# Patient Record
Sex: FEMALE | Employment: FULL TIME | URBAN - METROPOLITAN AREA
[De-identification: names, ages, dates, MRNs, and addresses within clinical notes are randomized per-mention and may not be internally consistent; named-entity substitution may affect disease eponyms.]

---

## 2017-01-01 ENCOUNTER — TELEPHONE (OUTPATIENT)
Dept: GYNECOLOGY | Age: 52
End: 2017-01-01

## 2017-01-01 ENCOUNTER — OFFICE VISIT (OUTPATIENT)
Dept: GYNECOLOGY | Age: 52
End: 2017-01-01

## 2017-01-01 ENCOUNTER — HOSPITAL ENCOUNTER (OUTPATIENT)
Dept: INFUSION THERAPY | Age: 52
Discharge: HOME | End: 2017-11-30
Payer: COMMERCIAL

## 2017-01-01 VITALS
RESPIRATION RATE: 16 BRPM | HEART RATE: 81 BPM | SYSTOLIC BLOOD PRESSURE: 105 MMHG | DIASTOLIC BLOOD PRESSURE: 61 MMHG | TEMPERATURE: 97.9 F

## 2017-01-01 VITALS
BODY MASS INDEX: 23.07 KG/M2 | WEIGHT: 147 LBS | HEIGHT: 67 IN | SYSTOLIC BLOOD PRESSURE: 119 MMHG | DIASTOLIC BLOOD PRESSURE: 70 MMHG | HEART RATE: 80 BPM

## 2017-01-01 DIAGNOSIS — R11.2 CINV (CHEMOTHERAPY-INDUCED NAUSEA AND VOMITING): ICD-10-CM

## 2017-01-01 DIAGNOSIS — T45.1X5A CINV (CHEMOTHERAPY-INDUCED NAUSEA AND VOMITING): ICD-10-CM

## 2017-01-01 DIAGNOSIS — Z15.01 BRCA1 POSITIVE: ICD-10-CM

## 2017-01-01 DIAGNOSIS — T45.1X5A CINV (CHEMOTHERAPY-INDUCED NAUSEA AND VOMITING): Primary | ICD-10-CM

## 2017-01-01 DIAGNOSIS — C56.3 MALIGNANT NEOPLASM OF BOTH OVARIES (HCC): ICD-10-CM

## 2017-01-01 DIAGNOSIS — C56.3 MALIGNANT NEOPLASM OF BOTH OVARIES (HCC): Primary | ICD-10-CM

## 2017-01-01 DIAGNOSIS — Z15.09 BRCA1 POSITIVE: ICD-10-CM

## 2017-01-01 DIAGNOSIS — R11.2 CINV (CHEMOTHERAPY-INDUCED NAUSEA AND VOMITING): Primary | ICD-10-CM

## 2017-01-01 LAB
ALBUMIN SERPL-MCNC: 3.3 G/DL (ref 3.5–5)
ALBUMIN SERPL-MCNC: 4.6 G/DL (ref 3.5–5.5)
ALBUMIN/GLOB SERPL: 0.8 {RATIO} (ref 1.1–2.2)
ALBUMIN/GLOB SERPL: 2 {RATIO} (ref 1.2–2.2)
ALP SERPL-CCNC: 107 U/L (ref 45–117)
ALP SERPL-CCNC: 87 IU/L (ref 39–117)
ALT SERPL-CCNC: 20 IU/L (ref 0–32)
ALT SERPL-CCNC: 44 U/L (ref 12–78)
ANION GAP SERPL CALC-SCNC: 6 MMOL/L (ref 5–15)
AST SERPL-CCNC: 19 IU/L (ref 0–40)
AST SERPL-CCNC: 21 U/L (ref 15–37)
BASOPHILS # BLD AUTO: 0 X10E3/UL (ref 0–0.2)
BASOPHILS # BLD: 0 K/UL (ref 0–0.1)
BASOPHILS NFR BLD AUTO: 1 %
BASOPHILS NFR BLD: 0 % (ref 0–1)
BILIRUB SERPL-MCNC: 0.2 MG/DL (ref 0.2–1)
BILIRUB SERPL-MCNC: 0.2 MG/DL (ref 0–1.2)
BUN SERPL-MCNC: 15 MG/DL (ref 6–20)
BUN SERPL-MCNC: 15 MG/DL (ref 6–24)
BUN/CREAT SERPL: 14 (ref 9–23)
BUN/CREAT SERPL: 15 (ref 12–20)
CALCIUM SERPL-MCNC: 8.9 MG/DL (ref 8.5–10.1)
CALCIUM SERPL-MCNC: 9.6 MG/DL (ref 8.7–10.2)
CANCER AG125 SERPL-ACNC: 15.6 U/ML (ref 0–38.1)
CANCER AG125 SERPL-ACNC: 74 U/ML (ref 0–30)
CHLORIDE SERPL-SCNC: 104 MMOL/L (ref 96–106)
CHLORIDE SERPL-SCNC: 106 MMOL/L (ref 97–108)
CO2 SERPL-SCNC: 24 MMOL/L (ref 18–29)
CO2 SERPL-SCNC: 27 MMOL/L (ref 21–32)
CREAT SERPL-MCNC: 1.03 MG/DL (ref 0.55–1.02)
CREAT SERPL-MCNC: 1.05 MG/DL (ref 0.57–1)
EOSINOPHIL # BLD AUTO: 0.1 X10E3/UL (ref 0–0.4)
EOSINOPHIL # BLD: 0 K/UL (ref 0–0.4)
EOSINOPHIL NFR BLD AUTO: 2 %
EOSINOPHIL NFR BLD: 1 % (ref 0–7)
ERYTHROCYTE [DISTWIDTH] IN BLOOD BY AUTOMATED COUNT: 13.8 % (ref 11.5–14.5)
ERYTHROCYTE [DISTWIDTH] IN BLOOD BY AUTOMATED COUNT: 15.2 % (ref 12.3–15.4)
GFR SERPLBLD CREATININE-BSD FMLA CKD-EPI: 61 ML/MIN/1.73
GFR SERPLBLD CREATININE-BSD FMLA CKD-EPI: 71 ML/MIN/1.73
GLOBULIN SER CALC-MCNC: 2.3 G/DL (ref 1.5–4.5)
GLOBULIN SER CALC-MCNC: 3.9 G/DL (ref 2–4)
GLUCOSE SERPL-MCNC: 101 MG/DL (ref 65–100)
GLUCOSE SERPL-MCNC: 61 MG/DL (ref 65–99)
HCT VFR BLD AUTO: 31.8 % (ref 35–47)
HCT VFR BLD AUTO: 33.4 % (ref 34–46.6)
HGB BLD-MCNC: 10.4 G/DL (ref 11.5–16)
HGB BLD-MCNC: 10.9 G/DL (ref 11.1–15.9)
IMM GRANULOCYTES # BLD: 0 X10E3/UL (ref 0–0.1)
IMM GRANULOCYTES NFR BLD: 0 %
LYMPHOCYTES # BLD AUTO: 1.2 X10E3/UL (ref 0.7–3.1)
LYMPHOCYTES # BLD: 1.6 K/UL (ref 0.8–3.5)
LYMPHOCYTES NFR BLD AUTO: 36 %
LYMPHOCYTES NFR BLD: 32 % (ref 12–49)
MAGNESIUM SERPL-MCNC: 2.2 MG/DL (ref 1.6–2.3)
MCH RBC QN AUTO: 31 PG (ref 26–34)
MCH RBC QN AUTO: 31.6 PG (ref 26.6–33)
MCHC RBC AUTO-ENTMCNC: 32.6 G/DL (ref 31.5–35.7)
MCHC RBC AUTO-ENTMCNC: 32.7 G/DL (ref 30–36.5)
MCV RBC AUTO: 94.9 FL (ref 80–99)
MCV RBC AUTO: 97 FL (ref 79–97)
MONOCYTES # BLD AUTO: 0.5 X10E3/UL (ref 0.1–0.9)
MONOCYTES # BLD: 0.4 K/UL (ref 0–1)
MONOCYTES NFR BLD AUTO: 14 %
MONOCYTES NFR BLD: 7 % (ref 5–13)
NEUTROPHILS # BLD AUTO: 1.6 X10E3/UL (ref 1.4–7)
NEUTROPHILS NFR BLD AUTO: 47 %
NEUTS SEG # BLD: 3 K/UL (ref 1.8–8)
NEUTS SEG NFR BLD: 60 % (ref 32–75)
PLATELET # BLD AUTO: 124 X10E3/UL (ref 150–379)
PLATELET # BLD AUTO: 160 K/UL (ref 150–400)
POTASSIUM SERPL-SCNC: 3.9 MMOL/L (ref 3.5–5.1)
POTASSIUM SERPL-SCNC: 4.3 MMOL/L (ref 3.5–5.2)
PROT SERPL-MCNC: 6.9 G/DL (ref 6–8.5)
PROT SERPL-MCNC: 7.2 G/DL (ref 6.4–8.2)
RBC # BLD AUTO: 3.35 M/UL (ref 3.8–5.2)
RBC # BLD AUTO: 3.45 X10E6/UL (ref 3.77–5.28)
SODIUM SERPL-SCNC: 139 MMOL/L (ref 136–145)
SODIUM SERPL-SCNC: 143 MMOL/L (ref 134–144)
WBC # BLD AUTO: 3.4 X10E3/UL (ref 3.4–10.8)
WBC # BLD AUTO: 5 K/UL (ref 3.6–11)

## 2017-01-01 PROCEDURE — 77030012965 HC NDL HUBR BBMI -A

## 2017-01-01 PROCEDURE — 36591 DRAW BLOOD OFF VENOUS DEVICE: CPT

## 2017-01-01 PROCEDURE — 86304 IMMUNOASSAY TUMOR CA 125: CPT | Performed by: OBSTETRICS & GYNECOLOGY

## 2017-01-01 PROCEDURE — 74011250636 HC RX REV CODE- 250/636: Performed by: OBSTETRICS & GYNECOLOGY

## 2017-01-01 PROCEDURE — 36415 COLL VENOUS BLD VENIPUNCTURE: CPT | Performed by: OBSTETRICS & GYNECOLOGY

## 2017-01-01 PROCEDURE — 74011000250 HC RX REV CODE- 250: Performed by: OBSTETRICS & GYNECOLOGY

## 2017-01-01 PROCEDURE — 80053 COMPREHEN METABOLIC PANEL: CPT | Performed by: OBSTETRICS & GYNECOLOGY

## 2017-01-01 PROCEDURE — 85025 COMPLETE CBC W/AUTO DIFF WBC: CPT | Performed by: OBSTETRICS & GYNECOLOGY

## 2017-01-01 RX ORDER — OLAPARIB 150 MG/1
TABLET, FILM COATED ORAL
COMMUNITY
Start: 2017-11-20 | End: 2018-01-01 | Stop reason: DRUGHIGH

## 2017-01-01 RX ORDER — OXYMETAZOLINE HCL 0.05 %
2 SPRAY, NON-AEROSOL (ML) NASAL 2 TIMES DAILY
COMMUNITY
End: 2018-01-01 | Stop reason: ALTCHOICE

## 2017-01-01 RX ORDER — SODIUM CHLORIDE 9 MG/ML
10 INJECTION INTRAMUSCULAR; INTRAVENOUS; SUBCUTANEOUS AS NEEDED
Status: ACTIVE | OUTPATIENT
Start: 2017-01-01 | End: 2017-01-01

## 2017-01-01 RX ORDER — HEPARIN 100 UNIT/ML
500 SYRINGE INTRAVENOUS AS NEEDED
Status: ACTIVE | OUTPATIENT
Start: 2017-01-01 | End: 2017-01-01

## 2017-01-01 RX ORDER — SODIUM CHLORIDE 0.9 % (FLUSH) 0.9 %
5-10 SYRINGE (ML) INJECTION AS NEEDED
Status: ACTIVE | OUTPATIENT
Start: 2017-01-01 | End: 2017-01-01

## 2017-01-01 RX ORDER — PROMETHAZINE HYDROCHLORIDE 12.5 MG/1
12.5 TABLET ORAL
Qty: 40 TAB | Refills: 2 | Status: SHIPPED | OUTPATIENT
Start: 2017-01-01 | End: 2018-01-01 | Stop reason: ALTCHOICE

## 2017-01-01 RX ORDER — OLAPARIB 100 MG/1
TABLET, FILM COATED ORAL
COMMUNITY
Start: 2017-11-20 | End: 2018-01-01 | Stop reason: DRUGHIGH

## 2017-01-01 RX ADMIN — SODIUM CHLORIDE, PRESERVATIVE FREE 500 UNITS: 5 INJECTION INTRAVENOUS at 17:05

## 2017-01-01 RX ADMIN — SODIUM CHLORIDE 10 ML: 9 INJECTION, SOLUTION INTRAMUSCULAR; INTRAVENOUS; SUBCUTANEOUS at 17:05

## 2017-01-01 RX ADMIN — Medication 10 ML: at 17:05

## 2017-01-16 ENCOUNTER — HOSPITAL ENCOUNTER (OUTPATIENT)
Dept: INFUSION THERAPY | Age: 52
Discharge: HOME | End: 2017-01-16
Payer: COMMERCIAL

## 2017-01-16 VITALS
TEMPERATURE: 97.9 F | HEART RATE: 90 BPM | RESPIRATION RATE: 16 BRPM | SYSTOLIC BLOOD PRESSURE: 106 MMHG | DIASTOLIC BLOOD PRESSURE: 49 MMHG | OXYGEN SATURATION: 100 %

## 2017-01-16 LAB
ALBUMIN SERPL BCP-MCNC: 3.8 G/DL (ref 3.5–5)
ALBUMIN/GLOB SERPL: 1 {RATIO} (ref 1.1–2.2)
ALP SERPL-CCNC: 126 U/L (ref 45–117)
ALT SERPL-CCNC: 58 U/L (ref 12–78)
ANION GAP BLD CALC-SCNC: 7 MMOL/L (ref 5–15)
AST SERPL W P-5'-P-CCNC: 25 U/L (ref 15–37)
BASOPHILS # BLD AUTO: 0 K/UL (ref 0–0.1)
BASOPHILS # BLD: 0 % (ref 0–1)
BILIRUB SERPL-MCNC: 0.1 MG/DL (ref 0.2–1)
BUN SERPL-MCNC: 17 MG/DL (ref 6–20)
BUN/CREAT SERPL: 22 (ref 12–20)
CALCIUM SERPL-MCNC: 9.4 MG/DL (ref 8.5–10.1)
CHLORIDE SERPL-SCNC: 103 MMOL/L (ref 97–108)
CO2 SERPL-SCNC: 28 MMOL/L (ref 21–32)
CREAT SERPL-MCNC: 0.78 MG/DL (ref 0.55–1.02)
EOSINOPHIL # BLD: 0.1 K/UL (ref 0–0.4)
EOSINOPHIL NFR BLD: 2 % (ref 0–7)
ERYTHROCYTE [DISTWIDTH] IN BLOOD BY AUTOMATED COUNT: 12.7 % (ref 11.5–14.5)
GLOBULIN SER CALC-MCNC: 3.7 G/DL (ref 2–4)
GLUCOSE SERPL-MCNC: 106 MG/DL (ref 65–100)
HCT VFR BLD AUTO: 35.8 % (ref 35–47)
HGB BLD-MCNC: 11.8 G/DL (ref 11.5–16)
LYMPHOCYTES # BLD AUTO: 37 % (ref 12–49)
LYMPHOCYTES # BLD: 1.7 K/UL (ref 0.8–3.5)
MCH RBC QN AUTO: 31.8 PG (ref 26–34)
MCHC RBC AUTO-ENTMCNC: 33 G/DL (ref 30–36.5)
MCV RBC AUTO: 96.5 FL (ref 80–99)
MONOCYTES # BLD: 0.5 K/UL (ref 0–1)
MONOCYTES NFR BLD AUTO: 10 % (ref 5–13)
NEUTS SEG # BLD: 2.3 K/UL (ref 1.8–8)
NEUTS SEG NFR BLD AUTO: 51 % (ref 32–75)
PLATELET # BLD AUTO: 203 K/UL (ref 150–400)
POTASSIUM SERPL-SCNC: 4.2 MMOL/L (ref 3.5–5.1)
PROT SERPL-MCNC: 7.5 G/DL (ref 6.4–8.2)
RBC # BLD AUTO: 3.71 M/UL (ref 3.8–5.2)
SODIUM SERPL-SCNC: 138 MMOL/L (ref 136–145)
WBC # BLD AUTO: 4.4 K/UL (ref 3.6–11)

## 2017-01-16 PROCEDURE — 36591 DRAW BLOOD OFF VENOUS DEVICE: CPT

## 2017-01-16 PROCEDURE — 80053 COMPREHEN METABOLIC PANEL: CPT | Performed by: OBSTETRICS & GYNECOLOGY

## 2017-01-16 PROCEDURE — 74011250636 HC RX REV CODE- 250/636: Performed by: OBSTETRICS & GYNECOLOGY

## 2017-01-16 PROCEDURE — 36415 COLL VENOUS BLD VENIPUNCTURE: CPT | Performed by: OBSTETRICS & GYNECOLOGY

## 2017-01-16 PROCEDURE — 86304 IMMUNOASSAY TUMOR CA 125: CPT | Performed by: OBSTETRICS & GYNECOLOGY

## 2017-01-16 PROCEDURE — 74011000250 HC RX REV CODE- 250: Performed by: OBSTETRICS & GYNECOLOGY

## 2017-01-16 PROCEDURE — 77030012965 HC NDL HUBR BBMI -A

## 2017-01-16 PROCEDURE — 85025 COMPLETE CBC W/AUTO DIFF WBC: CPT | Performed by: OBSTETRICS & GYNECOLOGY

## 2017-01-16 RX ORDER — HEPARIN 100 UNIT/ML
500 SYRINGE INTRAVENOUS AS NEEDED
Status: ACTIVE | OUTPATIENT
Start: 2017-01-16 | End: 2017-01-17

## 2017-01-16 RX ORDER — SODIUM CHLORIDE 0.9 % (FLUSH) 0.9 %
5-10 SYRINGE (ML) INJECTION AS NEEDED
Status: DISCONTINUED | OUTPATIENT
Start: 2017-01-16 | End: 2017-01-20 | Stop reason: HOSPADM

## 2017-01-16 RX ORDER — SODIUM CHLORIDE 9 MG/ML
10 INJECTION INTRAMUSCULAR; INTRAVENOUS; SUBCUTANEOUS AS NEEDED
Status: ACTIVE | OUTPATIENT
Start: 2017-01-16 | End: 2017-01-17

## 2017-01-16 RX ADMIN — Medication 500 UNITS: at 17:08

## 2017-01-16 RX ADMIN — SODIUM CHLORIDE 10 ML: 9 INJECTION, SOLUTION INTRAMUSCULAR; INTRAVENOUS; SUBCUTANEOUS at 17:06

## 2017-01-16 RX ADMIN — Medication 10 ML: at 17:08

## 2017-01-16 NOTE — PROGRESS NOTES
[facility] Short Visit Progress Note    1700 Pt admit to [place] for port flush/labs. Pt ambulatory in stable condition. Assessment completed. No new concerns voiced. Please review pending lab results in [facility]. Visit Vitals    /49 (BP 1 Location: Right arm, BP Patient Position: Sitting)    Pulse 90    Temp 97.9 °F (36.6 °C)    Resp 16    LMP 02/02/2015 (Approximate)    SpO2 100%    Breastfeeding No       Port with positive blood return. Labs drawn, flushed, heparinized and de-accessed per protocol. 1710 Pt tolerated treatment well. D/c home ambulatory in no distress. Pt aware of next appointment scheduled for 03/13/17.

## 2017-01-18 LAB — CANCER AG125 SERPL-ACNC: 55 U/ML (ref 0–30)

## 2017-01-19 ENCOUNTER — TELEPHONE (OUTPATIENT)
Dept: GYNECOLOGY | Age: 52
End: 2017-01-19

## 2017-01-19 NOTE — TELEPHONE ENCOUNTER
Pt informed of Ca 125 results of 55 drawn on 1/16/17. Per Dr. Danielle Courtney he will call her today and will repeat labs in 1 month. Had ct scan 12/8/16.

## 2017-01-31 RX ORDER — LORAZEPAM 1 MG/1
TABLET ORAL
Qty: 30 TAB | Refills: 1 | Status: SHIPPED | OUTPATIENT
Start: 2017-01-31 | End: 2017-03-30 | Stop reason: SDUPTHER

## 2017-02-02 ENCOUNTER — OFFICE VISIT (OUTPATIENT)
Dept: GYNECOLOGY | Age: 52
End: 2017-02-02

## 2017-02-02 VITALS
HEART RATE: 98 BPM | SYSTOLIC BLOOD PRESSURE: 104 MMHG | DIASTOLIC BLOOD PRESSURE: 68 MMHG | BODY MASS INDEX: 21.82 KG/M2 | HEIGHT: 67 IN | WEIGHT: 139 LBS

## 2017-02-02 DIAGNOSIS — C56.9 OVARIAN CANCER, UNSPECIFIED LATERALITY (HCC): Primary | ICD-10-CM

## 2017-02-02 DIAGNOSIS — R10.32 LEFT LOWER QUADRANT PAIN: ICD-10-CM

## 2017-02-02 NOTE — PROGRESS NOTES
[facility] [address], [address] [facility]  [phone]  MD Naun Chi MD  Office Note  Patient ID:  Name:  Cecily Hurtado  MRN:  [id number]  :  1965/52 y.o. Date:  2017      HISTORY OF PRESENT ILLNESS:  Cecily Hurtado is a 46 y.o.  postmenopausal female with stage IIIC ovarian cancer. She underwent X-lap, hysterectomy, and tumor debulking in 2015. She was recommended adjuvant chemotherapy with 6 cycles of Taxol and Carboplatin. Her post-treatment PET/CT was negative for disease. Her Myriad results also found her to have a deleterious BRCA 1 mutation. As for the BRCA 1 mutation, I had her see one of our breast surgeons, Dr. Dorene Ellis, to talk about options, specifically screening and prophylactic surgery. She is going to hold off on surgery for now. She has decided not to have her daughter tested at this time. She presented in December ahead of her scheduled visit complaining of left lower quadrant pain for several weeks. The pain was intermittent and crampy. She also had some pain with bowel movements. Certain foods also exacerbated it. I sent her for a CT of the abdomen/pelvis which showed no obvious disease. In late December she had a colonoscopy which could not be completed due to adhesions. A subsequent barium enema was negative. A subsequent CA-125 was mildly elevated at 55, up from 10 three months earlier. She is still having the same pain. It's no worse, but not any better. ROS:   and GI review: Negative  Cardiopulmonary review:Negative   Musculoskeletal:  Negative    A comprehensive review of systems was negative except for that written in the History of Present Illness.  , 10 point ROS      Problem List:  Patient Active Problem List    Diagnosis Date Noted    BRCA1 positive 09/10/2015    Ovarian cancer ([facility] [address] 2015    S/P total abdominal hysterectomy 2015  Pelvic mass 03/06/2015     PMH:  Past Medical History   Diagnosis Date    Anxiety     Calculus of kidney 1/13     right    Hernia, inguinal, left 2012    MS (multiple sclerosis) ([facility]) 1996    Nausea & vomiting     Ovarian cancer ([facility] [address] 3/2015     high grade, stage 3C papillary serous    Thromboembolus ([facility] [address] 8/2007     lower abdomen post fall      PSH:  Past Surgical History   Procedure Laterality Date    Hx other surgical  8/2007     green filter    Hx gyn  2009     endometrial ablation with punctured uterus    Hx collins and bso  3/2014     ovarian cancer      Social History:  Social History   Substance Use Topics    Smoking status: Never Smoker    Smokeless tobacco: Never Used    Alcohol use No      Family History:  Family History   Problem Relation Age of Onset    Cancer Paternal Grandmother      breast    Heart Disease Mother     Heart Disease Father       Medications: (reviewed)  Current Outpatient Prescriptions   Medication Sig    LORazepam (ATIVAN) 1 mg tablet TAKE 1 TABLET BY MOUTH AT BEDTIME    cefdinir (OMNICEF) 300 mg capsule     amitriptyline (ELAVIL) 25 mg tablet TAKE 1 TABLET BY MOUTH NIGHTLY    XARELTO 20 mg tab tablet     gabapentin (NEURONTIN) 100 mg capsule Take 2 Caps by mouth three (3) times daily.  gabapentin (NEURONTIN) 400 mg capsule Take 1 Cap by mouth three (3) times daily.  BOOSTRIX TDAP 2.5-8-5 Lf-mcg-Lf/0.5mL syrg     amitriptyline (ELAVIL) 25 mg tablet Take 1 Tab by mouth nightly.  RIVAROXABAN (XARELTO PO) Take 20 mg by mouth.  lidocaine-prilocaine (EMLA) topical cream Apply small amount to port area and cover with band aid 1 hour before chemo treatment    TECFIDERA 120 mg cpDR      No current facility-administered medications for this visit. Allergies: (reviewed)  Allergies   Allergen Reactions    Pcn [Penicillins] Rash          OBJECTIVE:    Physical Exam:  VITAL SIGNS: There were no vitals filed for this visit.   There is no height or weight on file to calculate BMI. GENERAL FILIBERTO: Conversant, alert, oriented. No acute distress. HEENT: HEENT. No thyroid enlargement. No JVD. Neck: Supple without restrictions. RESPIRATORY: Clear to auscultation and percussion to the bases. No CVAT. CARDIOVASC: RRR without murmur/rub. GASTROINT: Soft, non-distended, without masses or organomegaly. MUSCULOSKEL: no joint tenderness, deformity or swelling   EXTREMITIES: extremities normal, atraumatic, no cyanosis or edema   PELVIC: Normal vulva. Normal vagina. Uterus, cervix, ovaries absent. No masses or nodularity. RECTAL: Deferred   CHARMAINE SURVEY: No suspicious lymphadenopathy or edema noted. NEURO: Grossly intact. No acute deficit. Lab Results   Component Value Date/Time    WBC 4.4 01/16/2017 05:07 PM    HGB 11.8 01/16/2017 05:07 PM    HCT 35.8 01/16/2017 05:07 PM    PLATELET 624 52/10/7699 05:07 PM    MCV 96.5 01/16/2017 05:07 PM     Lab Results   Component Value Date/Time    Sodium 138 01/16/2017 05:07 PM    Potassium 4.2 01/16/2017 05:07 PM    Chloride 103 01/16/2017 05:07 PM    CO2 28 01/16/2017 05:07 PM    Anion gap 7 01/16/2017 05:07 PM    Glucose 106 01/16/2017 05:07 PM    BUN 17 01/16/2017 05:07 PM    Creatinine 0.78 01/16/2017 05:07 PM    BUN/Creatinine ratio 22 01/16/2017 05:07 PM    GFR est AA >60 01/16/2017 05:07 PM    GFR est non-AA >60 01/16/2017 05:07 PM    Calcium 9.4 01/16/2017 05:07 PM       CT of abdomen/pelvis (12/8/16)  The visualized portions of the lung bases are clear.     Incidental hypodensity is seen adjacent to ligamentum teres. The liver is  otherwise unremarkable. The spleen, bilateral adrenal glands, pancreas, and  gallbladder are unremarkable. The bilateral kidneys are unremarkable without  evidence of hydronephrosis.     The abdominal aorta is normal in size. An IVC filter is in place. A stent is  present in the left iliac vein. No retroperitoneal lymphadenopathy.     The stomach and small bowel appear unremarkable. The appendix is normal. No  obstruction, free fluid, or free air.     The patient is status hysterectomy and ovarian resection. There is unchanged  scarring above the vaginal cuff and bladder.     No evidence of a destructive osseous lesion. IMPRESSION:   1. Status post hysterectomy and ovarian resection. There is unchanged scarring  over the vaginal cuff and bladder. 2. No evidence of acute process. Barium enema (12/22/16)   KUB shows unremarkable bowel gas pattern. There is IVC filter  and left iliac stent. Retrograde flow of barium without air fills the colon with  reflux into the appendix and extensive reflux into the unremarkable distal  ileum. Colon is well cleansed. Sigmoid is redundant and tortuous without  obstruction, and without mass or stricture identified.     Mucosal pattern is smooth. No mass or polyp is encountered. There is no  stricture. Diverticula are not seen. Postevacuation image shows incomplete  emptying of the colon but no level of obstruction. Radiation exposure index:  45.1Gycm2.     IMPRESSION: Unremarkable Barium Enema      IMPRESSION/PLAN:  Roxi Richmond is a 46 y.o. female with a diagnosis of stage IIIC ovarian cancer. She underwent tumor debulking followed by 6 cycles of Taxol and Carboplatin chemotherapy. Her post-treatment PET/CT was negative. She has no evidence of disease on her exam and her CT scan revealed nothing obvious, although her CA-125 is mildly elevated. This is obviously concerning for recurrence, but not definite. The elevated CA-125 could be secondary to inflammation from her recent procedures. I recommend that we wait a few more weeks and repeat a CT scan and a CA-125. We may ultimately need to consider diagnostic laparoscopy if the pain continues and if there is no definitive recurrence.       Signed By: Mery Escobar MD     2/2/2017/3:54 PM

## 2017-02-13 ENCOUNTER — HOSPITAL ENCOUNTER (OUTPATIENT)
Dept: INFUSION THERAPY | Age: 52
Discharge: HOME | End: 2017-02-13
Payer: COMMERCIAL

## 2017-02-13 VITALS
TEMPERATURE: 97.4 F | RESPIRATION RATE: 16 BRPM | DIASTOLIC BLOOD PRESSURE: 58 MMHG | HEART RATE: 87 BPM | SYSTOLIC BLOOD PRESSURE: 116 MMHG

## 2017-02-13 PROCEDURE — 86304 IMMUNOASSAY TUMOR CA 125: CPT | Performed by: OBSTETRICS & GYNECOLOGY

## 2017-02-13 PROCEDURE — 77030012965 HC NDL HUBR BBMI -A

## 2017-02-13 PROCEDURE — 36591 DRAW BLOOD OFF VENOUS DEVICE: CPT

## 2017-02-13 PROCEDURE — 74011250636 HC RX REV CODE- 250/636: Performed by: OBSTETRICS & GYNECOLOGY

## 2017-02-13 PROCEDURE — 74011000250 HC RX REV CODE- 250: Performed by: OBSTETRICS & GYNECOLOGY

## 2017-02-13 PROCEDURE — 36415 COLL VENOUS BLD VENIPUNCTURE: CPT | Performed by: OBSTETRICS & GYNECOLOGY

## 2017-02-13 RX ORDER — SODIUM CHLORIDE 0.9 % (FLUSH) 0.9 %
10-40 SYRINGE (ML) INJECTION AS NEEDED
Status: DISCONTINUED | OUTPATIENT
Start: 2017-02-13 | End: 2017-02-17 | Stop reason: HOSPADM

## 2017-02-13 RX ORDER — SODIUM CHLORIDE 9 MG/ML
10 INJECTION INTRAMUSCULAR; INTRAVENOUS; SUBCUTANEOUS AS NEEDED
Status: ACTIVE | OUTPATIENT
Start: 2017-02-13 | End: 2017-02-14

## 2017-02-13 RX ORDER — HEPARIN 100 UNIT/ML
500 SYRINGE INTRAVENOUS AS NEEDED
Status: ACTIVE | OUTPATIENT
Start: 2017-02-13 | End: 2017-02-14

## 2017-02-13 RX ADMIN — SODIUM CHLORIDE 10 ML: 9 INJECTION, SOLUTION INTRAMUSCULAR; INTRAVENOUS; SUBCUTANEOUS at 16:53

## 2017-02-13 RX ADMIN — Medication 10 ML: at 16:54

## 2017-02-13 RX ADMIN — Medication 500 UNITS: at 16:55

## 2017-02-14 ENCOUNTER — HOSPITAL ENCOUNTER (OUTPATIENT)
Dept: CT IMAGING | Age: 52
Discharge: HOME | End: 2017-02-14
Attending: OBSTETRICS & GYNECOLOGY
Payer: COMMERCIAL

## 2017-02-14 DIAGNOSIS — C56.9 OVARIAN CANCER, UNSPECIFIED LATERALITY (HCC): ICD-10-CM

## 2017-02-14 DIAGNOSIS — R10.32 LEFT LOWER QUADRANT PAIN: ICD-10-CM

## 2017-02-14 PROCEDURE — 74011636320 HC RX REV CODE- 636/320: Performed by: OBSTETRICS & GYNECOLOGY

## 2017-02-14 PROCEDURE — 74011000258 HC RX REV CODE- 258: Performed by: OBSTETRICS & GYNECOLOGY

## 2017-02-14 PROCEDURE — 74177 CT ABD & PELVIS W/CONTRAST: CPT

## 2017-02-14 RX ORDER — SODIUM CHLORIDE 0.9 % (FLUSH) 0.9 %
10 SYRINGE (ML) INJECTION
Status: COMPLETED | OUTPATIENT
Start: 2017-02-14 | End: 2017-02-14

## 2017-02-14 RX ADMIN — SODIUM CHLORIDE 100 ML: 900 INJECTION, SOLUTION INTRAVENOUS at 18:42

## 2017-02-14 RX ADMIN — Medication 10 ML: at 18:42

## 2017-02-14 RX ADMIN — IOPAMIDOL 100 ML: 755 INJECTION, SOLUTION INTRAVENOUS at 18:42

## 2017-02-15 LAB — CANCER AG125 SERPL-ACNC: 99 U/ML (ref 0–30)

## 2017-02-16 ENCOUNTER — OFFICE VISIT (OUTPATIENT)
Dept: GYNECOLOGY | Age: 52
End: 2017-02-16

## 2017-02-16 VITALS
HEIGHT: 67 IN | WEIGHT: 137.6 LBS | BODY MASS INDEX: 21.6 KG/M2 | DIASTOLIC BLOOD PRESSURE: 68 MMHG | HEART RATE: 114 BPM | SYSTOLIC BLOOD PRESSURE: 137 MMHG

## 2017-02-16 DIAGNOSIS — Z15.01 BRCA1 POSITIVE: ICD-10-CM

## 2017-02-16 DIAGNOSIS — C56.9 OVARIAN CANCER, UNSPECIFIED LATERALITY (HCC): Primary | ICD-10-CM

## 2017-02-16 DIAGNOSIS — Z15.09 BRCA1 POSITIVE: ICD-10-CM

## 2017-02-16 RX ORDER — POLYETHYLENE GLYCOL 3350, SODIUM CHLORIDE, SODIUM BICARBONATE, POTASSIUM CHLORIDE 420; 11.2; 5.72; 1.48 G/4L; G/4L; G/4L; G/4L
POWDER, FOR SOLUTION ORAL
COMMUNITY
Start: 2016-12-17 | End: 2017-02-16 | Stop reason: ALTCHOICE

## 2017-02-16 NOTE — PROGRESS NOTES
[address] [place] [address] [address], [address] [facility]  [phone]  Virlinda Bamberger, MD Casey Bue, MD  Office Note  Patient ID:  Name:  Earvin Spatz  MRN:  [id number]  :  1965/52 y.o. Date:  2017      HISTORY OF PRESENT ILLNESS:  Earvin Spatz is a 46 y.o.  postmenopausal female with stage IIIC ovarian cancer. She underwent X-lap, hysterectomy, and tumor debulking in 2015. She was recommended adjuvant chemotherapy with 6 cycles of Taxol and Carboplatin. Her post-treatment PET/CT was negative for disease. Her Myriad results also found her to have a deleterious BRCA 1 mutation. As for the BRCA 1 mutation, I had her see one of our breast surgeons, Dr. Lawrence Lazaro, to talk about options, specifically screening and prophylactic surgery. She is going to hold off on surgery for now. She has decided not to have her daughter tested at this time. She presented in December ahead of her scheduled visit complaining of left lower quadrant pain for several weeks. The pain was intermittent and crampy. She also had some pain with bowel movements. Certain foods also exacerbated it. I sent her for a CT of the abdomen/pelvis which showed no obvious disease. In late December she had a colonoscopy which could not be completed due to adhesions. A subsequent barium enema was negative. A subsequent CA-125 was mildly elevated at 55, up from 10 three months earlier. Her CA-125 is now up to 99 and her CT suggests recurrent disease. She presents today to discuss these results. ROS:   and GI review: Negative  Cardiopulmonary review:Negative   Musculoskeletal:  Negative    A comprehensive review of systems was negative except for that written in the History of Present Illness.  , 10 point ROS      Problem List:  Patient Active Problem List    Diagnosis Date Noted    BRCA1 positive 09/10/2015    Ovarian cancer ([facility] [address] 2015    S/P total abdominal hysterectomy 03/18/2015    Pelvic mass 03/06/2015     PMH:  Past Medical History   Diagnosis Date    Anxiety     Calculus of kidney 1/13     right    Hernia, inguinal, left 2012    MS (multiple sclerosis) ([facility]) 1996    Nausea & vomiting     Ovarian cancer ([facility] [address] 3/2015     high grade, stage 3C papillary serous    Thromboembolus ([facility] [address] 8/2007     lower abdomen post fall      PSH:  Past Surgical History   Procedure Laterality Date    Hx other surgical  8/2007     green filter    Hx gyn  2009     endometrial ablation with punctured uterus    Hx collins and bso  3/2014     ovarian cancer      Social History:  Social History   Substance Use Topics    Smoking status: Never Smoker    Smokeless tobacco: Never Used    Alcohol use No      Family History:  Family History   Problem Relation Age of Onset    Cancer Paternal Grandmother      breast    Heart Disease Mother     Heart Disease Father       Medications: (reviewed)  Current Outpatient Prescriptions   Medication Sig    LORazepam (ATIVAN) 1 mg tablet TAKE 1 TABLET BY MOUTH AT BEDTIME    XARELTO 20 mg tab tablet     gabapentin (NEURONTIN) 400 mg capsule Take 1 Cap by mouth three (3) times daily.  amitriptyline (ELAVIL) 25 mg tablet Take 1 Tab by mouth nightly.  lidocaine-prilocaine (EMLA) topical cream Apply small amount to port area and cover with band aid 1 hour before chemo treatment    TECFIDERA 120 mg cpDR      No current facility-administered medications for this visit. Facility-Administered Medications Ordered in Other Visits   Medication Dose Route Frequency    sodium chloride (NS) flush 10-40 mL  10-40 mL IntraVENous PRN     Allergies: (reviewed)  Allergies   Allergen Reactions    Pcn [Penicillins] Rash          OBJECTIVE:    Physical Exam:  VITAL SIGNS: Vitals:    02/16/17 1512   BP: 137/68   Pulse: (!) 114   Weight: 137 lb 9.6 oz (62.4 kg)   Height: 5' 7.01\" (1.702 m)     Body mass index is 21.55 kg/(m^2). GENERAL FILIBERTO: Conversant, alert, oriented. No acute distress. HEENT: HEENT. No thyroid enlargement. No JVD. Neck: Supple without restrictions. RESPIRATORY: Clear to auscultation and percussion to the bases. No CVAT. CARDIOVASC: RRR without murmur/rub. GASTROINT: Soft, non-distended, without masses or organomegaly. MUSCULOSKEL: no joint tenderness, deformity or swelling   EXTREMITIES: extremities normal, atraumatic, no cyanosis or edema   PELVIC: Deferred   RECTAL: Deferred   CHARMAINE SURVEY: No suspicious lymphadenopathy or edema noted. NEURO: Grossly intact. No acute deficit. Lab Results   Component Value Date/Time    WBC 4.4 01/16/2017 05:07 PM    HGB 11.8 01/16/2017 05:07 PM    HCT 35.8 01/16/2017 05:07 PM    PLATELET 064 69/67/1632 05:07 PM    MCV 96.5 01/16/2017 05:07 PM     Lab Results   Component Value Date/Time    Sodium 138 01/16/2017 05:07 PM    Potassium 4.2 01/16/2017 05:07 PM    Chloride 103 01/16/2017 05:07 PM    CO2 28 01/16/2017 05:07 PM    Anion gap 7 01/16/2017 05:07 PM    Glucose 106 01/16/2017 05:07 PM    BUN 17 01/16/2017 05:07 PM    Creatinine 0.78 01/16/2017 05:07 PM    BUN/Creatinine ratio 22 01/16/2017 05:07 PM    GFR est AA >60 01/16/2017 05:07 PM    GFR est non-AA >60 01/16/2017 05:07 PM    Calcium 9.4 01/16/2017 05:07 PM       CT of abdomen/pelvis (12/8/16)  The visualized portions of the lung bases are clear.     Incidental hypodensity is seen adjacent to ligamentum teres. The liver is  otherwise unremarkable. The spleen, bilateral adrenal glands, pancreas, and  gallbladder are unremarkable. The bilateral kidneys are unremarkable without  evidence of hydronephrosis.     The abdominal aorta is normal in size. An IVC filter is in place. A stent is  present in the left iliac vein. No retroperitoneal lymphadenopathy.     The stomach and small bowel appear unremarkable.  The appendix is normal. No  obstruction, free fluid, or free air.     The patient is status hysterectomy and ovarian resection. There is unchanged  scarring above the vaginal cuff and bladder.     No evidence of a destructive osseous lesion. IMPRESSION:   1. Status post hysterectomy and ovarian resection. There is unchanged scarring  over the vaginal cuff and bladder. 2. No evidence of acute process. Barium enema (12/22/16)   KUB shows unremarkable bowel gas pattern. There is IVC filter  and left iliac stent. Retrograde flow of barium without air fills the colon with  reflux into the appendix and extensive reflux into the unremarkable distal  ileum. Colon is well cleansed. Sigmoid is redundant and tortuous without  obstruction, and without mass or stricture identified.     Mucosal pattern is smooth. No mass or polyp is encountered. There is no  stricture. Diverticula are not seen. Postevacuation image shows incomplete  emptying of the colon but no level of obstruction. Radiation exposure index:  45.1Gycm2.     IMPRESSION: Unremarkable Barium Enema      CT of abdomen/pelvis (2/14/17)  Abdomen: The lung bases are clear and the heart size is normal. The distal  esophagus, stomach, duodenum, liver, gallbladder, pancreas, spleen, adrenals,  and kidneys are normal.      Pelvis: There is trace, new ascites in the anterior pelvis (3-79, 602-78), the  anterior left lower quadrant (601-28), the pericecal right lower quadrant  (601-26, 3-72), and around the liver and spleen. The adjacent peritoneum  enhances (601-28, 602-77, 602-50). Curvilinear enhancement in the deep pelvis is  not masslike, but has increased compared to 12/18/2016 and 5/24/2016. Extension  along the peritoneum of the left paracolic gutter has increased (318-695). Enhancement is curvilinear and not nodular; no focal peritoneal or omental  nodules.  The appendiceal mucosa is hyperenhancing (171-94), likely reactive, as  the appendix is not dilated.     The left external iliac vein is chronically occluded, though there is a stent in  place extending from the external iliac vein, through the common iliac, and into  the IVC. A large subcutaneous collateral vein (601-15, 601-18) and drains the  left lower extremity via the right common femoral and external iliac veins. An  IVC filter is in appropriate position below the renal vein ostia.     The small bowel, ileocecal junction, colon, and bladder are otherwise normal.  Post hysterectomy. No free air and no abdominopelvic lymphadenopathy.     IMPRESSION:   1. Trace new ascites, with enhancement of adjacent peritoneum. 2. Increased curvilinear enhancement in the pelvis and left paracolic gutter. No  focal peritoneal or omental nodules to confirm peritoneal carcinomatosis. 3. Chronic left external iliac vein occlusion. The left lower extremity drains  via a large subcutaneous collateral to the right femoral and iliac system. IMPRESSION/PLAN:  Kev James is a 46 y.o. female with a diagnosis of stage IIIC ovarian cancer. She underwent tumor debulking followed by 6 cycles of Taxol and Carboplatin chemotherapy. She completed treatment approximately 18 months ago. Her post-treatment PET/CT was negative. Her abdominal pain has increased and her CT now suggests recurrence, but not definitive. She is not a candidate for any kind of biopsy to confirm. I have recommended getting a PET/CT before making the decision to start back on chemotherapy. She agrees with that plan.         Signed By: Priscilla Han MD     2/16/2017/3:54 PM

## 2017-02-20 ENCOUNTER — HOSPITAL ENCOUNTER (OUTPATIENT)
Dept: PET IMAGING | Age: 52
Discharge: HOME | End: 2017-02-20
Attending: OBSTETRICS & GYNECOLOGY
Payer: COMMERCIAL

## 2017-02-20 DIAGNOSIS — Z15.01 BRCA1 POSITIVE: ICD-10-CM

## 2017-02-20 DIAGNOSIS — Z15.09 BRCA1 POSITIVE: ICD-10-CM

## 2017-02-20 DIAGNOSIS — C56.9 OVARIAN CANCER, UNSPECIFIED LATERALITY (HCC): ICD-10-CM

## 2017-02-20 PROCEDURE — A9552 F18 FDG: HCPCS

## 2017-02-20 RX ORDER — SODIUM CHLORIDE 0.9 % (FLUSH) 0.9 %
10 SYRINGE (ML) INJECTION
Status: COMPLETED | OUTPATIENT
Start: 2017-02-20 | End: 2017-02-20

## 2017-02-20 RX ADMIN — Medication 10 ML: at 12:05

## 2017-02-21 ENCOUNTER — OFFICE VISIT (OUTPATIENT)
Dept: GYNECOLOGY | Age: 52
End: 2017-02-21

## 2017-02-21 VITALS
HEIGHT: 67 IN | SYSTOLIC BLOOD PRESSURE: 121 MMHG | BODY MASS INDEX: 21.5 KG/M2 | HEART RATE: 93 BPM | DIASTOLIC BLOOD PRESSURE: 56 MMHG | WEIGHT: 137 LBS

## 2017-02-21 DIAGNOSIS — Z15.09 BRCA1 POSITIVE: ICD-10-CM

## 2017-02-21 DIAGNOSIS — Z15.01 BRCA1 POSITIVE: ICD-10-CM

## 2017-02-21 DIAGNOSIS — C56.9 OVARIAN CANCER, UNSPECIFIED LATERALITY (HCC): Primary | ICD-10-CM

## 2017-02-21 NOTE — PROGRESS NOTES
[address] [place] [address] [address], [address] [facility]  [phone]  MD Tai Cuevas MD  Office Note  Patient ID:  Name:  Janae Sanchez  MRN:  [id number]  :  1965/52 y.o. Date:  2017      HISTORY OF PRESENT ILLNESS:  Janae Sanchez is a 46 y.o.  postmenopausal female with stage IIIC ovarian cancer. She underwent X-lap, hysterectomy, and tumor debulking in 2015. She was recommended adjuvant chemotherapy with 6 cycles of Taxol and Carboplatin. Her post-treatment PET/CT was negative for disease. Her Myriad results also found her to have a deleterious BRCA 1 mutation. As for the BRCA 1 mutation, I had her see one of our breast surgeons, Dr. Hesham Shah, to talk about options, specifically screening and prophylactic surgery. She is going to hold off on surgery for now. She has decided not to have her daughter tested at this time. She presented in December ahead of her scheduled visit complaining of left lower quadrant pain for several weeks. The pain was intermittent and crampy. She also had some pain with bowel movements. Certain foods also exacerbated it. I sent her for a CT of the abdomen/pelvis which showed no obvious disease. In late December she had a colonoscopy which could not be completed due to adhesions. A subsequent barium enema was negative. A subsequent CA-125 was mildly elevated at 55, up from 10 three months earlier. Her CA-125 is now up to 99 and her CT suggests recurrent disease, although not definitive for recurrence. I recommended a PET/CT. She presents today to discuss those results. ROS:   and GI review: Negative  Cardiopulmonary review:Negative   Musculoskeletal:  Negative    A comprehensive review of systems was negative except for that written in the History of Present Illness.  , 10 point ROS      Problem List:  Patient Active Problem List    Diagnosis Date Noted    BRCA1 positive 09/10/2015    Ovarian cancer ([facility] 04/09/2015    S/P total abdominal hysterectomy 03/18/2015    Pelvic mass 03/06/2015     PMH:  Past Medical History   Diagnosis Date    Anxiety     Calculus of kidney 1/13     right    Hernia, inguinal, left 2012    MS (multiple sclerosis) (HCC) 1996    Nausea & vomiting     Ovarian cancer ([facility] 3/2015     high grade, stage 3C papillary serous    Thromboembolus ([facility] 8/2007     lower abdomen post fall      PSH:  Past Surgical History   Procedure Laterality Date    Hx other surgical  8/2007     green filter    Hx gyn  2009     endometrial ablation with punctured uterus    Hx collins and bso  3/2014     ovarian cancer      Social History:  Social History   Substance Use Topics    Smoking status: Never Smoker    Smokeless tobacco: Never Used    Alcohol use No      Family History:  Family History   Problem Relation Age of Onset    Cancer Paternal Grandmother      breast    Heart Disease Mother     Heart Disease Father       Medications: (reviewed)  Current Outpatient Prescriptions   Medication Sig    LORazepam (ATIVAN) 1 mg tablet TAKE 1 TABLET BY MOUTH AT BEDTIME    XARELTO 20 mg tab tablet     gabapentin (NEURONTIN) 400 mg capsule Take 1 Cap by mouth three (3) times daily.  amitriptyline (ELAVIL) 25 mg tablet Take 1 Tab by mouth nightly.  lidocaine-prilocaine (EMLA) topical cream Apply small amount to port area and cover with band aid 1 hour before chemo treatment    TECFIDERA 120 mg cpDR      No current facility-administered medications for this visit. Allergies: (reviewed)  Allergies   Allergen Reactions    Pcn [Penicillins] Rash          OBJECTIVE:    Physical Exam:  VITAL SIGNS: Vitals:    02/21/17 1538   BP: 121/56   Pulse: 93   Weight: 137 lb (62.1 kg)   Height: 5' 7.01\" (1.702 m)     Body mass index is 21.45 kg/(m^2). GENERAL FILIBERTO: Conversant, alert, oriented. No acute distress. HEENT: HEENT. No thyroid enlargement. No JVD. Neck: Supple without restrictions. RESPIRATORY: Clear to auscultation and percussion to the bases. No CVAT. CARDIOVASC: RRR without murmur/rub. GASTROINT: Soft, non-distended, without masses or organomegaly. MUSCULOSKEL: no joint tenderness, deformity or swelling   EXTREMITIES: extremities normal, atraumatic, no cyanosis or edema   PELVIC: Deferred   RECTAL: Deferred   CHARMAINE SURVEY: No suspicious lymphadenopathy or edema noted. NEURO: Grossly intact. No acute deficit. Lab Results   Component Value Date/Time    WBC 4.4 01/16/2017 05:07 PM    HGB 11.8 01/16/2017 05:07 PM    HCT 35.8 01/16/2017 05:07 PM    PLATELET 031 73/65/4019 05:07 PM    MCV 96.5 01/16/2017 05:07 PM     Lab Results   Component Value Date/Time    Sodium 138 01/16/2017 05:07 PM    Potassium 4.2 01/16/2017 05:07 PM    Chloride 103 01/16/2017 05:07 PM    CO2 28 01/16/2017 05:07 PM    Anion gap 7 01/16/2017 05:07 PM    Glucose 106 01/16/2017 05:07 PM    BUN 17 01/16/2017 05:07 PM    Creatinine 0.78 01/16/2017 05:07 PM    BUN/Creatinine ratio 22 01/16/2017 05:07 PM    GFR est AA >60 01/16/2017 05:07 PM    GFR est non-AA >60 01/16/2017 05:07 PM    Calcium 9.4 01/16/2017 05:07 PM       CT of abdomen/pelvis (12/8/16)  The visualized portions of the lung bases are clear.     Incidental hypodensity is seen adjacent to ligamentum teres. The liver is  otherwise unremarkable. The spleen, bilateral adrenal glands, pancreas, and  gallbladder are unremarkable. The bilateral kidneys are unremarkable without  evidence of hydronephrosis.     The abdominal aorta is normal in size. An IVC filter is in place. A stent is  present in the left iliac vein. No retroperitoneal lymphadenopathy.     The stomach and small bowel appear unremarkable. The appendix is normal. No  obstruction, free fluid, or free air.     The patient is status hysterectomy and ovarian resection.  There is unchanged  scarring above the vaginal cuff and bladder.     No evidence of a destructive osseous lesion. IMPRESSION:   1. Status post hysterectomy and ovarian resection. There is unchanged scarring  over the vaginal cuff and bladder. 2. No evidence of acute process. Barium enema (12/22/16)   KUB shows unremarkable bowel gas pattern. There is IVC filter  and left iliac stent. Retrograde flow of barium without air fills the colon with  reflux into the appendix and extensive reflux into the unremarkable distal  ileum. Colon is well cleansed. Sigmoid is redundant and tortuous without  obstruction, and without mass or stricture identified.     Mucosal pattern is smooth. No mass or polyp is encountered. There is no  stricture. Diverticula are not seen. Postevacuation image shows incomplete  emptying of the colon but no level of obstruction. Radiation exposure index:  45.1Gycm2.     IMPRESSION: Unremarkable Barium Enema      CT of abdomen/pelvis (2/14/17)  Abdomen: The lung bases are clear and the heart size is normal. The distal  esophagus, stomach, duodenum, liver, gallbladder, pancreas, spleen, adrenals,  and kidneys are normal.      Pelvis: There is trace, new ascites in the anterior pelvis (3-79, 602-78), the  anterior left lower quadrant (601-28), the pericecal right lower quadrant  (601-26, 3-72), and around the liver and spleen. The adjacent peritoneum  enhances (601-28, 602-77, 602-50). Curvilinear enhancement in the deep pelvis is  not masslike, but has increased compared to 12/18/2016 and 5/24/2016. Extension  along the peritoneum of the left paracolic gutter has increased (087-505). Enhancement is curvilinear and not nodular; no focal peritoneal or omental  nodules. The appendiceal mucosa is hyperenhancing (000-14), likely reactive, as  the appendix is not dilated.     The left external iliac vein is chronically occluded, though there is a stent in  place extending from the external iliac vein, through the common iliac, and into  the IVC.  A large subcutaneous collateral vein (601-15, 601-18) and drains the  left lower extremity via the right common femoral and external iliac veins. An  IVC filter is in appropriate position below the renal vein ostia.     The small bowel, ileocecal junction, colon, and bladder are otherwise normal.  Post hysterectomy. No free air and no abdominopelvic lymphadenopathy.     IMPRESSION:   1. Trace new ascites, with enhancement of adjacent peritoneum. 2. Increased curvilinear enhancement in the pelvis and left paracolic gutter. No  focal peritoneal or omental nodules to confirm peritoneal carcinomatosis. 3. Chronic left external iliac vein occlusion. The left lower extremity drains  via a large subcutaneous collateral to the right femoral and iliac system. PET/CT (2/20/17)  HEAD/NECK: No apparent foci of abnormal hypermetabolism. Cerebral evaluation is  limited by normal intense activity.     CHEST: No foci of abnormal hypermetabolism.     ABDOMEN/PELVIS: There is new extensive peritoneal activity compatible with  carcinomatosis as suggested by CT, max SUV 9.     SKELETON: No foci of abnormal hypermetabolism in the axial and visualized  appendicular skeleton.     IMPRESSION: Peritoneal carcinomatosis appearance. IMPRESSION/PLAN:  Rich Mello is a 46 y.o. female with a diagnosis of stage IIIC ovarian cancer. She underwent tumor debulking followed by 6 cycles of Taxol and Carboplatin chemotherapy, which she completed approximately 18 months ago. She now has evidence of recurrent disease based upon symptoms, elevated CA-125, CT and PET/CT findings. We discussed chemotherapy for her recurrence. She is clearly platinum resistant, therefore I recommended a platinum-based regimen. I would recommend Gemzar/Carboplatin. 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 She would also like to keep her hair.   We also discussed options for down the line, specifically a PARP inhibitor, since she is BRCA positive. We will start with a dose of Gemzar 800 mg/m2 on days 1 and 8 and a dose of Carboplatin of AUC 5 on day 1. The risks, benefits, indications, and alternatives of the regimen were discussed. Her questions were answered and she wishes to proceed.        Signed By: Bridget Hernandez MD     2/21/2017/3:54 PM

## 2017-02-22 ENCOUNTER — TELEPHONE (OUTPATIENT)
Dept: GYNECOLOGY | Age: 52
End: 2017-02-22

## 2017-02-22 RX ORDER — ONDANSETRON 4 MG/1
4 TABLET, ORALLY DISINTEGRATING ORAL
Qty: 30 TAB | Refills: 2 | Status: SHIPPED | OUTPATIENT
Start: 2017-02-22 | End: 2017-03-17

## 2017-02-22 NOTE — TELEPHONE ENCOUNTER
Requested Prescriptions     Signed Prescriptions Disp Refills    ondansetron (ZOFRAN ODT) 4 mg disintegrating tablet 30 Tab 2     Sig: Take 1 Tab by mouth every eight (8) hours as needed for Nausea.  Indications: CANCER CHEMOTHERAPY-INDUCED NAUSEA AND VOMITING     Authorizing Provider: Salvador Ta     Ordering User: Afshan Villegas to pharmacy for chemo per vo Dr. Jacquelyn Salazar

## 2017-03-09 ENCOUNTER — HOSPITAL ENCOUNTER (OUTPATIENT)
Dept: INFUSION THERAPY | Age: 52
Discharge: HOME | End: 2017-03-09
Payer: COMMERCIAL

## 2017-03-09 VITALS
HEIGHT: 68 IN | BODY MASS INDEX: 20.75 KG/M2 | SYSTOLIC BLOOD PRESSURE: 99 MMHG | DIASTOLIC BLOOD PRESSURE: 57 MMHG | HEART RATE: 84 BPM | TEMPERATURE: 97.5 F | RESPIRATION RATE: 16 BRPM | OXYGEN SATURATION: 94 % | WEIGHT: 136.91 LBS

## 2017-03-09 DIAGNOSIS — C56.9 OVARIAN CANCER, UNSPECIFIED LATERALITY (HCC): Primary | ICD-10-CM

## 2017-03-09 DIAGNOSIS — T45.1X5A CINV (CHEMOTHERAPY-INDUCED NAUSEA AND VOMITING): ICD-10-CM

## 2017-03-09 DIAGNOSIS — R11.2 CINV (CHEMOTHERAPY-INDUCED NAUSEA AND VOMITING): ICD-10-CM

## 2017-03-09 LAB
ALBUMIN SERPL BCP-MCNC: 3.6 G/DL (ref 3.5–5)
ALBUMIN/GLOB SERPL: 1 {RATIO} (ref 1.1–2.2)
ALP SERPL-CCNC: 128 U/L (ref 45–117)
ALT SERPL-CCNC: 62 U/L (ref 12–78)
ANION GAP BLD CALC-SCNC: 5 MMOL/L (ref 5–15)
AST SERPL W P-5'-P-CCNC: 36 U/L (ref 15–37)
BASO+EOS+MONOS # BLD AUTO: 0.8 K/UL (ref 0.2–1.2)
BASO+EOS+MONOS # BLD AUTO: 14 % (ref 3.2–16.9)
BILIRUB SERPL-MCNC: 0.4 MG/DL (ref 0.2–1)
BUN SERPL-MCNC: 20 MG/DL (ref 6–20)
BUN/CREAT SERPL: 29 (ref 12–20)
CALCIUM SERPL-MCNC: 8.9 MG/DL (ref 8.5–10.1)
CHLORIDE SERPL-SCNC: 105 MMOL/L (ref 97–108)
CO2 SERPL-SCNC: 28 MMOL/L (ref 21–32)
CREAT SERPL-MCNC: 0.69 MG/DL (ref 0.55–1.02)
DIFFERENTIAL METHOD BLD: ABNORMAL
ERYTHROCYTE [DISTWIDTH] IN BLOOD BY AUTOMATED COUNT: 13.2 % (ref 11.8–15.8)
GLOBULIN SER CALC-MCNC: 3.6 G/DL (ref 2–4)
GLUCOSE SERPL-MCNC: 87 MG/DL (ref 65–100)
HCT VFR BLD AUTO: 35.1 % (ref 35–47)
HGB BLD-MCNC: 12.1 G/DL (ref 11.5–16)
LYMPHOCYTES # BLD AUTO: 23 % (ref 12–49)
LYMPHOCYTES # BLD: 1.2 K/UL (ref 0.8–3.5)
MAGNESIUM SERPL-MCNC: 2.3 MG/DL (ref 1.6–2.4)
MCH RBC QN AUTO: 32.6 PG (ref 26–34)
MCHC RBC AUTO-ENTMCNC: 34.5 G/DL (ref 30–36.5)
MCV RBC AUTO: 94.6 FL (ref 80–99)
NEUTS SEG # BLD: 3.5 K/UL (ref 1.8–8)
NEUTS SEG NFR BLD AUTO: 63 % (ref 32–75)
PLATELET # BLD AUTO: 181 K/UL (ref 150–400)
POTASSIUM SERPL-SCNC: 4.3 MMOL/L (ref 3.5–5.1)
PROT SERPL-MCNC: 7.2 G/DL (ref 6.4–8.2)
RBC # BLD AUTO: 3.71 M/UL (ref 3.8–5.2)
SODIUM SERPL-SCNC: 138 MMOL/L (ref 136–145)
WBC # BLD AUTO: 5.5 K/UL (ref 3.6–11)

## 2017-03-09 PROCEDURE — 74011250636 HC RX REV CODE- 250/636: Performed by: OBSTETRICS & GYNECOLOGY

## 2017-03-09 PROCEDURE — 74011000250 HC RX REV CODE- 250: Performed by: OBSTETRICS & GYNECOLOGY

## 2017-03-09 PROCEDURE — 80053 COMPREHEN METABOLIC PANEL: CPT | Performed by: NURSE PRACTITIONER

## 2017-03-09 PROCEDURE — 96375 TX/PRO/DX INJ NEW DRUG ADDON: CPT

## 2017-03-09 PROCEDURE — 96413 CHEMO IV INFUSION 1 HR: CPT

## 2017-03-09 PROCEDURE — 96417 CHEMO IV INFUS EACH ADDL SEQ: CPT

## 2017-03-09 PROCEDURE — 85025 COMPLETE CBC W/AUTO DIFF WBC: CPT | Performed by: OBSTETRICS & GYNECOLOGY

## 2017-03-09 PROCEDURE — 86304 IMMUNOASSAY TUMOR CA 125: CPT | Performed by: OBSTETRICS & GYNECOLOGY

## 2017-03-09 PROCEDURE — 36415 COLL VENOUS BLD VENIPUNCTURE: CPT | Performed by: OBSTETRICS & GYNECOLOGY

## 2017-03-09 PROCEDURE — 83735 ASSAY OF MAGNESIUM: CPT | Performed by: NURSE PRACTITIONER

## 2017-03-09 RX ORDER — DEXAMETHASONE SODIUM PHOSPHATE 4 MG/ML
20 INJECTION, SOLUTION INTRA-ARTICULAR; INTRALESIONAL; INTRAMUSCULAR; INTRAVENOUS; SOFT TISSUE ONCE
Status: COMPLETED | OUTPATIENT
Start: 2017-03-09 | End: 2017-03-09

## 2017-03-09 RX ORDER — GRANISETRON HYDROCHLORIDE 1 MG/ML
1 INJECTION, SOLUTION INTRAVENOUS ONCE
Status: COMPLETED | OUTPATIENT
Start: 2017-03-09 | End: 2017-03-09

## 2017-03-09 RX ORDER — HEPARIN 100 UNIT/ML
500 SYRINGE INTRAVENOUS AS NEEDED
Status: ACTIVE | OUTPATIENT
Start: 2017-03-09 | End: 2017-03-10

## 2017-03-09 RX ORDER — SODIUM CHLORIDE 9 MG/ML
25 INJECTION, SOLUTION INTRAVENOUS AS NEEDED
Status: DISPENSED | OUTPATIENT
Start: 2017-03-09 | End: 2017-03-10

## 2017-03-09 RX ORDER — SODIUM CHLORIDE 9 MG/ML
10 INJECTION INTRAMUSCULAR; INTRAVENOUS; SUBCUTANEOUS AS NEEDED
Status: ACTIVE | OUTPATIENT
Start: 2017-03-09 | End: 2017-03-10

## 2017-03-09 RX ORDER — SODIUM CHLORIDE 0.9 % (FLUSH) 0.9 %
10-40 SYRINGE (ML) INJECTION AS NEEDED
Status: ACTIVE | OUTPATIENT
Start: 2017-03-09 | End: 2017-03-10

## 2017-03-09 RX ORDER — PROCHLORPERAZINE MALEATE 10 MG
10 TABLET ORAL
Qty: 30 TAB | Refills: 5 | Status: SHIPPED | OUTPATIENT
Start: 2017-03-09 | End: 2017-01-01 | Stop reason: ALTCHOICE

## 2017-03-09 RX ADMIN — SODIUM CHLORIDE 1400 MG: 900 INJECTION, SOLUTION INTRAVENOUS at 13:30

## 2017-03-09 RX ADMIN — Medication 20 ML: at 11:22

## 2017-03-09 RX ADMIN — DEXAMETHASONE SODIUM PHOSPHATE 20 MG: 4 INJECTION, SOLUTION INTRA-ARTICULAR; INTRALESIONAL; INTRAMUSCULAR; INTRAVENOUS; SOFT TISSUE at 12:48

## 2017-03-09 RX ADMIN — CARBOPLATIN 540 MG: 10 INJECTION, SOLUTION INTRAVENOUS at 14:25

## 2017-03-09 RX ADMIN — Medication 500 UNITS: at 15:04

## 2017-03-09 RX ADMIN — GRANISETRON HYDROCHLORIDE 1 MG: 1 INJECTION, SOLUTION INTRAVENOUS at 12:43

## 2017-03-09 RX ADMIN — SODIUM CHLORIDE 25 ML/HR: 900 INJECTION, SOLUTION INTRAVENOUS at 11:22

## 2017-03-09 RX ADMIN — Medication 10 ML: at 15:03

## 2017-03-09 RX ADMIN — SODIUM CHLORIDE 10 ML: 9 INJECTION, SOLUTION INTRAMUSCULAR; INTRAVENOUS; SUBCUTANEOUS at 11:21

## 2017-03-09 NOTE — PROGRESS NOTES
1100 Pt admit to [facility] for C 1, D 1 Gemzar/Carboplatin ambulatory in stable condition. Accompanied by supportive , Luz Maria Castillo. Assessment completed, has some residual neuropathy from prior chemo treatments. No new concerns voiced. Port accessed with positive blood return. Conferred with Christina, KYE - orders state to use labs that are > 27days old. Labs drawn per order and sent. Normal Saline at [facility]. Micromedex on Gemzar given, reviewed, understood by patient and family. Treatment schedule reviewed. Ankit Lopes will see at bedside. Visit Vitals    BP 99/57 (BP 1 Location: Right arm, BP Patient Position: Supine)    Pulse 84    Temp 97.5 °F (36.4 °C)    Resp 16    Ht 5' 7.5\" (1.715 m)    Wt 62.1 kg (136 lb 14.5 oz)    LMP 02/02/2015 (Approximate)    SpO2 94%    Breastfeeding No    BMI 21.13 kg/m2       Medications:  Normal Saline  Kytril  Decadron  Gemzar  Carboplatin    1505 Pt tolerated treatment well. Port maintained positive blood return throughout treatment, flushed with positive blood return at conclusion and heparinized per protocol. Nalinishun Eric removed. D/c home ambulatory in no distress. Pt aware of next appointment scheduled for C 1, D 8 on 3/16/17. Recent Results (from the past 12 hour(s))   CBC WITH 3 PART DIFF    Collection Time: 03/09/17 11:23 AM   Result Value Ref Range    WBC 5.5 3.6 - 11.0 K/uL    RBC 3.71 (L) 3.80 - 5.20 M/uL    HGB 12.1 11.5 - 16.0 g/dL    HCT 35.1 35.0 - 47.0 %    MCV 94.6 80.0 - 99.0 FL    MCH 32.6 26.0 - 34.0 PG    MCHC 34.5 30.0 - 36.5 g/dL    RDW 13.2 11.8 - 15.8 %    PLATELET 905 674 - 664 K/uL    NEUTROPHILS 63 32 - 75 %    MIXED CELLS 14 3.2 - 16.9 %    LYMPHOCYTES 23 12 - 49 %    ABS. NEUTROPHILS 3.5 1.8 - 8.0 K/UL    ABS. MIXED CELLS 0.8 0.2 - 1.2 K/uL    ABS.  LYMPHOCYTES 1.2 0.8 - 3.5 K/UL    DF AUTOMATED     METABOLIC PANEL, COMPREHENSIVE    Collection Time: 03/09/17 11:23 AM   Result Value Ref Range    Sodium 138 136 - 145 mmol/L    Potassium 4.3 3.5 - 5.1 mmol/L    Chloride 105 97 - 108 mmol/L    CO2 28 21 - 32 mmol/L    Anion gap 5 5 - 15 mmol/L    Glucose 87 65 - 100 mg/dL    BUN 20 6 - 20 MG/DL    Creatinine 0.69 0.55 - 1.02 MG/DL    BUN/Creatinine ratio 29 (H) 12 - 20      GFR est AA >60 >60 ml/min/1.73m2    GFR est non-AA >60 >60 ml/min/1.73m2    Calcium 8.9 8.5 - 10.1 MG/DL    Bilirubin, total 0.4 0.2 - 1.0 MG/DL    ALT (SGPT) 62 12 - 78 U/L    AST (SGOT) 36 15 - 37 U/L    Alk.  phosphatase 128 (H) 45 - 117 U/L    Protein, total 7.2 6.4 - 8.2 g/dL    Albumin 3.6 3.5 - 5.0 g/dL    Globulin 3.6 2.0 - 4.0 g/dL    A-G Ratio 1.0 (L) 1.1 - 2.2     MAGNESIUM    Collection Time: 03/09/17 11:23 AM   Result Value Ref Range    Magnesium 2.3 1.6 - 2.4 mg/dL

## 2017-03-09 NOTE — PROGRESS NOTES
[facility]     Dr. Jose Barlow NP      Date of visit: 3/9/2017       Subjective:   Sam Johns is a 46 y.o.  postmenopausal female with stage IIIC ovarian cancer. She underwent X-lap, hysterectomy, and tumor debulking in March 2015. She was recommended adjuvant chemotherapy with 6 cycles of Taxol and Carboplatin. Her post-treatment PET/CT was negative for disease. Her Myriad results also found her to have a deleterious BRCA 1 mutation. As for the BRCA 1 mutation, I had her see one of our breast surgeons, Dr. Kai Echols, to talk about options, specifically screening and prophylactic surgery. She is going to hold off on surgery for now. She has decided not to have her daughter tested at this time.     She presented in December ahead of her scheduled visit complaining of left lower quadrant pain for several weeks. The pain was intermittent and crampy. She also had some pain with bowel movements. Certain foods also exacerbated it. Dr. Jazzmine Taylor sent her for a CT of the abdomen/pelvis which showed no obvious disease. In late December she had a colonoscopy which could not be completed due to adhesions. A subsequent barium enema was negative. A subsequent CA-125 was mildly elevated at 55, up from 10 three months earlier. Her CA-125 is now up to 99 and her CT suggests recurrent disease, although not definitive for recurrence. Dr. Jazzmine Taylor recommended a PET/CT. This was done on Feb 20, 2017 (see full results below)  This showed peritoneal carcinomatosis and discussion with Dr. Jazzmine Taylor was had and they agreed upon Carbo/Gemzar for 3 cycles Q21 days each with gemzar D1 & 8. .     She presents today for her first cycle of Carbo/Gemzar. She is in good spirits and her  is with her and supportive. Denies any complaint other than some mild residual neuropathy from her previous chemotherapy.       Current Outpatient Prescriptions   Medication Sig    ondansetron (ZOFRAN ODT) 4 mg disintegrating tablet Take 1 Tab by mouth every eight (8) hours as needed for Nausea. Indications: CANCER CHEMOTHERAPY-INDUCED NAUSEA AND VOMITING    LORazepam (ATIVAN) 1 mg tablet TAKE 1 TABLET BY MOUTH AT BEDTIME    XARELTO 20 mg tab tablet     gabapentin (NEURONTIN) 400 mg capsule Take 1 Cap by mouth three (3) times daily.  amitriptyline (ELAVIL) 25 mg tablet Take 1 Tab by mouth nightly.  lidocaine-prilocaine (EMLA) topical cream Apply small amount to port area and cover with band aid 1 hour before chemo treatment    TECFIDERA 120 mg cpDR      Current Facility-Administered Medications   Medication Dose Route Frequency    sodium chloride (NS) flush 10-40 mL  10-40 mL IntraVENous PRN    0.9% sodium chloride infusion  25 mL/hr IntraVENous PRN    sodium chloride 0.9 % injection 10 mL  10 mL IntraVENous PRN    heparin (porcine) pf 500 Units  500 Units IntraVENous PRN        Review of Systems:  General: Denies wt loss, fatigue  HEENT: Denies visual changes, dysphagia or headache  Resp: Denies SOB, LYONS, wheezing or cough  CV: Denies CP, palpitations  GI/: Denies N/V, Diarrhea, constipation or dysuria  MuskSkel: Denies muscle ache or joint pain  Neuro: Denies dizzyness or syncope.  Persistent neuropathy from previous chemotherapy that she says has improved \"some since stopping\"    Objective:     Patient Vitals for the past 8 hrs:   BP Temp Pulse Resp SpO2 Height Weight   03/09/17 1502 99/57 97.5 °F (36.4 °C) 84 16 94 % - -   03/09/17 1336 95/58 96.7 °F (35.9 °C) 72 16 99 % - -   03/09/17 1104 91/60 95.4 °F (35.2 °C) 77 16 100 % 5' 7.5\" (1.715 m) 136 lb 14.5 oz (62.1 kg)       Physical Exam:  General: A&O X3 in NAD  HEENT: Sclera anicteric, Mucosa pink, moist   Neck: No JVD bilat without cervical adenopathy present  Port site without redness/swelling or tenderness  Heart: Regular without M/R/G  Lungs: CTA Bilat   Abd: Soft, NT/ND with + BS throughout  Ext: Without edema and + pedal pulses bilat  Neuro: grossly intact    Recent Results (from the past 12 hour(s))   CBC WITH 3 PART DIFF    Collection Time: 03/09/17 11:23 AM   Result Value Ref Range    WBC 5.5 3.6 - 11.0 K/uL    RBC 3.71 (L) 3.80 - 5.20 M/uL    HGB 12.1 11.5 - 16.0 g/dL    HCT 35.1 35.0 - 47.0 %    MCV 94.6 80.0 - 99.0 FL    MCH 32.6 26.0 - 34.0 PG    MCHC 34.5 30.0 - 36.5 g/dL    RDW 13.2 11.8 - 15.8 %    PLATELET 316 867 - 880 K/uL    NEUTROPHILS 63 32 - 75 %    MIXED CELLS 14 3.2 - 16.9 %    LYMPHOCYTES 23 12 - 49 %    ABS. NEUTROPHILS 3.5 1.8 - 8.0 K/UL    ABS. MIXED CELLS 0.8 0.2 - 1.2 K/uL    ABS. LYMPHOCYTES 1.2 0.8 - 3.5 K/UL    DF AUTOMATED     METABOLIC PANEL, COMPREHENSIVE    Collection Time: 03/09/17 11:23 AM   Result Value Ref Range    Sodium 138 136 - 145 mmol/L    Potassium 4.3 3.5 - 5.1 mmol/L    Chloride 105 97 - 108 mmol/L    CO2 28 21 - 32 mmol/L    Anion gap 5 5 - 15 mmol/L    Glucose 87 65 - 100 mg/dL    BUN 20 6 - 20 MG/DL    Creatinine 0.69 0.55 - 1.02 MG/DL    BUN/Creatinine ratio 29 (H) 12 - 20      GFR est AA >60 >60 ml/min/1.73m2    GFR est non-AA >60 >60 ml/min/1.73m2    Calcium 8.9 8.5 - 10.1 MG/DL    Bilirubin, total 0.4 0.2 - 1.0 MG/DL    ALT (SGPT) 62 12 - 78 U/L    AST (SGOT) 36 15 - 37 U/L    Alk. phosphatase 128 (H) 45 - 117 U/L    Protein, total 7.2 6.4 - 8.2 g/dL    Albumin 3.6 3.5 - 5.0 g/dL    Globulin 3.6 2.0 - 4.0 g/dL    A-G Ratio 1.0 (L) 1.1 - 2.2     MAGNESIUM    Collection Time: 03/09/17 11:23 AM   Result Value Ref Range    Magnesium 2.3 1.6 - 2.4 mg/dL               Imaging:  PET/CT 2/20/2017;  FINDINGS:     HEAD/NECK: No apparent foci of abnormal hypermetabolism.  Cerebral evaluation is  limited by normal intense activity.     CHEST: No foci of abnormal hypermetabolism.     ABDOMEN/PELVIS: There is new extensive peritoneal activity compatible with  carcinomatosis as suggested by CT, max SUV 9.     SKELETON: No foci of abnormal hypermetabolism in the axial and visualized  appendicular skeleton.       IMPRESSION: Peritoneal carcinomatosis appearance.     Assessment:     Patient Active Problem List   Diagnosis Code    Pelvic mass R19.00    S/P total abdominal hysterectomy Z90.710    Ovarian cancer ([facility] C56.9    BRCA1 positive Z15.01, Z15.02         Plan:   Proceed with cycle 1 of Carbo/Gemzar  Reviewed at length the importance of hydration  Will call in compazine for prn use if Zofran is ineffective as this was helpful with her last chemotherapy  Hydration encouraged  Encouraged to call for any concerns or questions  Maksim Hawkins NP

## 2017-03-10 ENCOUNTER — HOSPITAL ENCOUNTER (OUTPATIENT)
Dept: MAMMOGRAPHY | Age: 52
Discharge: HOME | End: 2017-03-10
Payer: COMMERCIAL

## 2017-03-10 DIAGNOSIS — Z12.31 VISIT FOR SCREENING MAMMOGRAM: ICD-10-CM

## 2017-03-10 LAB — CANCER AG125 SERPL-ACNC: 496 U/ML (ref 0–30)

## 2017-03-10 PROCEDURE — 77067 SCR MAMMO BI INCL CAD: CPT

## 2017-03-13 ENCOUNTER — APPOINTMENT (OUTPATIENT)
Dept: INFUSION THERAPY | Age: 52
End: 2017-03-13

## 2017-03-14 RX ORDER — DEXAMETHASONE SODIUM PHOSPHATE 4 MG/ML
4 INJECTION, SOLUTION INTRA-ARTICULAR; INTRALESIONAL; INTRAMUSCULAR; INTRAVENOUS; SOFT TISSUE ONCE
Status: COMPLETED | OUTPATIENT
Start: 2017-03-16 | End: 2017-03-16

## 2017-03-14 RX ORDER — GRANISETRON HYDROCHLORIDE 1 MG/ML
1 INJECTION, SOLUTION INTRAVENOUS ONCE
Status: COMPLETED | OUTPATIENT
Start: 2017-03-16 | End: 2017-03-16

## 2017-03-16 ENCOUNTER — HOSPITAL ENCOUNTER (OUTPATIENT)
Dept: INFUSION THERAPY | Age: 52
Discharge: HOME | End: 2017-03-16
Payer: COMMERCIAL

## 2017-03-16 VITALS
DIASTOLIC BLOOD PRESSURE: 70 MMHG | SYSTOLIC BLOOD PRESSURE: 112 MMHG | HEART RATE: 67 BPM | OXYGEN SATURATION: 99 % | RESPIRATION RATE: 16 BRPM | BODY MASS INDEX: 20.55 KG/M2 | HEIGHT: 68 IN | TEMPERATURE: 96.7 F | WEIGHT: 135.58 LBS

## 2017-03-16 LAB
ALBUMIN SERPL BCP-MCNC: 3.2 G/DL (ref 3.5–5)
ALBUMIN/GLOB SERPL: 0.8 {RATIO} (ref 1.1–2.2)
ALP SERPL-CCNC: 193 U/L (ref 45–117)
ALT SERPL-CCNC: 128 U/L (ref 12–78)
ANION GAP BLD CALC-SCNC: 8 MMOL/L (ref 5–15)
AST SERPL W P-5'-P-CCNC: 70 U/L (ref 15–37)
BASOPHILS # BLD AUTO: 0 K/UL (ref 0–0.1)
BASOPHILS # BLD: 1 % (ref 0–1)
BILIRUB SERPL-MCNC: 0.2 MG/DL (ref 0.2–1)
BUN SERPL-MCNC: 24 MG/DL (ref 6–20)
BUN/CREAT SERPL: 37 (ref 12–20)
CALCIUM SERPL-MCNC: 8.8 MG/DL (ref 8.5–10.1)
CHLORIDE SERPL-SCNC: 103 MMOL/L (ref 97–108)
CO2 SERPL-SCNC: 27 MMOL/L (ref 21–32)
CREAT SERPL-MCNC: 0.65 MG/DL (ref 0.55–1.02)
EOSINOPHIL # BLD: 0 K/UL (ref 0–0.4)
EOSINOPHIL NFR BLD: 1 % (ref 0–7)
ERYTHROCYTE [DISTWIDTH] IN BLOOD BY AUTOMATED COUNT: 12.6 % (ref 11.5–14.5)
GLOBULIN SER CALC-MCNC: 3.9 G/DL (ref 2–4)
GLUCOSE SERPL-MCNC: 102 MG/DL (ref 65–100)
HCT VFR BLD AUTO: 30.5 % (ref 35–47)
HGB BLD-MCNC: 10.2 G/DL (ref 11.5–16)
LYMPHOCYTES # BLD AUTO: 26 % (ref 12–49)
LYMPHOCYTES # BLD: 1.2 K/UL (ref 0.8–3.5)
MCH RBC QN AUTO: 32 PG (ref 26–34)
MCHC RBC AUTO-ENTMCNC: 33.4 G/DL (ref 30–36.5)
MCV RBC AUTO: 95.6 FL (ref 80–99)
MONOCYTES # BLD: 0.4 K/UL (ref 0–1)
MONOCYTES NFR BLD AUTO: 8 % (ref 5–13)
NEUTS SEG # BLD: 2.9 K/UL (ref 1.8–8)
NEUTS SEG NFR BLD AUTO: 64 % (ref 32–75)
PLATELET # BLD AUTO: 108 K/UL (ref 150–400)
POTASSIUM SERPL-SCNC: 3.9 MMOL/L (ref 3.5–5.1)
PROT SERPL-MCNC: 7.1 G/DL (ref 6.4–8.2)
RBC # BLD AUTO: 3.19 M/UL (ref 3.8–5.2)
SODIUM SERPL-SCNC: 138 MMOL/L (ref 136–145)
WBC # BLD AUTO: 4.5 K/UL (ref 3.6–11)

## 2017-03-16 PROCEDURE — 77030012965 HC NDL HUBR BBMI -A

## 2017-03-16 PROCEDURE — 96375 TX/PRO/DX INJ NEW DRUG ADDON: CPT

## 2017-03-16 PROCEDURE — 80053 COMPREHEN METABOLIC PANEL: CPT | Performed by: OBSTETRICS & GYNECOLOGY

## 2017-03-16 PROCEDURE — 74011000250 HC RX REV CODE- 250: Performed by: OBSTETRICS & GYNECOLOGY

## 2017-03-16 PROCEDURE — 36415 COLL VENOUS BLD VENIPUNCTURE: CPT | Performed by: OBSTETRICS & GYNECOLOGY

## 2017-03-16 PROCEDURE — 96361 HYDRATE IV INFUSION ADD-ON: CPT

## 2017-03-16 PROCEDURE — 96413 CHEMO IV INFUSION 1 HR: CPT

## 2017-03-16 PROCEDURE — 74011250636 HC RX REV CODE- 250/636: Performed by: OBSTETRICS & GYNECOLOGY

## 2017-03-16 PROCEDURE — 85025 COMPLETE CBC W/AUTO DIFF WBC: CPT | Performed by: OBSTETRICS & GYNECOLOGY

## 2017-03-16 RX ORDER — HEPARIN 100 UNIT/ML
500 SYRINGE INTRAVENOUS AS NEEDED
Status: ACTIVE | OUTPATIENT
Start: 2017-03-16 | End: 2017-03-17

## 2017-03-16 RX ORDER — SODIUM CHLORIDE 9 MG/ML
10 INJECTION INTRAMUSCULAR; INTRAVENOUS; SUBCUTANEOUS AS NEEDED
Status: ACTIVE | OUTPATIENT
Start: 2017-03-16 | End: 2017-03-17

## 2017-03-16 RX ORDER — SODIUM CHLORIDE 0.9 % (FLUSH) 0.9 %
10-40 SYRINGE (ML) INJECTION AS NEEDED
Status: ACTIVE | OUTPATIENT
Start: 2017-03-16 | End: 2017-03-17

## 2017-03-16 RX ADMIN — DEXAMETHASONE SODIUM PHOSPHATE 4 MG: 4 INJECTION, SOLUTION INTRA-ARTICULAR; INTRALESIONAL; INTRAMUSCULAR; INTRAVENOUS; SOFT TISSUE at 16:06

## 2017-03-16 RX ADMIN — SODIUM CHLORIDE 1000 ML: 900 INJECTION, SOLUTION INTRAVENOUS at 14:08

## 2017-03-16 RX ADMIN — Medication 500 UNITS: at 17:20

## 2017-03-16 RX ADMIN — Medication 10 ML: at 17:20

## 2017-03-16 RX ADMIN — SODIUM CHLORIDE 1400 MG: 900 INJECTION, SOLUTION INTRAVENOUS at 16:45

## 2017-03-16 RX ADMIN — SODIUM CHLORIDE 10 ML: 9 INJECTION, SOLUTION INTRAMUSCULAR; INTRAVENOUS; SUBCUTANEOUS at 13:25

## 2017-03-16 RX ADMIN — Medication 20 ML: at 13:26

## 2017-03-16 RX ADMIN — GRANISETRON HYDROCHLORIDE 1 MG: 1 INJECTION, SOLUTION INTRAVENOUS at 16:04

## 2017-03-17 ENCOUNTER — HOSPITAL ENCOUNTER (OUTPATIENT)
Dept: INFUSION THERAPY | Age: 52
Discharge: HOME | End: 2017-03-17
Payer: COMMERCIAL

## 2017-03-17 VITALS
SYSTOLIC BLOOD PRESSURE: 111 MMHG | RESPIRATION RATE: 16 BRPM | TEMPERATURE: 97.1 F | DIASTOLIC BLOOD PRESSURE: 67 MMHG | HEART RATE: 69 BPM | OXYGEN SATURATION: 100 %

## 2017-03-17 DIAGNOSIS — R11.2 CINV (CHEMOTHERAPY-INDUCED NAUSEA AND VOMITING): ICD-10-CM

## 2017-03-17 DIAGNOSIS — Z15.09 BRCA1 POSITIVE: ICD-10-CM

## 2017-03-17 DIAGNOSIS — C56.9 OVARIAN CANCER, UNSPECIFIED LATERALITY (HCC): Primary | ICD-10-CM

## 2017-03-17 DIAGNOSIS — Z15.01 BRCA1 POSITIVE: ICD-10-CM

## 2017-03-17 DIAGNOSIS — T45.1X5A CINV (CHEMOTHERAPY-INDUCED NAUSEA AND VOMITING): ICD-10-CM

## 2017-03-17 PROBLEM — D69.6 THROMBOCYTOPENIA (HCC): Status: ACTIVE | Noted: 2017-03-17

## 2017-03-17 PROBLEM — E86.0 DEHYDRATION: Status: ACTIVE | Noted: 2017-03-17

## 2017-03-17 PROCEDURE — 74011250636 HC RX REV CODE- 250/636: Performed by: OBSTETRICS & GYNECOLOGY

## 2017-03-17 PROCEDURE — 96360 HYDRATION IV INFUSION INIT: CPT

## 2017-03-17 PROCEDURE — 77030012965 HC NDL HUBR BBMI -A

## 2017-03-17 RX ORDER — DEXAMETHASONE 4 MG/1
TABLET ORAL
Qty: 20 TAB | Refills: 2 | Status: SHIPPED | OUTPATIENT
Start: 2017-03-17 | End: 2018-01-01 | Stop reason: ALTCHOICE

## 2017-03-17 RX ORDER — ONDANSETRON 4 MG/1
4 TABLET, FILM COATED ORAL
Qty: 20 TAB | Refills: 2 | Status: SHIPPED | OUTPATIENT
Start: 2017-03-17 | End: 2017-03-28 | Stop reason: SDUPTHER

## 2017-03-17 RX ORDER — HEPARIN 100 UNIT/ML
500 SYRINGE INTRAVENOUS AS NEEDED
Status: ACTIVE | OUTPATIENT
Start: 2017-03-17 | End: 2017-03-18

## 2017-03-17 RX ORDER — SODIUM CHLORIDE 0.9 % (FLUSH) 0.9 %
10-40 SYRINGE (ML) INJECTION AS NEEDED
Status: ACTIVE | OUTPATIENT
Start: 2017-03-17 | End: 2017-03-18

## 2017-03-17 RX ADMIN — SODIUM CHLORIDE 1000 ML: 900 INJECTION, SOLUTION INTRAVENOUS at 15:08

## 2017-03-17 RX ADMIN — Medication 10 ML: at 14:59

## 2017-03-17 NOTE — PROGRESS NOTES
Outpatient Infusion Center Progress Note    1500 Pt admit to [facility] for hydration ambulatory in stable condition. Assessment completed. No new concerns voiced. Port accessed with positive blood return. Pt connected to hydration. Christine Saucedo NP at bedside with pt discussing symptom management. Visit Vitals    /67 (BP 1 Location: Left arm, BP Patient Position: Sitting)    Pulse 69    Temp 97.1 °F (36.2 °C)    Resp 16    LMP 02/02/2015 (Approximate)    SpO2 100%       Medications:  1000 mL NS over 1 hr    1615 Pt tolerated treatment well. Port flushed, heparinized, and de-accessed per protocol. D/c home ambulatory in no distress. Pt aware of next appointment scheduled for 3/30/17.

## 2017-03-17 NOTE — PROGRESS NOTES
Ton Barlow NP      Date of visit: 3/17/2017       Subjective:   Lupe Yoder is a 46 y.o.  postmenopausal female with stage IIIC ovarian cancer. She underwent X-lap, hysterectomy, and tumor debulking in March 2015. She was recommended adjuvant chemotherapy with 6 cycles of Taxol and Carboplatin. Her post-treatment PET/CT was negative for disease. Her Myriad results also found her to have a deleterious BRCA 1 mutation. As for the BRCA 1 mutation, I had her see one of our breast surgeons, Dr. Peter Crawford, to talk about options, specifically screening and prophylactic surgery. She is going to hold off on surgery for now. She has decided not to have her daughter tested at this time.     She presented in December ahead of her scheduled visit complaining of left lower quadrant pain for several weeks. The pain was intermittent and crampy. She also had some pain with bowel movements. Certain foods also exacerbated it. Dr. Ariel Blue sent her for a CT of the abdomen/pelvis which showed no obvious disease. In late December she had a colonoscopy which could not be completed due to adhesions. A subsequent barium enema was negative. A subsequent CA-125 was mildly elevated at 55, up from 10 three months earlier. Her CA-125 is now up to 99 and her CT suggests recurrent disease, although not definitive for recurrence. Dr. Ariel Blue recommended a PET/CT. This was done on Feb 20, 2017 (see full results below)  This showed peritoneal carcinomatosis and discussion with Dr. Ariel Blue was had and they agreed upon Carbo/Gemzar for 3 cycles Q21 days each with gemzar D1 & 8. .     She presented yesterday for C1D8 (I was not here yesterday to see her)  When she presented yesterday, she mentioned that she \"felt like she had been hit by a truck\". Beginning the eveing of post chemo day 1, she felt fatigued and with mild nausea.   By Sat mid day (day 2) she said she \"just began to go down hill\" and was so tired, she barly got out of bed. Also, nausea began and \"just didn't let up\". She tried the desolving Zofran, but she said even that made her nauseated so she stuck with the Compazine. She did say she tried to drink as much fluid as she could tolerate, but she knew it was not good enough. By Monday, she tried to go to work, but said she just wanted to curl up and go to sleep. Nausea persisted until Tuesday. She said she only began to have some relief of the fatigue yesterday and today, feels it is begging to \"creep back in\". She was not prescribed decadron with this treatment. She is having BM's and some loose, but \"no actual diarrhea\"    Current Outpatient Prescriptions   Medication Sig    dexamethasone (DECADRON) 4 mg tablet Take 1-2 tabs QAM with food the 2 days following chemo as needed    ondansetron hcl (ZOFRAN) 4 mg tablet Take 1 Tab by mouth every eight (8) hours as needed for Nausea.  prochlorperazine (COMPAZINE) 10 mg tablet Take 1 Tab by mouth every six (6) hours as needed for Nausea. Indications: CANCER CHEMOTHERAPY-INDUCED NAUSEA AND VOMITING    LORazepam (ATIVAN) 1 mg tablet TAKE 1 TABLET BY MOUTH AT BEDTIME    XARELTO 20 mg tab tablet     gabapentin (NEURONTIN) 400 mg capsule Take 1 Cap by mouth three (3) times daily.  amitriptyline (ELAVIL) 25 mg tablet Take 1 Tab by mouth nightly.     lidocaine-prilocaine (EMLA) topical cream Apply small amount to port area and cover with band aid 1 hour before chemo treatment    TECFIDERA 120 mg cpDR      Current Facility-Administered Medications   Medication Dose Route Frequency    heparin (porcine) pf 500 Units  500 Units IntraVENous PRN    sodium chloride (NS) flush 10-40 mL  10-40 mL IntraVENous PRN        Review of Systems:  General: See above  HEENT: Denies visual changes, dysphagia or headache  Resp: Denies SOB, LYONS, wheezing or cough  CV: Denies CP, palpitations  GI/:See above for N/V/ +loose stools, but no more than 2 a day. Denies constipation or dysuria  MuskSkel: Acknowledges achy muscles but denies joint pain  Neuro: Denies dizzyness or syncope. Persistent neuropathy, but it has not worsened since receiving first treatment. Objective:     Patient Vitals for the past 8 hrs:   BP Temp Pulse Resp   03/17/17 1456 115/70 97.1 °F (36.2 °C) 81 16       Physical Exam:  General: A&O X3 tired appearing  HEENT: Sclera anicteric, Mucosa pink, moist   Neck: No JVD bilat without cervical adenopathy present  Port site without redness/swelling or tenderness  Heart: Regular without M/R/G  Lungs: CTA Bilat   Abd: Soft, mild tenderness to upper abd. No distention noted with + BS throughout  Ext: Without edema and + pedal pulses bilat  Neuro: grossly intact    Labs 3/16/17:  WBC 4.5; HGB 10.2; HCT 30.5; Plt's 108; ANC 2.9; Na 138; K+ 3.9; ; CO2 27; BUN 24; Creat 0.65; Gluc 102; Ca 8.8   496 on 3/9        Imaging:  PET/CT 2/20/2017;  FINDINGS:     HEAD/NECK: No apparent foci of abnormal hypermetabolism. Cerebral evaluation is  limited by normal intense activity.     CHEST: No foci of abnormal hypermetabolism.     ABDOMEN/PELVIS: There is new extensive peritoneal activity compatible with  carcinomatosis as suggested by CT, max SUV 9.     SKELETON: No foci of abnormal hypermetabolism in the axial and visualized  appendicular skeleton.       IMPRESSION: Peritoneal carcinomatosis appearance. Assessment:     Patient Active Problem List   Diagnosis Code    Pelvic mass R19.00    S/P total abdominal hysterectomy Z90.710    Ovarian cancer ([facility] C56.9    BRCA1 positive Z15.01, Z15.02    Thrombocytopenia (HCC) D69.6    CINV (chemotherapy-induced nausea and vomiting) R11.2, T45.1X5A    Dehydration E86.0         Plan:   Received cycle 1D8 of Carbo/Gemzar (Gemzar only) yesterday. Hydrated today with 1 liter NaCl  Long discussion with pt about symptom management.   She and I discussed trying Decadron as she had used before, but only in the morning to avoid some of the sleeplessness. She will take it tomorrow and Sunday and call back on Monday to let me know how she did. If her symptoms get any worse, she is to call over the weekend (Dr. Mary Ernandez is on call) or come directly to the ER  Reviewed at length the importance of hydration  Monitor plt's  Called in Zofran that is not disintegrating as to avoid nausea from this.     Encouraged to call for any concerns or questions  Shawanda Knowles NP

## 2017-03-24 DIAGNOSIS — C56.9 OVARIAN CANCER, UNSPECIFIED LATERALITY (HCC): Primary | ICD-10-CM

## 2017-03-28 ENCOUNTER — OFFICE VISIT (OUTPATIENT)
Dept: GYNECOLOGY | Age: 52
End: 2017-03-28

## 2017-03-28 VITALS
SYSTOLIC BLOOD PRESSURE: 102 MMHG | DIASTOLIC BLOOD PRESSURE: 61 MMHG | HEART RATE: 78 BPM | HEIGHT: 68 IN | WEIGHT: 136.4 LBS | BODY MASS INDEX: 20.67 KG/M2

## 2017-03-28 DIAGNOSIS — C56.9 OVARIAN CANCER, UNSPECIFIED LATERALITY (HCC): Primary | ICD-10-CM

## 2017-03-28 RX ORDER — ONDANSETRON 4 MG/1
TABLET, ORALLY DISINTEGRATING ORAL
Refills: 2 | COMMUNITY
Start: 2017-02-22 | End: 2018-01-01 | Stop reason: ALTCHOICE

## 2017-03-28 NOTE — PROGRESS NOTES
[address] [facility] [address] [facility]  [phone]  MD Opal Rasmussen MD  Office Note  Patient ID:  Name:  Kirk Liu  MRN:  [id number]  :  1965/52 y.o. Date:  3/28/2017      HISTORY OF PRESENT ILLNESS:  Kirk Liu is a 46 y.o.  postmenopausal female with stage IIIC ovarian cancer. She underwent X-lap, hysterectomy, and tumor debulking in 2015. She was recommended adjuvant chemotherapy with 6 cycles of Taxol and Carboplatin. Her post-treatment PET/CT was negative for disease. Her Myriad results also found her to have a deleterious BRCA 1 mutation. As for the BRCA 1 mutation, I had her see one of our breast surgeons, Dr. Sheree Escobar, to talk about options, specifically screening and prophylactic surgery. She is going to hold off on surgery for now. She has decided not to have her daughter tested at this time. She presented in December ahead of her scheduled visit complaining of left lower quadrant pain for several weeks. The pain was intermittent and crampy. She also had some pain with bowel movements. Certain foods also exacerbated it. I sent her for a CT of the abdomen/pelvis which showed no obvious disease. In late December she had a colonoscopy which could not be completed due to adhesions. A subsequent barium enema was negative. A subsequent CA-125 was mildly elevated at 55, up from 10 three months earlier. Her CA-125 is now up to 99 and her CT suggests recurrent disease, although not definitive for recurrence. I recommended a PET/CT which confirmed recurrence. We discussed chemotherapy for her recurrence. She was clearly platinum resistant, therefore I recommended a platinum-based regimen. I recommended Gemzar/Carboplatin. 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 She also wanted to keep her hair.   We also discussed options for down the line, specifically a PARP inhibitor, since she is BRCA positive. She has completed one cycle so far and it was a very tough cycle. She felt nauseated, fatigued, and didn't feel like doing anything. She had to be brought in for additional hydration as well. Today she actually feels OK. ROS:   and GI review: Negative  Cardiopulmonary review:Negative   Musculoskeletal:  Negative    A comprehensive review of systems was negative except for that written in the History of Present Illness.  , 10 point ROS      Problem List:  Patient Active Problem List    Diagnosis Date Noted    Thrombocytopenia ([address] 03/17/2017    CINV (chemotherapy-induced nausea and vomiting) 03/17/2017    Dehydration 03/17/2017    BRCA1 positive 09/10/2015    Ovarian cancer ([address] 04/09/2015    S/P total abdominal hysterectomy 03/18/2015    Pelvic mass 03/06/2015     PMH:  Past Medical History:   Diagnosis Date    Anxiety     Calculus of kidney 1/13    right    Hernia, inguinal, left 2012    MS (multiple sclerosis) ([address] 1996    Nausea & vomiting     Ovarian cancer ([address] 3/2015    high grade, stage 3C papillary serous    Thromboembolus ([address] 8/2007    lower abdomen post fall      PSH:  Past Surgical History:   Procedure Laterality Date    HX GYN  2009    endometrial ablation with punctured uterus    HX OTHER SURGICAL  8/2007    green filter    HX MARIEL AND BSO  3/2014    ovarian cancer      Social History:  Social History   Substance Use Topics    Smoking status: Never Smoker    Smokeless tobacco: Never Used    Alcohol use No      Family History:  Family History   Problem Relation Age of Onset    Cancer Paternal Grandmother      breast    Heart Disease Mother     Heart Disease Father       Medications: (reviewed)  Current Outpatient Prescriptions   Medication Sig    ondansetron (ZOFRAN ODT) 4 mg disintegrating tablet TAKE 1 TABLET BY MOUTH EVERY 8 HOURS AS NEEDED FOR NAUSEA    dexamethasone (DECADRON) 4 mg tablet Take 1-2 tabs QAM with food the 2 days following chemo as needed    prochlorperazine (COMPAZINE) 10 mg tablet Take 1 Tab by mouth every six (6) hours as needed for Nausea. Indications: CANCER CHEMOTHERAPY-INDUCED NAUSEA AND VOMITING    LORazepam (ATIVAN) 1 mg tablet TAKE 1 TABLET BY MOUTH AT BEDTIME    XARELTO 20 mg tab tablet     amitriptyline (ELAVIL) 25 mg tablet Take 1 Tab by mouth nightly.  lidocaine-prilocaine (EMLA) topical cream Apply small amount to port area and cover with band aid 1 hour before chemo treatment     No current facility-administered medications for this visit. Allergies: (reviewed)  Allergies   Allergen Reactions    Pcn [Penicillins] Rash          OBJECTIVE:    Physical Exam:  VITAL SIGNS: Vitals:    03/28/17 1535   BP: 102/61   Pulse: 78   Weight: 136 lb 6.4 oz (61.9 kg)   Height: 5' 7.52\" (1.715 m)     Body mass index is 21.04 kg/(m^2). GENERAL FILIBERTO: Conversant, alert, oriented. No acute distress. HEENT: HEENT. No thyroid enlargement. No JVD. Neck: Supple without restrictions. RESPIRATORY: Clear to auscultation and percussion to the bases. No CVAT. CARDIOVASC: RRR without murmur/rub. GASTROINT: Soft, non-distended, without masses or organomegaly. MUSCULOSKEL: no joint tenderness, deformity or swelling   EXTREMITIES: extremities normal, atraumatic, no cyanosis or edema   PELVIC: Deferred   RECTAL: Deferred   CHARMAINE SURVEY: No suspicious lymphadenopathy or edema noted. NEURO: Grossly intact. No acute deficit.          Lab Results   Component Value Date/Time    WBC 4.5 03/16/2017 01:23 PM    HGB 10.2 03/16/2017 01:23 PM    HCT 30.5 03/16/2017 01:23 PM    PLATELET 499 69/28/1322 01:23 PM    MCV 95.6 03/16/2017 01:23 PM     Lab Results   Component Value Date/Time    Sodium 138 03/16/2017 02:08 PM    Potassium 3.9 03/16/2017 02:08 PM    Chloride 103 03/16/2017 02:08 PM    CO2 27 03/16/2017 02:08 PM    Anion gap 8 03/16/2017 02:08 PM    Glucose 102 03/16/2017 02:08 PM    BUN 24 03/16/2017 02:08 PM    Creatinine 0.65 03/16/2017 02:08 PM    BUN/Creatinine ratio 37 03/16/2017 02:08 PM    GFR est AA >60 03/16/2017 02:08 PM    GFR est non-AA >60 03/16/2017 02:08 PM    Calcium 8.8 03/16/2017 02:08 PM       CT of abdomen/pelvis (12/8/16)  The visualized portions of the lung bases are clear.     Incidental hypodensity is seen adjacent to ligamentum teres. The liver is  otherwise unremarkable. The spleen, bilateral adrenal glands, pancreas, and  gallbladder are unremarkable. The bilateral kidneys are unremarkable without  evidence of hydronephrosis.     The abdominal aorta is normal in size. An IVC filter is in place. A stent is  present in the left iliac vein. No retroperitoneal lymphadenopathy.     The stomach and small bowel appear unremarkable. The appendix is normal. No  obstruction, free fluid, or free air.     The patient is status hysterectomy and ovarian resection. There is unchanged  scarring above the vaginal cuff and bladder.     No evidence of a destructive osseous lesion. IMPRESSION:   1. Status post hysterectomy and ovarian resection. There is unchanged scarring  over the vaginal cuff and bladder. 2. No evidence of acute process. Barium enema (12/22/16)   KUB shows unremarkable bowel gas pattern. There is IVC filter  and left iliac stent. Retrograde flow of barium without air fills the colon with  reflux into the appendix and extensive reflux into the unremarkable distal  ileum. Colon is well cleansed. Sigmoid is redundant and tortuous without  obstruction, and without mass or stricture identified.     Mucosal pattern is smooth. No mass or polyp is encountered. There is no  stricture. Diverticula are not seen. Postevacuation image shows incomplete  emptying of the colon but no level of obstruction. Radiation exposure index:  45.1Gycm2.     IMPRESSION: Unremarkable Barium Enema      CT of abdomen/pelvis (2/14/17)  Abdomen:  The lung bases are clear and the heart size is normal. The distal  esophagus, stomach, duodenum, liver, gallbladder, pancreas, spleen, adrenals,  and kidneys are normal.      Pelvis: There is trace, new ascites in the anterior pelvis (3-79, 602-78), the  anterior left lower quadrant (601-28), the pericecal right lower quadrant  (601-26, 3-72), and around the liver and spleen. The adjacent peritoneum  enhances (601-28, 602-77, 602-50). Curvilinear enhancement in the deep pelvis is  not masslike, but has increased compared to 12/18/2016 and 5/24/2016. Extension  along the peritoneum of the left paracolic gutter has increased (238-093). Enhancement is curvilinear and not nodular; no focal peritoneal or omental  nodules. The appendiceal mucosa is hyperenhancing (675-84), likely reactive, as  the appendix is not dilated.     The left external iliac vein is chronically occluded, though there is a stent in  place extending from the external iliac vein, through the common iliac, and into  the IVC. A large subcutaneous collateral vein (601-15, 601-18) and drains the  left lower extremity via the right common femoral and external iliac veins. An  IVC filter is in appropriate position below the renal vein ostia.     The small bowel, ileocecal junction, colon, and bladder are otherwise normal.  Post hysterectomy. No free air and no abdominopelvic lymphadenopathy.     IMPRESSION:   1. Trace new ascites, with enhancement of adjacent peritoneum. 2. Increased curvilinear enhancement in the pelvis and left paracolic gutter. No  focal peritoneal or omental nodules to confirm peritoneal carcinomatosis. 3. Chronic left external iliac vein occlusion. The left lower extremity drains  via a large subcutaneous collateral to the right femoral and iliac system. PET/CT (2/20/17)  HEAD/NECK: No apparent foci of abnormal hypermetabolism.  Cerebral evaluation is  limited by normal intense activity.     CHEST: No foci of abnormal hypermetabolism.     ABDOMEN/PELVIS: There is new extensive peritoneal activity compatible with  carcinomatosis as suggested by CT, max SUV 9.     SKELETON: No foci of abnormal hypermetabolism in the axial and visualized  appendicular skeleton.     IMPRESSION: Peritoneal carcinomatosis appearance. IMPRESSION/PLAN:  Brady Galindo is a 46 y.o. female with a diagnosis of stage IIIC ovarian cancer. She underwent tumor debulking followed by 6 cycles of Taxol and Carboplatin chemotherapy, which she completed approximately 18 months ago. She now has evidence of recurrent disease based upon symptoms, elevated CA-125, CT and PET/CT findings. She is now receiving Gemzar/Carboplatin and has completed one cycle. She had a lot of difficulty with the first cycle. I tried to encourage her today. We will plan to bring give her additional hydration through the cycle. We will also increase the use of steroids around the time of treatment. Hopefully her counts will tolerate therapy. She is heading to the lab after the appointment.        Signed By: Jocelynn Lopez MD     3/28/2017/3:54 PM

## 2017-03-28 NOTE — PROGRESS NOTES
Pre-chemo apt, labs today, chemo 3/30/17. The pt states she is no longer taking Neurontin or Techfidera.

## 2017-03-29 ENCOUNTER — TELEPHONE (OUTPATIENT)
Dept: GYNECOLOGY | Age: 52
End: 2017-03-29

## 2017-03-29 LAB
ALBUMIN SERPL-MCNC: 4.2 G/DL (ref 3.5–5.5)
ALBUMIN/GLOB SERPL: 1.6 {RATIO} (ref 1.2–2.2)
ALP SERPL-CCNC: 138 IU/L (ref 39–117)
ALT SERPL-CCNC: 50 IU/L (ref 0–32)
AST SERPL-CCNC: 21 IU/L (ref 0–40)
BASOPHILS # BLD AUTO: 0 X10E3/UL (ref 0–0.2)
BASOPHILS NFR BLD AUTO: 0 %
BILIRUB SERPL-MCNC: <0.2 MG/DL (ref 0–1.2)
BUN SERPL-MCNC: 17 MG/DL (ref 6–24)
BUN/CREAT SERPL: 25 (ref 9–23)
CALCIUM SERPL-MCNC: 9.3 MG/DL (ref 8.7–10.2)
CANCER AG125 SERPL-ACNC: 150.4 U/ML (ref 0–38.1)
CHLORIDE SERPL-SCNC: 102 MMOL/L (ref 96–106)
CO2 SERPL-SCNC: 25 MMOL/L (ref 18–29)
CREAT SERPL-MCNC: 0.67 MG/DL (ref 0.57–1)
EOSINOPHIL # BLD AUTO: 0 X10E3/UL (ref 0–0.4)
EOSINOPHIL NFR BLD AUTO: 0 %
ERYTHROCYTE [DISTWIDTH] IN BLOOD BY AUTOMATED COUNT: 13.1 % (ref 12.3–15.4)
GLOBULIN SER CALC-MCNC: 2.7 G/DL (ref 1.5–4.5)
GLUCOSE SERPL-MCNC: 105 MG/DL (ref 65–99)
HCT VFR BLD AUTO: 30.8 % (ref 34–46.6)
HGB BLD-MCNC: 10 G/DL (ref 11.1–15.9)
IMM GRANULOCYTES # BLD: 0 X10E3/UL (ref 0–0.1)
IMM GRANULOCYTES NFR BLD: 0 %
LYMPHOCYTES # BLD AUTO: 1.4 X10E3/UL (ref 0.7–3.1)
LYMPHOCYTES NFR BLD AUTO: 44 %
MAGNESIUM SERPL-MCNC: 2 MG/DL (ref 1.6–2.3)
MCH RBC QN AUTO: 31.3 PG (ref 26.6–33)
MCHC RBC AUTO-ENTMCNC: 32.5 G/DL (ref 31.5–35.7)
MCV RBC AUTO: 97 FL (ref 79–97)
MONOCYTES # BLD AUTO: 0.3 X10E3/UL (ref 0.1–0.9)
MONOCYTES NFR BLD AUTO: 10 %
NEUTROPHILS # BLD AUTO: 1.5 X10E3/UL (ref 1.4–7)
NEUTROPHILS NFR BLD AUTO: 46 %
PLATELET # BLD AUTO: 146 X10E3/UL (ref 150–379)
POTASSIUM SERPL-SCNC: 4.2 MMOL/L (ref 3.5–5.2)
PROT SERPL-MCNC: 6.9 G/DL (ref 6–8.5)
RBC # BLD AUTO: 3.19 X10E6/UL (ref 3.77–5.28)
SODIUM SERPL-SCNC: 143 MMOL/L (ref 134–144)
WBC # BLD AUTO: 3.3 X10E3/UL (ref 3.4–10.8)

## 2017-03-30 ENCOUNTER — HOSPITAL ENCOUNTER (OUTPATIENT)
Dept: INFUSION THERAPY | Age: 52
Discharge: HOME | End: 2017-03-30
Payer: COMMERCIAL

## 2017-03-30 VITALS
WEIGHT: 136.69 LBS | SYSTOLIC BLOOD PRESSURE: 110 MMHG | BODY MASS INDEX: 21.45 KG/M2 | RESPIRATION RATE: 16 BRPM | HEART RATE: 78 BPM | DIASTOLIC BLOOD PRESSURE: 62 MMHG | HEIGHT: 67 IN | TEMPERATURE: 96.5 F

## 2017-03-30 PROCEDURE — 74011000250 HC RX REV CODE- 250

## 2017-03-30 PROCEDURE — 74011250636 HC RX REV CODE- 250/636: Performed by: OBSTETRICS & GYNECOLOGY

## 2017-03-30 PROCEDURE — 96375 TX/PRO/DX INJ NEW DRUG ADDON: CPT

## 2017-03-30 PROCEDURE — 96417 CHEMO IV INFUS EACH ADDL SEQ: CPT

## 2017-03-30 PROCEDURE — 96413 CHEMO IV INFUSION 1 HR: CPT

## 2017-03-30 PROCEDURE — 96361 HYDRATE IV INFUSION ADD-ON: CPT

## 2017-03-30 PROCEDURE — 77030012965 HC NDL HUBR BBMI -A

## 2017-03-30 RX ORDER — SODIUM CHLORIDE 9 MG/ML
10 INJECTION INTRAMUSCULAR; INTRAVENOUS; SUBCUTANEOUS AS NEEDED
Status: ACTIVE | OUTPATIENT
Start: 2017-03-30 | End: 2017-03-31

## 2017-03-30 RX ORDER — HEPARIN 100 UNIT/ML
500 SYRINGE INTRAVENOUS AS NEEDED
Status: ACTIVE | OUTPATIENT
Start: 2017-03-30 | End: 2017-03-31

## 2017-03-30 RX ORDER — SODIUM CHLORIDE 0.9 % (FLUSH) 0.9 %
10-40 SYRINGE (ML) INJECTION AS NEEDED
Status: ACTIVE | OUTPATIENT
Start: 2017-03-30 | End: 2017-03-31

## 2017-03-30 RX ORDER — DEXAMETHASONE SODIUM PHOSPHATE 4 MG/ML
20 INJECTION, SOLUTION INTRA-ARTICULAR; INTRALESIONAL; INTRAMUSCULAR; INTRAVENOUS; SOFT TISSUE ONCE
Status: COMPLETED | OUTPATIENT
Start: 2017-03-30 | End: 2017-03-30

## 2017-03-30 RX ORDER — LORAZEPAM 1 MG/1
TABLET ORAL
Qty: 30 TAB | Refills: 1 | Status: SHIPPED | OUTPATIENT
Start: 2017-03-30 | End: 2017-05-30 | Stop reason: SDUPTHER

## 2017-03-30 RX ORDER — GRANISETRON HYDROCHLORIDE 1 MG/ML
1 INJECTION, SOLUTION INTRAVENOUS ONCE
Status: COMPLETED | OUTPATIENT
Start: 2017-03-30 | End: 2017-03-30

## 2017-03-30 RX ADMIN — Medication 10 ML: at 11:13

## 2017-03-30 RX ADMIN — SODIUM CHLORIDE 1000 ML: 900 INJECTION, SOLUTION INTRAVENOUS at 11:13

## 2017-03-30 RX ADMIN — GRANISETRON HYDROCHLORIDE 1 MG: 1 INJECTION, SOLUTION INTRAVENOUS at 12:23

## 2017-03-30 RX ADMIN — SODIUM CHLORIDE 1400 MG: 900 INJECTION, SOLUTION INTRAVENOUS at 13:30

## 2017-03-30 RX ADMIN — CARBOPLATIN 480 MG: 10 INJECTION, SOLUTION INTRAVENOUS at 14:18

## 2017-03-30 RX ADMIN — SODIUM CHLORIDE 10 ML: 9 INJECTION, SOLUTION INTRAMUSCULAR; INTRAVENOUS; SUBCUTANEOUS at 11:13

## 2017-03-30 RX ADMIN — DEXAMETHASONE SODIUM PHOSPHATE 20 MG: 4 INJECTION, SOLUTION INTRA-ARTICULAR; INTRALESIONAL; INTRAMUSCULAR; INTRAVENOUS; SOFT TISSUE at 12:27

## 2017-03-30 NOTE — PROGRESS NOTES
Ene Dominguez. KYE Barlow      Date of visit: 3/30/2017       Subjective:   Shirlene Lora is a 46 y.o.  postmenopausal female with stage IIIC ovarian cancer. She underwent X-lap, hysterectomy, and tumor debulking in March 2015. She was recommended adjuvant chemotherapy with 6 cycles of Taxol and Carboplatin. Her post-treatment PET/CT was negative for disease. Her Myriad results also found her to have a deleterious BRCA 1 mutation. As for the BRCA 1 mutation, I had her see one of our breast surgeons, Dr. Carlos Contreras, to talk about options, specifically screening and prophylactic surgery. She is going to hold off on surgery for now. She has decided not to have her daughter tested at this time.     She presented in December ahead of her scheduled visit complaining of left lower quadrant pain for several weeks. The pain was intermittent and crampy. She also had some pain with bowel movements. Certain foods also exacerbated it. Dr. Mary Ernandez sent her for a CT of the abdomen/pelvis which showed no obvious disease. In late December she had a colonoscopy which could not be completed due to adhesions. A subsequent barium enema was negative. A subsequent CA-125 was mildly elevated at 55, up from 10 three months earlier. Her CA-125 is now up to 99 and her CT suggests recurrent disease, although not definitive for recurrence. Dr. Mary Ernandez recommended a PET/CT. This was done on Feb 20, 2017 (see full results below)  This showed peritoneal carcinomatosis and discussion with Dr. Mary Ernandez was had and they agreed upon Carbo/Gemzar for 3 cycles Q21 days each with gemzar D1 & 8. .     She presents today for cycle 2D1. She feels she has \"recovered\" from her previous round. She did receive an extra day of hydration after her last infusion as she was dehydrated and \"just felt awful\".   She said the fluids did \"make a big difference\" and felt she began getting better after that. Said nausea has also improved. Denies diarrhea or constipation   Says she is eating well, but her taste buds are \"very different\" and she eats mostly bland type foods. She did take her decadron yesterday and will continue it tomorrow and Sat. She is scheduled for hydration again tomorrow      Current Outpatient Prescriptions   Medication Sig    LORazepam (ATIVAN) 1 mg tablet TAKE 1 TABLET BY MOUTH AT BEDTIME    ondansetron (ZOFRAN ODT) 4 mg disintegrating tablet TAKE 1 TABLET BY MOUTH EVERY 8 HOURS AS NEEDED FOR NAUSEA    dexamethasone (DECADRON) 4 mg tablet Take 1-2 tabs QAM with food the 2 days following chemo as needed    prochlorperazine (COMPAZINE) 10 mg tablet Take 1 Tab by mouth every six (6) hours as needed for Nausea. Indications: CANCER CHEMOTHERAPY-INDUCED NAUSEA AND VOMITING    XARELTO 20 mg tab tablet     amitriptyline (ELAVIL) 25 mg tablet Take 1 Tab by mouth nightly.  lidocaine-prilocaine (EMLA) topical cream Apply small amount to port area and cover with band aid 1 hour before chemo treatment     Current Facility-Administered Medications   Medication Dose Route Frequency    sodium chloride 0.9 % injection 10 mL  10 mL IntraVENous PRN    heparin (porcine) pf 500 Units  500 Units IntraVENous PRN    sodium chloride (NS) flush 10-40 mL  10-40 mL IntraVENous PRN     Facility-Administered Medications Ordered in Other Encounters   Medication Dose Route Frequency    [START ON 3/31/2017] sodium chloride 0.9 % bolus infusion 1,000 mL  1,000 mL IntraVENous ONCE        Review of Systems:  General: feeling stronger this time and denies wt loss  HEENT: Denies visual changes, dysphagia or headache  Resp: Denies SOB, LYONS, wheezing or cough  CV: Denies CP, palpitations  GI/:Denies abd pain/discomfort. Denies constipation or dysuria  MuskSkel: Acknowledges achy muscles, unchanged,  but denies joint pain  Neuro: Denies dizzyness or syncope.  Persistent neuropathy, but it has not worsened since receiving first treatment. Objective:     Patient Vitals for the past 8 hrs:   BP Temp Pulse Resp Height Weight   03/30/17 1500 110/62 - 78 16 - -   03/30/17 1102 (!) 89/54 96.5 °F (35.8 °C) 69 16 5' 6.93\" (1.7 m) 136 lb 11 oz (62 kg)       Physical Exam:  General: A&O X3 in NAD and less tired appearing  HEENT: Sclera anicteric, Mucosa pink, moist   Neck: No JVD bilat without cervical adenopathy present  Port site without redness/swelling or tenderness and accessed without difficulty  Heart: Regular without M/R/G  Lungs: CTA Bilat   Abd: Soft, mild tenderness to upper abd. No distention noted with + BS throughout  Ext: Without edema and + pedal pulses bilat  Neuro: grossly intact    Labs 3/28/17:  WBC 3.3; HGB 10.0; HCT 30.8; Plt's 146; ANC 1.5; Na 143; K+ 4.2; ; CO2 25; BUN 17; Creat 0.67; Gluc 105; Ca 9.3   3/48=433(  on 3/9=496; 2/13=99; 1/16=55)        Imaging:  PET/CT 2/20/2017;  FINDINGS:     HEAD/NECK: No apparent foci of abnormal hypermetabolism. Cerebral evaluation is  limited by normal intense activity.     CHEST: No foci of abnormal hypermetabolism.     ABDOMEN/PELVIS: There is new extensive peritoneal activity compatible with  carcinomatosis as suggested by CT, max SUV 9.     SKELETON: No foci of abnormal hypermetabolism in the axial and visualized  appendicular skeleton.       IMPRESSION: Peritoneal carcinomatosis appearance.     Assessment:     Patient Active Problem List   Diagnosis Code    Pelvic mass R19.00    S/P total abdominal hysterectomy Z90.710    Ovarian cancer ([address] C56.9    BRCA1 positive Z15.01, Z15.02    Thrombocytopenia (HCC) D69.6    CINV (chemotherapy-induced nausea and vomiting) R11.2, T45.1X5A    Dehydration E86.0         Plan:   Proceed with cycle 2 D1 of Carbo/Gemzar  Plan on hydration tomorrow  Discussed management of side effects and pt will take her decadron tomorrow and Sat and take 4 mg on Sunday since this helped much last time  She will eat small, frequent meals and let us know if she is not able to eat much  Continue to try to walk daily and work as able. Reviewed at length the importance of hydration  Continue to monitor plt's  Continue \"non-de solving\" Zofran for nausea  Encouraged to call for any concerns or questions  We discussed the FDA approval of the new PARP inhibitors and her potential use of them. We will continue this regimen and consider starting one as midstance after this regimen is completed.   Elizabeth Fraire, NP

## 2017-03-30 NOTE — PROGRESS NOTES
Outpatient Infusion Center - Chemotherapy Progress Note    1100 Pt admit to [facility] for Gemzar/Carboplatin C2D1 ambulatory in stable condition accompanied by spouse. Assessment completed. No new concerns voiced. Port accessed with positive blood return. Labs drawn, 03/28, WNL. Updated orders scanned in, hydration ordered. Line flushed, hydration started. Visit Vitals    /62    Pulse 78    Temp 96.5 °F (35.8 °C)    Resp 16    Ht 5' 6.93\" (1.7 m)    Wt 62 kg (136 lb 11 oz)    LMP 02/02/2015 (Approximate)    BMI 21.45 kg/m2       Medications:  Hydration  NS  Kytril 1 mg  Decadron 20 mg  Gemzar  Carboplatin    1500 Pt tolerated treatment well. Port maintained positive blood return throughout treatment, flushed with positive blood return at conclusion, heparinized, and de-accessed per pt. D/c home ambulatory in no distress. Pt aware of next [facility] appointment scheduled for 03/31/2017 for hydration.

## 2017-03-31 ENCOUNTER — HOSPITAL ENCOUNTER (OUTPATIENT)
Dept: INFUSION THERAPY | Age: 52
Discharge: HOME | End: 2017-03-31
Payer: COMMERCIAL

## 2017-03-31 VITALS
SYSTOLIC BLOOD PRESSURE: 94 MMHG | DIASTOLIC BLOOD PRESSURE: 60 MMHG | RESPIRATION RATE: 16 BRPM | HEART RATE: 71 BPM | TEMPERATURE: 96.9 F

## 2017-03-31 PROCEDURE — 96360 HYDRATION IV INFUSION INIT: CPT

## 2017-03-31 PROCEDURE — 77030012965 HC NDL HUBR BBMI -A

## 2017-03-31 RX ORDER — HEPARIN 100 UNIT/ML
500 SYRINGE INTRAVENOUS AS NEEDED
Status: ACTIVE | OUTPATIENT
Start: 2017-03-31 | End: 2017-04-01

## 2017-03-31 RX ORDER — SODIUM CHLORIDE 9 MG/ML
10 INJECTION INTRAMUSCULAR; INTRAVENOUS; SUBCUTANEOUS AS NEEDED
Status: ACTIVE | OUTPATIENT
Start: 2017-03-31 | End: 2017-04-01

## 2017-03-31 RX ORDER — SODIUM CHLORIDE 0.9 % (FLUSH) 0.9 %
10-40 SYRINGE (ML) INJECTION AS NEEDED
Status: ACTIVE | OUTPATIENT
Start: 2017-03-31 | End: 2017-04-01

## 2017-04-04 RX ORDER — GRANISETRON HYDROCHLORIDE 1 MG/ML
1 INJECTION, SOLUTION INTRAVENOUS ONCE
Status: COMPLETED | OUTPATIENT
Start: 2017-04-06 | End: 2017-04-06

## 2017-04-04 RX ORDER — DEXAMETHASONE SODIUM PHOSPHATE 4 MG/ML
4 INJECTION, SOLUTION INTRA-ARTICULAR; INTRALESIONAL; INTRAMUSCULAR; INTRAVENOUS; SOFT TISSUE ONCE
Status: COMPLETED | OUTPATIENT
Start: 2017-04-06 | End: 2017-04-06

## 2017-04-06 ENCOUNTER — HOSPITAL ENCOUNTER (OUTPATIENT)
Dept: INFUSION THERAPY | Age: 52
Discharge: HOME | End: 2017-04-06
Payer: COMMERCIAL

## 2017-04-06 VITALS
HEART RATE: 72 BPM | SYSTOLIC BLOOD PRESSURE: 90 MMHG | BODY MASS INDEX: 20.7 KG/M2 | WEIGHT: 136.6 LBS | OXYGEN SATURATION: 100 % | RESPIRATION RATE: 16 BRPM | HEIGHT: 68 IN | TEMPERATURE: 97.4 F | DIASTOLIC BLOOD PRESSURE: 52 MMHG

## 2017-04-06 PROBLEM — T45.1X5A CHEMOTHERAPY-INDUCED NEUTROPENIA (HCC): Status: ACTIVE | Noted: 2017-04-06

## 2017-04-06 PROBLEM — D70.1 CHEMOTHERAPY-INDUCED NEUTROPENIA (HCC): Status: ACTIVE | Noted: 2017-04-06

## 2017-04-06 LAB
BASOPHILS # BLD AUTO: 0 K/UL (ref 0–0.1)
BASOPHILS # BLD: 0 % (ref 0–1)
EOSINOPHIL # BLD: 0 K/UL (ref 0–0.4)
EOSINOPHIL NFR BLD: 1 % (ref 0–7)
ERYTHROCYTE [DISTWIDTH] IN BLOOD BY AUTOMATED COUNT: 13.4 % (ref 11.5–14.5)
HCT VFR BLD AUTO: 30 % (ref 35–47)
HGB BLD-MCNC: 10 G/DL (ref 11.5–16)
LYMPHOCYTES # BLD AUTO: 64 % (ref 12–49)
LYMPHOCYTES # BLD: 2.4 K/UL (ref 0.8–3.5)
MCH RBC QN AUTO: 31.5 PG (ref 26–34)
MCHC RBC AUTO-ENTMCNC: 33.3 G/DL (ref 30–36.5)
MCV RBC AUTO: 94.6 FL (ref 80–99)
MONOCYTES # BLD: 0.3 K/UL (ref 0–1)
MONOCYTES NFR BLD AUTO: 8 % (ref 5–13)
NEUTS SEG # BLD: 1 K/UL (ref 1.8–8)
NEUTS SEG NFR BLD AUTO: 27 % (ref 32–75)
PLATELET # BLD AUTO: 307 K/UL (ref 150–400)
RBC # BLD AUTO: 3.17 M/UL (ref 3.8–5.2)
WBC # BLD AUTO: 3.7 K/UL (ref 3.6–11)

## 2017-04-06 PROCEDURE — 96413 CHEMO IV INFUSION 1 HR: CPT

## 2017-04-06 PROCEDURE — 96375 TX/PRO/DX INJ NEW DRUG ADDON: CPT

## 2017-04-06 PROCEDURE — 85025 COMPLETE CBC W/AUTO DIFF WBC: CPT | Performed by: OBSTETRICS & GYNECOLOGY

## 2017-04-06 PROCEDURE — 74011000250 HC RX REV CODE- 250: Performed by: OBSTETRICS & GYNECOLOGY

## 2017-04-06 PROCEDURE — 96361 HYDRATE IV INFUSION ADD-ON: CPT

## 2017-04-06 PROCEDURE — 36415 COLL VENOUS BLD VENIPUNCTURE: CPT | Performed by: OBSTETRICS & GYNECOLOGY

## 2017-04-06 PROCEDURE — 74011250636 HC RX REV CODE- 250/636: Performed by: OBSTETRICS & GYNECOLOGY

## 2017-04-06 PROCEDURE — 77030012965 HC NDL HUBR BBMI -A

## 2017-04-06 RX ORDER — SODIUM CHLORIDE 0.9 % (FLUSH) 0.9 %
10-40 SYRINGE (ML) INJECTION AS NEEDED
Status: ACTIVE | OUTPATIENT
Start: 2017-04-06 | End: 2017-04-07

## 2017-04-06 RX ORDER — HEPARIN 100 UNIT/ML
500 SYRINGE INTRAVENOUS AS NEEDED
Status: ACTIVE | OUTPATIENT
Start: 2017-04-06 | End: 2017-04-07

## 2017-04-06 RX ORDER — SODIUM CHLORIDE 9 MG/ML
10 INJECTION INTRAMUSCULAR; INTRAVENOUS; SUBCUTANEOUS AS NEEDED
Status: ACTIVE | OUTPATIENT
Start: 2017-04-06 | End: 2017-04-07

## 2017-04-06 RX ADMIN — Medication 10 ML: at 13:35

## 2017-04-06 RX ADMIN — SODIUM CHLORIDE 1400 MG: 900 INJECTION, SOLUTION INTRAVENOUS at 16:12

## 2017-04-06 RX ADMIN — DEXAMETHASONE SODIUM PHOSPHATE 4 MG: 4 INJECTION, SOLUTION INTRA-ARTICULAR; INTRALESIONAL; INTRAMUSCULAR; INTRAVENOUS; SOFT TISSUE at 15:42

## 2017-04-06 RX ADMIN — GRANISETRON HYDROCHLORIDE 1 MG: 1 INJECTION, SOLUTION INTRAVENOUS at 15:39

## 2017-04-06 RX ADMIN — SODIUM CHLORIDE 10 ML: 9 INJECTION INTRAMUSCULAR; INTRAVENOUS; SUBCUTANEOUS at 13:35

## 2017-04-06 RX ADMIN — SODIUM CHLORIDE 1000 ML: 900 INJECTION, SOLUTION INTRAVENOUS at 13:36

## 2017-04-06 RX ADMIN — Medication 10 ML: at 16:49

## 2017-04-06 RX ADMIN — Medication 500 UNITS: at 16:49

## 2017-04-06 NOTE — PROGRESS NOTES
1325 Pt admit to [facility] for C 2, D 8 Gemzar ambulatory in stable condition. Accompanied by supportive , Lizzy Kirk. 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 No new concerns voiced. Port accessed with positive blood return. Labs drawn per order and sent. Hydration of 1 liter initiated. Contacted MD office with labs. Will order Neulasta which required a peer to peer approval for medication. Chemo ordered, premeds given. Visit Vitals    BP 90/52    Pulse 72    Temp 97.4 °F (36.3 °C)    Resp 16    Ht 5' 7.5\" (1.715 m)    Wt 62 kg (136 lb 9.6 oz)    LMP 02/02/2015 (Approximate)    SpO2 100%    Breastfeeding No    BMI 21.08 kg/m2       Medications:  Normal Saline 1 Liter  Kytril  Decadron  Gemzar      1650 Pt tolerated treatment well. Port maintained positive blood return throughout treatment, flushed with positive blood return at conclusion and heparinized per protocol. Carol Ann Reeve removed. D/c home ambulatory in no distress. Pt aware of next appointment scheduled for hydration and Neulasta on 4/7/17  Recent Results (from the past 12 hour(s))   CBC WITH AUTOMATED DIFF    Collection Time: 04/06/17  1:37 PM   Result Value Ref Range    WBC 3.7 3.6 - 11.0 K/uL    RBC 3.17 (L) 3.80 - 5.20 M/uL    HGB 10.0 (L) 11.5 - 16.0 g/dL    HCT 30.0 (L) 35.0 - 47.0 %    MCV 94.6 80.0 - 99.0 FL    MCH 31.5 26.0 - 34.0 PG    MCHC 33.3 30.0 - 36.5 g/dL    RDW 13.4 11.5 - 14.5 %    PLATELET 496 703 - 628 K/uL    NEUTROPHILS 27 (L) 32 - 75 %    LYMPHOCYTES 64 (H) 12 - 49 %    MONOCYTES 8 5 - 13 %    EOSINOPHILS 1 0 - 7 %    BASOPHILS 0 0 - 1 %    ABS. NEUTROPHILS 1.0 (L) 1.8 - 8.0 K/UL    ABS. LYMPHOCYTES 2.4 0.8 - 3.5 K/UL    ABS. MONOCYTES 0.3 0.0 - 1.0 K/UL    ABS. EOSINOPHILS 0.0 0.0 - 0.4 K/UL    ABS.  BASOPHILS 0.0 0.0 - 0.1 K/UL

## 2017-04-06 NOTE — PROGRESS NOTES
Amber Irene. KYE Barlow      Date of visit: 4/6/2017       HPI:   Deanne Henry is a 46 y.o.  postmenopausal female with stage IIIC ovarian cancer. She underwent X-lap, hysterectomy, and tumor debulking in March 2015. She was recommended adjuvant chemotherapy with 6 cycles of Taxol and Carboplatin. Her post-treatment PET/CT was negative for disease. Her Myriad results also found her to have a deleterious BRCA 1 mutation. As for the BRCA 1 mutation, I had her see one of our breast surgeons, Dr. Avila Adjutant, to talk about options, specifically screening and prophylactic surgery. She is going to hold off on surgery for now. She has decided not to have her daughter tested at this time.     She presented in December ahead of her scheduled visit complaining of left lower quadrant pain for several weeks. The pain was intermittent and crampy. She also had some pain with bowel movements. Certain foods also exacerbated it. Dr. Cresencio Rubin sent her for a CT of the abdomen/pelvis which showed no obvious disease. In late December she had a colonoscopy which could not be completed due to adhesions. A subsequent barium enema was negative. A subsequent CA-125 was mildly elevated at 55, up from 10 three months earlier. Her CA-125 is now up to 99 and her CT suggests recurrent disease, although not definitive for recurrence. Dr. Cresencio Rubin recommended a PET/CT. This was done on Feb 20, 2017 (see full results below)  This showed peritoneal carcinomatosis and discussion with Dr. Cresencio Rubin was had and they agreed upon Carbo/Gemzar for 3 cycles Q21 days each with gemzar D1 & 8. SUBJECTIVE:  She presents today for cycle 2D8. She had difficulty with her first cycle and after her second cycle, she received hydration the following day and said she \"did much better\". She says she is trying to stay active and continues to work   Said nausea has also improved.   Denies diarrhea or constipation   Says she is eating well, but her taste buds are \"very different\" and she eats mostly bland type foods. She is scheduled for hydration again tomorrow      Current Outpatient Prescriptions   Medication Sig    LORazepam (ATIVAN) 1 mg tablet TAKE 1 TABLET BY MOUTH AT BEDTIME    ondansetron (ZOFRAN ODT) 4 mg disintegrating tablet TAKE 1 TABLET BY MOUTH EVERY 8 HOURS AS NEEDED FOR NAUSEA    dexamethasone (DECADRON) 4 mg tablet Take 1-2 tabs QAM with food the 2 days following chemo as needed    prochlorperazine (COMPAZINE) 10 mg tablet Take 1 Tab by mouth every six (6) hours as needed for Nausea. Indications: CANCER CHEMOTHERAPY-INDUCED NAUSEA AND VOMITING    XARELTO 20 mg tab tablet     amitriptyline (ELAVIL) 25 mg tablet Take 1 Tab by mouth nightly.  lidocaine-prilocaine (EMLA) topical cream Apply small amount to port area and cover with band aid 1 hour before chemo treatment     Current Facility-Administered Medications   Medication Dose Route Frequency    sodium chloride 0.9 % injection 10 mL  10 mL IntraVENous PRN    heparin (porcine) pf 500 Units  500 Units IntraVENous PRN    sodium chloride (NS) flush 10-40 mL  10-40 mL IntraVENous PRN    gemcitabine (GEMZAR) 1,400 mg in 0.9% sodium chloride 250 mL, overfill volume 25 mL chemo infusion  1,400 mg IntraVENous ONCE    granisetron (PF) (KYTRIL) injection 1 mg  1 mg IntraVENous ONCE    dexamethasone (DECADRON) 4 mg/mL injection 4 mg  4 mg IntraVENous ONCE     Facility-Administered Medications Ordered in Other Encounters   Medication Dose Route Frequency    [START ON 4/7/2017] pegfilgrastim (NEULASTA) injection 6 mg  6 mg SubCUTAneous ONCE    [START ON 4/7/2017] sodium chloride 0.9 % bolus infusion 1,000 mL  1,000 mL IntraVENous ONCE        Review of Systems:  General: feeling a \"little run down\" from her Day 1 infusion last week.  Denies wt loss  HEENT: Denies visual changes, dysphagia or headache  Resp: Denies SOB, LYONS, wheezing or cough  CV: Denies CP, palpitations  GI/:Denies abd pain/discomfort. Denies constipation or dysuria  MuskSkel: Acknowledges persistent  achy muscles, unchanged,  but denies joint pain  Neuro: Denies dizzyness or syncope. Persistent neuropathy, but it has not worsened since receiving first treatment. Objective:     Patient Vitals for the past 8 hrs:   BP Temp Pulse Resp Height Weight   04/06/17 1328 92/54 97.4 °F (36.3 °C) 74 16 5' 7.5\" (1.715 m) 136 lb 9.6 oz (62 kg)       Physical Exam:  General: A&O X3 in NAD and slightly tired appearing  HEENT: Sclera anicteric, mucosa pink, moist   Neck: No JVD bilat without cervical adenopathy present  Port site without redness/swelling or tenderness and accessed without difficulty  Heart: Regular without M/R/G  Lungs: CTA Bilat without rales or wheezing  Abd: Soft, mild tenderness to upper abd . No distention noted with + BS throughout  Ext: Without edema and + pedal pulses bilat  Neuro: grossly intact    Labs 4/6/17:  WBC 3.7; HGB 10.0; HCT 30.0; Plt's 307; ANC 1.0;  3/28/2017: Na 143; K+ 4.2; ; CO2 25; BUN 17; Creat 0.67; Gluc 105; Ca 9.3   3/42=858(  on 3/9=496; 2/13=99; 1/16=55)        Imaging:  PET/CT 2/20/2017;  FINDINGS:     HEAD/NECK: No apparent foci of abnormal hypermetabolism. Cerebral evaluation is  limited by normal intense activity.     CHEST: No foci of abnormal hypermetabolism.     ABDOMEN/PELVIS: There is new extensive peritoneal activity compatible with  carcinomatosis as suggested by CT, max SUV 9.     SKELETON: No foci of abnormal hypermetabolism in the axial and visualized  appendicular skeleton.       IMPRESSION: Peritoneal carcinomatosis appearance.     Assessment:     Patient Active Problem List   Diagnosis Code    Pelvic mass R19.00    S/P total abdominal hysterectomy Z90.710    Ovarian cancer ([facility] [address] C56.9    BRCA1 positive Z15.01, Z15.02    Thrombocytopenia (HCC) D69.6    CINV (chemotherapy-induced nausea and vomiting) R11.2, T45.1X5A    Dehydration E86.0    Chemotherapy-induced neutropenia (HCC) D70.1         Plan:   Proceed with cycle 2 D8 of Carbo/Gemzar (Gemzar only today)  Due to her ANC of 1.0, discussed with Dr. Edson Hensley and we will proceed, but will give Neulasta tomorrow with hydration (at least 24 hours after infusion completed). Discussed s/s infection and pt will call us for any temp of 100.4 or greater or any concern of infections  Plan on hydration tomorrow in addition to what she receives today   She will eat small, frequent meals and let us know if she is not able to eat much  Continue to try to walk daily and work as able. Reviewed at length the importance of hydration at home in addition to her supplement here  Continue to monitor plt's  Continue Zofran tabs for nausea  Encouraged to call for any concerns or questions  We discussed the FDA approval of the new PARP inhibitors and her potential use of them. We will continue this regimen and consider starting one as midstance after this regimen is completed.   Jessie Greenwood NP

## 2017-04-07 ENCOUNTER — HOSPITAL ENCOUNTER (OUTPATIENT)
Dept: INFUSION THERAPY | Age: 52
Discharge: HOME | End: 2017-04-07
Payer: COMMERCIAL

## 2017-04-07 VITALS
HEART RATE: 80 BPM | RESPIRATION RATE: 16 BRPM | DIASTOLIC BLOOD PRESSURE: 65 MMHG | TEMPERATURE: 96.7 F | SYSTOLIC BLOOD PRESSURE: 109 MMHG

## 2017-04-07 PROCEDURE — 96372 THER/PROPH/DIAG INJ SC/IM: CPT

## 2017-04-07 PROCEDURE — 74011250636 HC RX REV CODE- 250/636: Performed by: OBSTETRICS & GYNECOLOGY

## 2017-04-07 PROCEDURE — 96360 HYDRATION IV INFUSION INIT: CPT

## 2017-04-07 PROCEDURE — 74011000250 HC RX REV CODE- 250: Performed by: OBSTETRICS & GYNECOLOGY

## 2017-04-07 PROCEDURE — 74011250636 HC RX REV CODE- 250/636: Performed by: NURSE PRACTITIONER

## 2017-04-07 PROCEDURE — 77030012965 HC NDL HUBR BBMI -A

## 2017-04-07 RX ORDER — HEPARIN 100 UNIT/ML
500 SYRINGE INTRAVENOUS AS NEEDED
Status: ACTIVE | OUTPATIENT
Start: 2017-04-07 | End: 2017-04-08

## 2017-04-07 RX ORDER — SODIUM CHLORIDE 9 MG/ML
10 INJECTION INTRAMUSCULAR; INTRAVENOUS; SUBCUTANEOUS AS NEEDED
Status: ACTIVE | OUTPATIENT
Start: 2017-04-07 | End: 2017-04-08

## 2017-04-07 RX ORDER — SODIUM CHLORIDE 0.9 % (FLUSH) 0.9 %
5-10 SYRINGE (ML) INJECTION AS NEEDED
Status: ACTIVE | OUTPATIENT
Start: 2017-04-07 | End: 2017-04-08

## 2017-04-07 RX ADMIN — SODIUM CHLORIDE 10 ML: 9 INJECTION, SOLUTION INTRAMUSCULAR; INTRAVENOUS; SUBCUTANEOUS at 18:17

## 2017-04-07 RX ADMIN — Medication 500 UNITS: at 18:18

## 2017-04-07 RX ADMIN — PEGFILGRASTIM 6 MG: 6 INJECTION SUBCUTANEOUS at 17:22

## 2017-04-07 RX ADMIN — Medication 10 ML: at 18:17

## 2017-04-07 RX ADMIN — SODIUM CHLORIDE 1000 ML: 900 INJECTION, SOLUTION INTRAVENOUS at 16:20

## 2017-04-07 NOTE — PROGRESS NOTES
[facility] Outpatient Infusion Center Note:  1600Pt arrived at [facility] ambulatory and in no distress for hydration    Assessment stable, no new complaints voiced. Medications received:  NS 1000 ml     Tolerated treatment well, no adverse reaction noted. D/Cd from [facility] ambulatory and in no distress accompanied by self. Next appt 4/20  1100  Visit Vitals    /65    Pulse 80    Temp 96.7 °F (35.9 °C)    Resp 16    LMP 02/02/2015 (Approximate)     No results found for this or any previous visit (from the past 12 hour(s)).

## 2017-04-07 NOTE — PROGRESS NOTES
Problem: Patient Education: Go to Education Activity  Goal: Patient/Family Education  Outcome: Progressing Towards Goal  Benefits of hydration

## 2017-04-14 DIAGNOSIS — C56.9 OVARIAN CANCER, UNSPECIFIED LATERALITY (HCC): Primary | ICD-10-CM

## 2017-04-18 ENCOUNTER — OFFICE VISIT (OUTPATIENT)
Dept: GYNECOLOGY | Age: 52
End: 2017-04-18

## 2017-04-18 VITALS
HEART RATE: 78 BPM | SYSTOLIC BLOOD PRESSURE: 102 MMHG | WEIGHT: 143.8 LBS | BODY MASS INDEX: 21.79 KG/M2 | HEIGHT: 68 IN | DIASTOLIC BLOOD PRESSURE: 62 MMHG

## 2017-04-18 DIAGNOSIS — C56.9 OVARIAN CANCER, UNSPECIFIED LATERALITY (HCC): Primary | ICD-10-CM

## 2017-04-18 RX ORDER — GABAPENTIN 400 MG/1
CAPSULE ORAL
Refills: 12 | COMMUNITY
Start: 2017-02-22 | End: 2018-01-01 | Stop reason: ALTCHOICE

## 2017-04-18 NOTE — PROGRESS NOTES
[address] [place] [address] [address] [place]  [phone]  MD Sylvie Mejia MD  Office Note  Patient ID:  Name:  Sharyle Erps  MRN:  [id number]  :  1965/52 y.o. Date:  2017      HISTORY OF PRESENT ILLNESS:  Sharyle Erps is a 46 y.o.  postmenopausal female with stage IIIC ovarian cancer. She underwent X-lap, hysterectomy, and tumor debulking in 2015. She was recommended adjuvant chemotherapy with 6 cycles of Taxol and Carboplatin. Her post-treatment PET/CT was negative for disease. Her Myriad results also found her to have a deleterious BRCA 1 mutation. As for the BRCA 1 mutation, I had her see one of our breast surgeons, Dr. Niki Martino, to talk about options, specifically screening and prophylactic surgery. She is going to hold off on surgery for now. She has decided not to have her daughter tested at this time. She presented in December ahead of her scheduled visit complaining of left lower quadrant pain for several weeks. The pain was intermittent and crampy. She also had some pain with bowel movements. Certain foods also exacerbated it. I sent her for a CT of the abdomen/pelvis which showed no obvious disease. In late December she had a colonoscopy which could not be completed due to adhesions. A subsequent barium enema was negative. A subsequent CA-125 was mildly elevated at 55, up from 10 three months earlier. Her CA-125 is now up to 99 and her CT suggests recurrent disease, although not definitive for recurrence. I recommended a PET/CT which confirmed recurrence. We discussed chemotherapy for her recurrence. She was clearly platinum resistant, therefore I recommended a platinum-based regimen. I recommended Gemzar/Carboplatin. 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 She also wanted to keep her hair.   We also discussed options for down the line, specifically a PARP inhibitor, since she is BRCA positive. She has completed 2 cycles so far. She had a lot of difficulty with the first cycle, but the second was much easier. ROS:   and GI review: Negative  Cardiopulmonary review:Negative   Musculoskeletal:  Negative    A comprehensive review of systems was negative except for that written in the History of Present Illness.  , 10 point ROS      Problem List:  Patient Active Problem List    Diagnosis Date Noted    Chemotherapy-induced neutropenia ([address] 04/06/2017    Thrombocytopenia (HCC) 03/17/2017    CINV (chemotherapy-induced nausea and vomiting) 03/17/2017    Dehydration 03/17/2017    BRCA1 positive 09/10/2015    Ovarian cancer ([facility] [address] 04/09/2015    S/P total abdominal hysterectomy 03/18/2015    Pelvic mass 03/06/2015     PMH:  Past Medical History:   Diagnosis Date    Anxiety     Calculus of kidney 1/13    right    Hernia, inguinal, left 2012    MS (multiple sclerosis) (HCC) 1996    Nausea & vomiting     Ovarian cancer ([facility] [address] 3/2015    high grade, stage 3C papillary serous    Thromboembolus ([facility] [address] 8/2007    lower abdomen post fall      PSH:  Past Surgical History:   Procedure Laterality Date    HX GYN  2009    endometrial ablation with punctured uterus    HX OTHER SURGICAL  8/2007    green filter    HX MARIEL AND BSO  3/2014    ovarian cancer      Social History:  Social History   Substance Use Topics    Smoking status: Never Smoker    Smokeless tobacco: Never Used    Alcohol use No      Family History:  Family History   Problem Relation Age of Onset    Cancer Paternal Grandmother      breast    Heart Disease Mother     Heart Disease Father       Medications: (reviewed)  Current Outpatient Prescriptions   Medication Sig    LORazepam (ATIVAN) 1 mg tablet TAKE 1 TABLET BY MOUTH AT BEDTIME    ondansetron (ZOFRAN ODT) 4 mg disintegrating tablet TAKE 1 TABLET BY MOUTH EVERY 8 HOURS AS NEEDED FOR NAUSEA  dexamethasone (DECADRON) 4 mg tablet Take 1-2 tabs QAM with food the 2 days following chemo as needed    prochlorperazine (COMPAZINE) 10 mg tablet Take 1 Tab by mouth every six (6) hours as needed for Nausea. Indications: CANCER CHEMOTHERAPY-INDUCED NAUSEA AND VOMITING    XARELTO 20 mg tab tablet     lidocaine-prilocaine (EMLA) topical cream Apply small amount to port area and cover with band aid 1 hour before chemo treatment    gabapentin (NEURONTIN) 400 mg capsule TAKE 1 CAPSULE BY MOUTH 3 TIMES DAILY    amitriptyline (ELAVIL) 25 mg tablet Take 1 Tab by mouth nightly. No current facility-administered medications for this visit. Allergies: (reviewed)  Allergies   Allergen Reactions    Pcn [Penicillins] Rash          OBJECTIVE:    Physical Exam:  VITAL SIGNS: Vitals:    04/18/17 1556   BP: 102/62   Pulse: 78   Weight: 143 lb 12.8 oz (65.2 kg)   Height: 5' 7.52\" (1.715 m)     Body mass index is 22.18 kg/(m^2). GENERAL FILIBERTO: Conversant, alert, oriented. No acute distress. HEENT: HEENT. No thyroid enlargement. No JVD. Neck: Supple without restrictions. RESPIRATORY: Clear to auscultation and percussion to the bases. No CVAT. CARDIOVASC: RRR without murmur/rub. GASTROINT: Soft, non-distended, without masses or organomegaly. MUSCULOSKEL: no joint tenderness, deformity or swelling   EXTREMITIES: extremities normal, atraumatic, no cyanosis or edema   PELVIC: Deferred   RECTAL: Deferred   CHARMAINE SURVEY: No suspicious lymphadenopathy or edema noted. NEURO: Grossly intact. No acute deficit.          Lab Results   Component Value Date/Time    WBC 3.7 04/06/2017 01:37 PM    HGB 10.0 04/06/2017 01:37 PM    HCT 30.0 04/06/2017 01:37 PM    PLATELET 045 43/86/0058 01:37 PM    MCV 94.6 04/06/2017 01:37 PM     Lab Results   Component Value Date/Time    Sodium 143 03/28/2017 04:36 PM    Potassium 4.2 03/28/2017 04:36 PM    Chloride 102 03/28/2017 04:36 PM    CO2 25 03/28/2017 04:36 PM    Anion gap 8 03/16/2017 02:08 PM    Glucose 105 03/28/2017 04:36 PM    BUN 17 03/28/2017 04:36 PM    Creatinine 0.67 03/28/2017 04:36 PM    BUN/Creatinine ratio 25 03/28/2017 04:36 PM    GFR est  03/28/2017 04:36 PM    GFR est non- 03/28/2017 04:36 PM    Calcium 9.3 03/28/2017 04:36 PM       CT of abdomen/pelvis (12/8/16)  The visualized portions of the lung bases are clear.     Incidental hypodensity is seen adjacent to ligamentum teres. The liver is  otherwise unremarkable. The spleen, bilateral adrenal glands, pancreas, and  gallbladder are unremarkable. The bilateral kidneys are unremarkable without  evidence of hydronephrosis.     The abdominal aorta is normal in size. An IVC filter is in place. A stent is  present in the left iliac vein. No retroperitoneal lymphadenopathy.     The stomach and small bowel appear unremarkable. The appendix is normal. No  obstruction, free fluid, or free air.     The patient is status hysterectomy and ovarian resection. There is unchanged  scarring above the vaginal cuff and bladder.     No evidence of a destructive osseous lesion. IMPRESSION:   1. Status post hysterectomy and ovarian resection. There is unchanged scarring  over the vaginal cuff and bladder. 2. No evidence of acute process. Barium enema (12/22/16)   KUB shows unremarkable bowel gas pattern. There is IVC filter  and left iliac stent. Retrograde flow of barium without air fills the colon with  reflux into the appendix and extensive reflux into the unremarkable distal  ileum. Colon is well cleansed. Sigmoid is redundant and tortuous without  obstruction, and without mass or stricture identified.     Mucosal pattern is smooth. No mass or polyp is encountered. There is no  stricture. Diverticula are not seen. Postevacuation image shows incomplete  emptying of the colon but no level of obstruction.  Radiation exposure index:  45.1Gycm2.     IMPRESSION: Unremarkable Barium Enema      CT of abdomen/pelvis (2/14/17)  Abdomen: The lung bases are clear and the heart size is normal. The distal  esophagus, stomach, duodenum, liver, gallbladder, pancreas, spleen, adrenals,  and kidneys are normal.      Pelvis: There is trace, new ascites in the anterior pelvis (3-79, 602-78), the  anterior left lower quadrant (601-28), the pericecal right lower quadrant  (601-26, 3-72), and around the liver and spleen. The adjacent peritoneum  enhances (601-28, 602-77, 602-50). Curvilinear enhancement in the deep pelvis is  not masslike, but has increased compared to 12/18/2016 and 5/24/2016. Extension  along the peritoneum of the left paracolic gutter has increased (371-384). Enhancement is curvilinear and not nodular; no focal peritoneal or omental  nodules. The appendiceal mucosa is hyperenhancing (453-04), likely reactive, as  the appendix is not dilated.     The left external iliac vein is chronically occluded, though there is a stent in  place extending from the external iliac vein, through the common iliac, and into  the IVC. A large subcutaneous collateral vein (601-15, 601-18) and drains the  left lower extremity via the right common femoral and external iliac veins. An  IVC filter is in appropriate position below the renal vein ostia.     The small bowel, ileocecal junction, colon, and bladder are otherwise normal.  Post hysterectomy. No free air and no abdominopelvic lymphadenopathy.     IMPRESSION:   1. Trace new ascites, with enhancement of adjacent peritoneum. 2. Increased curvilinear enhancement in the pelvis and left paracolic gutter. No  focal peritoneal or omental nodules to confirm peritoneal carcinomatosis. 3. Chronic left external iliac vein occlusion. The left lower extremity drains  via a large subcutaneous collateral to the right femoral and iliac system. PET/CT (2/20/17)  HEAD/NECK: No apparent foci of abnormal hypermetabolism.  Cerebral evaluation is  limited by normal intense activity.     CHEST: No foci of abnormal hypermetabolism.     ABDOMEN/PELVIS: There is new extensive peritoneal activity compatible with  carcinomatosis as suggested by CT, max SUV 9.     SKELETON: No foci of abnormal hypermetabolism in the axial and visualized  appendicular skeleton.     IMPRESSION: Peritoneal carcinomatosis appearance. IMPRESSION/PLAN:  Ruma Robert is a 46 y.o. female with a diagnosis of stage IIIC ovarian cancer. She underwent tumor debulking followed by 6 cycles of Taxol and Carboplatin chemotherapy, which she completed approximately 18 months ago. She now has evidence of recurrent disease based upon symptoms, elevated CA-125, CT and PET/CT findings. She is now receiving Gemzar/Carboplatin and has completed 2 cycles. She had a lot of difficulty with the first cycle, but did much better with the second. Her CA-125 has responded well. We will continue with the current regimen for now.       Signed By: Pankaj Nix MD     4/18/2017/3:54 PM

## 2017-04-18 NOTE — PROGRESS NOTES
Pre-chemo apt, scheduled for 4/20, labs today. The pt states she is no longer taking Neurontin or Elavil.

## 2017-04-19 LAB
ALBUMIN SERPL-MCNC: 4.4 G/DL (ref 3.5–5.5)
ALBUMIN/GLOB SERPL: 1.9 {RATIO} (ref 1.2–2.2)
ALP SERPL-CCNC: 128 IU/L (ref 39–117)
ALT SERPL-CCNC: 45 IU/L (ref 0–32)
AST SERPL-CCNC: 24 IU/L (ref 0–40)
BASOPHILS # BLD AUTO: 0 X10E3/UL (ref 0–0.2)
BASOPHILS NFR BLD AUTO: 0 %
BILIRUB SERPL-MCNC: <0.2 MG/DL (ref 0–1.2)
BUN SERPL-MCNC: 18 MG/DL (ref 6–24)
BUN/CREAT SERPL: 26 (ref 9–23)
CALCIUM SERPL-MCNC: 9 MG/DL (ref 8.7–10.2)
CANCER AG125 SERPL-ACNC: 27.9 U/ML (ref 0–38.1)
CHLORIDE SERPL-SCNC: 101 MMOL/L (ref 96–106)
CO2 SERPL-SCNC: 25 MMOL/L (ref 18–29)
CREAT SERPL-MCNC: 0.7 MG/DL (ref 0.57–1)
EOSINOPHIL # BLD AUTO: 0 X10E3/UL (ref 0–0.4)
EOSINOPHIL NFR BLD AUTO: 0 %
ERYTHROCYTE [DISTWIDTH] IN BLOOD BY AUTOMATED COUNT: 16.2 % (ref 12.3–15.4)
GLOBULIN SER CALC-MCNC: 2.3 G/DL (ref 1.5–4.5)
GLUCOSE SERPL-MCNC: 85 MG/DL (ref 65–99)
HCT VFR BLD AUTO: 29.7 % (ref 34–46.6)
HGB BLD-MCNC: 9.7 G/DL (ref 11.1–15.9)
IMM GRANULOCYTES # BLD: 0.1 X10E3/UL (ref 0–0.1)
IMM GRANULOCYTES NFR BLD: 1 %
LYMPHOCYTES # BLD AUTO: 1.8 X10E3/UL (ref 0.7–3.1)
LYMPHOCYTES NFR BLD AUTO: 19 %
MAGNESIUM SERPL-MCNC: 2 MG/DL (ref 1.6–2.3)
MCH RBC QN AUTO: 32.6 PG (ref 26.6–33)
MCHC RBC AUTO-ENTMCNC: 32.7 G/DL (ref 31.5–35.7)
MCV RBC AUTO: 100 FL (ref 79–97)
MONOCYTES # BLD AUTO: 0.8 X10E3/UL (ref 0.1–0.9)
MONOCYTES NFR BLD AUTO: 9 %
MORPHOLOGY BLD-IMP: ABNORMAL
NEUTROPHILS # BLD AUTO: 6.6 X10E3/UL (ref 1.4–7)
NEUTROPHILS NFR BLD AUTO: 71 %
PLATELET # BLD AUTO: 85 X10E3/UL (ref 150–379)
POTASSIUM SERPL-SCNC: 4.3 MMOL/L (ref 3.5–5.2)
PROT SERPL-MCNC: 6.7 G/DL (ref 6–8.5)
RBC # BLD AUTO: 2.98 X10E6/UL (ref 3.77–5.28)
SODIUM SERPL-SCNC: 141 MMOL/L (ref 134–144)
WBC # BLD AUTO: 9.2 X10E3/UL (ref 3.4–10.8)

## 2017-04-19 RX ORDER — DEXAMETHASONE SODIUM PHOSPHATE 4 MG/ML
20 INJECTION, SOLUTION INTRA-ARTICULAR; INTRALESIONAL; INTRAMUSCULAR; INTRAVENOUS; SOFT TISSUE ONCE
Status: COMPLETED | OUTPATIENT
Start: 2017-04-20 | End: 2017-04-20

## 2017-04-19 RX ORDER — GRANISETRON HYDROCHLORIDE 1 MG/ML
1 INJECTION, SOLUTION INTRAVENOUS ONCE
Status: COMPLETED | OUTPATIENT
Start: 2017-04-20 | End: 2017-04-20

## 2017-04-20 ENCOUNTER — HOSPITAL ENCOUNTER (OUTPATIENT)
Dept: INFUSION THERAPY | Age: 52
Discharge: HOME | End: 2017-04-20
Payer: COMMERCIAL

## 2017-04-20 VITALS
WEIGHT: 141 LBS | BODY MASS INDEX: 21.37 KG/M2 | TEMPERATURE: 97.6 F | RESPIRATION RATE: 16 BRPM | HEIGHT: 68 IN | DIASTOLIC BLOOD PRESSURE: 58 MMHG | HEART RATE: 85 BPM | SYSTOLIC BLOOD PRESSURE: 97 MMHG

## 2017-04-20 LAB
BASOPHILS # BLD AUTO: 0 K/UL (ref 0–0.1)
BASOPHILS # BLD: 0 % (ref 0–1)
EOSINOPHIL # BLD: 0 K/UL (ref 0–0.4)
EOSINOPHIL NFR BLD: 0 % (ref 0–7)
ERYTHROCYTE [DISTWIDTH] IN BLOOD BY AUTOMATED COUNT: 16.3 % (ref 11.5–14.5)
HCT VFR BLD AUTO: 29 % (ref 35–47)
HGB BLD-MCNC: 9.4 G/DL (ref 11.5–16)
LYMPHOCYTES # BLD AUTO: 19 % (ref 12–49)
LYMPHOCYTES # BLD: 1.4 K/UL (ref 0.8–3.5)
MCH RBC QN AUTO: 31.5 PG (ref 26–34)
MCHC RBC AUTO-ENTMCNC: 32.4 G/DL (ref 30–36.5)
MCV RBC AUTO: 97.3 FL (ref 80–99)
MONOCYTES # BLD: 0.6 K/UL (ref 0–1)
MONOCYTES NFR BLD AUTO: 8 % (ref 5–13)
NEUTS SEG # BLD: 5.2 K/UL (ref 1.8–8)
NEUTS SEG NFR BLD AUTO: 73 % (ref 32–75)
PLATELET # BLD AUTO: 135 K/UL (ref 150–400)
RBC # BLD AUTO: 2.98 M/UL (ref 3.8–5.2)
WBC # BLD AUTO: 7.2 K/UL (ref 3.6–11)

## 2017-04-20 PROCEDURE — 96417 CHEMO IV INFUS EACH ADDL SEQ: CPT

## 2017-04-20 PROCEDURE — 74011000250 HC RX REV CODE- 250

## 2017-04-20 PROCEDURE — 85025 COMPLETE CBC W/AUTO DIFF WBC: CPT | Performed by: NURSE PRACTITIONER

## 2017-04-20 PROCEDURE — 74011250636 HC RX REV CODE- 250/636: Performed by: OBSTETRICS & GYNECOLOGY

## 2017-04-20 PROCEDURE — 74011250636 HC RX REV CODE- 250/636

## 2017-04-20 PROCEDURE — 96375 TX/PRO/DX INJ NEW DRUG ADDON: CPT

## 2017-04-20 PROCEDURE — 36415 COLL VENOUS BLD VENIPUNCTURE: CPT | Performed by: NURSE PRACTITIONER

## 2017-04-20 PROCEDURE — 77030012965 HC NDL HUBR BBMI -A

## 2017-04-20 PROCEDURE — 96413 CHEMO IV INFUSION 1 HR: CPT

## 2017-04-20 PROCEDURE — 96361 HYDRATE IV INFUSION ADD-ON: CPT

## 2017-04-20 RX ORDER — SODIUM CHLORIDE 9 MG/ML
1000 INJECTION, SOLUTION INTRAVENOUS ONCE
Status: COMPLETED | OUTPATIENT
Start: 2017-04-21 | End: 2017-04-21

## 2017-04-20 RX ORDER — SODIUM CHLORIDE 0.9 % (FLUSH) 0.9 %
10-40 SYRINGE (ML) INJECTION AS NEEDED
Status: ACTIVE | OUTPATIENT
Start: 2017-04-20 | End: 2017-04-21

## 2017-04-20 RX ORDER — HEPARIN 100 UNIT/ML
500 SYRINGE INTRAVENOUS AS NEEDED
Status: ACTIVE | OUTPATIENT
Start: 2017-04-20 | End: 2017-04-21

## 2017-04-20 RX ORDER — SODIUM CHLORIDE 9 MG/ML
10 INJECTION INTRAMUSCULAR; INTRAVENOUS; SUBCUTANEOUS AS NEEDED
Status: ACTIVE | OUTPATIENT
Start: 2017-04-20 | End: 2017-04-21

## 2017-04-20 RX ADMIN — DEXAMETHASONE SODIUM PHOSPHATE 20 MG: 4 INJECTION, SOLUTION INTRA-ARTICULAR; INTRALESIONAL; INTRAMUSCULAR; INTRAVENOUS; SOFT TISSUE at 11:55

## 2017-04-20 RX ADMIN — Medication 500 UNITS: at 14:23

## 2017-04-20 RX ADMIN — SODIUM CHLORIDE 1000 ML: 900 INJECTION, SOLUTION INTRAVENOUS at 11:20

## 2017-04-20 RX ADMIN — Medication 20 ML: at 14:23

## 2017-04-20 RX ADMIN — GRANISETRON HYDROCHLORIDE 1 MG: 1 INJECTION, SOLUTION INTRAVENOUS at 11:54

## 2017-04-20 RX ADMIN — SODIUM CHLORIDE 10 ML: 9 INJECTION INTRAMUSCULAR; INTRAVENOUS; SUBCUTANEOUS at 11:20

## 2017-04-20 RX ADMIN — CARBOPLATIN 480 MG: 10 INJECTION, SOLUTION INTRAVENOUS at 13:51

## 2017-04-20 RX ADMIN — Medication 20 ML: at 11:20

## 2017-04-20 RX ADMIN — SODIUM CHLORIDE 1400 MG: 900 INJECTION, SOLUTION INTRAVENOUS at 13:16

## 2017-04-20 NOTE — PROGRESS NOTES
Outpatient Infusion Center - Chemotherapy Progress Note    1100 Pt admit to [facility] for Gemzar/Carbo C3 D1 ambulatory in stable condition. Assessment completed. No new concerns voiced. Port with positive blood return. PLT from 4/18 are low at 85. NP aware, orders received to redraw CBC with diff. Labs drawn per order and sent. Line flushed, pt connected to IV bolus. Labs reviewed, ok to proceed with treatment per NP. Visit Vitals    /54 (BP 1 Location: Left arm, BP Patient Position: Sitting)    Pulse 86    Temp 97.6 °F (36.4 °C)    Resp 16    Ht 5' 7.5\" (1.715 m)    Wt 64 kg (141 lb)    LMP 02/02/2015 (Approximate)    BMI 21.76 kg/m2       Medications:  NS 1000 mL bolus  Kytril  Decadron  Gemzar  Carbo    1430 Pt tolerated treatment well. Port maintained positive blood return throughout treatment, flushed with positive blood return at conclusion and heparinized and de-accessed per protocol. D/c home ambulatory in no distress. Pt aware of next [facility] appointment scheduled for 4/21/17. Recent Results (from the past 12 hour(s))   CBC WITH AUTOMATED DIFF    Collection Time: 04/20/17 11:11 AM   Result Value Ref Range    WBC 7.2 3.6 - 11.0 K/uL    RBC 2.98 (L) 3.80 - 5.20 M/uL    HGB 9.4 (L) 11.5 - 16.0 g/dL    HCT 29.0 (L) 35.0 - 47.0 %    MCV 97.3 80.0 - 99.0 FL    MCH 31.5 26.0 - 34.0 PG    MCHC 32.4 30.0 - 36.5 g/dL    RDW 16.3 (H) 11.5 - 14.5 %    PLATELET 711 (L) 763 - 400 K/uL    NEUTROPHILS 73 32 - 75 %    LYMPHOCYTES 19 12 - 49 %    MONOCYTES 8 5 - 13 %    EOSINOPHILS 0 0 - 7 %    BASOPHILS 0 0 - 1 %    ABS. NEUTROPHILS 5.2 1.8 - 8.0 K/UL    ABS. LYMPHOCYTES 1.4 0.8 - 3.5 K/UL    ABS. MONOCYTES 0.6 0.0 - 1.0 K/UL    ABS. EOSINOPHILS 0.0 0.0 - 0.4 K/UL    ABS.  BASOPHILS 0.0 0.0 - 0.1 K/UL

## 2017-04-21 ENCOUNTER — HOSPITAL ENCOUNTER (OUTPATIENT)
Dept: INFUSION THERAPY | Age: 52
Discharge: HOME | End: 2017-04-21
Payer: COMMERCIAL

## 2017-04-21 VITALS
HEART RATE: 88 BPM | DIASTOLIC BLOOD PRESSURE: 55 MMHG | SYSTOLIC BLOOD PRESSURE: 111 MMHG | TEMPERATURE: 96.6 F | RESPIRATION RATE: 16 BRPM

## 2017-04-21 PROCEDURE — 74011250636 HC RX REV CODE- 250/636: Performed by: OBSTETRICS & GYNECOLOGY

## 2017-04-21 PROCEDURE — 77030012965 HC NDL HUBR BBMI -A

## 2017-04-21 PROCEDURE — 96360 HYDRATION IV INFUSION INIT: CPT

## 2017-04-21 PROCEDURE — 74011000250 HC RX REV CODE- 250

## 2017-04-21 PROCEDURE — 74011250636 HC RX REV CODE- 250/636

## 2017-04-21 RX ORDER — HEPARIN 100 UNIT/ML
500 SYRINGE INTRAVENOUS AS NEEDED
Status: ACTIVE | OUTPATIENT
Start: 2017-04-21 | End: 2017-04-22

## 2017-04-21 RX ORDER — SODIUM CHLORIDE 0.9 % (FLUSH) 0.9 %
10-40 SYRINGE (ML) INJECTION AS NEEDED
Status: DISCONTINUED | OUTPATIENT
Start: 2017-04-21 | End: 2017-04-25 | Stop reason: HOSPADM

## 2017-04-21 RX ORDER — SODIUM CHLORIDE 9 MG/ML
10 INJECTION INTRAMUSCULAR; INTRAVENOUS; SUBCUTANEOUS AS NEEDED
Status: ACTIVE | OUTPATIENT
Start: 2017-04-21 | End: 2017-04-22

## 2017-04-21 RX ADMIN — Medication 500 UNITS: at 17:04

## 2017-04-21 RX ADMIN — Medication 10 ML: at 17:04

## 2017-04-21 RX ADMIN — Medication 10 ML: at 16:00

## 2017-04-21 RX ADMIN — SODIUM CHLORIDE 10 ML: 9 INJECTION INTRAMUSCULAR; INTRAVENOUS; SUBCUTANEOUS at 16:00

## 2017-04-21 RX ADMIN — SODIUM CHLORIDE 1000 ML: 900 INJECTION, SOLUTION INTRAVENOUS at 16:00

## 2017-04-21 NOTE — PROGRESS NOTES
1545 Pt admit to [facility] for Hydration ambulatory in stable condition. Assessment completed. No new concerns voiced. Port accessed and flushed with positive blood return. Visit Vitals    /55    Pulse 88    Temp 96.6 °F (35.9 °C)    Resp 16    LMP 02/02/2015 (Approximate)       Medications:  Hydration 1 liter    1705 Pt tolerated treatment well. Port maintained positive blood return throughout treatment, flushed with positive blood return at conclusion, port heparinized and de-accessed. D/c home ambulatory in no distress. Pt aware of next appointment scheduled for 4/28/17.

## 2017-04-25 RX ORDER — DEXAMETHASONE SODIUM PHOSPHATE 4 MG/ML
4 INJECTION, SOLUTION INTRA-ARTICULAR; INTRALESIONAL; INTRAMUSCULAR; INTRAVENOUS; SOFT TISSUE ONCE
Status: COMPLETED | OUTPATIENT
Start: 2017-04-27 | End: 2017-04-27

## 2017-04-25 RX ORDER — GRANISETRON HYDROCHLORIDE 1 MG/ML
1 INJECTION, SOLUTION INTRAVENOUS ONCE
Status: COMPLETED | OUTPATIENT
Start: 2017-04-27 | End: 2017-04-27

## 2017-04-25 RX ORDER — SODIUM CHLORIDE 9 MG/ML
1000 INJECTION, SOLUTION INTRAVENOUS ONCE
Status: COMPLETED | OUTPATIENT
Start: 2017-04-27 | End: 2017-04-27

## 2017-04-27 ENCOUNTER — HOSPITAL ENCOUNTER (OUTPATIENT)
Dept: INFUSION THERAPY | Age: 52
Discharge: HOME | End: 2017-04-27
Payer: COMMERCIAL

## 2017-04-27 VITALS
RESPIRATION RATE: 16 BRPM | BODY MASS INDEX: 21.43 KG/M2 | TEMPERATURE: 97 F | DIASTOLIC BLOOD PRESSURE: 65 MMHG | SYSTOLIC BLOOD PRESSURE: 105 MMHG | OXYGEN SATURATION: 98 % | WEIGHT: 141.4 LBS | HEART RATE: 82 BPM | HEIGHT: 68 IN

## 2017-04-27 LAB
BASOPHILS # BLD AUTO: 0 K/UL (ref 0–0.1)
BASOPHILS # BLD: 0 % (ref 0–1)
DIFFERENTIAL METHOD BLD: ABNORMAL
EOSINOPHIL # BLD: 0 K/UL (ref 0–0.4)
EOSINOPHIL NFR BLD: 0 % (ref 0–7)
ERYTHROCYTE [DISTWIDTH] IN BLOOD BY AUTOMATED COUNT: 16 % (ref 11.5–14.5)
HCT VFR BLD AUTO: 28.8 % (ref 35–47)
HGB BLD-MCNC: 9.7 G/DL (ref 11.5–16)
LYMPHOCYTES # BLD AUTO: 33 % (ref 12–49)
LYMPHOCYTES # BLD: 3.5 K/UL (ref 0.8–3.5)
MCH RBC QN AUTO: 32.3 PG (ref 26–34)
MCHC RBC AUTO-ENTMCNC: 33.7 G/DL (ref 30–36.5)
MCV RBC AUTO: 96 FL (ref 80–99)
METAMYELOCYTES NFR BLD MANUAL: 2 %
MONOCYTES # BLD: 1.1 K/UL (ref 0–1)
MONOCYTES NFR BLD AUTO: 10 % (ref 5–13)
MYELOCYTES NFR BLD MANUAL: 2 %
NEUTS SEG # BLD: 5.6 K/UL (ref 1.8–8)
NEUTS SEG NFR BLD AUTO: 53 % (ref 32–75)
PLATELET # BLD AUTO: 357 K/UL (ref 150–400)
RBC # BLD AUTO: 3 M/UL (ref 3.8–5.2)
RBC MORPH BLD: ABNORMAL
RBC MORPH BLD: ABNORMAL
WBC # BLD AUTO: 10.5 K/UL (ref 3.6–11)

## 2017-04-27 PROCEDURE — 74011250636 HC RX REV CODE- 250/636: Performed by: OBSTETRICS & GYNECOLOGY

## 2017-04-27 PROCEDURE — 96361 HYDRATE IV INFUSION ADD-ON: CPT

## 2017-04-27 PROCEDURE — 85025 COMPLETE CBC W/AUTO DIFF WBC: CPT | Performed by: OBSTETRICS & GYNECOLOGY

## 2017-04-27 PROCEDURE — 77030012965 HC NDL HUBR BBMI -A

## 2017-04-27 PROCEDURE — 96413 CHEMO IV INFUSION 1 HR: CPT

## 2017-04-27 PROCEDURE — 96375 TX/PRO/DX INJ NEW DRUG ADDON: CPT

## 2017-04-27 PROCEDURE — 74011000250 HC RX REV CODE- 250: Performed by: OBSTETRICS & GYNECOLOGY

## 2017-04-27 PROCEDURE — 36415 COLL VENOUS BLD VENIPUNCTURE: CPT | Performed by: OBSTETRICS & GYNECOLOGY

## 2017-04-27 RX ORDER — SODIUM CHLORIDE 9 MG/ML
50 INJECTION, SOLUTION INTRAVENOUS AS NEEDED
Status: DISPENSED | OUTPATIENT
Start: 2017-04-27 | End: 2017-04-28

## 2017-04-27 RX ORDER — SODIUM CHLORIDE 9 MG/ML
10 INJECTION INTRAMUSCULAR; INTRAVENOUS; SUBCUTANEOUS AS NEEDED
Status: ACTIVE | OUTPATIENT
Start: 2017-04-27 | End: 2017-04-28

## 2017-04-27 RX ORDER — HEPARIN 100 UNIT/ML
500 SYRINGE INTRAVENOUS AS NEEDED
Status: ACTIVE | OUTPATIENT
Start: 2017-04-27 | End: 2017-04-28

## 2017-04-27 RX ORDER — SODIUM CHLORIDE 0.9 % (FLUSH) 0.9 %
10-40 SYRINGE (ML) INJECTION AS NEEDED
Status: ACTIVE | OUTPATIENT
Start: 2017-04-27 | End: 2017-04-28

## 2017-04-27 RX ADMIN — DEXAMETHASONE SODIUM PHOSPHATE 4 MG: 4 INJECTION, SOLUTION INTRA-ARTICULAR; INTRALESIONAL; INTRAMUSCULAR; INTRAVENOUS; SOFT TISSUE at 14:26

## 2017-04-27 RX ADMIN — SODIUM CHLORIDE 1000 ML: 900 INJECTION, SOLUTION INTRAVENOUS at 13:18

## 2017-04-27 RX ADMIN — SODIUM CHLORIDE 10 ML: 9 INJECTION INTRAMUSCULAR; INTRAVENOUS; SUBCUTANEOUS at 13:17

## 2017-04-27 RX ADMIN — Medication 10 ML: at 13:17

## 2017-04-27 RX ADMIN — GRANISETRON HYDROCHLORIDE 1 MG: 1 INJECTION, SOLUTION INTRAVENOUS at 14:25

## 2017-04-27 RX ADMIN — SODIUM CHLORIDE 1400 MG: 900 INJECTION, SOLUTION INTRAVENOUS at 15:09

## 2017-04-27 NOTE — PROGRESS NOTES
Outpatient Infusion Center - Chemotherapy Progress Note    1300 Pt admit to [facility] for Gemzar/C3D8 ambulatory in stable condition accompanied by spouse. Assessment completed. No new concerns voiced. Port accessed with positive blood return. Labs drawn per order and sent. Line flushed, hydration started. Pt stated she will not receive the Neulasta OBI today, she will get the Neulasta injection when she returns tomorrow, 04/28 for hydration, stated, \"That's what I did the last time. \"  Called and notified [employer], will scan in updated Neulasta orders. Visit Vitals    /65    Pulse 82    Temp 97 °F (36.1 °C)    Resp 16    Ht 5' 7.5\" (1.715 m)    Wt 64.1 kg (141 lb 6.4 oz)    LMP 02/02/2015 (Approximate)    SpO2 98%    BMI 21.82 kg/m2       Medications:  NS 1L/hour  Kytril 1 mg  Decadron 4 mg  Gemzar    1550 Pt tolerated treatment well. Port maintained positive blood return throughout treatment, flushed with positive blood return at conclusion,  Heparinized and de-accessed. D/c home ambulatory in no distress. Pt aware of next [facility] appointment scheduled for 04/28/2017 for hydration and Neulasta. Recent Results (from the past 12 hour(s))   CBC WITH AUTOMATED DIFF    Collection Time: 04/27/17  1:07 PM   Result Value Ref Range    WBC 10.5 3.6 - 11.0 K/uL    RBC 3.00 (L) 3.80 - 5.20 M/uL    HGB 9.7 (L) 11.5 - 16.0 g/dL    HCT 28.8 (L) 35.0 - 47.0 %    MCV 96.0 80.0 - 99.0 FL    MCH 32.3 26.0 - 34.0 PG    MCHC 33.7 30.0 - 36.5 g/dL    RDW 16.0 (H) 11.5 - 14.5 %    PLATELET 847 027 - 466 K/uL    NEUTROPHILS 53 32 - 75 %    LYMPHOCYTES 33 12 - 49 %    MONOCYTES 10 5 - 13 %    EOSINOPHILS 0 0 - 7 %    BASOPHILS 0 0 - 1 %    METAMYELOCYTES 2 %    MYELOCYTES 2 %    ABS. NEUTROPHILS 5.6 1.8 - 8.0 K/UL    ABS. LYMPHOCYTES 3.5 0.8 - 3.5 K/UL    ABS. MONOCYTES 1.1 (H) 0.0 - 1.0 K/UL    ABS. EOSINOPHILS 0.0 0.0 - 0.4 K/UL    ABS.  BASOPHILS 0.0 0.0 - 0.1 K/UL    DF MANUAL      RBC COMMENTS ANISOCYTOSIS  1+        RBC COMMENTS MACROCYTOSIS  1+

## 2017-04-28 ENCOUNTER — HOSPITAL ENCOUNTER (OUTPATIENT)
Dept: INFUSION THERAPY | Age: 52
Discharge: HOME | End: 2017-04-28
Payer: COMMERCIAL

## 2017-04-28 VITALS
HEIGHT: 68 IN | BODY MASS INDEX: 21.98 KG/M2 | HEART RATE: 78 BPM | TEMPERATURE: 98.1 F | RESPIRATION RATE: 16 BRPM | OXYGEN SATURATION: 100 % | SYSTOLIC BLOOD PRESSURE: 117 MMHG | WEIGHT: 145 LBS | DIASTOLIC BLOOD PRESSURE: 61 MMHG

## 2017-04-28 PROCEDURE — 96360 HYDRATION IV INFUSION INIT: CPT

## 2017-04-28 PROCEDURE — 74011250636 HC RX REV CODE- 250/636: Performed by: NURSE PRACTITIONER

## 2017-04-28 PROCEDURE — 96372 THER/PROPH/DIAG INJ SC/IM: CPT

## 2017-04-28 PROCEDURE — 74011250636 HC RX REV CODE- 250/636: Performed by: OBSTETRICS & GYNECOLOGY

## 2017-04-28 PROCEDURE — 77030012965 HC NDL HUBR BBMI -A

## 2017-04-28 PROCEDURE — 74011000250 HC RX REV CODE- 250: Performed by: OBSTETRICS & GYNECOLOGY

## 2017-04-28 RX ORDER — SODIUM CHLORIDE 9 MG/ML
10 INJECTION INTRAMUSCULAR; INTRAVENOUS; SUBCUTANEOUS AS NEEDED
Status: ACTIVE | OUTPATIENT
Start: 2017-04-28 | End: 2017-04-29

## 2017-04-28 RX ORDER — SODIUM CHLORIDE 0.9 % (FLUSH) 0.9 %
10 SYRINGE (ML) INJECTION AS NEEDED
Status: ACTIVE | OUTPATIENT
Start: 2017-04-28 | End: 2017-04-29

## 2017-04-28 RX ORDER — HEPARIN 100 UNIT/ML
500 SYRINGE INTRAVENOUS AS NEEDED
Status: ACTIVE | OUTPATIENT
Start: 2017-04-28 | End: 2017-04-29

## 2017-04-28 RX ADMIN — SODIUM CHLORIDE 10 ML: 9 INJECTION INTRAMUSCULAR; INTRAVENOUS; SUBCUTANEOUS at 16:45

## 2017-04-28 RX ADMIN — Medication 10 ML: at 17:53

## 2017-04-28 RX ADMIN — PEGFILGRASTIM 6 MG: 6 INJECTION SUBCUTANEOUS at 17:58

## 2017-04-28 RX ADMIN — Medication 500 UNITS: at 17:53

## 2017-04-28 RX ADMIN — SODIUM CHLORIDE 1000 ML: 900 INJECTION, SOLUTION INTRAVENOUS at 16:47

## 2017-04-28 NOTE — PROGRESS NOTES
36 Pt admit to [facility] for Hydration/Neulasta ambulatory in stable condition. Assessment completed. No new concerns voiced. Port accessed with positive blood return. No labs due today, Normal Saline 1 liter over 1 hour. Visit Vitals    /61 (BP 1 Location: Left arm, BP Patient Position: Sitting)    Pulse 78    Temp 98.1 °F (36.7 °C)    Resp 16    Ht 5' 7.5\" (1.715 m)    Wt 65.8 kg (145 lb)    LMP 02/02/2015 (Approximate)    SpO2 100%    BMI 22.38 kg/m2       Medications:  Normal Saline 1 Liter bolus  Neulasta SQ on right upper arm    1800 Pt tolerated treatment well. Port maintained positive blood return throughout treatment, flushed with positive blood return at conclusion and heparinized per protocol. Deborah Flowers removed. . D/c home ambulatory in no distress. Pt aware of next [facility] appointment scheduled for 5/11/2017.

## 2017-05-08 ENCOUNTER — APPOINTMENT (OUTPATIENT)
Dept: INFUSION THERAPY | Age: 52
End: 2017-05-08
Payer: COMMERCIAL

## 2017-05-08 DIAGNOSIS — C56.9 OVARIAN CANCER, UNSPECIFIED LATERALITY (HCC): Primary | ICD-10-CM

## 2017-05-09 ENCOUNTER — OFFICE VISIT (OUTPATIENT)
Dept: GYNECOLOGY | Age: 52
End: 2017-05-09

## 2017-05-09 VITALS
WEIGHT: 144 LBS | HEIGHT: 67 IN | DIASTOLIC BLOOD PRESSURE: 55 MMHG | BODY MASS INDEX: 22.6 KG/M2 | SYSTOLIC BLOOD PRESSURE: 97 MMHG | HEART RATE: 87 BPM

## 2017-05-09 DIAGNOSIS — Z15.01 BRCA1 POSITIVE: ICD-10-CM

## 2017-05-09 DIAGNOSIS — Z15.09 BRCA1 POSITIVE: ICD-10-CM

## 2017-05-09 DIAGNOSIS — C56.9 OVARIAN CANCER, UNSPECIFIED LATERALITY (HCC): Primary | ICD-10-CM

## 2017-05-09 NOTE — PROGRESS NOTES
[address] [place] [address] [address], [address] [place]  [phone]  MD Maurilio Hernandez MD  Office Note  Patient ID:  Name:  Renato Doyle  MRN:  [id number]  :  1965/52 y.o. Date:  2017      HISTORY OF PRESENT ILLNESS:  Renato Doyle is a 46 y.o.  postmenopausal female with stage IIIC ovarian cancer. She underwent X-lap, hysterectomy, and tumor debulking in 2015. She was recommended adjuvant chemotherapy with 6 cycles of Taxol and Carboplatin. Her post-treatment PET/CT was negative for disease. Her [facility] results also found her to have a deleterious BRCA 1 mutation. As for the BRCA 1 mutation, I had her see one of our breast surgeons, Dr. Jef Narvaez, to talk about options, specifically screening and prophylactic surgery. She is going to hold off on surgery for now. She has decided not to have her daughter tested at this time. She presented in December ahead of her scheduled visit complaining of left lower quadrant pain for several weeks. The pain was intermittent and crampy. She also had some pain with bowel movements. Certain foods also exacerbated it. I sent her for a CT of the abdomen/pelvis which showed no obvious disease. In late December she had a colonoscopy which could not be completed due to adhesions. A subsequent barium enema was negative. A subsequent CA-125 was mildly elevated at 55, up from 10 three months earlier. Her CA-125 is now up to 99 and her CT suggests recurrent disease, although not definitive for recurrence. I recommended a PET/CT which confirmed recurrence. We discussed chemotherapy for her recurrence. She was clearly platinum resistant, therefore I recommended a platinum-based regimen. I recommended Gemzar/Carboplatin. 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 She also wanted to keep her hair.   We also discussed options for down the line, specifically a PARP inhibitor, since she is BRCA positive. She has completed 3 cycles so far. She had a lot of difficulty with the first cycle, but the second and third cycles were much easier. Her CA-125 has responded well. ROS:   and GI review: Negative  Cardiopulmonary review:Negative   Musculoskeletal:  Negative    A comprehensive review of systems was negative except for that written in the History of Present Illness.  , 10 point ROS      Problem List:  Patient Active Problem List    Diagnosis Date Noted    Chemotherapy-induced neutropenia ([address] 04/06/2017    Thrombocytopenia (HCC) 03/17/2017    CINV (chemotherapy-induced nausea and vomiting) 03/17/2017    Dehydration 03/17/2017    BRCA1 positive 09/10/2015    [facility] cancer [facility] 04/09/2015    S/P total abdominal hysterectomy 03/18/2015    Pelvic mass 03/06/2015     PMH:  Past Medical History:   Diagnosis Date    Anxiety     Calculus of kidney 1/13    right    Hernia, inguinal, left 2012    MS (multiple sclerosis) ([facility] 1996    Nausea & vomiting     Ovarian cancer ([address] 3/2015    high grade, stage 3C papillary serous    Thromboembolus ([address] 8/2007    lower abdomen post fall      PSH:  Past Surgical History:   Procedure Laterality Date    HX GYN  2009    endometrial ablation with punctured uterus    HX OTHER SURGICAL  8/2007    green filter    HX MARIEL AND BSO  3/2014    ovarian cancer      Social History:  Social History   Substance Use Topics    Smoking status: Never Smoker    Smokeless tobacco: Never Used    Alcohol use No      Family History:  Family History   Problem Relation Age of Onset    Cancer Paternal Grandmother      breast    Heart Disease Mother     Heart Disease Father       Medications: (reviewed)  Current Outpatient Prescriptions   Medication Sig    LORazepam (ATIVAN) 1 mg tablet TAKE 1 TABLET BY MOUTH AT BEDTIME    ondansetron (ZOFRAN ODT) 4 mg disintegrating tablet TAKE 1 TABLET BY MOUTH EVERY 8 HOURS AS NEEDED FOR NAUSEA    dexamethasone (DECADRON) 4 mg tablet Take 1-2 tabs QAM with food the 2 days following chemo as needed    prochlorperazine (COMPAZINE) 10 mg tablet Take 1 Tab by mouth every six (6) hours as needed for Nausea. Indications: CANCER CHEMOTHERAPY-INDUCED NAUSEA AND VOMITING    XARELTO 20 mg tab tablet     amitriptyline (ELAVIL) 25 mg tablet Take 1 Tab by mouth nightly.  lidocaine-prilocaine (EMLA) topical cream Apply small amount to port area and cover with band aid 1 hour before chemo treatment    gabapentin (NEURONTIN) 400 mg capsule TAKE 1 CAPSULE BY MOUTH 3 TIMES DAILY     No current facility-administered medications for this visit. Allergies: (reviewed)  Allergies   Allergen Reactions    Pcn [Penicillins] Rash          OBJECTIVE:    Physical Exam:  VITAL SIGNS: Vitals:    05/09/17 1552   BP: 97/55   Pulse: 87   Weight: 144 lb (65.3 kg)   Height: 5' 7\" (1.702 m)     Body mass index is 22.55 kg/(m^2). GENERAL FILIBERTO: Conversant, alert, oriented. No acute distress. HEENT: HEENT. No thyroid enlargement. No JVD. Neck: Supple without restrictions. RESPIRATORY: Clear to auscultation and percussion to the bases. No CVAT. CARDIOVASC: RRR without murmur/rub. GASTROINT: Soft, non-distended, without masses or organomegaly. MUSCULOSKEL: no joint tenderness, deformity or swelling   EXTREMITIES: extremities normal, atraumatic, no cyanosis or edema   PELVIC: Deferred   RECTAL: Deferred   CHARMAINE SURVEY: No suspicious lymphadenopathy or edema noted. NEURO: Grossly intact. No acute deficit.          Lab Results   Component Value Date/Time    WBC 10.5 04/27/2017 01:07 PM    HGB 9.7 04/27/2017 01:07 PM    HCT 28.8 04/27/2017 01:07 PM    PLATELET 805 81/47/1351 01:07 PM    MCV 96.0 04/27/2017 01:07 PM     Lab Results   Component Value Date/Time    Sodium 141 04/18/2017 04:38 PM    Potassium 4.3 04/18/2017 04:38 PM    Chloride 101 04/18/2017 04:38 PM    CO2 25 04/18/2017 04:38 PM    Anion gap 8 03/16/2017 02:08 PM    Glucose 85 04/18/2017 04:38 PM    BUN 18 04/18/2017 04:38 PM    Creatinine 0.70 04/18/2017 04:38 PM    BUN/Creatinine ratio 26 04/18/2017 04:38 PM    GFR est  04/18/2017 04:38 PM    GFR est non- 04/18/2017 04:38 PM    Calcium 9.0 04/18/2017 04:38 PM       CT of abdomen/pelvis (12/8/16)  The visualized portions of the lung bases are clear.     Incidental hypodensity is seen adjacent to ligamentum teres. The liver is  otherwise unremarkable. The spleen, bilateral adrenal glands, pancreas, and  gallbladder are unremarkable. The bilateral kidneys are unremarkable without  evidence of hydronephrosis.     The abdominal aorta is normal in size. An IVC filter is in place. A stent is  present in the left iliac vein. No retroperitoneal lymphadenopathy.     The stomach and small bowel appear unremarkable. The appendix is normal. No  obstruction, free fluid, or free air.     The patient is status hysterectomy and ovarian resection. There is unchanged  scarring above the vaginal cuff and bladder.     No evidence of a destructive osseous lesion. IMPRESSION:   1. Status post hysterectomy and ovarian resection. There is unchanged scarring  over the vaginal cuff and bladder. 2. No evidence of acute process. Barium enema (12/22/16)   KUB shows unremarkable bowel gas pattern. There is IVC filter  and left iliac stent. Retrograde flow of barium without air fills the colon with  reflux into the appendix and extensive reflux into the unremarkable distal  ileum. Colon is well cleansed. Sigmoid is redundant and tortuous without  obstruction, and without mass or stricture identified.     Mucosal pattern is smooth. No mass or polyp is encountered. There is no  stricture. Diverticula are not seen. Postevacuation image shows incomplete  emptying of the colon but no level of obstruction.  Radiation exposure index:  45.1Gycm2.     IMPRESSION: Unremarkable Barium Enema      CT of abdomen/pelvis (2/14/17)  Abdomen: The lung bases are clear and the heart size is normal. The distal  esophagus, stomach, duodenum, liver, gallbladder, pancreas, spleen, adrenals,  and kidneys are normal.      Pelvis: There is trace, new ascites in the anterior pelvis (3-79, 602-78), the  anterior left lower quadrant (601-28), the pericecal right lower quadrant  (601-26, 3-72), and around the liver and spleen. The adjacent peritoneum  enhances (601-28, 602-77, 602-50). Curvilinear enhancement in the deep pelvis is  not masslike, but has increased compared to 12/18/2016 and 5/24/2016. Extension  along the peritoneum of the left paracolic gutter has increased (094-308). Enhancement is curvilinear and not nodular; no focal peritoneal or omental  nodules. The appendiceal mucosa is hyperenhancing (632-44), likely reactive, as  the appendix is not dilated.     The left external iliac vein is chronically occluded, though there is a stent in  place extending from the external iliac vein, through the common iliac, and into  the IVC. A large subcutaneous collateral vein (601-15, 601-18) and drains the  left lower extremity via the right common femoral and external iliac veins. An  IVC filter is in appropriate position below the renal vein ostia.     The small bowel, ileocecal junction, colon, and bladder are otherwise normal.  Post hysterectomy. No free air and no abdominopelvic lymphadenopathy.     IMPRESSION:   1. Trace new ascites, with enhancement of adjacent peritoneum. 2. Increased curvilinear enhancement in the pelvis and left paracolic gutter. No  focal peritoneal or omental nodules to confirm peritoneal carcinomatosis. 3. Chronic left external iliac vein occlusion. The left lower extremity drains  via a large subcutaneous collateral to the right femoral and iliac system. PET/CT (2/20/17)  HEAD/NECK: No apparent foci of abnormal hypermetabolism.  Cerebral evaluation is  limited by normal intense activity.     CHEST: No foci of abnormal hypermetabolism.     ABDOMEN/PELVIS: There is new extensive peritoneal activity compatible with  carcinomatosis as suggested by CT, max SUV 9.     SKELETON: No foci of abnormal hypermetabolism in the axial and visualized  appendicular skeleton.     IMPRESSION: Peritoneal carcinomatosis appearance. IMPRESSION/PLAN:  Beatrice Gillespie is a 46 y.o. female with a diagnosis of stage IIIC ovarian cancer. She underwent tumor debulking followed by 6 cycles of Taxol and Carboplatin chemotherapy, which she completed approximately 18 months ago. She now has evidence of recurrent disease based upon symptoms, elevated CA-125, CT and PET/CT findings. She is now receiving Gemzar/Carboplatin and has completed 3 cycles. She had a lot of difficulty with the first cycle, but did much better with subsequent cycles. Her CA-125 has responded well. We will continue with the current regimen for now. We will consider stopping chemotherapy and converting her to a PARP inhibitor at some point.       Signed By: Santy Flores MD     5/9/2017/3:54 PM

## 2017-05-10 ENCOUNTER — TELEPHONE (OUTPATIENT)
Dept: GYNECOLOGY | Age: 52
End: 2017-05-10

## 2017-05-10 LAB
ALBUMIN SERPL-MCNC: 4.4 G/DL (ref 3.5–5.5)
ALBUMIN/GLOB SERPL: 2.6 {RATIO} (ref 1.2–2.2)
ALP SERPL-CCNC: 167 IU/L (ref 39–117)
ALT SERPL-CCNC: 28 IU/L (ref 0–32)
AST SERPL-CCNC: 24 IU/L (ref 0–40)
BASOPHILS # BLD AUTO: 0 X10E3/UL (ref 0–0.2)
BASOPHILS NFR BLD AUTO: 0 %
BILIRUB SERPL-MCNC: 0.2 MG/DL (ref 0–1.2)
BUN SERPL-MCNC: 21 MG/DL (ref 6–24)
BUN/CREAT SERPL: 33 (ref 9–23)
CALCIUM SERPL-MCNC: 9.3 MG/DL (ref 8.7–10.2)
CANCER AG125 SERPL-ACNC: 15.3 U/ML (ref 0–38.1)
CHLORIDE SERPL-SCNC: 102 MMOL/L (ref 96–106)
CO2 SERPL-SCNC: 24 MMOL/L (ref 18–29)
CREAT SERPL-MCNC: 0.63 MG/DL (ref 0.57–1)
EOSINOPHIL # BLD AUTO: 0 X10E3/UL (ref 0–0.4)
EOSINOPHIL NFR BLD AUTO: 0 %
ERYTHROCYTE [DISTWIDTH] IN BLOOD BY AUTOMATED COUNT: 19.3 % (ref 12.3–15.4)
GLOBULIN SER CALC-MCNC: 1.7 G/DL (ref 1.5–4.5)
GLUCOSE SERPL-MCNC: 77 MG/DL (ref 65–99)
HCT VFR BLD AUTO: 28.7 % (ref 34–46.6)
HGB BLD-MCNC: 9.2 G/DL (ref 11.1–15.9)
IMM GRANULOCYTES # BLD: 0.1 X10E3/UL (ref 0–0.1)
IMM GRANULOCYTES NFR BLD: 1 %
LYMPHOCYTES # BLD AUTO: 1.9 X10E3/UL (ref 0.7–3.1)
LYMPHOCYTES NFR BLD AUTO: 14 %
MAGNESIUM SERPL-MCNC: 2.2 MG/DL (ref 1.6–2.3)
MCH RBC QN AUTO: 33.2 PG (ref 26.6–33)
MCHC RBC AUTO-ENTMCNC: 32.1 G/DL (ref 31.5–35.7)
MCV RBC AUTO: 104 FL (ref 79–97)
MONOCYTES # BLD AUTO: 0.9 X10E3/UL (ref 0.1–0.9)
MONOCYTES NFR BLD AUTO: 7 %
NEUTROPHILS # BLD AUTO: 10.2 X10E3/UL (ref 1.4–7)
NEUTROPHILS NFR BLD AUTO: 78 %
PLATELET # BLD AUTO: 105 X10E3/UL (ref 150–379)
POTASSIUM SERPL-SCNC: 4.4 MMOL/L (ref 3.5–5.2)
PROT SERPL-MCNC: 6.1 G/DL (ref 6–8.5)
RBC # BLD AUTO: 2.77 X10E6/UL (ref 3.77–5.28)
SODIUM SERPL-SCNC: 142 MMOL/L (ref 134–144)
WBC # BLD AUTO: 13.1 X10E3/UL (ref 3.4–10.8)

## 2017-05-11 ENCOUNTER — HOSPITAL ENCOUNTER (OUTPATIENT)
Dept: INFUSION THERAPY | Age: 52
Discharge: HOME | End: 2017-05-11
Payer: COMMERCIAL

## 2017-05-11 ENCOUNTER — NURSE NAVIGATOR (OUTPATIENT)
Dept: ONCOLOGY | Age: 52
End: 2017-05-11

## 2017-05-11 VITALS
HEART RATE: 88 BPM | HEIGHT: 68 IN | TEMPERATURE: 97.8 F | DIASTOLIC BLOOD PRESSURE: 54 MMHG | SYSTOLIC BLOOD PRESSURE: 95 MMHG | WEIGHT: 144 LBS | RESPIRATION RATE: 16 BRPM | BODY MASS INDEX: 21.82 KG/M2 | OXYGEN SATURATION: 100 %

## 2017-05-11 PROCEDURE — 74011000250 HC RX REV CODE- 250

## 2017-05-11 PROCEDURE — 96413 CHEMO IV INFUSION 1 HR: CPT

## 2017-05-11 PROCEDURE — 74011250636 HC RX REV CODE- 250/636: Performed by: OBSTETRICS & GYNECOLOGY

## 2017-05-11 PROCEDURE — 96375 TX/PRO/DX INJ NEW DRUG ADDON: CPT

## 2017-05-11 PROCEDURE — 77030012965 HC NDL HUBR BBMI -A

## 2017-05-11 PROCEDURE — 96417 CHEMO IV INFUS EACH ADDL SEQ: CPT

## 2017-05-11 RX ORDER — GRANISETRON HYDROCHLORIDE 1 MG/ML
1 INJECTION, SOLUTION INTRAVENOUS ONCE
Status: COMPLETED | OUTPATIENT
Start: 2017-05-11 | End: 2017-05-11

## 2017-05-11 RX ORDER — HEPARIN 100 UNIT/ML
500 SYRINGE INTRAVENOUS AS NEEDED
Status: ACTIVE | OUTPATIENT
Start: 2017-05-11 | End: 2017-05-12

## 2017-05-11 RX ORDER — DEXAMETHASONE SODIUM PHOSPHATE 4 MG/ML
20 INJECTION, SOLUTION INTRA-ARTICULAR; INTRALESIONAL; INTRAMUSCULAR; INTRAVENOUS; SOFT TISSUE ONCE
Status: COMPLETED | OUTPATIENT
Start: 2017-05-11 | End: 2017-05-11

## 2017-05-11 RX ORDER — SODIUM CHLORIDE 0.9 % (FLUSH) 0.9 %
10-40 SYRINGE (ML) INJECTION AS NEEDED
Status: ACTIVE | OUTPATIENT
Start: 2017-05-11 | End: 2017-05-12

## 2017-05-11 RX ORDER — SODIUM CHLORIDE 9 MG/ML
10 INJECTION INTRAMUSCULAR; INTRAVENOUS; SUBCUTANEOUS AS NEEDED
Status: ACTIVE | OUTPATIENT
Start: 2017-05-11 | End: 2017-05-12

## 2017-05-11 RX ADMIN — SODIUM CHLORIDE 10 ML: 9 INJECTION INTRAMUSCULAR; INTRAVENOUS; SUBCUTANEOUS at 11:17

## 2017-05-11 RX ADMIN — SODIUM CHLORIDE 1000 ML: 900 INJECTION, SOLUTION INTRAVENOUS at 11:17

## 2017-05-11 RX ADMIN — SODIUM CHLORIDE 1400 MG: 900 INJECTION, SOLUTION INTRAVENOUS at 13:09

## 2017-05-11 RX ADMIN — Medication 10 ML: at 11:17

## 2017-05-11 RX ADMIN — CARBOPLATIN 480 MG: 10 INJECTION, SOLUTION INTRAVENOUS at 13:51

## 2017-05-11 RX ADMIN — GRANISETRON HYDROCHLORIDE 1 MG: 1 INJECTION, SOLUTION INTRAVENOUS at 12:25

## 2017-05-11 RX ADMIN — DEXAMETHASONE SODIUM PHOSPHATE 20 MG: 4 INJECTION, SOLUTION INTRA-ARTICULAR; INTRALESIONAL; INTRAMUSCULAR; INTRAVENOUS; SOFT TISSUE at 12:27

## 2017-05-11 NOTE — PROGRESS NOTES
Gabby Reed, : 1965  Ovarian Cancer    I stopped by to meet Mrs. Montague while she was receiving her chemotherapy today. Her  was present and she was pleasant and stated that she was feeling well. I explained the role of the ONN and provided my contact information to her. I also explained to her the complimentary services that are offered through the [facility] and she will contact me if she wishes to be scheduled for these services. She and her  said they appreciated my visit today.       Sundar Mendiola RN, BSN, [facility], [facility]    Oncology Nurse Navigator

## 2017-05-11 NOTE — PROGRESS NOTES
Outpatient Infusion Center - Chemotherapy Progress Note    1110 Pt admit to [facility] for Gemzar/Carboplatin C4D1 ambulatory in stable condition accompanied by spouse. Assessment completed. No new concerns voiced. Port accessed with positive blood return. Labs drawn 05/09, WNL. Line flushed, hydration started. Visit Vitals    BP 95/54    Pulse 88    Temp 97.8 °F (36.6 °C)    Resp 16    Ht 5' 7.5\" (1.715 m)    Wt 65.3 kg (144 lb)    LMP 02/02/2015 (Approximate)    SpO2 100%    Breastfeeding No    BMI 22.22 kg/m2       Medications:  Hydration  Kytril 1 mg  Decadron 20 mg  Gemzar  Carboplatin    1430 Pt tolerated treatment well. Port maintained positive blood return throughout treatment, flushed with positive blood return at conclusion, heparinized and de-accessed. D/c home ambulatory in no distress. Pt aware of next [facility] appointment scheduled for 05/12/2017 for hydration.

## 2017-05-12 ENCOUNTER — HOSPITAL ENCOUNTER (OUTPATIENT)
Dept: INFUSION THERAPY | Age: 52
Discharge: HOME | End: 2017-05-12
Payer: COMMERCIAL

## 2017-05-12 VITALS
DIASTOLIC BLOOD PRESSURE: 56 MMHG | HEART RATE: 80 BPM | RESPIRATION RATE: 16 BRPM | OXYGEN SATURATION: 96 % | TEMPERATURE: 97 F | SYSTOLIC BLOOD PRESSURE: 99 MMHG

## 2017-05-12 PROCEDURE — 74011250636 HC RX REV CODE- 250/636: Performed by: OBSTETRICS & GYNECOLOGY

## 2017-05-12 PROCEDURE — 96360 HYDRATION IV INFUSION INIT: CPT

## 2017-05-12 PROCEDURE — 77030012965 HC NDL HUBR BBMI -A

## 2017-05-12 RX ADMIN — SODIUM CHLORIDE 1000 ML: 900 INJECTION, SOLUTION INTRAVENOUS at 16:14

## 2017-05-12 NOTE — PROGRESS NOTES
Outpatient Infusion Center Short Visit Progress Note    8268 Pt admit to [facility] for hydration ambulatory in stable condition. Assessment completed. No new concerns voiced. Visit Vitals    BP 99/56    Pulse 80    Temp 97 °F (36.1 °C)    Resp 16    LMP 02/02/2015 (Approximate)    SpO2 96%       Port with positive blood return. Lab drawn, flushed, heparinized and de-accessed per protocol. Medications:  Hydration NS 1000ml over 60 minutes     1730 Pt tolerated treatment well. D/c home ambulatory in no distress. Pt aware of next appointment scheduled for 5/18/17.

## 2017-05-16 RX ORDER — DEXAMETHASONE SODIUM PHOSPHATE 4 MG/ML
4 INJECTION, SOLUTION INTRA-ARTICULAR; INTRALESIONAL; INTRAMUSCULAR; INTRAVENOUS; SOFT TISSUE ONCE
Status: COMPLETED | OUTPATIENT
Start: 2017-05-18 | End: 2017-05-18

## 2017-05-16 RX ORDER — GRANISETRON HYDROCHLORIDE 1 MG/ML
1 INJECTION, SOLUTION INTRAVENOUS ONCE
Status: COMPLETED | OUTPATIENT
Start: 2017-05-18 | End: 2017-05-18

## 2017-05-18 ENCOUNTER — HOSPITAL ENCOUNTER (OUTPATIENT)
Dept: INFUSION THERAPY | Age: 52
Discharge: HOME | End: 2017-05-18
Payer: COMMERCIAL

## 2017-05-18 VITALS
HEART RATE: 77 BPM | WEIGHT: 143 LBS | TEMPERATURE: 98 F | DIASTOLIC BLOOD PRESSURE: 62 MMHG | RESPIRATION RATE: 16 BRPM | BODY MASS INDEX: 22.44 KG/M2 | SYSTOLIC BLOOD PRESSURE: 107 MMHG | HEIGHT: 67 IN

## 2017-05-18 LAB
BASOPHILS # BLD AUTO: 0 K/UL (ref 0–0.1)
BASOPHILS # BLD: 0 % (ref 0–1)
EOSINOPHIL # BLD: 0 K/UL (ref 0–0.4)
EOSINOPHIL NFR BLD: 0 % (ref 0–7)
ERYTHROCYTE [DISTWIDTH] IN BLOOD BY AUTOMATED COUNT: 19.2 % (ref 11.5–14.5)
HCT VFR BLD AUTO: 26.7 % (ref 35–47)
HGB BLD-MCNC: 9.1 G/DL (ref 11.5–16)
LYMPHOCYTES # BLD AUTO: 28 % (ref 12–49)
LYMPHOCYTES # BLD: 2.5 K/UL (ref 0.8–3.5)
MCH RBC QN AUTO: 34 PG (ref 26–34)
MCHC RBC AUTO-ENTMCNC: 34.1 G/DL (ref 30–36.5)
MCV RBC AUTO: 99.6 FL (ref 80–99)
MONOCYTES # BLD: 0.8 K/UL (ref 0–1)
MONOCYTES NFR BLD AUTO: 9 % (ref 5–13)
NEUTS SEG # BLD: 5.6 K/UL (ref 1.8–8)
NEUTS SEG NFR BLD AUTO: 63 % (ref 32–75)
PLATELET # BLD AUTO: 266 K/UL (ref 150–400)
RBC # BLD AUTO: 2.68 M/UL (ref 3.8–5.2)
WBC # BLD AUTO: 8.9 K/UL (ref 3.6–11)

## 2017-05-18 PROCEDURE — 85025 COMPLETE CBC W/AUTO DIFF WBC: CPT | Performed by: OBSTETRICS & GYNECOLOGY

## 2017-05-18 PROCEDURE — 77030012965 HC NDL HUBR BBMI -A

## 2017-05-18 PROCEDURE — 96375 TX/PRO/DX INJ NEW DRUG ADDON: CPT

## 2017-05-18 PROCEDURE — 36415 COLL VENOUS BLD VENIPUNCTURE: CPT | Performed by: OBSTETRICS & GYNECOLOGY

## 2017-05-18 PROCEDURE — 96413 CHEMO IV INFUSION 1 HR: CPT

## 2017-05-18 PROCEDURE — 74011250636 HC RX REV CODE- 250/636: Performed by: OBSTETRICS & GYNECOLOGY

## 2017-05-18 RX ORDER — SODIUM CHLORIDE 9 MG/ML
10 INJECTION INTRAMUSCULAR; INTRAVENOUS; SUBCUTANEOUS AS NEEDED
Status: ACTIVE | OUTPATIENT
Start: 2017-05-18 | End: 2017-05-19

## 2017-05-18 RX ORDER — SODIUM CHLORIDE 0.9 % (FLUSH) 0.9 %
10-40 SYRINGE (ML) INJECTION AS NEEDED
Status: ACTIVE | OUTPATIENT
Start: 2017-05-18 | End: 2017-05-19

## 2017-05-18 RX ORDER — HEPARIN 100 UNIT/ML
500 SYRINGE INTRAVENOUS AS NEEDED
Status: ACTIVE | OUTPATIENT
Start: 2017-05-18 | End: 2017-05-19

## 2017-05-18 RX ADMIN — SODIUM CHLORIDE 1400 MG: 900 INJECTION, SOLUTION INTRAVENOUS at 15:28

## 2017-05-18 RX ADMIN — DEXAMETHASONE SODIUM PHOSPHATE 4 MG: 4 INJECTION, SOLUTION INTRA-ARTICULAR; INTRALESIONAL; INTRAMUSCULAR; INTRAVENOUS; SOFT TISSUE at 14:33

## 2017-05-18 RX ADMIN — GRANISETRON HYDROCHLORIDE 1 MG: 1 INJECTION, SOLUTION INTRAVENOUS at 14:25

## 2017-05-18 RX ADMIN — SODIUM CHLORIDE 1000 ML: 900 INJECTION, SOLUTION INTRAVENOUS at 13:27

## 2017-05-18 NOTE — PROGRESS NOTES
Outpatient Infusion Center - Chemotherapy Progress Note    1300 Pt admit to [facility] for Gemzar ambulatory in stable condition. Assessment completed. No new concerns voiced. Port with positive blood return. Labs drawn and sent for processing. Treatment started  Chemotherapy Flowsheet 5/18/2017   Cycle C4D8   Date 5/18/2017   Drug / Regimen Gemzar   Pre Hydration given    Pre Meds given    Notes given         Visit Vitals    /62    Pulse 77    Temp 98 °F (36.7 °C)    Resp 16    Ht 5' 7\" (1.702 m)    Wt 64.9 kg (143 lb)    LMP 02/02/2015 (Approximate)    BMI 22.4 kg/m2       Medications:  Hydration 1L/hour  Kytril  Decadron  Gemzar    1610 Pt tolerated treatment well. Port maintained positive blood return throughout treatment. Flushed, heparinized and de-accessed per protocol. D/c home ambulatory in no distress. Pt aware of next appointment scheduled for 5/19/17. Recent Results (from the past 12 hour(s))   CBC WITH AUTOMATED DIFF    Collection Time: 05/18/17  1:22 PM   Result Value Ref Range    WBC 8.9 3.6 - 11.0 K/uL    RBC 2.68 (L) 3.80 - 5.20 M/uL    HGB 9.1 (L) 11.5 - 16.0 g/dL    HCT 26.7 (L) 35.0 - 47.0 %    MCV 99.6 (H) 80.0 - 99.0 FL    MCH 34.0 26.0 - 34.0 PG    MCHC 34.1 30.0 - 36.5 g/dL    RDW 19.2 (H) 11.5 - 14.5 %    PLATELET 594 814 - 842 K/uL    NEUTROPHILS 63 32 - 75 %    LYMPHOCYTES 28 12 - 49 %    MONOCYTES 9 5 - 13 %    EOSINOPHILS 0 0 - 7 %    BASOPHILS 0 0 - 1 %    ABS. NEUTROPHILS 5.6 1.8 - 8.0 K/UL    ABS. LYMPHOCYTES 2.5 0.8 - 3.5 K/UL    ABS. MONOCYTES 0.8 0.0 - 1.0 K/UL    ABS. EOSINOPHILS 0.0 0.0 - 0.4 K/UL    ABS.  BASOPHILS 0.0 0.0 - 0.1 K/UL

## 2017-05-19 ENCOUNTER — HOSPITAL ENCOUNTER (OUTPATIENT)
Dept: INFUSION THERAPY | Age: 52
Discharge: HOME | End: 2017-05-19
Payer: COMMERCIAL

## 2017-05-19 ENCOUNTER — DOCUMENTATION ONLY (OUTPATIENT)
Dept: GYNECOLOGY | Age: 52
End: 2017-05-19

## 2017-05-19 VITALS
DIASTOLIC BLOOD PRESSURE: 53 MMHG | RESPIRATION RATE: 16 BRPM | SYSTOLIC BLOOD PRESSURE: 110 MMHG | TEMPERATURE: 98.2 F | HEART RATE: 87 BPM

## 2017-05-19 PROCEDURE — 96360 HYDRATION IV INFUSION INIT: CPT

## 2017-05-19 PROCEDURE — 96372 THER/PROPH/DIAG INJ SC/IM: CPT

## 2017-05-19 PROCEDURE — 74011250636 HC RX REV CODE- 250/636: Performed by: NURSE PRACTITIONER

## 2017-05-19 PROCEDURE — 77030012965 HC NDL HUBR BBMI -A

## 2017-05-19 RX ADMIN — SODIUM CHLORIDE 1000 ML: 900 INJECTION, SOLUTION INTRAVENOUS at 15:11

## 2017-05-19 RX ADMIN — PEGFILGRASTIM 6 MG: 6 INJECTION SUBCUTANEOUS at 16:14

## 2017-05-19 NOTE — PROGRESS NOTES
Completed peer to peer for autherization for Jackie Mins Strait's chemotherapy and neulasta.   Auth number it [id number]

## 2017-05-19 NOTE — PROGRESS NOTES
Outpatient Infusion Center - Chemotherapy Progress Note    1500 Pt admit to [facility] for Hydration/Neulasta ambulatory in stable condition. Assessment completed. No new concerns voiced. Port accessed with positive blood return. Line flushed, hydration infusing. Visit Vitals    /53    Pulse 87    Temp 98.2 °F (36.8 °C)    Resp 16    LMP 02/02/2015 (Approximate)       Medications:  Hydration   Neulasta (SC R arm)    1620 Pt tolerated treatment well. Port maintained positive blood return throughout treatment, flushed with positive blood return at conclusion, heparinized and de-accessed. D/c home ambulatory in no distress. Pt aware of next [facility] appointment scheduled for 06/01/2017.

## 2017-05-30 ENCOUNTER — OFFICE VISIT (OUTPATIENT)
Dept: GYNECOLOGY | Age: 52
End: 2017-05-30

## 2017-05-30 VITALS
BODY MASS INDEX: 23.1 KG/M2 | DIASTOLIC BLOOD PRESSURE: 58 MMHG | WEIGHT: 147.2 LBS | HEIGHT: 67 IN | HEART RATE: 85 BPM | SYSTOLIC BLOOD PRESSURE: 119 MMHG

## 2017-05-30 DIAGNOSIS — T45.1X5A CINV (CHEMOTHERAPY-INDUCED NAUSEA AND VOMITING): ICD-10-CM

## 2017-05-30 DIAGNOSIS — C56.9 OVARIAN CANCER, UNSPECIFIED LATERALITY (HCC): Primary | ICD-10-CM

## 2017-05-30 DIAGNOSIS — C56.3 MALIGNANT NEOPLASM OF BOTH OVARIES (HCC): Primary | ICD-10-CM

## 2017-05-30 DIAGNOSIS — R11.2 CINV (CHEMOTHERAPY-INDUCED NAUSEA AND VOMITING): ICD-10-CM

## 2017-05-30 DIAGNOSIS — D69.6 THROMBOCYTOPENIA (HCC): ICD-10-CM

## 2017-05-30 RX ORDER — ONDANSETRON 4 MG/1
TABLET, FILM COATED ORAL
Refills: 2 | COMMUNITY
Start: 2017-05-19 | End: 2018-01-01 | Stop reason: ALTCHOICE

## 2017-05-30 NOTE — PROGRESS NOTES
[address] [place] [address] [address]ias [address], [address] [facility]  [phone]  MD Margret Garza MD  Office Note  Patient ID:  Name:  Gene Ramirez  MRN:  [id number]  :  1965/52 y.o. Date:  2017      HISTORY OF PRESENT ILLNESS:  Gene Ramirez is a 46 y.o.  postmenopausal female with stage IIIC ovarian cancer. She underwent X-lap, hysterectomy, and tumor debulking in 2015. She was recommended adjuvant chemotherapy with 6 cycles of Taxol and Carboplatin. Her post-treatment PET/CT was negative for disease. Her Myriad results also found her to have a deleterious BRCA 1 mutation. As for the BRCA 1 mutation, I had her see one of our breast surgeons, Dr. Randee Multani, to talk about options, specifically screening and prophylactic surgery. She is going to hold off on surgery for now. She has decided not to have her daughter tested at this time. She presented in December ahead of her scheduled visit complaining of left lower quadrant pain for several weeks. The pain was intermittent and crampy. She also had some pain with bowel movements. Certain foods also exacerbated it. I sent her for a CT of the abdomen/pelvis which showed no obvious disease. In late December she had a colonoscopy which could not be completed due to adhesions. A subsequent barium enema was negative. A subsequent CA-125 was mildly elevated at 55, up from 10 three months earlier. Her CA-125 is now up to 99 and her CT suggests recurrent disease, although not definitive for recurrence. I recommended a PET/CT which confirmed recurrence. We discussed chemotherapy for her recurrence. She was clearly platinum sensitive, therefore I recommended a platinum-based regimen. I recommended Gemzar/Carboplatin. 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 She also wanted to keep her hair.   We also discussed options for down the line, specifically a PARP inhibitor, since she is BRCA positive. She has completed 4 cycles so far. She had a lot of difficulty with the first cycle, but the last few cycles were easier. She has trouble sleeping with the steroids. Her CA-125 has responded well, and is almost back to baseline. ROS:   and GI review: Negative  Cardiopulmonary review:Negative   Musculoskeletal:  Negative    A comprehensive review of systems was negative except for that written in the History of Present Illness.  , 10 point ROS      Problem List:  Patient Active Problem List    Diagnosis Date Noted    Chemotherapy-induced neutropenia ([address] 04/06/2017    Thrombocytopenia (HCC) 03/17/2017    CINV (chemotherapy-induced nausea and vomiting) 03/17/2017    Dehydration 03/17/2017    BRCA1 positive 09/10/2015    Malignant neoplasm of both ovaries ([address] 04/09/2015    S/P total abdominal hysterectomy 03/18/2015    Pelvic mass 03/06/2015     PMH:  Past Medical History:   Diagnosis Date    Anxiety     Calculus of kidney 1/13    right    Hernia, inguinal, left 2012    MS (multiple sclerosis) ([address] 1996    Nausea & vomiting     Ovarian cancer ([address] 3/2015    high grade, stage 3C papillary serous    Thromboembolus ([address] 8/2007    lower abdomen post fall      PSH:  Past Surgical History:   Procedure Laterality Date    HX GYN  2009    endometrial ablation with punctured uterus    HX OTHER SURGICAL  8/2007    green filter    HX MARIEL AND BSO  3/2014    ovarian cancer      Social History:  Social History   Substance Use Topics    Smoking status: Never Smoker    Smokeless tobacco: Never Used    Alcohol use No      Family History:  Family History   Problem Relation Age of Onset    Cancer Paternal Grandmother      breast    Heart Disease Mother     Heart Disease Father       Medications: (reviewed)  Current Outpatient Prescriptions   Medication Sig    ondansetron hcl (ZOFRAN) 4 mg tablet TAKE 1 TAB BY MOUTH EVERY EIGHT (8) HOURS AS NEEDED FOR NAUSEA.  LORazepam (ATIVAN) 1 mg tablet TAKE 1 TABLET BY MOUTH AT BEDTIME    dexamethasone (DECADRON) 4 mg tablet Take 1-2 tabs QAM with food the 2 days following chemo as needed    prochlorperazine (COMPAZINE) 10 mg tablet Take 1 Tab by mouth every six (6) hours as needed for Nausea. Indications: CANCER CHEMOTHERAPY-INDUCED NAUSEA AND VOMITING    XARELTO 20 mg tab tablet     lidocaine-prilocaine (EMLA) topical cream Apply small amount to port area and cover with band aid 1 hour before chemo treatment    gabapentin (NEURONTIN) 400 mg capsule TAKE 1 CAPSULE BY MOUTH 3 TIMES DAILY    ondansetron (ZOFRAN ODT) 4 mg disintegrating tablet TAKE 1 TABLET BY MOUTH EVERY 8 HOURS AS NEEDED FOR NAUSEA    amitriptyline (ELAVIL) 25 mg tablet Take 1 Tab by mouth nightly. No current facility-administered medications for this visit. Allergies: (reviewed)  Allergies   Allergen Reactions    Pcn [Penicillins] Rash          OBJECTIVE:    Physical Exam:  VITAL SIGNS: Vitals:    05/30/17 1546   BP: 119/58   Pulse: 85   Weight: 66.8 kg (147 lb 3.2 oz)   Height: 5' 7.01\" (1.702 m)     Body mass index is 23.05 kg/(m^2). GENERAL FILIBERTO: Conversant, alert, oriented. No acute distress. HEENT: HEENT. No thyroid enlargement. No JVD. Neck: Supple without restrictions. RESPIRATORY: Clear to auscultation and percussion to the bases. No CVAT. CARDIOVASC: RRR without murmur/rub. GASTROINT: Soft, non-distended, without masses or organomegaly. MUSCULOSKEL: no joint tenderness, deformity or swelling   EXTREMITIES: extremities normal, atraumatic, no cyanosis or edema   PELVIC: Deferred   RECTAL: Deferred   CHARMAINE SURVEY: No suspicious lymphadenopathy or edema noted. NEURO: Grossly intact. No acute deficit.          Lab Results   Component Value Date/Time    WBC 8.9 05/18/2017 01:22 PM    HGB 9.1 05/18/2017 01:22 PM    HCT 26.7 05/18/2017 01:22 PM    PLATELET 682 30/88/1863 01:22 PM    MCV 99.6 05/18/2017 01:22 PM     Lab Results   Component Value Date/Time    Sodium 142 05/09/2017 04:28 PM    Potassium 4.4 05/09/2017 04:28 PM    Chloride 102 05/09/2017 04:28 PM    CO2 24 05/09/2017 04:28 PM    Anion gap 8 03/16/2017 02:08 PM    Glucose 77 05/09/2017 04:28 PM    BUN 21 05/09/2017 04:28 PM    Creatinine 0.63 05/09/2017 04:28 PM    BUN/Creatinine ratio 33 05/09/2017 04:28 PM    GFR est  05/09/2017 04:28 PM    GFR est non- 05/09/2017 04:28 PM    Calcium 9.3 05/09/2017 04:28 PM       CT of abdomen/pelvis (12/8/16)  The visualized portions of the lung bases are clear.     Incidental hypodensity is seen adjacent to ligamentum teres. The liver is  otherwise unremarkable. The spleen, bilateral adrenal glands, pancreas, and  gallbladder are unremarkable. The bilateral kidneys are unremarkable without  evidence of hydronephrosis.     The abdominal aorta is normal in size. An IVC filter is in place. A stent is  present in the left iliac vein. No retroperitoneal lymphadenopathy.     The stomach and small bowel appear unremarkable. The appendix is normal. No  obstruction, free fluid, or free air.     The patient is status hysterectomy and ovarian resection. There is unchanged  scarring above the vaginal cuff and bladder.     No evidence of a destructive osseous lesion. IMPRESSION:   1. Status post hysterectomy and ovarian resection. There is unchanged scarring  over the vaginal cuff and bladder. 2. No evidence of acute process. Barium enema (12/22/16)   KUB shows unremarkable bowel gas pattern. There is IVC filter  and left iliac stent. Retrograde flow of barium without air fills the colon with  reflux into the appendix and extensive reflux into the unremarkable distal  ileum. Colon is well cleansed. Sigmoid is redundant and tortuous without  obstruction, and without mass or stricture identified.     Mucosal pattern is smooth. No mass or polyp is encountered.  There is no  stricture. Diverticula are not seen. Postevacuation image shows incomplete  emptying of the colon but no level of obstruction. Radiation exposure index:  45.1Gycm2.     IMPRESSION: Unremarkable Barium Enema      CT of abdomen/pelvis (2/14/17)  Abdomen: The lung bases are clear and the heart size is normal. The distal  esophagus, stomach, duodenum, liver, gallbladder, pancreas, spleen, adrenals,  and kidneys are normal.      Pelvis: There is trace, new ascites in the anterior pelvis (3-79, 602-78), the  anterior left lower quadrant (601-28), the pericecal right lower quadrant  (601-26, 3-72), and around the liver and spleen. The adjacent peritoneum  enhances (601-28, 602-77, 602-50). Curvilinear enhancement in the deep pelvis is  not masslike, but has increased compared to 12/18/2016 and 5/24/2016. Extension  along the peritoneum of the left paracolic gutter has increased (969-922). Enhancement is curvilinear and not nodular; no focal peritoneal or omental  nodules. The appendiceal mucosa is hyperenhancing (820-80), likely reactive, as  the appendix is not dilated.     The left external iliac vein is chronically occluded, though there is a stent in  place extending from the external iliac vein, through the common iliac, and into  the IVC. A large subcutaneous collateral vein (601-15, 601-18) and drains the  left lower extremity via the right common femoral and external iliac veins. An  IVC filter is in appropriate position below the renal vein ostia.     The small bowel, ileocecal junction, colon, and bladder are otherwise normal.  Post hysterectomy. No free air and no abdominopelvic lymphadenopathy.     IMPRESSION:   1. Trace new ascites, with enhancement of adjacent peritoneum. 2. Increased curvilinear enhancement in the pelvis and left paracolic gutter. No  focal peritoneal or omental nodules to confirm peritoneal carcinomatosis. 3. Chronic left external iliac vein occlusion.  The left lower extremity drains  via a large subcutaneous collateral to the right femoral and iliac system. PET/CT (2/20/17)  HEAD/NECK: No apparent foci of abnormal hypermetabolism. Cerebral evaluation is  limited by normal intense activity.     CHEST: No foci of abnormal hypermetabolism.     ABDOMEN/PELVIS: There is new extensive peritoneal activity compatible with  carcinomatosis as suggested by CT, max SUV 9.     SKELETON: No foci of abnormal hypermetabolism in the axial and visualized  appendicular skeleton.     IMPRESSION: Peritoneal carcinomatosis appearance. IMPRESSION/PLAN:  Abrahan Pacheco is a 46 y.o. female with a diagnosis of stage IIIC ovarian cancer. She underwent tumor debulking followed by 6 cycles of Taxol and Carboplatin chemotherapy, which she completed approximately 18 months ago. She now has evidence of recurrent disease based upon symptoms, elevated CA-125, CT and PET/CT findings. She is now receiving Gemzar/Carboplatin and has completed 4 cycles. She had a lot of difficulty with the first cycle, but did much better with subsequent cycles. Her CA-125 has responded well. We will continue with the current regimen for now. We will consider stopping chemotherapy and converting her to a PARP inhibitor after the next cycle. I will send her for a CT of the abdomen/pelvis after this cycle.       Signed By: Mitzy Urban MD     5/30/2017/3:54 PM

## 2017-05-31 ENCOUNTER — DOCUMENTATION ONLY (OUTPATIENT)
Dept: GYNECOLOGY | Age: 52
End: 2017-05-31

## 2017-05-31 LAB
ALBUMIN SERPL-MCNC: 4.5 G/DL (ref 3.5–5.5)
ALBUMIN/GLOB SERPL: 1.9 {RATIO} (ref 1.2–2.2)
ALP SERPL-CCNC: 169 IU/L (ref 39–117)
ALT SERPL-CCNC: 28 IU/L (ref 0–32)
AST SERPL-CCNC: 22 IU/L (ref 0–40)
BASOPHILS # BLD AUTO: 0 X10E3/UL (ref 0–0.2)
BASOPHILS NFR BLD AUTO: 0 %
BILIRUB SERPL-MCNC: <0.2 MG/DL (ref 0–1.2)
BUN SERPL-MCNC: 19 MG/DL (ref 6–24)
BUN/CREAT SERPL: 26 (ref 9–23)
CALCIUM SERPL-MCNC: 9.3 MG/DL (ref 8.7–10.2)
CANCER AG125 SERPL-ACNC: 11.4 U/ML (ref 0–38.1)
CHLORIDE SERPL-SCNC: 103 MMOL/L (ref 96–106)
CO2 SERPL-SCNC: 21 MMOL/L (ref 18–29)
CREAT SERPL-MCNC: 0.73 MG/DL (ref 0.57–1)
EOSINOPHIL # BLD AUTO: 0 X10E3/UL (ref 0–0.4)
EOSINOPHIL NFR BLD AUTO: 0 %
ERYTHROCYTE [DISTWIDTH] IN BLOOD BY AUTOMATED COUNT: 22.1 % (ref 12.3–15.4)
GLOBULIN SER CALC-MCNC: 2.4 G/DL (ref 1.5–4.5)
GLUCOSE SERPL-MCNC: 81 MG/DL (ref 65–99)
HCT VFR BLD AUTO: 27.8 % (ref 34–46.6)
HGB BLD-MCNC: 9 G/DL (ref 11.1–15.9)
IMM GRANULOCYTES # BLD: 0 X10E3/UL (ref 0–0.1)
IMM GRANULOCYTES NFR BLD: 0 %
LYMPHOCYTES # BLD AUTO: 1.6 X10E3/UL (ref 0.7–3.1)
LYMPHOCYTES NFR BLD AUTO: 12 %
MAGNESIUM SERPL-MCNC: 2.3 MG/DL (ref 1.6–2.3)
MCH RBC QN AUTO: 33.8 PG (ref 26.6–33)
MCHC RBC AUTO-ENTMCNC: 32.4 G/DL (ref 31.5–35.7)
MCV RBC AUTO: 105 FL (ref 79–97)
MONOCYTES # BLD AUTO: 0.9 X10E3/UL (ref 0.1–0.9)
MONOCYTES NFR BLD AUTO: 7 %
MORPHOLOGY BLD-IMP: ABNORMAL
NEUTROPHILS # BLD AUTO: 10.5 X10E3/UL (ref 1.4–7)
NEUTROPHILS NFR BLD AUTO: 81 %
PLATELET # BLD AUTO: 83 X10E3/UL (ref 150–379)
POTASSIUM SERPL-SCNC: 4.7 MMOL/L (ref 3.5–5.2)
PROT SERPL-MCNC: 6.9 G/DL (ref 6–8.5)
RBC # BLD AUTO: 2.66 X10E6/UL (ref 3.77–5.28)
SODIUM SERPL-SCNC: 143 MMOL/L (ref 134–144)
WBC # BLD AUTO: 12.9 X10E3/UL (ref 3.4–10.8)

## 2017-05-31 RX ORDER — LORAZEPAM 1 MG/1
TABLET ORAL
Qty: 30 TAB | Refills: 1 | Status: SHIPPED | OUTPATIENT
Start: 2017-05-31 | End: 2017-07-29 | Stop reason: SDUPTHER

## 2017-05-31 NOTE — PROGRESS NOTES
MD notes, allergies and labs reviewed. Chemotherapy orders for C5D1 Gemzar/Carbo/Hydration for 6/1/17. New orders scanned into CC to repeat CBC pre chemo as platelets were low at 83. Pt has been informed.

## 2017-05-31 NOTE — PROGRESS NOTES
Per Manjula Calle NP, hydration was cancelled with opic for D1 and D8 of this chemo cycle per pt request.

## 2017-06-01 ENCOUNTER — TELEPHONE (OUTPATIENT)
Dept: GYNECOLOGY | Age: 52
End: 2017-06-01

## 2017-06-01 ENCOUNTER — HOSPITAL ENCOUNTER (OUTPATIENT)
Dept: INFUSION THERAPY | Age: 52
Discharge: HOME | End: 2017-06-01
Payer: COMMERCIAL

## 2017-06-01 VITALS
HEIGHT: 68 IN | WEIGHT: 147.4 LBS | RESPIRATION RATE: 16 BRPM | DIASTOLIC BLOOD PRESSURE: 59 MMHG | BODY MASS INDEX: 22.34 KG/M2 | HEART RATE: 82 BPM | TEMPERATURE: 97 F | OXYGEN SATURATION: 100 % | SYSTOLIC BLOOD PRESSURE: 104 MMHG

## 2017-06-01 LAB
BASOPHILS # BLD AUTO: 0 K/UL (ref 0–0.1)
BASOPHILS # BLD: 0 % (ref 0–1)
DIFFERENTIAL METHOD BLD: ABNORMAL
EOSINOPHIL # BLD: 0 K/UL (ref 0–0.4)
EOSINOPHIL NFR BLD: 0 % (ref 0–7)
ERYTHROCYTE [DISTWIDTH] IN BLOOD BY AUTOMATED COUNT: 22.2 % (ref 11.5–14.5)
HCT VFR BLD AUTO: 26.4 % (ref 35–47)
HGB BLD-MCNC: 8.6 G/DL (ref 11.5–16)
LYMPHOCYTES # BLD AUTO: 13 % (ref 12–49)
LYMPHOCYTES # BLD: 1.4 K/UL (ref 0.8–3.5)
MCH RBC QN AUTO: 33.9 PG (ref 26–34)
MCHC RBC AUTO-ENTMCNC: 32.6 G/DL (ref 30–36.5)
MCV RBC AUTO: 103.9 FL (ref 80–99)
MONOCYTES # BLD: 0.6 K/UL (ref 0–1)
MONOCYTES NFR BLD AUTO: 6 % (ref 5–13)
NEUTS SEG # BLD: 8.4 K/UL (ref 1.8–8)
NEUTS SEG NFR BLD AUTO: 81 % (ref 32–75)
PLATELET # BLD AUTO: 95 K/UL (ref 150–400)
PLATELET COMMENTS,PCOM: ABNORMAL
RBC # BLD AUTO: 2.54 M/UL (ref 3.8–5.2)
RBC MORPH BLD: ABNORMAL
WBC # BLD AUTO: 10.4 K/UL (ref 3.6–11)

## 2017-06-01 PROCEDURE — 85025 COMPLETE CBC W/AUTO DIFF WBC: CPT | Performed by: OBSTETRICS & GYNECOLOGY

## 2017-06-01 PROCEDURE — 96375 TX/PRO/DX INJ NEW DRUG ADDON: CPT

## 2017-06-01 PROCEDURE — 74011250636 HC RX REV CODE- 250/636: Performed by: OBSTETRICS & GYNECOLOGY

## 2017-06-01 PROCEDURE — 36415 COLL VENOUS BLD VENIPUNCTURE: CPT | Performed by: OBSTETRICS & GYNECOLOGY

## 2017-06-01 PROCEDURE — 96417 CHEMO IV INFUS EACH ADDL SEQ: CPT

## 2017-06-01 PROCEDURE — 77030012965 HC NDL HUBR BBMI -A

## 2017-06-01 PROCEDURE — 96413 CHEMO IV INFUSION 1 HR: CPT

## 2017-06-01 RX ORDER — DEXAMETHASONE SODIUM PHOSPHATE 4 MG/ML
20 INJECTION, SOLUTION INTRA-ARTICULAR; INTRALESIONAL; INTRAMUSCULAR; INTRAVENOUS; SOFT TISSUE ONCE
Status: COMPLETED | OUTPATIENT
Start: 2017-06-01 | End: 2017-06-01

## 2017-06-01 RX ORDER — GRANISETRON HYDROCHLORIDE 1 MG/ML
1 INJECTION, SOLUTION INTRAVENOUS ONCE
Status: COMPLETED | OUTPATIENT
Start: 2017-06-01 | End: 2017-06-01

## 2017-06-01 RX ORDER — HEPARIN 100 UNIT/ML
500 SYRINGE INTRAVENOUS AS NEEDED
Status: ACTIVE | OUTPATIENT
Start: 2017-06-01 | End: 2017-06-02

## 2017-06-01 RX ORDER — SODIUM CHLORIDE 0.9 % (FLUSH) 0.9 %
10-40 SYRINGE (ML) INJECTION AS NEEDED
Status: ACTIVE | OUTPATIENT
Start: 2017-06-01 | End: 2017-06-02

## 2017-06-01 RX ORDER — SODIUM CHLORIDE 9 MG/ML
10 INJECTION INTRAMUSCULAR; INTRAVENOUS; SUBCUTANEOUS AS NEEDED
Status: ACTIVE | OUTPATIENT
Start: 2017-06-01 | End: 2017-06-02

## 2017-06-01 RX ADMIN — GRANISETRON HYDROCHLORIDE 1 MG: 1 INJECTION, SOLUTION INTRAVENOUS at 13:09

## 2017-06-01 RX ADMIN — CARBOPLATIN 480 MG: 10 INJECTION, SOLUTION INTRAVENOUS at 14:53

## 2017-06-01 RX ADMIN — SODIUM CHLORIDE 1400 MG: 900 INJECTION, SOLUTION INTRAVENOUS at 14:03

## 2017-06-01 RX ADMIN — DEXAMETHASONE SODIUM PHOSPHATE 20 MG: 4 INJECTION, SOLUTION INTRA-ARTICULAR; INTRALESIONAL; INTRAMUSCULAR; INTRAVENOUS; SOFT TISSUE at 13:26

## 2017-06-01 NOTE — PROGRESS NOTES
1100 Pt admit to [facility] for C5D1 Gemzar/Carbo ambulatory in stable condition. Assessment completed. No new concerns voiced. Port accessed and flushed with positive blood return. Labs drawn per order and sent for processing. Labs reviewed, platelets 95, spoke with Safia per Dr Marry schneider to treat. Medication ordered. Visit Vitals    /58    Pulse 89    Temp 97.6 °F (36.4 °C)    Resp 16    Ht 5' 7.5\" (1.715 m)    Wt 66.9 kg (147 lb 6.4 oz)    LMP 02/02/2015 (Approximate)    SpO2 100%    BMI 22.75 kg/m2       Medications:  Normal Saline KVO  Kytril IV Push  Decadron IV Push  Gemzar  Carboplatin  Heparin Flush    1540 Pt tolerated treatment well. Port maintained positive blood return throughout treatment, flushed with positive blood return at conclusion, port heparinized and de-accessed per protocol. D/c home ambulatory in no distress. Pt aware of next appointment scheduled for 6/2/17. Recent Results (from the past 12 hour(s))   CBC WITH AUTOMATED DIFF    Collection Time: 06/01/17 11:05 AM   Result Value Ref Range    WBC 10.4 3.6 - 11.0 K/uL    RBC 2.54 (L) 3.80 - 5.20 M/uL    HGB 8.6 (L) 11.5 - 16.0 g/dL    HCT 26.4 (L) 35.0 - 47.0 %    .9 (H) 80.0 - 99.0 FL    MCH 33.9 26.0 - 34.0 PG    MCHC 32.6 30.0 - 36.5 g/dL    RDW 22.2 (H) 11.5 - 14.5 %    PLATELET 95 (L) 334 - 400 K/uL    NEUTROPHILS 81 (H) 32 - 75 %    LYMPHOCYTES 13 12 - 49 %    MONOCYTES 6 5 - 13 %    EOSINOPHILS 0 0 - 7 %    BASOPHILS 0 0 - 1 %    ABS. NEUTROPHILS 8.4 (H) 1.8 - 8.0 K/UL    ABS. LYMPHOCYTES 1.4 0.8 - 3.5 K/UL    ABS. MONOCYTES 0.6 0.0 - 1.0 K/UL    ABS. EOSINOPHILS 0.0 0.0 - 0.4 K/UL    ABS.  BASOPHILS 0.0 0.0 - 0.1 K/UL    DF SMEAR SCANNED      PLATELET COMMENTS LARGE PLATELETS      RBC COMMENTS ANISOCYTOSIS  2+        RBC COMMENTS MACROCYTOSIS  1+        RBC COMMENTS TEARDROP CELLS  PRESENT        RBC COMMENTS OVALOCYTES  PRESENT

## 2017-06-01 NOTE — TELEPHONE ENCOUNTER
Pt calling with concern of not having C6 chemo scheduled and ca125 being 11 (not in the single digits) before CT scan. Pt informed per Dr. Joshua Miller the ca125 result is close enough, and to keep Ct and MD fu appts, and not to schedule a 6th cycle yet.

## 2017-06-02 ENCOUNTER — HOSPITAL ENCOUNTER (OUTPATIENT)
Dept: INFUSION THERAPY | Age: 52
Discharge: HOME | End: 2017-06-02
Payer: COMMERCIAL

## 2017-06-02 ENCOUNTER — DOCUMENTATION ONLY (OUTPATIENT)
Dept: GYNECOLOGY | Age: 52
End: 2017-06-02

## 2017-06-02 NOTE — PROGRESS NOTES
C5D1 Gemzar/Carboplatin treatment from 6/1/17 reviewed. Pt does not want hydration today and tomorrow. appts cancelled with opic.

## 2017-06-06 RX ORDER — GRANISETRON HYDROCHLORIDE 1 MG/ML
1 INJECTION INTRAVENOUS ONCE
Status: COMPLETED | OUTPATIENT
Start: 2017-06-08 | End: 2017-06-08

## 2017-06-06 RX ORDER — DEXAMETHASONE SODIUM PHOSPHATE 4 MG/ML
4 INJECTION, SOLUTION INTRA-ARTICULAR; INTRALESIONAL; INTRAMUSCULAR; INTRAVENOUS; SOFT TISSUE ONCE
Status: COMPLETED | OUTPATIENT
Start: 2017-06-08 | End: 2017-06-08

## 2017-06-08 ENCOUNTER — HOSPITAL ENCOUNTER (OUTPATIENT)
Dept: INFUSION THERAPY | Age: 52
Discharge: HOME | End: 2017-06-08
Payer: COMMERCIAL

## 2017-06-08 VITALS
SYSTOLIC BLOOD PRESSURE: 100 MMHG | OXYGEN SATURATION: 97 % | TEMPERATURE: 98.6 F | WEIGHT: 147.2 LBS | BODY MASS INDEX: 22.31 KG/M2 | HEART RATE: 84 BPM | DIASTOLIC BLOOD PRESSURE: 54 MMHG | RESPIRATION RATE: 16 BRPM | HEIGHT: 68 IN

## 2017-06-08 LAB
BASOPHILS # BLD AUTO: 0 K/UL (ref 0–0.1)
BASOPHILS # BLD: 0 % (ref 0–1)
DIFFERENTIAL METHOD BLD: ABNORMAL
EOSINOPHIL # BLD: 0 K/UL (ref 0–0.4)
EOSINOPHIL NFR BLD: 0 % (ref 0–7)
ERYTHROCYTE [DISTWIDTH] IN BLOOD BY AUTOMATED COUNT: 20.2 % (ref 11.5–14.5)
HCT VFR BLD AUTO: 24.7 % (ref 35–47)
HGB BLD-MCNC: 8.1 G/DL (ref 11.5–16)
LYMPHOCYTES # BLD AUTO: 35 % (ref 12–49)
LYMPHOCYTES # BLD: 2.9 K/UL (ref 0.8–3.5)
MCH RBC QN AUTO: 34.8 PG (ref 26–34)
MCHC RBC AUTO-ENTMCNC: 32.8 G/DL (ref 30–36.5)
MCV RBC AUTO: 106 FL (ref 80–99)
MONOCYTES # BLD: 0.8 K/UL (ref 0–1)
MONOCYTES NFR BLD AUTO: 9 % (ref 5–13)
NEUTS SEG # BLD: 4.7 K/UL (ref 1.8–8)
NEUTS SEG NFR BLD AUTO: 56 % (ref 32–75)
PLATELET # BLD AUTO: 161 K/UL (ref 150–400)
RBC # BLD AUTO: 2.33 M/UL (ref 3.8–5.2)
RBC MORPH BLD: ABNORMAL
WBC # BLD AUTO: 8.4 K/UL (ref 3.6–11)

## 2017-06-08 PROCEDURE — 96375 TX/PRO/DX INJ NEW DRUG ADDON: CPT

## 2017-06-08 PROCEDURE — 96413 CHEMO IV INFUSION 1 HR: CPT

## 2017-06-08 PROCEDURE — 77030012965 HC NDL HUBR BBMI -A

## 2017-06-08 PROCEDURE — 85025 COMPLETE CBC W/AUTO DIFF WBC: CPT | Performed by: OBSTETRICS & GYNECOLOGY

## 2017-06-08 PROCEDURE — 74011000250 HC RX REV CODE- 250: Performed by: OBSTETRICS & GYNECOLOGY

## 2017-06-08 PROCEDURE — 96361 HYDRATE IV INFUSION ADD-ON: CPT

## 2017-06-08 PROCEDURE — 74011250636 HC RX REV CODE- 250/636: Performed by: OBSTETRICS & GYNECOLOGY

## 2017-06-08 PROCEDURE — 36415 COLL VENOUS BLD VENIPUNCTURE: CPT | Performed by: OBSTETRICS & GYNECOLOGY

## 2017-06-08 RX ORDER — SODIUM CHLORIDE 0.9 % (FLUSH) 0.9 %
10-40 SYRINGE (ML) INJECTION AS NEEDED
Status: ACTIVE | OUTPATIENT
Start: 2017-06-08 | End: 2017-06-09

## 2017-06-08 RX ORDER — SODIUM CHLORIDE 9 MG/ML
10 INJECTION INTRAMUSCULAR; INTRAVENOUS; SUBCUTANEOUS AS NEEDED
Status: ACTIVE | OUTPATIENT
Start: 2017-06-08 | End: 2017-06-09

## 2017-06-08 RX ORDER — HEPARIN 100 UNIT/ML
500 SYRINGE INTRAVENOUS AS NEEDED
Status: ACTIVE | OUTPATIENT
Start: 2017-06-08 | End: 2017-06-09

## 2017-06-08 RX ADMIN — Medication 10 ML: at 17:12

## 2017-06-08 RX ADMIN — SODIUM CHLORIDE 1400 MG: 900 INJECTION, SOLUTION INTRAVENOUS at 16:31

## 2017-06-08 RX ADMIN — SODIUM CHLORIDE 10 ML: 9 INJECTION, SOLUTION INTRAMUSCULAR; INTRAVENOUS; SUBCUTANEOUS at 13:40

## 2017-06-08 RX ADMIN — DEXAMETHASONE SODIUM PHOSPHATE 4 MG: 4 INJECTION, SOLUTION INTRA-ARTICULAR; INTRALESIONAL; INTRAMUSCULAR; INTRAVENOUS; SOFT TISSUE at 16:14

## 2017-06-08 RX ADMIN — SODIUM CHLORIDE 1000 ML: 900 INJECTION, SOLUTION INTRAVENOUS at 14:24

## 2017-06-08 RX ADMIN — SODIUM CHLORIDE, PRESERVATIVE FREE 500 UNITS: 5 INJECTION INTRAVENOUS at 17:12

## 2017-06-08 RX ADMIN — GRANISETRON HYDROCHLORIDE 1 MG: 1 INJECTION INTRAVENOUS at 16:10

## 2017-06-08 RX ADMIN — Medication 20 ML: at 13:40

## 2017-06-08 NOTE — PROGRESS NOTES
1325 Pt admit to [facility] for C 5 D 8 Gemzar ambulatory in stable condition. Accompanied by supportive , Scott Doyle. Assessment completed, has some residual neuropathy from prior chemo treatments, dysgeusia, fatigue. No new concerns voiced. Port accessed with positive blood return. Labs drawn per order and sent. Hydration of 1 liter initiated. [address] for results, Per daisha in lab, results will be soon. 1546 Chemo ordered, premeds given. Normal Saline at [facility]. Visit Vitals    /60 (BP 1 Location: Right arm, BP Patient Position: Sitting)    Pulse 89    Temp 97.8 °F (36.6 °C)    Resp 16    Ht 5' 7.5\" (1.715 m)    Wt 66.8 kg (147 lb 3.2 oz)    LMP 02/02/2015 (Approximate)    BMI 22.71 kg/m2       Medications:  Normal Saline 1 Liter  Kytril  Decadron  Gemzar      1715 Pt tolerated treatment well. Port maintained positive blood return throughout treatment, flushed with positive blood return at conclusion and heparinized per protocol. Felipe Arnold removed. D/c home ambulatory in no distress. Pt aware of next appointment scheduled for Neulasta on 6/9/17. Recent Results (from the past 12 hour(s))   CBC WITH AUTOMATED DIFF    Collection Time: 06/08/17  2:03 PM   Result Value Ref Range    WBC 8.4 3.6 - 11.0 K/uL    RBC 2.33 (L) 3.80 - 5.20 M/uL    HGB 8.1 (L) 11.5 - 16.0 g/dL    HCT 24.7 (L) 35.0 - 47.0 %    .0 (H) 80.0 - 99.0 FL    MCH 34.8 (H) 26.0 - 34.0 PG    MCHC 32.8 30.0 - 36.5 g/dL    RDW 20.2 (H) 11.5 - 14.5 %    PLATELET 042 752 - 712 K/uL    NEUTROPHILS 56 32 - 75 %    LYMPHOCYTES 35 12 - 49 %    MONOCYTES 9 5 - 13 %    EOSINOPHILS 0 0 - 7 %    BASOPHILS 0 0 - 1 %    ABS. NEUTROPHILS 4.7 1.8 - 8.0 K/UL    ABS. LYMPHOCYTES 2.9 0.8 - 3.5 K/UL    ABS. MONOCYTES 0.8 0.0 - 1.0 K/UL    ABS. EOSINOPHILS 0.0 0.0 - 0.4 K/UL    ABS.  BASOPHILS 0.0 0.0 - 0.1 K/UL    DF SMEAR SCANNED      RBC COMMENTS ANISOCYTOSIS  2+        RBC COMMENTS TEARDROP CELLS  PRESENT        RBC COMMENTS MACROCYTOSIS  2+

## 2017-06-08 NOTE — PROGRESS NOTES
Mickey Barlow NP      Date of visit: 6/8/2017       HPI:   Ciro Phillips is a 46 y.o.  postmenopausal female with stage IIIC ovarian cancer. She underwent X-lap, hysterectomy, and tumor debulking in March 2015. She was recommended adjuvant chemotherapy with 6 cycles of Taxol and Carboplatin. Her post-treatment PET/CT was negative for disease. Her Myriad results also found her to have a deleterious BRCA 1 mutation. As for the BRCA 1 mutation, I had her see one of our breast surgeons, Dr. Benito Daniel, to talk about options, specifically screening and prophylactic surgery. She is going to hold off on surgery for now. She has decided not to have her daughter tested at this time.     She presented in December ahead of her scheduled visit complaining of left lower quadrant pain for several weeks. The pain was intermittent and crampy. She also had some pain with bowel movements. Certain foods also exacerbated it. Dr. Héctor Campoverde sent her for a CT of the abdomen/pelvis which showed no obvious disease. In late December she had a colonoscopy which could not be completed due to adhesions. A subsequent barium enema was negative. A subsequent CA-125 was mildly elevated at 55, up from 10 three months earlier. Her CA-125 is now up to 99 and her CT suggests recurrent disease, although not definitive for recurrence. Dr. Héctor Campoverde recommended a PET/CT. This was done on Feb 20, 2017 (see full results below)  This showed peritoneal carcinomatosis and discussion with Dr. Héctor Campoverde was had and they agreed upon Carbo/Gemzar for 5 cycles Q21 days each with gemzar D1 & 8. SUBJECTIVE:  She presents today for cycle 5D8, last treatment. She has been receiving hydration with her previous cycles, but since this is her last, she asked to hold that because she says she does not sleep for 3 days because of getting up to urinate at night.   She agreed it was helpful to get her to this point. She continues to work and says she has only missed her chemo days of work and is pleased with this. She is also very excited today is the last cycle. Noted hgb of 8.1 today    Current Outpatient Prescriptions   Medication Sig    LORazepam (ATIVAN) 1 mg tablet TAKE 1 TABLET BY MOUTH AT BEDTIME    ondansetron hcl (ZOFRAN) 4 mg tablet TAKE 1 TAB BY MOUTH EVERY EIGHT (8) HOURS AS NEEDED FOR NAUSEA.  gabapentin (NEURONTIN) 400 mg capsule TAKE 1 CAPSULE BY MOUTH 3 TIMES DAILY    ondansetron (ZOFRAN ODT) 4 mg disintegrating tablet TAKE 1 TABLET BY MOUTH EVERY 8 HOURS AS NEEDED FOR NAUSEA    dexamethasone (DECADRON) 4 mg tablet Take 1-2 tabs QAM with food the 2 days following chemo as needed    prochlorperazine (COMPAZINE) 10 mg tablet Take 1 Tab by mouth every six (6) hours as needed for Nausea. Indications: CANCER CHEMOTHERAPY-INDUCED NAUSEA AND VOMITING    XARELTO 20 mg tab tablet     amitriptyline (ELAVIL) 25 mg tablet Take 1 Tab by mouth nightly.  lidocaine-prilocaine (EMLA) topical cream Apply small amount to port area and cover with band aid 1 hour before chemo treatment     Current Facility-Administered Medications   Medication Dose Route Frequency    sodium chloride 0.9 % injection 10 mL  10 mL IntraVENous PRN    heparin (porcine) pf 500 Units  500 Units IntraVENous PRN    sodium chloride (NS) flush 10-40 mL  10-40 mL IntraVENous PRN    gemcitabine (GEMZAR) 1,400 mg in 0.9% sodium chloride 250 mL, overfill volume 25 mL chemo infusion  1,400 mg IntraVENous ONCE        Review of Systems:  General:Fatigues easlily, but says it is \"manageable\"  HEENT: Denies visual changes, dysphagia or headache  Resp: Denies SOB, LYONS, wheezing or cough  CV: Denies CP, palpitations  GI/:Denies abd pain/discomfort. Denies  Dysuria and says she take Miralax for constipation.    MuskSkel: Acknowledges persistent  achy muscles, unchanged,  but denies joint pain  Neuro: Denies dizzyness or syncope. Persistent neuropathy, but it has not worsened with this treatment. Objective:     Patient Vitals for the past 8 hrs:   BP Temp Pulse Resp Height Weight   06/08/17 1332 106/60 97.8 °F (36.6 °C) 89 16 5' 7.5\" (1.715 m) 147 lb 3.2 oz (66.8 kg)       Physical Exam:  General: A&O X3 in NAD in good spirits  HEENT: Sclera anicteric, mucosa pink, moist   Neck: No JVD bilat without cervical adenopathy present  Port site without redness/swelling or tenderness and accessed without difficulty  Heart: Regular without M/R/G  Lungs: CTA bilat without rales or wheezing  Abd: Soft, no tenderness throughout . No distention noted with + BS throughout  Ext: Without edema and + pedal pulses bilat  Neuro: grossly intact    Labs 4/6/17:  Recent Results (from the past 12 hour(s))   CBC WITH AUTOMATED DIFF    Collection Time: 06/08/17  2:03 PM   Result Value Ref Range    WBC 8.4 3.6 - 11.0 K/uL    RBC 2.33 (L) 3.80 - 5.20 M/uL    HGB 8.1 (L) 11.5 - 16.0 g/dL    HCT 24.7 (L) 35.0 - 47.0 %    .0 (H) 80.0 - 99.0 FL    MCH 34.8 (H) 26.0 - 34.0 PG    MCHC 32.8 30.0 - 36.5 g/dL    RDW 20.2 (H) 11.5 - 14.5 %    PLATELET 002 625 - 079 K/uL    NEUTROPHILS 56 32 - 75 %    LYMPHOCYTES 35 12 - 49 %    MONOCYTES 9 5 - 13 %    EOSINOPHILS 0 0 - 7 %    BASOPHILS 0 0 - 1 %    ABS. NEUTROPHILS 4.7 1.8 - 8.0 K/UL    ABS. LYMPHOCYTES 2.9 0.8 - 3.5 K/UL    ABS. MONOCYTES 0.8 0.0 - 1.0 K/UL    ABS. EOSINOPHILS 0.0 0.0 - 0.4 K/UL    ABS. BASOPHILS 0.0 0.0 - 0.1 K/UL    DF SMEAR SCANNED      RBC COMMENTS ANISOCYTOSIS  2+        RBC COMMENTS TEARDROP CELLS  PRESENT        RBC COMMENTS MACROCYTOSIS  2+            5/30= 11.4; (5/9=15.3; 4/18=27.9)        Imaging:  PET/CT 2/20/2017;  FINDINGS:     HEAD/NECK: No apparent foci of abnormal hypermetabolism.  Cerebral evaluation is  limited by normal intense activity.     CHEST: No foci of abnormal hypermetabolism.     ABDOMEN/PELVIS: There is new extensive peritoneal activity compatible with  carcinomatosis as suggested by CT, max SUV 9.     SKELETON: No foci of abnormal hypermetabolism in the axial and visualized  appendicular skeleton.       IMPRESSION: Peritoneal carcinomatosis appearance. Assessment:     Patient Active Problem List   Diagnosis Code    Pelvic mass R19.00    S/P total abdominal hysterectomy Z90.710    Malignant neoplasm of both ovaries (HCC) C56.1, C56.2    BRCA1 positive Z15.01, Z15.02    Thrombocytopenia (HCC) D69.6    CINV (chemotherapy-induced nausea and vomiting) R11.2, T45.1X5A    Dehydration E86.0    Chemotherapy-induced neutropenia (HCC) D70.1         Plan:   Proceed with cycle 5 D8 of Carbo/Gemzar (Gemzar only today)  Monitor Hgb. She will call for any increase in SOB, fatigue or palpitations  Continue to try to walk daily .    Continue hydration  Encouraged to call for any concerns or questions    Monik Cisneros NP

## 2017-06-09 ENCOUNTER — HOSPITAL ENCOUNTER (OUTPATIENT)
Dept: INFUSION THERAPY | Age: 52
Discharge: HOME | End: 2017-06-09
Payer: COMMERCIAL

## 2017-06-09 ENCOUNTER — APPOINTMENT (OUTPATIENT)
Dept: INFUSION THERAPY | Age: 52
End: 2017-06-09
Payer: COMMERCIAL

## 2017-06-09 VITALS
HEART RATE: 84 BPM | RESPIRATION RATE: 18 BRPM | SYSTOLIC BLOOD PRESSURE: 108 MMHG | DIASTOLIC BLOOD PRESSURE: 65 MMHG | TEMPERATURE: 97.1 F | OXYGEN SATURATION: 96 %

## 2017-06-09 PROCEDURE — 96372 THER/PROPH/DIAG INJ SC/IM: CPT

## 2017-06-09 PROCEDURE — 74011250636 HC RX REV CODE- 250/636: Performed by: OBSTETRICS & GYNECOLOGY

## 2017-06-09 RX ADMIN — PEGFILGRASTIM 6 MG: 6 INJECTION SUBCUTANEOUS at 15:48

## 2017-06-09 NOTE — PROGRESS NOTES
Outpatient Infusion Center Short Visit Progress Note    1868 Pt admit to [facility] for Neulasta ambulatory in stable condition. Assessment completed. No new concerns voiced. Visit Vitals    /65 (BP 1 Location: Right arm, BP Patient Position: At rest)    Pulse 84    Temp 97.1 °F (36.2 °C)    Resp 18    SpO2 96%       Medications:  Neulasta SQ right arm    1555 Pt tolerated treatment well. D/c home ambulatory in no distress. Pt to follow up with MD sands.

## 2017-06-12 ENCOUNTER — HOSPITAL ENCOUNTER (OUTPATIENT)
Dept: CT IMAGING | Age: 52
Discharge: HOME | End: 2017-06-12
Attending: OBSTETRICS & GYNECOLOGY
Payer: COMMERCIAL

## 2017-06-12 DIAGNOSIS — C56.3 MALIGNANT NEOPLASM OF BOTH OVARIES (HCC): ICD-10-CM

## 2017-06-12 PROCEDURE — 74011000258 HC RX REV CODE- 258: Performed by: OBSTETRICS & GYNECOLOGY

## 2017-06-12 PROCEDURE — 74177 CT ABD & PELVIS W/CONTRAST: CPT

## 2017-06-12 PROCEDURE — 74011636320 HC RX REV CODE- 636/320: Performed by: OBSTETRICS & GYNECOLOGY

## 2017-06-12 RX ORDER — SODIUM CHLORIDE 0.9 % (FLUSH) 0.9 %
10 SYRINGE (ML) INJECTION
Status: COMPLETED | OUTPATIENT
Start: 2017-06-12 | End: 2017-06-12

## 2017-06-12 RX ADMIN — SODIUM CHLORIDE 100 ML: 900 INJECTION, SOLUTION INTRAVENOUS at 19:00

## 2017-06-12 RX ADMIN — IOPAMIDOL 100 ML: 755 INJECTION, SOLUTION INTRAVENOUS at 19:00

## 2017-06-12 RX ADMIN — Medication 10 ML: at 19:00

## 2017-06-13 ENCOUNTER — OFFICE VISIT (OUTPATIENT)
Dept: GYNECOLOGY | Age: 52
End: 2017-06-13

## 2017-06-13 VITALS
SYSTOLIC BLOOD PRESSURE: 128 MMHG | DIASTOLIC BLOOD PRESSURE: 61 MMHG | BODY MASS INDEX: 22.76 KG/M2 | HEART RATE: 89 BPM | WEIGHT: 150.2 LBS | HEIGHT: 68 IN

## 2017-06-13 DIAGNOSIS — C56.3 MALIGNANT NEOPLASM OF BOTH OVARIES (HCC): Primary | ICD-10-CM

## 2017-06-13 NOTE — PROGRESS NOTES
[address] [place] [address] Mathias Moritz [address], [address] [facility]  [phone]  MD Eufemia Bean MD  Office Note  Patient ID:  Name:  Jason Zaldivar  MRN:  [id number]  :  1965/52 y.o. Date:  2017      HISTORY OF PRESENT ILLNESS:  Jason Zaldivar is a 46 y.o.  postmenopausal female with stage IIIC ovarian cancer. She underwent X-lap, hysterectomy, and tumor debulking in 2015. She was recommended adjuvant chemotherapy with 6 cycles of Taxol and Carboplatin. Her post-treatment PET/CT was negative for disease. Her [facility] results also found her to have a deleterious BRCA 1 mutation. As for the BRCA 1 mutation, I had her see one of our breast surgeons, Dr. Rl Perdomo, to talk about options, specifically screening and prophylactic surgery. She is going to hold off on surgery for now. She has decided not to have her daughter tested at this time. She presented in December ahead of her scheduled visit complaining of left lower quadrant pain for several weeks. The pain was intermittent and crampy. She also had some pain with bowel movements. Certain foods also exacerbated it. I sent her for a CT of the abdomen/pelvis which showed no obvious disease. In late December she had a colonoscopy which could not be completed due to adhesions. A subsequent barium enema was negative. A subsequent CA-125 was mildly elevated at 55, up from 10 three months earlier. Her CA-125 is now up to 99 and her CT suggests recurrent disease, although not definitive for recurrence. I recommended a PET/CT which confirmed recurrence. We discussed chemotherapy for her recurrence. She was clearly platinum sensitive, therefore I recommended a platinum-based regimen. I recommended Gemzar/Carboplatin. 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 She also wanted to keep her hair.   We also discussed options for down the line, specifically a PARP inhibitor, since she is BRCA positive. She has completed 5 cycles so far. She had a lot of difficulty with the first cycle, but the last few cycles were easier. She has trouble sleeping with the steroids. Her CA-125 has responded well, and is now back to baseline. She presents today to discuss the results of her recent CT scan and consideration of starting on a PARP inhibitor      ROS:   and GI review: Negative  Cardiopulmonary review:Negative   Musculoskeletal:  Negative    A comprehensive review of systems was negative except for that written in the History of Present Illness.  , 10 point ROS      Problem List:  Patient Active Problem List    Diagnosis Date Noted    Chemotherapy-induced neutropenia ([address] 04/06/2017    Thrombocytopenia (HCC) 03/17/2017    CINV (chemotherapy-induced nausea and vomiting) 03/17/2017    Dehydration 03/17/2017    BRCA1 positive 09/10/2015    Malignant neoplasm of both ovaries ([address] 04/09/2015    S/P total abdominal hysterectomy 03/18/2015    Pelvic mass 03/06/2015     PMH:  Past Medical History:   Diagnosis Date    Anxiety     Calculus of kidney 1/13    right    Hernia, inguinal, left 2012    MS (multiple sclerosis) ([address] 1996    Nausea & vomiting     Ovarian cancer ([address] 3/2015    high grade, stage 3C papillary serous    Thromboembolus ([address] 8/2007    lower abdomen post fall      PSH:  Past Surgical History:   Procedure Laterality Date    HX GYN  2009    endometrial ablation with punctured uterus    HX OTHER SURGICAL  8/2007    green filter    HX MARIEL AND BSO  3/2014    ovarian cancer      Social History:  Social History   Substance Use Topics    Smoking status: Never Smoker    Smokeless tobacco: Never Used    Alcohol use No      Family History:  Family History   Problem Relation Age of Onset    Cancer Paternal Grandmother      breast    Heart Disease Mother     Heart Disease Father       Medications: (reviewed)  Current Outpatient Prescriptions   Medication Sig    LORazepam (ATIVAN) 1 mg tablet TAKE 1 TABLET BY MOUTH AT BEDTIME    ondansetron hcl (ZOFRAN) 4 mg tablet TAKE 1 TAB BY MOUTH EVERY EIGHT (8) HOURS AS NEEDED FOR NAUSEA.  gabapentin (NEURONTIN) 400 mg capsule TAKE 1 CAPSULE BY MOUTH 3 TIMES DAILY    ondansetron (ZOFRAN ODT) 4 mg disintegrating tablet TAKE 1 TABLET BY MOUTH EVERY 8 HOURS AS NEEDED FOR NAUSEA    dexamethasone (DECADRON) 4 mg tablet Take 1-2 tabs QAM with food the 2 days following chemo as needed    prochlorperazine (COMPAZINE) 10 mg tablet Take 1 Tab by mouth every six (6) hours as needed for Nausea. Indications: CANCER CHEMOTHERAPY-INDUCED NAUSEA AND VOMITING    XARELTO 20 mg tab tablet     lidocaine-prilocaine (EMLA) topical cream Apply small amount to port area and cover with band aid 1 hour before chemo treatment    amitriptyline (ELAVIL) 25 mg tablet Take 1 Tab by mouth nightly. No current facility-administered medications for this visit. Allergies: (reviewed)  Allergies   Allergen Reactions    Pcn [Penicillins] Rash          OBJECTIVE:    Physical Exam:  VITAL SIGNS: Vitals:    06/13/17 1547   BP: 128/61   Pulse: 89   Weight: 68.1 kg (150 lb 3.2 oz)   Height: 5' 7.52\" (1.715 m)     Body mass index is 23.16 kg/(m^2). GENERAL FILIBERTO: Conversant, alert, oriented. No acute distress. HEENT: HEENT. No thyroid enlargement. No JVD. Neck: Supple without restrictions. RESPIRATORY: Clear to auscultation and percussion to the bases. No CVAT. CARDIOVASC: RRR without murmur/rub. GASTROINT: Soft, non-distended, without masses or organomegaly. MUSCULOSKEL: no joint tenderness, deformity or swelling   EXTREMITIES: extremities normal, atraumatic, no cyanosis or edema   PELVIC: Deferred   RECTAL: Deferred   CHARMAINE SURVEY: No suspicious lymphadenopathy or edema noted. NEURO: Grossly intact. No acute deficit.          Lab Results   Component Value Date/Time    WBC 8.4 06/08/2017 02:03 PM    HGB 8.1 06/08/2017 02:03 PM    HCT 24.7 06/08/2017 02:03 PM    PLATELET 387 22/15/6671 02:03 PM    .0 06/08/2017 02:03 PM     Lab Results   Component Value Date/Time    Sodium 143 05/30/2017 04:21 PM    Potassium 4.7 05/30/2017 04:21 PM    Chloride 103 05/30/2017 04:21 PM    CO2 21 05/30/2017 04:21 PM    Anion gap 8 03/16/2017 02:08 PM    Glucose 81 05/30/2017 04:21 PM    BUN 19 05/30/2017 04:21 PM    Creatinine 0.73 05/30/2017 04:21 PM    BUN/Creatinine ratio 26 05/30/2017 04:21 PM    GFR est  05/30/2017 04:21 PM    GFR est non-AA 95 05/30/2017 04:21 PM    Calcium 9.3 05/30/2017 04:21 PM       CT of abdomen/pelvis (6/12/17)  No pleural effusion at the lung bases. Trace pericardial fluid. Small hiatal hernia.     LIVER/GALLBLADDER: No mass or biliary dilatation. No evidence of serosal  implant. There is no intrahepatic duct dilatation. The CBD is not dilated. There  is no hepatic parenchymal mass. Hepatic enhancement pattern is within normal  limits. Gallbladder within normal limits by CT criteria.     SPLEEN/PANCREAS: No mass. There is no pancreatic duct dilatation. There is no  evidence of splenomegaly.     ADRENALS/KIDNEYS: Glory Lo is no adrenal mass.] There is no hydroureteronephrosis. There is no renal or ureteral calculus. There is no renal mass. There is no  perinephric mass.     COLON AND SMALL BOWEL: No dilatation or wall thickening. There is no obstruction  or free intraperitoneal air. There is no evidence of incarceration or  obstruction. Paracolic gutter soft tissue is not demonstrated when compared to  the prior examination 2/20/2017   Series 3 image 225 as index focus is correlates to imaging findings at series 3  image 64 of the current study.   Series 3 image 246 just to the right of midline correlates to imaging findings  of series 3 image 79 on the current exam.  APPENDIX: Unremarkable.     BLADDER/REPRODUCTIVE ORGANS: No mass or calculus.     BONES/RETROPERITONEUM: No destructive bone lesion. . IVC filter in place. Left  iliac vein stent. The abdominal aorta is normal in caliber. No aneurysm. No  fracture. No retroperitoneal adenopathy. Trace ascites is resolved. Peritoneal  enhancement has resolved. No focal nodular enhancement or omental implant is  demonstrated. Subcutaneous venous collaterals. Status post hysterectomy. No  external iliac adenopathy. No pelvic adenopathy.     IMPRESSION:   Resolved ascites and peritoneal thickening/ enhancement. Areas of thickened  peritoneum/soft tissue density identified on the PET examination of 2/20/2017  are not demonstrated on the current study. No focal nodular implants suggestive of peritoneal carcinomatosis.     No other significant interval change. IMPRESSION/PLAN:  Marcia Martinez is a 46 y.o. female with a diagnosis of stage IIIC ovarian cancer. She underwent tumor debulking followed by 6 cycles of Taxol and Carboplatin chemotherapy, which she completed approximately 18 months ago. She now has evidence of recurrent disease based upon symptoms, elevated CA-125, CT and PET/CT findings. She is now receiving Gemzar/Carboplatin and has completed 5 cycles. Her CA-125 has responded well, and is now back to her baseline. We reviewed the CT results together. She has no evidence of residual disease. We discussed our options at this point and I recommended maintenance therapy with Pedro Luis Abts. We reviewed the toxicities and I answered their questions. They wish to proceed with therapy at this time.         Signed By: Julius Jones MD     6/13/2017/3:54 PM

## 2017-06-22 ENCOUNTER — TELEPHONE (OUTPATIENT)
Dept: GYNECOLOGY | Age: 52
End: 2017-06-22

## 2017-06-22 NOTE — TELEPHONE ENCOUNTER
Pt called complaining of a headache. She said it has now been almost every day. Says it is much worse when she has been reading or doing computer work and seems to be better just \"not using my eyes\". I reviewed that many times eyeglass prescription can change with the stress of chemotherapy. She agreed that she felt it was related to eye strain. Suggested trying Excedrin Migraine as directed, applying ice packs to her forehead and resting her eyes. Also suggested she make an appointment with her opthamologist soon and having her prescription evaluated. She said she would do this today. Also reviewed with her that if she felt the headache was getting worse or if she had any visual changes, dizziness, syncope or N/V associated with it, call our office or to proceed to the ER for further evaluation. She will call back in a few days to let us know how she is doing or sooner if needed.

## 2017-06-26 DIAGNOSIS — C56.3 MALIGNANT NEOPLASM OF BOTH OVARIES (HCC): Primary | ICD-10-CM

## 2017-07-03 ENCOUNTER — APPOINTMENT (OUTPATIENT)
Dept: INFUSION THERAPY | Age: 52
End: 2017-07-03
Payer: COMMERCIAL

## 2017-07-06 LAB
ALBUMIN SERPL-MCNC: 4.6 G/DL (ref 3.5–5.5)
ALBUMIN/GLOB SERPL: 1.8 {RATIO} (ref 1.2–2.2)
ALP SERPL-CCNC: 88 IU/L (ref 39–117)
ALT SERPL-CCNC: 25 IU/L (ref 0–32)
AST SERPL-CCNC: 25 IU/L (ref 0–40)
BASOPHILS # BLD AUTO: 0.1 X10E3/UL (ref 0–0.2)
BASOPHILS NFR BLD AUTO: 2 %
BILIRUB SERPL-MCNC: 0.2 MG/DL (ref 0–1.2)
BUN SERPL-MCNC: 16 MG/DL (ref 6–24)
BUN/CREAT SERPL: 19 (ref 9–23)
CALCIUM SERPL-MCNC: 9.9 MG/DL (ref 8.7–10.2)
CANCER AG125 SERPL-ACNC: 10.6 U/ML (ref 0–38.1)
CHLORIDE SERPL-SCNC: 102 MMOL/L (ref 96–106)
CO2 SERPL-SCNC: 23 MMOL/L (ref 18–29)
CREAT SERPL-MCNC: 0.86 MG/DL (ref 0.57–1)
EOSINOPHIL # BLD AUTO: 0.1 X10E3/UL (ref 0–0.4)
EOSINOPHIL NFR BLD AUTO: 1 %
ERYTHROCYTE [DISTWIDTH] IN BLOOD BY AUTOMATED COUNT: 17.2 % (ref 12.3–15.4)
GLOBULIN SER CALC-MCNC: 2.6 G/DL (ref 1.5–4.5)
GLUCOSE SERPL-MCNC: 81 MG/DL (ref 65–99)
HCT VFR BLD AUTO: 31.7 % (ref 34–46.6)
HGB BLD-MCNC: 9.8 G/DL (ref 11.1–15.9)
IMM GRANULOCYTES # BLD: 0 X10E3/UL (ref 0–0.1)
IMM GRANULOCYTES NFR BLD: 1 %
LYMPHOCYTES # BLD AUTO: 1.7 X10E3/UL (ref 0.7–3.1)
LYMPHOCYTES NFR BLD AUTO: 38 %
MAGNESIUM SERPL-MCNC: 1.9 MG/DL (ref 1.6–2.3)
MCH RBC QN AUTO: 33.8 PG (ref 26.6–33)
MCHC RBC AUTO-ENTMCNC: 30.9 G/DL (ref 31.5–35.7)
MCV RBC AUTO: 109 FL (ref 79–97)
MONOCYTES # BLD AUTO: 0.7 X10E3/UL (ref 0.1–0.9)
MONOCYTES NFR BLD AUTO: 16 %
NEUTROPHILS # BLD AUTO: 1.9 X10E3/UL (ref 1.4–7)
NEUTROPHILS NFR BLD AUTO: 42 %
PLATELET # BLD AUTO: 412 X10E3/UL (ref 150–379)
POTASSIUM SERPL-SCNC: 4.6 MMOL/L (ref 3.5–5.2)
PROT SERPL-MCNC: 7.2 G/DL (ref 6–8.5)
RBC # BLD AUTO: 2.9 X10E6/UL (ref 3.77–5.28)
SODIUM SERPL-SCNC: 142 MMOL/L (ref 134–144)
WBC # BLD AUTO: 4.5 X10E3/UL (ref 3.4–10.8)

## 2017-07-07 ENCOUNTER — TELEPHONE (OUTPATIENT)
Dept: GYNECOLOGY | Age: 52
End: 2017-07-07

## 2017-07-17 ENCOUNTER — TELEPHONE (OUTPATIENT)
Dept: GYNECOLOGY | Age: 52
End: 2017-07-17

## 2017-07-17 NOTE — TELEPHONE ENCOUNTER
Pt calling with c/o vomiting all night. She feels it was take out [place] food from last night. No nausea or diarrhea, but now has migraine this AM.  She was to start Alfornia Derek today, but requesting to hold off until she is feeling better. Per Dr. Sienna Mijares this is fine, and she will call at end of week with follow up.

## 2017-07-21 ENCOUNTER — TELEPHONE (OUTPATIENT)
Dept: GYNECOLOGY | Age: 52
End: 2017-07-21

## 2017-07-21 RX ORDER — NIRAPARIB 100 MG/1
200 CAPSULE ORAL
COMMUNITY
Start: 2017-06-20 | End: 2017-10-11 | Stop reason: SINTOL

## 2017-07-21 NOTE — TELEPHONE ENCOUNTER
Pt calling to state is feeling recovered and would like to start her Lynda Harada on 7/22/17 with repeat labs on 7/31/17. Baseline labs were drawn on 7/5/17. Per vo Dr. Evangelina Waite, she may start Lynda Harada on 7/22/17.

## 2017-07-25 DIAGNOSIS — C56.9 OVARIAN CANCER, UNSPECIFIED LATERALITY (HCC): ICD-10-CM

## 2017-07-25 DIAGNOSIS — Z15.01 BRCA1 POSITIVE: ICD-10-CM

## 2017-07-25 DIAGNOSIS — Z15.09 BRCA1 POSITIVE: ICD-10-CM

## 2017-07-25 DIAGNOSIS — R11.2 CINV (CHEMOTHERAPY-INDUCED NAUSEA AND VOMITING): ICD-10-CM

## 2017-07-25 DIAGNOSIS — T45.1X5A CINV (CHEMOTHERAPY-INDUCED NAUSEA AND VOMITING): ICD-10-CM

## 2017-07-26 ENCOUNTER — TELEPHONE (OUTPATIENT)
Dept: GYNECOLOGY | Age: 52
End: 2017-07-26

## 2017-07-26 RX ORDER — ONDANSETRON 4 MG/1
TABLET, FILM COATED ORAL
Qty: 20 TAB | Refills: 2 | Status: SHIPPED | OUTPATIENT
Start: 2017-07-26 | End: 2017-01-01 | Stop reason: SDUPTHER

## 2017-07-26 NOTE — TELEPHONE ENCOUNTER
Pt called to say she began her Zejula on July 22nd. Pt states she seems to have more and more side effects every day. First it was some nausea, then headache, then sleeplessness and now \"really bad stomach pain\". Pt says the symptoms start within an hour or two of taking the medication. She has been taking Zofran with the Primus Nan, and says this helps \"some\" but is feeling like she is getting \"chemo every day now with the bad side effects\". We discussed some strategies. She will take the Zofran 30 minuts prior to Primus Nan. She will continue taking it at night with food  We will lower the dose to 200 mg daily. She will continue with increase hydration    I asked her to keep us informed of how she is doing. She said she would.

## 2017-07-31 ENCOUNTER — TELEPHONE (OUTPATIENT)
Dept: GYNECOLOGY | Age: 52
End: 2017-07-31

## 2017-07-31 DIAGNOSIS — C56.9 MALIGNANT NEOPLASM OF OVARY, UNSPECIFIED LATERALITY (HCC): Primary | ICD-10-CM

## 2017-07-31 RX ORDER — LORAZEPAM 1 MG/1
TABLET ORAL
Qty: 30 TAB | Refills: 1 | Status: SHIPPED | OUTPATIENT
Start: 2017-07-31 | End: 2017-09-28 | Stop reason: SDUPTHER

## 2017-07-31 NOTE — TELEPHONE ENCOUNTER
Pt called to say the side effects of the Wyattetta Rumpf are causing her a lot of problems especially GI issue. She did lower the dose to 200 mg last Friday and says her stomach pain has continued, but does admit she thinks it is a little better. She says the nausea persist and now the Zofran is not \"really helping\" and is causing her a lot of constipation. We discussed taking the Zejula before bed with food and moving her Xarelto to the morning. She said she would try this and let me know. She also had her labs drawn today and they have not resulted yet. We will talk tomorrow and review labs and how she is feeling. I did offer her coompazine/phenergan but she said she did not want that yet. She would let me know.

## 2017-08-01 ENCOUNTER — TELEPHONE (OUTPATIENT)
Dept: GYNECOLOGY | Age: 52
End: 2017-08-01

## 2017-08-01 ENCOUNTER — HOSPITAL ENCOUNTER (OUTPATIENT)
Dept: INFUSION THERAPY | Age: 52
Discharge: HOME | End: 2017-08-01
Payer: COMMERCIAL

## 2017-08-01 VITALS
TEMPERATURE: 96.9 F | RESPIRATION RATE: 18 BRPM | HEART RATE: 79 BPM | SYSTOLIC BLOOD PRESSURE: 105 MMHG | DIASTOLIC BLOOD PRESSURE: 62 MMHG

## 2017-08-01 LAB
ALBUMIN SERPL-MCNC: 4.7 G/DL (ref 3.5–5.5)
ALBUMIN/GLOB SERPL: 1.9 {RATIO} (ref 1.2–2.2)
ALP SERPL-CCNC: 87 IU/L (ref 39–117)
ALT SERPL-CCNC: 17 IU/L (ref 0–32)
AST SERPL-CCNC: 22 IU/L (ref 0–40)
BASOPHILS # BLD AUTO: 0 X10E3/UL (ref 0–0.2)
BASOPHILS NFR BLD AUTO: 1 %
BILIRUB SERPL-MCNC: 0.3 MG/DL (ref 0–1.2)
BUN SERPL-MCNC: 15 MG/DL (ref 6–24)
BUN/CREAT SERPL: 12 (ref 9–23)
CALCIUM SERPL-MCNC: 9.7 MG/DL (ref 8.7–10.2)
CANCER AG125 SERPL-ACNC: 9.5 U/ML (ref 0–38.1)
CHLORIDE SERPL-SCNC: 100 MMOL/L (ref 96–106)
CO2 SERPL-SCNC: 25 MMOL/L (ref 18–29)
CREAT SERPL-MCNC: 1.29 MG/DL (ref 0.57–1)
EOSINOPHIL # BLD AUTO: 0.2 X10E3/UL (ref 0–0.4)
EOSINOPHIL NFR BLD AUTO: 4 %
ERYTHROCYTE [DISTWIDTH] IN BLOOD BY AUTOMATED COUNT: 13.9 % (ref 12.3–15.4)
GLOBULIN SER CALC-MCNC: 2.5 G/DL (ref 1.5–4.5)
GLUCOSE SERPL-MCNC: 103 MG/DL (ref 65–99)
HCT VFR BLD AUTO: 35.5 % (ref 34–46.6)
HGB BLD-MCNC: 11.6 G/DL (ref 11.1–15.9)
IMM GRANULOCYTES # BLD: 0 X10E3/UL (ref 0–0.1)
IMM GRANULOCYTES NFR BLD: 0 %
LYMPHOCYTES # BLD AUTO: 0.9 X10E3/UL (ref 0.7–3.1)
LYMPHOCYTES NFR BLD AUTO: 17 %
MAGNESIUM SERPL-MCNC: 2.1 MG/DL (ref 1.6–2.3)
MCH RBC QN AUTO: 33.6 PG (ref 26.6–33)
MCHC RBC AUTO-ENTMCNC: 32.7 G/DL (ref 31.5–35.7)
MCV RBC AUTO: 103 FL (ref 79–97)
MONOCYTES # BLD AUTO: 0.5 X10E3/UL (ref 0.1–0.9)
MONOCYTES NFR BLD AUTO: 9 %
NEUTROPHILS # BLD AUTO: 3.8 X10E3/UL (ref 1.4–7)
NEUTROPHILS NFR BLD AUTO: 69 %
PLATELET # BLD AUTO: 146 X10E3/UL (ref 150–379)
POTASSIUM SERPL-SCNC: 4.5 MMOL/L (ref 3.5–5.2)
PROT SERPL-MCNC: 7.2 G/DL (ref 6–8.5)
RBC # BLD AUTO: 3.45 X10E6/UL (ref 3.77–5.28)
SODIUM SERPL-SCNC: 141 MMOL/L (ref 134–144)
WBC # BLD AUTO: 5.5 X10E3/UL (ref 3.4–10.8)

## 2017-08-01 PROCEDURE — 77030012965 HC NDL HUBR BBMI -A

## 2017-08-01 PROCEDURE — 96360 HYDRATION IV INFUSION INIT: CPT

## 2017-08-01 PROCEDURE — 74011000250 HC RX REV CODE- 250: Performed by: OBSTETRICS & GYNECOLOGY

## 2017-08-01 PROCEDURE — 74011250636 HC RX REV CODE- 250/636: Performed by: OBSTETRICS & GYNECOLOGY

## 2017-08-01 RX ORDER — SODIUM CHLORIDE 0.9 % (FLUSH) 0.9 %
10-40 SYRINGE (ML) INJECTION AS NEEDED
Status: ACTIVE | OUTPATIENT
Start: 2017-08-01 | End: 2017-08-02

## 2017-08-01 RX ORDER — SODIUM CHLORIDE 9 MG/ML
1000 INJECTION, SOLUTION INTRAVENOUS ONCE
Status: COMPLETED | OUTPATIENT
Start: 2017-08-01 | End: 2017-08-01

## 2017-08-01 RX ORDER — HEPARIN 100 UNIT/ML
500 SYRINGE INTRAVENOUS AS NEEDED
Status: ACTIVE | OUTPATIENT
Start: 2017-08-01 | End: 2017-08-02

## 2017-08-01 RX ORDER — SODIUM CHLORIDE 9 MG/ML
10 INJECTION INTRAMUSCULAR; INTRAVENOUS; SUBCUTANEOUS AS NEEDED
Status: ACTIVE | OUTPATIENT
Start: 2017-08-01 | End: 2017-08-02

## 2017-08-01 RX ADMIN — Medication 500 UNITS: at 16:11

## 2017-08-01 RX ADMIN — SODIUM CHLORIDE 1000 ML: 900 INJECTION, SOLUTION INTRAVENOUS at 15:10

## 2017-08-01 RX ADMIN — Medication 20 ML: at 16:11

## 2017-08-01 RX ADMIN — SODIUM CHLORIDE 10 ML: 9 INJECTION INTRAMUSCULAR; INTRAVENOUS; SUBCUTANEOUS at 15:10

## 2017-08-01 NOTE — TELEPHONE ENCOUNTER
Called pt to given lab results that were drawn yesterday, and d/t elevated creatinine Dr. Roland Silva has ordered 1 L NS hydration to be given today at 3pm.  Pt states she is feeling better without nausea this AM.  Will cancel port flush scheduled for 8/3/17, and have labs repeated on 8/7/17.

## 2017-08-02 ENCOUNTER — TELEPHONE (OUTPATIENT)
Dept: GYNECOLOGY | Age: 52
End: 2017-08-02

## 2017-08-02 NOTE — TELEPHONE ENCOUNTER
Pt calling with c/o port area that was accessed yesterday, continued to have a drip of blood for several hours afterwards. She states nothing out of the ordinary happened during hydration yesterday, but the \"hole\" looks a little bigger. We will have her get stat labs 8/4/17, and come by office to look at it, if she continues to have concerns. Reviewed bleeding and infection precautions with pt.

## 2017-08-03 ENCOUNTER — APPOINTMENT (OUTPATIENT)
Dept: INFUSION THERAPY | Age: 52
End: 2017-08-03
Payer: COMMERCIAL

## 2017-08-04 ENCOUNTER — TELEPHONE (OUTPATIENT)
Dept: GYNECOLOGY | Age: 52
End: 2017-08-04

## 2017-08-04 LAB
ALBUMIN SERPL-MCNC: 4.5 G/DL (ref 3.5–5.5)
ALBUMIN/GLOB SERPL: 2 {RATIO} (ref 1.2–2.2)
ALP SERPL-CCNC: 87 IU/L (ref 39–117)
ALT SERPL-CCNC: 17 IU/L (ref 0–32)
AST SERPL-CCNC: 21 IU/L (ref 0–40)
BASOPHILS # BLD AUTO: 0.1 X10E3/UL (ref 0–0.2)
BASOPHILS NFR BLD AUTO: 1 %
BILIRUB SERPL-MCNC: 0.2 MG/DL (ref 0–1.2)
BUN SERPL-MCNC: 13 MG/DL (ref 6–24)
BUN/CREAT SERPL: 13 (ref 9–23)
CALCIUM SERPL-MCNC: 9.5 MG/DL (ref 8.7–10.2)
CANCER AG125 SERPL-ACNC: 9.3 U/ML (ref 0–38.1)
CHLORIDE SERPL-SCNC: 101 MMOL/L (ref 96–106)
CO2 SERPL-SCNC: 24 MMOL/L (ref 18–29)
CREAT SERPL-MCNC: 1.01 MG/DL (ref 0.57–1)
EOSINOPHIL # BLD AUTO: 0.2 X10E3/UL (ref 0–0.4)
EOSINOPHIL NFR BLD AUTO: 4 %
ERYTHROCYTE [DISTWIDTH] IN BLOOD BY AUTOMATED COUNT: 13.7 % (ref 12.3–15.4)
GLOBULIN SER CALC-MCNC: 2.3 G/DL (ref 1.5–4.5)
GLUCOSE SERPL-MCNC: 93 MG/DL (ref 65–99)
HCT VFR BLD AUTO: 35 % (ref 34–46.6)
HGB BLD-MCNC: 11 G/DL (ref 11.1–15.9)
IMM GRANULOCYTES # BLD: 0 X10E3/UL (ref 0–0.1)
IMM GRANULOCYTES NFR BLD: 0 %
LYMPHOCYTES # BLD AUTO: 1.2 X10E3/UL (ref 0.7–3.1)
LYMPHOCYTES NFR BLD AUTO: 27 %
MAGNESIUM SERPL-MCNC: 2.1 MG/DL (ref 1.6–2.3)
MCH RBC QN AUTO: 33.4 PG (ref 26.6–33)
MCHC RBC AUTO-ENTMCNC: 31.4 G/DL (ref 31.5–35.7)
MCV RBC AUTO: 106 FL (ref 79–97)
MONOCYTES # BLD AUTO: 0.3 X10E3/UL (ref 0.1–0.9)
MONOCYTES NFR BLD AUTO: 7 %
MORPHOLOGY BLD-IMP: ABNORMAL
NEUTROPHILS # BLD AUTO: 2.8 X10E3/UL (ref 1.4–7)
NEUTROPHILS NFR BLD AUTO: 61 %
PLATELET # BLD AUTO: 93 X10E3/UL (ref 150–379)
POTASSIUM SERPL-SCNC: 4.1 MMOL/L (ref 3.5–5.2)
PROT SERPL-MCNC: 6.8 G/DL (ref 6–8.5)
RBC # BLD AUTO: 3.29 X10E6/UL (ref 3.77–5.28)
SODIUM SERPL-SCNC: 140 MMOL/L (ref 134–144)
WBC # BLD AUTO: 4.6 X10E3/UL (ref 3.4–10.8)

## 2017-08-04 NOTE — TELEPHONE ENCOUNTER
Abnormal platelet count of 93 called to pt. Per annabelle Deluna Course NP pt was informed to stop Zejula today and have labs repeated next week. Bleeding precautions reviewed with pt. Pt understands and agrees.

## 2017-08-11 ENCOUNTER — TELEPHONE (OUTPATIENT)
Dept: GYNECOLOGY | Age: 52
End: 2017-08-11

## 2017-08-11 LAB
ALBUMIN SERPL-MCNC: 4.6 G/DL (ref 3.5–5.5)
ALBUMIN/GLOB SERPL: 1.7 {RATIO} (ref 1.2–2.2)
ALP SERPL-CCNC: 100 IU/L (ref 39–117)
ALT SERPL-CCNC: 21 IU/L (ref 0–32)
AST SERPL-CCNC: 19 IU/L (ref 0–40)
BASOPHILS # BLD AUTO: 0 X10E3/UL (ref 0–0.2)
BASOPHILS NFR BLD AUTO: 0 %
BILIRUB SERPL-MCNC: 0.3 MG/DL (ref 0–1.2)
BUN SERPL-MCNC: 14 MG/DL (ref 6–24)
BUN/CREAT SERPL: 15 (ref 9–23)
CALCIUM SERPL-MCNC: 9.9 MG/DL (ref 8.7–10.2)
CANCER AG125 SERPL-ACNC: 9.5 U/ML (ref 0–38.1)
CHLORIDE SERPL-SCNC: 101 MMOL/L (ref 96–106)
CO2 SERPL-SCNC: 24 MMOL/L (ref 18–29)
CREAT SERPL-MCNC: 0.96 MG/DL (ref 0.57–1)
EOSINOPHIL # BLD AUTO: 0.2 X10E3/UL (ref 0–0.4)
EOSINOPHIL NFR BLD AUTO: 5 %
ERYTHROCYTE [DISTWIDTH] IN BLOOD BY AUTOMATED COUNT: 13.9 % (ref 12.3–15.4)
GLOBULIN SER CALC-MCNC: 2.7 G/DL (ref 1.5–4.5)
GLUCOSE SERPL-MCNC: 110 MG/DL (ref 65–99)
HCT VFR BLD AUTO: 35.6 % (ref 34–46.6)
HGB BLD-MCNC: 11.4 G/DL (ref 11.1–15.9)
IMM GRANULOCYTES # BLD: 0 X10E3/UL (ref 0–0.1)
IMM GRANULOCYTES NFR BLD: 0 %
LYMPHOCYTES # BLD AUTO: 1 X10E3/UL (ref 0.7–3.1)
LYMPHOCYTES NFR BLD AUTO: 23 %
MAGNESIUM SERPL-MCNC: 2.1 MG/DL (ref 1.6–2.3)
MCH RBC QN AUTO: 33.1 PG (ref 26.6–33)
MCHC RBC AUTO-ENTMCNC: 32 G/DL (ref 31.5–35.7)
MCV RBC AUTO: 104 FL (ref 79–97)
MONOCYTES # BLD AUTO: 0.3 X10E3/UL (ref 0.1–0.9)
MONOCYTES NFR BLD AUTO: 6 %
MORPHOLOGY BLD-IMP: ABNORMAL
NEUTROPHILS # BLD AUTO: 2.9 X10E3/UL (ref 1.4–7)
NEUTROPHILS NFR BLD AUTO: 66 %
PLATELET # BLD AUTO: 39 X10E3/UL (ref 150–379)
POTASSIUM SERPL-SCNC: 4.4 MMOL/L (ref 3.5–5.2)
PROT SERPL-MCNC: 7.3 G/DL (ref 6–8.5)
RBC # BLD AUTO: 3.44 X10E6/UL (ref 3.77–5.28)
SODIUM SERPL-SCNC: 142 MMOL/L (ref 134–144)
WBC # BLD AUTO: 4.4 X10E3/UL (ref 3.4–10.8)

## 2017-08-11 NOTE — TELEPHONE ENCOUNTER
Pt informed of low platelet count of 39 drawn on 8/10/17, and to continue to hold Leisa Bhakta and have labs repeated 8/15/17 per annabelle Mason NP.

## 2017-08-11 NOTE — PROGRESS NOTES
Platelet's noted and will hold [place] Saline Ben and re-check on Tuesday,8/15. Bleeding precautions reviewed again today.    Will evacuate if we re-start at 200 mg or decrease again to 100 mg daily

## 2017-08-12 ENCOUNTER — HOSPITAL ENCOUNTER (EMERGENCY)
Age: 52
Discharge: HOME | End: 2017-08-12
Attending: EMERGENCY MEDICINE
Payer: COMMERCIAL

## 2017-08-12 VITALS
TEMPERATURE: 98 F | BODY MASS INDEX: 22.76 KG/M2 | WEIGHT: 145 LBS | OXYGEN SATURATION: 99 % | HEIGHT: 67 IN | DIASTOLIC BLOOD PRESSURE: 61 MMHG | SYSTOLIC BLOOD PRESSURE: 116 MMHG | RESPIRATION RATE: 16 BRPM | HEART RATE: 70 BPM

## 2017-08-12 DIAGNOSIS — D69.6 THROMBOCYTOPENIA (HCC): Primary | ICD-10-CM

## 2017-08-12 LAB
ALBUMIN SERPL BCP-MCNC: 3.8 G/DL (ref 3.5–5)
ALBUMIN/GLOB SERPL: 1 {RATIO} (ref 1.1–2.2)
ALP SERPL-CCNC: 97 U/L (ref 45–117)
ALT SERPL-CCNC: 28 U/L (ref 12–78)
ANION GAP BLD CALC-SCNC: 6 MMOL/L (ref 5–15)
AST SERPL W P-5'-P-CCNC: 19 U/L (ref 15–37)
BASOPHILS # BLD AUTO: 0 K/UL (ref 0–0.1)
BASOPHILS # BLD: 0 % (ref 0–1)
BILIRUB SERPL-MCNC: 0.2 MG/DL (ref 0.2–1)
BUN SERPL-MCNC: 15 MG/DL (ref 6–20)
BUN/CREAT SERPL: 17 (ref 12–20)
CALCIUM SERPL-MCNC: 9.3 MG/DL (ref 8.5–10.1)
CHLORIDE SERPL-SCNC: 106 MMOL/L (ref 97–108)
CO2 SERPL-SCNC: 26 MMOL/L (ref 21–32)
CREAT SERPL-MCNC: 0.86 MG/DL (ref 0.55–1.02)
EOSINOPHIL # BLD: 0.2 K/UL (ref 0–0.4)
EOSINOPHIL NFR BLD: 5 % (ref 0–7)
ERYTHROCYTE [DISTWIDTH] IN BLOOD BY AUTOMATED COUNT: 13.4 % (ref 11.5–14.5)
GLOBULIN SER CALC-MCNC: 3.7 G/DL (ref 2–4)
GLUCOSE SERPL-MCNC: 96 MG/DL (ref 65–100)
HCT VFR BLD AUTO: 34.4 % (ref 35–47)
HGB BLD-MCNC: 11.2 G/DL (ref 11.5–16)
LYMPHOCYTES # BLD AUTO: 24 % (ref 12–49)
LYMPHOCYTES # BLD: 1.1 K/UL (ref 0.8–3.5)
MCH RBC QN AUTO: 33 PG (ref 26–34)
MCHC RBC AUTO-ENTMCNC: 32.6 G/DL (ref 30–36.5)
MCV RBC AUTO: 101.5 FL (ref 80–99)
MONOCYTES # BLD: 0.8 K/UL (ref 0–1)
MONOCYTES NFR BLD AUTO: 16 % (ref 5–13)
NEUTS SEG # BLD: 2.6 K/UL (ref 1.8–8)
NEUTS SEG NFR BLD AUTO: 55 % (ref 32–75)
PLATELET # BLD AUTO: 50 K/UL (ref 150–400)
POTASSIUM SERPL-SCNC: 4 MMOL/L (ref 3.5–5.1)
PROT SERPL-MCNC: 7.5 G/DL (ref 6.4–8.2)
RBC # BLD AUTO: 3.39 M/UL (ref 3.8–5.2)
SODIUM SERPL-SCNC: 138 MMOL/L (ref 136–145)
WBC # BLD AUTO: 4.8 K/UL (ref 3.6–11)

## 2017-08-12 PROCEDURE — 36415 COLL VENOUS BLD VENIPUNCTURE: CPT | Performed by: EMERGENCY MEDICINE

## 2017-08-12 PROCEDURE — 99283 EMERGENCY DEPT VISIT LOW MDM: CPT

## 2017-08-12 PROCEDURE — 80053 COMPREHEN METABOLIC PANEL: CPT | Performed by: EMERGENCY MEDICINE

## 2017-08-12 PROCEDURE — 85025 COMPLETE CBC W/AUTO DIFF WBC: CPT | Performed by: EMERGENCY MEDICINE

## 2017-08-12 NOTE — DISCHARGE INSTRUCTIONS
We hope that we have addressed all of your medical concerns. The examination and treatment you received in the Emergency Department were for an emergent problem and were not intended as complete care. It is important that you follow up with your healthcare provider(s) for ongoing care. If your symptoms worsen or do not improve as expected, and you are unable to reach your usual health care provider(s), you should return to the Emergency Department. Today's healthcare is undergoing tremendous change, and patient satisfaction surveys are one of the many tools to assess the quality of medical care. You may receive a survey from the [employer] organization regarding your experience in the Emergency Department. I hope that your experience has been completely positive, particularly the medical care that I provided. As such, please participate in the survey; anything less than excellent does not meet my expectations or intentions. [facility] and 8 [facility] participate in nationally recognized quality of care measures. If your blood pressure is greater than 120/80, as reported below, we urge that you seek medical care to address the potential of high blood pressure, commonly known as hypertension. Hypertension can be hereditary or can be caused by certain medical conditions, pain, stress, or \"white coat syndrome. \"       Please make an appointment with your health care provider(s) for follow up of your Emergency Department visit. VITALS:   Patient Vitals for the past 8 hrs:   Temp Pulse Resp BP SpO2   08/12/17 1300 - 70 16 116/61 99 %   08/12/17 1245 - - - 110/65 100 %   08/12/17 1049 97.6 °F (36.4 °C) (!) 101 18 110/65 98 %          Thank you for allowing us to provide you with medical care today. We realize that you have many choices for your emergency care needs. Please choose us in the future for any continued health care needs.       Regards, Joel Vasquez M.D. [address] [employer] [facility],[address], [address] [place] [address].   Office: [phone]            Recent Results (from the past 24 hour(s))   CBC WITH AUTOMATED DIFF    Collection Time: 08/12/17 11:57 AM   Result Value Ref Range    WBC 4.8 3.6 - 11.0 K/uL    RBC 3.39 (L) 3.80 - 5.20 M/uL    HGB 11.2 (L) 11.5 - 16.0 g/dL    HCT 34.4 (L) 35.0 - 47.0 %    .5 (H) 80.0 - 99.0 FL    MCH 33.0 26.0 - 34.0 PG    MCHC 32.6 30.0 - 36.5 g/dL    RDW 13.4 11.5 - 14.5 %    PLATELET 50 (L) 826 - 400 K/uL    NEUTROPHILS 55 32 - 75 %    LYMPHOCYTES 24 12 - 49 %    MONOCYTES 16 (H) 5 - 13 %    EOSINOPHILS 5 0 - 7 %    BASOPHILS 0 0 - 1 %    ABS. NEUTROPHILS 2.6 1.8 - 8.0 K/UL    ABS. LYMPHOCYTES 1.1 0.8 - 3.5 K/UL    ABS. MONOCYTES 0.8 0.0 - 1.0 K/UL    ABS. EOSINOPHILS 0.2 0.0 - 0.4 K/UL    ABS. BASOPHILS 0.0 0.0 - 0.1 K/UL   METABOLIC PANEL, COMPREHENSIVE    Collection Time: 08/12/17 11:57 AM   Result Value Ref Range    Sodium 138 136 - 145 mmol/L    Potassium 4.0 3.5 - 5.1 mmol/L    Chloride 106 97 - 108 mmol/L    CO2 26 21 - 32 mmol/L    Anion gap 6 5 - 15 mmol/L    Glucose 96 65 - 100 mg/dL    BUN 15 6 - 20 MG/DL    Creatinine 0.86 0.55 - 1.02 MG/DL    BUN/Creatinine ratio 17 12 - 20      GFR est AA >60 >60 ml/min/1.73m2    GFR est non-AA >60 >60 ml/min/1.73m2    Calcium 9.3 8.5 - 10.1 MG/DL    Bilirubin, total 0.2 0.2 - 1.0 MG/DL    ALT (SGPT) 28 12 - 78 U/L    AST (SGOT) 19 15 - 37 U/L    Alk. phosphatase 97 45 - 117 U/L    Protein, total 7.5 6.4 - 8.2 g/dL    Albumin 3.8 3.5 - 5.0 g/dL    Globulin 3.7 2.0 - 4.0 g/dL    A-G Ratio 1.0 (L) 1.1 - 2.2         No results found. Thrombocytopenia: Care Instructions  Your Care Instructions    Thrombocytopenia is a low number of platelets in the blood. Platelets are the cells that help blood clot. If you don't have enough of them, your blood cannot clot well. So it is harder to stop bleeding.   You may have low platelets because your bone marrow does not make them. Or your body's defenses (immune system) may destroy them. Having an enlarged spleen can also reduce the number of platelets in your blood. This is because they can get trapped in the enlarged spleen. Some diseases or medicines may also cause low platelets. But platelets may go back to normal levels if the disease is treated or the medicine is stopped. You may not need treatment if your problem is mild. If you do need treatment, you may have platelets added to your blood. Or you may get medicine to stop the loss of platelets or help your body make them. Follow-up care is a key part of your treatment and safety. Be sure to make and go to all appointments, and call your doctor if you are having problems. It's also a good idea to know your test results and keep a list of the medicines you take. How can you care for yourself at home? · Be safe with medicines. Take your medicines exactly as prescribed. Call your doctor if you think you are having a problem with your medicine. · Do not take aspirin or anti-inflammatory medicines unless your doctor says it is okay. Examples are ibuprofen (Advil, Motrin) and naproxen (Aleve). They may increase the risk of bleeding. · Avoid contact sports or activities that could cause you to fall. When should you call for help? Call 911 anytime you think you may need emergency care. For example, call if:  · You have symptoms of a stroke. These may include:  ¨ Sudden numbness, tingling, weakness, or loss of movement in your face, arm, or leg, especially on only one side of your body. ¨ Sudden vision changes. ¨ Sudden trouble speaking. ¨ Sudden confusion or trouble understanding simple statements. ¨ Sudden problems with walking or balance. ¨ A sudden, severe headache that is different from past headaches. Call your doctor now or seek immediate medical care if:  · You have any abnormal bleeding, such as:  ¨ Nosebleeds.   ¨ Vaginal bleeding that is different (heavier, more frequent, at a different time of the month) than what you are used to. ¨ Bloody or black stools, or rectal bleeding. ¨ Bloody or pink urine. · You have severe pain that does not get better. Watch closely for changes in your health, and be sure to contact your doctor if:  · You do not get better as expected. Where can you learn more? Go to http://ammon-kalli.info/. Enter X422 in the search box to learn more about \"Thrombocytopenia: Care Instructions. \"  Current as of: October 13, 2016  Content Version: 11.3  © 4977-2701 [employer]. Care instructions adapted under license by [employer] (which disclaims liability or warranty for this information). If you have questions about a medical condition or this instruction, always ask your healthcare professional. [address] any warranty or liability for your use of this information.

## 2017-08-12 NOTE — ED PROVIDER NOTES
HPI Comments: 46 y.o. female with past medical history significant for kidney stones, MS, hernia, thromboembolus, and ovarian cancer who presents from home for evaluation of low platelets. Patient is on a chemo maintenance drug for ovarian cancer that causes her platelets to be low. Her platelets dropped from 147,000 to 93,000 while she was on the drug. She stopped taking the drug for one week and had her platelets rechecked and they were at 39,000. Patient was sent here to have her platelets checked again to see if they are continuing to drop even after being taken off the drug. She reports increased fatigue and a rash on her lower extremities. She takes an anticoagulant. She denies bleeding from gums, vaginal bleeding, rectal bleeding, or hematuria. Patient does note some \"darker\" stools but would not say they are black. Her stool this morning was more brown. She takes a multivitamin and is unsure if it contains iron. There are no other acute medical concerns at this time. Social hx: Never smoker. No alcohol use. No illicit drug use. PCP: Jen Rutledge MD    Note written by Gemma Bazan, as dictated by Alem Barnett MD 11:10 AM      The history is provided by the patient.         Past Medical History:   Diagnosis Date    Anxiety     Calculus of kidney 1/13    right    Hernia, inguinal, left 2012    MS (multiple sclerosis) ([address] 1996    Nausea & vomiting     Ovarian cancer ([address] 3/2015    high grade, stage 3C papillary serous    Thromboembolus ([address] 8/2007    lower abdomen post fall       Past Surgical History:   Procedure Laterality Date    HX GYN  2009    endometrial ablation with punctured uterus    HX OTHER SURGICAL  8/2007    green filter    HX MARIEL AND BSO  3/2014    ovarian cancer         Family History:   Problem Relation Age of Onset    Cancer Paternal Grandmother      breast    Heart Disease Mother     Heart Disease Father        Social History     Social History    Marital status:      Spouse name: N/A    Number of children: N/A    Years of education: N/A     Occupational History    Not on file. Social History Main Topics    Smoking status: Never Smoker    Smokeless tobacco: Never Used    Alcohol use No    Drug use: No    Sexual activity: Not on file     Other Topics Concern    Not on file     Social History Narrative         ALLERGIES: Pcn [penicillins]    Review of Systems   Constitutional: Positive for fatigue. Negative for chills, diaphoresis and fever. HENT: Negative for congestion, postnasal drip, rhinorrhea and sore throat. Eyes: Negative for photophobia, discharge, redness and visual disturbance. Respiratory: Negative for cough, chest tightness, shortness of breath and wheezing. Cardiovascular: Negative for chest pain, palpitations and leg swelling. Gastrointestinal: Negative for abdominal distention, abdominal pain, anal bleeding, blood in stool, constipation, diarrhea, nausea and vomiting. Genitourinary: Negative for difficulty urinating, dysuria, frequency, hematuria and urgency. Musculoskeletal: Negative for arthralgias, back pain, joint swelling and myalgias. Skin: Positive for rash. Negative for color change. Neurological: Negative for dizziness, speech difficulty, weakness, light-headedness, numbness and headaches. Psychiatric/Behavioral: Negative for confusion. The patient is not nervous/anxious. All other systems reviewed and are negative. Vitals:    08/12/17 1049   BP: 110/65   Pulse: (!) 101   Resp: 18   Temp: 97.6 °F (36.4 °C)   SpO2: 98%   Weight: 65.8 kg (145 lb)   Height: 5' 7\" (1.702 m)            Physical Exam   Constitutional: She is oriented to person, place, and time. She appears well-developed and well-nourished. No distress. HENT:   Head: Normocephalic and atraumatic.    Right Ear: External ear normal.   Left Ear: External ear normal.   Nose: Nose normal.   Mouth/Throat: Oropharynx is clear and moist. Eyes: Conjunctivae and EOM are normal. Pupils are equal, round, and reactive to light. No scleral icterus. Neck: Normal range of motion. Neck supple. No JVD present. No tracheal deviation present. No thyromegaly present. Cardiovascular: Normal rate, regular rhythm and normal heart sounds. Exam reveals no gallop and no friction rub. No murmur heard. Pulmonary/Chest: Effort normal and breath sounds normal. No respiratory distress. She has no wheezes. She has no rales. She exhibits no tenderness. Abdominal: Soft. Bowel sounds are normal. She exhibits no distension and no mass. There is no tenderness. There is no rebound and no guarding. Musculoskeletal: Normal range of motion. She exhibits no edema or tenderness. Lymphadenopathy:     She has no cervical adenopathy. Neurological: She is alert and oriented to person, place, and time. She has normal strength. She displays no atrophy and no tremor. No cranial nerve deficit. She exhibits normal muscle tone. Coordination and gait normal.   Skin: Skin is warm and dry. Petechiae (lower extremities) noted. No rash noted. She is not diaphoretic. No erythema. Psychiatric: She has a normal mood and affect. Her behavior is normal. Judgment and thought content normal.   Nursing note and vitals reviewed. Note written by Gemma Bridges, as dictated by Papito Lilly MD 11:10 AM    MDM  Number of Diagnoses or Management Options  Diagnosis management comments: STRAIT  Impression: 60-year-old female with a history of ovarian cancer on chemotherapy agents is known to have thrombocytopenia and is here for reevaluation of same. Only physical stigmata of her thrombocytopenia is that she has petechiae in her lower extremities. Plan of care will be repeat CBC and we'll treat accordingly.     ED Course       Procedures

## 2017-08-16 ENCOUNTER — OFFICE VISIT (OUTPATIENT)
Dept: GYNECOLOGY | Age: 52
End: 2017-08-16

## 2017-08-16 VITALS
SYSTOLIC BLOOD PRESSURE: 122 MMHG | WEIGHT: 146.2 LBS | HEART RATE: 101 BPM | BODY MASS INDEX: 22.95 KG/M2 | HEIGHT: 67 IN | DIASTOLIC BLOOD PRESSURE: 64 MMHG

## 2017-08-16 DIAGNOSIS — D69.6 THROMBOCYTOPENIA (HCC): ICD-10-CM

## 2017-08-16 DIAGNOSIS — C56.3 MALIGNANT NEOPLASM OF BOTH OVARIES (HCC): ICD-10-CM

## 2017-08-16 DIAGNOSIS — Z15.01 BRCA1 POSITIVE: Primary | ICD-10-CM

## 2017-08-16 DIAGNOSIS — Z15.09 BRCA1 POSITIVE: Primary | ICD-10-CM

## 2017-08-16 DIAGNOSIS — Z79.01 CHRONIC ANTICOAGULATION: ICD-10-CM

## 2017-08-16 DIAGNOSIS — Z86.718 HISTORY OF BLOOD CLOTS: ICD-10-CM

## 2017-08-16 LAB
ALBUMIN SERPL-MCNC: 4.7 G/DL (ref 3.5–5.5)
ALBUMIN/GLOB SERPL: 1.8 {RATIO} (ref 1.2–2.2)
ALP SERPL-CCNC: 103 IU/L (ref 39–117)
ALT SERPL-CCNC: 21 IU/L (ref 0–32)
AST SERPL-CCNC: 24 IU/L (ref 0–40)
BASOPHILS # BLD AUTO: 0 X10E3/UL (ref 0–0.2)
BASOPHILS NFR BLD AUTO: 1 %
BILIRUB SERPL-MCNC: 0.2 MG/DL (ref 0–1.2)
BUN SERPL-MCNC: 13 MG/DL (ref 6–24)
BUN/CREAT SERPL: 15 (ref 9–23)
CALCIUM SERPL-MCNC: 9.8 MG/DL (ref 8.7–10.2)
CANCER AG125 SERPL-ACNC: 10.3 U/ML (ref 0–38.1)
CHLORIDE SERPL-SCNC: 101 MMOL/L (ref 96–106)
CO2 SERPL-SCNC: 24 MMOL/L (ref 18–29)
CREAT SERPL-MCNC: 0.85 MG/DL (ref 0.57–1)
EOSINOPHIL # BLD AUTO: 0.2 X10E3/UL (ref 0–0.4)
EOSINOPHIL NFR BLD AUTO: 6 %
ERYTHROCYTE [DISTWIDTH] IN BLOOD BY AUTOMATED COUNT: 14.2 % (ref 12.3–15.4)
GLOBULIN SER CALC-MCNC: 2.6 G/DL (ref 1.5–4.5)
GLUCOSE SERPL-MCNC: 61 MG/DL (ref 65–99)
HCT VFR BLD AUTO: 35.7 % (ref 34–46.6)
HGB BLD-MCNC: 11.3 G/DL (ref 11.1–15.9)
IMM GRANULOCYTES # BLD: 0 X10E3/UL (ref 0–0.1)
IMM GRANULOCYTES NFR BLD: 0 %
LYMPHOCYTES # BLD AUTO: 0.8 X10E3/UL (ref 0.7–3.1)
LYMPHOCYTES NFR BLD AUTO: 20 %
MAGNESIUM SERPL-MCNC: 2.1 MG/DL (ref 1.6–2.3)
MCH RBC QN AUTO: 32.2 PG (ref 26.6–33)
MCHC RBC AUTO-ENTMCNC: 31.7 G/DL (ref 31.5–35.7)
MCV RBC AUTO: 102 FL (ref 79–97)
MONOCYTES # BLD AUTO: 0.5 X10E3/UL (ref 0.1–0.9)
MONOCYTES NFR BLD AUTO: 13 %
NEUTROPHILS # BLD AUTO: 2.2 X10E3/UL (ref 1.4–7)
NEUTROPHILS NFR BLD AUTO: 60 %
PLATELET # BLD AUTO: 105 X10E3/UL (ref 150–379)
POTASSIUM SERPL-SCNC: 3.6 MMOL/L (ref 3.5–5.2)
PROT SERPL-MCNC: 7.3 G/DL (ref 6–8.5)
RBC # BLD AUTO: 3.51 X10E6/UL (ref 3.77–5.28)
SODIUM SERPL-SCNC: 143 MMOL/L (ref 134–144)
WBC # BLD AUTO: 3.8 X10E3/UL (ref 3.4–10.8)

## 2017-08-16 NOTE — PROGRESS NOTES
[address] [place] [address] [address]ias [address], [address] [facility]  [phone]  Mickiel Slade, MD Kendrick Najjar, MD  Office Note  Patient ID:  Name:  Zetta Lesch  MRN:  [id number]  :  1965/52 y.o. Date:  2017      HISTORY OF PRESENT ILLNESS:  Zetta Lesch is a 46 y.o.  postmenopausal female with stage IIIC ovarian cancer. She underwent X-lap, hysterectomy, and tumor debulking in 2015. She was recommended adjuvant chemotherapy with 6 cycles of Taxol and Carboplatin. Her post-treatment PET/CT was negative for disease. Her [facility] results also found her to have a deleterious BRCA 1 mutation. As for the BRCA 1 mutation, she was referred to one of our breast surgeons, Dr. Kaela Shore, to talk about options, specifically screening and prophylactic surgery. She is going to hold off on surgery for now. She has decided not to have her daughter tested at this time. She presented in December ahead of her scheduled visit complaining of left lower quadrant pain for several weeks. The pain was intermittent and crampy. She also had some pain with bowel movements. Certain foods also exacerbated it. I sent her for a CT of the abdomen/pelvis which showed no obvious disease. In late December she had a colonoscopy which could not be completed due to adhesions. A subsequent barium enema was negative. A subsequent CA-125 was mildly elevated at 55, up from 10 three months earlier. Her CA-125 is now up to 99 and her CT suggests recurrent disease, although not definitive for recurrence. Dr. Loreto Lucero then recommended a PET/CT which confirmed recurrence. They discussed chemotherapy for her recurrence. She was clearly platinum sensitive, therefore was recommended a platinum-based regimen, specifically, Gemzar/Carboplatin. 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   She also wanted to keep her hair.  We also discussed options for down the line, specifically a PARP inhibitor, since she is BRCA positive. She completed 5 cycles of Gemzar/Carbo. She had a lot of difficulty with the first cycle, but the last few cycles were easier. She has trouble sleeping with the steroids. Her CA-125 has responded well, and went back to baseline. She met with Dr. Caity Thompson in June to discuss the possibility of a PARP inhibitor and on July 22 nd, she began [place]  On July 28, she was having significant stomach pain and 'GI upsetment\" and her dose was lowered to 200 mg daily. She did do bettter on this, but on her labs on 8/4, she was noted with plt's of 93 and her [place] was held and labs re-checked on 8/10. At this time, plt's were 39. We continued to hold. She also was noted to have a rash on her lower legs. On August 12th, they began to rise to 50 and the rash began to improve. Yesterday, 8/15, they were now noted to be 105 and she is free of the rash  She presents today for follow up. Karuna Adams says she \"felt miserable on that drug\" and says she felt like she had her 6th round of chemotherapy. She says if this is how she is going to feel, she does not want to take it any more. ROS:    A comprehensive review of systems was negative except for that written in the History of Present Illness.  , 10 point ROS      Problem List:  Patient Active Problem List    Diagnosis Date Noted    Chemotherapy-induced neutropenia ([address] 04/06/2017    Thrombocytopenia (HCC) 03/17/2017    CINV (chemotherapy-induced nausea and vomiting) 03/17/2017    Dehydration 03/17/2017    BRCA1 positive 09/10/2015    Malignant neoplasm of both ovaries ([address] 04/09/2015    S/P total abdominal hysterectomy 03/18/2015    Pelvic mass 03/06/2015     PMH:  Past Medical History:   Diagnosis Date    Anxiety     Calculus of kidney 1/13    right    Hernia, inguinal, left 2012    MS (multiple sclerosis) (HCC) 1996    Nausea & vomiting     Ovarian [facility] ([facility] 3/2015    high grade, stage 3C papillary serous    Thromboembolus ([facility] 8/2007    lower abdomen post fall      PSH:  Past Surgical History:   Procedure Laterality Date    HX GYN  2009    endometrial ablation with punctured uterus    HX OTHER SURGICAL  8/2007    green filter    HX MARIEL AND BSO  3/2014    ovarian cancer      Social History:  Social History   Substance Use Topics    Smoking status: Never Smoker    Smokeless tobacco: Never Used    Alcohol use No      Family History:  Family History   Problem Relation Age of Onset    Cancer Paternal Grandmother      breast    Heart Disease Mother     Heart Disease Father       Medications: (reviewed)  Current Outpatient Prescriptions   Medication Sig    LORazepam (ATIVAN) 1 mg tablet TAKE 1 TABLET BY MOUTH AT BEDTIME. DO NOT EXCEED 4 TABS DAILY    ondansetron hcl (ZOFRAN) 4 mg tablet TAKE 1 TAB BY MOUTH EVERY EIGHT (8) HOURS AS NEEDED FOR NAUSEA.  ZEJULA 100 mg cap 200 mg.    ondansetron hcl (ZOFRAN) 4 mg tablet TAKE 1 TAB BY MOUTH EVERY EIGHT (8) HOURS AS NEEDED FOR NAUSEA.  gabapentin (NEURONTIN) 400 mg capsule TAKE 1 CAPSULE BY MOUTH 3 TIMES DAILY    ondansetron (ZOFRAN ODT) 4 mg disintegrating tablet TAKE 1 TABLET BY MOUTH EVERY 8 HOURS AS NEEDED FOR NAUSEA    dexamethasone (DECADRON) 4 mg tablet Take 1-2 tabs QAM with food the 2 days following chemo as needed    prochlorperazine (COMPAZINE) 10 mg tablet Take 1 Tab by mouth every six (6) hours as needed for Nausea. Indications: CANCER CHEMOTHERAPY-INDUCED NAUSEA AND VOMITING    XARELTO 20 mg tab tablet     amitriptyline (ELAVIL) 25 mg tablet Take 1 Tab by mouth nightly.  lidocaine-prilocaine (EMLA) topical cream Apply small amount to port area and cover with band aid 1 hour before chemo treatment     No current facility-administered medications for this visit.       Allergies: (reviewed)  Allergies   Allergen Reactions    Pcn [Penicillins] Rash          OBJECTIVE:  Visit Vitals    /64 (BP 1 Location: Left arm, BP Patient Position: Sitting)    Pulse (!) 101    Ht 5' 7.01\" (1.702 m)    Wt 146 lb 3.2 oz (66.3 kg)    LMP 02/02/2015 (Approximate)    BMI 22.89 kg/m2       Physical Exam:  GENERAL FILIBERTO: Conversant, alert, oriented. No acute distress. HEENT: No JVD. No adenopathy appreciated . RESPIRATORY: CTA bilat withotu wheezing or rales   CARDIOVASC: Regular without M/R/G   GASTROINT: Soft, NT/ND with + bowel sounds throughout. EXTREMITIES: With + pedal pulses bilat without edema or rash   PELVIC: Deferred   RECTAL: Deferred   NEURO: Grossly intact. No acute deficit. Lab Results   Component Value Date/Time    WBC 3.8 08/15/2017 08:56 AM    HGB 11.3 08/15/2017 08:56 AM    HCT 35.7 08/15/2017 08:56 AM    PLATELET 788 92/51/0912 08:56 AM     08/15/2017 08:56 AM     Lab Results   Component Value Date/Time    Sodium 143 08/15/2017 08:56 AM    Potassium 3.6 08/15/2017 08:56 AM    Chloride 101 08/15/2017 08:56 AM    CO2 24 08/15/2017 08:56 AM    Anion gap 6 08/12/2017 11:57 AM    Glucose 61 08/15/2017 08:56 AM    BUN 13 08/15/2017 08:56 AM    Creatinine 0.85 08/15/2017 08:56 AM    BUN/Creatinine ratio 15 08/15/2017 08:56 AM    GFR est AA 91 08/15/2017 08:56 AM    GFR est non-AA 79 08/15/2017 08:56 AM    Calcium 9.8 08/15/2017 08:56 AM       CT of abdomen/pelvis (6/12/17)  No pleural effusion at the lung bases. Trace pericardial fluid. Small hiatal hernia.     LIVER/GALLBLADDER: No mass or biliary dilatation. No evidence of serosal  implant. There is no intrahepatic duct dilatation. The CBD is not dilated. There  is no hepatic parenchymal mass. Hepatic enhancement pattern is within normal  limits. Gallbladder within normal limits by CT criteria.     SPLEEN/PANCREAS: No mass. There is no pancreatic duct dilatation. There is no  evidence of splenomegaly.     ADRENALS/KIDNEYS: Lam Croak is no adrenal mass.] There is no hydroureteronephrosis.   There is no renal or ureteral calculus. There is no renal mass. There is no  perinephric mass.     COLON AND SMALL BOWEL: No dilatation or wall thickening. There is no obstruction  or free intraperitoneal air. There is no evidence of incarceration or  obstruction. Paracolic gutter soft tissue is not demonstrated when compared to  the prior examination 2/20/2017   Series 3 image 225 as index focus is correlates to imaging findings at series 3  image 64 of the current study. Series 3 image 246 just to the right of midline correlates to imaging findings  of series 3 image 79 on the current exam.  APPENDIX: Unremarkable.     BLADDER/REPRODUCTIVE ORGANS: No mass or calculus.     BONES/RETROPERITONEUM: No destructive bone lesion. . IVC filter in place. Left  iliac vein stent. The abdominal aorta is normal in caliber. No aneurysm. No  fracture. No retroperitoneal adenopathy. Trace ascites is resolved. Peritoneal  enhancement has resolved. No focal nodular enhancement or omental implant is  demonstrated. Subcutaneous venous collaterals. Status post hysterectomy. No  external iliac adenopathy. No pelvic adenopathy.     IMPRESSION:   Resolved ascites and peritoneal thickening/ enhancement. Areas of thickened  peritoneum/soft tissue density identified on the PET examination of 2/20/2017  are not demonstrated on the current study. No focal nodular implants suggestive of peritoneal carcinomatosis.     No other significant interval change. IMPRESSION/PLAN:  Faiza Patel is a 46 y.o. female with a diagnosis of stage IIIC ovarian cancer. She underwent tumor debulking followed by 6 cycles of Taxol and Carboplatin chemotherapy, which she completed approximately 18 months ago. She now has evidence of recurrent disease based upon symptoms, elevated CA-125, CT and PET/CT findings. She completed 5 cycles of Gemzar/Carboplatin    Her CA-125 has responded well, and has remained at her baseline.     Pt, her  and I had a long discussion about Cindy Juan and how \"awful\" she felt on it. Due to her need to be on Xarelto (which we have been holding due to thrombocytopenia) for history of pelvic blood clot with kan filter in place, we will wait until platelets are at least 130 before beginning Xarelto again and then not re-start Zejula until they are at least 150. When we do re-start Cindy Gun[place], we will only begin at 100 mg daily. We discussed that if after she re-starts it and begins to feel poorly, we will stop it and consider just staying of all together and monitoring her regularly   Greater than 30 minutes spend in consultation regarding medication management and side effects.        Signed By: Roberto López NP     8/16/2017/3:54 PM

## 2017-08-16 NOTE — PATIENT INSTRUCTIONS
Thrombocytopenia: Care Instructions  Your Care Instructions    Thrombocytopenia is a low number of platelets in the blood. Platelets are the cells that help blood clot. If you don't have enough of them, your blood cannot clot well. So it is harder to stop bleeding. You may have low platelets because your bone marrow does not make them. Or your body's defenses (immune system) may destroy them. Having an enlarged spleen can also reduce the number of platelets in your blood. This is because they can get trapped in the enlarged spleen. Some diseases or medicines may also cause low platelets. But platelets may go back to normal levels if the disease is treated or the medicine is stopped. You may not need treatment if your problem is mild. If you do need treatment, you may have platelets added to your blood. Or you may get medicine to stop the loss of platelets or help your body make them. Follow-up care is a key part of your treatment and safety. Be sure to make and go to all appointments, and call your doctor if you are having problems. It's also a good idea to know your test results and keep a list of the medicines you take. How can you care for yourself at home? · Be safe with medicines. Take your medicines exactly as prescribed. Call your doctor if you think you are having a problem with your medicine. · Do not take aspirin or anti-inflammatory medicines unless your doctor says it is okay. Examples are ibuprofen (Advil, Motrin) and naproxen (Aleve). They may increase the risk of bleeding. · Avoid contact sports or activities that could cause you to fall. When should you call for help? Call 911 anytime you think you may need emergency care. For example, call if:  · You have symptoms of a stroke. These may include:  ¨ Sudden numbness, tingling, weakness, or loss of movement in your face, arm, or leg, especially on only one side of your body. ¨ Sudden vision changes.   ¨ Sudden trouble speaking. ¨ Sudden confusion or trouble understanding simple statements. ¨ Sudden problems with walking or balance. ¨ A sudden, severe headache that is different from past headaches. Call your doctor now or seek immediate medical care if:  · You have any abnormal bleeding, such as:  ¨ Nosebleeds. ¨ Vaginal bleeding that is different (heavier, more frequent, at a different time of the month) than what you are used to. ¨ Bloody or black stools, or rectal bleeding. ¨ Bloody or pink urine. · You have severe pain that does not get better. Watch closely for changes in your health, and be sure to contact your doctor if:  · You do not get better as expected. Where can you learn more? Go to http://ammon-kalli.info/. Enter E380 in the search box to learn more about \"Thrombocytopenia: Care Instructions. \"  Current as of: October 13, 2016  Content Version: 11.3  © 6934-8005 [employer]. Care instructions adapted under license by [employer] (which disclaims liability or warranty for this information). If you have questions about a medical condition or this instruction, always ask your healthcare professional. [place] [address] any warranty or liability for your use of this information.

## 2017-08-22 LAB
ALBUMIN SERPL-MCNC: 4.4 G/DL (ref 3.5–5.5)
ALBUMIN/GLOB SERPL: 1.6 {RATIO} (ref 1.2–2.2)
ALP SERPL-CCNC: 110 IU/L (ref 39–117)
ALT SERPL-CCNC: 25 IU/L (ref 0–32)
AST SERPL-CCNC: 22 IU/L (ref 0–40)
BASOPHILS # BLD AUTO: 0 X10E3/UL (ref 0–0.2)
BASOPHILS NFR BLD AUTO: 1 %
BILIRUB SERPL-MCNC: 0.2 MG/DL (ref 0–1.2)
BUN SERPL-MCNC: 16 MG/DL (ref 6–24)
BUN/CREAT SERPL: 22 (ref 9–23)
CALCIUM SERPL-MCNC: 9.6 MG/DL (ref 8.7–10.2)
CANCER AG125 SERPL-ACNC: 10.2 U/ML (ref 0–38.1)
CHLORIDE SERPL-SCNC: 99 MMOL/L (ref 96–106)
CO2 SERPL-SCNC: 24 MMOL/L (ref 18–29)
CREAT SERPL-MCNC: 0.72 MG/DL (ref 0.57–1)
EOSINOPHIL # BLD AUTO: 0.1 X10E3/UL (ref 0–0.4)
EOSINOPHIL NFR BLD AUTO: 3 %
ERYTHROCYTE [DISTWIDTH] IN BLOOD BY AUTOMATED COUNT: 13.9 % (ref 12.3–15.4)
GLOBULIN SER CALC-MCNC: 2.8 G/DL (ref 1.5–4.5)
GLUCOSE SERPL-MCNC: 99 MG/DL (ref 65–99)
HCT VFR BLD AUTO: 32.4 % (ref 34–46.6)
HGB BLD-MCNC: 10.7 G/DL (ref 11.1–15.9)
IMM GRANULOCYTES # BLD: 0 X10E3/UL (ref 0–0.1)
IMM GRANULOCYTES NFR BLD: 0 %
LYMPHOCYTES # BLD AUTO: 1.1 X10E3/UL (ref 0.7–3.1)
LYMPHOCYTES NFR BLD AUTO: 30 %
MAGNESIUM SERPL-MCNC: 2.2 MG/DL (ref 1.6–2.3)
MCH RBC QN AUTO: 32.9 PG (ref 26.6–33)
MCHC RBC AUTO-ENTMCNC: 33 G/DL (ref 31.5–35.7)
MCV RBC AUTO: 100 FL (ref 79–97)
MONOCYTES # BLD AUTO: 0.7 X10E3/UL (ref 0.1–0.9)
MONOCYTES NFR BLD AUTO: 18 %
NEUTROPHILS # BLD AUTO: 1.7 X10E3/UL (ref 1.4–7)
NEUTROPHILS NFR BLD AUTO: 48 %
PLATELET # BLD AUTO: 169 X10E3/UL (ref 150–379)
POTASSIUM SERPL-SCNC: 4.5 MMOL/L (ref 3.5–5.2)
PROT SERPL-MCNC: 7.2 G/DL (ref 6–8.5)
RBC # BLD AUTO: 3.25 X10E6/UL (ref 3.77–5.28)
SODIUM SERPL-SCNC: 139 MMOL/L (ref 134–144)
WBC # BLD AUTO: 3.6 X10E3/UL (ref 3.4–10.8)

## 2017-08-23 ENCOUNTER — TELEPHONE (OUTPATIENT)
Dept: GYNECOLOGY | Age: 52
End: 2017-08-23

## 2017-09-11 ENCOUNTER — TELEPHONE (OUTPATIENT)
Dept: GYNECOLOGY | Age: 52
End: 2017-09-11

## 2017-09-13 ENCOUNTER — OFFICE VISIT (OUTPATIENT)
Dept: GYNECOLOGY | Age: 52
End: 2017-09-13

## 2017-09-13 VITALS
DIASTOLIC BLOOD PRESSURE: 60 MMHG | WEIGHT: 145.8 LBS | HEART RATE: 73 BPM | SYSTOLIC BLOOD PRESSURE: 103 MMHG | BODY MASS INDEX: 22.88 KG/M2 | HEIGHT: 67 IN

## 2017-09-13 DIAGNOSIS — D69.6 THROMBOCYTOPENIA (HCC): Primary | ICD-10-CM

## 2017-09-13 DIAGNOSIS — Z15.01 BRCA1 POSITIVE: ICD-10-CM

## 2017-09-13 DIAGNOSIS — Z15.09 BRCA1 POSITIVE: ICD-10-CM

## 2017-09-13 DIAGNOSIS — C56.9 OVARIAN CANCER, UNSPECIFIED LATERALITY (HCC): ICD-10-CM

## 2017-09-13 NOTE — PROGRESS NOTES
[address] [place] [address] [address]ias [address], [address] [facility]  [phone]  MD Sharmaine Soriano MD  Office Note  Patient ID:  Name:  Annabella Hare  MRN:  [id number]  :  1965/52 y.o. Date:  2017      HISTORY OF PRESENT ILLNESS:  Annabella Hare is a 46 y.o.  postmenopausal female with stage IIIC ovarian cancer. She underwent X-lap, hysterectomy, and tumor debulking in 2015. She was recommended adjuvant chemotherapy with 6 cycles of Taxol and Carboplatin. Her post-treatment PET/CT was negative for disease. Her Myriad results also found her to have a deleterious BRCA 1 mutation. As for the BRCA 1 mutation, she was referred to one of our breast surgeons, Dr. Steve Garcia, to talk about options, specifically screening and prophylactic surgery. She is going to hold off on surgery for now. She has decided not to have her daughter tested at this time. She presented in December ahead of her scheduled visit complaining of left lower quadrant pain for several weeks. The pain was intermittent and crampy. She also had some pain with bowel movements. Certain foods also exacerbated it. I sent her for a CT of the abdomen/pelvis which showed no obvious disease. In late December she had a colonoscopy which could not be completed due to adhesions. A subsequent barium enema was negative. A subsequent CA-125 was mildly elevated at 55, up from 10 three months earlier. Her CA-125 is now up to 99 and her CT suggests recurrent disease, although not definitive for recurrence. Dr. Patricia Garcia then recommended a PET/CT which confirmed recurrence. They discussed chemotherapy for her recurrence. She was clearly platinum sensitive, therefore was recommended a platinum-based regimen, specifically, Gemzar/Carboplatin. 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   She also wanted to keep her hair.  We also discussed options for down the line, specifically a PARP inhibitor, since she is BRCA positive. She completed 5 cycles of Gemzar/Carbo. She had a lot of difficulty with the first cycle, but the last few cycles were easier. She has trouble sleeping with the steroids. Her CA-125 has responded well, and went back to baseline. She met with Dr. August Jiménez in June to discuss the possibility of a PARP inhibitor and on July 22 nd, she began [place]  On July 28, she was having significant stomach pain and 'GI upsetment\" and her dose was lowered to 200 mg daily. She did do bettter on this, but on her labs on 8/4, she was noted with plt's of 93 and her [place] was held and labs re-checked on 8/10. At this time, plt's were 39. We continued to hold. She also was noted to have a rash on her lower legs. On August 12th, they began to rise to 50 and the rash began to improve. On 8/15, they were noted to be rising and were 105 and she was free of the rash   When she presented on 8/16, she said she \"felt miserable on that drug\" and says she felt like she had her worst round of chemotherapy. She stated she did not want to take it any more or at least take a \"good break\". She now presents for follow up. She is a  at an elementary school and has just re-started the school year. She is feeling more tired than she expected and since stopping the Zejuls, feels she \"just can not get back into feeling like she has any energy. This is frustrating her. She says she tries and does swim on the weekends, but during the week, seems to only be able to go to work and come home, eat and go to bed. Says this \"is just not me\". She does deny any physical complaint, but says she \"feels like a failure for not being able to tolerate the Zejula\"  She says she knows there is nothing she could do about platelets, she is just concerned now her cancer will come back sooner. She became teary when discussing this. She is also frustrated her hair, although has grown back some, seems to have stopped growing and it is a very short hair style. ROS:    A comprehensive review of systems was negative except for that written in the History of Present Illness. , 10 point ROS      Problem List:  Patient Active Problem List    Diagnosis Date Noted    Chemotherapy-induced neutropenia ([facility] 04/06/2017    Thrombocytopenia (HCC) 03/17/2017    CINV (chemotherapy-induced nausea and vomiting) 03/17/2017    Dehydration 03/17/2017    BRCA1 positive 09/10/2015    Malignant neoplasm of both ovaries ([address] 04/09/2015    S/P total abdominal hysterectomy 03/18/2015    Pelvic mass 03/06/2015     PMH:  Past Medical History:   Diagnosis Date    Anxiety     Calculus of kidney 1/13    right    Hernia, inguinal, left 2012    MS (multiple sclerosis) ([facility] 1996    Nausea & vomiting     Ovarian cancer ([facility] 3/2015    high grade, stage 3C papillary serous    Thromboembolus ([facility] [facility] 8/2007    lower abdomen post fall      PSH:  Past Surgical History:   Procedure Laterality Date    HX GYN  2009    endometrial ablation with punctured uterus    HX OTHER SURGICAL  8/2007    green filter    HX MARIEL AND BSO  3/2014    ovarian cancer      Social History:  Social History   Substance Use Topics    Smoking status: Never Smoker    Smokeless tobacco: Never Used    Alcohol use No      Family History:  Family History   Problem Relation Age of Onset    Cancer Paternal Grandmother      breast    Heart Disease Mother     Heart Disease Father       Medications: (reviewed)  Current Outpatient Prescriptions   Medication Sig    LORazepam (ATIVAN) 1 mg tablet TAKE 1 TABLET BY MOUTH AT BEDTIME.  DO NOT EXCEED 4 TABS DAILY    XARELTO 20 mg tab tablet     lidocaine-prilocaine (EMLA) topical cream Apply small amount to port area and cover with band aid 1 hour before chemo treatment    ondansetron hcl (ZOFRAN) 4 mg tablet TAKE 1 TAB BY MOUTH EVERY EIGHT (8) HOURS AS NEEDED FOR NAUSEA.  ZEJULA 100 mg cap 200 mg.    ondansetron hcl (ZOFRAN) 4 mg tablet TAKE 1 TAB BY MOUTH EVERY EIGHT (8) HOURS AS NEEDED FOR NAUSEA.  gabapentin (NEURONTIN) 400 mg capsule TAKE 1 CAPSULE BY MOUTH 3 TIMES DAILY    ondansetron (ZOFRAN ODT) 4 mg disintegrating tablet TAKE 1 TABLET BY MOUTH EVERY 8 HOURS AS NEEDED FOR NAUSEA    dexamethasone (DECADRON) 4 mg tablet Take 1-2 tabs QAM with food the 2 days following chemo as needed    prochlorperazine (COMPAZINE) 10 mg tablet Take 1 Tab by mouth every six (6) hours as needed for Nausea. Indications: CANCER CHEMOTHERAPY-INDUCED NAUSEA AND VOMITING    amitriptyline (ELAVIL) 25 mg tablet Take 1 Tab by mouth nightly. No current facility-administered medications for this visit. Allergies: (reviewed)  Allergies   Allergen Reactions    Pcn [Penicillins] Rash          OBJECTIVE:  Visit Vitals    /60 (BP 1 Location: Right arm, BP Patient Position: Sitting)    Pulse 73    Ht 5' 7.01\" (1.702 m)    Wt 145 lb 12.8 oz (66.1 kg)    LMP 02/02/2015 (Approximate)    BMI 22.83 kg/m2       Physical Exam:  GENERAL FILIBERTO: Conversant, alert, oriented. No acute distress, but teary when talking about not being able to take the [facility]: Without redness, swelling or tenderness. HEENT: No JVD. No adenopathy appreciated . RESPIRATORY: CTA bilat withotu wheezing or rales   CARDIOVASC: Regular without M/R/G   GASTROINT: Soft, NT/ND with + bowel sounds throughout. EXTREMITIES: With + pedal pulses bilat without edema or rash   PELVIC: Deferred   RECTAL: Deferred   NEURO: Grossly intact. No acute deficit. Labs will be drawn today.      Lab Results   Component Value Date/Time    WBC 3.6 08/21/2017 04:02 PM    HGB 10.7 08/21/2017 04:02 PM    HCT 32.4 08/21/2017 04:02 PM    PLATELET 077 79/74/1150 04:02 PM     08/21/2017 04:02 PM     Lab Results   Component Value Date/Time    Sodium 139 08/21/2017 04:02 PM    Potassium 4.5 08/21/2017 04:02 PM    Chloride 99 08/21/2017 04:02 PM    CO2 24 08/21/2017 04:02 PM    Anion gap 6 08/12/2017 11:57 AM    Glucose 99 08/21/2017 04:02 PM    BUN 16 08/21/2017 04:02 PM    Creatinine 0.72 08/21/2017 04:02 PM    BUN/Creatinine ratio 22 08/21/2017 04:02 PM    GFR est  08/21/2017 04:02 PM    GFR est non-AA 97 08/21/2017 04:02 PM    Calcium 9.6 08/21/2017 04:02 PM       CT of abdomen/pelvis (6/12/17)  No pleural effusion at the lung bases. Trace pericardial fluid. Small hiatal hernia.     LIVER/GALLBLADDER: No mass or biliary dilatation. No evidence of serosal  implant. There is no intrahepatic duct dilatation. The CBD is not dilated. There  is no hepatic parenchymal mass. Hepatic enhancement pattern is within normal  limits. Gallbladder within normal limits by CT criteria.     SPLEEN/PANCREAS: No mass. There is no pancreatic duct dilatation. There is no  evidence of splenomegaly.     ADRENALS/KIDNEYS: Harpreet Sites is no adrenal mass.] There is no hydroureteronephrosis. There is no renal or ureteral calculus. There is no renal mass. There is no  perinephric mass.     COLON AND SMALL BOWEL: No dilatation or wall thickening. There is no obstruction  or free intraperitoneal air. There is no evidence of incarceration or  obstruction. Paracolic gutter soft tissue is not demonstrated when compared to  the prior examination 2/20/2017   Series 3 image 225 as index focus is correlates to imaging findings at series 3  image 64 of the current study. Series 3 image 246 just to the right of midline correlates to imaging findings  of series 3 image 79 on the current exam.  APPENDIX: Unremarkable.     BLADDER/REPRODUCTIVE ORGANS: No mass or calculus.     BONES/RETROPERITONEUM: No destructive bone lesion. . IVC filter in place. Left  iliac vein stent. The abdominal aorta is normal in caliber. No aneurysm. No  fracture. No retroperitoneal adenopathy. Trace ascites is resolved. Peritoneal  enhancement has resolved.  No focal nodular enhancement or omental implant is  demonstrated. Subcutaneous venous collaterals. Status post hysterectomy. No  external iliac adenopathy. No pelvic adenopathy.     IMPRESSION:   Resolved ascites and peritoneal thickening/ enhancement. Areas of thickened  peritoneum/soft tissue density identified on the PET examination of 2/20/2017  are not demonstrated on the current study. No focal nodular implants suggestive of peritoneal carcinomatosis.     No other significant interval change. IMPRESSION/PLAN:  Bertin Cheney is a 46 y.o. female with a diagnosis of stage IIIC ovarian cancer. She underwent tumor debulking followed by 6 cycles of Taxol and Carboplatin chemotherapy, which she completed approximately 18 months ago. She now has evidence of recurrent disease based upon symptoms, elevated CA-125, CT and PET/CT findings. She completed 5 cycles of Gemzar/Carboplatin    Her CA-125 has responded well, and has remained at her baseline. We will re-check today  Since pt's last visit, Earnest Kingstein (another PARB inhibitor) has been approved for maintance therapy. We discussed this at length and reviewed that it tends to affect platelets less than Leah Reel so it could be a very good option for her. Jesus Soriano would like to wait one more month to begin this as with the beginning of school, she would like to just get that behind her before trying something new. She was given patient information and will look it over. We will then discuss further either at her next visit or sooner if she desires. We also discussed trying to walk in her neighborhood at least 15 minutes each evening and maybe try to work up to 30 minutes M, W, F over time. She agreed she knew this would be good for her and would try it. She did tearfully revealed that her son just got engaged and they will  next Fall.   She and Dr. Valentino Esparza discussed that her ovarian cancer may reoccur at any time and she is scared she will not be in good shape if that happens. I discussed that that is ONLY if it reoccurs and that there is no predicting it will in that time frame. She has a day with her daughter planned in two weeks in [place] and is very much looking forward to this.     She was encouraged to call for any concerns or questions  Greater than 30 minutes was spent in face to face counseling/discussion      Signed By: Michelle Antoine NP     9/13/2017/3:54 PM

## 2017-09-13 NOTE — PROGRESS NOTES
Follow up for Roger Goode. Pt stopped taking the medication on 8/22/17 d/t side effects. The pt is no longer taking Neurontin, Elavil, Zofran, Compazine, and Decadron.

## 2017-09-13 NOTE — PATIENT INSTRUCTIONS

## 2017-09-14 LAB
ALBUMIN SERPL-MCNC: 4.6 G/DL (ref 3.5–5.5)
ALBUMIN/GLOB SERPL: 1.5 {RATIO} (ref 1.2–2.2)
ALP SERPL-CCNC: 88 IU/L (ref 39–117)
ALT SERPL-CCNC: 17 IU/L (ref 0–32)
AST SERPL-CCNC: 23 IU/L (ref 0–40)
BASOPHILS # BLD AUTO: 0 X10E3/UL (ref 0–0.2)
BASOPHILS NFR BLD AUTO: 1 %
BILIRUB SERPL-MCNC: <0.2 MG/DL (ref 0–1.2)
BUN SERPL-MCNC: 13 MG/DL (ref 6–24)
BUN/CREAT SERPL: 15 (ref 9–23)
CALCIUM SERPL-MCNC: 9.7 MG/DL (ref 8.7–10.2)
CANCER AG125 SERPL-ACNC: 10 U/ML (ref 0–38.1)
CHLORIDE SERPL-SCNC: 102 MMOL/L (ref 96–106)
CO2 SERPL-SCNC: 26 MMOL/L (ref 18–29)
CREAT SERPL-MCNC: 0.86 MG/DL (ref 0.57–1)
EOSINOPHIL # BLD AUTO: 0.1 X10E3/UL (ref 0–0.4)
EOSINOPHIL NFR BLD AUTO: 2 %
ERYTHROCYTE [DISTWIDTH] IN BLOOD BY AUTOMATED COUNT: 14.6 % (ref 12.3–15.4)
GLOBULIN SER CALC-MCNC: 3 G/DL (ref 1.5–4.5)
GLUCOSE SERPL-MCNC: 87 MG/DL (ref 65–99)
HCT VFR BLD AUTO: 35 % (ref 34–46.6)
HGB BLD-MCNC: 11.8 G/DL (ref 11.1–15.9)
IMM GRANULOCYTES # BLD: 0 X10E3/UL (ref 0–0.1)
IMM GRANULOCYTES NFR BLD: 0 %
LYMPHOCYTES # BLD AUTO: 1.6 X10E3/UL (ref 0.7–3.1)
LYMPHOCYTES NFR BLD AUTO: 42 %
MAGNESIUM SERPL-MCNC: 1.8 MG/DL (ref 1.6–2.3)
MCH RBC QN AUTO: 32.4 PG (ref 26.6–33)
MCHC RBC AUTO-ENTMCNC: 33.7 G/DL (ref 31.5–35.7)
MCV RBC AUTO: 96 FL (ref 79–97)
MONOCYTES # BLD AUTO: 0.5 X10E3/UL (ref 0.1–0.9)
MONOCYTES NFR BLD AUTO: 13 %
NEUTROPHILS # BLD AUTO: 1.7 X10E3/UL (ref 1.4–7)
NEUTROPHILS NFR BLD AUTO: 42 %
PLATELET # BLD AUTO: 178 X10E3/UL (ref 150–379)
POTASSIUM SERPL-SCNC: 4.4 MMOL/L (ref 3.5–5.2)
PROT SERPL-MCNC: 7.6 G/DL (ref 6–8.5)
RBC # BLD AUTO: 3.64 X10E6/UL (ref 3.77–5.28)
SODIUM SERPL-SCNC: 141 MMOL/L (ref 134–144)
WBC # BLD AUTO: 3.9 X10E3/UL (ref 3.4–10.8)

## 2017-09-28 ENCOUNTER — APPOINTMENT (OUTPATIENT)
Dept: INFUSION THERAPY | Age: 52
End: 2017-09-28
Payer: COMMERCIAL

## 2017-09-29 RX ORDER — LORAZEPAM 1 MG/1
TABLET ORAL
Qty: 30 TAB | Refills: 1 | Status: SHIPPED | OUTPATIENT
Start: 2017-09-29 | End: 2018-01-01 | Stop reason: SDUPTHER

## 2017-10-05 ENCOUNTER — HOSPITAL ENCOUNTER (OUTPATIENT)
Dept: INFUSION THERAPY | Age: 52
Discharge: HOME | End: 2017-10-05
Payer: COMMERCIAL

## 2017-10-05 VITALS
DIASTOLIC BLOOD PRESSURE: 64 MMHG | HEART RATE: 63 BPM | RESPIRATION RATE: 16 BRPM | TEMPERATURE: 98 F | SYSTOLIC BLOOD PRESSURE: 100 MMHG

## 2017-10-05 LAB
ALBUMIN SERPL-MCNC: 3.8 G/DL (ref 3.5–5)
ALBUMIN/GLOB SERPL: 1.2 {RATIO} (ref 1.1–2.2)
ALP SERPL-CCNC: 79 U/L (ref 45–117)
ALT SERPL-CCNC: 23 U/L (ref 12–78)
ANION GAP SERPL CALC-SCNC: 8 MMOL/L (ref 5–15)
AST SERPL-CCNC: 19 U/L (ref 15–37)
BASOPHILS # BLD: 0 K/UL (ref 0–0.1)
BASOPHILS NFR BLD: 1 % (ref 0–1)
BILIRUB SERPL-MCNC: 0.2 MG/DL (ref 0.2–1)
BUN SERPL-MCNC: 16 MG/DL (ref 6–20)
BUN/CREAT SERPL: 22 (ref 12–20)
CALCIUM SERPL-MCNC: 8.5 MG/DL (ref 8.5–10.1)
CHLORIDE SERPL-SCNC: 106 MMOL/L (ref 97–108)
CO2 SERPL-SCNC: 26 MMOL/L (ref 21–32)
CREAT SERPL-MCNC: 0.74 MG/DL (ref 0.55–1.02)
EOSINOPHIL # BLD: 0.1 K/UL (ref 0–0.4)
EOSINOPHIL NFR BLD: 2 % (ref 0–7)
ERYTHROCYTE [DISTWIDTH] IN BLOOD BY AUTOMATED COUNT: 14.2 % (ref 11.5–14.5)
GLOBULIN SER CALC-MCNC: 3.1 G/DL (ref 2–4)
GLUCOSE SERPL-MCNC: 94 MG/DL (ref 65–100)
HCT VFR BLD AUTO: 33.7 % (ref 35–47)
HGB BLD-MCNC: 10.9 G/DL (ref 11.5–16)
LYMPHOCYTES # BLD: 1.9 K/UL (ref 0.8–3.5)
LYMPHOCYTES NFR BLD: 44 % (ref 12–49)
MAGNESIUM SERPL-MCNC: 2.1 MG/DL (ref 1.6–2.4)
MCH RBC QN AUTO: 30.8 PG (ref 26–34)
MCHC RBC AUTO-ENTMCNC: 32.3 G/DL (ref 30–36.5)
MCV RBC AUTO: 95.2 FL (ref 80–99)
MONOCYTES # BLD: 0.4 K/UL (ref 0–1)
MONOCYTES NFR BLD: 10 % (ref 5–13)
NEUTS SEG # BLD: 1.9 K/UL (ref 1.8–8)
NEUTS SEG NFR BLD: 43 % (ref 32–75)
PLATELET # BLD AUTO: 135 K/UL (ref 150–400)
POTASSIUM SERPL-SCNC: 3.7 MMOL/L (ref 3.5–5.1)
PROT SERPL-MCNC: 6.9 G/DL (ref 6.4–8.2)
RBC # BLD AUTO: 3.54 M/UL (ref 3.8–5.2)
SODIUM SERPL-SCNC: 140 MMOL/L (ref 136–145)
WBC # BLD AUTO: 4.3 K/UL (ref 3.6–11)

## 2017-10-05 PROCEDURE — 85025 COMPLETE CBC W/AUTO DIFF WBC: CPT | Performed by: OBSTETRICS & GYNECOLOGY

## 2017-10-05 PROCEDURE — 86304 IMMUNOASSAY TUMOR CA 125: CPT | Performed by: OBSTETRICS & GYNECOLOGY

## 2017-10-05 PROCEDURE — 77030012965 HC NDL HUBR BBMI -A

## 2017-10-05 PROCEDURE — 74011250636 HC RX REV CODE- 250/636: Performed by: OBSTETRICS & GYNECOLOGY

## 2017-10-05 PROCEDURE — 83735 ASSAY OF MAGNESIUM: CPT | Performed by: OBSTETRICS & GYNECOLOGY

## 2017-10-05 PROCEDURE — 80053 COMPREHEN METABOLIC PANEL: CPT | Performed by: OBSTETRICS & GYNECOLOGY

## 2017-10-05 PROCEDURE — 74011000250 HC RX REV CODE- 250: Performed by: OBSTETRICS & GYNECOLOGY

## 2017-10-05 PROCEDURE — 36591 DRAW BLOOD OFF VENOUS DEVICE: CPT

## 2017-10-05 PROCEDURE — 36415 COLL VENOUS BLD VENIPUNCTURE: CPT | Performed by: OBSTETRICS & GYNECOLOGY

## 2017-10-05 RX ORDER — SODIUM CHLORIDE 0.9 % (FLUSH) 0.9 %
5-10 SYRINGE (ML) INJECTION AS NEEDED
Status: ACTIVE | OUTPATIENT
Start: 2017-10-05 | End: 2017-10-06

## 2017-10-05 RX ORDER — HEPARIN 100 UNIT/ML
500 SYRINGE INTRAVENOUS AS NEEDED
Status: ACTIVE | OUTPATIENT
Start: 2017-10-05 | End: 2017-10-06

## 2017-10-05 RX ORDER — SODIUM CHLORIDE 9 MG/ML
10 INJECTION INTRAMUSCULAR; INTRAVENOUS; SUBCUTANEOUS AS NEEDED
Status: ACTIVE | OUTPATIENT
Start: 2017-10-05 | End: 2017-10-06

## 2017-10-05 RX ADMIN — SODIUM CHLORIDE 10 ML: 9 INJECTION INTRAMUSCULAR; INTRAVENOUS; SUBCUTANEOUS at 17:16

## 2017-10-05 RX ADMIN — Medication 10 ML: at 17:16

## 2017-10-05 RX ADMIN — Medication 500 UNITS: at 17:16

## 2017-10-05 NOTE — PROGRESS NOTES
[facility] - Chemotherapy Progress Note    2933 Pt admit to [facility] for port flush/labs ambulatory in stable condition. Assessment completed. No new concerns voiced. Port accessed with positive blood return. Labs drawn per order and sent. Line flushed, heparinized and de-accessed. Visit Vitals    /64    Pulse 63    Temp 98 °F (36.7 °C)    Resp 16    LMP 02/02/2015 (Approximate)    Breastfeeding No       Medications:  NS flush  Heparin flush    1720 Pt tolerated treatment well. D/c home ambulatory in no distress. Pt aware of next [facility] appointment scheduled for 11/30/2017. Please review labs in [facility] once resulted.

## 2017-10-06 LAB — CANCER AG125 SERPL-ACNC: 5 U/ML (ref 0–30)

## 2017-11-02 NOTE — PROGRESS NOTES
I spoke with patient regarding breast MRI appointment. She has green field filter and is aware that she will need to be rescheduled at [facility]. I spoke with Schuyler Lane in [facility] and is contacting patient to reschedule. Patient has my contact number as well.

## 2017-11-07 ENCOUNTER — OFFICE VISIT (OUTPATIENT)
Dept: GYNECOLOGY | Age: 52
End: 2017-11-07

## 2017-11-07 VITALS
SYSTOLIC BLOOD PRESSURE: 115 MMHG | DIASTOLIC BLOOD PRESSURE: 72 MMHG | HEART RATE: 73 BPM | BODY MASS INDEX: 22.88 KG/M2 | HEIGHT: 67 IN | WEIGHT: 145.8 LBS

## 2017-11-07 DIAGNOSIS — C56.9 MALIGNANT NEOPLASM OF OVARY, UNSPECIFIED LATERALITY (HCC): Primary | ICD-10-CM

## 2017-11-07 NOTE — PROGRESS NOTES
[address] [place] [address] [address], [address] [facility]  [phone]  MD Cindi Freeman MD  Office Note  Patient ID:  Name:  Tahmina Olivas  MRN:  [id number]  :  1965/52 y.o. Date:  2017      HISTORY OF PRESENT ILLNESS:  Tahmina Olivas is a 46 y.o.  postmenopausal female with stage IIIC ovarian cancer. She underwent X-lap, hysterectomy, and tumor debulking in 2015. She was recommended adjuvant chemotherapy with 6 cycles of Taxol and Carboplatin. Her post-treatment PET/CT was negative for disease. Her Myriad results also found her to have a deleterious BRCA 1 mutation. As for the BRCA 1 mutation, I had her see one of our breast surgeons, Dr. Vishnu Perez, to talk about options, specifically screening and prophylactic surgery. She is going to hold off on surgery for now. She has decided not to have her daughter tested at this time. In early  she was found to have recurrence. She was clearly platinum sensitive, therefore I recommended a platinum-based regimen. 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 She also wanted to keep her hair. We also discussed options for down the line, specifically a PARP inhibitor, since she is BRCA positive. She has completed 6 cycles of Gemzar/Carboplatin. Her CA-125 responded well, and in fact returned to baseline. Her CT at that time was clear. We discussed maintenance therapy with a PARP inhibitor. She was started on Zejula in late 2017 at 300 mg daily. Her dose was lowered to 200 mg daily due to gastrointestinal complaints. In 2017 it was held due to thrombocytopenia. She elected not to restart at that time, even at a lower dose. She presents today to discuss other PARP alternatives.   Her CA-125 remains normal.      ROS:   and GI review:  Negative  Cardiopulmonary review:  Negative Musculoskeletal:  Negative    A comprehensive review of systems was negative except for that written in the History of Present Illness. , 10 point ROS      Problem List:  Patient Active Problem List    Diagnosis Date Noted    Chemotherapy-induced neutropenia ([address] 04/06/2017    Thrombocytopenia (HCC) 03/17/2017    CINV (chemotherapy-induced nausea and vomiting) 03/17/2017    Dehydration 03/17/2017    BRCA1 positive 09/10/2015    Malignant neoplasm of both ovaries ([facility] 04/09/2015    S/P total abdominal hysterectomy 03/18/2015    Pelvic mass 03/06/2015     PMH:  Past Medical History:   Diagnosis Date    Anxiety     Calculus of kidney 1/13    right    Hernia, inguinal, left 2012    MS (multiple sclerosis) (HCC) 1996    Nausea & vomiting     Ovarian cancer ([facility] [address] 3/2015    high grade, stage 3C papillary serous    Thromboembolus ([facility] 8/2007    lower abdomen post fall      PSH:  Past Surgical History:   Procedure Laterality Date    HX GYN  2009    endometrial ablation with punctured uterus    HX OTHER SURGICAL  8/2007    green filter    HX MARIEL AND BSO  3/2014    ovarian cancer      Social History:  Social History   Substance Use Topics    Smoking status: Never Smoker    Smokeless tobacco: Never Used    Alcohol use No      Family History:  Family History   Problem Relation Age of Onset    Cancer Paternal Grandmother      breast    Heart Disease Mother     Heart Disease Father       Medications: (reviewed)  Current Outpatient Prescriptions   Medication Sig    LORazepam (ATIVAN) 1 mg tablet TAKE 1 TABLET BY MOUTH AT BEDTIME    XARELTO 20 mg tab tablet     lidocaine-prilocaine (EMLA) topical cream Apply small amount to port area and cover with band aid 1 hour before chemo treatment    ondansetron hcl (ZOFRAN) 4 mg tablet TAKE 1 TAB BY MOUTH EVERY EIGHT (8) HOURS AS NEEDED FOR NAUSEA.  ondansetron hcl (ZOFRAN) 4 mg tablet TAKE 1 TAB BY MOUTH EVERY EIGHT (8) HOURS AS NEEDED FOR NAUSEA.     gabapentin (NEURONTIN) 400 mg capsule TAKE 1 CAPSULE BY MOUTH 3 TIMES DAILY    ondansetron (ZOFRAN ODT) 4 mg disintegrating tablet TAKE 1 TABLET BY MOUTH EVERY 8 HOURS AS NEEDED FOR NAUSEA    dexamethasone (DECADRON) 4 mg tablet Take 1-2 tabs QAM with food the 2 days following chemo as needed    prochlorperazine (COMPAZINE) 10 mg tablet Take 1 Tab by mouth every six (6) hours as needed for Nausea. Indications: CANCER CHEMOTHERAPY-INDUCED NAUSEA AND VOMITING    amitriptyline (ELAVIL) 25 mg tablet Take 1 Tab by mouth nightly. No current facility-administered medications for this visit. Allergies: (reviewed)  Allergies   Allergen Reactions    Pcn [Penicillins] Rash          OBJECTIVE:    Physical Exam:  VITAL SIGNS: Vitals:    11/07/17 1455   BP: 115/72   Pulse: 73   Weight: 145 lb 12.8 oz (66.1 kg)   Height: 5' 7.01\" (1.702 m)     Body mass index is 22.83 kg/(m^2). GENERAL FILIBERTO: Conversant, alert, oriented. No acute distress. HEENT: HEENT. No thyroid enlargement. No JVD. Neck: Supple without restrictions. RESPIRATORY: Clear to auscultation and percussion to the bases. No CVAT. CARDIOVASC: RRR without murmur/rub. GASTROINT: Soft, non-distended, without masses or organomegaly. MUSCULOSKEL: no joint tenderness, deformity or swelling   EXTREMITIES: extremities normal, atraumatic, no cyanosis or edema   PELVIC: Deferred   RECTAL: Deferred   CHARMAINE SURVEY: No suspicious lymphadenopathy or edema noted. NEURO: Grossly intact. No acute deficit.          Lab Results   Component Value Date/Time    WBC 4.3 10/05/2017 05:08 PM    HGB 10.9 10/05/2017 05:08 PM    HCT 33.7 10/05/2017 05:08 PM    PLATELET 900 18/60/4586 05:08 PM    MCV 95.2 10/05/2017 05:08 PM     Lab Results   Component Value Date/Time    Sodium 140 10/05/2017 05:08 PM    Potassium 3.7 10/05/2017 05:08 PM    Chloride 106 10/05/2017 05:08 PM    CO2 26 10/05/2017 05:08 PM    Anion gap 8 10/05/2017 05:08 PM    Glucose 94 10/05/2017 05:08 PM BUN 16 10/05/2017 05:08 PM    Creatinine 0.74 10/05/2017 05:08 PM    BUN/Creatinine ratio 22 10/05/2017 05:08 PM    GFR est AA >60 10/05/2017 05:08 PM    GFR est non-AA >60 10/05/2017 05:08 PM    Calcium 8.5 10/05/2017 05:08 PM       CT of abdomen/pelvis (6/12/17)  No pleural effusion at the lung bases. Trace pericardial fluid. Small hiatal hernia.     LIVER/GALLBLADDER: No mass or biliary dilatation. No evidence of serosal  implant. There is no intrahepatic duct dilatation. The CBD is not dilated. There  is no hepatic parenchymal mass. Hepatic enhancement pattern is within normal  limits. Gallbladder within normal limits by CT criteria.     SPLEEN/PANCREAS: No mass. There is no pancreatic duct dilatation. There is no  evidence of splenomegaly.     ADRENALS/KIDNEYS: Maya Casa is no adrenal mass.] There is no hydroureteronephrosis. There is no renal or ureteral calculus. There is no renal mass. There is no  perinephric mass.     COLON AND SMALL BOWEL: No dilatation or wall thickening. There is no obstruction  or free intraperitoneal air. There is no evidence of incarceration or  obstruction. Paracolic gutter soft tissue is not demonstrated when compared to  the prior examination 2/20/2017   Series 3 image 225 as index focus is correlates to imaging findings at series 3  image 64 of the current study. Series 3 image 246 just to the right of midline correlates to imaging findings  of series 3 image 79 on the current exam.  APPENDIX: Unremarkable.     BLADDER/REPRODUCTIVE ORGANS: No mass or calculus.     BONES/RETROPERITONEUM: No destructive bone lesion. . IVC filter in place. Left  iliac vein stent. The abdominal aorta is normal in caliber. No aneurysm. No  fracture. No retroperitoneal adenopathy. Trace ascites is resolved. Peritoneal  enhancement has resolved. No focal nodular enhancement or omental implant is  demonstrated. Subcutaneous venous collaterals. Status post hysterectomy. No  external iliac adenopathy. No pelvic adenopathy.     IMPRESSION:   Resolved ascites and peritoneal thickening/ enhancement. Areas of thickened  peritoneum/soft tissue density identified on the PET examination of 2/20/2017  are not demonstrated on the current study. No focal nodular implants suggestive of peritoneal carcinomatosis.     No other significant interval change. IMPRESSION/PLAN:  Brady Galindo is a 46 y.o. female with a history of recurrent ovarian cancer. She has no evidence of disease at this time. She has had a lot of discussions with her  about restarting maintenance therapy with a PARP inhibitor. Due to the toxicities that she experienced with Dakota Hardy. There may be still some of the gastrointestinal side effects, but the hematologic effects should be much less. We will start her at a one-step dose reduction of 250 mg twice daily. We will keep a very close eye on her for toxicities. She is someone who should benefit the most from a PARP inhibitor. It is important to keep her on therapy.         Signed By: Jocelynn Lopez MD     11/7/2017/3:54 PM

## 2017-11-08 LAB
ALBUMIN SERPL-MCNC: 4.6 G/DL (ref 3.5–5.5)
ALBUMIN/GLOB SERPL: 1.6 {RATIO} (ref 1.2–2.2)
ALP SERPL-CCNC: 84 IU/L (ref 39–117)
ALT SERPL-CCNC: 18 IU/L (ref 0–32)
AST SERPL-CCNC: 18 IU/L (ref 0–40)
BASOPHILS # BLD AUTO: 0 X10E3/UL (ref 0–0.2)
BASOPHILS NFR BLD AUTO: 0 %
BILIRUB SERPL-MCNC: 0.3 MG/DL (ref 0–1.2)
BUN SERPL-MCNC: 12 MG/DL (ref 6–24)
BUN/CREAT SERPL: 14 (ref 9–23)
CALCIUM SERPL-MCNC: 9.6 MG/DL (ref 8.7–10.2)
CANCER AG125 SERPL-ACNC: 24 U/ML (ref 0–38.1)
CHLORIDE SERPL-SCNC: 102 MMOL/L (ref 96–106)
CO2 SERPL-SCNC: 23 MMOL/L (ref 18–29)
CREAT SERPL-MCNC: 0.83 MG/DL (ref 0.57–1)
EOSINOPHIL # BLD AUTO: 0 X10E3/UL (ref 0–0.4)
EOSINOPHIL NFR BLD AUTO: 1 %
ERYTHROCYTE [DISTWIDTH] IN BLOOD BY AUTOMATED COUNT: 15.2 % (ref 12.3–15.4)
GFR SERPLBLD CREATININE-BSD FMLA CKD-EPI: 81 ML/MIN/1.73
GFR SERPLBLD CREATININE-BSD FMLA CKD-EPI: 94 ML/MIN/1.73
GLOBULIN SER CALC-MCNC: 2.8 G/DL (ref 1.5–4.5)
GLUCOSE SERPL-MCNC: 87 MG/DL (ref 65–99)
HCT VFR BLD AUTO: 37.9 % (ref 34–46.6)
HGB BLD-MCNC: 12.4 G/DL (ref 11.1–15.9)
IMM GRANULOCYTES # BLD: 0 X10E3/UL (ref 0–0.1)
IMM GRANULOCYTES NFR BLD: 0 %
LYMPHOCYTES # BLD AUTO: 2 X10E3/UL (ref 0.7–3.1)
LYMPHOCYTES NFR BLD AUTO: 39 %
MAGNESIUM SERPL-MCNC: 2.1 MG/DL (ref 1.6–2.3)
MCH RBC QN AUTO: 31 PG (ref 26.6–33)
MCHC RBC AUTO-ENTMCNC: 32.7 G/DL (ref 31.5–35.7)
MCV RBC AUTO: 95 FL (ref 79–97)
MONOCYTES # BLD AUTO: 0.5 X10E3/UL (ref 0.1–0.9)
MONOCYTES NFR BLD AUTO: 9 %
NEUTROPHILS # BLD AUTO: 2.7 X10E3/UL (ref 1.4–7)
NEUTROPHILS NFR BLD AUTO: 51 %
PLATELET # BLD AUTO: 170 X10E3/UL (ref 150–379)
POTASSIUM SERPL-SCNC: 4.3 MMOL/L (ref 3.5–5.2)
PROT SERPL-MCNC: 7.4 G/DL (ref 6–8.5)
RBC # BLD AUTO: 4 X10E6/UL (ref 3.77–5.28)
SODIUM SERPL-SCNC: 142 MMOL/L (ref 134–144)
WBC # BLD AUTO: 5.2 X10E3/UL (ref 3.4–10.8)

## 2017-11-09 ENCOUNTER — TELEPHONE (OUTPATIENT)
Dept: GYNECOLOGY | Age: 52
End: 2017-11-09

## 2017-11-10 ENCOUNTER — HOSPITAL ENCOUNTER (OUTPATIENT)
Dept: MRI IMAGING | Age: 52
Discharge: HOME | End: 2017-11-10
Payer: COMMERCIAL

## 2017-11-10 VITALS — WEIGHT: 145 LBS | BODY MASS INDEX: 22.7 KG/M2

## 2017-11-10 DIAGNOSIS — Z15.01 BRCA1 POSITIVE: ICD-10-CM

## 2017-11-10 DIAGNOSIS — Z15.09 BRCA1 POSITIVE: ICD-10-CM

## 2017-11-10 PROCEDURE — 74011250636 HC RX REV CODE- 250/636

## 2017-11-10 PROCEDURE — 77059 MRI BREAST BI W WO CONT: CPT

## 2017-11-10 PROCEDURE — A9585 GADOBUTROL INJECTION: HCPCS

## 2017-11-10 RX ADMIN — GADOBUTROL 6.5 ML: 604.72 INJECTION INTRAVENOUS at 10:33

## 2017-11-21 ENCOUNTER — TELEPHONE (OUTPATIENT)
Dept: GYNECOLOGY | Age: 52
End: 2017-11-21

## 2017-11-21 NOTE — TELEPHONE ENCOUNTER
Pt calling to state [facility] called her yesterday and confirmed a 0 co-pay for Lynparza and delivery will be today 11/21/17. Pt will begin Lynparza 250 mg Q12hrs. To have port flush/labs on 11/30/17, and repeat labs on 12/18/17 with 1 month f/u appt with Lourdes Tate NP on 12/21/17. Pt encouraged to call the office with any concerns.

## 2017-11-22 ENCOUNTER — APPOINTMENT (OUTPATIENT)
Dept: INFUSION THERAPY | Age: 52
End: 2017-11-22
Payer: COMMERCIAL

## 2017-11-24 ENCOUNTER — APPOINTMENT (OUTPATIENT)
Dept: CT IMAGING | Age: 52
End: 2017-11-24
Attending: NURSE PRACTITIONER
Payer: COMMERCIAL

## 2017-11-24 ENCOUNTER — HOSPITAL ENCOUNTER (EMERGENCY)
Age: 52
Discharge: HOME | End: 2017-11-24
Attending: STUDENT IN AN ORGANIZED HEALTH CARE EDUCATION/TRAINING PROGRAM
Payer: COMMERCIAL

## 2017-11-24 VITALS
SYSTOLIC BLOOD PRESSURE: 114 MMHG | HEART RATE: 78 BPM | HEIGHT: 67 IN | OXYGEN SATURATION: 99 % | RESPIRATION RATE: 15 BRPM | BODY MASS INDEX: 23.23 KG/M2 | WEIGHT: 148 LBS | DIASTOLIC BLOOD PRESSURE: 71 MMHG | TEMPERATURE: 97.8 F

## 2017-11-24 DIAGNOSIS — R10.31 ABDOMINAL PAIN, RIGHT LOWER QUADRANT: Primary | ICD-10-CM

## 2017-11-24 DIAGNOSIS — K52.89 OTHER NONINFECTIOUS GASTROENTERITIS: ICD-10-CM

## 2017-11-24 PROBLEM — K52.9 NONINFECTIOUS GASTROENTERITIS: Status: ACTIVE | Noted: 2017-11-24

## 2017-11-24 LAB
ALBUMIN SERPL-MCNC: 3.6 G/DL (ref 3.5–5)
ALBUMIN/GLOB SERPL: 0.9 {RATIO} (ref 1.1–2.2)
ALP SERPL-CCNC: 126 U/L (ref 45–117)
ALT SERPL-CCNC: 86 U/L (ref 12–78)
ANION GAP SERPL CALC-SCNC: 7 MMOL/L (ref 5–15)
APPEARANCE UR: CLEAR
AST SERPL-CCNC: 72 U/L (ref 15–37)
BACTERIA URNS QL MICRO: NEGATIVE /HPF
BASOPHILS # BLD: 0 K/UL (ref 0–0.1)
BASOPHILS NFR BLD: 0 % (ref 0–1)
BILIRUB SERPL-MCNC: 0.7 MG/DL (ref 0.2–1)
BILIRUB UR QL: NEGATIVE
BUN SERPL-MCNC: 16 MG/DL (ref 6–20)
BUN/CREAT SERPL: 14 (ref 12–20)
CALCIUM SERPL-MCNC: 9.4 MG/DL (ref 8.5–10.1)
CHLORIDE SERPL-SCNC: 102 MMOL/L (ref 97–108)
CO2 SERPL-SCNC: 29 MMOL/L (ref 21–32)
COLOR UR: ABNORMAL
CREAT SERPL-MCNC: 1.12 MG/DL (ref 0.55–1.02)
EOSINOPHIL # BLD: 0 K/UL (ref 0–0.4)
EOSINOPHIL NFR BLD: 1 % (ref 0–7)
EPITH CASTS URNS QL MICRO: ABNORMAL /LPF
ERYTHROCYTE [DISTWIDTH] IN BLOOD BY AUTOMATED COUNT: 14.5 % (ref 11.5–14.5)
GLOBULIN SER CALC-MCNC: 4.2 G/DL (ref 2–4)
GLUCOSE SERPL-MCNC: 115 MG/DL (ref 65–100)
GLUCOSE UR STRIP.AUTO-MCNC: NEGATIVE MG/DL
HCT VFR BLD AUTO: 36.3 % (ref 35–47)
HGB BLD-MCNC: 11.9 G/DL (ref 11.5–16)
HGB UR QL STRIP: NEGATIVE
HYALINE CASTS URNS QL MICRO: ABNORMAL /LPF (ref 0–5)
KETONES UR QL STRIP.AUTO: NEGATIVE MG/DL
LEUKOCYTE ESTERASE UR QL STRIP.AUTO: ABNORMAL
LIPASE SERPL-CCNC: 132 U/L (ref 73–393)
LYMPHOCYTES # BLD: 1.6 K/UL (ref 0.8–3.5)
LYMPHOCYTES NFR BLD: 22 % (ref 12–49)
MCH RBC QN AUTO: 31.5 PG (ref 26–34)
MCHC RBC AUTO-ENTMCNC: 32.8 G/DL (ref 30–36.5)
MCV RBC AUTO: 96 FL (ref 80–99)
MONOCYTES # BLD: 0.8 K/UL (ref 0–1)
MONOCYTES NFR BLD: 12 % (ref 5–13)
NEUTS SEG # BLD: 4.7 K/UL (ref 1.8–8)
NEUTS SEG NFR BLD: 65 % (ref 32–75)
NITRITE UR QL STRIP.AUTO: NEGATIVE
PH UR STRIP: 6.5 [PH] (ref 5–8)
PLATELET # BLD AUTO: 167 K/UL (ref 150–400)
POTASSIUM SERPL-SCNC: 3.6 MMOL/L (ref 3.5–5.1)
PROT SERPL-MCNC: 7.8 G/DL (ref 6.4–8.2)
PROT UR STRIP-MCNC: NEGATIVE MG/DL
RBC # BLD AUTO: 3.78 M/UL (ref 3.8–5.2)
RBC #/AREA URNS HPF: ABNORMAL /HPF (ref 0–5)
SODIUM SERPL-SCNC: 138 MMOL/L (ref 136–145)
SP GR UR REFRACTOMETRY: 1.01 (ref 1–1.03)
UR CULT HOLD, URHOLD: NORMAL
UROBILINOGEN UR QL STRIP.AUTO: 0.2 EU/DL (ref 0.2–1)
WBC # BLD AUTO: 7.1 K/UL (ref 3.6–11)
WBC URNS QL MICRO: ABNORMAL /HPF (ref 0–4)

## 2017-11-24 PROCEDURE — 83690 ASSAY OF LIPASE: CPT | Performed by: STUDENT IN AN ORGANIZED HEALTH CARE EDUCATION/TRAINING PROGRAM

## 2017-11-24 PROCEDURE — 74011636320 HC RX REV CODE- 636/320: Performed by: STUDENT IN AN ORGANIZED HEALTH CARE EDUCATION/TRAINING PROGRAM

## 2017-11-24 PROCEDURE — 81001 URINALYSIS AUTO W/SCOPE: CPT | Performed by: EMERGENCY MEDICINE

## 2017-11-24 PROCEDURE — 74011000258 HC RX REV CODE- 258: Performed by: STUDENT IN AN ORGANIZED HEALTH CARE EDUCATION/TRAINING PROGRAM

## 2017-11-24 PROCEDURE — 36415 COLL VENOUS BLD VENIPUNCTURE: CPT | Performed by: EMERGENCY MEDICINE

## 2017-11-24 PROCEDURE — 99284 EMERGENCY DEPT VISIT MOD MDM: CPT

## 2017-11-24 PROCEDURE — 80053 COMPREHEN METABOLIC PANEL: CPT | Performed by: EMERGENCY MEDICINE

## 2017-11-24 PROCEDURE — 85025 COMPLETE CBC W/AUTO DIFF WBC: CPT | Performed by: EMERGENCY MEDICINE

## 2017-11-24 PROCEDURE — 74177 CT ABD & PELVIS W/CONTRAST: CPT

## 2017-11-24 RX ORDER — METRONIDAZOLE 500 MG/1
500 TABLET ORAL 2 TIMES DAILY
Qty: 20 TAB | Refills: 0 | Status: SHIPPED | OUTPATIENT
Start: 2017-11-24 | End: 2017-01-01

## 2017-11-24 RX ORDER — SODIUM CHLORIDE 0.9 % (FLUSH) 0.9 %
10 SYRINGE (ML) INJECTION
Status: COMPLETED | OUTPATIENT
Start: 2017-11-24 | End: 2017-11-24

## 2017-11-24 RX ORDER — CIPROFLOXACIN 500 MG/1
500 TABLET ORAL 2 TIMES DAILY
Qty: 20 TAB | Refills: 0 | Status: SHIPPED | OUTPATIENT
Start: 2017-11-24 | End: 2017-01-01

## 2017-11-24 RX ADMIN — Medication 10 ML: at 15:44

## 2017-11-24 RX ADMIN — SODIUM CHLORIDE 100 ML: 900 INJECTION, SOLUTION INTRAVENOUS at 15:44

## 2017-11-24 RX ADMIN — IOPAMIDOL 100 ML: 755 INJECTION, SOLUTION INTRAVENOUS at 15:44

## 2017-11-24 NOTE — CONSULTS
Surgery Consult    Subjective:      Marsha Saenz is a 46 y.o. female who is being seen for evaluation of abdominal pain. The pateint began to have pain about 3 days ago. She states that she has a history of constipation that began after she started her treatments for ovarian cancer. Usually she has some constipation and her pain is relieved by BM. This time was different. The pain is in the RLQ. Complains of pain with movement. She had a BM and this time it did not work. She denies any fever. She had some nausea a few days ago that resolved with zofran. She started on a new drug called  Lynparza 2 days ago. She denies a history of inflammatory bowel disease.        Patient Active Problem List    Diagnosis Date Noted    Chemotherapy-induced neutropenia ([address] 04/06/2017    Thrombocytopenia (HCC) 03/17/2017    CINV (chemotherapy-induced nausea and vomiting) 03/17/2017    Dehydration 03/17/2017    BRCA1 positive 09/10/2015    Malignant neoplasm of both ovaries ([address] 04/09/2015    S/P total abdominal hysterectomy 03/18/2015    Pelvic mass 03/06/2015     Past Medical History:   Diagnosis Date    Anxiety     Calculus of kidney 1/13    right    Hernia, inguinal, left 2012    MS (multiple sclerosis) ([address] 1996    Nausea & vomiting     Ovarian cancer ([address] 3/2015    high grade, stage 3C papillary serous    Thromboembolus ([address] 8/2007    lower abdomen post fall      Past Surgical History:   Procedure Laterality Date    HX GYN  2009    endometrial ablation with punctured uterus    HX OTHER SURGICAL  8/2007    green filter    HX MARIEL AND BSO  3/2014    ovarian cancer      Social History   Substance Use Topics    Smoking status: Never Smoker    Smokeless tobacco: Never Used    Alcohol use No      Family History   Problem Relation Age of Onset    Cancer Paternal Grandmother      breast    Heart Disease Mother     Heart Disease Father       No current facility-administered medications for this encounter. Current Outpatient Prescriptions   Medication Sig    LORazepam (ATIVAN) 1 mg tablet TAKE 1 TABLET BY MOUTH AT BEDTIME    ondansetron hcl (ZOFRAN) 4 mg tablet TAKE 1 TAB BY MOUTH EVERY EIGHT (8) HOURS AS NEEDED FOR NAUSEA.  ondansetron hcl (ZOFRAN) 4 mg tablet TAKE 1 TAB BY MOUTH EVERY EIGHT (8) HOURS AS NEEDED FOR NAUSEA.  gabapentin (NEURONTIN) 400 mg capsule TAKE 1 CAPSULE BY MOUTH 3 TIMES DAILY    ondansetron (ZOFRAN ODT) 4 mg disintegrating tablet TAKE 1 TABLET BY MOUTH EVERY 8 HOURS AS NEEDED FOR NAUSEA    dexamethasone (DECADRON) 4 mg tablet Take 1-2 tabs QAM with food the 2 days following chemo as needed    prochlorperazine (COMPAZINE) 10 mg tablet Take 1 Tab by mouth every six (6) hours as needed for Nausea. Indications: CANCER CHEMOTHERAPY-INDUCED NAUSEA AND VOMITING    XARELTO 20 mg tab tablet     amitriptyline (ELAVIL) 25 mg tablet Take 1 Tab by mouth nightly.     lidocaine-prilocaine (EMLA) topical cream Apply small amount to port area and cover with band aid 1 hour before chemo treatment      Allergies   Allergen Reactions    Pcn [Penicillins] Rash       Review of Systems:    Constitutional: negative  Eyes: negative  Ears, Nose, Mouth, Throat, and Face: negative  Respiratory: negative  Cardiovascular: negative  Gastrointestinal: positive for abdominal pain  Genitourinary:negative  Integument/Breast: negative  Hematologic/Lymphatic: negative  Musculoskeletal:negative  Neurological: negative  Behavioral/Psychiatric: negative  Endocrine: negative  Allergic/Immunologic: negative    Objective:        Visit Vitals    /72 (BP 1 Location: Left arm, BP Patient Position: At rest)    Pulse 83    Temp 98.1 °F (36.7 °C)    Resp 15    Ht 5' 7\" (1.702 m)    Wt 148 lb (67.1 kg)    LMP 02/02/2015 (Approximate)    SpO2 98%    BMI 23.18 kg/m2       Physical Exam:  GENERAL: alert, cooperative, no distress, appears stated age, EYE: negative, THROAT & NECK: normal, LUNG: clear to auscultation bilaterally, HEART: regular rate and rhythm, ABDOMEN: soft with tenderness in the RLQ, no generalized rebound. EXTREMITIES:  extremities normal, atraumatic, no cyanosis or edema, SKIN: Normal., NEUROLOGIC: negative, PSYCH: non focal    Imaging:  images and reports reviewed  CT scan reviewed directly with the radiologist.  There is  Transmural inflammation of the Cecum with fluid laterally. The appendix is short and fat similar to the way it looked 5 months ago. Lab/Data Review: All lab results for the last 24 hours reviewed. Recent Results (from the past 24 hour(s))   URINALYSIS W/MICROSCOPIC    Collection Time: 11/24/17  2:04 PM   Result Value Ref Range    Color YELLOW/STRAW      Appearance CLEAR CLEAR      Specific gravity 1.008 1.003 - 1.030      pH (UA) 6.5 5.0 - 8.0      Protein NEGATIVE  NEG mg/dL    Glucose NEGATIVE  NEG mg/dL    Ketone NEGATIVE  NEG mg/dL    Bilirubin NEGATIVE  NEG      Blood NEGATIVE  NEG      Urobilinogen 0.2 0.2 - 1.0 EU/dL    Nitrites NEGATIVE  NEG      Leukocyte Esterase TRACE (A) NEG      WBC 0-4 0 - 4 /hpf    RBC 0-5 0 - 5 /hpf    Epithelial cells FEW FEW /lpf    Bacteria NEGATIVE  NEG /hpf    Hyaline cast 0-2 0 - 5 /lpf   URINE CULTURE HOLD SAMPLE    Collection Time: 11/24/17  2:04 PM   Result Value Ref Range    Urine culture hold URINE ON HOLD IN MICROBIOLOGY DEPT FOR 3 DAYS     CBC WITH AUTOMATED DIFF    Collection Time: 11/24/17  2:04 PM   Result Value Ref Range    WBC 7.1 3.6 - 11.0 K/uL    RBC 3.78 (L) 3.80 - 5.20 M/uL    HGB 11.9 11.5 - 16.0 g/dL    HCT 36.3 35.0 - 47.0 %    MCV 96.0 80.0 - 99.0 FL    MCH 31.5 26.0 - 34.0 PG    MCHC 32.8 30.0 - 36.5 g/dL    RDW 14.5 11.5 - 14.5 %    PLATELET 425 872 - 051 K/uL    NEUTROPHILS 65 32 - 75 %    LYMPHOCYTES 22 12 - 49 %    MONOCYTES 12 5 - 13 %    EOSINOPHILS 1 0 - 7 %    BASOPHILS 0 0 - 1 %    ABS. NEUTROPHILS 4.7 1.8 - 8.0 K/UL    ABS. LYMPHOCYTES 1.6 0.8 - 3.5 K/UL    ABS. MONOCYTES 0.8 0.0 - 1.0 K/UL    ABS. EOSINOPHILS 0.0 0.0 - 0.4 K/UL    ABS. BASOPHILS 0.0 0.0 - 0.1 K/UL   METABOLIC PANEL, COMPREHENSIVE    Collection Time: 11/24/17  2:04 PM   Result Value Ref Range    Sodium 138 136 - 145 mmol/L    Potassium 3.6 3.5 - 5.1 mmol/L    Chloride 102 97 - 108 mmol/L    CO2 29 21 - 32 mmol/L    Anion gap 7 5 - 15 mmol/L    Glucose 115 (H) 65 - 100 mg/dL    BUN 16 6 - 20 MG/DL    Creatinine 1.12 (H) 0.55 - 1.02 MG/DL    BUN/Creatinine ratio 14 12 - 20      GFR est AA >60 >60 ml/min/1.73m2    GFR est non-AA 51 (L) >60 ml/min/1.73m2    Calcium 9.4 8.5 - 10.1 MG/DL    Bilirubin, total 0.7 0.2 - 1.0 MG/DL    ALT (SGPT) 86 (H) 12 - 78 U/L    AST (SGOT) 72 (H) 15 - 37 U/L    Alk. phosphatase 126 (H) 45 - 117 U/L    Protein, total 7.8 6.4 - 8.2 g/dL    Albumin 3.6 3.5 - 5.0 g/dL    Globulin 4.2 (H) 2.0 - 4.0 g/dL    A-G Ratio 0.9 (L) 1.1 - 2.2     LIPASE    Collection Time: 11/24/17  2:04 PM   Result Value Ref Range    Lipase 132 73 - 393 U/L         Assessment:     Cecal Inflammation - This seems to be a primary colon issue as opposed to appendicitis     Plan:   No need for Appendectomy at this time  Would Begin ABX. Recommend discussing with her oncologist regarding her current medication as a possible source  Should she not improve may need laparoscopy, though would have to hold Xarelto prior to this.      Signed By: Jamison Metz MD     November 24, 2017

## 2017-11-24 NOTE — ED NOTES
Patient is NPO; she is resting in bed, comfortably, call bell at the bedside, will continue to monitor patient.

## 2017-11-25 NOTE — DISCHARGE INSTRUCTIONS
Colitis: Care Instructions  Your Care Instructions  Colitis is the medical term for swelling (inflammation) of the intestine. It can be caused by different things, such as an infection or loss of blood flow in the intestine. Other causes are problems like Crohn's disease or ulcerative colitis. Symptoms may include fever, diarrhea that may be bloody, or belly pain. Sometimes symptoms go away without treatment. But you may need treatment or more tests, such as blood tests or a stool test. Or you may need imaging tests like a CT scan or a colonoscopy. In some cases, the doctor may want to test a sample of tissue from the intestine. This test is called a biopsy. The doctor has checked you carefully, but problems can develop later. If you notice any problems or new symptoms, get medical treatment right away. Follow-up care is a key part of your treatment and safety. Be sure to make and go to all appointments, and call your doctor if you are having problems. It's also a good idea to know your test results and keep a list of the medicines you take. How can you care for yourself at home? · Rest until you feel better. · Your doctor may recommend that you eat bland foods. These include rice, dry toast or crackers, bananas, and applesauce. · To prevent dehydration, drink plenty of fluids. Choose water and other caffeine-free clear liquids until you feel better. If you have kidney, heart, or liver disease and have to limit fluids, talk with your doctor before you increase the amount of fluids you drink. · Be safe with medicines. Take your medicines exactly as prescribed. Call your doctor if you think you are having a problem with your medicine. You will get more details on the specific medicines your doctor prescribes. When should you call for help? Call 911 anytime you think you may need emergency care. For example, call if:  ? · You passed out (lost consciousness). ? · Your stools are maroon or very bloody. ?Call your doctor now or seek immediate medical care if:  ? · You have new or worse belly pain. ? · You have a fever. ? · You are vomiting. ? · You cannot pass stools or gas. ? · You have new or more blood in your stools. ? Watch closely for changes in your health, and be sure to contact your doctor if:  ? · You have new or worse symptoms. ? · You are losing weight. ? · You do not get better as expected. Where can you learn more? Go to http://ammon-kalli.info/. Josh Hirsch in the search box to learn more about \"Colitis: Care Instructions. \"  Current as of: May 12, 2017  Content Version: 11.4  © 8391-1838 [employer]. Care instructions adapted under license by [employer] (which disclaims liability or warranty for this information). If you have questions about a medical condition or this instruction, always ask your healthcare professional. [place] [address] any warranty or liability for your use of this information.

## 2017-11-25 NOTE — ED PROVIDER NOTES
HPI Comments: Alexandria King is a 46 y.o. female who presents ambulatory to the ED with  c/o abdominal pain. Patient states she had rapid onset right lower quadrant pain that has persisted for three days now. Patient states she went to [facility] and was referred to the emergency room to rule out appendicitis. Patient states the pain decreases when she stays still laying in bed but with any movement increases dramatically. Patient denies any fever or chills, patient with mild nausea, no vomiting. Patient is a current oncology patient that is being followed by Dr. Jarocho Timmons. There are no further complaints at this time. PCP: Filemon Haley MD    PMHx significant for: Past Medical History:  No date: Anxiety  1/13: Calculus of kidney      Comment: right  2012: Hernia, inguinal, left  1996: MS (multiple sclerosis) ([address]  No date: Nausea & vomiting  3/2015: Ovarian cancer ([address]      Comment: high grade, stage 3C papillary serous  8/2007: Thromboembolus ([address]      Comment: lower abdomen post fall    PSHx significant for: Past Surgical History:  2009: HX GYN      Comment: endometrial ablation with punctured uterus  8/2007: HX OTHER SURGICAL      Comment: green filter  3/2014: HX MARIEL AND BSO      Comment: ovarian cancer    Social Hx: Tobacco: none EtOH: none Illicit drug use: none  There are no further complaints or symptoms at this time. The history is provided by the patient.         Past Medical History:   Diagnosis Date    Anxiety     Calculus of kidney 1/13    right    Hernia, inguinal, left 2012    MS (multiple sclerosis) ([address] 1996    Nausea & vomiting     Ovarian cancer ([address] 3/2015    high grade, stage 3C papillary serous    Thromboembolus ([address] 8/2007    lower abdomen post fall       Past Surgical History:   Procedure Laterality Date    HX GYN  2009    endometrial ablation with punctured uterus    HX OTHER SURGICAL  8/2007    green filter    HX MARIEL AND BSO  3/2014    ovarian cancer Family History:   Problem Relation Age of Onset    Cancer Paternal Grandmother      breast    Heart Disease Mother     Heart Disease Father        Social History     Social History    Marital status:      Spouse name: N/A    Number of children: N/A    Years of education: N/A     Occupational History    Not on file. Social History Main Topics    Smoking status: Never Smoker    Smokeless tobacco: Never Used    Alcohol use No    Drug use: No    Sexual activity: Not on file     Other Topics Concern    Not on file     Social History Narrative         ALLERGIES: Pcn [penicillins]    Review of Systems   Constitutional: Positive for activity change (unable to move without pain in the right lower quadrant). Negative for appetite change, chills, diaphoresis, fatigue and fever. HENT: Negative for congestion, ear discharge, ear pain, sinus pain, sinus pressure, sore throat and trouble swallowing. Eyes: Negative for photophobia, pain, redness and visual disturbance. Respiratory: Negative for chest tightness, shortness of breath and wheezing. Cardiovascular: Negative for chest pain and palpitations. Gastrointestinal: Positive for abdominal pain and nausea. Negative for abdominal distention and vomiting. Endocrine: Negative. Genitourinary: Negative for difficulty urinating, flank pain, frequency and urgency. Musculoskeletal: Negative for back pain, neck pain and neck stiffness. Skin: Negative for color change, pallor, rash and wound. Allergic/Immunologic: Negative. Neurological: Negative for dizziness, speech difficulty, weakness and headaches. Hematological: Does not bruise/bleed easily. Psychiatric/Behavioral: Negative for behavioral problems. The patient is not nervous/anxious.         Vitals:    11/24/17 1315 11/24/17 1904   BP: 116/72 114/71   Pulse: 83 78   Resp: 15 15   Temp: 98.1 °F (36.7 °C) 97.8 °F (36.6 °C)   SpO2: 98% 99%   Weight: 67.1 kg (148 lb)    Height: 5' 7\" (1.702 m)             Physical Exam   Constitutional: She is oriented to person, place, and time. She appears well-developed and well-nourished. No distress. HENT:   Head: Normocephalic and atraumatic. Right Ear: External ear normal.   Left Ear: External ear normal.   Nose: Nose normal.   Mouth/Throat: Oropharynx is clear and moist.   Eyes: Conjunctivae and EOM are normal. Pupils are equal, round, and reactive to light. Right eye exhibits no discharge. Left eye exhibits no discharge. Neck: Normal range of motion. Neck supple. No JVD present. No tracheal deviation present. Cardiovascular: Normal rate, regular rhythm, normal heart sounds and intact distal pulses. Exam reveals no gallop. No murmur heard. Pulmonary/Chest: Effort normal and breath sounds normal. No respiratory distress. She has no wheezes. She has no rales. She exhibits no tenderness. Abdominal: Soft. Bowel sounds are normal. She exhibits no distension. There is tenderness. There is no rebound and no guarding. Genitourinary:   Genitourinary Comments: Negative     Musculoskeletal: Normal range of motion. She exhibits no edema or tenderness. Neurological: She is alert and oriented to person, place, and time. Skin: Skin is warm and dry. No rash noted. No erythema. No pallor. Psychiatric: She has a normal mood and affect. Her behavior is normal. Judgment and thought content normal.   Nursing note and vitals reviewed.        MDM  Number of Diagnoses or Management Options  Abdominal pain, right lower quadrant: new and requires workup  Other noninfectious gastroenteritis: new and requires workup  Diagnosis management comments: Plan:   Discharge to home and follow up with PCP, oncologist       Amount and/or Complexity of Data Reviewed  Clinical lab tests: ordered and reviewed  Tests in the radiology section of CPT®: ordered and reviewed  Discuss the patient with other providers: yes (Discussed with Dr. Carmen Orr)      ED Course   1820:  Hugo at bedside for general surgery consult. No need for appendectomy at present. 1925: Spoke with Dr. Gabrielle Jay regarding discharge plans and the ability to take Cipro and Flagyl with Hesham Pacheco. Ok to discharge to home and follow up on Monday. 7:28 PM  Pt has been reexamined. Pt has no new complaints, changes or physical findings. Care plan outlined and precautions discussed. All available results were reviewed with pt. All medications were reviewed with pt. All of pt's questions and concerns were addressed. Pt agrees to F/U as instructed and agrees to return to ED upon further deterioration. Pt is ready to go home.   Mile Aguirre NP      Procedures

## 2017-11-27 NOTE — TELEPHONE ENCOUNTER
Patient  and is requesting a call from [facility].   She may be reached at [phone]

## 2017-11-27 NOTE — TELEPHONE ENCOUNTER
[place] [address] Oncology      Pt called today with concerns after her ER visit on 11/24. She began with RLQ pain on Wed, 11/22 and said she \"just tolerated it\" because she had company. She did proceed and begin her Lymparza that evening, but the pain was present before she ever began it. Over the next two days, it continued at [place] a level 5\" and finally on 11/24, she went to the ER for evaluation. There she had a CT scan that showed:   IMPRESSION: There is inflammatory change and thin minimal fluid collection around the lateral margin of the cecum which is in confluence with mildly  inflamed appearing appendix suspicious for primary cecal information versus sequela of ruptured appendicitis. This question is too small for percutaneous drainage. There is secondary thickening of the adjacent cecal wall for which  underlying neoplasm cannot be entirely excluded. Pt was seen by a surgeon (Dr. Luisa Gonzalez) and was told it was not appendicitis and was placed on Cipro & Flagyl for colitis. She said she is so concerned it is her cancer returning and they just aren't seeing it. She now is having loose and frequent stools. She has placed herself on a BRAT diet and said it is helping some. She said she continues with dull, throbbing pain to her RLQ, but it is improved some. Denies any fevers or chills and amador nausea or vomiting. She asks me to discuss with Dr. Dave Dotson to have him review her scan. I did have Dr. Dave Dotson review her scan and he does see the inflammation to that area, but says there is now way to be able to tell for sure it is cancer or not cancer and that we will have to let the abx/flagyl run their course and see how she does    She was told to call for any fevers/chills/increase in pain or any concerns.          Joy Blake, KYE

## 2017-11-29 NOTE — TELEPHONE ENCOUNTER
Pt called to say she is feeling \"much better\" and her pain is now a 0-2 and \"not all the time'  She does continue with stool frequency, but says it is not diarrhea and it is just \"slightly loose\". Said she was able to go out for a good walk yesterday, but then did begin to have some discomfort at the end of the walk. She says she is staying on a BRAT diet because she is afraid to advance it any. I suggested she try Benifiber to try to bulk the stools up and maybe help with looseness of stool. She said she would try this beginning today and let me know on Friday. She will continue with her antibiotics until completed next Monday. She will call with any worsening if symptoms, fevers or nausea/vomiting.

## 2017-11-30 NOTE — PROGRESS NOTES
[facility] Short Visit Progress Note    1700 Pt admit to [facility] for port flush and labs ambulatory in stable condition. Assessment completed. No new concerns voiced. Please review pending lab results in [address] [place] [address]. Patient Vitals for the past 12 hrs:   Temp Pulse Resp BP   11/30/17 1701 97.9 °F (36.6 °C) 81 16 105/61       Port accessed with positive blood return. Lab drawn, flushed, heparinized and de-accessed per protocol. Medications:  NS flushes  Heparin flush    1710 Pt tolerated treatment well. D/c home ambulatory in no distress. Pt aware of next appointment scheduled for 1/25/17.

## 2017-12-20 NOTE — PROGRESS NOTES
[facility] GYNECOLOGIC ONCOLOGY  [address] [place] [address], [id number] [facility]  [phone] formerly [facility] [phone]  Patient ID:  Name:  Rich Mello  MRN:  [id number]  :  1965/52 y.o. Date:  2017      HISTORY OF PRESENT ILLNESS:  Rich Mello is a 46 y.o.  postmenopausal female with stage IIIC ovarian cancer. She underwent X-lap, hysterectomy, and tumor debulking in 2015. She was recommended adjuvant chemotherapy with 6 cycles of Taxol and Carboplatin. Her post-treatment PET/CT was negative for disease. Her My[facility] results also found her to have a deleterious BRCA 1 mutation. As for the BRCA 1 mutation, she was referred to one of our breast surgeons, Dr. Mariela Romo, to talk about options, specifically screening and prophylactic surgery. She is going to hold off on surgery for now. She has decided not to have her daughter tested at this time. She presented in December ahead of her scheduled visit complaining of left lower quadrant pain for several weeks. The pain was intermittent and crampy. She also had some pain with bowel movements. Certain foods also exacerbated it. I sent her for a CT of the abdomen/pelvis which showed no obvious disease. In late December she had a colonoscopy which could not be completed due to adhesions. A subsequent barium enema was negative. A subsequent CA-125 was mildly elevated at 55, up from 10 three months earlier. Her CA-125 is now up to 99 and her CT suggests recurrent disease, although not definitive for recurrence. Dr. Saleem Sotelo then recommended a PET/CT which confirmed recurrence. They discussed chemotherapy for her recurrence. She was clearly platinum sensitive, therefore was recommended a platinum-based regimen, specifically, Gemzar/Carboplatin. 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 She also wanted to keep her hair.   We also discussed options for down the line, specifically a PARP inhibitor, since she is BRCA positive. She completed 5 cycles of Gemzar/Carbo. She had a lot of difficulty with the first cycle, but the last few cycles were easier. She has trouble sleeping with the steroids. Her CA-125 has responded well, and went back to baseline. She met with Dr. Yamil Nelson in June to discuss the possibility of a PARP inhibitor and on July 22 nd, she began [place]  On July 28, she was having significant stomach pain and 'GI upsetment\" and her dose was lowered to 200 mg daily. She did do bettter on this, but on her labs on 8/4, she was noted with plt's of 93 and her [place] was held and labs re-checked on 8/10. At this time, plt's were 39. We continued to hold. She also was noted to have a rash on her lower legs. On August 12th, they began to rise to 50 and the rash began to improve. On 8/15, they were noted to be rising and were 105 and she was free of the rash   When she presented on 8/16, she said she \"felt miserable on that drug\" and says she felt like she had her worst round of chemotherapy. She stated she did not want to take it any more or at least take a \"good break\". 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 She waited until the first quarter of school was finished and then agreed to begin Zimbae. She began 250 mg Q12h on 11/22/17. She had some difficulties at first with nausea, but she says in the last week or so, this has improved. She only takes the Zofran occasionally and not daily. She continues to work, but says towards 2pm, she is feeling like she \"hit a wall\" and will go home and need a rest for about 30-45 minutes. She then says she feels better. Continues to walk daily. Overall, she is very pleased that she seems to be tolerating the Lymparza and that her CA-125 decreased so significantly (74 to 15)     Her  is with her and supportive.      She remains frustrated with her hair, although has grown back some, seems to have stopped growing and it is a very short hair style. ROS:    A comprehensive review of systems was negative except for that written in the History of Present Illness. , 10 point ROS      Problem List:  Patient Active Problem List    Diagnosis Date Noted    Noninfectious gastroenteritis 11/24/2017    Chemotherapy-induced neutropenia (HCC) 04/06/2017    Thrombocytopenia (HCC) 03/17/2017    CINV (chemotherapy-induced nausea and vomiting) 03/17/2017    Dehydration 03/17/2017    BRCA1 positive 09/10/2015    Malignant neoplasm of both ovaries ([facility] [address] 04/09/2015    S/P total abdominal hysterectomy 03/18/2015    Pelvic mass 03/06/2015        Medications: (reviewed)  Allergies: (reviewed)  Allergies   Allergen Reactions    Pcn [Penicillins] Rash          OBJECTIVE:  Visit Vitals    /70 (BP 1 Location: Right arm, BP Patient Position: Sitting)    Pulse 80    Ht 5' 7.01\" (1.702 m)    Wt 147 lb (66.7 kg)    LMP 02/02/2015 (Approximate)    BMI 23.02 kg/m2       Physical Exam:  GENERAL FILIBERTO: Conversant, alert, oriented. No acute distress   Port Site: Without redness, swelling or tenderness. HEENT: Sclera anicteric, pupils equal and reactive to light. Oral mucosa pink, moist   Neck No JVD. No adenopathy appreciated . RESPIRATORY: CTA bilat withotu wheezing or rales   CARDIOVASC: Regular without M/R/G   GASTROINT: Soft, NT/ND with + bowel sounds throughout. EXTREMITIES: With + pedal pulses bilat without edema or rash   NEURO: Grossly intact. No acute deficit.      Labs from 12/18    Lab Results   Component Value Date/Time    WBC 3.4 12/18/2017 08:25 AM    HGB 10.9 12/18/2017 08:25 AM    HCT 33.4 12/18/2017 08:25 AM    PLATELET 579 57/51/6688 08:25 AM    MCV 97 12/18/2017 08:25 AM     Lab Results   Component Value Date/Time    Sodium 143 12/18/2017 08:25 AM    Potassium 4.3 12/18/2017 08:25 AM    Chloride 104 12/18/2017 08:25 AM CO2 24 12/18/2017 08:25 AM    Anion gap 6 11/30/2017 05:03 PM    Glucose 61 12/18/2017 08:25 AM    BUN 15 12/18/2017 08:25 AM    Creatinine 1.05 12/18/2017 08:25 AM    BUN/Creatinine ratio 14 12/18/2017 08:25 AM    GFR est AA 71 12/18/2017 08:25 AM    GFR est non-AA 61 12/18/2017 08:25 AM    Calcium 9.6 12/18/2017 08:25 AM     CA-125 12/18/17=15.6 (11/30=74; 11/7=24)      IMAGING:  CT Abd/Pelvis with contrast 11/24/17  FINDINGS:      LUNG BASES: Clear. INCIDENTALLY IMAGED HEART AND MEDIASTINUM: The visualized heart is normal in  size without pericardial effusion. LIVER: Unremarkable. GALLBLADDER: Unremarkable. SPLEEN: Unremarkable. PANCREAS: No mass or duct dilation. ADRENALS: Unremarkable. KIDNEYS: No mass, calculus, or hydronephrosis. STOMACH: Unremarkable. SMALL BOWEL: No dilatation or wall thickening. COLON AND APPENDIX: There is inflammatory change and a thin collection of fluid  with peripheral enhancement along the lateral margin of the cecum with adjacent  cecal wall thickening. The appendix is positioned medially along the inferior  aspect of the appendix and does not appear dilated though there is some  stranding around the appendix. PERITONEUM: No ascites or pneumoperitoneum. RETROPERITONEUM: Atherosclerotic calcification within the aorta without  dissection or aneurysm. IVC filter in position. Left iliac vein stent occluded. REPRODUCTIVE ORGANS: Uterus and ovaries are surgically absent. URINARY BLADDER: No mass or calculus. BONES: No acute fracture or aggressive lesion. IMPRESSION: There is inflammatory change and thin minimal fluid collection  around the lateral margin of the cecum which is in confluence with mildly  inflamed appearing appendix suspicious for primary cecal information versus  sequela of ruptured appendicitis. This question is too small for percutaneous  drainage.  There is secondary thickening of the adjacent cecal wall for which  underlying neoplasm cannot be entirely excluded. CT of abdomen/pelvis (6/12/17)  No pleural effusion at the lung bases. Trace pericardial fluid. Small hiatal hernia.     LIVER/GALLBLADDER: No mass or biliary dilatation. No evidence of serosal  implant. There is no intrahepatic duct dilatation. The CBD is not dilated. There  is no hepatic parenchymal mass. Hepatic enhancement pattern is within normal  limits. Gallbladder within normal limits by CT criteria.     SPLEEN/PANCREAS: No mass. There is no pancreatic duct dilatation. There is no  evidence of splenomegaly.     ADRENALS/KIDNEYS: Lizama Comes is no adrenal mass.] There is no hydroureteronephrosis. There is no renal or ureteral calculus. There is no renal mass. There is no  perinephric mass.     COLON AND SMALL BOWEL: No dilatation or wall thickening. There is no obstruction  or free intraperitoneal air. There is no evidence of incarceration or  obstruction. Paracolic gutter soft tissue is not demonstrated when compared to  the prior examination 2/20/2017   Series 3 image 225 as index focus is correlates to imaging findings at series 3  image 64 of the current study. Series 3 image 246 just to the right of midline correlates to imaging findings  of series 3 image 79 on the current exam.  APPENDIX: Unremarkable.     BLADDER/REPRODUCTIVE ORGANS: No mass or calculus.     BONES/RETROPERITONEUM: No destructive bone lesion. . IVC filter in place. Left  iliac vein stent. The abdominal aorta is normal in caliber. No aneurysm. No  fracture. No retroperitoneal adenopathy. Trace ascites is resolved. Peritoneal  enhancement has resolved. No focal nodular enhancement or omental implant is  demonstrated. Subcutaneous venous collaterals. Status post hysterectomy. No  external iliac adenopathy. No pelvic adenopathy.     IMPRESSION:   Resolved ascites and peritoneal thickening/ enhancement.  Areas of thickened  peritoneum/soft tissue density identified on the PET examination of 2/20/2017  are not demonstrated on the current study. No focal nodular implants suggestive of peritoneal carcinomatosis.     No other significant interval change. IMPRESSION/PLAN:  Kev James is a 46 y.o. female with a diagnosis of stage IIIC ovarian cancer. She underwent tumor debulking followed by 6 cycles of Taxol and Carboplatin chemotherapy, which she completed approximately 18 months ago. She then had evidence of recurrent disease based upon symptoms, elevated CA-125, CT and PET/CT findings. She completed 5 cycles of Gemzar/Carboplatin    Her CA-125 has responded well, and improved significantly with her last draw on 12/18   She will continue on current dose of Rozetta Fairly and continue with current symptom management since it is working well for her.   Continue Xeralto  And management per PCP  She will call for any concerns or questions  Follow up in one month or sooner if necessary  Greater than 30 minutes was spent in face to face counseling/discussion      Signed By: Leticia Oconnor NP     12/20/2017/3:54 PM

## 2017-12-20 NOTE — PROGRESS NOTES
One month follow up on Lynparza. Pt c/o nasal congestion and using Afrin, denies fever or chills. The pt is no longer taking Neurontin, Decadron, or Compazine. She states the Zofran is not effective for the nausea.

## 2018-01-01 ENCOUNTER — DOCUMENTATION ONLY (OUTPATIENT)
Dept: PALLATIVE CARE | Age: 53
End: 2018-01-01

## 2018-01-01 ENCOUNTER — HOSPITAL ENCOUNTER (INPATIENT)
Age: 53
LOS: 2 days | Discharge: HOME | DRG: 392 | End: 2018-10-12
Attending: OBSTETRICS & GYNECOLOGY | Admitting: OBSTETRICS & GYNECOLOGY
Payer: COMMERCIAL

## 2018-01-01 ENCOUNTER — APPOINTMENT (OUTPATIENT)
Dept: INFUSION THERAPY | Age: 53
End: 2018-01-01

## 2018-01-01 ENCOUNTER — APPOINTMENT (OUTPATIENT)
Dept: ULTRASOUND IMAGING | Age: 53
DRG: 374 | End: 2018-01-01
Attending: NURSE PRACTITIONER
Payer: COMMERCIAL

## 2018-01-01 ENCOUNTER — TELEPHONE (OUTPATIENT)
Dept: PALLATIVE CARE | Age: 53
End: 2018-01-01

## 2018-01-01 ENCOUNTER — TELEPHONE (OUTPATIENT)
Dept: GYNECOLOGY | Age: 53
End: 2018-01-01

## 2018-01-01 ENCOUNTER — HOSPITAL ENCOUNTER (OUTPATIENT)
Dept: INFUSION THERAPY | Age: 53
Discharge: HOME | End: 2018-03-20
Payer: COMMERCIAL

## 2018-01-01 ENCOUNTER — APPOINTMENT (OUTPATIENT)
Dept: INFUSION THERAPY | Age: 53
End: 2018-01-01
Payer: COMMERCIAL

## 2018-01-01 ENCOUNTER — HOSPITAL ENCOUNTER (OUTPATIENT)
Dept: INFUSION THERAPY | Age: 53
Discharge: HOME | End: 2018-08-09
Payer: COMMERCIAL

## 2018-01-01 ENCOUNTER — APPOINTMENT (OUTPATIENT)
Dept: ULTRASOUND IMAGING | Age: 53
DRG: 392 | End: 2018-01-01
Attending: PHYSICIAN ASSISTANT
Payer: COMMERCIAL

## 2018-01-01 ENCOUNTER — OFFICE VISIT (OUTPATIENT)
Dept: GYNECOLOGY | Age: 53
End: 2018-01-01

## 2018-01-01 ENCOUNTER — HOSPITAL ENCOUNTER (OUTPATIENT)
Dept: CT IMAGING | Age: 53
Discharge: HOME | DRG: 374 | End: 2018-10-15
Attending: OBSTETRICS & GYNECOLOGY
Payer: COMMERCIAL

## 2018-01-01 ENCOUNTER — HOSPITAL ENCOUNTER (OUTPATIENT)
Dept: ULTRASOUND IMAGING | Age: 53
Discharge: HOME | End: 2018-04-27
Attending: NURSE PRACTITIONER
Payer: COMMERCIAL

## 2018-01-01 ENCOUNTER — OFFICE VISIT (OUTPATIENT)
Dept: PALLATIVE CARE | Age: 53
End: 2018-01-01

## 2018-01-01 ENCOUNTER — HOME CARE VISIT (OUTPATIENT)
Dept: HOSPICE | Facility: HOSPICE | Age: 53
End: 2018-01-01
Payer: COMMERCIAL

## 2018-01-01 ENCOUNTER — HOSPITAL ENCOUNTER (OUTPATIENT)
Dept: INFUSION THERAPY | Age: 53
Discharge: HOME | End: 2018-09-06
Payer: COMMERCIAL

## 2018-01-01 ENCOUNTER — TELEPHONE (OUTPATIENT)
Dept: ONCOLOGY | Age: 53
End: 2018-01-01

## 2018-01-01 ENCOUNTER — DOCUMENTATION ONLY (OUTPATIENT)
Dept: ONCOLOGY | Age: 53
End: 2018-01-01

## 2018-01-01 ENCOUNTER — HOME CARE VISIT (OUTPATIENT)
Dept: SCHEDULING | Facility: HOME HEALTH | Age: 53
End: 2018-01-01
Payer: COMMERCIAL

## 2018-01-01 ENCOUNTER — APPOINTMENT (OUTPATIENT)
Dept: CT IMAGING | Age: 53
DRG: 374 | End: 2018-01-01
Attending: PHYSICIAN ASSISTANT
Payer: COMMERCIAL

## 2018-01-01 ENCOUNTER — HOSPITAL ENCOUNTER (OUTPATIENT)
Dept: INFUSION THERAPY | Age: 53
Discharge: HOME | End: 2018-10-04
Payer: COMMERCIAL

## 2018-01-01 ENCOUNTER — DOCUMENTATION ONLY (OUTPATIENT)
Dept: GYNECOLOGY | Age: 53
End: 2018-01-01

## 2018-01-01 ENCOUNTER — HOSPITAL ENCOUNTER (OUTPATIENT)
Dept: ULTRASOUND IMAGING | Age: 53
Discharge: HOME | End: 2018-10-10
Attending: INTERNAL MEDICINE

## 2018-01-01 ENCOUNTER — OFFICE VISIT (OUTPATIENT)
Dept: INTERNAL MEDICINE CLINIC | Age: 53
End: 2018-01-01

## 2018-01-01 ENCOUNTER — NURSE NAVIGATOR (OUTPATIENT)
Dept: PALLATIVE CARE | Age: 53
End: 2018-01-01

## 2018-01-01 ENCOUNTER — APPOINTMENT (OUTPATIENT)
Dept: GENERAL RADIOLOGY | Age: 53
DRG: 392 | End: 2018-01-01
Attending: PHYSICIAN ASSISTANT
Payer: COMMERCIAL

## 2018-01-01 ENCOUNTER — HOSPITAL ENCOUNTER (OUTPATIENT)
Dept: INFUSION THERAPY | Age: 53
Discharge: HOME | End: 2018-07-26
Payer: COMMERCIAL

## 2018-01-01 ENCOUNTER — HOSPITAL ENCOUNTER (OUTPATIENT)
Dept: INFUSION THERAPY | Age: 53
Discharge: HOME | End: 2018-09-20
Payer: COMMERCIAL

## 2018-01-01 ENCOUNTER — HOSPITAL ENCOUNTER (OUTPATIENT)
Dept: INFUSION THERAPY | Age: 53
Discharge: HOME | End: 2018-10-18
Payer: COMMERCIAL

## 2018-01-01 ENCOUNTER — HOSPITAL ENCOUNTER (OUTPATIENT)
Dept: INFUSION THERAPY | Age: 53
Discharge: HOME | End: 2018-05-17
Payer: COMMERCIAL

## 2018-01-01 ENCOUNTER — APPOINTMENT (OUTPATIENT)
Dept: GENERAL RADIOLOGY | Age: 53
End: 2018-01-01
Attending: EMERGENCY MEDICINE
Payer: COMMERCIAL

## 2018-01-01 ENCOUNTER — HOSPITAL ENCOUNTER (OUTPATIENT)
Dept: NON INVASIVE DIAGNOSTICS | Age: 53
Discharge: HOME | End: 2018-07-11
Attending: OBSTETRICS & GYNECOLOGY
Payer: COMMERCIAL

## 2018-01-01 ENCOUNTER — NURSE NAVIGATOR (OUTPATIENT)
Dept: ONCOLOGY | Age: 53
End: 2018-01-01

## 2018-01-01 ENCOUNTER — HOSPITAL ENCOUNTER (OUTPATIENT)
Dept: INFUSION THERAPY | Age: 53
End: 2018-01-01

## 2018-01-01 ENCOUNTER — HOSPITAL ENCOUNTER (OUTPATIENT)
Dept: CT IMAGING | Age: 53
Discharge: HOME | End: 2018-04-02
Attending: OBSTETRICS & GYNECOLOGY
Payer: COMMERCIAL

## 2018-01-01 ENCOUNTER — HOSPITAL ENCOUNTER (OUTPATIENT)
Dept: CT IMAGING | Age: 53
Discharge: HOME | End: 2018-07-09
Attending: OBSTETRICS & GYNECOLOGY
Payer: COMMERCIAL

## 2018-01-01 ENCOUNTER — HOSPITAL ENCOUNTER (INPATIENT)
Age: 53
LOS: 27 days | Discharge: HOSPICE | DRG: 374 | End: 2018-11-12
Attending: OBSTETRICS & GYNECOLOGY | Admitting: OBSTETRICS & GYNECOLOGY
Payer: COMMERCIAL

## 2018-01-01 ENCOUNTER — HOSPICE ADMISSION (OUTPATIENT)
Dept: HOSPICE | Facility: HOSPICE | Age: 53
End: 2018-01-01
Payer: COMMERCIAL

## 2018-01-01 ENCOUNTER — HOSPITAL ENCOUNTER (OUTPATIENT)
Dept: INFUSION THERAPY | Age: 53
Discharge: HOME | End: 2018-08-23
Payer: COMMERCIAL

## 2018-01-01 ENCOUNTER — TELEPHONE (OUTPATIENT)
Dept: FAMILY MEDICINE CLINIC | Age: 53
End: 2018-01-01

## 2018-01-01 ENCOUNTER — ANESTHESIA (OUTPATIENT)
Dept: ENDOSCOPY | Age: 53
DRG: 392 | End: 2018-01-01
Payer: COMMERCIAL

## 2018-01-01 ENCOUNTER — APPOINTMENT (OUTPATIENT)
Dept: GENERAL RADIOLOGY | Age: 53
DRG: 374 | End: 2018-01-01
Attending: NURSE PRACTITIONER
Payer: COMMERCIAL

## 2018-01-01 ENCOUNTER — HOSPITAL ENCOUNTER (EMERGENCY)
Age: 53
Discharge: HOME | End: 2018-01-09
Attending: EMERGENCY MEDICINE
Payer: COMMERCIAL

## 2018-01-01 ENCOUNTER — ANESTHESIA EVENT (OUTPATIENT)
Dept: ENDOSCOPY | Age: 53
DRG: 392 | End: 2018-01-01
Payer: COMMERCIAL

## 2018-01-01 ENCOUNTER — HOSPITAL ENCOUNTER (OUTPATIENT)
Dept: INFUSION THERAPY | Age: 53
Discharge: HOME | End: 2018-08-15
Payer: COMMERCIAL

## 2018-01-01 ENCOUNTER — HOSPITAL ENCOUNTER (OUTPATIENT)
Dept: ULTRASOUND IMAGING | Age: 53
Discharge: HOME | End: 2018-07-20
Attending: PHYSICIAN ASSISTANT
Payer: COMMERCIAL

## 2018-01-01 ENCOUNTER — HOSPITAL ENCOUNTER (OUTPATIENT)
Dept: INFUSION THERAPY | Age: 53
Discharge: HOME | End: 2018-06-18
Payer: COMMERCIAL

## 2018-01-01 ENCOUNTER — HOSPITAL ENCOUNTER (OUTPATIENT)
Dept: MAMMOGRAPHY | Age: 53
Discharge: HOME | End: 2018-04-04
Payer: COMMERCIAL

## 2018-01-01 ENCOUNTER — APPOINTMENT (OUTPATIENT)
Dept: GENERAL RADIOLOGY | Age: 53
DRG: 374 | End: 2018-01-01
Attending: OBSTETRICS & GYNECOLOGY
Payer: COMMERCIAL

## 2018-01-01 ENCOUNTER — PATIENT MESSAGE (OUTPATIENT)
Dept: ONCOLOGY | Age: 53
End: 2018-01-01

## 2018-01-01 ENCOUNTER — HOSPITAL ENCOUNTER (OUTPATIENT)
Dept: INFUSION THERAPY | Age: 53
Discharge: HOME | End: 2018-01-25
Payer: COMMERCIAL

## 2018-01-01 VITALS
RESPIRATION RATE: 18 BRPM | WEIGHT: 129.8 LBS | BODY MASS INDEX: 19.67 KG/M2 | HEART RATE: 72 BPM | DIASTOLIC BLOOD PRESSURE: 48 MMHG | SYSTOLIC BLOOD PRESSURE: 93 MMHG | HEIGHT: 68 IN | TEMPERATURE: 98.2 F

## 2018-01-01 VITALS
DIASTOLIC BLOOD PRESSURE: 60 MMHG | HEART RATE: 87 BPM | SYSTOLIC BLOOD PRESSURE: 117 MMHG | HEIGHT: 68 IN | BODY MASS INDEX: 21.43 KG/M2 | WEIGHT: 141.4 LBS

## 2018-01-01 VITALS
DIASTOLIC BLOOD PRESSURE: 66 MMHG | SYSTOLIC BLOOD PRESSURE: 118 MMHG | TEMPERATURE: 98.9 F | HEART RATE: 92 BPM | RESPIRATION RATE: 16 BRPM | OXYGEN SATURATION: 95 %

## 2018-01-01 VITALS
DIASTOLIC BLOOD PRESSURE: 69 MMHG | HEIGHT: 68 IN | HEART RATE: 91 BPM | SYSTOLIC BLOOD PRESSURE: 123 MMHG | WEIGHT: 148.4 LBS | BODY MASS INDEX: 22.49 KG/M2

## 2018-01-01 VITALS
BODY MASS INDEX: 20.49 KG/M2 | WEIGHT: 135.2 LBS | HEIGHT: 68 IN | SYSTOLIC BLOOD PRESSURE: 129 MMHG | DIASTOLIC BLOOD PRESSURE: 59 MMHG | HEART RATE: 105 BPM

## 2018-01-01 VITALS
DIASTOLIC BLOOD PRESSURE: 65 MMHG | OXYGEN SATURATION: 100 % | HEART RATE: 93 BPM | WEIGHT: 148 LBS | BODY MASS INDEX: 22.43 KG/M2 | HEIGHT: 68 IN | SYSTOLIC BLOOD PRESSURE: 122 MMHG | TEMPERATURE: 97.5 F | RESPIRATION RATE: 18 BRPM

## 2018-01-01 VITALS
OXYGEN SATURATION: 98 % | RESPIRATION RATE: 17 BRPM | HEART RATE: 95 BPM | DIASTOLIC BLOOD PRESSURE: 74 MMHG | SYSTOLIC BLOOD PRESSURE: 113 MMHG

## 2018-01-01 VITALS
WEIGHT: 130 LBS | HEIGHT: 67 IN | DIASTOLIC BLOOD PRESSURE: 60 MMHG | RESPIRATION RATE: 14 BRPM | SYSTOLIC BLOOD PRESSURE: 84 MMHG | OXYGEN SATURATION: 96 % | HEART RATE: 84 BPM | BODY MASS INDEX: 20.4 KG/M2 | TEMPERATURE: 98.1 F

## 2018-01-01 VITALS — HEART RATE: 116 BPM | SYSTOLIC BLOOD PRESSURE: 110 MMHG | RESPIRATION RATE: 16 BRPM | DIASTOLIC BLOOD PRESSURE: 70 MMHG

## 2018-01-01 VITALS
TEMPERATURE: 97.6 F | HEART RATE: 111 BPM | WEIGHT: 138.2 LBS | SYSTOLIC BLOOD PRESSURE: 95 MMHG | BODY MASS INDEX: 20.95 KG/M2 | DIASTOLIC BLOOD PRESSURE: 68 MMHG | HEART RATE: 67 BPM | WEIGHT: 128.4 LBS | BODY MASS INDEX: 19.46 KG/M2 | SYSTOLIC BLOOD PRESSURE: 100 MMHG | HEIGHT: 68 IN | HEIGHT: 68 IN | DIASTOLIC BLOOD PRESSURE: 58 MMHG

## 2018-01-01 VITALS
SYSTOLIC BLOOD PRESSURE: 108 MMHG | DIASTOLIC BLOOD PRESSURE: 74 MMHG | BODY MASS INDEX: 19.49 KG/M2 | HEIGHT: 68 IN | WEIGHT: 128.6 LBS | HEART RATE: 94 BPM | RESPIRATION RATE: 18 BRPM | OXYGEN SATURATION: 97 % | TEMPERATURE: 96.1 F

## 2018-01-01 VITALS
DIASTOLIC BLOOD PRESSURE: 58 MMHG | SYSTOLIC BLOOD PRESSURE: 112 MMHG | HEIGHT: 68 IN | WEIGHT: 146.2 LBS | BODY MASS INDEX: 22.16 KG/M2 | HEART RATE: 77 BPM

## 2018-01-01 VITALS
DIASTOLIC BLOOD PRESSURE: 71 MMHG | RESPIRATION RATE: 16 BRPM | HEIGHT: 68 IN | SYSTOLIC BLOOD PRESSURE: 109 MMHG | HEART RATE: 80 BPM | WEIGHT: 130 LBS | TEMPERATURE: 96.7 F | BODY MASS INDEX: 19.7 KG/M2

## 2018-01-01 VITALS
HEIGHT: 68 IN | WEIGHT: 145.6 LBS | HEART RATE: 83 BPM | BODY MASS INDEX: 22.07 KG/M2 | DIASTOLIC BLOOD PRESSURE: 77 MMHG | SYSTOLIC BLOOD PRESSURE: 113 MMHG

## 2018-01-01 VITALS
RESPIRATION RATE: 16 BRPM | OXYGEN SATURATION: 92 % | HEART RATE: 98 BPM | DIASTOLIC BLOOD PRESSURE: 60 MMHG | SYSTOLIC BLOOD PRESSURE: 106 MMHG

## 2018-01-01 VITALS
SYSTOLIC BLOOD PRESSURE: 96 MMHG | BODY MASS INDEX: 19.97 KG/M2 | HEART RATE: 72 BPM | OXYGEN SATURATION: 100 % | DIASTOLIC BLOOD PRESSURE: 50 MMHG | HEIGHT: 68 IN | WEIGHT: 131.8 LBS | RESPIRATION RATE: 18 BRPM | TEMPERATURE: 97.9 F

## 2018-01-01 VITALS
RESPIRATION RATE: 18 BRPM | WEIGHT: 127.8 LBS | BODY MASS INDEX: 19.37 KG/M2 | SYSTOLIC BLOOD PRESSURE: 105 MMHG | HEART RATE: 79 BPM | DIASTOLIC BLOOD PRESSURE: 55 MMHG | HEIGHT: 68 IN

## 2018-01-01 VITALS — SYSTOLIC BLOOD PRESSURE: 128 MMHG | HEART RATE: 68 BPM | RESPIRATION RATE: 18 BRPM | DIASTOLIC BLOOD PRESSURE: 74 MMHG

## 2018-01-01 VITALS
DIASTOLIC BLOOD PRESSURE: 57 MMHG | RESPIRATION RATE: 16 BRPM | HEART RATE: 80 BPM | TEMPERATURE: 98 F | SYSTOLIC BLOOD PRESSURE: 110 MMHG

## 2018-01-01 VITALS
DIASTOLIC BLOOD PRESSURE: 62 MMHG | RESPIRATION RATE: 18 BRPM | HEIGHT: 68 IN | SYSTOLIC BLOOD PRESSURE: 110 MMHG | BODY MASS INDEX: 20.76 KG/M2 | TEMPERATURE: 97 F | WEIGHT: 137 LBS | HEART RATE: 81 BPM

## 2018-01-01 VITALS
SYSTOLIC BLOOD PRESSURE: 98 MMHG | TEMPERATURE: 97.7 F | HEART RATE: 98 BPM | DIASTOLIC BLOOD PRESSURE: 59 MMHG | RESPIRATION RATE: 16 BRPM

## 2018-01-01 VITALS
BODY MASS INDEX: 21.22 KG/M2 | DIASTOLIC BLOOD PRESSURE: 65 MMHG | HEART RATE: 95 BPM | HEIGHT: 68 IN | SYSTOLIC BLOOD PRESSURE: 114 MMHG | WEIGHT: 140 LBS

## 2018-01-01 VITALS
HEART RATE: 98 BPM | WEIGHT: 127 LBS | RESPIRATION RATE: 18 BRPM | BODY MASS INDEX: 19.93 KG/M2 | OXYGEN SATURATION: 99 % | SYSTOLIC BLOOD PRESSURE: 92 MMHG | HEIGHT: 67 IN | DIASTOLIC BLOOD PRESSURE: 50 MMHG

## 2018-01-01 VITALS — HEART RATE: 116 BPM | RESPIRATION RATE: 16 BRPM

## 2018-01-01 VITALS
BODY MASS INDEX: 19.55 KG/M2 | HEART RATE: 96 BPM | HEIGHT: 68 IN | WEIGHT: 129 LBS | SYSTOLIC BLOOD PRESSURE: 115 MMHG | DIASTOLIC BLOOD PRESSURE: 60 MMHG

## 2018-01-01 VITALS
DIASTOLIC BLOOD PRESSURE: 62 MMHG | SYSTOLIC BLOOD PRESSURE: 105 MMHG | HEART RATE: 83 BPM | RESPIRATION RATE: 16 BRPM | TEMPERATURE: 97 F

## 2018-01-01 VITALS
WEIGHT: 144.4 LBS | BODY MASS INDEX: 21.89 KG/M2 | HEART RATE: 89 BPM | DIASTOLIC BLOOD PRESSURE: 70 MMHG | HEIGHT: 68 IN | SYSTOLIC BLOOD PRESSURE: 118 MMHG

## 2018-01-01 VITALS
HEIGHT: 68 IN | WEIGHT: 129 LBS | BODY MASS INDEX: 19.55 KG/M2 | HEART RATE: 103 BPM | SYSTOLIC BLOOD PRESSURE: 99 MMHG | DIASTOLIC BLOOD PRESSURE: 69 MMHG

## 2018-01-01 VITALS — HEART RATE: 96 BPM | RESPIRATION RATE: 16 BRPM | SYSTOLIC BLOOD PRESSURE: 98 MMHG | DIASTOLIC BLOOD PRESSURE: 60 MMHG

## 2018-01-01 VITALS
HEART RATE: 83 BPM | TEMPERATURE: 96.8 F | RESPIRATION RATE: 18 BRPM | SYSTOLIC BLOOD PRESSURE: 121 MMHG | DIASTOLIC BLOOD PRESSURE: 75 MMHG

## 2018-01-01 VITALS
SYSTOLIC BLOOD PRESSURE: 142 MMHG | BODY MASS INDEX: 21.1 KG/M2 | HEART RATE: 105 BPM | WEIGHT: 139.2 LBS | DIASTOLIC BLOOD PRESSURE: 75 MMHG | HEIGHT: 68 IN

## 2018-01-01 VITALS
DIASTOLIC BLOOD PRESSURE: 72 MMHG | HEIGHT: 68 IN | BODY MASS INDEX: 19.37 KG/M2 | WEIGHT: 127.8 LBS | HEART RATE: 120 BPM | SYSTOLIC BLOOD PRESSURE: 102 MMHG

## 2018-01-01 VITALS
SYSTOLIC BLOOD PRESSURE: 124 MMHG | BODY MASS INDEX: 20.26 KG/M2 | OXYGEN SATURATION: 98 % | HEART RATE: 76 BPM | RESPIRATION RATE: 16 BRPM | DIASTOLIC BLOOD PRESSURE: 74 MMHG | WEIGHT: 131.3 LBS | TEMPERATURE: 98 F

## 2018-01-01 VITALS
SYSTOLIC BLOOD PRESSURE: 122 MMHG | OXYGEN SATURATION: 98 % | RESPIRATION RATE: 18 BRPM | DIASTOLIC BLOOD PRESSURE: 56 MMHG | HEART RATE: 78 BPM

## 2018-01-01 VITALS — BODY MASS INDEX: 19.95 KG/M2 | WEIGHT: 127.1 LBS | RESPIRATION RATE: 18 BRPM | HEIGHT: 67 IN

## 2018-01-01 VITALS
DIASTOLIC BLOOD PRESSURE: 66 MMHG | HEART RATE: 69 BPM | TEMPERATURE: 97.9 F | SYSTOLIC BLOOD PRESSURE: 104 MMHG | RESPIRATION RATE: 18 BRPM

## 2018-01-01 DIAGNOSIS — R10.84 ABDOMINAL PAIN, GENERALIZED: Primary | ICD-10-CM

## 2018-01-01 DIAGNOSIS — C78.6 PERITONEAL CARCINOMATOSIS (HCC): ICD-10-CM

## 2018-01-01 DIAGNOSIS — T45.1X5A ANEMIA DUE TO CHEMOTHERAPY: ICD-10-CM

## 2018-01-01 DIAGNOSIS — C56.3 MALIGNANT NEOPLASM OF BOTH OVARIES (HCC): ICD-10-CM

## 2018-01-01 DIAGNOSIS — R18.0 MALIGNANT ASCITES: ICD-10-CM

## 2018-01-01 DIAGNOSIS — F41.9 ANXIETY: ICD-10-CM

## 2018-01-01 DIAGNOSIS — R53.0 NEOPLASTIC MALIGNANT RELATED FATIGUE: ICD-10-CM

## 2018-01-01 DIAGNOSIS — R11.2 CINV (CHEMOTHERAPY-INDUCED NAUSEA AND VOMITING): ICD-10-CM

## 2018-01-01 DIAGNOSIS — C56.3 MALIGNANT NEOPLASM OF BOTH OVARIES (HCC): Primary | ICD-10-CM

## 2018-01-01 DIAGNOSIS — K52.1 CHEMOTHERAPY INDUCED DIARRHEA: Primary | ICD-10-CM

## 2018-01-01 DIAGNOSIS — R10.84 GENERALIZED ABDOMINAL PAIN: ICD-10-CM

## 2018-01-01 DIAGNOSIS — D69.6 THROMBOCYTOPENIA (HCC): Primary | ICD-10-CM

## 2018-01-01 DIAGNOSIS — Z15.09 BRCA1 POSITIVE: ICD-10-CM

## 2018-01-01 DIAGNOSIS — R18.0 MALIGNANT ASCITES: Primary | ICD-10-CM

## 2018-01-01 DIAGNOSIS — G89.3 PAIN DUE TO NEOPLASM: ICD-10-CM

## 2018-01-01 DIAGNOSIS — R00.0 TACHYCARDIA: ICD-10-CM

## 2018-01-01 DIAGNOSIS — R64 MALIGNANT CACHEXIA (HCC): ICD-10-CM

## 2018-01-01 DIAGNOSIS — F41.8 ANXIETY ABOUT HEALTH: ICD-10-CM

## 2018-01-01 DIAGNOSIS — G89.3 PAIN OF METASTATIC MALIGNANCY: ICD-10-CM

## 2018-01-01 DIAGNOSIS — T45.1X5A CINV (CHEMOTHERAPY-INDUCED NAUSEA AND VOMITING): ICD-10-CM

## 2018-01-01 DIAGNOSIS — C56.9 MALIGNANT NEOPLASM OF OVARY, UNSPECIFIED LATERALITY (HCC): ICD-10-CM

## 2018-01-01 DIAGNOSIS — G89.3 CANCER ASSOCIATED PAIN: ICD-10-CM

## 2018-01-01 DIAGNOSIS — D70.1 CHEMOTHERAPY-INDUCED NEUTROPENIA (HCC): Primary | ICD-10-CM

## 2018-01-01 DIAGNOSIS — T45.1X5A CHEMOTHERAPY-INDUCED PERIPHERAL NEUROPATHY (HCC): Primary | ICD-10-CM

## 2018-01-01 DIAGNOSIS — G62.0 CHEMOTHERAPY-INDUCED NEUROPATHY (HCC): ICD-10-CM

## 2018-01-01 DIAGNOSIS — C78.6 PERITONEAL CARCINOMATOSIS (HCC): Primary | ICD-10-CM

## 2018-01-01 DIAGNOSIS — K56.609 SBO (SMALL BOWEL OBSTRUCTION) (HCC): ICD-10-CM

## 2018-01-01 DIAGNOSIS — K56.609 SBO (SMALL BOWEL OBSTRUCTION) (HCC): Primary | ICD-10-CM

## 2018-01-01 DIAGNOSIS — Z12.39 BREAST CANCER SCREENING: ICD-10-CM

## 2018-01-01 DIAGNOSIS — K56.609 INTESTINAL OBSTRUCTION, UNSPECIFIED CAUSE, UNSPECIFIED WHETHER PARTIAL OR COMPLETE (HCC): ICD-10-CM

## 2018-01-01 DIAGNOSIS — Z15.01 BRCA1 POSITIVE: ICD-10-CM

## 2018-01-01 DIAGNOSIS — C56.9 MALIGNANT NEOPLASM OF OVARY, UNSPECIFIED LATERALITY (HCC): Primary | ICD-10-CM

## 2018-01-01 DIAGNOSIS — R11.0 NAUSEA: ICD-10-CM

## 2018-01-01 DIAGNOSIS — K31.84 GASTROPARESIS: ICD-10-CM

## 2018-01-01 DIAGNOSIS — R10.13 EPIGASTRIC PAIN: ICD-10-CM

## 2018-01-01 DIAGNOSIS — R18.0 ASCITES, MALIGNANT: ICD-10-CM

## 2018-01-01 DIAGNOSIS — G62.0 PERIPHERAL NEUROPATHY DUE TO CHEMOTHERAPY (HCC): ICD-10-CM

## 2018-01-01 DIAGNOSIS — G89.3 CANCER ASSOCIATED PAIN: Primary | ICD-10-CM

## 2018-01-01 DIAGNOSIS — C78.6 SECONDARY MALIGNANT NEOPLASM OF RETROPERITONEUM AND PERITONEUM (HCC): Primary | ICD-10-CM

## 2018-01-01 DIAGNOSIS — D69.6 THROMBOCYTOPENIA (HCC): ICD-10-CM

## 2018-01-01 DIAGNOSIS — G62.9 NEUROPATHY: ICD-10-CM

## 2018-01-01 DIAGNOSIS — Z79.01 ON ANTICOAGULANT THERAPY: ICD-10-CM

## 2018-01-01 DIAGNOSIS — F41.9 ANXIETY: Primary | ICD-10-CM

## 2018-01-01 DIAGNOSIS — T45.1X5A CHEMOTHERAPY-INDUCED NEUROPATHY (HCC): ICD-10-CM

## 2018-01-01 DIAGNOSIS — K59.03 DRUG-INDUCED CONSTIPATION: Primary | ICD-10-CM

## 2018-01-01 DIAGNOSIS — K59.01 SLOW TRANSIT CONSTIPATION: ICD-10-CM

## 2018-01-01 DIAGNOSIS — R11.2 INTRACTABLE VOMITING WITH NAUSEA, UNSPECIFIED VOMITING TYPE: ICD-10-CM

## 2018-01-01 DIAGNOSIS — Z86.718 HISTORY OF THROMBOEMBOLISM: ICD-10-CM

## 2018-01-01 DIAGNOSIS — K59.03 DRUG-INDUCED CONSTIPATION: ICD-10-CM

## 2018-01-01 DIAGNOSIS — D64.81 ANEMIA DUE TO CHEMOTHERAPY: ICD-10-CM

## 2018-01-01 DIAGNOSIS — T45.1X5A CHEMOTHERAPY INDUCED DIARRHEA: Primary | ICD-10-CM

## 2018-01-01 DIAGNOSIS — Z00.00 ROUTINE PHYSICAL EXAMINATION: Primary | ICD-10-CM

## 2018-01-01 DIAGNOSIS — R00.2 PALPITATIONS: Primary | ICD-10-CM

## 2018-01-01 DIAGNOSIS — G62.0 CHEMOTHERAPY-INDUCED PERIPHERAL NEUROPATHY (HCC): Primary | ICD-10-CM

## 2018-01-01 DIAGNOSIS — T45.1X5A CHEMOTHERAPY-INDUCED NEUTROPENIA (HCC): Primary | ICD-10-CM

## 2018-01-01 DIAGNOSIS — R06.02 SHORTNESS OF BREATH ON EXERTION: ICD-10-CM

## 2018-01-01 DIAGNOSIS — K59.00 CONSTIPATION, UNSPECIFIED CONSTIPATION TYPE: ICD-10-CM

## 2018-01-01 DIAGNOSIS — Z71.89 GOALS OF CARE, COUNSELING/DISCUSSION: ICD-10-CM

## 2018-01-01 DIAGNOSIS — T45.1X5A PERIPHERAL NEUROPATHY DUE TO CHEMOTHERAPY (HCC): ICD-10-CM

## 2018-01-01 DIAGNOSIS — E44.0 MODERATE MALNUTRITION (HCC): ICD-10-CM

## 2018-01-01 DIAGNOSIS — C78.6 PERITONEAL METASTASES (HCC): ICD-10-CM

## 2018-01-01 DIAGNOSIS — R06.02 SHORTNESS OF BREATH: ICD-10-CM

## 2018-01-01 DIAGNOSIS — Z79.899 ON PALLIATIVE ANTINEOPLASTIC CHEMOTHERAPY: ICD-10-CM

## 2018-01-01 DIAGNOSIS — R80.9 PROTEINURIA, UNSPECIFIED TYPE: Primary | ICD-10-CM

## 2018-01-01 DIAGNOSIS — R10.84 ABDOMINAL PAIN, GENERALIZED: ICD-10-CM

## 2018-01-01 LAB
ALBUMIN SERPL-MCNC: 1.9 G/DL (ref 3.5–5)
ALBUMIN SERPL-MCNC: 2 G/DL (ref 3.5–5)
ALBUMIN SERPL-MCNC: 2.3 G/DL (ref 3.5–5)
ALBUMIN SERPL-MCNC: 2.4 G/DL (ref 3.5–5)
ALBUMIN SERPL-MCNC: 2.4 G/DL (ref 3.5–5)
ALBUMIN SERPL-MCNC: 2.5 G/DL (ref 3.5–5)
ALBUMIN SERPL-MCNC: 2.5 G/DL (ref 3.5–5)
ALBUMIN SERPL-MCNC: 2.6 G/DL (ref 3.5–5)
ALBUMIN SERPL-MCNC: 2.7 G/DL (ref 3.5–5)
ALBUMIN SERPL-MCNC: 2.9 G/DL (ref 3.5–5)
ALBUMIN SERPL-MCNC: 3.6 G/DL (ref 3.5–5)
ALBUMIN SERPL-MCNC: 3.6 G/DL (ref 3.5–5)
ALBUMIN SERPL-MCNC: 3.7 G/DL (ref 3.5–5)
ALBUMIN SERPL-MCNC: 3.8 G/DL (ref 3.5–5)
ALBUMIN SERPL-MCNC: 3.9 G/DL (ref 3.5–5.5)
ALBUMIN SERPL-MCNC: 3.9 G/DL (ref 3.5–5.5)
ALBUMIN SERPL-MCNC: 4 G/DL (ref 3.5–5)
ALBUMIN SERPL-MCNC: 4.1 G/DL (ref 3.5–5.5)
ALBUMIN SERPL-MCNC: 4.2 G/DL (ref 3.5–5.5)
ALBUMIN SERPL-MCNC: 4.2 G/DL (ref 3.5–5.5)
ALBUMIN SERPL-MCNC: 4.3 G/DL (ref 3.5–5.5)
ALBUMIN SERPL-MCNC: 4.5 G/DL (ref 3.5–5.5)
ALBUMIN SERPL-MCNC: 4.6 G/DL (ref 3.5–5.5)
ALBUMIN SERPL-MCNC: 4.9 G/DL (ref 3.5–5.5)
ALBUMIN/GLOB SERPL: 0.5 {RATIO} (ref 1.1–2.2)
ALBUMIN/GLOB SERPL: 0.6 {RATIO} (ref 1.1–2.2)
ALBUMIN/GLOB SERPL: 0.7 {RATIO} (ref 1.1–2.2)
ALBUMIN/GLOB SERPL: 0.9 {RATIO} (ref 1.1–2.2)
ALBUMIN/GLOB SERPL: 1 {RATIO} (ref 1.1–2.2)
ALBUMIN/GLOB SERPL: 1.1 {RATIO} (ref 1.1–2.2)
ALBUMIN/GLOB SERPL: 1.1 {RATIO} (ref 1.1–2.2)
ALBUMIN/GLOB SERPL: 1.3 {RATIO} (ref 1.1–2.2)
ALBUMIN/GLOB SERPL: 1.5 {RATIO} (ref 1.2–2.2)
ALBUMIN/GLOB SERPL: 1.6 {RATIO} (ref 1.2–2.2)
ALBUMIN/GLOB SERPL: 1.7 {RATIO} (ref 1.2–2.2)
ALBUMIN/GLOB SERPL: 1.8 {RATIO} (ref 1.2–2.2)
ALBUMIN/GLOB SERPL: 2 {RATIO} (ref 1.2–2.2)
ALP SERPL-CCNC: 107 U/L (ref 45–117)
ALP SERPL-CCNC: 115 U/L (ref 45–117)
ALP SERPL-CCNC: 119 U/L (ref 45–117)
ALP SERPL-CCNC: 120 IU/L (ref 39–117)
ALP SERPL-CCNC: 122 IU/L (ref 39–117)
ALP SERPL-CCNC: 126 U/L (ref 45–117)
ALP SERPL-CCNC: 129 U/L (ref 45–117)
ALP SERPL-CCNC: 136 U/L (ref 45–117)
ALP SERPL-CCNC: 136 U/L (ref 45–117)
ALP SERPL-CCNC: 145 U/L (ref 45–117)
ALP SERPL-CCNC: 150 U/L (ref 45–117)
ALP SERPL-CCNC: 153 U/L (ref 45–117)
ALP SERPL-CCNC: 154 IU/L (ref 39–117)
ALP SERPL-CCNC: 157 U/L (ref 45–117)
ALP SERPL-CCNC: 159 U/L (ref 45–117)
ALP SERPL-CCNC: 168 IU/L (ref 39–117)
ALP SERPL-CCNC: 169 U/L (ref 45–117)
ALP SERPL-CCNC: 201 IU/L (ref 39–117)
ALP SERPL-CCNC: 227 U/L (ref 45–117)
ALP SERPL-CCNC: 297 IU/L (ref 39–117)
ALP SERPL-CCNC: 303 IU/L (ref 39–117)
ALP SERPL-CCNC: 367 IU/L (ref 39–117)
ALP SERPL-CCNC: 71 U/L (ref 45–117)
ALP SERPL-CCNC: 76 U/L (ref 45–117)
ALP SERPL-CCNC: 77 U/L (ref 45–117)
ALP SERPL-CCNC: 78 U/L (ref 45–117)
ALP SERPL-CCNC: 84 U/L (ref 45–117)
ALP SERPL-CCNC: 85 IU/L (ref 39–117)
ALP SERPL-CCNC: 90 U/L (ref 45–117)
ALP SERPL-CCNC: 92 U/L (ref 45–117)
ALP SERPL-CCNC: 99 U/L (ref 45–117)
ALT SERPL-CCNC: 103 U/L (ref 12–78)
ALT SERPL-CCNC: 105 U/L (ref 12–78)
ALT SERPL-CCNC: 16 U/L (ref 12–78)
ALT SERPL-CCNC: 17 U/L (ref 12–78)
ALT SERPL-CCNC: 174 U/L (ref 12–78)
ALT SERPL-CCNC: 18 U/L (ref 12–78)
ALT SERPL-CCNC: 19 IU/L (ref 0–32)
ALT SERPL-CCNC: 19 U/L (ref 12–78)
ALT SERPL-CCNC: 20 IU/L (ref 0–32)
ALT SERPL-CCNC: 24 IU/L (ref 0–32)
ALT SERPL-CCNC: 264 U/L (ref 12–78)
ALT SERPL-CCNC: 28 U/L (ref 12–78)
ALT SERPL-CCNC: 282 U/L (ref 12–78)
ALT SERPL-CCNC: 29 U/L (ref 12–78)
ALT SERPL-CCNC: 293 U/L (ref 12–78)
ALT SERPL-CCNC: 310 U/L (ref 12–78)
ALT SERPL-CCNC: 34 IU/L (ref 0–32)
ALT SERPL-CCNC: 35 IU/L (ref 0–32)
ALT SERPL-CCNC: 37 IU/L (ref 0–32)
ALT SERPL-CCNC: 37 U/L (ref 12–78)
ALT SERPL-CCNC: 44 U/L (ref 12–78)
ALT SERPL-CCNC: 57 U/L (ref 12–78)
ALT SERPL-CCNC: 62 IU/L (ref 0–32)
ALT SERPL-CCNC: 66 U/L (ref 12–78)
ALT SERPL-CCNC: 74 U/L (ref 12–78)
ALT SERPL-CCNC: 75 IU/L (ref 0–32)
ALT SERPL-CCNC: 75 U/L (ref 12–78)
ALT SERPL-CCNC: 91 U/L (ref 12–78)
ALT SERPL-CCNC: 94 IU/L (ref 0–32)
ANION GAP SERPL CALC-SCNC: 10 MMOL/L (ref 5–15)
ANION GAP SERPL CALC-SCNC: 10 MMOL/L (ref 5–15)
ANION GAP SERPL CALC-SCNC: 11 MMOL/L (ref 5–15)
ANION GAP SERPL CALC-SCNC: 12 MMOL/L (ref 5–15)
ANION GAP SERPL CALC-SCNC: 14 MMOL/L (ref 5–15)
ANION GAP SERPL CALC-SCNC: 6 MMOL/L (ref 5–15)
ANION GAP SERPL CALC-SCNC: 6 MMOL/L (ref 5–15)
ANION GAP SERPL CALC-SCNC: 7 MMOL/L (ref 5–15)
ANION GAP SERPL CALC-SCNC: 8 MMOL/L (ref 5–15)
ANION GAP SERPL CALC-SCNC: 9 MMOL/L (ref 5–15)
APPEARANCE UR: ABNORMAL
APPEARANCE UR: CLEAR
AST SERPL-CCNC: 105 U/L (ref 15–37)
AST SERPL-CCNC: 114 U/L (ref 15–37)
AST SERPL-CCNC: 12 U/L (ref 15–37)
AST SERPL-CCNC: 12 U/L (ref 15–37)
AST SERPL-CCNC: 13 U/L (ref 15–37)
AST SERPL-CCNC: 14 U/L (ref 15–37)
AST SERPL-CCNC: 14 U/L (ref 15–37)
AST SERPL-CCNC: 148 U/L (ref 15–37)
AST SERPL-CCNC: 15 U/L (ref 15–37)
AST SERPL-CCNC: 16 U/L (ref 15–37)
AST SERPL-CCNC: 16 U/L (ref 15–37)
AST SERPL-CCNC: 18 IU/L (ref 0–40)
AST SERPL-CCNC: 18 U/L (ref 15–37)
AST SERPL-CCNC: 19 U/L (ref 15–37)
AST SERPL-CCNC: 199 U/L (ref 15–37)
AST SERPL-CCNC: 20 IU/L (ref 0–40)
AST SERPL-CCNC: 20 U/L (ref 15–37)
AST SERPL-CCNC: 21 U/L (ref 15–37)
AST SERPL-CCNC: 22 IU/L (ref 0–40)
AST SERPL-CCNC: 25 IU/L (ref 0–40)
AST SERPL-CCNC: 28 U/L (ref 15–37)
AST SERPL-CCNC: 35 IU/L (ref 0–40)
AST SERPL-CCNC: 36 U/L (ref 15–37)
AST SERPL-CCNC: 38 U/L (ref 15–37)
AST SERPL-CCNC: 43 IU/L (ref 0–40)
AST SERPL-CCNC: 43 IU/L (ref 0–40)
AST SERPL-CCNC: 53 IU/L (ref 0–40)
AST SERPL-CCNC: 85 U/L (ref 15–37)
AST SERPL-CCNC: 91 IU/L (ref 0–40)
ATRIAL RATE: 79 BPM
BACTERIA #/AREA URNS HPF: ABNORMAL /[HPF]
BACTERIA #/AREA URNS HPF: NORMAL /[HPF]
BACTERIA #/AREA URNS HPF: NORMAL /[HPF]
BACTERIA SPEC CULT: ABNORMAL
BACTERIA SPEC CULT: ABNORMAL
BACTERIA URNS QL MICRO: ABNORMAL /HPF
BACTERIA URNS QL MICRO: NEGATIVE /HPF
BASOPHILS # BLD AUTO: 0 X10E3/UL (ref 0–0.2)
BASOPHILS # BLD: 0 K/UL (ref 0–0.1)
BASOPHILS # BLD: 0.1 K/UL (ref 0–0.1)
BASOPHILS NFR BLD AUTO: 0 %
BASOPHILS NFR BLD AUTO: 1 %
BASOPHILS NFR BLD: 0 % (ref 0–1)
BASOPHILS NFR BLD: 1 % (ref 0–1)
BILIRUB SERPL-MCNC: 0.1 MG/DL (ref 0.2–1)
BILIRUB SERPL-MCNC: 0.2 MG/DL (ref 0.2–1)
BILIRUB SERPL-MCNC: 0.2 MG/DL (ref 0–1.2)
BILIRUB SERPL-MCNC: 0.2 MG/DL (ref 0–1.2)
BILIRUB SERPL-MCNC: 0.3 MG/DL (ref 0.2–1)
BILIRUB SERPL-MCNC: 0.3 MG/DL (ref 0–1.2)
BILIRUB SERPL-MCNC: 0.3 MG/DL (ref 0–1.2)
BILIRUB SERPL-MCNC: 0.4 MG/DL (ref 0.2–1)
BILIRUB SERPL-MCNC: 0.5 MG/DL (ref 0.2–1)
BILIRUB SERPL-MCNC: 0.5 MG/DL (ref 0.2–1)
BILIRUB SERPL-MCNC: 0.7 MG/DL (ref 0.2–1)
BILIRUB SERPL-MCNC: <0.2 MG/DL (ref 0–1.2)
BILIRUB UR QL STRIP: NEGATIVE
BILIRUB UR QL: NEGATIVE
BILIRUB UR QL: NEGATIVE
BUN SERPL-MCNC: 10 MG/DL (ref 6–20)
BUN SERPL-MCNC: 11 MG/DL (ref 6–20)
BUN SERPL-MCNC: 11 MG/DL (ref 6–20)
BUN SERPL-MCNC: 11 MG/DL (ref 6–24)
BUN SERPL-MCNC: 12 MG/DL (ref 6–24)
BUN SERPL-MCNC: 12 MG/DL (ref 6–24)
BUN SERPL-MCNC: 13 MG/DL (ref 6–20)
BUN SERPL-MCNC: 14 MG/DL (ref 6–20)
BUN SERPL-MCNC: 14 MG/DL (ref 6–20)
BUN SERPL-MCNC: 14 MG/DL (ref 6–24)
BUN SERPL-MCNC: 16 MG/DL (ref 6–20)
BUN SERPL-MCNC: 16 MG/DL (ref 6–24)
BUN SERPL-MCNC: 17 MG/DL (ref 6–20)
BUN SERPL-MCNC: 17 MG/DL (ref 6–24)
BUN SERPL-MCNC: 18 MG/DL (ref 6–20)
BUN SERPL-MCNC: 18 MG/DL (ref 6–20)
BUN SERPL-MCNC: 19 MG/DL (ref 6–20)
BUN SERPL-MCNC: 19 MG/DL (ref 6–20)
BUN SERPL-MCNC: 20 MG/DL (ref 6–20)
BUN SERPL-MCNC: 21 MG/DL (ref 6–20)
BUN SERPL-MCNC: 23 MG/DL (ref 6–20)
BUN SERPL-MCNC: 23 MG/DL (ref 6–20)
BUN SERPL-MCNC: 24 MG/DL (ref 6–20)
BUN SERPL-MCNC: 25 MG/DL (ref 6–20)
BUN SERPL-MCNC: 27 MG/DL (ref 6–20)
BUN SERPL-MCNC: 27 MG/DL (ref 6–20)
BUN SERPL-MCNC: 28 MG/DL (ref 6–20)
BUN SERPL-MCNC: 8 MG/DL (ref 6–20)
BUN/CREAT SERPL: 10 (ref 9–23)
BUN/CREAT SERPL: 14 (ref 12–20)
BUN/CREAT SERPL: 14 (ref 9–23)
BUN/CREAT SERPL: 14 (ref 9–23)
BUN/CREAT SERPL: 15 (ref 12–20)
BUN/CREAT SERPL: 16 (ref 12–20)
BUN/CREAT SERPL: 16 (ref 9–23)
BUN/CREAT SERPL: 16 (ref 9–23)
BUN/CREAT SERPL: 17 (ref 9–23)
BUN/CREAT SERPL: 17 (ref 9–23)
BUN/CREAT SERPL: 18 (ref 12–20)
BUN/CREAT SERPL: 18 (ref 12–20)
BUN/CREAT SERPL: 18 (ref 9–23)
BUN/CREAT SERPL: 18 (ref 9–23)
BUN/CREAT SERPL: 19 (ref 12–20)
BUN/CREAT SERPL: 21 (ref 12–20)
BUN/CREAT SERPL: 23 (ref 12–20)
BUN/CREAT SERPL: 25 (ref 12–20)
BUN/CREAT SERPL: 34 (ref 12–20)
BUN/CREAT SERPL: 35 (ref 12–20)
BUN/CREAT SERPL: 39 (ref 12–20)
BUN/CREAT SERPL: 41 (ref 12–20)
BUN/CREAT SERPL: 42 (ref 12–20)
BUN/CREAT SERPL: 44 (ref 12–20)
BUN/CREAT SERPL: 49 (ref 12–20)
BUN/CREAT SERPL: 51 (ref 12–20)
BUN/CREAT SERPL: 54 (ref 12–20)
BUN/CREAT SERPL: 57 (ref 12–20)
BUN/CREAT SERPL: 59 (ref 12–20)
CALCIUM SERPL-MCNC: 10 MG/DL (ref 8.7–10.2)
CALCIUM SERPL-MCNC: 8 MG/DL (ref 8.5–10.1)
CALCIUM SERPL-MCNC: 8.1 MG/DL (ref 8.5–10.1)
CALCIUM SERPL-MCNC: 8.1 MG/DL (ref 8.5–10.1)
CALCIUM SERPL-MCNC: 8.2 MG/DL (ref 8.5–10.1)
CALCIUM SERPL-MCNC: 8.3 MG/DL (ref 8.5–10.1)
CALCIUM SERPL-MCNC: 8.3 MG/DL (ref 8.5–10.1)
CALCIUM SERPL-MCNC: 8.5 MG/DL (ref 8.5–10.1)
CALCIUM SERPL-MCNC: 8.6 MG/DL (ref 8.5–10.1)
CALCIUM SERPL-MCNC: 8.7 MG/DL (ref 8.5–10.1)
CALCIUM SERPL-MCNC: 8.7 MG/DL (ref 8.5–10.1)
CALCIUM SERPL-MCNC: 8.8 MG/DL (ref 8.5–10.1)
CALCIUM SERPL-MCNC: 8.9 MG/DL (ref 8.5–10.1)
CALCIUM SERPL-MCNC: 9 MG/DL (ref 8.5–10.1)
CALCIUM SERPL-MCNC: 9 MG/DL (ref 8.5–10.1)
CALCIUM SERPL-MCNC: 9.1 MG/DL (ref 8.5–10.1)
CALCIUM SERPL-MCNC: 9.2 MG/DL (ref 8.5–10.1)
CALCIUM SERPL-MCNC: 9.2 MG/DL (ref 8.7–10.2)
CALCIUM SERPL-MCNC: 9.2 MG/DL (ref 8.7–10.2)
CALCIUM SERPL-MCNC: 9.3 MG/DL (ref 8.5–10.1)
CALCIUM SERPL-MCNC: 9.3 MG/DL (ref 8.7–10.2)
CALCIUM SERPL-MCNC: 9.3 MG/DL (ref 8.7–10.2)
CALCIUM SERPL-MCNC: 9.4 MG/DL (ref 8.7–10.2)
CALCIUM SERPL-MCNC: 9.5 MG/DL (ref 8.7–10.2)
CALCIUM SERPL-MCNC: 9.6 MG/DL (ref 8.7–10.2)
CALCIUM SERPL-MCNC: 9.6 MG/DL (ref 8.7–10.2)
CALCULATED P AXIS, ECG09: 74 DEGREES
CALCULATED R AXIS, ECG10: 55 DEGREES
CALCULATED T AXIS, ECG11: 36 DEGREES
CANCER AG125 SERPL-ACNC: 314.7 U/ML (ref 0–38.1)
CANCER AG125 SERPL-ACNC: 319 U/ML (ref 1.5–35)
CANCER AG125 SERPL-ACNC: 319 U/ML (ref 1.5–35)
CANCER AG125 SERPL-ACNC: 321.5 U/ML (ref 0–38.1)
CANCER AG125 SERPL-ACNC: 406 U/ML (ref 1.5–35)
CANCER AG125 SERPL-ACNC: 41 U/ML (ref 0–30)
CANCER AG125 SERPL-ACNC: 558.7 U/ML (ref 0–38.1)
CANCER AG125 SERPL-ACNC: 591.6 U/ML (ref 0–38.1)
CANCER AG125 SERPL-ACNC: 613.7 U/ML (ref 0–38.1)
CANCER AG125 SERPL-ACNC: 641.8 U/ML (ref 0–38.1)
CANCER AG125 SERPL-ACNC: 726.9 U/ML (ref 0–38.1)
CANCER AG125 SERPL-ACNC: 754.1 U/ML (ref 0–38.1)
CANCER AG125 SERPL-ACNC: <4 U/ML (ref 0–30)
CASTS URNS QL MICRO: ABNORMAL /LPF
CASTS URNS QL MICRO: NORMAL /LPF
CASTS URNS QL MICRO: NORMAL /LPF
CC UR VC: ABNORMAL
CHLORIDE SERPL-SCNC: 100 MMOL/L (ref 96–106)
CHLORIDE SERPL-SCNC: 101 MMOL/L (ref 96–106)
CHLORIDE SERPL-SCNC: 101 MMOL/L (ref 96–106)
CHLORIDE SERPL-SCNC: 102 MMOL/L (ref 96–106)
CHLORIDE SERPL-SCNC: 102 MMOL/L (ref 96–106)
CHLORIDE SERPL-SCNC: 102 MMOL/L (ref 97–108)
CHLORIDE SERPL-SCNC: 103 MMOL/L (ref 97–108)
CHLORIDE SERPL-SCNC: 103 MMOL/L (ref 97–108)
CHLORIDE SERPL-SCNC: 104 MMOL/L (ref 96–106)
CHLORIDE SERPL-SCNC: 104 MMOL/L (ref 97–108)
CHLORIDE SERPL-SCNC: 105 MMOL/L (ref 97–108)
CHLORIDE SERPL-SCNC: 106 MMOL/L (ref 97–108)
CHLORIDE SERPL-SCNC: 107 MMOL/L (ref 97–108)
CHLORIDE SERPL-SCNC: 108 MMOL/L (ref 97–108)
CHLORIDE SERPL-SCNC: 109 MMOL/L (ref 97–108)
CHLORIDE SERPL-SCNC: 109 MMOL/L (ref 97–108)
CHLORIDE SERPL-SCNC: 98 MMOL/L (ref 96–106)
CHLORIDE SERPL-SCNC: 99 MMOL/L (ref 96–106)
CHLORIDE SERPL-SCNC: 99 MMOL/L (ref 96–106)
CK MB CFR SERPL CALC: NORMAL % (ref 0–2.5)
CK MB SERPL-MCNC: <1 NG/ML (ref 5–25)
CK SERPL-CCNC: 72 U/L (ref 26–192)
CO2 SERPL-SCNC: 19 MMOL/L (ref 21–32)
CO2 SERPL-SCNC: 21 MMOL/L (ref 21–32)
CO2 SERPL-SCNC: 22 MMOL/L (ref 21–32)
CO2 SERPL-SCNC: 23 MMOL/L (ref 18–29)
CO2 SERPL-SCNC: 23 MMOL/L (ref 20–29)
CO2 SERPL-SCNC: 23 MMOL/L (ref 20–29)
CO2 SERPL-SCNC: 23 MMOL/L (ref 21–32)
CO2 SERPL-SCNC: 24 MMOL/L (ref 18–29)
CO2 SERPL-SCNC: 24 MMOL/L (ref 18–29)
CO2 SERPL-SCNC: 24 MMOL/L (ref 21–32)
CO2 SERPL-SCNC: 25 MMOL/L (ref 18–29)
CO2 SERPL-SCNC: 25 MMOL/L (ref 20–29)
CO2 SERPL-SCNC: 26 MMOL/L (ref 18–29)
CO2 SERPL-SCNC: 26 MMOL/L (ref 18–29)
CO2 SERPL-SCNC: 26 MMOL/L (ref 21–32)
CO2 SERPL-SCNC: 27 MMOL/L (ref 21–32)
CO2 SERPL-SCNC: 27 MMOL/L (ref 21–32)
COLOR UR: ABNORMAL
COLOR UR: ABNORMAL
COLOR UR: YELLOW
CREAT SERPL-MCNC: 0.37 MG/DL (ref 0.55–1.02)
CREAT SERPL-MCNC: 0.41 MG/DL (ref 0.55–1.02)
CREAT SERPL-MCNC: 0.49 MG/DL (ref 0.55–1.02)
CREAT SERPL-MCNC: 0.52 MG/DL (ref 0.55–1.02)
CREAT SERPL-MCNC: 0.52 MG/DL (ref 0.55–1.02)
CREAT SERPL-MCNC: 0.53 MG/DL (ref 0.55–1.02)
CREAT SERPL-MCNC: 0.54 MG/DL (ref 0.55–1.02)
CREAT SERPL-MCNC: 0.54 MG/DL (ref 0.55–1.02)
CREAT SERPL-MCNC: 0.55 MG/DL (ref 0.55–1.02)
CREAT SERPL-MCNC: 0.56 MG/DL (ref 0.55–1.02)
CREAT SERPL-MCNC: 0.59 MG/DL (ref 0.55–1.02)
CREAT SERPL-MCNC: 0.6 MG/DL (ref 0.55–1.02)
CREAT SERPL-MCNC: 0.63 MG/DL (ref 0.55–1.02)
CREAT SERPL-MCNC: 0.69 MG/DL (ref 0.55–1.02)
CREAT SERPL-MCNC: 0.72 MG/DL (ref 0.55–1.02)
CREAT SERPL-MCNC: 0.75 MG/DL (ref 0.57–1)
CREAT SERPL-MCNC: 0.77 MG/DL (ref 0.55–1.02)
CREAT SERPL-MCNC: 0.79 MG/DL (ref 0.57–1)
CREAT SERPL-MCNC: 0.79 MG/DL (ref 0.57–1)
CREAT SERPL-MCNC: 0.82 MG/DL (ref 0.57–1)
CREAT SERPL-MCNC: 0.88 MG/DL (ref 0.55–1.02)
CREAT SERPL-MCNC: 0.89 MG/DL (ref 0.55–1.02)
CREAT SERPL-MCNC: 0.9 MG/DL (ref 0.57–1)
CREAT SERPL-MCNC: 0.9 MG/DL (ref 0.57–1)
CREAT SERPL-MCNC: 0.98 MG/DL (ref 0.57–1)
CREAT SERPL-MCNC: 1.02 MG/DL (ref 0.57–1)
CREAT SERPL-MCNC: 1.03 MG/DL (ref 0.55–1.02)
CREAT SERPL-MCNC: 1.04 MG/DL (ref 0.55–1.02)
CREAT SERPL-MCNC: 1.15 MG/DL (ref 0.57–1)
CRYSTALS URNS MICRO: ABNORMAL
DIAGNOSIS, 93000: NORMAL
DIFFERENTIAL METHOD BLD: ABNORMAL
EOSINOPHIL # BLD AUTO: 0 X10E3/UL (ref 0–0.4)
EOSINOPHIL # BLD AUTO: 0.1 X10E3/UL (ref 0–0.4)
EOSINOPHIL # BLD: 0 K/UL (ref 0–0.4)
EOSINOPHIL # BLD: 0.1 K/UL (ref 0–0.4)
EOSINOPHIL # BLD: 0.1 K/UL (ref 0–0.4)
EOSINOPHIL NFR BLD AUTO: 0 %
EOSINOPHIL NFR BLD AUTO: 1 %
EOSINOPHIL NFR BLD: 0 % (ref 0–7)
EOSINOPHIL NFR BLD: 1 % (ref 0–7)
EOSINOPHIL NFR BLD: 2 % (ref 0–7)
EPI CELLS #/AREA URNS HPF: ABNORMAL /HPF
EPI CELLS #/AREA URNS HPF: NORMAL /HPF
EPI CELLS #/AREA URNS HPF: NORMAL /HPF
EPITH CASTS URNS QL MICRO: ABNORMAL /LPF
EPITH CASTS URNS QL MICRO: ABNORMAL /LPF
ERYTHROCYTE [DISTWIDTH] IN BLOOD BY AUTOMATED COUNT: 12.5 % (ref 11.5–14.5)
ERYTHROCYTE [DISTWIDTH] IN BLOOD BY AUTOMATED COUNT: 12.6 % (ref 11.5–14.5)
ERYTHROCYTE [DISTWIDTH] IN BLOOD BY AUTOMATED COUNT: 12.7 % (ref 12.3–15.4)
ERYTHROCYTE [DISTWIDTH] IN BLOOD BY AUTOMATED COUNT: 12.9 % (ref 12.3–15.4)
ERYTHROCYTE [DISTWIDTH] IN BLOOD BY AUTOMATED COUNT: 12.9 % (ref 12.3–15.4)
ERYTHROCYTE [DISTWIDTH] IN BLOOD BY AUTOMATED COUNT: 13 % (ref 11.5–14.5)
ERYTHROCYTE [DISTWIDTH] IN BLOOD BY AUTOMATED COUNT: 13.2 % (ref 11.5–14.5)
ERYTHROCYTE [DISTWIDTH] IN BLOOD BY AUTOMATED COUNT: 13.4 % (ref 11.5–14.5)
ERYTHROCYTE [DISTWIDTH] IN BLOOD BY AUTOMATED COUNT: 13.4 % (ref 11.5–14.5)
ERYTHROCYTE [DISTWIDTH] IN BLOOD BY AUTOMATED COUNT: 13.5 % (ref 11.5–14.5)
ERYTHROCYTE [DISTWIDTH] IN BLOOD BY AUTOMATED COUNT: 13.7 % (ref 12.3–15.4)
ERYTHROCYTE [DISTWIDTH] IN BLOOD BY AUTOMATED COUNT: 13.8 % (ref 11.5–14.5)
ERYTHROCYTE [DISTWIDTH] IN BLOOD BY AUTOMATED COUNT: 14 % (ref 11.5–14.5)
ERYTHROCYTE [DISTWIDTH] IN BLOOD BY AUTOMATED COUNT: 14.3 % (ref 11.5–14.5)
ERYTHROCYTE [DISTWIDTH] IN BLOOD BY AUTOMATED COUNT: 14.3 % (ref 11.5–14.5)
ERYTHROCYTE [DISTWIDTH] IN BLOOD BY AUTOMATED COUNT: 14.3 % (ref 12.3–15.4)
ERYTHROCYTE [DISTWIDTH] IN BLOOD BY AUTOMATED COUNT: 14.8 % (ref 11.5–14.5)
ERYTHROCYTE [DISTWIDTH] IN BLOOD BY AUTOMATED COUNT: 15 % (ref 12.3–15.4)
ERYTHROCYTE [DISTWIDTH] IN BLOOD BY AUTOMATED COUNT: 15.2 % (ref 11.5–14.5)
ERYTHROCYTE [DISTWIDTH] IN BLOOD BY AUTOMATED COUNT: 15.9 % (ref 12.3–15.4)
ERYTHROCYTE [DISTWIDTH] IN BLOOD BY AUTOMATED COUNT: 16.8 % (ref 11.5–14.5)
ERYTHROCYTE [DISTWIDTH] IN BLOOD BY AUTOMATED COUNT: 17.6 % (ref 12.3–15.4)
ERYTHROCYTE [DISTWIDTH] IN BLOOD BY AUTOMATED COUNT: 19.5 % (ref 12.3–15.4)
ERYTHROCYTE [DISTWIDTH] IN BLOOD BY AUTOMATED COUNT: 19.6 % (ref 11.5–14.5)
ERYTHROCYTE [DISTWIDTH] IN BLOOD BY AUTOMATED COUNT: 20.4 % (ref 12.3–15.4)
GFR SERPLBLD CREATININE-BSD FMLA CKD-EPI: 54 ML/MIN/1.73
GFR SERPLBLD CREATININE-BSD FMLA CKD-EPI: 63 ML/MIN/1.73
GFR SERPLBLD CREATININE-BSD FMLA CKD-EPI: 63 ML/MIN/1.73
GFR SERPLBLD CREATININE-BSD FMLA CKD-EPI: 66 ML/MIN/1.73
GFR SERPLBLD CREATININE-BSD FMLA CKD-EPI: 73 ML/MIN/1.73
GFR SERPLBLD CREATININE-BSD FMLA CKD-EPI: 76 ML/MIN/1.73
GFR SERPLBLD CREATININE-BSD FMLA CKD-EPI: 82 ML/MIN/1.73
GFR SERPLBLD CREATININE-BSD FMLA CKD-EPI: 84 ML/MIN/1.73
GFR SERPLBLD CREATININE-BSD FMLA CKD-EPI: 84 ML/MIN/1.73
GFR SERPLBLD CREATININE-BSD FMLA CKD-EPI: 94 ML/MIN/1.73
GLOBULIN SER CALC-MCNC: 2.3 G/DL (ref 1.5–4.5)
GLOBULIN SER CALC-MCNC: 2.4 G/DL (ref 1.5–4.5)
GLOBULIN SER CALC-MCNC: 2.5 G/DL (ref 1.5–4.5)
GLOBULIN SER CALC-MCNC: 2.6 G/DL (ref 1.5–4.5)
GLOBULIN SER CALC-MCNC: 2.7 G/DL (ref 1.5–4.5)
GLOBULIN SER CALC-MCNC: 2.8 G/DL (ref 1.5–4.5)
GLOBULIN SER CALC-MCNC: 2.8 G/DL (ref 1.5–4.5)
GLOBULIN SER CALC-MCNC: 3 G/DL (ref 2–4)
GLOBULIN SER CALC-MCNC: 3.2 G/DL (ref 2–4)
GLOBULIN SER CALC-MCNC: 3.3 G/DL (ref 2–4)
GLOBULIN SER CALC-MCNC: 3.5 G/DL (ref 2–4)
GLOBULIN SER CALC-MCNC: 3.6 G/DL (ref 2–4)
GLOBULIN SER CALC-MCNC: 3.9 G/DL (ref 2–4)
GLOBULIN SER CALC-MCNC: 4 G/DL (ref 2–4)
GLOBULIN SER CALC-MCNC: 4.1 G/DL (ref 2–4)
GLOBULIN SER CALC-MCNC: 4.1 G/DL (ref 2–4)
GLOBULIN SER CALC-MCNC: 4.2 G/DL (ref 2–4)
GLOBULIN SER CALC-MCNC: 4.3 G/DL (ref 2–4)
GLOBULIN SER CALC-MCNC: 4.3 G/DL (ref 2–4)
GLOBULIN SER CALC-MCNC: 4.4 G/DL (ref 2–4)
GLUCOSE BLD STRIP.AUTO-MCNC: 105 MG/DL (ref 65–100)
GLUCOSE BLD STRIP.AUTO-MCNC: 106 MG/DL (ref 65–100)
GLUCOSE BLD STRIP.AUTO-MCNC: 108 MG/DL (ref 65–100)
GLUCOSE BLD STRIP.AUTO-MCNC: 109 MG/DL (ref 65–100)
GLUCOSE BLD STRIP.AUTO-MCNC: 112 MG/DL (ref 65–100)
GLUCOSE BLD STRIP.AUTO-MCNC: 112 MG/DL (ref 65–100)
GLUCOSE BLD STRIP.AUTO-MCNC: 115 MG/DL (ref 65–100)
GLUCOSE BLD STRIP.AUTO-MCNC: 116 MG/DL (ref 65–100)
GLUCOSE BLD STRIP.AUTO-MCNC: 118 MG/DL (ref 65–100)
GLUCOSE BLD STRIP.AUTO-MCNC: 119 MG/DL (ref 65–100)
GLUCOSE BLD STRIP.AUTO-MCNC: 122 MG/DL (ref 65–100)
GLUCOSE BLD STRIP.AUTO-MCNC: 122 MG/DL (ref 65–100)
GLUCOSE BLD STRIP.AUTO-MCNC: 124 MG/DL (ref 65–100)
GLUCOSE BLD STRIP.AUTO-MCNC: 126 MG/DL (ref 65–100)
GLUCOSE BLD STRIP.AUTO-MCNC: 131 MG/DL (ref 65–100)
GLUCOSE BLD STRIP.AUTO-MCNC: 134 MG/DL (ref 65–100)
GLUCOSE BLD STRIP.AUTO-MCNC: 136 MG/DL (ref 65–100)
GLUCOSE BLD STRIP.AUTO-MCNC: 137 MG/DL (ref 65–100)
GLUCOSE BLD STRIP.AUTO-MCNC: 138 MG/DL (ref 65–100)
GLUCOSE BLD STRIP.AUTO-MCNC: 139 MG/DL (ref 65–100)
GLUCOSE BLD STRIP.AUTO-MCNC: 140 MG/DL (ref 65–100)
GLUCOSE BLD STRIP.AUTO-MCNC: 150 MG/DL (ref 65–100)
GLUCOSE BLD STRIP.AUTO-MCNC: 152 MG/DL (ref 65–100)
GLUCOSE BLD STRIP.AUTO-MCNC: 160 MG/DL (ref 65–100)
GLUCOSE BLD STRIP.AUTO-MCNC: 163 MG/DL (ref 65–100)
GLUCOSE BLD STRIP.AUTO-MCNC: 163 MG/DL (ref 65–100)
GLUCOSE BLD STRIP.AUTO-MCNC: 167 MG/DL (ref 65–100)
GLUCOSE BLD STRIP.AUTO-MCNC: 173 MG/DL (ref 65–100)
GLUCOSE BLD STRIP.AUTO-MCNC: 174 MG/DL (ref 65–100)
GLUCOSE BLD STRIP.AUTO-MCNC: 62 MG/DL (ref 65–100)
GLUCOSE BLD STRIP.AUTO-MCNC: 65 MG/DL (ref 65–100)
GLUCOSE BLD STRIP.AUTO-MCNC: 79 MG/DL (ref 65–100)
GLUCOSE BLD STRIP.AUTO-MCNC: 81 MG/DL (ref 65–100)
GLUCOSE BLD STRIP.AUTO-MCNC: 81 MG/DL (ref 65–100)
GLUCOSE BLD STRIP.AUTO-MCNC: 85 MG/DL (ref 65–100)
GLUCOSE BLD STRIP.AUTO-MCNC: 88 MG/DL (ref 65–100)
GLUCOSE BLD STRIP.AUTO-MCNC: 95 MG/DL (ref 65–100)
GLUCOSE BLD STRIP.AUTO-MCNC: 95 MG/DL (ref 65–100)
GLUCOSE BLD STRIP.AUTO-MCNC: 96 MG/DL (ref 65–100)
GLUCOSE BLD STRIP.AUTO-MCNC: 97 MG/DL (ref 65–100)
GLUCOSE BLD STRIP.AUTO-MCNC: 98 MG/DL (ref 65–100)
GLUCOSE SERPL-MCNC: 101 MG/DL (ref 65–99)
GLUCOSE SERPL-MCNC: 101 MG/DL (ref 65–99)
GLUCOSE SERPL-MCNC: 104 MG/DL (ref 65–100)
GLUCOSE SERPL-MCNC: 105 MG/DL (ref 65–100)
GLUCOSE SERPL-MCNC: 109 MG/DL (ref 65–100)
GLUCOSE SERPL-MCNC: 114 MG/DL (ref 65–100)
GLUCOSE SERPL-MCNC: 117 MG/DL (ref 65–100)
GLUCOSE SERPL-MCNC: 120 MG/DL (ref 65–100)
GLUCOSE SERPL-MCNC: 121 MG/DL (ref 65–100)
GLUCOSE SERPL-MCNC: 127 MG/DL (ref 65–100)
GLUCOSE SERPL-MCNC: 132 MG/DL (ref 65–100)
GLUCOSE SERPL-MCNC: 157 MG/DL (ref 65–100)
GLUCOSE SERPL-MCNC: 164 MG/DL (ref 65–100)
GLUCOSE SERPL-MCNC: 59 MG/DL (ref 65–100)
GLUCOSE SERPL-MCNC: 62 MG/DL (ref 65–100)
GLUCOSE SERPL-MCNC: 76 MG/DL (ref 65–100)
GLUCOSE SERPL-MCNC: 79 MG/DL (ref 65–100)
GLUCOSE SERPL-MCNC: 80 MG/DL (ref 65–100)
GLUCOSE SERPL-MCNC: 81 MG/DL (ref 65–99)
GLUCOSE SERPL-MCNC: 87 MG/DL (ref 65–100)
GLUCOSE SERPL-MCNC: 87 MG/DL (ref 65–100)
GLUCOSE SERPL-MCNC: 88 MG/DL (ref 65–100)
GLUCOSE SERPL-MCNC: 89 MG/DL (ref 65–100)
GLUCOSE SERPL-MCNC: 89 MG/DL (ref 65–99)
GLUCOSE SERPL-MCNC: 90 MG/DL (ref 65–100)
GLUCOSE SERPL-MCNC: 90 MG/DL (ref 65–99)
GLUCOSE SERPL-MCNC: 92 MG/DL (ref 65–99)
GLUCOSE SERPL-MCNC: 92 MG/DL (ref 65–99)
GLUCOSE SERPL-MCNC: 93 MG/DL (ref 65–99)
GLUCOSE SERPL-MCNC: 94 MG/DL (ref 65–100)
GLUCOSE SERPL-MCNC: 94 MG/DL (ref 65–99)
GLUCOSE UR QL: NEGATIVE
GLUCOSE UR STRIP.AUTO-MCNC: NEGATIVE MG/DL
GLUCOSE UR STRIP.AUTO-MCNC: NEGATIVE MG/DL
HCT VFR BLD AUTO: 23.9 % (ref 35–47)
HCT VFR BLD AUTO: 26.1 % (ref 35–47)
HCT VFR BLD AUTO: 26.1 % (ref 35–47)
HCT VFR BLD AUTO: 26.9 % (ref 35–47)
HCT VFR BLD AUTO: 28.3 % (ref 35–47)
HCT VFR BLD AUTO: 28.5 % (ref 35–47)
HCT VFR BLD AUTO: 28.6 % (ref 35–47)
HCT VFR BLD AUTO: 29.5 % (ref 35–47)
HCT VFR BLD AUTO: 29.8 % (ref 35–47)
HCT VFR BLD AUTO: 29.9 % (ref 34–46.6)
HCT VFR BLD AUTO: 30.1 % (ref 35–47)
HCT VFR BLD AUTO: 30.2 % (ref 35–47)
HCT VFR BLD AUTO: 30.5 % (ref 34–46.6)
HCT VFR BLD AUTO: 30.7 % (ref 35–47)
HCT VFR BLD AUTO: 32 % (ref 35–47)
HCT VFR BLD AUTO: 33.4 % (ref 35–47)
HCT VFR BLD AUTO: 33.9 % (ref 34–46.6)
HCT VFR BLD AUTO: 34 % (ref 34–46.6)
HCT VFR BLD AUTO: 34.1 % (ref 35–47)
HCT VFR BLD AUTO: 34.2 % (ref 34–46.6)
HCT VFR BLD AUTO: 34.6 % (ref 34–46.6)
HCT VFR BLD AUTO: 34.6 % (ref 34–46.6)
HCT VFR BLD AUTO: 35 % (ref 34–46.6)
HCT VFR BLD AUTO: 35.5 % (ref 34–46.6)
HCT VFR BLD AUTO: 36.4 % (ref 34–46.6)
HGB BLD-MCNC: 10.1 G/DL (ref 11.5–16)
HGB BLD-MCNC: 10.3 G/DL (ref 11.5–16)
HGB BLD-MCNC: 10.5 G/DL (ref 11.1–15.9)
HGB BLD-MCNC: 10.7 G/DL (ref 11.1–15.9)
HGB BLD-MCNC: 10.7 G/DL (ref 11.1–15.9)
HGB BLD-MCNC: 10.9 G/DL (ref 11.1–15.9)
HGB BLD-MCNC: 11 G/DL (ref 11.5–16)
HGB BLD-MCNC: 11 G/DL (ref 11.5–16)
HGB BLD-MCNC: 11.1 G/DL (ref 11.1–15.9)
HGB BLD-MCNC: 11.2 G/DL (ref 11.1–15.9)
HGB BLD-MCNC: 11.3 G/DL (ref 11.1–15.9)
HGB BLD-MCNC: 11.9 G/DL (ref 11.1–15.9)
HGB BLD-MCNC: 12 G/DL (ref 11.1–15.9)
HGB BLD-MCNC: 8 G/DL (ref 11.5–16)
HGB BLD-MCNC: 8.1 G/DL (ref 11.5–16)
HGB BLD-MCNC: 8.6 G/DL (ref 11.5–16)
HGB BLD-MCNC: 8.9 G/DL (ref 11.5–16)
HGB BLD-MCNC: 8.9 G/DL (ref 11.5–16)
HGB BLD-MCNC: 9 G/DL (ref 11.5–16)
HGB BLD-MCNC: 9.2 G/DL (ref 11.5–16)
HGB BLD-MCNC: 9.4 G/DL (ref 11.5–16)
HGB BLD-MCNC: 9.5 G/DL (ref 11.5–16)
HGB BLD-MCNC: 9.5 G/DL (ref 11.5–16)
HGB BLD-MCNC: 9.6 G/DL (ref 11.5–16)
HGB BLD-MCNC: 9.9 G/DL (ref 11.1–15.9)
HGB UR QL STRIP: NEGATIVE
HYALINE CASTS URNS QL MICRO: ABNORMAL /LPF (ref 0–5)
IMM GRANULOCYTES # BLD: 0 K/UL
IMM GRANULOCYTES # BLD: 0 K/UL (ref 0–0.04)
IMM GRANULOCYTES # BLD: 0 X10E3/UL (ref 0–0.1)
IMM GRANULOCYTES # BLD: 0.1 K/UL (ref 0–0.04)
IMM GRANULOCYTES # BLD: 0.2 K/UL (ref 0–0.04)
IMM GRANULOCYTES # BLD: 0.2 K/UL (ref 0–0.04)
IMM GRANULOCYTES NFR BLD AUTO: 0 %
IMM GRANULOCYTES NFR BLD AUTO: 0 % (ref 0–0.5)
IMM GRANULOCYTES NFR BLD AUTO: 1 % (ref 0–0.5)
IMM GRANULOCYTES NFR BLD: 0 %
INR PPP: 1 (ref 0.8–1.2)
KETONES UR QL STRIP.AUTO: NEGATIVE MG/DL
KETONES UR QL STRIP.AUTO: NEGATIVE MG/DL
KETONES UR QL STRIP: ABNORMAL
KETONES UR QL STRIP: NEGATIVE
LEUKOCYTE ESTERASE UR QL STRIP.AUTO: ABNORMAL
LEUKOCYTE ESTERASE UR QL STRIP.AUTO: ABNORMAL
LEUKOCYTE ESTERASE UR QL STRIP: ABNORMAL
LEUKOCYTE ESTERASE UR QL STRIP: NEGATIVE
LYMPHOCYTES # BLD AUTO: 0.9 X10E3/UL (ref 0.7–3.1)
LYMPHOCYTES # BLD AUTO: 1.1 X10E3/UL (ref 0.7–3.1)
LYMPHOCYTES # BLD AUTO: 1.4 X10E3/UL (ref 0.7–3.1)
LYMPHOCYTES # BLD AUTO: 1.4 X10E3/UL (ref 0.7–3.1)
LYMPHOCYTES # BLD AUTO: 1.6 X10E3/UL (ref 0.7–3.1)
LYMPHOCYTES # BLD AUTO: 1.6 X10E3/UL (ref 0.7–3.1)
LYMPHOCYTES # BLD AUTO: 1.9 X10E3/UL (ref 0.7–3.1)
LYMPHOCYTES # BLD: 0.4 K/UL (ref 0.8–3.5)
LYMPHOCYTES # BLD: 0.6 K/UL (ref 0.8–3.5)
LYMPHOCYTES # BLD: 0.6 K/UL (ref 0.8–3.5)
LYMPHOCYTES # BLD: 0.8 K/UL (ref 0.8–3.5)
LYMPHOCYTES # BLD: 0.8 K/UL (ref 0.8–3.5)
LYMPHOCYTES # BLD: 1.1 K/UL (ref 0.8–3.5)
LYMPHOCYTES # BLD: 1.1 K/UL (ref 0.8–3.5)
LYMPHOCYTES # BLD: 1.2 K/UL (ref 0.8–3.5)
LYMPHOCYTES # BLD: 1.2 K/UL (ref 0.8–3.5)
LYMPHOCYTES # BLD: 1.3 K/UL (ref 0.8–3.5)
LYMPHOCYTES # BLD: 1.6 K/UL (ref 0.8–3.5)
LYMPHOCYTES # BLD: 1.6 K/UL (ref 0.8–3.5)
LYMPHOCYTES # BLD: 1.7 K/UL (ref 0.8–3.5)
LYMPHOCYTES # BLD: 1.7 K/UL (ref 0.8–3.5)
LYMPHOCYTES # BLD: 1.9 K/UL (ref 0.8–3.5)
LYMPHOCYTES NFR BLD AUTO: 16 %
LYMPHOCYTES NFR BLD AUTO: 18 %
LYMPHOCYTES NFR BLD AUTO: 23 %
LYMPHOCYTES NFR BLD AUTO: 28 %
LYMPHOCYTES NFR BLD AUTO: 28 %
LYMPHOCYTES NFR BLD AUTO: 30 %
LYMPHOCYTES NFR BLD AUTO: 32 %
LYMPHOCYTES NFR BLD AUTO: 35 %
LYMPHOCYTES NFR BLD AUTO: 49 %
LYMPHOCYTES NFR BLD: 11 % (ref 12–49)
LYMPHOCYTES NFR BLD: 13 % (ref 12–49)
LYMPHOCYTES NFR BLD: 13 % (ref 12–49)
LYMPHOCYTES NFR BLD: 18 % (ref 12–49)
LYMPHOCYTES NFR BLD: 22 % (ref 12–49)
LYMPHOCYTES NFR BLD: 23 % (ref 12–49)
LYMPHOCYTES NFR BLD: 27 % (ref 12–49)
LYMPHOCYTES NFR BLD: 34 % (ref 12–49)
LYMPHOCYTES NFR BLD: 35 % (ref 12–49)
LYMPHOCYTES NFR BLD: 37 % (ref 12–49)
LYMPHOCYTES NFR BLD: 45 % (ref 12–49)
LYMPHOCYTES NFR BLD: 6 % (ref 12–49)
LYMPHOCYTES NFR BLD: 8 % (ref 12–49)
MAGNESIUM SERPL-MCNC: 1.6 MG/DL (ref 1.6–2.4)
MAGNESIUM SERPL-MCNC: 1.8 MG/DL (ref 1.6–2.4)
MAGNESIUM SERPL-MCNC: 1.8 MG/DL (ref 1.6–2.4)
MAGNESIUM SERPL-MCNC: 1.9 MG/DL (ref 1.6–2.3)
MAGNESIUM SERPL-MCNC: 1.9 MG/DL (ref 1.6–2.4)
MAGNESIUM SERPL-MCNC: 2 MG/DL (ref 1.6–2.4)
MAGNESIUM SERPL-MCNC: 2 MG/DL (ref 1.6–2.4)
MAGNESIUM SERPL-MCNC: 2.1 MG/DL (ref 1.6–2.3)
MAGNESIUM SERPL-MCNC: 2.1 MG/DL (ref 1.6–2.4)
MAGNESIUM SERPL-MCNC: 2.2 MG/DL (ref 1.6–2.3)
MAGNESIUM SERPL-MCNC: 2.2 MG/DL (ref 1.6–2.4)
MAGNESIUM SERPL-MCNC: 2.3 MG/DL (ref 1.6–2.3)
MAGNESIUM SERPL-MCNC: 2.3 MG/DL (ref 1.6–2.3)
MCH RBC QN AUTO: 30.9 PG (ref 26.6–33)
MCH RBC QN AUTO: 31.5 PG (ref 26–34)
MCH RBC QN AUTO: 31.6 PG (ref 26.6–33)
MCH RBC QN AUTO: 31.7 PG (ref 26.6–33)
MCH RBC QN AUTO: 31.7 PG (ref 26.6–33)
MCH RBC QN AUTO: 32.2 PG (ref 26–34)
MCH RBC QN AUTO: 32.4 PG (ref 26–34)
MCH RBC QN AUTO: 32.5 PG (ref 26.6–33)
MCH RBC QN AUTO: 32.5 PG (ref 26–34)
MCH RBC QN AUTO: 32.5 PG (ref 26–34)
MCH RBC QN AUTO: 32.6 PG (ref 26–34)
MCH RBC QN AUTO: 32.7 PG (ref 26.6–33)
MCH RBC QN AUTO: 32.8 PG (ref 26.6–33)
MCH RBC QN AUTO: 33 PG (ref 26–34)
MCH RBC QN AUTO: 33.1 PG (ref 26–34)
MCH RBC QN AUTO: 33.3 PG (ref 26–34)
MCH RBC QN AUTO: 33.5 PG (ref 26–34)
MCH RBC QN AUTO: 33.6 PG (ref 26.6–33)
MCH RBC QN AUTO: 33.7 PG (ref 26.6–33)
MCH RBC QN AUTO: 33.8 PG (ref 26.6–33)
MCH RBC QN AUTO: 34.2 PG (ref 26–34)
MCH RBC QN AUTO: 34.3 PG (ref 26–34)
MCH RBC QN AUTO: 34.3 PG (ref 26–34)
MCH RBC QN AUTO: 34.4 PG (ref 26–34)
MCH RBC QN AUTO: 34.7 PG (ref 26–34)
MCHC RBC AUTO-ENTMCNC: 31 G/DL (ref 30–36.5)
MCHC RBC AUTO-ENTMCNC: 31.1 G/DL (ref 31.5–35.7)
MCHC RBC AUTO-ENTMCNC: 31.4 G/DL (ref 30–36.5)
MCHC RBC AUTO-ENTMCNC: 31.5 G/DL (ref 30–36.5)
MCHC RBC AUTO-ENTMCNC: 31.5 G/DL (ref 31.5–35.7)
MCHC RBC AUTO-ENTMCNC: 31.6 G/DL (ref 30–36.5)
MCHC RBC AUTO-ENTMCNC: 31.6 G/DL (ref 31.5–35.7)
MCHC RBC AUTO-ENTMCNC: 31.9 G/DL (ref 30–36.5)
MCHC RBC AUTO-ENTMCNC: 32 G/DL (ref 30–36.5)
MCHC RBC AUTO-ENTMCNC: 32.2 G/DL (ref 30–36.5)
MCHC RBC AUTO-ENTMCNC: 32.2 G/DL (ref 30–36.5)
MCHC RBC AUTO-ENTMCNC: 32.3 G/DL (ref 30–36.5)
MCHC RBC AUTO-ENTMCNC: 32.4 G/DL (ref 31.5–35.7)
MCHC RBC AUTO-ENTMCNC: 32.5 G/DL (ref 31.5–35.7)
MCHC RBC AUTO-ENTMCNC: 32.5 G/DL (ref 31.5–35.7)
MCHC RBC AUTO-ENTMCNC: 32.7 G/DL (ref 31.5–35.7)
MCHC RBC AUTO-ENTMCNC: 32.9 G/DL (ref 30–36.5)
MCHC RBC AUTO-ENTMCNC: 32.9 G/DL (ref 30–36.5)
MCHC RBC AUTO-ENTMCNC: 33 G/DL (ref 31.5–35.7)
MCHC RBC AUTO-ENTMCNC: 33.5 G/DL (ref 30–36.5)
MCHC RBC AUTO-ENTMCNC: 33.5 G/DL (ref 31.5–35.7)
MCHC RBC AUTO-ENTMCNC: 34.1 G/DL (ref 30–36.5)
MCHC RBC AUTO-ENTMCNC: 35.1 G/DL (ref 31.5–35.7)
MCV RBC AUTO: 102.1 FL (ref 80–99)
MCV RBC AUTO: 102.4 FL (ref 80–99)
MCV RBC AUTO: 102.5 FL (ref 80–99)
MCV RBC AUTO: 102.6 FL (ref 80–99)
MCV RBC AUTO: 102.9 FL (ref 80–99)
MCV RBC AUTO: 104 FL (ref 79–97)
MCV RBC AUTO: 104 FL (ref 79–97)
MCV RBC AUTO: 104.5 FL (ref 80–99)
MCV RBC AUTO: 104.9 FL (ref 80–99)
MCV RBC AUTO: 105 FL (ref 79–97)
MCV RBC AUTO: 105.4 FL (ref 80–99)
MCV RBC AUTO: 106.5 FL (ref 80–99)
MCV RBC AUTO: 107 FL (ref 79–97)
MCV RBC AUTO: 107 FL (ref 80–99)
MCV RBC AUTO: 107.2 FL (ref 80–99)
MCV RBC AUTO: 108.8 FL (ref 80–99)
MCV RBC AUTO: 95 FL (ref 79–97)
MCV RBC AUTO: 95.6 FL (ref 80–99)
MCV RBC AUTO: 96 FL (ref 79–97)
MCV RBC AUTO: 96 FL (ref 79–97)
MCV RBC AUTO: 97 FL (ref 79–97)
MCV RBC AUTO: 97.7 FL (ref 80–99)
MCV RBC AUTO: 98 FL (ref 79–97)
MCV RBC AUTO: 98 FL (ref 79–97)
MCV RBC AUTO: 98.4 FL (ref 80–99)
MICRO URNS: ABNORMAL
MICRO URNS: ABNORMAL
MICRO URNS: NORMAL
MONOCYTES # BLD AUTO: 0.3 X10E3/UL (ref 0.1–0.9)
MONOCYTES # BLD AUTO: 0.4 X10E3/UL (ref 0.1–0.9)
MONOCYTES # BLD AUTO: 0.4 X10E3/UL (ref 0.1–0.9)
MONOCYTES # BLD AUTO: 0.5 X10E3/UL (ref 0.1–0.9)
MONOCYTES # BLD AUTO: 0.6 X10E3/UL (ref 0.1–0.9)
MONOCYTES # BLD AUTO: 0.6 X10E3/UL (ref 0.1–0.9)
MONOCYTES # BLD AUTO: 0.7 X10E3/UL (ref 0.1–0.9)
MONOCYTES # BLD: 0 K/UL (ref 0–1)
MONOCYTES # BLD: 0.2 K/UL (ref 0–1)
MONOCYTES # BLD: 0.2 K/UL (ref 0–1)
MONOCYTES # BLD: 0.3 K/UL (ref 0–1)
MONOCYTES # BLD: 0.4 K/UL (ref 0–1)
MONOCYTES # BLD: 0.5 K/UL (ref 0–1)
MONOCYTES # BLD: 0.6 K/UL (ref 0–1)
MONOCYTES # BLD: 0.9 K/UL (ref 0–1)
MONOCYTES # BLD: 1.4 K/UL (ref 0–1)
MONOCYTES # BLD: 1.9 K/UL (ref 0–1)
MONOCYTES # BLD: 2.6 K/UL (ref 0–1)
MONOCYTES NFR BLD AUTO: 10 %
MONOCYTES NFR BLD AUTO: 12 %
MONOCYTES NFR BLD AUTO: 12 %
MONOCYTES NFR BLD AUTO: 14 %
MONOCYTES NFR BLD AUTO: 14 %
MONOCYTES NFR BLD AUTO: 16 %
MONOCYTES NFR BLD AUTO: 6 %
MONOCYTES NFR BLD AUTO: 7 %
MONOCYTES NFR BLD AUTO: 9 %
MONOCYTES NFR BLD: 0 % (ref 5–13)
MONOCYTES NFR BLD: 12 % (ref 5–13)
MONOCYTES NFR BLD: 13 % (ref 5–13)
MONOCYTES NFR BLD: 14 % (ref 5–13)
MONOCYTES NFR BLD: 15 % (ref 5–13)
MONOCYTES NFR BLD: 16 % (ref 5–13)
MONOCYTES NFR BLD: 17 % (ref 5–13)
MONOCYTES NFR BLD: 18 % (ref 5–13)
MONOCYTES NFR BLD: 29 % (ref 5–13)
MONOCYTES NFR BLD: 4 % (ref 5–13)
MONOCYTES NFR BLD: 6 % (ref 5–13)
MONOCYTES NFR BLD: 7 % (ref 5–13)
MONOCYTES NFR BLD: 7 % (ref 5–13)
MONOCYTES NFR BLD: 8 % (ref 5–13)
MONOCYTES NFR BLD: 9 % (ref 5–13)
MUCOUS THREADS URNS QL MICRO: ABNORMAL /LPF
MUCOUS THREADS URNS QL MICRO: PRESENT
NEUTROPHILS # BLD AUTO: 1.4 X10E3/UL (ref 1.4–7)
NEUTROPHILS # BLD AUTO: 2.2 X10E3/UL (ref 1.4–7)
NEUTROPHILS # BLD AUTO: 2.3 X10E3/UL (ref 1.4–7)
NEUTROPHILS # BLD AUTO: 2.4 X10E3/UL (ref 1.4–7)
NEUTROPHILS # BLD AUTO: 2.8 X10E3/UL (ref 1.4–7)
NEUTROPHILS # BLD AUTO: 3 X10E3/UL (ref 1.4–7)
NEUTROPHILS # BLD AUTO: 3.1 X10E3/UL (ref 1.4–7)
NEUTROPHILS # BLD AUTO: 4.2 X10E3/UL (ref 1.4–7)
NEUTROPHILS # BLD AUTO: 5.1 X10E3/UL (ref 1.4–7)
NEUTROPHILS NFR BLD AUTO: 37 %
NEUTROPHILS NFR BLD AUTO: 49 %
NEUTROPHILS NFR BLD AUTO: 57 %
NEUTROPHILS NFR BLD AUTO: 60 %
NEUTROPHILS NFR BLD AUTO: 64 %
NEUTROPHILS NFR BLD AUTO: 70 %
NEUTROPHILS NFR BLD AUTO: 74 %
NEUTS BAND NFR BLD MANUAL: 1 % (ref 0–6)
NEUTS BAND NFR BLD MANUAL: 15 % (ref 0–6)
NEUTS SEG # BLD: 1.1 K/UL (ref 1.8–8)
NEUTS SEG # BLD: 1.4 K/UL (ref 1.8–8)
NEUTS SEG # BLD: 1.6 K/UL (ref 1.8–8)
NEUTS SEG # BLD: 10.5 K/UL (ref 1.8–8)
NEUTS SEG # BLD: 11 K/UL (ref 1.8–8)
NEUTS SEG # BLD: 11.6 K/UL (ref 1.8–8)
NEUTS SEG # BLD: 2.3 K/UL (ref 1.8–8)
NEUTS SEG # BLD: 2.4 K/UL (ref 1.8–8)
NEUTS SEG # BLD: 2.4 K/UL (ref 1.8–8)
NEUTS SEG # BLD: 2.7 K/UL (ref 1.8–8)
NEUTS SEG # BLD: 2.7 K/UL (ref 1.8–8)
NEUTS SEG # BLD: 3 K/UL (ref 1.8–8)
NEUTS SEG # BLD: 4.3 K/UL (ref 1.8–8)
NEUTS SEG # BLD: 4.3 K/UL (ref 1.8–8)
NEUTS SEG # BLD: 4.9 K/UL (ref 1.8–8)
NEUTS SEG NFR BLD: 40 % (ref 32–75)
NEUTS SEG NFR BLD: 42 % (ref 32–75)
NEUTS SEG NFR BLD: 45 % (ref 32–75)
NEUTS SEG NFR BLD: 52 % (ref 32–75)
NEUTS SEG NFR BLD: 55 % (ref 32–75)
NEUTS SEG NFR BLD: 56 % (ref 32–75)
NEUTS SEG NFR BLD: 65 % (ref 32–75)
NEUTS SEG NFR BLD: 69 % (ref 32–75)
NEUTS SEG NFR BLD: 69 % (ref 32–75)
NEUTS SEG NFR BLD: 71 % (ref 32–75)
NEUTS SEG NFR BLD: 72 % (ref 32–75)
NEUTS SEG NFR BLD: 73 % (ref 32–75)
NEUTS SEG NFR BLD: 74 % (ref 32–75)
NEUTS SEG NFR BLD: 77 % (ref 32–75)
NEUTS SEG NFR BLD: 86 % (ref 32–75)
NITRITE UR QL STRIP.AUTO: NEGATIVE
NITRITE UR QL STRIP.AUTO: NEGATIVE
NITRITE UR QL STRIP: NEGATIVE
NRBC # BLD: 0 K/UL (ref 0–0.01)
NRBC BLD-RTO: 0 PER 100 WBC
P-R INTERVAL, ECG05: 114 MS
PH UR STRIP: 5 [PH] (ref 5–7.5)
PH UR STRIP: 5.5 [PH] (ref 5–8)
PH UR STRIP: 6 [PH] (ref 5–7.5)
PH UR STRIP: 6 [PH] (ref 5–7.5)
PH UR STRIP: 6 [PH] (ref 5–8)
PH UR STRIP: 7 [PH] (ref 5–7.5)
PHOSPHATE SERPL-MCNC: 2.1 MG/DL (ref 2.6–4.7)
PHOSPHATE SERPL-MCNC: 2.5 MG/DL (ref 2.6–4.7)
PHOSPHATE SERPL-MCNC: 2.6 MG/DL (ref 2.6–4.7)
PHOSPHATE SERPL-MCNC: 2.6 MG/DL (ref 2.6–4.7)
PHOSPHATE SERPL-MCNC: 2.9 MG/DL (ref 2.6–4.7)
PHOSPHATE SERPL-MCNC: 3.1 MG/DL (ref 2.6–4.7)
PHOSPHATE SERPL-MCNC: 3.1 MG/DL (ref 2.6–4.7)
PHOSPHATE SERPL-MCNC: 3.3 MG/DL (ref 2.6–4.7)
PHOSPHATE SERPL-MCNC: 3.3 MG/DL (ref 2.6–4.7)
PHOSPHATE SERPL-MCNC: 3.4 MG/DL (ref 2.6–4.7)
PHOSPHATE SERPL-MCNC: 3.6 MG/DL (ref 2.6–4.7)
PHOSPHATE SERPL-MCNC: 3.6 MG/DL (ref 2.6–4.7)
PHOSPHATE SERPL-MCNC: 3.7 MG/DL (ref 2.6–4.7)
PHOSPHATE SERPL-MCNC: 3.7 MG/DL (ref 2.6–4.7)
PHOSPHATE SERPL-MCNC: 3.8 MG/DL (ref 2.6–4.7)
PHOSPHATE SERPL-MCNC: 4.1 MG/DL (ref 2.6–4.7)
PHOSPHATE SERPL-MCNC: 4.1 MG/DL (ref 2.6–4.7)
PLATELET # BLD AUTO: 118 K/UL (ref 150–400)
PLATELET # BLD AUTO: 122 K/UL (ref 150–400)
PLATELET # BLD AUTO: 122 K/UL (ref 150–400)
PLATELET # BLD AUTO: 130 K/UL (ref 150–400)
PLATELET # BLD AUTO: 138 K/UL (ref 150–400)
PLATELET # BLD AUTO: 142 X10E3/UL (ref 150–379)
PLATELET # BLD AUTO: 146 K/UL (ref 150–400)
PLATELET # BLD AUTO: 153 K/UL (ref 150–400)
PLATELET # BLD AUTO: 155 K/UL (ref 150–400)
PLATELET # BLD AUTO: 158 K/UL (ref 150–400)
PLATELET # BLD AUTO: 162 K/UL (ref 150–400)
PLATELET # BLD AUTO: 167 X10E3/UL (ref 150–379)
PLATELET # BLD AUTO: 172 K/UL (ref 150–400)
PLATELET # BLD AUTO: 190 X10E3/UL (ref 150–379)
PLATELET # BLD AUTO: 197 K/UL (ref 150–400)
PLATELET # BLD AUTO: 202 X10E3/UL (ref 150–379)
PLATELET # BLD AUTO: 209 K/UL (ref 150–400)
PLATELET # BLD AUTO: 211 X10E3/UL (ref 150–379)
PLATELET # BLD AUTO: 225 K/UL (ref 150–400)
PLATELET # BLD AUTO: 234 X10E3/UL (ref 150–379)
PLATELET # BLD AUTO: 235 X10E3/UL (ref 150–379)
PLATELET # BLD AUTO: 266 X10E3/UL (ref 150–379)
PLATELET # BLD AUTO: 285 X10E3/UL (ref 150–379)
PLATELET # BLD AUTO: 288 X10E3/UL (ref 150–379)
PLATELET # BLD AUTO: 95 K/UL (ref 150–400)
PLATELET COMMENTS,PCOM: ABNORMAL
PMV BLD AUTO: 10.1 FL (ref 8.9–12.9)
PMV BLD AUTO: 10.7 FL (ref 8.9–12.9)
PMV BLD AUTO: 10.8 FL (ref 8.9–12.9)
PMV BLD AUTO: 10.9 FL (ref 8.9–12.9)
PMV BLD AUTO: 11 FL (ref 8.9–12.9)
PMV BLD AUTO: 11.1 FL (ref 8.9–12.9)
PMV BLD AUTO: 11.2 FL (ref 8.9–12.9)
PMV BLD AUTO: 11.3 FL (ref 8.9–12.9)
PMV BLD AUTO: 11.3 FL (ref 8.9–12.9)
PMV BLD AUTO: 11.4 FL (ref 8.9–12.9)
PMV BLD AUTO: 11.5 FL (ref 8.9–12.9)
PMV BLD AUTO: 11.5 FL (ref 8.9–12.9)
POTASSIUM SERPL-SCNC: 3.4 MMOL/L (ref 3.5–5.1)
POTASSIUM SERPL-SCNC: 3.6 MMOL/L (ref 3.5–5.1)
POTASSIUM SERPL-SCNC: 3.7 MMOL/L (ref 3.5–5.1)
POTASSIUM SERPL-SCNC: 3.8 MMOL/L (ref 3.5–5.1)
POTASSIUM SERPL-SCNC: 3.9 MMOL/L (ref 3.5–5.1)
POTASSIUM SERPL-SCNC: 4 MMOL/L (ref 3.5–5.1)
POTASSIUM SERPL-SCNC: 4 MMOL/L (ref 3.5–5.1)
POTASSIUM SERPL-SCNC: 4.1 MMOL/L (ref 3.5–5.1)
POTASSIUM SERPL-SCNC: 4.1 MMOL/L (ref 3.5–5.1)
POTASSIUM SERPL-SCNC: 4.2 MMOL/L (ref 3.5–5.1)
POTASSIUM SERPL-SCNC: 4.2 MMOL/L (ref 3.5–5.2)
POTASSIUM SERPL-SCNC: 4.3 MMOL/L (ref 3.5–5.1)
POTASSIUM SERPL-SCNC: 4.3 MMOL/L (ref 3.5–5.2)
POTASSIUM SERPL-SCNC: 4.4 MMOL/L (ref 3.5–5.1)
POTASSIUM SERPL-SCNC: 4.5 MMOL/L (ref 3.5–5.1)
POTASSIUM SERPL-SCNC: 4.6 MMOL/L (ref 3.5–5.1)
POTASSIUM SERPL-SCNC: 4.6 MMOL/L (ref 3.5–5.2)
POTASSIUM SERPL-SCNC: 4.8 MMOL/L (ref 3.5–5.1)
POTASSIUM SERPL-SCNC: 4.8 MMOL/L (ref 3.5–5.2)
POTASSIUM SERPL-SCNC: 5 MMOL/L (ref 3.5–5.2)
PREALB SERPL-MCNC: 11.6 MG/DL (ref 20–40)
PREALB SERPL-MCNC: 7.9 MG/DL (ref 20–40)
PROT SERPL-MCNC: 5.7 G/DL (ref 6.4–8.2)
PROT SERPL-MCNC: 5.8 G/DL (ref 6.4–8.2)
PROT SERPL-MCNC: 6 G/DL (ref 6.4–8.2)
PROT SERPL-MCNC: 6.2 G/DL (ref 6–8.5)
PROT SERPL-MCNC: 6.4 G/DL (ref 6.4–8.2)
PROT SERPL-MCNC: 6.4 G/DL (ref 6.4–8.2)
PROT SERPL-MCNC: 6.5 G/DL (ref 6–8.5)
PROT SERPL-MCNC: 6.6 G/DL (ref 6.4–8.2)
PROT SERPL-MCNC: 6.7 G/DL (ref 6.4–8.2)
PROT SERPL-MCNC: 6.8 G/DL (ref 6.4–8.2)
PROT SERPL-MCNC: 6.8 G/DL (ref 6–8.5)
PROT SERPL-MCNC: 6.9 G/DL (ref 6.4–8.2)
PROT SERPL-MCNC: 6.9 G/DL (ref 6.4–8.2)
PROT SERPL-MCNC: 6.9 G/DL (ref 6–8.5)
PROT SERPL-MCNC: 6.9 G/DL (ref 6–8.5)
PROT SERPL-MCNC: 7 G/DL (ref 6.4–8.2)
PROT SERPL-MCNC: 7 G/DL (ref 6–8.5)
PROT SERPL-MCNC: 7.1 G/DL (ref 6–8.5)
PROT SERPL-MCNC: 7.2 G/DL (ref 6–8.5)
PROT SERPL-MCNC: 7.3 G/DL (ref 6.4–8.2)
PROT SERPL-MCNC: 7.3 G/DL (ref 6–8.5)
PROT SERPL-MCNC: 7.5 G/DL (ref 6.4–8.2)
PROT SERPL-MCNC: 7.6 G/DL (ref 6.4–8.2)
PROT UR QL STRIP: ABNORMAL
PROT UR QL STRIP: NEGATIVE
PROT UR STRIP-MCNC: NEGATIVE MG/DL
PROT UR STRIP-MCNC: NEGATIVE MG/DL
PROTHROMBIN TIME: 10.7 SEC (ref 9.1–12)
Q-T INTERVAL, ECG07: 350 MS
QRS DURATION, ECG06: 80 MS
QTC CALCULATION (BEZET), ECG08: 401 MS
RBC # BLD AUTO: 2.33 M/UL (ref 3.8–5.2)
RBC # BLD AUTO: 2.45 M/UL (ref 3.8–5.2)
RBC # BLD AUTO: 2.51 M/UL (ref 3.8–5.2)
RBC # BLD AUTO: 2.6 M/UL (ref 3.8–5.2)
RBC # BLD AUTO: 2.73 M/UL (ref 3.8–5.2)
RBC # BLD AUTO: 2.77 M/UL (ref 3.8–5.2)
RBC # BLD AUTO: 2.79 M/UL (ref 3.8–5.2)
RBC # BLD AUTO: 2.84 M/UL (ref 3.8–5.2)
RBC # BLD AUTO: 2.88 M/UL (ref 3.8–5.2)
RBC # BLD AUTO: 2.89 M/UL (ref 3.8–5.2)
RBC # BLD AUTO: 2.93 X10E6/UL (ref 3.77–5.28)
RBC # BLD AUTO: 2.95 M/UL (ref 3.8–5.2)
RBC # BLD AUTO: 2.99 M/UL (ref 3.8–5.2)
RBC # BLD AUTO: 3.12 M/UL (ref 3.8–5.2)
RBC # BLD AUTO: 3.12 X10E6/UL (ref 3.77–5.28)
RBC # BLD AUTO: 3.17 M/UL (ref 3.8–5.2)
RBC # BLD AUTO: 3.18 X10E6/UL (ref 3.77–5.28)
RBC # BLD AUTO: 3.26 X10E6/UL (ref 3.77–5.28)
RBC # BLD AUTO: 3.35 X10E6/UL (ref 3.77–5.28)
RBC # BLD AUTO: 3.49 M/UL (ref 3.8–5.2)
RBC # BLD AUTO: 3.5 X10E6/UL (ref 3.77–5.28)
RBC # BLD AUTO: 3.56 X10E6/UL (ref 3.77–5.28)
RBC # BLD AUTO: 3.63 X10E6/UL (ref 3.77–5.28)
RBC # BLD AUTO: 3.64 X10E6/UL (ref 3.77–5.28)
RBC # BLD AUTO: 3.8 X10E6/UL (ref 3.77–5.28)
RBC #/AREA URNS HPF: ABNORMAL /HPF
RBC #/AREA URNS HPF: ABNORMAL /HPF (ref 0–5)
RBC #/AREA URNS HPF: ABNORMAL /HPF (ref 0–5)
RBC #/AREA URNS HPF: NORMAL /HPF
RBC #/AREA URNS HPF: NORMAL /HPF
RBC MORPH BLD: ABNORMAL
SERVICE CMNT-IMP: ABNORMAL
SERVICE CMNT-IMP: NORMAL
SODIUM SERPL-SCNC: 135 MMOL/L (ref 136–145)
SODIUM SERPL-SCNC: 136 MMOL/L (ref 136–145)
SODIUM SERPL-SCNC: 136 MMOL/L (ref 136–145)
SODIUM SERPL-SCNC: 137 MMOL/L (ref 136–145)
SODIUM SERPL-SCNC: 138 MMOL/L (ref 134–144)
SODIUM SERPL-SCNC: 138 MMOL/L (ref 136–145)
SODIUM SERPL-SCNC: 138 MMOL/L (ref 136–145)
SODIUM SERPL-SCNC: 139 MMOL/L (ref 134–144)
SODIUM SERPL-SCNC: 139 MMOL/L (ref 136–145)
SODIUM SERPL-SCNC: 140 MMOL/L (ref 134–144)
SODIUM SERPL-SCNC: 140 MMOL/L (ref 134–144)
SODIUM SERPL-SCNC: 140 MMOL/L (ref 136–145)
SODIUM SERPL-SCNC: 141 MMOL/L (ref 134–144)
SODIUM SERPL-SCNC: 141 MMOL/L (ref 136–145)
SODIUM SERPL-SCNC: 142 MMOL/L (ref 136–145)
SODIUM SERPL-SCNC: 143 MMOL/L (ref 134–144)
SP GR UR REFRACTOMETRY: 1.01 (ref 1–1.03)
SP GR UR REFRACTOMETRY: 1.02 (ref 1–1.03)
SP GR UR: 1.01 (ref 1–1.03)
SP GR UR: 1.02 (ref 1–1.03)
SP GR UR: 1.02 (ref 1–1.03)
SP GR UR: >=1.03 (ref 1–1.03)
TROPONIN I SERPL-MCNC: <0.04 NG/ML
UA: UC IF INDICATED,UAUC: ABNORMAL
UA: UC IF INDICATED,UAUC: ABNORMAL
UNIDENT CRYS URNS QL MICRO: PRESENT
UROBILINOGEN UR QL STRIP.AUTO: 0.2 EU/DL (ref 0.2–1)
UROBILINOGEN UR QL STRIP.AUTO: 0.2 EU/DL (ref 0.2–1)
UROBILINOGEN UR STRIP-MCNC: 0.2 MG/DL (ref 0.2–1)
VENTRICULAR RATE, ECG03: 79 BPM
WBC # BLD AUTO: 13.5 K/UL (ref 3.6–11)
WBC # BLD AUTO: 13.7 K/UL (ref 3.6–11)
WBC # BLD AUTO: 15.5 K/UL (ref 3.6–11)
WBC # BLD AUTO: 2.4 K/UL (ref 3.6–11)
WBC # BLD AUTO: 2.7 K/UL (ref 3.6–11)
WBC # BLD AUTO: 3.1 K/UL (ref 3.6–11)
WBC # BLD AUTO: 3.5 K/UL (ref 3.6–11)
WBC # BLD AUTO: 3.5 K/UL (ref 3.6–11)
WBC # BLD AUTO: 3.8 X10E3/UL (ref 3.4–10.8)
WBC # BLD AUTO: 4 X10E3/UL (ref 3.4–10.8)
WBC # BLD AUTO: 4 X10E3/UL (ref 3.4–10.8)
WBC # BLD AUTO: 4.1 K/UL (ref 3.6–11)
WBC # BLD AUTO: 4.4 X10E3/UL (ref 3.4–10.8)
WBC # BLD AUTO: 4.7 K/UL (ref 3.6–11)
WBC # BLD AUTO: 4.7 K/UL (ref 3.6–11)
WBC # BLD AUTO: 4.7 X10E3/UL (ref 3.4–10.8)
WBC # BLD AUTO: 4.8 X10E3/UL (ref 3.4–10.8)
WBC # BLD AUTO: 4.9 X10E3/UL (ref 3.4–10.8)
WBC # BLD AUTO: 5.5 K/UL (ref 3.6–11)
WBC # BLD AUTO: 5.9 K/UL (ref 3.6–11)
WBC # BLD AUTO: 6.1 X10E3/UL (ref 3.4–10.8)
WBC # BLD AUTO: 6.2 K/UL (ref 3.6–11)
WBC # BLD AUTO: 6.4 X10E3/UL (ref 3.4–10.8)
WBC # BLD AUTO: 6.8 K/UL (ref 3.6–11)
WBC # BLD AUTO: 7 X10E3/UL (ref 3.4–10.8)
WBC #/AREA URNS HPF: ABNORMAL /HPF
WBC #/AREA URNS HPF: NORMAL /HPF
WBC #/AREA URNS HPF: NORMAL /HPF
WBC MORPH BLD: ABNORMAL
WBC URNS QL MICRO: ABNORMAL /HPF (ref 0–4)
WBC URNS QL MICRO: ABNORMAL /HPF (ref 0–4)

## 2018-01-01 PROCEDURE — 74011250636 HC RX REV CODE- 250/636: Performed by: PHYSICIAN ASSISTANT

## 2018-01-01 PROCEDURE — 65410000002 HC RM PRIVATE OB

## 2018-01-01 PROCEDURE — T4522 ADULT SIZE BRIEF/DIAPER MED: HCPCS

## 2018-01-01 PROCEDURE — 74011250636 HC RX REV CODE- 250/636

## 2018-01-01 PROCEDURE — 74011000258 HC RX REV CODE- 258: Performed by: OBSTETRICS & GYNECOLOGY

## 2018-01-01 PROCEDURE — 96361 HYDRATE IV INFUSION ADD-ON: CPT

## 2018-01-01 PROCEDURE — 74011250636 HC RX REV CODE- 250/636: Performed by: NURSE PRACTITIONER

## 2018-01-01 PROCEDURE — 71046 X-RAY EXAM CHEST 2 VIEWS: CPT

## 2018-01-01 PROCEDURE — C9113 INJ PANTOPRAZOLE SODIUM, VIA: HCPCS | Performed by: OBSTETRICS & GYNECOLOGY

## 2018-01-01 PROCEDURE — 82962 GLUCOSE BLOOD TEST: CPT

## 2018-01-01 PROCEDURE — 77030012965 HC NDL HUBR BBMI -A

## 2018-01-01 PROCEDURE — 74011250636 HC RX REV CODE- 250/636: Performed by: OBSTETRICS & GYNECOLOGY

## 2018-01-01 PROCEDURE — 83735 ASSAY OF MAGNESIUM: CPT | Performed by: NURSE PRACTITIONER

## 2018-01-01 PROCEDURE — 84100 ASSAY OF PHOSPHORUS: CPT | Performed by: NURSE PRACTITIONER

## 2018-01-01 PROCEDURE — 0651 HSPC ROUTINE HOME CARE

## 2018-01-01 PROCEDURE — 86304 IMMUNOASSAY TUMOR CA 125: CPT | Performed by: OBSTETRICS & GYNECOLOGY

## 2018-01-01 PROCEDURE — 85025 COMPLETE CBC W/AUTO DIFF WBC: CPT | Performed by: NURSE PRACTITIONER

## 2018-01-01 PROCEDURE — 36415 COLL VENOUS BLD VENIPUNCTURE: CPT | Performed by: OBSTETRICS & GYNECOLOGY

## 2018-01-01 PROCEDURE — 74011000258 HC RX REV CODE- 258: Performed by: PHYSICIAN ASSISTANT

## 2018-01-01 PROCEDURE — 77030009426 HC FCPS BIOP ENDOSC BSC -B: Performed by: INTERNAL MEDICINE

## 2018-01-01 PROCEDURE — 74011000258 HC RX REV CODE- 258: Performed by: NURSE PRACTITIONER

## 2018-01-01 PROCEDURE — 36415 COLL VENOUS BLD VENIPUNCTURE: CPT | Performed by: NURSE PRACTITIONER

## 2018-01-01 PROCEDURE — 74011636637 HC RX REV CODE- 636/637: Performed by: OBSTETRICS & GYNECOLOGY

## 2018-01-01 PROCEDURE — 85025 COMPLETE CBC W/AUTO DIFF WBC: CPT | Performed by: OBSTETRICS & GYNECOLOGY

## 2018-01-01 PROCEDURE — 87086 URINE CULTURE/COLONY COUNT: CPT | Performed by: OBSTETRICS & GYNECOLOGY

## 2018-01-01 PROCEDURE — 74011250637 HC RX REV CODE- 250/637: Performed by: INTERNAL MEDICINE

## 2018-01-01 PROCEDURE — 74011250637 HC RX REV CODE- 250/637: Performed by: NURSE PRACTITIONER

## 2018-01-01 PROCEDURE — 74011250637 HC RX REV CODE- 250/637: Performed by: PHYSICIAN ASSISTANT

## 2018-01-01 PROCEDURE — 74011250636 HC RX REV CODE- 250/636: Performed by: INTERNAL MEDICINE

## 2018-01-01 PROCEDURE — A4452 WATERPROOF TAPE: HCPCS

## 2018-01-01 PROCEDURE — 74011000250 HC RX REV CODE- 250: Performed by: OBSTETRICS & GYNECOLOGY

## 2018-01-01 PROCEDURE — G0299 HHS/HOSPICE OF RN EA 15 MIN: HCPCS

## 2018-01-01 PROCEDURE — 36591 DRAW BLOOD OFF VENOUS DEVICE: CPT

## 2018-01-01 PROCEDURE — 74011250637 HC RX REV CODE- 250/637: Performed by: OBSTETRICS & GYNECOLOGY

## 2018-01-01 PROCEDURE — 82553 CREATINE MB FRACTION: CPT | Performed by: EMERGENCY MEDICINE

## 2018-01-01 PROCEDURE — A9270 NON-COVERED ITEM OR SERVICE: HCPCS

## 2018-01-01 PROCEDURE — 74011000250 HC RX REV CODE- 250: Performed by: NURSE PRACTITIONER

## 2018-01-01 PROCEDURE — 0W9G3ZZ DRAINAGE OF PERITONEAL CAVITY, PERCUTANEOUS APPROACH: ICD-10-PCS | Performed by: RADIOLOGY

## 2018-01-01 PROCEDURE — A4333 URINARY CATH ANCHOR DEVICE: HCPCS

## 2018-01-01 PROCEDURE — HOSPICE MEDICATION HC HH HOSPICE MEDICATION

## 2018-01-01 PROCEDURE — 84134 ASSAY OF PREALBUMIN: CPT | Performed by: NURSE PRACTITIONER

## 2018-01-01 PROCEDURE — 74011000250 HC RX REV CODE- 250: Performed by: INTERNAL MEDICINE

## 2018-01-01 PROCEDURE — 83735 ASSAY OF MAGNESIUM: CPT | Performed by: PHYSICIAN ASSISTANT

## 2018-01-01 PROCEDURE — 96375 TX/PRO/DX INJ NEW DRUG ADDON: CPT

## 2018-01-01 PROCEDURE — 96417 CHEMO IV INFUS EACH ADDL SEQ: CPT

## 2018-01-01 PROCEDURE — A4927 NON-STERILE GLOVES: HCPCS

## 2018-01-01 PROCEDURE — 99152 MOD SED SAME PHYS/QHP 5/>YRS: CPT

## 2018-01-01 PROCEDURE — A6402 STERILE GAUZE <= 16 SQ IN: HCPCS

## 2018-01-01 PROCEDURE — C9113 INJ PANTOPRAZOLE SODIUM, VIA: HCPCS | Performed by: INTERNAL MEDICINE

## 2018-01-01 PROCEDURE — 99232 SBSQ HOSP IP/OBS MODERATE 35: CPT | Performed by: INTERNAL MEDICINE

## 2018-01-01 PROCEDURE — 80053 COMPREHEN METABOLIC PANEL: CPT | Performed by: EMERGENCY MEDICINE

## 2018-01-01 PROCEDURE — 3336500001 HSPC ELECTION

## 2018-01-01 PROCEDURE — 0D9670Z DRAINAGE OF STOMACH WITH DRAINAGE DEVICE, VIA NATURAL OR ARTIFICIAL OPENING: ICD-10-PCS | Performed by: OBSTETRICS & GYNECOLOGY

## 2018-01-01 PROCEDURE — 74011636320 HC RX REV CODE- 636/320: Performed by: OBSTETRICS & GYNECOLOGY

## 2018-01-01 PROCEDURE — 74019 RADEX ABDOMEN 2 VIEWS: CPT

## 2018-01-01 PROCEDURE — 80053 COMPREHEN METABOLIC PANEL: CPT | Performed by: NURSE PRACTITIONER

## 2018-01-01 PROCEDURE — 99233 SBSQ HOSP IP/OBS HIGH 50: CPT | Performed by: INTERNAL MEDICINE

## 2018-01-01 PROCEDURE — 74011636637 HC RX REV CODE- 636/637: Performed by: NURSE PRACTITIONER

## 2018-01-01 PROCEDURE — 99231 SBSQ HOSP IP/OBS SF/LOW 25: CPT | Performed by: INTERNAL MEDICINE

## 2018-01-01 PROCEDURE — 74011000250 HC RX REV CODE- 250: Performed by: PHYSICIAN ASSISTANT

## 2018-01-01 PROCEDURE — 71260 CT THORAX DX C+: CPT

## 2018-01-01 PROCEDURE — 99222 1ST HOSP IP/OBS MODERATE 55: CPT | Performed by: INTERNAL MEDICINE

## 2018-01-01 PROCEDURE — 84484 ASSAY OF TROPONIN QUANT: CPT | Performed by: EMERGENCY MEDICINE

## 2018-01-01 PROCEDURE — 74011636637 HC RX REV CODE- 636/637: Performed by: PHYSICIAN ASSISTANT

## 2018-01-01 PROCEDURE — 96415 CHEMO IV INFUSION ADDL HR: CPT

## 2018-01-01 PROCEDURE — 81001 URINALYSIS AUTO W/SCOPE: CPT | Performed by: OBSTETRICS & GYNECOLOGY

## 2018-01-01 PROCEDURE — 97161 PT EVAL LOW COMPLEX 20 MIN: CPT

## 2018-01-01 PROCEDURE — 96413 CHEMO IV INFUSION 1 HR: CPT

## 2018-01-01 PROCEDURE — 93005 ELECTROCARDIOGRAM TRACING: CPT

## 2018-01-01 PROCEDURE — 3E0336Z INTRODUCTION OF NUTRITIONAL SUBSTANCE INTO PERIPHERAL VEIN, PERCUTANEOUS APPROACH: ICD-10-PCS | Performed by: OBSTETRICS & GYNECOLOGY

## 2018-01-01 PROCEDURE — 85025 COMPLETE CBC W/AUTO DIFF WBC: CPT | Performed by: PHYSICIAN ASSISTANT

## 2018-01-01 PROCEDURE — 76040000019: Performed by: INTERNAL MEDICINE

## 2018-01-01 PROCEDURE — 84100 ASSAY OF PHOSPHORUS: CPT | Performed by: PHYSICIAN ASSISTANT

## 2018-01-01 PROCEDURE — 77030019563 HC DEV ATTCH FEED HOLL -A

## 2018-01-01 PROCEDURE — 0DH63UZ INSERTION OF FEEDING DEVICE INTO STOMACH, PERCUTANEOUS APPROACH: ICD-10-PCS | Performed by: RADIOLOGY

## 2018-01-01 PROCEDURE — 80053 COMPREHEN METABOLIC PANEL: CPT | Performed by: PHYSICIAN ASSISTANT

## 2018-01-01 PROCEDURE — HHS10554 SHAMPOO/BODY WASH 8 OZ ALOE VESTA

## 2018-01-01 PROCEDURE — 93306 TTE W/DOPPLER COMPLETE: CPT

## 2018-01-01 PROCEDURE — 96360 HYDRATION IV INFUSION INIT: CPT

## 2018-01-01 PROCEDURE — T4527 ADULT SIZE PULL-ON LG: HCPCS

## 2018-01-01 PROCEDURE — 77030032034 HC SYS EVAC ASEPT DISP BBMI -B

## 2018-01-01 PROCEDURE — A5120 SKIN BARRIER, WIPE OR SWAB: HCPCS

## 2018-01-01 PROCEDURE — 77030011256 HC DRSG MEPILEX <16IN NO BORD MOLN -A

## 2018-01-01 PROCEDURE — 76450000000

## 2018-01-01 PROCEDURE — 74011000250 HC RX REV CODE- 250: Performed by: RADIOLOGY

## 2018-01-01 PROCEDURE — T4541 LARGE DISPOSABLE UNDERPAD: HCPCS

## 2018-01-01 PROCEDURE — A4314 CATH W/DRAINAGE 2-WAY LATEX: HCPCS

## 2018-01-01 PROCEDURE — 36415 COLL VENOUS BLD VENIPUNCTURE: CPT | Performed by: EMERGENCY MEDICINE

## 2018-01-01 PROCEDURE — 74177 CT ABD & PELVIS W/CONTRAST: CPT

## 2018-01-01 PROCEDURE — 77030014137 HC TY PARCNT TELE -B

## 2018-01-01 PROCEDURE — 76705 ECHO EXAM OF ABDOMEN: CPT

## 2018-01-01 PROCEDURE — 83735 ASSAY OF MAGNESIUM: CPT | Performed by: OBSTETRICS & GYNECOLOGY

## 2018-01-01 PROCEDURE — 76060000031 HC ANESTHESIA FIRST 0.5 HR: Performed by: INTERNAL MEDICINE

## 2018-01-01 PROCEDURE — 80053 COMPREHEN METABOLIC PANEL: CPT | Performed by: OBSTETRICS & GYNECOLOGY

## 2018-01-01 PROCEDURE — 36415 COLL VENOUS BLD VENIPUNCTURE: CPT | Performed by: PHYSICIAN ASSISTANT

## 2018-01-01 PROCEDURE — A6250 SKIN SEAL PROTECT MOISTURIZR: HCPCS

## 2018-01-01 PROCEDURE — 86304 IMMUNOASSAY TUMOR CA 125: CPT | Performed by: NURSE PRACTITIONER

## 2018-01-01 PROCEDURE — 87147 CULTURE TYPE IMMUNOLOGIC: CPT | Performed by: OBSTETRICS & GYNECOLOGY

## 2018-01-01 PROCEDURE — G0156 HHCP-SVS OF AIDE,EA 15 MIN: HCPCS

## 2018-01-01 PROCEDURE — 49083 ABD PARACENTESIS W/IMAGING: CPT

## 2018-01-01 PROCEDURE — 88342 IMHCHEM/IMCYTCHM 1ST ANTB: CPT | Performed by: INTERNAL MEDICINE

## 2018-01-01 PROCEDURE — 74011250636 HC RX REV CODE- 250/636: Performed by: RADIOLOGY

## 2018-01-01 PROCEDURE — 94760 N-INVAS EAR/PLS OXIMETRY 1: CPT

## 2018-01-01 PROCEDURE — A6212 FOAM DRG <=16 SQ IN W/BORDER: HCPCS

## 2018-01-01 PROCEDURE — 88305 TISSUE EXAM BY PATHOLOGIST: CPT | Performed by: INTERNAL MEDICINE

## 2018-01-01 PROCEDURE — 77067 SCR MAMMO BI INCL CAD: CPT

## 2018-01-01 PROCEDURE — 99284 EMERGENCY DEPT VISIT MOD MDM: CPT

## 2018-01-01 PROCEDURE — 85025 COMPLETE CBC W/AUTO DIFF WBC: CPT | Performed by: EMERGENCY MEDICINE

## 2018-01-01 PROCEDURE — 97116 GAIT TRAINING THERAPY: CPT

## 2018-01-01 PROCEDURE — C1729 CATH, DRAINAGE: HCPCS

## 2018-01-01 RX ORDER — HEPARIN 100 UNIT/ML
300-500 SYRINGE INTRAVENOUS AS NEEDED
Status: ACTIVE | OUTPATIENT
Start: 2018-01-01 | End: 2018-01-01

## 2018-01-01 RX ORDER — PREGABALIN 25 MG/1
25 CAPSULE ORAL 2 TIMES DAILY
Status: DISCONTINUED | OUTPATIENT
Start: 2018-01-01 | End: 2018-01-01

## 2018-01-01 RX ORDER — HEPARIN 100 UNIT/ML
300-500 SYRINGE INTRAVENOUS AS NEEDED
Status: CANCELLED
Start: 2018-01-01

## 2018-01-01 RX ORDER — SUCRALFATE 1 G/10ML
1 SUSPENSION ORAL 4 TIMES DAILY
Status: DISCONTINUED | OUTPATIENT
Start: 2018-01-01 | End: 2018-01-01

## 2018-01-01 RX ORDER — OXYCODONE HYDROCHLORIDE 5 MG/1
5 TABLET ORAL
Status: DISCONTINUED | OUTPATIENT
Start: 2018-01-01 | End: 2018-01-01

## 2018-01-01 RX ORDER — SODIUM CHLORIDE 9 MG/ML
10 INJECTION INTRAMUSCULAR; INTRAVENOUS; SUBCUTANEOUS AS NEEDED
Status: ACTIVE | OUTPATIENT
Start: 2018-01-01 | End: 2018-01-01

## 2018-01-01 RX ORDER — DEXTROSE MONOHYDRATE 50 MG/ML
500 INJECTION, SOLUTION INTRAVENOUS CONTINUOUS
Status: CANCELLED | OUTPATIENT
Start: 2018-01-01

## 2018-01-01 RX ORDER — SUCRALFATE 1 G/10ML
0.5 SUSPENSION ORAL 4 TIMES DAILY
Status: DISCONTINUED | OUTPATIENT
Start: 2018-01-01 | End: 2018-01-01 | Stop reason: HOSPADM

## 2018-01-01 RX ORDER — DIPHENHYDRAMINE HYDROCHLORIDE 50 MG/ML
50 INJECTION, SOLUTION INTRAMUSCULAR; INTRAVENOUS AS NEEDED
Status: CANCELLED
Start: 2018-01-01

## 2018-01-01 RX ORDER — PANTOPRAZOLE SODIUM 40 MG/10ML
40 INJECTION, POWDER, LYOPHILIZED, FOR SOLUTION INTRAVENOUS DAILY
Status: DISCONTINUED | OUTPATIENT
Start: 2018-01-01 | End: 2018-01-01 | Stop reason: SDUPTHER

## 2018-01-01 RX ORDER — SODIUM CHLORIDE 0.9 % (FLUSH) 0.9 %
10 SYRINGE (ML) INJECTION AS NEEDED
Status: ACTIVE | OUTPATIENT
Start: 2018-01-01 | End: 2018-01-01

## 2018-01-01 RX ORDER — EPINEPHRINE 1 MG/ML
0.3 INJECTION, SOLUTION, CONCENTRATE INTRAVENOUS AS NEEDED
Status: CANCELLED | OUTPATIENT
Start: 2018-01-01

## 2018-01-01 RX ORDER — DEXTROSE MONOHYDRATE 50 MG/ML
500 INJECTION, SOLUTION INTRAVENOUS CONTINUOUS
Status: DISPENSED | OUTPATIENT
Start: 2018-01-01 | End: 2018-01-01

## 2018-01-01 RX ORDER — OXYCODONE HCL 10 MG/1
10 TABLET, FILM COATED, EXTENDED RELEASE ORAL EVERY 12 HOURS
Qty: 60 TAB | Refills: 0 | Status: SHIPPED | OUTPATIENT
Start: 2018-01-01 | End: 2018-01-01 | Stop reason: SDUPTHER

## 2018-01-01 RX ORDER — LIDOCAINE HYDROCHLORIDE 10 MG/ML
10 INJECTION INFILTRATION; PERINEURAL
Status: ACTIVE | OUTPATIENT
Start: 2018-01-01 | End: 2018-01-01

## 2018-01-01 RX ORDER — SUCRALFATE 1 G/10ML
1 SUSPENSION ORAL 4 TIMES DAILY
Qty: 300 ML | Refills: 3 | Status: ON HOLD | OUTPATIENT
Start: 2018-01-01 | End: 2018-01-01

## 2018-01-01 RX ORDER — HYDROCORTISONE SODIUM SUCCINATE 100 MG/2ML
100 INJECTION, POWDER, FOR SOLUTION INTRAMUSCULAR; INTRAVENOUS AS NEEDED
Status: CANCELLED | OUTPATIENT
Start: 2018-01-01

## 2018-01-01 RX ORDER — METOCLOPRAMIDE 5 MG/1
5 TABLET ORAL
Qty: 60 TAB | Refills: 0 | Status: SHIPPED | OUTPATIENT
Start: 2018-01-01 | End: 2018-01-01

## 2018-01-01 RX ORDER — FENTANYL CITRATE 200 UG/1
200 LOZENGE ORAL; TRANSMUCOSAL
Qty: 180 EACH | Refills: 0 | Status: SHIPPED | OUTPATIENT
Start: 2018-01-01 | End: 2018-12-06

## 2018-01-01 RX ORDER — POLYETHYLENE GLYCOL 3350 17 G/17G
17 POWDER, FOR SOLUTION ORAL DAILY PRN
Status: DISCONTINUED | OUTPATIENT
Start: 2018-01-01 | End: 2018-01-01 | Stop reason: HOSPADM

## 2018-01-01 RX ORDER — MAGNESIUM SULFATE 100 %
4 CRYSTALS MISCELLANEOUS AS NEEDED
Status: DISCONTINUED | OUTPATIENT
Start: 2018-01-01 | End: 2018-01-01

## 2018-01-01 RX ORDER — FENTANYL CITRATE 50 UG/ML
200 INJECTION, SOLUTION INTRAMUSCULAR; INTRAVENOUS
Status: DISCONTINUED | OUTPATIENT
Start: 2018-01-01 | End: 2018-01-01 | Stop reason: HOSPADM

## 2018-01-01 RX ORDER — DIPHENHYDRAMINE HYDROCHLORIDE 50 MG/ML
12.5 INJECTION, SOLUTION INTRAMUSCULAR; INTRAVENOUS
Status: DISCONTINUED | OUTPATIENT
Start: 2018-01-01 | End: 2018-01-01 | Stop reason: SINTOL

## 2018-01-01 RX ORDER — PREGABALIN 25 MG/1
25 CAPSULE ORAL 2 TIMES DAILY
Qty: 30 CAP | Refills: 0 | Status: SHIPPED | OUTPATIENT
Start: 2018-01-01

## 2018-01-01 RX ORDER — SODIUM CHLORIDE 0.9 % (FLUSH) 0.9 %
5-10 SYRINGE (ML) INJECTION AS NEEDED
Status: DISCONTINUED | OUTPATIENT
Start: 2018-01-01 | End: 2018-01-01 | Stop reason: HOSPADM

## 2018-01-01 RX ORDER — LIDOCAINE HYDROCHLORIDE 10 MG/ML
INJECTION, SOLUTION EPIDURAL; INFILTRATION; INTRACAUDAL; PERINEURAL
Status: COMPLETED
Start: 2018-01-01 | End: 2018-01-01

## 2018-01-01 RX ORDER — AMITRIPTYLINE HYDROCHLORIDE 10 MG/1
10 TABLET, FILM COATED ORAL
Status: DISCONTINUED | OUTPATIENT
Start: 2018-01-01 | End: 2018-01-01

## 2018-01-01 RX ORDER — HEPARIN 100 UNIT/ML
500 SYRINGE INTRAVENOUS AS NEEDED
Status: ACTIVE | OUTPATIENT
Start: 2018-01-01 | End: 2018-01-01

## 2018-01-01 RX ORDER — SENNOSIDES 8.8 MG/5ML
5 LIQUID ORAL DAILY
Status: DISCONTINUED | OUTPATIENT
Start: 2018-01-01 | End: 2018-01-01

## 2018-01-01 RX ORDER — SODIUM CHLORIDE 9 MG/ML
10 INJECTION INTRAMUSCULAR; INTRAVENOUS; SUBCUTANEOUS AS NEEDED
Status: CANCELLED | OUTPATIENT
Start: 2018-01-01

## 2018-01-01 RX ORDER — PROMETHAZINE HYDROCHLORIDE 12.5 MG/1
TABLET ORAL
COMMUNITY
Start: 2018-01-01

## 2018-01-01 RX ORDER — OXYCODONE HYDROCHLORIDE 5 MG/1
7.5 TABLET ORAL
Qty: 180 TAB | Refills: 0 | Status: SHIPPED | OUTPATIENT
Start: 2018-01-01

## 2018-01-01 RX ORDER — SODIUM CHLORIDE 0.9 % (FLUSH) 0.9 %
10 SYRINGE (ML) INJECTION AS NEEDED
Status: CANCELLED
Start: 2018-01-01

## 2018-01-01 RX ORDER — EPINEPHRINE 0.1 MG/ML
1 INJECTION INTRACARDIAC; INTRAVENOUS
Status: DISCONTINUED | OUTPATIENT
Start: 2018-01-01 | End: 2018-01-01 | Stop reason: HOSPADM

## 2018-01-01 RX ORDER — MORPHINE SULFATE 2 MG/ML
4 INJECTION, SOLUTION INTRAMUSCULAR; INTRAVENOUS ONCE
Status: COMPLETED | OUTPATIENT
Start: 2018-01-01 | End: 2018-01-01

## 2018-01-01 RX ORDER — FENTANYL 25 UG/1
1 PATCH TRANSDERMAL
Status: DISCONTINUED | OUTPATIENT
Start: 2018-01-01 | End: 2018-01-01

## 2018-01-01 RX ORDER — HALOPERIDOL 5 MG/ML
1 INJECTION INTRAMUSCULAR
Status: DISCONTINUED | OUTPATIENT
Start: 2018-01-01 | End: 2018-01-01 | Stop reason: HOSPADM

## 2018-01-01 RX ORDER — ONDANSETRON 2 MG/ML
8 INJECTION INTRAMUSCULAR; INTRAVENOUS AS NEEDED
Status: CANCELLED | OUTPATIENT
Start: 2018-01-01

## 2018-01-01 RX ORDER — DEXAMETHASONE 2 MG/1
2 TABLET ORAL EVERY 8 HOURS
Qty: 21 TAB | Refills: 1 | Status: SHIPPED | OUTPATIENT
Start: 2018-01-01 | End: 2018-01-01

## 2018-01-01 RX ORDER — SODIUM CHLORIDE 9 MG/ML
500 INJECTION, SOLUTION INTRAVENOUS CONTINUOUS
Status: DISPENSED | OUTPATIENT
Start: 2018-01-01 | End: 2018-01-01

## 2018-01-01 RX ORDER — SODIUM CHLORIDE 9 MG/ML
500 INJECTION, SOLUTION INTRAVENOUS CONTINUOUS
Status: CANCELLED | OUTPATIENT
Start: 2018-01-01

## 2018-01-01 RX ORDER — ACETAMINOPHEN 325 MG/1
650 TABLET ORAL AS NEEDED
Status: CANCELLED
Start: 2018-01-01

## 2018-01-01 RX ORDER — METOCLOPRAMIDE HYDROCHLORIDE 5 MG/ML
5 INJECTION INTRAMUSCULAR; INTRAVENOUS EVERY 6 HOURS
Status: DISCONTINUED | OUTPATIENT
Start: 2018-01-01 | End: 2018-01-01

## 2018-01-01 RX ORDER — FENTANYL 50 UG/1
1 PATCH TRANSDERMAL
Status: DISCONTINUED | OUTPATIENT
Start: 2018-01-01 | End: 2018-01-01 | Stop reason: HOSPADM

## 2018-01-01 RX ORDER — FLUMAZENIL 0.1 MG/ML
0.2 INJECTION INTRAVENOUS
Status: DISCONTINUED | OUTPATIENT
Start: 2018-01-01 | End: 2018-01-01 | Stop reason: HOSPADM

## 2018-01-01 RX ORDER — OXYCODONE AND ACETAMINOPHEN 5; 325 MG/1; MG/1
1-2 TABLET ORAL
Qty: 20 TAB | Refills: 0 | Status: SHIPPED | OUTPATIENT
Start: 2018-01-01 | End: 2018-01-01

## 2018-01-01 RX ORDER — ONDANSETRON 2 MG/ML
8 INJECTION INTRAMUSCULAR; INTRAVENOUS ONCE
Status: COMPLETED | OUTPATIENT
Start: 2018-01-01 | End: 2018-01-01

## 2018-01-01 RX ORDER — DEXAMETHASONE SODIUM PHOSPHATE 4 MG/ML
2 INJECTION, SOLUTION INTRA-ARTICULAR; INTRALESIONAL; INTRAMUSCULAR; INTRAVENOUS; SOFT TISSUE 3 TIMES DAILY
Status: DISCONTINUED | OUTPATIENT
Start: 2018-01-01 | End: 2018-01-01 | Stop reason: SDUPTHER

## 2018-01-01 RX ORDER — OXYCODONE HYDROCHLORIDE 5 MG/1
7.5 TABLET ORAL
Status: DISCONTINUED | OUTPATIENT
Start: 2018-01-01 | End: 2018-01-01

## 2018-01-01 RX ORDER — LIDOCAINE HYDROCHLORIDE 10 MG/ML
5 INJECTION, SOLUTION EPIDURAL; INFILTRATION; INTRACAUDAL; PERINEURAL
Status: COMPLETED | OUTPATIENT
Start: 2018-01-01 | End: 2018-01-01

## 2018-01-01 RX ORDER — ALBUTEROL SULFATE 0.83 MG/ML
2.5 SOLUTION RESPIRATORY (INHALATION) AS NEEDED
Status: CANCELLED
Start: 2018-01-01

## 2018-01-01 RX ORDER — SCOLOPAMINE TRANSDERMAL SYSTEM 1 MG/1
1 PATCH, EXTENDED RELEASE TRANSDERMAL
Qty: 10 PATCH | Refills: 1 | Status: SHIPPED | OUTPATIENT
Start: 2018-01-01

## 2018-01-01 RX ORDER — SODIUM CHLORIDE 9 MG/ML
1000 INJECTION, SOLUTION INTRAVENOUS ONCE
Status: COMPLETED | OUTPATIENT
Start: 2018-01-01 | End: 2018-01-01

## 2018-01-01 RX ORDER — SODIUM CHLORIDE 0.9 % (FLUSH) 0.9 %
5-10 SYRINGE (ML) INJECTION EVERY 8 HOURS
Status: DISCONTINUED | OUTPATIENT
Start: 2018-01-01 | End: 2018-01-01 | Stop reason: HOSPADM

## 2018-01-01 RX ORDER — MORPHINE SULFATE IN 0.9 % NACL 150MG/30ML
PATIENT CONTROLLED ANALGESIA SYRINGE INTRAVENOUS CONTINUOUS
Status: DISCONTINUED | OUTPATIENT
Start: 2018-01-01 | End: 2018-01-01

## 2018-01-01 RX ORDER — FENTANYL CITRATE 50 UG/ML
100 INJECTION, SOLUTION INTRAMUSCULAR; INTRAVENOUS
Status: DISCONTINUED | OUTPATIENT
Start: 2018-01-01 | End: 2018-01-01 | Stop reason: HOSPADM

## 2018-01-01 RX ORDER — PANTOPRAZOLE SODIUM 40 MG/1
40 TABLET, DELAYED RELEASE ORAL EVERY 12 HOURS
Qty: 60 TAB | Refills: 3 | Status: SHIPPED | OUTPATIENT
Start: 2018-01-01

## 2018-01-01 RX ORDER — LACTULOSE 10 G/15ML
20 SOLUTION ORAL; RECTAL
Status: DISCONTINUED | OUTPATIENT
Start: 2018-01-01 | End: 2018-01-01 | Stop reason: HOSPADM

## 2018-01-01 RX ORDER — DEXTROSE 50 % IN WATER (D50W) INTRAVENOUS SYRINGE
12.5-25 AS NEEDED
Status: DISCONTINUED | OUTPATIENT
Start: 2018-01-01 | End: 2018-01-01

## 2018-01-01 RX ORDER — AMOXICILLIN 250 MG
1 CAPSULE ORAL DAILY
Qty: 30 TAB | Refills: 1 | Status: SHIPPED | OUTPATIENT
Start: 2018-01-01 | End: 2018-01-01 | Stop reason: SDUPTHER

## 2018-01-01 RX ORDER — ONDANSETRON 2 MG/ML
INJECTION INTRAMUSCULAR; INTRAVENOUS
Status: DISPENSED
Start: 2018-01-01 | End: 2018-01-01

## 2018-01-01 RX ORDER — PROPOFOL 10 MG/ML
INJECTION, EMULSION INTRAVENOUS AS NEEDED
Status: DISCONTINUED | OUTPATIENT
Start: 2018-01-01 | End: 2018-01-01 | Stop reason: HOSPADM

## 2018-01-01 RX ORDER — ATROPINE SULFATE 0.1 MG/ML
0.5 INJECTION INTRAVENOUS
Status: DISCONTINUED | OUTPATIENT
Start: 2018-01-01 | End: 2018-01-01 | Stop reason: HOSPADM

## 2018-01-01 RX ORDER — SODIUM CHLORIDE 0.9 % (FLUSH) 0.9 %
10-40 SYRINGE (ML) INJECTION AS NEEDED
Status: ACTIVE | OUTPATIENT
Start: 2018-01-01 | End: 2018-01-01

## 2018-01-01 RX ORDER — PANTOPRAZOLE SODIUM 40 MG/10ML
40 INJECTION, POWDER, LYOPHILIZED, FOR SOLUTION INTRAVENOUS EVERY 12 HOURS
Status: DISCONTINUED | OUTPATIENT
Start: 2018-01-01 | End: 2018-01-01

## 2018-01-01 RX ORDER — SODIUM CHLORIDE 0.9 % (FLUSH) 0.9 %
10 SYRINGE (ML) INJECTION EVERY 24 HOURS
Status: DISCONTINUED | OUTPATIENT
Start: 2018-01-01 | End: 2018-01-01

## 2018-01-01 RX ORDER — ONDANSETRON 4 MG/1
4 TABLET, FILM COATED ORAL
COMMUNITY
End: 2018-01-01

## 2018-01-01 RX ORDER — RUCAPARIB 250 MG/1
TABLET, FILM COATED ORAL
COMMUNITY
Start: 2018-01-01 | End: 2018-01-01

## 2018-01-01 RX ORDER — FACIAL-BODY WIPES
10 EACH TOPICAL
Status: COMPLETED | OUTPATIENT
Start: 2018-01-01 | End: 2018-01-01

## 2018-01-01 RX ORDER — CETIRIZINE HYDROCHLORIDE, PSEUDOEPHEDRINE HYDROCHLORIDE 5; 120 MG/1; MG/1
TABLET, FILM COATED, EXTENDED RELEASE ORAL
Qty: 30 TAB | Refills: 1 | OUTPATIENT
Start: 2018-01-01

## 2018-01-01 RX ORDER — PROCHLORPERAZINE EDISYLATE 5 MG/ML
10 INJECTION INTRAMUSCULAR; INTRAVENOUS
Status: DISCONTINUED | OUTPATIENT
Start: 2018-01-01 | End: 2018-01-01 | Stop reason: SDUPTHER

## 2018-01-01 RX ORDER — METOCLOPRAMIDE HYDROCHLORIDE 5 MG/ML
10 INJECTION INTRAMUSCULAR; INTRAVENOUS 3 TIMES DAILY
Status: DISCONTINUED | OUTPATIENT
Start: 2018-01-01 | End: 2018-01-01

## 2018-01-01 RX ORDER — SODIUM CHLORIDE 9 MG/ML
10 INJECTION INTRAMUSCULAR; INTRAVENOUS; SUBCUTANEOUS AS NEEDED
Status: DISPENSED | OUTPATIENT
Start: 2018-01-01 | End: 2018-01-01

## 2018-01-01 RX ORDER — OLANZAPINE 5 MG/1
5 TABLET, ORALLY DISINTEGRATING ORAL
Status: DISCONTINUED | OUTPATIENT
Start: 2018-01-01 | End: 2018-01-01

## 2018-01-01 RX ORDER — SUCRALFATE 1 G/10ML
1 SUSPENSION ORAL 4 TIMES DAILY
Qty: 300 ML | Refills: 3 | Status: SHIPPED | OUTPATIENT
Start: 2018-01-01

## 2018-01-01 RX ORDER — SODIUM CHLORIDE 9 MG/ML
INJECTION, SOLUTION INTRAVENOUS
Status: DISCONTINUED | OUTPATIENT
Start: 2018-01-01 | End: 2018-01-01 | Stop reason: HOSPADM

## 2018-01-01 RX ORDER — FACIAL-BODY WIPES
10 EACH TOPICAL DAILY
Qty: 30 SUPPOSITORY | Refills: 3 | Status: SHIPPED | OUTPATIENT
Start: 2018-01-01

## 2018-01-01 RX ORDER — METOCLOPRAMIDE HYDROCHLORIDE 5 MG/5ML
5 SOLUTION ORAL
Status: DISCONTINUED | OUTPATIENT
Start: 2018-01-01 | End: 2018-01-01

## 2018-01-01 RX ORDER — ONDANSETRON 2 MG/ML
4 INJECTION INTRAMUSCULAR; INTRAVENOUS
Status: DISCONTINUED | OUTPATIENT
Start: 2018-01-01 | End: 2018-01-01 | Stop reason: HOSPADM

## 2018-01-01 RX ORDER — HEPARIN 100 UNIT/ML
300 SYRINGE INTRAVENOUS AS NEEDED
Status: DISCONTINUED | OUTPATIENT
Start: 2018-01-01 | End: 2018-01-01 | Stop reason: HOSPADM

## 2018-01-01 RX ORDER — MORPHINE SULFATE 2 MG/ML
INJECTION, SOLUTION INTRAMUSCULAR; INTRAVENOUS
Status: DISPENSED
Start: 2018-01-01 | End: 2018-01-01

## 2018-01-01 RX ORDER — LORAZEPAM 1 MG/1
TABLET ORAL
Qty: 20 TAB | Refills: 0 | Status: SHIPPED | OUTPATIENT
Start: 2018-01-01 | End: 2018-01-01 | Stop reason: SDUPTHER

## 2018-01-01 RX ORDER — SODIUM CHLORIDE 9 MG/ML
25 INJECTION, SOLUTION INTRAVENOUS ONCE
Status: COMPLETED | OUTPATIENT
Start: 2018-01-01 | End: 2018-01-01

## 2018-01-01 RX ORDER — SODIUM CHLORIDE 0.9 % (FLUSH) 0.9 %
5-10 SYRINGE (ML) INJECTION EVERY 8 HOURS
Status: DISCONTINUED | OUTPATIENT
Start: 2018-01-01 | End: 2018-01-01

## 2018-01-01 RX ORDER — OXYCODONE HYDROCHLORIDE 15 MG/1
15 TABLET, FILM COATED, EXTENDED RELEASE ORAL EVERY 12 HOURS
Qty: 30 TAB | Refills: 0 | Status: SHIPPED | OUTPATIENT
Start: 2018-01-01 | End: 2018-01-01 | Stop reason: SDUPTHER

## 2018-01-01 RX ORDER — SENNOSIDES 8.8 MG/5ML
5 LIQUID ORAL 2 TIMES DAILY
Status: DISCONTINUED | OUTPATIENT
Start: 2018-01-01 | End: 2018-01-01 | Stop reason: HOSPADM

## 2018-01-01 RX ORDER — LORAZEPAM 1 MG/1
0.5 TABLET ORAL
Status: DISCONTINUED | OUTPATIENT
Start: 2018-01-01 | End: 2018-01-01 | Stop reason: HOSPADM

## 2018-01-01 RX ORDER — SODIUM CHLORIDE 0.9 % (FLUSH) 0.9 %
20 SYRINGE (ML) INJECTION AS NEEDED
Status: DISCONTINUED | OUTPATIENT
Start: 2018-01-01 | End: 2018-01-01 | Stop reason: HOSPADM

## 2018-01-01 RX ORDER — OXYCODONE HYDROCHLORIDE 10 MG/1
TABLET, FILM COATED, EXTENDED RELEASE ORAL
Refills: 0 | COMMUNITY
Start: 2018-01-01

## 2018-01-01 RX ORDER — DIPHENHYDRAMINE HYDROCHLORIDE 50 MG/ML
50 INJECTION, SOLUTION INTRAMUSCULAR; INTRAVENOUS ONCE
Status: DISCONTINUED | OUTPATIENT
Start: 2018-01-01 | End: 2018-01-01 | Stop reason: HOSPADM

## 2018-01-01 RX ORDER — ENOXAPARIN SODIUM 100 MG/ML
40 INJECTION SUBCUTANEOUS EVERY 24 HOURS
Status: DISCONTINUED | OUTPATIENT
Start: 2018-01-01 | End: 2018-01-01

## 2018-01-01 RX ORDER — METOCLOPRAMIDE 10 MG/1
10 TABLET ORAL AS NEEDED
Qty: 40 TAB | Refills: 0 | Status: SHIPPED | OUTPATIENT
Start: 2018-01-01 | End: 2018-01-01

## 2018-01-01 RX ORDER — SODIUM CHLORIDE 0.9 % (FLUSH) 0.9 %
10 SYRINGE (ML) INJECTION EVERY 8 HOURS
Status: DISCONTINUED | OUTPATIENT
Start: 2018-01-01 | End: 2018-01-01 | Stop reason: HOSPADM

## 2018-01-01 RX ORDER — LORAZEPAM 2 MG/ML
1 INJECTION INTRAMUSCULAR
Status: DISCONTINUED | OUTPATIENT
Start: 2018-01-01 | End: 2018-01-01

## 2018-01-01 RX ORDER — SENNOSIDES 8.8 MG/5ML
5 LIQUID ORAL 2 TIMES DAILY
Status: DISCONTINUED | OUTPATIENT
Start: 2018-01-01 | End: 2018-01-01

## 2018-01-01 RX ORDER — OXYCODONE HYDROCHLORIDE 5 MG/1
5 TABLET ORAL EVERY 4 HOURS
Status: DISCONTINUED | OUTPATIENT
Start: 2018-01-01 | End: 2018-01-01

## 2018-01-01 RX ORDER — SODIUM CHLORIDE 0.9 % (FLUSH) 0.9 %
10 SYRINGE (ML) INJECTION
Status: COMPLETED | OUTPATIENT
Start: 2018-01-01 | End: 2018-01-01

## 2018-01-01 RX ORDER — MORPHINE SULFATE 2 MG/ML
4 INJECTION, SOLUTION INTRAMUSCULAR; INTRAVENOUS
Status: DISCONTINUED | OUTPATIENT
Start: 2018-01-01 | End: 2018-01-01 | Stop reason: HOSPADM

## 2018-01-01 RX ORDER — SODIUM CHLORIDE 9 MG/ML
100 INJECTION, SOLUTION INTRAVENOUS CONTINUOUS
Status: DISPENSED | OUTPATIENT
Start: 2018-01-01 | End: 2018-01-01

## 2018-01-01 RX ORDER — DEXAMETHASONE SODIUM PHOSPHATE 4 MG/ML
2 INJECTION, SOLUTION INTRA-ARTICULAR; INTRALESIONAL; INTRAMUSCULAR; INTRAVENOUS; SOFT TISSUE 3 TIMES DAILY
Status: DISCONTINUED | OUTPATIENT
Start: 2018-01-01 | End: 2018-01-01

## 2018-01-01 RX ORDER — POLYETHYLENE GLYCOL 3350 17 G/17G
17 POWDER, FOR SOLUTION ORAL DAILY
Status: DISCONTINUED | OUTPATIENT
Start: 2018-01-01 | End: 2018-01-01

## 2018-01-01 RX ORDER — ONDANSETRON 2 MG/ML
8 INJECTION INTRAMUSCULAR; INTRAVENOUS ONCE
Status: CANCELLED | OUTPATIENT
Start: 2018-01-01 | End: 2018-01-01

## 2018-01-01 RX ORDER — CLINDAMYCIN PHOSPHATE 900 MG/50ML
900 INJECTION INTRAVENOUS
Status: COMPLETED | OUTPATIENT
Start: 2018-01-01 | End: 2018-01-01

## 2018-01-01 RX ORDER — MORPHINE SULFATE 20 MG/ML
5 SOLUTION ORAL
Status: DISCONTINUED | OUTPATIENT
Start: 2018-01-01 | End: 2018-01-01

## 2018-01-01 RX ORDER — LORAZEPAM 1 MG/1
1 TABLET ORAL
Qty: 90 TAB | Refills: 0 | Status: SHIPPED | OUTPATIENT
Start: 2018-01-01

## 2018-01-01 RX ORDER — OXYCODONE HYDROCHLORIDE 5 MG/1
10 TABLET ORAL
Status: DISCONTINUED | OUTPATIENT
Start: 2018-01-01 | End: 2018-01-01

## 2018-01-01 RX ORDER — NALOXONE HYDROCHLORIDE 0.4 MG/ML
0.4 INJECTION, SOLUTION INTRAMUSCULAR; INTRAVENOUS; SUBCUTANEOUS
Status: DISCONTINUED | OUTPATIENT
Start: 2018-01-01 | End: 2018-01-01 | Stop reason: HOSPADM

## 2018-01-01 RX ORDER — PANTOPRAZOLE SODIUM 40 MG/1
40 TABLET, DELAYED RELEASE ORAL DAILY
Qty: 30 TAB | Refills: 3 | Status: ON HOLD | OUTPATIENT
Start: 2018-01-01 | End: 2018-01-01

## 2018-01-01 RX ORDER — AMOXICILLIN 250 MG
1 CAPSULE ORAL 2 TIMES DAILY
Status: DISCONTINUED | OUTPATIENT
Start: 2018-01-01 | End: 2018-01-01 | Stop reason: HOSPADM

## 2018-01-01 RX ORDER — FENTANYL CITRATE 50 UG/ML
INJECTION, SOLUTION INTRAMUSCULAR; INTRAVENOUS
Status: DISCONTINUED
Start: 2018-01-01 | End: 2018-01-01 | Stop reason: HOSPADM

## 2018-01-01 RX ORDER — MORPHINE SULFATE 20 MG/ML
10 SOLUTION ORAL EVERY 4 HOURS
Status: DISCONTINUED | OUTPATIENT
Start: 2018-01-01 | End: 2018-01-01 | Stop reason: HOSPADM

## 2018-01-01 RX ORDER — FACIAL-BODY WIPES
10 EACH TOPICAL ONCE
Status: COMPLETED | OUTPATIENT
Start: 2018-01-01 | End: 2018-01-01

## 2018-01-01 RX ORDER — DEXTROMETHORPHAN/PSEUDOEPHED 2.5-7.5/.8
1.2 DROPS ORAL
Status: DISCONTINUED | OUTPATIENT
Start: 2018-01-01 | End: 2018-01-01 | Stop reason: HOSPADM

## 2018-01-01 RX ORDER — OXYCODONE HYDROCHLORIDE 15 MG/1
15 TABLET, FILM COATED, EXTENDED RELEASE ORAL EVERY 12 HOURS
Qty: 60 TAB | Refills: 0 | Status: SHIPPED | OUTPATIENT
Start: 2018-01-01

## 2018-01-01 RX ORDER — LORAZEPAM 1 MG/1
1 TABLET ORAL
Status: DISCONTINUED | OUTPATIENT
Start: 2018-01-01 | End: 2018-01-01

## 2018-01-01 RX ORDER — INSULIN LISPRO 100 [IU]/ML
INJECTION, SOLUTION INTRAVENOUS; SUBCUTANEOUS EVERY 6 HOURS
Status: DISCONTINUED | OUTPATIENT
Start: 2018-01-01 | End: 2018-01-01

## 2018-01-01 RX ORDER — DEXAMETHASONE SODIUM PHOSPHATE 4 MG/ML
4 INJECTION, SOLUTION INTRA-ARTICULAR; INTRALESIONAL; INTRAMUSCULAR; INTRAVENOUS; SOFT TISSUE 3 TIMES DAILY
Status: DISCONTINUED | OUTPATIENT
Start: 2018-01-01 | End: 2018-01-01

## 2018-01-01 RX ORDER — METOCLOPRAMIDE 10 MG/1
5 TABLET ORAL
Status: DISCONTINUED | OUTPATIENT
Start: 2018-01-01 | End: 2018-01-01 | Stop reason: HOSPADM

## 2018-01-01 RX ORDER — SODIUM CHLORIDE 0.9 % (FLUSH) 0.9 %
5-10 SYRINGE (ML) INJECTION AS NEEDED
Status: ACTIVE | OUTPATIENT
Start: 2018-01-01 | End: 2018-01-01

## 2018-01-01 RX ORDER — SODIUM BICARBONATE 42 MG/ML
1 INJECTION, SOLUTION INTRAVENOUS
Status: COMPLETED | OUTPATIENT
Start: 2018-01-01 | End: 2018-01-01

## 2018-01-01 RX ORDER — OXYCODONE HYDROCHLORIDE 5 MG/1
2.5 TABLET ORAL
Qty: 60 TAB | Refills: 0 | Status: SHIPPED | OUTPATIENT
Start: 2018-01-01 | End: 2018-01-01 | Stop reason: SDUPTHER

## 2018-01-01 RX ORDER — DIPHENOXYLATE HYDROCHLORIDE AND ATROPINE SULFATE 2.5; .025 MG/1; MG/1
1 TABLET ORAL
Qty: 30 TAB | Refills: 3 | Status: SHIPPED | OUTPATIENT
Start: 2018-01-01 | End: 2018-01-01

## 2018-01-01 RX ORDER — LORAZEPAM 1 MG/1
TABLET ORAL
Qty: 20 TAB | Refills: 0 | Status: SHIPPED | OUTPATIENT
Start: 2018-01-01 | End: 2018-01-01 | Stop reason: ALTCHOICE

## 2018-01-01 RX ORDER — OXYCODONE HYDROCHLORIDE 5 MG/1
7.5 TABLET ORAL
Status: DISCONTINUED | OUTPATIENT
Start: 2018-01-01 | End: 2018-01-01 | Stop reason: HOSPADM

## 2018-01-01 RX ORDER — OXYCODONE HCL 10 MG/1
10 TABLET, FILM COATED, EXTENDED RELEASE ORAL EVERY 8 HOURS
Status: DISCONTINUED | OUTPATIENT
Start: 2018-01-01 | End: 2018-01-01 | Stop reason: HOSPADM

## 2018-01-01 RX ORDER — LACTULOSE 10 G/15ML
20 SOLUTION ORAL; RECTAL
Qty: 480 ML | Refills: 2 | Status: SHIPPED | OUTPATIENT
Start: 2018-01-01

## 2018-01-01 RX ORDER — SODIUM CHLORIDE AND POTASSIUM CHLORIDE .9; .15 G/100ML; G/100ML
SOLUTION INTRAVENOUS CONTINUOUS
Status: DISCONTINUED | OUTPATIENT
Start: 2018-01-01 | End: 2018-01-01 | Stop reason: HOSPADM

## 2018-01-01 RX ORDER — SODIUM CHLORIDE 9 MG/ML
50 INJECTION, SOLUTION INTRAVENOUS CONTINUOUS
Status: DISCONTINUED | OUTPATIENT
Start: 2018-01-01 | End: 2018-01-01 | Stop reason: HOSPADM

## 2018-01-01 RX ORDER — LORAZEPAM 1 MG/1
TABLET ORAL
Qty: 10 TAB | Refills: 0 | Status: SHIPPED | OUTPATIENT
Start: 2018-01-01 | End: 2018-01-01 | Stop reason: SDUPTHER

## 2018-01-01 RX ORDER — MIDAZOLAM HYDROCHLORIDE 1 MG/ML
5 INJECTION, SOLUTION INTRAMUSCULAR; INTRAVENOUS
Status: DISCONTINUED | OUTPATIENT
Start: 2018-01-01 | End: 2018-01-01 | Stop reason: HOSPADM

## 2018-01-01 RX ORDER — LORAZEPAM 1 MG/1
1 TABLET ORAL
Qty: 30 TAB | Refills: 3 | Status: SHIPPED | OUTPATIENT
Start: 2018-01-01 | End: 2018-01-01 | Stop reason: SDUPTHER

## 2018-01-01 RX ORDER — SCOLOPAMINE TRANSDERMAL SYSTEM 1 MG/1
1 PATCH, EXTENDED RELEASE TRANSDERMAL
Status: DISCONTINUED | OUTPATIENT
Start: 2018-01-01 | End: 2018-01-01 | Stop reason: SINTOL

## 2018-01-01 RX ORDER — AMITRIPTYLINE HYDROCHLORIDE 10 MG/1
10 TABLET, FILM COATED ORAL
Status: DISCONTINUED | OUTPATIENT
Start: 2018-01-01 | End: 2018-01-01 | Stop reason: HOSPADM

## 2018-01-01 RX ORDER — SODIUM CHLORIDE 0.9 % (FLUSH) 0.9 %
5-10 SYRINGE (ML) INJECTION EVERY 8 HOURS
Status: ACTIVE | OUTPATIENT
Start: 2018-01-01 | End: 2018-01-01

## 2018-01-01 RX ORDER — POLYETHYLENE GLYCOL 3350 17 G/17G
17 POWDER, FOR SOLUTION ORAL DAILY
COMMUNITY
End: 2018-01-01 | Stop reason: ALTCHOICE

## 2018-01-01 RX ORDER — SODIUM CHLORIDE 0.9 % (FLUSH) 0.9 %
10 SYRINGE (ML) INJECTION AS NEEDED
Status: DISCONTINUED | OUTPATIENT
Start: 2018-01-01 | End: 2018-01-01 | Stop reason: HOSPADM

## 2018-01-01 RX ORDER — DEXAMETHASONE 4 MG/1
8 TABLET ORAL AS NEEDED
Qty: 21 TAB | Refills: 3 | Status: SHIPPED | OUTPATIENT
Start: 2018-01-01 | End: 2018-01-01 | Stop reason: ALTCHOICE

## 2018-01-01 RX ORDER — LORAZEPAM 1 MG/1
1 TABLET ORAL
Qty: 90 TAB | Refills: 0 | Status: SHIPPED | OUTPATIENT
Start: 2018-01-01 | End: 2018-01-01 | Stop reason: SDUPTHER

## 2018-01-01 RX ORDER — ENOXAPARIN SODIUM 100 MG/ML
40 INJECTION SUBCUTANEOUS EVERY 24 HOURS
Status: DISCONTINUED | OUTPATIENT
Start: 2018-01-01 | End: 2018-01-01 | Stop reason: SDUPTHER

## 2018-01-01 RX ORDER — DIPHENHYDRAMINE HYDROCHLORIDE 50 MG/ML
12.5 INJECTION, SOLUTION INTRAMUSCULAR; INTRAVENOUS
Status: ACTIVE | OUTPATIENT
Start: 2018-01-01 | End: 2018-01-01

## 2018-01-01 RX ORDER — LORAZEPAM 2 MG/ML
1 INJECTION INTRAMUSCULAR
Status: DISCONTINUED | OUTPATIENT
Start: 2018-01-01 | End: 2018-01-01 | Stop reason: HOSPADM

## 2018-01-01 RX ORDER — ENOXAPARIN SODIUM 100 MG/ML
40 INJECTION SUBCUTANEOUS EVERY 24 HOURS
Status: DISCONTINUED | OUTPATIENT
Start: 2018-01-01 | End: 2018-01-01 | Stop reason: HOSPADM

## 2018-01-01 RX ORDER — MORPHINE SULFATE ORAL SOLUTION 10 MG/5ML
5 SOLUTION ORAL
Status: DISCONTINUED | OUTPATIENT
Start: 2018-01-01 | End: 2018-01-01

## 2018-01-01 RX ORDER — FENTANYL 12.5 UG/1
1 PATCH TRANSDERMAL
Status: DISCONTINUED | OUTPATIENT
Start: 2018-01-01 | End: 2018-01-01

## 2018-01-01 RX ORDER — OXYCODONE HYDROCHLORIDE 15 MG/1
15 TABLET, FILM COATED, EXTENDED RELEASE ORAL EVERY 12 HOURS
Qty: 30 TAB | Refills: 0 | OUTPATIENT
Start: 2018-01-01 | End: 2018-01-01 | Stop reason: SDUPTHER

## 2018-01-01 RX ORDER — HEPARIN 100 UNIT/ML
300 SYRINGE INTRAVENOUS ONCE
Status: COMPLETED | OUTPATIENT
Start: 2018-01-01 | End: 2018-01-01

## 2018-01-01 RX ORDER — METOCLOPRAMIDE HYDROCHLORIDE 5 MG/ML
10 INJECTION INTRAMUSCULAR; INTRAVENOUS EVERY 6 HOURS
Status: DISCONTINUED | OUTPATIENT
Start: 2018-01-01 | End: 2018-01-01

## 2018-01-01 RX ORDER — AMOXICILLIN 250 MG
1 CAPSULE ORAL DAILY
Qty: 30 TAB | Refills: 1 | Status: SHIPPED | OUTPATIENT
Start: 2018-01-01 | End: 2018-01-01

## 2018-01-01 RX ORDER — ACETAMINOPHEN/DIPHENHYDRAMINE 500MG-25MG
TABLET ORAL
COMMUNITY
End: 2018-01-01

## 2018-01-01 RX ORDER — DEXTROSE MONOHYDRATE 50 MG/ML
25 INJECTION, SOLUTION INTRAVENOUS AS NEEDED
Status: DISPENSED | OUTPATIENT
Start: 2018-01-01 | End: 2018-01-01

## 2018-01-01 RX ORDER — DEXTROSE, SODIUM CHLORIDE, AND POTASSIUM CHLORIDE 5; .45; .075 G/100ML; G/100ML; G/100ML
100 INJECTION INTRAVENOUS CONTINUOUS
Status: DISCONTINUED | OUTPATIENT
Start: 2018-01-01 | End: 2018-01-01

## 2018-01-01 RX ORDER — SODIUM CHLORIDE 9 MG/ML
50 INJECTION, SOLUTION INTRAVENOUS AS NEEDED
Status: DISPENSED | OUTPATIENT
Start: 2018-01-01 | End: 2018-01-01

## 2018-01-01 RX ORDER — LIDOCAINE HYDROCHLORIDE 20 MG/ML
INJECTION, SOLUTION EPIDURAL; INFILTRATION; INTRACAUDAL; PERINEURAL AS NEEDED
Status: DISCONTINUED | OUTPATIENT
Start: 2018-01-01 | End: 2018-01-01 | Stop reason: HOSPADM

## 2018-01-01 RX ORDER — AMITRIPTYLINE HYDROCHLORIDE 10 MG/1
10 TABLET, FILM COATED ORAL
Qty: 30 TAB | Refills: 1 | Status: SHIPPED | OUTPATIENT
Start: 2018-01-01

## 2018-01-01 RX ORDER — LACTULOSE 10 G/15ML
10 SOLUTION ORAL; RECTAL DAILY
Status: DISCONTINUED | OUTPATIENT
Start: 2018-01-01 | End: 2018-01-01 | Stop reason: HOSPADM

## 2018-01-01 RX ORDER — OLANZAPINE 5 MG/1
5 TABLET, ORALLY DISINTEGRATING ORAL
Status: DISCONTINUED | OUTPATIENT
Start: 2018-01-01 | End: 2018-01-01 | Stop reason: HOSPADM

## 2018-01-01 RX ORDER — OXYCODONE HCL 10 MG/1
10 TABLET, FILM COATED, EXTENDED RELEASE ORAL EVERY 12 HOURS
Qty: 60 TAB | Refills: 0 | Status: SHIPPED | OUTPATIENT
Start: 2018-01-01 | End: 2018-01-01 | Stop reason: ALTCHOICE

## 2018-01-01 RX ORDER — MIDAZOLAM HYDROCHLORIDE 1 MG/ML
.25-1 INJECTION, SOLUTION INTRAMUSCULAR; INTRAVENOUS
Status: DISCONTINUED | OUTPATIENT
Start: 2018-01-01 | End: 2018-01-01 | Stop reason: HOSPADM

## 2018-01-01 RX ORDER — SODIUM CHLORIDE 9 MG/ML
1000 INJECTION, SOLUTION INTRAVENOUS ONCE
Status: CANCELLED | OUTPATIENT
Start: 2018-01-01 | End: 2018-01-01

## 2018-01-01 RX ORDER — LORAZEPAM 2 MG/ML
1 CONCENTRATE ORAL
Status: DISCONTINUED | OUTPATIENT
Start: 2018-01-01 | End: 2018-01-01

## 2018-01-01 RX ORDER — GLYCOPYRROLATE 0.2 MG/ML
0.2 INJECTION INTRAMUSCULAR; INTRAVENOUS
Status: DISCONTINUED | OUTPATIENT
Start: 2018-01-01 | End: 2018-01-01 | Stop reason: HOSPADM

## 2018-01-01 RX ORDER — PREGABALIN 25 MG/1
25 CAPSULE ORAL 2 TIMES DAILY
Status: DISCONTINUED | OUTPATIENT
Start: 2018-01-01 | End: 2018-01-01 | Stop reason: SDUPTHER

## 2018-01-01 RX ORDER — MORPHINE SULFATE 20 MG/ML
10 SOLUTION ORAL
Status: DISCONTINUED | OUTPATIENT
Start: 2018-01-01 | End: 2018-01-01 | Stop reason: HOSPADM

## 2018-01-01 RX ORDER — PREGABALIN 25 MG/1
25 CAPSULE ORAL 2 TIMES DAILY
Status: DISCONTINUED | OUTPATIENT
Start: 2018-01-01 | End: 2018-01-01 | Stop reason: HOSPADM

## 2018-01-01 RX ADMIN — MORPHINE SULFATE 10 MG: 20 SOLUTION ORAL at 00:13

## 2018-01-01 RX ADMIN — SUCRALFATE 0.5 G: 1 SUSPENSION ORAL at 15:19

## 2018-01-01 RX ADMIN — ONDANSETRON HYDROCHLORIDE 4 MG: 2 INJECTION INTRAMUSCULAR; INTRAVENOUS at 13:57

## 2018-01-01 RX ADMIN — MORPHINE SULFATE 10 MG: 20 SOLUTION ORAL at 08:01

## 2018-01-01 RX ADMIN — SUCRALFATE 0.5 G: 1 SUSPENSION ORAL at 14:01

## 2018-01-01 RX ADMIN — Medication 10 ML: at 14:00

## 2018-01-01 RX ADMIN — PREGABALIN 25 MG: 25 CAPSULE ORAL at 09:42

## 2018-01-01 RX ADMIN — METOCLOPRAMIDE 10 MG: 5 INJECTION, SOLUTION INTRAMUSCULAR; INTRAVENOUS at 12:19

## 2018-01-01 RX ADMIN — OXYCODONE HYDROCHLORIDE 10 MG: 10 TABLET, FILM COATED, EXTENDED RELEASE ORAL at 13:27

## 2018-01-01 RX ADMIN — MORPHINE SULFATE 10 MG: 20 SOLUTION ORAL at 19:56

## 2018-01-01 RX ADMIN — SODIUM CHLORIDE 50 ML/HR: 900 INJECTION, SOLUTION INTRAVENOUS at 23:39

## 2018-01-01 RX ADMIN — MORPHINE SULFATE 10 MG: 20 SOLUTION ORAL at 07:55

## 2018-01-01 RX ADMIN — METOCLOPRAMIDE HYDROCHLORIDE 5 MG: 10 TABLET ORAL at 12:11

## 2018-01-01 RX ADMIN — DEXAMETHASONE SODIUM PHOSPHATE 2 MG: 4 INJECTION, SOLUTION INTRAMUSCULAR; INTRAVENOUS at 14:25

## 2018-01-01 RX ADMIN — AMITRIPTYLINE HYDROCHLORIDE 10 MG: 10 TABLET, FILM COATED ORAL at 21:03

## 2018-01-01 RX ADMIN — Medication 10 ML: at 16:15

## 2018-01-01 RX ADMIN — PREGABALIN 25 MG: 25 CAPSULE ORAL at 18:03

## 2018-01-01 RX ADMIN — Medication 10 ML: at 20:38

## 2018-01-01 RX ADMIN — SODIUM CHLORIDE 40 MG: 9 INJECTION INTRAMUSCULAR; INTRAVENOUS; SUBCUTANEOUS at 11:00

## 2018-01-01 RX ADMIN — METOCLOPRAMIDE HYDROCHLORIDE 5 MG: 10 TABLET ORAL at 19:58

## 2018-01-01 RX ADMIN — SODIUM CHLORIDE 500 ML: 900 INJECTION, SOLUTION INTRAVENOUS at 08:39

## 2018-01-01 RX ADMIN — PREGABALIN 25 MG: 25 CAPSULE ORAL at 17:42

## 2018-01-01 RX ADMIN — AMITRIPTYLINE HYDROCHLORIDE 10 MG: 10 TABLET, FILM COATED ORAL at 17:42

## 2018-01-01 RX ADMIN — STANDARDIZED SENNA CONCENTRATE 8.8 MG: 8.8 LIQUID ORAL at 08:38

## 2018-01-01 RX ADMIN — METOCLOPRAMIDE HYDROCHLORIDE 5 MG: 5 SOLUTION ORAL at 20:31

## 2018-01-01 RX ADMIN — SODIUM CHLORIDE 10 ML: 9 INJECTION INTRAMUSCULAR; INTRAVENOUS; SUBCUTANEOUS at 09:13

## 2018-01-01 RX ADMIN — DEXAMETHASONE SODIUM PHOSPHATE 2 MG: 4 INJECTION, SOLUTION INTRAMUSCULAR; INTRAVENOUS at 14:30

## 2018-01-01 RX ADMIN — ONDANSETRON HYDROCHLORIDE 4 MG: 2 INJECTION INTRAMUSCULAR; INTRAVENOUS at 17:57

## 2018-01-01 RX ADMIN — METOCLOPRAMIDE HYDROCHLORIDE 5 MG: 10 TABLET ORAL at 11:42

## 2018-01-01 RX ADMIN — ENOXAPARIN SODIUM 40 MG: 40 INJECTION SUBCUTANEOUS at 08:07

## 2018-01-01 RX ADMIN — Medication 10 ML: at 17:48

## 2018-01-01 RX ADMIN — OLANZAPINE 5 MG: 5 TABLET, ORALLY DISINTEGRATING ORAL at 19:56

## 2018-01-01 RX ADMIN — LORAZEPAM 1 MG: 2 INJECTION INTRAMUSCULAR; INTRAVENOUS at 21:08

## 2018-01-01 RX ADMIN — Medication 10 ML: at 10:00

## 2018-01-01 RX ADMIN — MORPHINE SULFATE 10 MG: 20 SOLUTION ORAL at 08:22

## 2018-01-01 RX ADMIN — METOCLOPRAMIDE HYDROCHLORIDE 5 MG: 10 TABLET ORAL at 20:16

## 2018-01-01 RX ADMIN — DEXAMETHASONE SODIUM PHOSPHATE 2 MG: 4 INJECTION, SOLUTION INTRAMUSCULAR; INTRAVENOUS at 06:08

## 2018-01-01 RX ADMIN — METOCLOPRAMIDE HYDROCHLORIDE 5 MG: 10 TABLET ORAL at 12:29

## 2018-01-01 RX ADMIN — DEXAMETHASONE SODIUM PHOSPHATE 2 MG: 4 INJECTION, SOLUTION INTRAMUSCULAR; INTRAVENOUS at 05:27

## 2018-01-01 RX ADMIN — Medication 10 ML: at 17:00

## 2018-01-01 RX ADMIN — OXYCODONE HYDROCHLORIDE 7.5 MG: 5 TABLET ORAL at 17:30

## 2018-01-01 RX ADMIN — METOCLOPRAMIDE 10 MG: 5 INJECTION, SOLUTION INTRAMUSCULAR; INTRAVENOUS at 14:00

## 2018-01-01 RX ADMIN — SODIUM CHLORIDE 500 ML: 900 INJECTION, SOLUTION INTRAVENOUS at 10:06

## 2018-01-01 RX ADMIN — LACTULOSE 20 G: 20 SOLUTION ORAL at 12:29

## 2018-01-01 RX ADMIN — ENOXAPARIN SODIUM 40 MG: 100 INJECTION SUBCUTANEOUS at 16:05

## 2018-01-01 RX ADMIN — OXYCODONE HYDROCHLORIDE 7.5 MG: 5 TABLET ORAL at 00:12

## 2018-01-01 RX ADMIN — Medication 10 ML: at 20:08

## 2018-01-01 RX ADMIN — STANDARDIZED SENNA CONCENTRATE 8.8 MG: 8.8 LIQUID ORAL at 16:15

## 2018-01-01 RX ADMIN — DOXORUBICIN HYDROCHLORIDE 69.6 MG: 2 INJECTION, SUSPENSION, LIPOSOMAL INTRAVENOUS at 10:49

## 2018-01-01 RX ADMIN — FAMOTIDINE 20 MG: 10 INJECTION, SOLUTION INTRAVENOUS at 08:34

## 2018-01-01 RX ADMIN — OLANZAPINE 5 MG: 5 TABLET, ORALLY DISINTEGRATING ORAL at 21:41

## 2018-01-01 RX ADMIN — I.V. FAT EMULSION 500 ML: 20 EMULSION INTRAVENOUS at 18:44

## 2018-01-01 RX ADMIN — METOCLOPRAMIDE 10 MG: 5 INJECTION, SOLUTION INTRAMUSCULAR; INTRAVENOUS at 19:57

## 2018-01-01 RX ADMIN — INSULIN LISPRO 2 UNITS: 100 INJECTION, SOLUTION INTRAVENOUS; SUBCUTANEOUS at 23:29

## 2018-01-01 RX ADMIN — Medication: at 19:56

## 2018-01-01 RX ADMIN — LORAZEPAM 1 MG: 2 INJECTION INTRAMUSCULAR; INTRAVENOUS at 20:55

## 2018-01-01 RX ADMIN — PROPOFOL 25 MG: 10 INJECTION, EMULSION INTRAVENOUS at 08:20

## 2018-01-01 RX ADMIN — ENOXAPARIN SODIUM 40 MG: 40 INJECTION SUBCUTANEOUS at 08:22

## 2018-01-01 RX ADMIN — Medication 10 ML: at 22:44

## 2018-01-01 RX ADMIN — MORPHINE SULFATE 10 MG: 20 SOLUTION ORAL at 03:46

## 2018-01-01 RX ADMIN — OLANZAPINE 5 MG: 5 TABLET, ORALLY DISINTEGRATING ORAL at 20:19

## 2018-01-01 RX ADMIN — DEXAMETHASONE SODIUM PHOSPHATE 2 MG: 4 INJECTION, SOLUTION INTRAMUSCULAR; INTRAVENOUS at 13:22

## 2018-01-01 RX ADMIN — METOCLOPRAMIDE 10 MG: 5 INJECTION, SOLUTION INTRAMUSCULAR; INTRAVENOUS at 23:00

## 2018-01-01 RX ADMIN — AMITRIPTYLINE HYDROCHLORIDE 10 MG: 10 TABLET, FILM COATED ORAL at 21:28

## 2018-01-01 RX ADMIN — STANDARDIZED SENNA CONCENTRATE 8.8 MG: 8.8 LIQUID ORAL at 08:22

## 2018-01-01 RX ADMIN — IOPAMIDOL 100 ML: 755 INJECTION, SOLUTION INTRAVENOUS at 11:02

## 2018-01-01 RX ADMIN — OXYCODONE HYDROCHLORIDE 10 MG: 10 TABLET, FILM COATED, EXTENDED RELEASE ORAL at 22:14

## 2018-01-01 RX ADMIN — Medication 10 ML: at 06:00

## 2018-01-01 RX ADMIN — MORPHINE SULFATE 10 MG: 20 SOLUTION ORAL at 12:23

## 2018-01-01 RX ADMIN — LORAZEPAM 1 MG: 2 INJECTION INTRAMUSCULAR; INTRAVENOUS at 00:26

## 2018-01-01 RX ADMIN — LORAZEPAM 1 MG: 2 INJECTION INTRAMUSCULAR; INTRAVENOUS at 20:40

## 2018-01-01 RX ADMIN — OXYCODONE HYDROCHLORIDE 5 MG: 5 TABLET ORAL at 16:28

## 2018-01-01 RX ADMIN — SODIUM CHLORIDE 1000 ML/HR: 900 INJECTION, SOLUTION INTRAVENOUS at 08:44

## 2018-01-01 RX ADMIN — ENOXAPARIN SODIUM 40 MG: 100 INJECTION SUBCUTANEOUS at 12:51

## 2018-01-01 RX ADMIN — OLANZAPINE 5 MG: 5 TABLET, ORALLY DISINTEGRATING ORAL at 20:03

## 2018-01-01 RX ADMIN — OXYCODONE HYDROCHLORIDE 5 MG: 5 TABLET ORAL at 20:59

## 2018-01-01 RX ADMIN — Medication 10 ML: at 15:20

## 2018-01-01 RX ADMIN — Medication 10 ML: at 20:12

## 2018-01-01 RX ADMIN — Medication 10 ML: at 06:34

## 2018-01-01 RX ADMIN — LORAZEPAM 0.5 MG: 1 TABLET ORAL at 20:15

## 2018-01-01 RX ADMIN — ONDANSETRON HYDROCHLORIDE 4 MG: 2 INJECTION INTRAMUSCULAR; INTRAVENOUS at 04:06

## 2018-01-01 RX ADMIN — LACTULOSE 10 G: 20 SOLUTION ORAL at 14:40

## 2018-01-01 RX ADMIN — METOCLOPRAMIDE 5 MG: 5 INJECTION, SOLUTION INTRAMUSCULAR; INTRAVENOUS at 23:29

## 2018-01-01 RX ADMIN — ASCORBIC ACID: 500 INJECTION, SOLUTION INTRAMUSCULAR; INTRAVENOUS; SUBCUTANEOUS at 18:56

## 2018-01-01 RX ADMIN — LORAZEPAM 0.5 MG: 1 TABLET ORAL at 20:04

## 2018-01-01 RX ADMIN — MORPHINE SULFATE 10 MG: 20 SOLUTION ORAL at 16:07

## 2018-01-01 RX ADMIN — ALTEPLASE 1 MG: 2.2 INJECTION, POWDER, LYOPHILIZED, FOR SOLUTION INTRAVENOUS at 17:16

## 2018-01-01 RX ADMIN — AMITRIPTYLINE HYDROCHLORIDE 10 MG: 10 TABLET, FILM COATED ORAL at 20:38

## 2018-01-01 RX ADMIN — Medication: at 13:18

## 2018-01-01 RX ADMIN — METOCLOPRAMIDE 10 MG: 5 INJECTION, SOLUTION INTRAMUSCULAR; INTRAVENOUS at 08:07

## 2018-01-01 RX ADMIN — MORPHINE SULFATE 10 MG: 20 SOLUTION ORAL at 16:15

## 2018-01-01 RX ADMIN — MORPHINE SULFATE 10 MG: 20 SOLUTION ORAL at 04:01

## 2018-01-01 RX ADMIN — METOCLOPRAMIDE 10 MG: 5 INJECTION, SOLUTION INTRAMUSCULAR; INTRAVENOUS at 12:50

## 2018-01-01 RX ADMIN — LORAZEPAM 1 MG: 2 INJECTION INTRAMUSCULAR; INTRAVENOUS at 21:42

## 2018-01-01 RX ADMIN — MORPHINE SULFATE 10 MG: 20 SOLUTION ORAL at 23:00

## 2018-01-01 RX ADMIN — METOCLOPRAMIDE 5 MG: 5 INJECTION, SOLUTION INTRAMUSCULAR; INTRAVENOUS at 06:08

## 2018-01-01 RX ADMIN — MORPHINE SULFATE 10 MG: 20 SOLUTION ORAL at 15:45

## 2018-01-01 RX ADMIN — METOCLOPRAMIDE 10 MG: 5 INJECTION, SOLUTION INTRAMUSCULAR; INTRAVENOUS at 20:40

## 2018-01-01 RX ADMIN — METOCLOPRAMIDE HYDROCHLORIDE 5 MG: 10 TABLET ORAL at 08:02

## 2018-01-01 RX ADMIN — METOCLOPRAMIDE 5 MG: 5 INJECTION, SOLUTION INTRAMUSCULAR; INTRAVENOUS at 00:34

## 2018-01-01 RX ADMIN — SODIUM CHLORIDE 50 ML/HR: 900 INJECTION, SOLUTION INTRAVENOUS at 08:23

## 2018-01-01 RX ADMIN — METOCLOPRAMIDE HYDROCHLORIDE 5 MG: 10 TABLET ORAL at 16:28

## 2018-01-01 RX ADMIN — MORPHINE SULFATE 10 MG: 20 SOLUTION ORAL at 11:52

## 2018-01-01 RX ADMIN — DEXAMETHASONE SODIUM PHOSPHATE 2 MG: 4 INJECTION, SOLUTION INTRAMUSCULAR; INTRAVENOUS at 20:38

## 2018-01-01 RX ADMIN — MORPHINE SULFATE 4 MG: 2 INJECTION, SOLUTION INTRAMUSCULAR; INTRAVENOUS at 16:00

## 2018-01-01 RX ADMIN — METOCLOPRAMIDE 10 MG: 5 INJECTION, SOLUTION INTRAMUSCULAR; INTRAVENOUS at 23:49

## 2018-01-01 RX ADMIN — Medication: at 20:13

## 2018-01-01 RX ADMIN — SODIUM CHLORIDE 50 ML/HR: 900 INJECTION, SOLUTION INTRAVENOUS at 12:26

## 2018-01-01 RX ADMIN — OXYCODONE HYDROCHLORIDE 10 MG: 5 TABLET ORAL at 02:48

## 2018-01-01 RX ADMIN — MORPHINE SULFATE 10 MG: 20 SOLUTION ORAL at 14:40

## 2018-01-01 RX ADMIN — AMITRIPTYLINE HYDROCHLORIDE 10 MG: 10 TABLET, FILM COATED ORAL at 22:00

## 2018-01-01 RX ADMIN — METOCLOPRAMIDE HYDROCHLORIDE 5 MG: 10 TABLET ORAL at 16:16

## 2018-01-01 RX ADMIN — STANDARDIZED SENNA CONCENTRATE 8.8 MG: 8.8 LIQUID ORAL at 16:13

## 2018-01-01 RX ADMIN — LIDOCAINE HYDROCHLORIDE 5 ML: 10 INJECTION, SOLUTION EPIDURAL; INFILTRATION; INTRACAUDAL; PERINEURAL at 09:15

## 2018-01-01 RX ADMIN — Medication 10 ML: at 05:29

## 2018-01-01 RX ADMIN — Medication 10 ML: at 08:23

## 2018-01-01 RX ADMIN — LACTULOSE 20 G: 20 SOLUTION ORAL at 17:26

## 2018-01-01 RX ADMIN — LORAZEPAM 1 MG: 2 INJECTION INTRAMUSCULAR; INTRAVENOUS at 23:04

## 2018-01-01 RX ADMIN — SODIUM CHLORIDE 10 ML: 9 INJECTION INTRAMUSCULAR; INTRAVENOUS; SUBCUTANEOUS at 08:35

## 2018-01-01 RX ADMIN — Medication 10 ML: at 08:02

## 2018-01-01 RX ADMIN — PROPOFOL 25 MG: 10 INJECTION, EMULSION INTRAVENOUS at 08:18

## 2018-01-01 RX ADMIN — SODIUM CHLORIDE 50 ML/HR: 900 INJECTION, SOLUTION INTRAVENOUS at 14:44

## 2018-01-01 RX ADMIN — DEXAMETHASONE SODIUM PHOSPHATE 2 MG: 4 INJECTION, SOLUTION INTRAMUSCULAR; INTRAVENOUS at 21:03

## 2018-01-01 RX ADMIN — SODIUM CHLORIDE 40 ML/HR: 900 INJECTION, SOLUTION INTRAVENOUS at 13:07

## 2018-01-01 RX ADMIN — METOCLOPRAMIDE HYDROCHLORIDE 5 MG: 10 TABLET ORAL at 20:04

## 2018-01-01 RX ADMIN — MIDAZOLAM 0.5 MG: 1 INJECTION INTRAMUSCULAR; INTRAVENOUS at 16:30

## 2018-01-01 RX ADMIN — IOHEXOL 50 ML: 240 INJECTION, SOLUTION INTRATHECAL; INTRAVASCULAR; INTRAVENOUS; ORAL at 10:00

## 2018-01-01 RX ADMIN — METOCLOPRAMIDE 10 MG: 5 INJECTION, SOLUTION INTRAMUSCULAR; INTRAVENOUS at 17:34

## 2018-01-01 RX ADMIN — METOCLOPRAMIDE 10 MG: 5 INJECTION, SOLUTION INTRAMUSCULAR; INTRAVENOUS at 10:18

## 2018-01-01 RX ADMIN — Medication 10 ML: at 16:57

## 2018-01-01 RX ADMIN — ENOXAPARIN SODIUM 40 MG: 100 INJECTION SUBCUTANEOUS at 12:00

## 2018-01-01 RX ADMIN — Medication: at 20:22

## 2018-01-01 RX ADMIN — LACTULOSE 20 G: 20 SOLUTION ORAL at 09:07

## 2018-01-01 RX ADMIN — DEXTROSE MONOHYDRATE 25 G: 25 INJECTION, SOLUTION INTRAVENOUS at 18:15

## 2018-01-01 RX ADMIN — LORAZEPAM 0.5 MG: 1 TABLET ORAL at 20:19

## 2018-01-01 RX ADMIN — OXYCODONE HYDROCHLORIDE 5 MG: 5 TABLET ORAL at 23:36

## 2018-01-01 RX ADMIN — STANDARDIZED SENNA CONCENTRATE 8.8 MG: 8.8 LIQUID ORAL at 10:22

## 2018-01-01 RX ADMIN — MORPHINE SULFATE 10 MG: 20 SOLUTION ORAL at 08:03

## 2018-01-01 RX ADMIN — Medication 10 ML: at 14:40

## 2018-01-01 RX ADMIN — INSULIN LISPRO 2 UNITS: 100 INJECTION, SOLUTION INTRAVENOUS; SUBCUTANEOUS at 06:08

## 2018-01-01 RX ADMIN — OXYCODONE HYDROCHLORIDE 10 MG: 10 TABLET, FILM COATED, EXTENDED RELEASE ORAL at 22:00

## 2018-01-01 RX ADMIN — Medication 10 ML: at 21:02

## 2018-01-01 RX ADMIN — OLANZAPINE 5 MG: 5 TABLET, ORALLY DISINTEGRATING ORAL at 20:04

## 2018-01-01 RX ADMIN — Medication 10 ML: at 18:36

## 2018-01-01 RX ADMIN — LIDOCAINE HYDROCHLORIDE 100 MG: 20 INJECTION, SOLUTION EPIDURAL; INFILTRATION; INTRACAUDAL; PERINEURAL at 08:14

## 2018-01-01 RX ADMIN — POTASSIUM CHLORIDE AND SODIUM CHLORIDE: 900; 150 INJECTION, SOLUTION INTRAVENOUS at 10:05

## 2018-01-01 RX ADMIN — METOCLOPRAMIDE 5 MG: 5 INJECTION, SOLUTION INTRAMUSCULAR; INTRAVENOUS at 05:58

## 2018-01-01 RX ADMIN — PREGABALIN 25 MG: 25 CAPSULE ORAL at 19:15

## 2018-01-01 RX ADMIN — MORPHINE SULFATE 10 MG: 20 SOLUTION ORAL at 04:19

## 2018-01-01 RX ADMIN — MORPHINE SULFATE 10 MG: 20 SOLUTION ORAL at 03:57

## 2018-01-01 RX ADMIN — LORAZEPAM 1 MG: 2 INJECTION INTRAMUSCULAR; INTRAVENOUS at 01:03

## 2018-01-01 RX ADMIN — SODIUM CHLORIDE 50 ML/HR: 900 INJECTION, SOLUTION INTRAVENOUS at 12:36

## 2018-01-01 RX ADMIN — METOCLOPRAMIDE 10 MG: 5 INJECTION, SOLUTION INTRAMUSCULAR; INTRAVENOUS at 17:56

## 2018-01-01 RX ADMIN — OXYCODONE HYDROCHLORIDE 5 MG: 5 TABLET ORAL at 01:28

## 2018-01-01 RX ADMIN — OXYCODONE HYDROCHLORIDE 7.5 MG: 5 TABLET ORAL at 04:02

## 2018-01-01 RX ADMIN — LORAZEPAM 1 MG: 2 INJECTION INTRAMUSCULAR; INTRAVENOUS at 16:48

## 2018-01-01 RX ADMIN — MORPHINE SULFATE 10 MG: 20 SOLUTION ORAL at 20:37

## 2018-01-01 RX ADMIN — SODIUM CHLORIDE 40 ML/HR: 900 INJECTION, SOLUTION INTRAVENOUS at 04:28

## 2018-01-01 RX ADMIN — Medication 10 ML: at 13:25

## 2018-01-01 RX ADMIN — DEXAMETHASONE SODIUM PHOSPHATE 2 MG: 4 INJECTION, SOLUTION INTRAMUSCULAR; INTRAVENOUS at 23:06

## 2018-01-01 RX ADMIN — MORPHINE SULFATE 10 MG: 20 SOLUTION ORAL at 20:06

## 2018-01-01 RX ADMIN — LORAZEPAM 1 MG: 2 INJECTION INTRAMUSCULAR; INTRAVENOUS at 04:06

## 2018-01-01 RX ADMIN — DOCUSATE SODIUM AND SENNOSIDES 1 TABLET: 8.6; 5 TABLET, FILM COATED ORAL at 17:56

## 2018-01-01 RX ADMIN — MORPHINE SULFATE 10 MG: 20 SOLUTION ORAL at 11:42

## 2018-01-01 RX ADMIN — SODIUM CHLORIDE 10 ML: 9 INJECTION INTRAMUSCULAR; INTRAVENOUS; SUBCUTANEOUS at 13:05

## 2018-01-01 RX ADMIN — PREGABALIN 25 MG: 25 CAPSULE ORAL at 19:39

## 2018-01-01 RX ADMIN — SODIUM CHLORIDE 100 ML: 900 INJECTION, SOLUTION INTRAVENOUS at 10:00

## 2018-01-01 RX ADMIN — POLYETHYLENE GLYCOL 3350 17 G: 17 POWDER, FOR SOLUTION ORAL at 10:43

## 2018-01-01 RX ADMIN — PREGABALIN 25 MG: 25 CAPSULE ORAL at 21:06

## 2018-01-01 RX ADMIN — MORPHINE SULFATE 10 MG: 20 SOLUTION ORAL at 03:02

## 2018-01-01 RX ADMIN — ONDANSETRON HYDROCHLORIDE 4 MG: 2 INJECTION INTRAMUSCULAR; INTRAVENOUS at 13:05

## 2018-01-01 RX ADMIN — SUCRALFATE 0.5 G: 1 SUSPENSION ORAL at 17:56

## 2018-01-01 RX ADMIN — MIDAZOLAM 1 MG: 1 INJECTION INTRAMUSCULAR; INTRAVENOUS at 16:21

## 2018-01-01 RX ADMIN — STANDARDIZED SENNA CONCENTRATE 8.8 MG: 8.8 LIQUID ORAL at 08:10

## 2018-01-01 RX ADMIN — SODIUM CHLORIDE 40 ML/HR: 900 INJECTION, SOLUTION INTRAVENOUS at 13:49

## 2018-01-01 RX ADMIN — MORPHINE SULFATE 10 MG: 20 SOLUTION ORAL at 04:48

## 2018-01-01 RX ADMIN — ENOXAPARIN SODIUM 40 MG: 100 INJECTION SUBCUTANEOUS at 11:57

## 2018-01-01 RX ADMIN — METOCLOPRAMIDE 10 MG: 5 INJECTION, SOLUTION INTRAMUSCULAR; INTRAVENOUS at 06:41

## 2018-01-01 RX ADMIN — POTASSIUM CHLORIDE, DEXTROSE MONOHYDRATE AND SODIUM CHLORIDE 100 ML/HR: 75; 5; 450 INJECTION, SOLUTION INTRAVENOUS at 22:27

## 2018-01-01 RX ADMIN — METOCLOPRAMIDE 5 MG: 5 INJECTION, SOLUTION INTRAMUSCULAR; INTRAVENOUS at 05:23

## 2018-01-01 RX ADMIN — SUCRALFATE 0.5 G: 1 SUSPENSION ORAL at 18:01

## 2018-01-01 RX ADMIN — PROCHLORPERAZINE EDISYLATE 5 MG: 5 INJECTION INTRAMUSCULAR; INTRAVENOUS at 18:58

## 2018-01-01 RX ADMIN — METOCLOPRAMIDE 10 MG: 5 INJECTION, SOLUTION INTRAMUSCULAR; INTRAVENOUS at 19:25

## 2018-01-01 RX ADMIN — PREGABALIN 25 MG: 25 CAPSULE ORAL at 20:38

## 2018-01-01 RX ADMIN — DOCUSATE SODIUM AND SENNOSIDES 1 TABLET: 8.6; 5 TABLET, FILM COATED ORAL at 17:30

## 2018-01-01 RX ADMIN — SUCRALFATE 1 G: 1 SUSPENSION ORAL at 23:22

## 2018-01-01 RX ADMIN — SODIUM CHLORIDE 40 MG: 9 INJECTION INTRAMUSCULAR; INTRAVENOUS; SUBCUTANEOUS at 11:57

## 2018-01-01 RX ADMIN — Medication 10 ML: at 11:02

## 2018-01-01 RX ADMIN — LORAZEPAM 1 MG: 2 INJECTION INTRAMUSCULAR; INTRAVENOUS at 04:03

## 2018-01-01 RX ADMIN — MORPHINE SULFATE 10 MG: 20 SOLUTION ORAL at 04:22

## 2018-01-01 RX ADMIN — LORAZEPAM 1 MG: 2 INJECTION INTRAMUSCULAR; INTRAVENOUS at 18:35

## 2018-01-01 RX ADMIN — OXYCODONE HYDROCHLORIDE 5 MG: 5 TABLET ORAL at 08:08

## 2018-01-01 RX ADMIN — SODIUM CHLORIDE 25 ML/HR: 900 INJECTION, SOLUTION INTRAVENOUS at 16:06

## 2018-01-01 RX ADMIN — DEXAMETHASONE SODIUM PHOSPHATE 2 MG: 4 INJECTION, SOLUTION INTRAMUSCULAR; INTRAVENOUS at 21:12

## 2018-01-01 RX ADMIN — LACTULOSE 20 G: 20 SOLUTION ORAL at 17:00

## 2018-01-01 RX ADMIN — MORPHINE SULFATE 10 MG: 20 SOLUTION ORAL at 20:30

## 2018-01-01 RX ADMIN — MORPHINE SULFATE 10 MG: 20 SOLUTION ORAL at 03:32

## 2018-01-01 RX ADMIN — INSULIN LISPRO 2 UNITS: 100 INJECTION, SOLUTION INTRAVENOUS; SUBCUTANEOUS at 23:59

## 2018-01-01 RX ADMIN — POTASSIUM CHLORIDE AND SODIUM CHLORIDE: 900; 150 INJECTION, SOLUTION INTRAVENOUS at 15:09

## 2018-01-01 RX ADMIN — METOCLOPRAMIDE 10 MG: 5 INJECTION, SOLUTION INTRAMUSCULAR; INTRAVENOUS at 00:08

## 2018-01-01 RX ADMIN — SODIUM CHLORIDE 40 MG: 9 INJECTION, SOLUTION INTRAMUSCULAR; INTRAVENOUS; SUBCUTANEOUS at 20:18

## 2018-01-01 RX ADMIN — MORPHINE SULFATE 5 MG: 10 SOLUTION ORAL at 17:39

## 2018-01-01 RX ADMIN — METOCLOPRAMIDE 10 MG: 5 INJECTION, SOLUTION INTRAMUSCULAR; INTRAVENOUS at 23:36

## 2018-01-01 RX ADMIN — BISACODYL 10 MG: 10 SUPPOSITORY RECTAL at 14:31

## 2018-01-01 RX ADMIN — DEXAMETHASONE SODIUM PHOSPHATE 2 MG: 4 INJECTION, SOLUTION INTRAMUSCULAR; INTRAVENOUS at 06:41

## 2018-01-01 RX ADMIN — SODIUM CHLORIDE 10 ML: 9 INJECTION INTRAMUSCULAR; INTRAVENOUS; SUBCUTANEOUS at 13:00

## 2018-01-01 RX ADMIN — METOCLOPRAMIDE HYDROCHLORIDE 5 MG: 10 TABLET ORAL at 08:38

## 2018-01-01 RX ADMIN — ASCORBIC ACID: 500 INJECTION, SOLUTION INTRAMUSCULAR; INTRAVENOUS; SUBCUTANEOUS at 19:07

## 2018-01-01 RX ADMIN — METOCLOPRAMIDE 10 MG: 5 INJECTION, SOLUTION INTRAMUSCULAR; INTRAVENOUS at 19:15

## 2018-01-01 RX ADMIN — SODIUM CHLORIDE 10 ML: 9 INJECTION INTRAMUSCULAR; INTRAVENOUS; SUBCUTANEOUS at 17:03

## 2018-01-01 RX ADMIN — SODIUM CHLORIDE, PRESERVATIVE FREE 500 UNITS: 5 INJECTION INTRAVENOUS at 13:00

## 2018-01-01 RX ADMIN — LACTULOSE 10 G: 20 SOLUTION ORAL at 12:08

## 2018-01-01 RX ADMIN — SODIUM CHLORIDE 40 MG: 9 INJECTION INTRAMUSCULAR; INTRAVENOUS; SUBCUTANEOUS at 12:09

## 2018-01-01 RX ADMIN — SODIUM CHLORIDE 40 MG: 9 INJECTION INTRAMUSCULAR; INTRAVENOUS; SUBCUTANEOUS at 11:51

## 2018-01-01 RX ADMIN — PREGABALIN 25 MG: 25 CAPSULE ORAL at 08:26

## 2018-01-01 RX ADMIN — SODIUM CHLORIDE 40 MG: 9 INJECTION INTRAMUSCULAR; INTRAVENOUS; SUBCUTANEOUS at 12:34

## 2018-01-01 RX ADMIN — SODIUM CHLORIDE 50 ML/HR: 900 INJECTION, SOLUTION INTRAVENOUS at 20:37

## 2018-01-01 RX ADMIN — OXYCODONE HYDROCHLORIDE 5 MG: 5 TABLET ORAL at 03:42

## 2018-01-01 RX ADMIN — METOCLOPRAMIDE HYDROCHLORIDE 5 MG: 10 TABLET ORAL at 16:09

## 2018-01-01 RX ADMIN — ONDANSETRON HYDROCHLORIDE 4 MG: 2 INJECTION INTRAMUSCULAR; INTRAVENOUS at 10:07

## 2018-01-01 RX ADMIN — OXYCODONE HYDROCHLORIDE 10 MG: 10 TABLET, FILM COATED, EXTENDED RELEASE ORAL at 06:26

## 2018-01-01 RX ADMIN — LORAZEPAM 1 MG: 2 INJECTION INTRAMUSCULAR; INTRAVENOUS at 19:23

## 2018-01-01 RX ADMIN — PREGABALIN 25 MG: 25 CAPSULE ORAL at 08:18

## 2018-01-01 RX ADMIN — PREGABALIN 25 MG: 25 CAPSULE ORAL at 23:57

## 2018-01-01 RX ADMIN — METOCLOPRAMIDE 10 MG: 5 INJECTION, SOLUTION INTRAMUSCULAR; INTRAVENOUS at 14:40

## 2018-01-01 RX ADMIN — STANDARDIZED SENNA CONCENTRATE 8.8 MG: 8.8 LIQUID ORAL at 08:35

## 2018-01-01 RX ADMIN — METOCLOPRAMIDE 10 MG: 5 INJECTION, SOLUTION INTRAMUSCULAR; INTRAVENOUS at 18:30

## 2018-01-01 RX ADMIN — SODIUM CHLORIDE 40 MG: 9 INJECTION, SOLUTION INTRAMUSCULAR; INTRAVENOUS; SUBCUTANEOUS at 09:48

## 2018-01-01 RX ADMIN — FENTANYL CITRATE 25 MCG: 50 INJECTION INTRAMUSCULAR; INTRAVENOUS at 16:21

## 2018-01-01 RX ADMIN — Medication 10 ML: at 23:59

## 2018-01-01 RX ADMIN — MORPHINE SULFATE 10 MG: 20 SOLUTION ORAL at 23:24

## 2018-01-01 RX ADMIN — METOCLOPRAMIDE 10 MG: 5 INJECTION, SOLUTION INTRAMUSCULAR; INTRAVENOUS at 18:35

## 2018-01-01 RX ADMIN — ENOXAPARIN SODIUM 40 MG: 100 INJECTION SUBCUTANEOUS at 14:30

## 2018-01-01 RX ADMIN — Medication: at 07:48

## 2018-01-01 RX ADMIN — Medication 10 ML: at 08:08

## 2018-01-01 RX ADMIN — BEVACIZUMAB 630 MG: 400 INJECTION, SOLUTION INTRAVENOUS at 09:26

## 2018-01-01 RX ADMIN — SUCRALFATE 1 G: 1 SUSPENSION ORAL at 12:18

## 2018-01-01 RX ADMIN — MORPHINE SULFATE 5 MG: 20 SOLUTION ORAL at 00:56

## 2018-01-01 RX ADMIN — STANDARDIZED SENNA CONCENTRATE 8.8 MG: 8.8 LIQUID ORAL at 08:17

## 2018-01-01 RX ADMIN — SODIUM CHLORIDE 75 ML/HR: 900 INJECTION, SOLUTION INTRAVENOUS at 12:07

## 2018-01-01 RX ADMIN — IOPAMIDOL 100 ML: 755 INJECTION, SOLUTION INTRAVENOUS at 10:36

## 2018-01-01 RX ADMIN — SODIUM CHLORIDE 40 MG: 9 INJECTION INTRAMUSCULAR; INTRAVENOUS; SUBCUTANEOUS at 12:13

## 2018-01-01 RX ADMIN — ONDANSETRON HYDROCHLORIDE 4 MG: 2 INJECTION INTRAMUSCULAR; INTRAVENOUS at 11:54

## 2018-01-01 RX ADMIN — AMITRIPTYLINE HYDROCHLORIDE 10 MG: 10 TABLET, FILM COATED ORAL at 17:56

## 2018-01-01 RX ADMIN — Medication: at 07:41

## 2018-01-01 RX ADMIN — METOCLOPRAMIDE 10 MG: 5 INJECTION, SOLUTION INTRAMUSCULAR; INTRAVENOUS at 08:15

## 2018-01-01 RX ADMIN — ENOXAPARIN SODIUM 40 MG: 100 INJECTION SUBCUTANEOUS at 15:08

## 2018-01-01 RX ADMIN — MORPHINE SULFATE 2 MG: 2 INJECTION, SOLUTION INTRAMUSCULAR; INTRAVENOUS at 13:05

## 2018-01-01 RX ADMIN — MIDAZOLAM 0.5 MG: 1 INJECTION INTRAMUSCULAR; INTRAVENOUS at 16:25

## 2018-01-01 RX ADMIN — STANDARDIZED SENNA CONCENTRATE 8.8 MG: 8.8 LIQUID ORAL at 08:27

## 2018-01-01 RX ADMIN — LORAZEPAM 1 MG: 2 INJECTION INTRAMUSCULAR; INTRAVENOUS at 05:41

## 2018-01-01 RX ADMIN — Medication 10 ML: at 13:00

## 2018-01-01 RX ADMIN — MORPHINE SULFATE 10 MG: 20 SOLUTION ORAL at 20:13

## 2018-01-01 RX ADMIN — DEXAMETHASONE SODIUM PHOSPHATE 2 MG: 4 INJECTION, SOLUTION INTRAMUSCULAR; INTRAVENOUS at 14:00

## 2018-01-01 RX ADMIN — INSULIN LISPRO 2 UNITS: 100 INJECTION, SOLUTION INTRAVENOUS; SUBCUTANEOUS at 14:25

## 2018-01-01 RX ADMIN — METOCLOPRAMIDE HYDROCHLORIDE 5 MG: 10 TABLET ORAL at 08:27

## 2018-01-01 RX ADMIN — ONDANSETRON 8 MG: 2 INJECTION, SOLUTION INTRAMUSCULAR; INTRAVENOUS at 09:09

## 2018-01-01 RX ADMIN — OLANZAPINE 5 MG: 5 TABLET, ORALLY DISINTEGRATING ORAL at 21:03

## 2018-01-01 RX ADMIN — SODIUM CHLORIDE, PRESERVATIVE FREE 500 UNITS: 5 INJECTION INTRAVENOUS at 08:30

## 2018-01-01 RX ADMIN — MORPHINE SULFATE 5 MG: 20 SOLUTION ORAL at 04:03

## 2018-01-01 RX ADMIN — POTASSIUM CHLORIDE, DEXTROSE MONOHYDRATE AND SODIUM CHLORIDE 100 ML/HR: 75; 5; 450 INJECTION, SOLUTION INTRAVENOUS at 12:01

## 2018-01-01 RX ADMIN — METOCLOPRAMIDE 10 MG: 5 INJECTION, SOLUTION INTRAMUSCULAR; INTRAVENOUS at 05:41

## 2018-01-01 RX ADMIN — LIDOCAINE HYDROCHLORIDE 10 ML: 10 INJECTION, SOLUTION EPIDURAL; INFILTRATION; INTRACAUDAL; PERINEURAL at 16:49

## 2018-01-01 RX ADMIN — METOCLOPRAMIDE HYDROCHLORIDE 5 MG: 10 TABLET ORAL at 11:57

## 2018-01-01 RX ADMIN — OXYCODONE HYDROCHLORIDE 10 MG: 5 TABLET ORAL at 22:54

## 2018-01-01 RX ADMIN — OXYCODONE HYDROCHLORIDE 10 MG: 5 TABLET ORAL at 06:38

## 2018-01-01 RX ADMIN — MORPHINE SULFATE 10 MG: 20 SOLUTION ORAL at 08:08

## 2018-01-01 RX ADMIN — Medication 10 ML: at 22:00

## 2018-01-01 RX ADMIN — METOCLOPRAMIDE 10 MG: 5 INJECTION, SOLUTION INTRAMUSCULAR; INTRAVENOUS at 20:13

## 2018-01-01 RX ADMIN — SODIUM CHLORIDE, PRESERVATIVE FREE 500 UNITS: 5 INJECTION INTRAVENOUS at 09:13

## 2018-01-01 RX ADMIN — DOXORUBICIN HYDROCHLORIDE 69.6 MG: 2 INJECTION, SUSPENSION, LIPOSOMAL INTRAVENOUS at 10:09

## 2018-01-01 RX ADMIN — I.V. FAT EMULSION 500 ML: 20 EMULSION INTRAVENOUS at 18:51

## 2018-01-01 RX ADMIN — SODIUM CHLORIDE 10 ML: 9 INJECTION INTRAMUSCULAR; INTRAVENOUS; SUBCUTANEOUS at 08:30

## 2018-01-01 RX ADMIN — Medication 10 ML: at 20:20

## 2018-01-01 RX ADMIN — Medication 10 ML: at 17:33

## 2018-01-01 RX ADMIN — METOCLOPRAMIDE HYDROCHLORIDE 5 MG: 5 SOLUTION ORAL at 16:50

## 2018-01-01 RX ADMIN — SUCRALFATE 0.5 G: 1 SUSPENSION ORAL at 20:15

## 2018-01-01 RX ADMIN — SODIUM CHLORIDE 40 MG: 9 INJECTION INTRAMUSCULAR; INTRAVENOUS; SUBCUTANEOUS at 10:18

## 2018-01-01 RX ADMIN — LACTULOSE 10 G: 20 SOLUTION ORAL at 09:00

## 2018-01-01 RX ADMIN — MORPHINE SULFATE 10 MG: 20 SOLUTION ORAL at 23:15

## 2018-01-01 RX ADMIN — LORAZEPAM 1 MG: 2 INJECTION INTRAMUSCULAR; INTRAVENOUS at 19:39

## 2018-01-01 RX ADMIN — OXYCODONE HYDROCHLORIDE 7.5 MG: 5 TABLET ORAL at 15:29

## 2018-01-01 RX ADMIN — MORPHINE SULFATE 10 MG: 20 SOLUTION ORAL at 20:19

## 2018-01-01 RX ADMIN — METOCLOPRAMIDE HYDROCHLORIDE 5 MG: 10 TABLET ORAL at 16:15

## 2018-01-01 RX ADMIN — METOCLOPRAMIDE 10 MG: 5 INJECTION, SOLUTION INTRAMUSCULAR; INTRAVENOUS at 12:00

## 2018-01-01 RX ADMIN — MORPHINE SULFATE 10 MG: 20 SOLUTION ORAL at 11:57

## 2018-01-01 RX ADMIN — LORAZEPAM 1 MG: 1 TABLET ORAL at 18:39

## 2018-01-01 RX ADMIN — PREGABALIN 25 MG: 25 CAPSULE ORAL at 09:09

## 2018-01-01 RX ADMIN — AMITRIPTYLINE HYDROCHLORIDE 10 MG: 10 TABLET, FILM COATED ORAL at 18:32

## 2018-01-01 RX ADMIN — LACTULOSE 20 G: 20 SOLUTION ORAL at 08:53

## 2018-01-01 RX ADMIN — METOCLOPRAMIDE 10 MG: 5 INJECTION, SOLUTION INTRAMUSCULAR; INTRAVENOUS at 06:54

## 2018-01-01 RX ADMIN — LACTULOSE 20 G: 20 SOLUTION ORAL at 17:33

## 2018-01-01 RX ADMIN — SODIUM CHLORIDE 40 MG: 9 INJECTION, SOLUTION INTRAMUSCULAR; INTRAVENOUS; SUBCUTANEOUS at 09:42

## 2018-01-01 RX ADMIN — METOCLOPRAMIDE HYDROCHLORIDE 5 MG: 5 SOLUTION ORAL at 07:40

## 2018-01-01 RX ADMIN — AMITRIPTYLINE HYDROCHLORIDE 10 MG: 10 TABLET, FILM COATED ORAL at 21:12

## 2018-01-01 RX ADMIN — METOCLOPRAMIDE 10 MG: 5 INJECTION, SOLUTION INTRAMUSCULAR; INTRAVENOUS at 20:07

## 2018-01-01 RX ADMIN — MORPHINE SULFATE 10 MG: 20 SOLUTION ORAL at 15:58

## 2018-01-01 RX ADMIN — MORPHINE SULFATE 10 MG: 20 SOLUTION ORAL at 07:58

## 2018-01-01 RX ADMIN — SODIUM CHLORIDE: 9 INJECTION, SOLUTION INTRAVENOUS at 08:00

## 2018-01-01 RX ADMIN — PREGABALIN 25 MG: 25 CAPSULE ORAL at 11:06

## 2018-01-01 RX ADMIN — ENOXAPARIN SODIUM 40 MG: 40 INJECTION SUBCUTANEOUS at 08:26

## 2018-01-01 RX ADMIN — SODIUM CHLORIDE 40 MG: 9 INJECTION INTRAMUSCULAR; INTRAVENOUS; SUBCUTANEOUS at 11:58

## 2018-01-01 RX ADMIN — OXYCODONE HYDROCHLORIDE 5 MG: 5 TABLET ORAL at 19:25

## 2018-01-01 RX ADMIN — ASCORBIC ACID: 500 INJECTION, SOLUTION INTRAMUSCULAR; INTRAVENOUS; SUBCUTANEOUS at 20:16

## 2018-01-01 RX ADMIN — ENOXAPARIN SODIUM 40 MG: 100 INJECTION SUBCUTANEOUS at 12:18

## 2018-01-01 RX ADMIN — METOCLOPRAMIDE HYDROCHLORIDE 5 MG: 10 TABLET ORAL at 20:05

## 2018-01-01 RX ADMIN — PREGABALIN 25 MG: 25 CAPSULE ORAL at 17:56

## 2018-01-01 RX ADMIN — SODIUM CHLORIDE 100 ML: 900 INJECTION, SOLUTION INTRAVENOUS at 11:02

## 2018-01-01 RX ADMIN — METOCLOPRAMIDE 10 MG: 5 INJECTION, SOLUTION INTRAMUSCULAR; INTRAVENOUS at 07:10

## 2018-01-01 RX ADMIN — ONDANSETRON HYDROCHLORIDE 4 MG: 2 INJECTION INTRAMUSCULAR; INTRAVENOUS at 05:20

## 2018-01-01 RX ADMIN — IOHEXOL 50 ML: 240 INJECTION, SOLUTION INTRATHECAL; INTRAVASCULAR; INTRAVENOUS; ORAL at 11:02

## 2018-01-01 RX ADMIN — STANDARDIZED SENNA CONCENTRATE 8.8 MG: 8.8 LIQUID ORAL at 16:27

## 2018-01-01 RX ADMIN — SODIUM CHLORIDE 40 ML/HR: 900 INJECTION, SOLUTION INTRAVENOUS at 13:38

## 2018-01-01 RX ADMIN — LORAZEPAM 1 MG: 2 INJECTION INTRAMUSCULAR; INTRAVENOUS at 00:21

## 2018-01-01 RX ADMIN — OXYCODONE HYDROCHLORIDE 5 MG: 5 TABLET ORAL at 15:39

## 2018-01-01 RX ADMIN — POLYETHYLENE GLYCOL 3350 17 G: 17 POWDER, FOR SOLUTION ORAL at 08:07

## 2018-01-01 RX ADMIN — MORPHINE SULFATE 10 MG: 20 SOLUTION ORAL at 18:20

## 2018-01-01 RX ADMIN — MORPHINE SULFATE 10 MG: 20 SOLUTION ORAL at 03:18

## 2018-01-01 RX ADMIN — MORPHINE SULFATE 10 MG: 20 SOLUTION ORAL at 19:03

## 2018-01-01 RX ADMIN — I.V. FAT EMULSION 500 ML: 20 EMULSION INTRAVENOUS at 18:49

## 2018-01-01 RX ADMIN — ONDANSETRON HYDROCHLORIDE 4 MG: 2 INJECTION INTRAMUSCULAR; INTRAVENOUS at 23:02

## 2018-01-01 RX ADMIN — MORPHINE SULFATE 10 MG: 20 SOLUTION ORAL at 16:26

## 2018-01-01 RX ADMIN — SUCRALFATE 1 G: 1 SUSPENSION ORAL at 17:26

## 2018-01-01 RX ADMIN — LACTULOSE 10 G: 20 SOLUTION ORAL at 08:22

## 2018-01-01 RX ADMIN — METOCLOPRAMIDE 10 MG: 5 INJECTION, SOLUTION INTRAMUSCULAR; INTRAVENOUS at 08:03

## 2018-01-01 RX ADMIN — I.V. FAT EMULSION 500 ML: 20 EMULSION INTRAVENOUS at 21:06

## 2018-01-01 RX ADMIN — OXYCODONE HYDROCHLORIDE 5 MG: 5 TABLET ORAL at 20:30

## 2018-01-01 RX ADMIN — ONDANSETRON 8 MG: 2 INJECTION INTRAMUSCULAR; INTRAVENOUS at 09:39

## 2018-01-01 RX ADMIN — BEVACIZUMAB 630 MG: 400 INJECTION, SOLUTION INTRAVENOUS at 10:04

## 2018-01-01 RX ADMIN — I.V. FAT EMULSION 500 ML: 20 EMULSION INTRAVENOUS at 19:14

## 2018-01-01 RX ADMIN — SODIUM CHLORIDE 75 ML/HR: 900 INJECTION, SOLUTION INTRAVENOUS at 13:25

## 2018-01-01 RX ADMIN — STANDARDIZED SENNA CONCENTRATE 8.8 MG: 8.8 LIQUID ORAL at 19:56

## 2018-01-01 RX ADMIN — ENOXAPARIN SODIUM 40 MG: 40 INJECTION SUBCUTANEOUS at 08:35

## 2018-01-01 RX ADMIN — Medication 10 ML: at 17:05

## 2018-01-01 RX ADMIN — OXYCODONE HYDROCHLORIDE 10 MG: 10 TABLET, FILM COATED, EXTENDED RELEASE ORAL at 15:10

## 2018-01-01 RX ADMIN — SODIUM CHLORIDE 1000 ML: 900 INJECTION, SOLUTION INTRAVENOUS at 08:01

## 2018-01-01 RX ADMIN — Medication 10 ML: at 09:12

## 2018-01-01 RX ADMIN — SODIUM CHLORIDE: 900 INJECTION, SOLUTION INTRAVENOUS at 09:48

## 2018-01-01 RX ADMIN — Medication 10 ML: at 23:37

## 2018-01-01 RX ADMIN — MORPHINE SULFATE 4 MG: 2 INJECTION, SOLUTION INTRAMUSCULAR; INTRAVENOUS at 20:54

## 2018-01-01 RX ADMIN — SODIUM CHLORIDE 40 ML/HR: 900 INJECTION, SOLUTION INTRAVENOUS at 08:07

## 2018-01-01 RX ADMIN — Medication 10 ML: at 23:14

## 2018-01-01 RX ADMIN — Medication 10 ML: at 05:41

## 2018-01-01 RX ADMIN — OXYCODONE HYDROCHLORIDE 10 MG: 5 TABLET ORAL at 18:30

## 2018-01-01 RX ADMIN — STANDARDIZED SENNA CONCENTRATE 8.8 MG: 8.8 LIQUID ORAL at 17:22

## 2018-01-01 RX ADMIN — AMITRIPTYLINE HYDROCHLORIDE 10 MG: 10 TABLET, FILM COATED ORAL at 18:35

## 2018-01-01 RX ADMIN — DEXAMETHASONE SODIUM PHOSPHATE 12 MG: 4 INJECTION, SOLUTION INTRA-ARTICULAR; INTRALESIONAL; INTRAMUSCULAR; INTRAVENOUS; SOFT TISSUE at 10:57

## 2018-01-01 RX ADMIN — SODIUM CHLORIDE 75 ML/HR: 900 INJECTION, SOLUTION INTRAVENOUS at 12:52

## 2018-01-01 RX ADMIN — SODIUM CHLORIDE 40 MG: 9 INJECTION INTRAMUSCULAR; INTRAVENOUS; SUBCUTANEOUS at 12:11

## 2018-01-01 RX ADMIN — METOCLOPRAMIDE HYDROCHLORIDE 5 MG: 10 TABLET ORAL at 11:54

## 2018-01-01 RX ADMIN — DOCUSATE SODIUM AND SENNOSIDES 1 TABLET: 8.6; 5 TABLET, FILM COATED ORAL at 18:03

## 2018-01-01 RX ADMIN — METOCLOPRAMIDE HYDROCHLORIDE 5 MG: 10 TABLET ORAL at 08:39

## 2018-01-01 RX ADMIN — MORPHINE SULFATE 10 MG: 20 SOLUTION ORAL at 10:14

## 2018-01-01 RX ADMIN — Medication 10 ML: at 05:58

## 2018-01-01 RX ADMIN — BEVACIZUMAB 630 MG: 400 INJECTION, SOLUTION INTRAVENOUS at 09:28

## 2018-01-01 RX ADMIN — MORPHINE SULFATE 10 MG: 20 SOLUTION ORAL at 16:18

## 2018-01-01 RX ADMIN — METOCLOPRAMIDE 10 MG: 5 INJECTION, SOLUTION INTRAMUSCULAR; INTRAVENOUS at 08:11

## 2018-01-01 RX ADMIN — SODIUM CHLORIDE 40 ML/HR: 900 INJECTION, SOLUTION INTRAVENOUS at 03:31

## 2018-01-01 RX ADMIN — SODIUM CHLORIDE 10 ML: 9 INJECTION, SOLUTION INTRAMUSCULAR; INTRAVENOUS; SUBCUTANEOUS at 08:20

## 2018-01-01 RX ADMIN — LORAZEPAM 1 MG: 2 INJECTION INTRAMUSCULAR; INTRAVENOUS at 19:49

## 2018-01-01 RX ADMIN — METOCLOPRAMIDE 5 MG: 5 INJECTION, SOLUTION INTRAMUSCULAR; INTRAVENOUS at 17:09

## 2018-01-01 RX ADMIN — ONDANSETRON HYDROCHLORIDE 4 MG: 2 INJECTION INTRAMUSCULAR; INTRAVENOUS at 20:51

## 2018-01-01 RX ADMIN — LORAZEPAM 0.5 MG: 1 TABLET ORAL at 20:11

## 2018-01-01 RX ADMIN — METOCLOPRAMIDE 5 MG: 5 INJECTION, SOLUTION INTRAMUSCULAR; INTRAVENOUS at 09:47

## 2018-01-01 RX ADMIN — Medication 10 ML: at 00:08

## 2018-01-01 RX ADMIN — PREGABALIN 25 MG: 25 CAPSULE ORAL at 18:06

## 2018-01-01 RX ADMIN — LORAZEPAM 1 MG: 2 INJECTION INTRAMUSCULAR; INTRAVENOUS at 23:49

## 2018-01-01 RX ADMIN — AMITRIPTYLINE HYDROCHLORIDE 10 MG: 10 TABLET, FILM COATED ORAL at 21:07

## 2018-01-01 RX ADMIN — LACTULOSE 20 G: 20 SOLUTION ORAL at 09:47

## 2018-01-01 RX ADMIN — POTASSIUM CHLORIDE AND SODIUM CHLORIDE: 900; 150 INJECTION, SOLUTION INTRAVENOUS at 08:14

## 2018-01-01 RX ADMIN — BEVACIZUMAB 630 MG: 400 INJECTION, SOLUTION INTRAVENOUS at 09:59

## 2018-01-01 RX ADMIN — LACTULOSE 20 G: 20 SOLUTION ORAL at 10:15

## 2018-01-01 RX ADMIN — OXYCODONE HYDROCHLORIDE 10 MG: 10 TABLET, FILM COATED, EXTENDED RELEASE ORAL at 14:08

## 2018-01-01 RX ADMIN — DEXTROSE MONOHYDRATE 25 ML/HR: 5 INJECTION, SOLUTION INTRAVENOUS at 08:34

## 2018-01-01 RX ADMIN — SUCRALFATE 0.5 G: 1 SUSPENSION ORAL at 11:59

## 2018-01-01 RX ADMIN — SODIUM CHLORIDE 40 MG: 9 INJECTION INTRAMUSCULAR; INTRAVENOUS; SUBCUTANEOUS at 11:11

## 2018-01-01 RX ADMIN — METOCLOPRAMIDE 5 MG: 5 INJECTION, SOLUTION INTRAMUSCULAR; INTRAVENOUS at 13:25

## 2018-01-01 RX ADMIN — METOCLOPRAMIDE HYDROCHLORIDE 5 MG: 10 TABLET ORAL at 11:59

## 2018-01-01 RX ADMIN — FENTANYL CITRATE 25 MCG: 50 INJECTION INTRAMUSCULAR; INTRAVENOUS at 16:08

## 2018-01-01 RX ADMIN — DEXAMETHASONE SODIUM PHOSPHATE 2 MG: 4 INJECTION, SOLUTION INTRAMUSCULAR; INTRAVENOUS at 05:51

## 2018-01-01 RX ADMIN — MORPHINE SULFATE 4 MG: 2 INJECTION, SOLUTION INTRAMUSCULAR; INTRAVENOUS at 01:05

## 2018-01-01 RX ADMIN — PROPOFOL 15 MG: 10 INJECTION, EMULSION INTRAVENOUS at 08:22

## 2018-01-01 RX ADMIN — LACTULOSE 20 G: 20 SOLUTION ORAL at 18:12

## 2018-01-01 RX ADMIN — Medication 20 ML: at 17:03

## 2018-01-01 RX ADMIN — MORPHINE SULFATE 10 MG: 20 SOLUTION ORAL at 23:40

## 2018-01-01 RX ADMIN — SODIUM CHLORIDE 40 MG: 9 INJECTION INTRAMUSCULAR; INTRAVENOUS; SUBCUTANEOUS at 10:22

## 2018-01-01 RX ADMIN — INSULIN LISPRO 2 UNITS: 100 INJECTION, SOLUTION INTRAVENOUS; SUBCUTANEOUS at 00:11

## 2018-01-01 RX ADMIN — METOCLOPRAMIDE 10 MG: 5 INJECTION, SOLUTION INTRAMUSCULAR; INTRAVENOUS at 20:39

## 2018-01-01 RX ADMIN — SODIUM CHLORIDE 45 ML/HR: 900 INJECTION, SOLUTION INTRAVENOUS at 18:53

## 2018-01-01 RX ADMIN — LORAZEPAM 0.5 MG: 1 TABLET ORAL at 19:57

## 2018-01-01 RX ADMIN — LIDOCAINE HYDROCHLORIDE 5 ML: 10 INJECTION, SOLUTION EPIDURAL; INFILTRATION; INTRACAUDAL; PERINEURAL at 09:52

## 2018-01-01 RX ADMIN — Medication 10 ML: at 06:52

## 2018-01-01 RX ADMIN — MORPHINE SULFATE 10 MG: 20 SOLUTION ORAL at 00:16

## 2018-01-01 RX ADMIN — OXYCODONE HYDROCHLORIDE 5 MG: 5 TABLET ORAL at 12:53

## 2018-01-01 RX ADMIN — INSULIN LISPRO 2 UNITS: 100 INJECTION, SOLUTION INTRAVENOUS; SUBCUTANEOUS at 05:42

## 2018-01-01 RX ADMIN — Medication 10 ML: at 07:19

## 2018-01-01 RX ADMIN — Medication 10 ML: at 06:57

## 2018-01-01 RX ADMIN — AMITRIPTYLINE HYDROCHLORIDE 10 MG: 10 TABLET, FILM COATED ORAL at 18:06

## 2018-01-01 RX ADMIN — SODIUM CHLORIDE 10 ML: 9 INJECTION INTRAMUSCULAR; INTRAVENOUS; SUBCUTANEOUS at 17:05

## 2018-01-01 RX ADMIN — MORPHINE SULFATE 10 MG: 20 SOLUTION ORAL at 12:11

## 2018-01-01 RX ADMIN — SODIUM CHLORIDE 250 ML: 900 INJECTION, SOLUTION INTRAVENOUS at 08:40

## 2018-01-01 RX ADMIN — OLANZAPINE 5 MG: 5 TABLET, ORALLY DISINTEGRATING ORAL at 20:26

## 2018-01-01 RX ADMIN — Medication 10 ML: at 08:25

## 2018-01-01 RX ADMIN — LACTULOSE 20 G: 20 SOLUTION ORAL at 17:09

## 2018-01-01 RX ADMIN — MORPHINE SULFATE 10 MG: 20 SOLUTION ORAL at 16:28

## 2018-01-01 RX ADMIN — METOCLOPRAMIDE 10 MG: 5 INJECTION, SOLUTION INTRAMUSCULAR; INTRAVENOUS at 14:33

## 2018-01-01 RX ADMIN — ENOXAPARIN SODIUM 40 MG: 100 INJECTION SUBCUTANEOUS at 12:26

## 2018-01-01 RX ADMIN — SODIUM CHLORIDE 75 ML/HR: 900 INJECTION, SOLUTION INTRAVENOUS at 19:23

## 2018-01-01 RX ADMIN — LACTULOSE 20 G: 20 SOLUTION ORAL at 18:06

## 2018-01-01 RX ADMIN — AMITRIPTYLINE HYDROCHLORIDE 10 MG: 10 TABLET, FILM COATED ORAL at 17:33

## 2018-01-01 RX ADMIN — METOCLOPRAMIDE 10 MG: 5 INJECTION, SOLUTION INTRAMUSCULAR; INTRAVENOUS at 23:05

## 2018-01-01 RX ADMIN — OXYCODONE HYDROCHLORIDE 10 MG: 10 TABLET, FILM COATED, EXTENDED RELEASE ORAL at 05:20

## 2018-01-01 RX ADMIN — METOCLOPRAMIDE 5 MG: 5 INJECTION, SOLUTION INTRAMUSCULAR; INTRAVENOUS at 18:08

## 2018-01-01 RX ADMIN — LORAZEPAM 1 MG: 2 INJECTION INTRAMUSCULAR; INTRAVENOUS at 12:38

## 2018-01-01 RX ADMIN — OXYCODONE HYDROCHLORIDE 5 MG: 5 TABLET ORAL at 06:53

## 2018-01-01 RX ADMIN — Medication 10 ML: at 23:49

## 2018-01-01 RX ADMIN — LORAZEPAM 0.5 MG: 1 TABLET ORAL at 20:23

## 2018-01-01 RX ADMIN — OLANZAPINE 5 MG: 5 TABLET, ORALLY DISINTEGRATING ORAL at 23:41

## 2018-01-01 RX ADMIN — MORPHINE SULFATE 10 MG: 20 SOLUTION ORAL at 12:12

## 2018-01-01 RX ADMIN — SODIUM CHLORIDE 40 MG: 9 INJECTION INTRAMUSCULAR; INTRAVENOUS; SUBCUTANEOUS at 12:50

## 2018-01-01 RX ADMIN — METOCLOPRAMIDE HYDROCHLORIDE 5 MG: 10 TABLET ORAL at 17:23

## 2018-01-01 RX ADMIN — Medication 10 ML: at 14:34

## 2018-01-01 RX ADMIN — SUCRALFATE 1 G: 1 SUSPENSION ORAL at 22:02

## 2018-01-01 RX ADMIN — SODIUM CHLORIDE 40 MG: 9 INJECTION INTRAMUSCULAR; INTRAVENOUS; SUBCUTANEOUS at 10:34

## 2018-01-01 RX ADMIN — MORPHINE SULFATE 10 MG: 20 SOLUTION ORAL at 16:00

## 2018-01-01 RX ADMIN — OXYCODONE HYDROCHLORIDE 7.5 MG: 5 TABLET ORAL at 20:02

## 2018-01-01 RX ADMIN — MORPHINE SULFATE 10 MG: 20 SOLUTION ORAL at 06:38

## 2018-01-01 RX ADMIN — MORPHINE SULFATE 10 MG: 20 SOLUTION ORAL at 00:08

## 2018-01-01 RX ADMIN — DOXORUBICIN HYDROCHLORIDE 70 MG: 2 INJECTION, SUSPENSION, LIPOSOMAL INTRAVENOUS at 11:33

## 2018-01-01 RX ADMIN — SODIUM CHLORIDE 50 ML/HR: 900 INJECTION, SOLUTION INTRAVENOUS at 23:15

## 2018-01-01 RX ADMIN — METOCLOPRAMIDE 10 MG: 5 INJECTION, SOLUTION INTRAMUSCULAR; INTRAVENOUS at 11:11

## 2018-01-01 RX ADMIN — SODIUM CHLORIDE 1000 ML: 900 INJECTION, SOLUTION INTRAVENOUS at 13:06

## 2018-01-01 RX ADMIN — METOCLOPRAMIDE 5 MG: 5 INJECTION, SOLUTION INTRAMUSCULAR; INTRAVENOUS at 11:57

## 2018-01-01 RX ADMIN — METOCLOPRAMIDE 5 MG: 5 INJECTION, SOLUTION INTRAMUSCULAR; INTRAVENOUS at 17:00

## 2018-01-01 RX ADMIN — LORAZEPAM 1 MG: 1 TABLET ORAL at 20:04

## 2018-01-01 RX ADMIN — PREGABALIN 25 MG: 25 CAPSULE ORAL at 08:53

## 2018-01-01 RX ADMIN — OXYCODONE HYDROCHLORIDE 5 MG: 5 TABLET ORAL at 05:02

## 2018-01-01 RX ADMIN — MORPHINE SULFATE 4 MG: 2 INJECTION, SOLUTION INTRAMUSCULAR; INTRAVENOUS at 00:18

## 2018-01-01 RX ADMIN — OXYCODONE HYDROCHLORIDE 7.5 MG: 5 TABLET ORAL at 10:07

## 2018-01-01 RX ADMIN — LORAZEPAM 1 MG: 2 INJECTION INTRAMUSCULAR; INTRAVENOUS at 19:01

## 2018-01-01 RX ADMIN — MIDAZOLAM 0.5 MG: 1 INJECTION INTRAMUSCULAR; INTRAVENOUS at 16:43

## 2018-01-01 RX ADMIN — MORPHINE SULFATE 5 MG: 20 SOLUTION ORAL at 10:54

## 2018-01-01 RX ADMIN — POTASSIUM CHLORIDE AND SODIUM CHLORIDE: 900; 150 INJECTION, SOLUTION INTRAVENOUS at 23:42

## 2018-01-01 RX ADMIN — OXYCODONE HYDROCHLORIDE 5 MG: 5 TABLET ORAL at 11:46

## 2018-01-01 RX ADMIN — METOCLOPRAMIDE 5 MG: 5 INJECTION, SOLUTION INTRAMUSCULAR; INTRAVENOUS at 17:25

## 2018-01-01 RX ADMIN — LACTULOSE 20 G: 20 SOLUTION ORAL at 08:18

## 2018-01-01 RX ADMIN — MIDAZOLAM 1 MG: 1 INJECTION INTRAMUSCULAR; INTRAVENOUS at 16:24

## 2018-01-01 RX ADMIN — Medication 10 ML: at 06:16

## 2018-01-01 RX ADMIN — LORAZEPAM 1 MG: 2 INJECTION INTRAMUSCULAR; INTRAVENOUS at 20:36

## 2018-01-01 RX ADMIN — METOCLOPRAMIDE 10 MG: 5 INJECTION, SOLUTION INTRAMUSCULAR; INTRAVENOUS at 20:02

## 2018-01-01 RX ADMIN — ENOXAPARIN SODIUM 40 MG: 40 INJECTION SUBCUTANEOUS at 09:26

## 2018-01-01 RX ADMIN — SUCRALFATE 1 G: 1 SUSPENSION ORAL at 21:03

## 2018-01-01 RX ADMIN — LORAZEPAM 1 MG: 2 INJECTION INTRAMUSCULAR; INTRAVENOUS at 19:11

## 2018-01-01 RX ADMIN — MORPHINE SULFATE 10 MG: 20 SOLUTION ORAL at 23:42

## 2018-01-01 RX ADMIN — MORPHINE SULFATE 10 MG: 20 SOLUTION ORAL at 23:57

## 2018-01-01 RX ADMIN — STANDARDIZED SENNA CONCENTRATE 8.8 MG: 8.8 LIQUID ORAL at 20:13

## 2018-01-01 RX ADMIN — LORAZEPAM 0.5 MG: 1 TABLET ORAL at 20:40

## 2018-01-01 RX ADMIN — SODIUM CHLORIDE 75 ML/HR: 900 INJECTION, SOLUTION INTRAVENOUS at 13:19

## 2018-01-01 RX ADMIN — OXYCODONE HYDROCHLORIDE 7.5 MG: 5 TABLET ORAL at 04:05

## 2018-01-01 RX ADMIN — OLANZAPINE 5 MG: 5 TABLET, ORALLY DISINTEGRATING ORAL at 20:13

## 2018-01-01 RX ADMIN — SODIUM CHLORIDE, PRESERVATIVE FREE 500 UNITS: 5 INJECTION INTRAVENOUS at 11:50

## 2018-01-01 RX ADMIN — METOCLOPRAMIDE 5 MG: 5 INJECTION, SOLUTION INTRAMUSCULAR; INTRAVENOUS at 05:51

## 2018-01-01 RX ADMIN — METOCLOPRAMIDE 10 MG: 5 INJECTION, SOLUTION INTRAMUSCULAR; INTRAVENOUS at 06:50

## 2018-01-01 RX ADMIN — LACTULOSE 20 G: 20 SOLUTION ORAL at 09:52

## 2018-01-01 RX ADMIN — METOCLOPRAMIDE 5 MG: 5 INJECTION, SOLUTION INTRAMUSCULAR; INTRAVENOUS at 00:04

## 2018-01-01 RX ADMIN — METOCLOPRAMIDE 5 MG: 5 INJECTION, SOLUTION INTRAMUSCULAR; INTRAVENOUS at 00:06

## 2018-01-01 RX ADMIN — ASCORBIC ACID: 500 INJECTION, SOLUTION INTRAMUSCULAR; INTRAVENOUS; SUBCUTANEOUS at 18:55

## 2018-01-01 RX ADMIN — METOCLOPRAMIDE 5 MG: 5 INJECTION, SOLUTION INTRAMUSCULAR; INTRAVENOUS at 00:03

## 2018-01-01 RX ADMIN — Medication 300 UNITS: at 17:33

## 2018-01-01 RX ADMIN — DEXTROSE MONOHYDRATE 500 ML: 50 INJECTION, SOLUTION INTRAVENOUS at 09:11

## 2018-01-01 RX ADMIN — PREGABALIN 25 MG: 25 CAPSULE ORAL at 09:55

## 2018-01-01 RX ADMIN — OLANZAPINE 5 MG: 5 TABLET, ORALLY DISINTEGRATING ORAL at 20:34

## 2018-01-01 RX ADMIN — METOCLOPRAMIDE HYDROCHLORIDE 5 MG: 10 TABLET ORAL at 08:23

## 2018-01-01 RX ADMIN — OXYCODONE HYDROCHLORIDE 5 MG: 5 TABLET ORAL at 03:22

## 2018-01-01 RX ADMIN — METOCLOPRAMIDE 10 MG: 5 INJECTION, SOLUTION INTRAMUSCULAR; INTRAVENOUS at 06:39

## 2018-01-01 RX ADMIN — SODIUM CHLORIDE 50 ML/HR: 900 INJECTION, SOLUTION INTRAVENOUS at 09:00

## 2018-01-01 RX ADMIN — INSULIN LISPRO 2 UNITS: 100 INJECTION, SOLUTION INTRAVENOUS; SUBCUTANEOUS at 06:53

## 2018-01-01 RX ADMIN — FENTANYL CITRATE 25 MCG: 50 INJECTION INTRAMUSCULAR; INTRAVENOUS at 16:30

## 2018-01-01 RX ADMIN — CLINDAMYCIN IN 5 PERCENT DEXTROSE 900 MG: 18 INJECTION, SOLUTION INTRAVENOUS at 16:06

## 2018-01-01 RX ADMIN — SODIUM CHLORIDE 40 MG: 9 INJECTION, SOLUTION INTRAMUSCULAR; INTRAVENOUS; SUBCUTANEOUS at 13:28

## 2018-01-01 RX ADMIN — MORPHINE SULFATE 10 MG: 20 SOLUTION ORAL at 20:05

## 2018-01-01 RX ADMIN — OLANZAPINE 5 MG: 5 TABLET, ORALLY DISINTEGRATING ORAL at 23:07

## 2018-01-01 RX ADMIN — LACTULOSE 10 G: 20 SOLUTION ORAL at 08:38

## 2018-01-01 RX ADMIN — OXYCODONE HYDROCHLORIDE 7.5 MG: 5 TABLET ORAL at 13:05

## 2018-01-01 RX ADMIN — BISACODYL 10 MG: 10 SUPPOSITORY RECTAL at 10:56

## 2018-01-01 RX ADMIN — MORPHINE SULFATE 10 MG: 20 SOLUTION ORAL at 00:19

## 2018-01-01 RX ADMIN — Medication: at 07:50

## 2018-01-01 RX ADMIN — HEPARIN 500 UNITS: 100 SYRINGE at 11:00

## 2018-01-01 RX ADMIN — ASCORBIC ACID: 500 INJECTION, SOLUTION INTRAMUSCULAR; INTRAVENOUS; SUBCUTANEOUS at 20:18

## 2018-01-01 RX ADMIN — LACTULOSE 20 G: 20 SOLUTION ORAL at 09:56

## 2018-01-01 RX ADMIN — ASCORBIC ACID: 500 INJECTION, SOLUTION INTRAMUSCULAR; INTRAVENOUS; SUBCUTANEOUS at 18:34

## 2018-01-01 RX ADMIN — SODIUM CHLORIDE 10 ML: 9 INJECTION INTRAMUSCULAR; INTRAVENOUS; SUBCUTANEOUS at 11:00

## 2018-01-01 RX ADMIN — AMITRIPTYLINE HYDROCHLORIDE 10 MG: 10 TABLET, FILM COATED ORAL at 19:39

## 2018-01-01 RX ADMIN — SODIUM CHLORIDE, PRESERVATIVE FREE 500 UNITS: 5 INJECTION INTRAVENOUS at 10:41

## 2018-01-01 RX ADMIN — SODIUM CHLORIDE 40 MG: 9 INJECTION, SOLUTION INTRAMUSCULAR; INTRAVENOUS; SUBCUTANEOUS at 20:02

## 2018-01-01 RX ADMIN — BEVACIZUMAB 630 MG: 400 INJECTION, SOLUTION INTRAVENOUS at 10:38

## 2018-01-01 RX ADMIN — Medication 10 ML: at 06:41

## 2018-01-01 RX ADMIN — AMITRIPTYLINE HYDROCHLORIDE 10 MG: 10 TABLET, FILM COATED ORAL at 19:15

## 2018-01-01 RX ADMIN — ASCORBIC ACID: 500 INJECTION, SOLUTION INTRAMUSCULAR; INTRAVENOUS; SUBCUTANEOUS at 18:49

## 2018-01-01 RX ADMIN — INSULIN LISPRO 2 UNITS: 100 INJECTION, SOLUTION INTRAVENOUS; SUBCUTANEOUS at 00:44

## 2018-01-01 RX ADMIN — ENOXAPARIN SODIUM 40 MG: 40 INJECTION SUBCUTANEOUS at 16:14

## 2018-01-01 RX ADMIN — MORPHINE SULFATE 10 MG: 20 SOLUTION ORAL at 00:02

## 2018-01-01 RX ADMIN — OXYCODONE HYDROCHLORIDE 5 MG: 5 TABLET ORAL at 05:45

## 2018-01-01 RX ADMIN — SODIUM CHLORIDE 40 MG: 9 INJECTION INTRAMUSCULAR; INTRAVENOUS; SUBCUTANEOUS at 11:42

## 2018-01-01 RX ADMIN — Medication: at 03:12

## 2018-01-01 RX ADMIN — DEXAMETHASONE SODIUM PHOSPHATE 2 MG: 4 INJECTION, SOLUTION INTRAMUSCULAR; INTRAVENOUS at 13:49

## 2018-01-01 RX ADMIN — MORPHINE SULFATE 10 MG: 20 SOLUTION ORAL at 22:42

## 2018-01-01 RX ADMIN — SODIUM CHLORIDE 50 ML/HR: 900 INJECTION, SOLUTION INTRAVENOUS at 10:20

## 2018-01-01 RX ADMIN — Medication 10 ML: at 05:52

## 2018-01-01 RX ADMIN — ENOXAPARIN SODIUM 40 MG: 40 INJECTION SUBCUTANEOUS at 08:38

## 2018-01-01 RX ADMIN — OXYCODONE HYDROCHLORIDE 7.5 MG: 5 TABLET ORAL at 20:38

## 2018-01-01 RX ADMIN — SODIUM CHLORIDE 40 MG: 9 INJECTION INTRAMUSCULAR; INTRAVENOUS; SUBCUTANEOUS at 12:18

## 2018-01-01 RX ADMIN — SODIUM CHLORIDE 40 MG: 9 INJECTION INTRAMUSCULAR; INTRAVENOUS; SUBCUTANEOUS at 11:52

## 2018-01-01 RX ADMIN — Medication 10 ML: at 10:36

## 2018-01-01 RX ADMIN — IOPAMIDOL 100 ML: 755 INJECTION, SOLUTION INTRAVENOUS at 10:00

## 2018-01-01 RX ADMIN — MORPHINE SULFATE 10 MG: 20 SOLUTION ORAL at 12:28

## 2018-01-01 RX ADMIN — OXYCODONE HYDROCHLORIDE 10 MG: 5 TABLET ORAL at 11:07

## 2018-01-01 RX ADMIN — OXYCODONE HYDROCHLORIDE 5 MG: 5 TABLET ORAL at 21:55

## 2018-01-01 RX ADMIN — OLANZAPINE 5 MG: 5 TABLET, ORALLY DISINTEGRATING ORAL at 20:05

## 2018-01-01 RX ADMIN — MORPHINE SULFATE 10 MG: 20 SOLUTION ORAL at 11:54

## 2018-01-01 RX ADMIN — ENOXAPARIN SODIUM 40 MG: 100 INJECTION SUBCUTANEOUS at 18:04

## 2018-01-01 RX ADMIN — SODIUM CHLORIDE, PRESERVATIVE FREE 500 UNITS: 5 INJECTION INTRAVENOUS at 17:05

## 2018-01-01 RX ADMIN — PREGABALIN 25 MG: 25 CAPSULE ORAL at 17:29

## 2018-01-01 RX ADMIN — I.V. FAT EMULSION 500 ML: 20 EMULSION INTRAVENOUS at 18:55

## 2018-01-01 RX ADMIN — DEXAMETHASONE SODIUM PHOSPHATE 2 MG: 4 INJECTION, SOLUTION INTRAMUSCULAR; INTRAVENOUS at 00:04

## 2018-01-01 RX ADMIN — LORAZEPAM 0.5 MG: 1 TABLET ORAL at 20:03

## 2018-01-01 RX ADMIN — MORPHINE SULFATE 10 MG: 20 SOLUTION ORAL at 23:58

## 2018-01-01 RX ADMIN — LORAZEPAM 0.5 MG: 1 TABLET ORAL at 20:27

## 2018-01-01 RX ADMIN — SODIUM CHLORIDE 50 ML/HR: 900 INJECTION, SOLUTION INTRAVENOUS at 14:29

## 2018-01-01 RX ADMIN — FENTANYL CITRATE 25 MCG: 50 INJECTION INTRAMUSCULAR; INTRAVENOUS at 16:34

## 2018-01-01 RX ADMIN — LACTULOSE 20 G: 20 SOLUTION ORAL at 19:07

## 2018-01-01 RX ADMIN — MORPHINE SULFATE 10 MG: 20 SOLUTION ORAL at 20:08

## 2018-01-01 RX ADMIN — OXYCODONE HYDROCHLORIDE 5 MG: 5 TABLET ORAL at 20:04

## 2018-01-01 RX ADMIN — ENOXAPARIN SODIUM 40 MG: 40 INJECTION SUBCUTANEOUS at 08:21

## 2018-01-01 RX ADMIN — STANDARDIZED SENNA CONCENTRATE 8.8 MG: 8.8 LIQUID ORAL at 09:33

## 2018-01-01 RX ADMIN — ASCORBIC ACID: 500 INJECTION, SOLUTION INTRAMUSCULAR; INTRAVENOUS; SUBCUTANEOUS at 18:48

## 2018-01-01 RX ADMIN — SODIUM CHLORIDE, PRESERVATIVE FREE 500 UNITS: 5 INJECTION INTRAVENOUS at 14:07

## 2018-01-01 RX ADMIN — MORPHINE SULFATE 10 MG: 20 SOLUTION ORAL at 03:54

## 2018-01-01 RX ADMIN — ASCORBIC ACID: 500 INJECTION, SOLUTION INTRAMUSCULAR; INTRAVENOUS; SUBCUTANEOUS at 18:32

## 2018-01-01 RX ADMIN — METOCLOPRAMIDE 5 MG: 5 INJECTION, SOLUTION INTRAMUSCULAR; INTRAVENOUS at 11:58

## 2018-01-01 RX ADMIN — METOCLOPRAMIDE HYDROCHLORIDE 5 MG: 5 SOLUTION ORAL at 12:51

## 2018-01-01 RX ADMIN — SODIUM CHLORIDE, PRESERVATIVE FREE 500 UNITS: 5 INJECTION INTRAVENOUS at 17:04

## 2018-01-01 RX ADMIN — Medication 10 ML: at 08:30

## 2018-01-01 RX ADMIN — SODIUM CHLORIDE 50 ML/HR: 900 INJECTION, SOLUTION INTRAVENOUS at 20:17

## 2018-01-01 RX ADMIN — SODIUM CHLORIDE 75 ML/HR: 900 INJECTION, SOLUTION INTRAVENOUS at 14:45

## 2018-01-01 RX ADMIN — LORAZEPAM 1 MG: 2 INJECTION INTRAMUSCULAR; INTRAVENOUS at 10:20

## 2018-01-01 RX ADMIN — Medication 10 ML: at 11:00

## 2018-01-01 RX ADMIN — PREGABALIN 25 MG: 25 CAPSULE ORAL at 18:35

## 2018-01-01 RX ADMIN — METOCLOPRAMIDE 10 MG: 5 INJECTION, SOLUTION INTRAMUSCULAR; INTRAVENOUS at 08:05

## 2018-01-01 RX ADMIN — Medication 10 ML: at 11:50

## 2018-01-01 RX ADMIN — DOCUSATE SODIUM AND SENNOSIDES 1 TABLET: 8.6; 5 TABLET, FILM COATED ORAL at 09:42

## 2018-01-01 RX ADMIN — SUCRALFATE 0.5 G: 1 SUSPENSION ORAL at 22:15

## 2018-01-01 RX ADMIN — ENOXAPARIN SODIUM 40 MG: 40 INJECTION SUBCUTANEOUS at 10:22

## 2018-01-01 RX ADMIN — DEXAMETHASONE SODIUM PHOSPHATE 2 MG: 4 INJECTION, SOLUTION INTRAMUSCULAR; INTRAVENOUS at 05:58

## 2018-01-01 RX ADMIN — PROPOFOL 50 MG: 10 INJECTION, EMULSION INTRAVENOUS at 08:14

## 2018-01-01 RX ADMIN — METOCLOPRAMIDE 5 MG: 5 INJECTION, SOLUTION INTRAMUSCULAR; INTRAVENOUS at 12:26

## 2018-01-01 RX ADMIN — ENOXAPARIN SODIUM 40 MG: 40 INJECTION SUBCUTANEOUS at 08:06

## 2018-01-01 RX ADMIN — Medication 1 SPRAY: at 17:56

## 2018-01-01 RX ADMIN — Medication 10 ML: at 14:07

## 2018-01-01 RX ADMIN — Medication 5 ML: at 21:00

## 2018-01-01 RX ADMIN — MIDAZOLAM 0.5 MG: 1 INJECTION INTRAMUSCULAR; INTRAVENOUS at 16:32

## 2018-01-01 RX ADMIN — LORAZEPAM 1 MG: 1 TABLET ORAL at 20:18

## 2018-01-01 RX ADMIN — ENOXAPARIN SODIUM 40 MG: 40 INJECTION SUBCUTANEOUS at 12:13

## 2018-01-01 RX ADMIN — Medication: at 14:07

## 2018-01-01 RX ADMIN — ENOXAPARIN SODIUM 40 MG: 100 INJECTION SUBCUTANEOUS at 14:28

## 2018-01-01 RX ADMIN — MORPHINE SULFATE 10 MG: 20 SOLUTION ORAL at 20:18

## 2018-01-01 RX ADMIN — SODIUM CHLORIDE 40 MG: 9 INJECTION INTRAMUSCULAR; INTRAVENOUS; SUBCUTANEOUS at 12:55

## 2018-01-01 RX ADMIN — LORAZEPAM 1 MG: 2 INJECTION INTRAMUSCULAR; INTRAVENOUS at 06:34

## 2018-01-01 RX ADMIN — METOCLOPRAMIDE 5 MG: 5 INJECTION, SOLUTION INTRAMUSCULAR; INTRAVENOUS at 18:04

## 2018-01-01 RX ADMIN — MORPHINE SULFATE 10 MG: 20 SOLUTION ORAL at 04:18

## 2018-01-01 RX ADMIN — DEXAMETHASONE SODIUM PHOSPHATE 2 MG: 4 INJECTION, SOLUTION INTRAMUSCULAR; INTRAVENOUS at 00:06

## 2018-01-01 RX ADMIN — DEXAMETHASONE SODIUM PHOSPHATE 2 MG: 4 INJECTION, SOLUTION INTRAMUSCULAR; INTRAVENOUS at 07:13

## 2018-01-01 RX ADMIN — MORPHINE SULFATE 10 MG: 20 SOLUTION ORAL at 07:41

## 2018-01-01 RX ADMIN — MORPHINE SULFATE 10 MG: 20 SOLUTION ORAL at 13:49

## 2018-01-01 RX ADMIN — INSULIN LISPRO 2 UNITS: 100 INJECTION, SOLUTION INTRAVENOUS; SUBCUTANEOUS at 05:36

## 2018-01-01 RX ADMIN — Medication 10 ML: at 17:34

## 2018-01-01 RX ADMIN — SODIUM CHLORIDE 10 ML: 9 INJECTION, SOLUTION INTRAMUSCULAR; INTRAVENOUS; SUBCUTANEOUS at 08:35

## 2018-01-01 RX ADMIN — MORPHINE SULFATE 10 MG: 20 SOLUTION ORAL at 08:12

## 2018-01-01 RX ADMIN — OXYCODONE HYDROCHLORIDE 5 MG: 5 TABLET ORAL at 23:57

## 2018-01-01 RX ADMIN — MORPHINE SULFATE 10 MG: 20 SOLUTION ORAL at 19:59

## 2018-01-01 RX ADMIN — METOCLOPRAMIDE 10 MG: 5 INJECTION, SOLUTION INTRAMUSCULAR; INTRAVENOUS at 12:53

## 2018-01-01 RX ADMIN — FENTANYL CITRATE 25 MCG: 50 INJECTION INTRAMUSCULAR; INTRAVENOUS at 16:23

## 2018-01-01 RX ADMIN — FENTANYL CITRATE 25 MCG: 50 INJECTION INTRAMUSCULAR; INTRAVENOUS at 16:42

## 2018-01-01 RX ADMIN — AMITRIPTYLINE HYDROCHLORIDE 10 MG: 10 TABLET, FILM COATED ORAL at 22:13

## 2018-01-01 RX ADMIN — IOHEXOL 10 ML: 240 INJECTION, SOLUTION INTRATHECAL; INTRAVASCULAR; INTRAVENOUS; ORAL at 16:50

## 2018-01-01 RX ADMIN — METOCLOPRAMIDE 10 MG: 5 INJECTION, SOLUTION INTRAMUSCULAR; INTRAVENOUS at 00:18

## 2018-01-01 RX ADMIN — AMITRIPTYLINE HYDROCHLORIDE 10 MG: 10 TABLET, FILM COATED ORAL at 23:57

## 2018-01-01 RX ADMIN — ASCORBIC ACID: 500 INJECTION, SOLUTION INTRAMUSCULAR; INTRAVENOUS; SUBCUTANEOUS at 18:37

## 2018-01-01 RX ADMIN — OLANZAPINE 5 MG: 5 TABLET, ORALLY DISINTEGRATING ORAL at 23:36

## 2018-01-01 RX ADMIN — LACTULOSE 20 G: 20 SOLUTION ORAL at 18:35

## 2018-01-01 RX ADMIN — SODIUM BICARBONATE 1 ML: 42 INJECTION, SOLUTION INTRAVENOUS at 13:45

## 2018-01-01 RX ADMIN — MORPHINE SULFATE 10 MG: 20 SOLUTION ORAL at 12:43

## 2018-01-01 RX ADMIN — METOCLOPRAMIDE 5 MG: 5 INJECTION, SOLUTION INTRAMUSCULAR; INTRAVENOUS at 05:27

## 2018-01-01 RX ADMIN — LACTULOSE 20 G: 20 SOLUTION ORAL at 09:55

## 2018-01-01 RX ADMIN — MORPHINE SULFATE 10 MG: 20 SOLUTION ORAL at 20:03

## 2018-01-01 RX ADMIN — PREGABALIN 25 MG: 25 CAPSULE ORAL at 12:52

## 2018-01-01 RX ADMIN — Medication 300 UNITS: at 09:57

## 2018-01-01 RX ADMIN — MORPHINE SULFATE 10 MG: 20 SOLUTION ORAL at 14:14

## 2018-01-01 RX ADMIN — MORPHINE SULFATE 10 MG: 20 SOLUTION ORAL at 16:12

## 2018-01-01 RX ADMIN — MORPHINE SULFATE 10 MG: 20 SOLUTION ORAL at 00:09

## 2018-01-01 RX ADMIN — LORAZEPAM 1 MG: 2 INJECTION INTRAMUSCULAR; INTRAVENOUS at 19:58

## 2018-01-01 RX ADMIN — Medication: at 00:21

## 2018-01-01 RX ADMIN — MORPHINE SULFATE 10 MG: 20 SOLUTION ORAL at 07:24

## 2018-01-01 RX ADMIN — MORPHINE SULFATE 10 MG: 20 SOLUTION ORAL at 16:13

## 2018-01-01 RX ADMIN — MORPHINE SULFATE 4 MG: 2 INJECTION, SOLUTION INTRAMUSCULAR; INTRAVENOUS at 18:02

## 2018-01-01 RX ADMIN — Medication 10 ML: at 00:59

## 2018-01-01 RX ADMIN — ENOXAPARIN SODIUM 40 MG: 100 INJECTION SUBCUTANEOUS at 13:29

## 2018-01-01 RX ADMIN — SUCRALFATE 0.5 G: 1 SUSPENSION ORAL at 07:21

## 2018-01-01 RX ADMIN — METOCLOPRAMIDE HYDROCHLORIDE 5 MG: 10 TABLET ORAL at 16:12

## 2018-01-01 RX ADMIN — Medication 10 ML: at 06:42

## 2018-01-01 RX ADMIN — PREGABALIN 25 MG: 25 CAPSULE ORAL at 10:34

## 2018-01-01 RX ADMIN — PREGABALIN 25 MG: 25 CAPSULE ORAL at 10:15

## 2018-01-01 RX ADMIN — SUCRALFATE 0.5 G: 1 SUSPENSION ORAL at 17:30

## 2018-01-01 RX ADMIN — Medication 10 ML: at 08:32

## 2018-01-01 RX ADMIN — Medication 10 ML: at 14:38

## 2018-01-01 RX ADMIN — MORPHINE SULFATE 2 MG: 2 INJECTION, SOLUTION INTRAMUSCULAR; INTRAVENOUS at 13:14

## 2018-01-01 RX ADMIN — SODIUM CHLORIDE 100 ML: 900 INJECTION, SOLUTION INTRAVENOUS at 10:36

## 2018-01-01 RX ADMIN — MORPHINE SULFATE 10 MG: 20 SOLUTION ORAL at 08:05

## 2018-01-01 RX ADMIN — SODIUM CHLORIDE 50 ML/HR: 900 INJECTION, SOLUTION INTRAVENOUS at 01:31

## 2018-01-01 RX ADMIN — METOCLOPRAMIDE 10 MG: 5 INJECTION, SOLUTION INTRAMUSCULAR; INTRAVENOUS at 01:01

## 2018-01-01 RX ADMIN — Medication 10 ML: at 20:25

## 2018-01-01 RX ADMIN — PREGABALIN 25 MG: 25 CAPSULE ORAL at 17:33

## 2018-01-01 RX ADMIN — ENOXAPARIN SODIUM 40 MG: 100 INJECTION SUBCUTANEOUS at 14:13

## 2018-01-01 RX ADMIN — DEXTROSE MONOHYDRATE 250 ML: 5 INJECTION, SOLUTION INTRAVENOUS at 10:48

## 2018-01-01 RX ADMIN — SODIUM CHLORIDE 50 ML/HR: 900 INJECTION, SOLUTION INTRAVENOUS at 03:28

## 2018-01-01 RX ADMIN — Medication 10 ML: at 21:13

## 2018-01-01 RX ADMIN — SUCRALFATE 1 G: 1 SUSPENSION ORAL at 19:05

## 2018-01-01 RX ADMIN — MORPHINE SULFATE 10 MG: 20 SOLUTION ORAL at 20:02

## 2018-01-01 RX ADMIN — METOCLOPRAMIDE 10 MG: 5 INJECTION, SOLUTION INTRAMUSCULAR; INTRAVENOUS at 12:54

## 2018-01-01 RX ADMIN — ASCORBIC ACID: 500 INJECTION, SOLUTION INTRAMUSCULAR; INTRAVENOUS; SUBCUTANEOUS at 19:46

## 2018-01-01 RX ADMIN — ASCORBIC ACID: 500 INJECTION, SOLUTION INTRAMUSCULAR; INTRAVENOUS; SUBCUTANEOUS at 19:20

## 2018-01-01 RX ADMIN — Medication 10 ML: at 20:40

## 2018-01-01 RX ADMIN — METOCLOPRAMIDE HYDROCHLORIDE 5 MG: 10 TABLET ORAL at 20:23

## 2018-01-01 RX ADMIN — PROPOFOL 25 MG: 10 INJECTION, EMULSION INTRAVENOUS at 08:16

## 2018-01-01 RX ADMIN — MORPHINE SULFATE 10 MG: 20 SOLUTION ORAL at 16:50

## 2018-01-01 RX ADMIN — ASCORBIC ACID: 500 INJECTION, SOLUTION INTRAMUSCULAR; INTRAVENOUS; SUBCUTANEOUS at 18:16

## 2018-01-01 RX ADMIN — ONDANSETRON HYDROCHLORIDE 4 MG: 2 INJECTION INTRAMUSCULAR; INTRAVENOUS at 01:00

## 2018-01-01 RX ADMIN — Medication 10 ML: at 10:41

## 2018-01-01 RX ADMIN — POLYETHYLENE GLYCOL 3350 17 G: 17 POWDER, FOR SOLUTION ORAL at 08:08

## 2018-01-01 RX ADMIN — OXYCODONE HYDROCHLORIDE 5 MG: 5 TABLET ORAL at 12:09

## 2018-01-01 RX ADMIN — PREGABALIN 25 MG: 25 CAPSULE ORAL at 18:30

## 2018-01-02 NOTE — PROGRESS NOTES
Joeandre Elana called today to say her nausea is under much better control, but she can not eat anything \"that makes my stomach & intestines work to [employer] like roughage, beans, salads or any foods other than bland foods and no coffee or carbonated beverages. She says it causes her stomach pain and it is now almost constant. She also says the abd pain prevents her from being intimate with her  which causes her a great deal of distress. She is having daily migraines now as well. She just received a delivery of a months supply of Lymparza and is on 250 mg BID. She has enough pill to be able to lower the dose to 200 mg Q12h. (dose now going from 500 mg daily to 400 mg daily). Pt was pleased with this as she said she \"wants to keep giving this a try and work with the dosing before stopping it all together. I did mention that there is one more PARB inhibitor, Klaus Beard, that she is now eligible if this did not work. She was pleased there was another option, but really wanted to continue on the Travisfort and try to push through with a lower dose. She sill call anytime if symptoms get any worse, but will call next week to let us know how she is doing.    Discussed with Dr. Diana Weinstein who agrees with renay Townsend NP

## 2018-01-09 NOTE — TELEPHONE ENCOUNTER
Requested Prescriptions     Signed Prescriptions Disp Refills    olaparib (LYNPARZA) 100 mg tab tablet 120 Tab 4     Sig: Take 1 Tab by mouth two (2) times a day. Authorizing Provider: Yenni Lebron     Ordering User: Linnell Alpers     Rx called to pharmacy per vo Dr. Azalea Aase.

## 2018-01-09 NOTE — DISCHARGE INSTRUCTIONS
Palpitations: Care Instructions  Your Care Instructions    Heart palpitations are the uncomfortable sensation that your heart is beating fast or irregularly. You might feel pounding or fluttering in your chest. It might feel like your heart is skipping a beat. Although palpitations may be caused by a heart problem, they also occur because of stress, fatigue, or use of alcohol, caffeine, or nicotine. Many medicines, including diet pills, antihistamines, decongestants, and some herbal products, can cause heart palpitations. Nearly everyone has palpitations from time to time. Depending on your symptoms, your doctor may need to do more tests to try to find the cause of your palpitations. Follow-up care is a key part of your treatment and safety. Be sure to make and go to all appointments, and call your doctor if you are having problems. It's also a good idea to know your test results and keep a list of the medicines you take. How can you care for yourself at home? · Avoid caffeine, nicotine, and excess alcohol. · Do not take illegal drugs, such as methamphetamines and cocaine. · Do not take weight loss or diet medicines unless you talk with your doctor first.  · Get plenty of sleep. · Do not overeat. · If you have palpitations again, take deep breaths and try to relax. This may slow a racing heart. · If you start to feel lightheaded, lie down to avoid injuries that might result if you pass out and fall down. · Keep a record of your palpitations and bring it to your next doctor's appointment. Write down:  ¨ The date and time. ¨ Your pulse. (If your heart is beating fast, it may be hard to count your pulse.)  ¨ What you were doing when the palpitations started. ¨ How long the palpitations lasted. ¨ Any other symptoms. · If an activity causes palpitations, slow down or stop. Talk to your doctor before you do that activity again. · Take your medicines exactly as prescribed.  Call your doctor if you think you are having a problem with your medicine. When should you call for help? Call 911 anytime you think you may need emergency care. For example, call if:  ? · You passed out (lost consciousness). ? · You have symptoms of a heart attack. These may include:  ¨ Chest pain or pressure, or a strange feeling in the chest.  ¨ Sweating. ¨ Shortness of breath. ¨ Pain, pressure, or a strange feeling in the back, neck, jaw, or upper belly or in one or both shoulders or arms. ¨ Lightheadedness or sudden weakness. ¨ A fast or irregular heartbeat. After you call 911, the  may tell you to chew 1 adult-strength or 2 to 4 low-dose aspirin. Wait for an ambulance. Do not try to drive yourself. ? · You have symptoms of a stroke. These may include:  ¨ Sudden numbness, tingling, weakness, or loss of movement in your face, arm, or leg, especially on only one side of your body. ¨ Sudden vision changes. ¨ Sudden trouble speaking. ¨ Sudden confusion or trouble understanding simple statements. ¨ Sudden problems with walking or balance. ¨ A sudden, severe headache that is different from past headaches. ?Call your doctor now or seek immediate medical care if:  ? · You have heart palpitations and:  ¨ Are dizzy or lightheaded, or you feel like you may faint. ¨ Have new or increased shortness of breath. ? Watch closely for changes in your health, and be sure to contact your doctor if:  ? · You continue to have heart palpitations. Where can you learn more? Go to http://ammon-kalli.info/. Enter R508 in the search box to learn more about \"Palpitations: Care Instructions. \"  Current as of: September 21, 2016  Content Version: 11.4  © 7792-1370 [employer]. Care instructions adapted under license by [employer] (which disclaims liability or warranty for this information).  If you have questions about a medical condition or this instruction, always ask your healthcare professional. [address] any warranty or liability for your use of this information.

## 2018-01-09 NOTE — PROGRESS NOTES
Pt called to let us know her stomach pains/discomfort is improved with the dose reduction to 200 BID  She now says thought that she has noticed her heart is \"racing more and more\" when she walks up flights of stairs. Denies any dizziness associated with it, but does acknowledge some associated SOB with it. Says it resolves soon after and denies any residual side effects. No current symptoms   Asked pt if she could contact PCP to obtain an EKG or to go to the ER to be evaluated as we are unable to to this in our office. Also, if at anytime, it gets worse, to proceed directly to the ER via 911. She will check with her PCP and call back.

## 2018-01-09 NOTE — ED TRIAGE NOTES
Pt arrives with heart palpations and SOB that started last week. Denies CP or nausea. On maintenance drug for ovarian CA.

## 2018-01-09 NOTE — ED PROVIDER NOTES
HPI Comments: 46 y.o. female with past medical history significant for MS, thromboembolus (to lower abdomen), anxiety, and ovarian cancer who presents from home with chief complaint of palpitations on exertion (specifically, going up multiple flights of stairs). Pt reports associated SOB. States she is on new maintenance medication, lynparza. She attributes sx to same. Her oncologist, Dr. Ashley Garcia, recommended she come to the ED for further evaluation. Pt reports otherwise normal exercise tolerance, stating she is still able to swim w/o difficulty. Pt reports experiencing similar sx with prior chemo. Pt is on xarelto and is S/P Sheryl filter placement. There are no other acute medical concerns at this time. Social hx: never smoker, no alcohol or drug use  PCP: Kathy Montalvo MD    Note written by Guanaco Centeno, as dictated by Dorie Humphreys MD 1:15 PM      The history is provided by the patient. No  was used. Past Medical History:   Diagnosis Date    Anxiety     Calculus of kidney 1/13    right    Hernia, inguinal, left 2012    MS (multiple sclerosis) ([address] 1996    Nausea & vomiting     Ovarian cancer ([address] 3/2015    high grade, stage 3C papillary serous    Thromboembolus ([address] 8/2007    lower abdomen post fall       Past Surgical History:   Procedure Laterality Date    HX GYN  2009    endometrial ablation with punctured uterus    HX OTHER SURGICAL  8/2007    green filter    HX MARIEL AND BSO  3/2014    ovarian cancer         Family History:   Problem Relation Age of Onset    Cancer Paternal Grandmother      breast    Heart Disease Mother     Heart Disease Father        Social History     Social History    Marital status:      Spouse name: N/A    Number of children: N/A    Years of education: N/A     Occupational History    Not on file.      Social History Main Topics    Smoking status: Never Smoker    Smokeless tobacco: Never Used    Alcohol use No    Drug use: No    Sexual activity: Not on file     Other Topics Concern    Not on file     Social History Narrative         ALLERGIES: Pcn [penicillins]    Review of Systems   Constitutional: Negative for chills and fever. HENT: Negative for ear pain and sore throat. Eyes: Negative for photophobia and pain. Respiratory: Positive for shortness of breath. Negative for chest tightness. Cardiovascular: Positive for palpitations. Negative for chest pain and leg swelling. Gastrointestinal: Negative for abdominal pain, nausea and vomiting. Genitourinary: Negative for dysuria and flank pain. Musculoskeletal: Negative for back pain and neck pain. Skin: Negative for rash and wound. Neurological: Negative for dizziness, light-headedness and headaches. Vitals:    01/09/18 1047   BP: 122/65   Pulse: 93   Resp: 18   Temp: 97.5 °F (36.4 °C)   SpO2: 100%   Weight: 67.1 kg (148 lb)   Height: 5' 8\" (1.727 m)            Physical Exam   Constitutional: She is oriented to person, place, and time. She appears well-developed and well-nourished. No distress. HENT:   Head: Normocephalic and atraumatic. Mouth/Throat: Oropharynx is clear and moist.   Eyes: Conjunctivae and EOM are normal. Pupils are equal, round, and reactive to light. Neck: Normal range of motion. Cardiovascular: Normal rate, regular rhythm, normal heart sounds and intact distal pulses. No murmur heard. Pulmonary/Chest: Effort normal and breath sounds normal. No stridor. No respiratory distress. Abdominal: Soft. Bowel sounds are normal. There is no tenderness. Musculoskeletal: Normal range of motion. She exhibits no edema or tenderness. Neurological: She is alert and oriented to person, place, and time. No cranial nerve deficit. Skin: Skin is warm and dry. She is not diaphoretic. Psychiatric: She has a normal mood and affect. Nursing note and vitals reviewed.        MDM  Number of Diagnoses or Management Options  Anemia due to chemotherapy:   Palpitations:   Diagnosis management comments: Patient with increased LYONS - unclear as to cause - worried its one of her meds for her cancer. Check labs, CXR, EKG and re-eval    Patient in NAD - mild anemia noted, may be the reason for increased LYONS - may go home to f/u with Dr. Evelia Mckoy and/or Complexity of Data Reviewed  Clinical lab tests: reviewed and ordered  Tests in the radiology section of CPT®: ordered and reviewed  Discuss the patient with other providers: yes (Dr. Ashley Garcia to f/u in clinic - he sent his NP to see patient in the ED)  Independent visualization of images, tracings, or specimens: yes      ED Course       Procedures    CONSULT NOTE:  1:30 PM Dorie Humphreys MD spoke with Dr. Ashley Gracia, Consult for Gynecological Oncology. Discussed available diagnostic tests and clinical findings. He is in agreement with care plans as outlined. Dr. Ashley Garcia will send NP to evaluate patient. VITALS:   No data found.                  Recent Results (from the past 24 hour(s))   EKG, 12 LEAD, INITIAL    Collection Time: 01/09/18 11:03 AM   Result Value Ref Range    Ventricular Rate 79 BPM    Atrial Rate 79 BPM    P-R Interval 114 ms    QRS Duration 80 ms    Q-T Interval 350 ms    QTC Calculation (Bezet) 401 ms    Calculated P Axis 74 degrees    Calculated R Axis 55 degrees    Calculated T Axis 36 degrees    Diagnosis       Normal sinus rhythm  When compared with ECG of 13-MAR-2015 14:14,  No significant change was found  Confirmed by Gloria Caballero M.D., Pily Mejia ([id number]) on 1/9/2018 12:02:31 PM     CBC WITH AUTOMATED DIFF    Collection Time: 01/09/18 11:09 AM   Result Value Ref Range    WBC 5.9 3.6 - 11.0 K/uL    RBC 2.73 (L) 3.80 - 5.20 M/uL    HGB 8.9 (L) 11.5 - 16.0 g/dL    HCT 26.1 (L) 35.0 - 47.0 %    MCV 95.6 80.0 - 99.0 FL    MCH 32.6 26.0 - 34.0 PG    MCHC 34.1 30.0 - 36.5 g/dL    RDW 14.3 11.5 - 14.5 %    PLATELET 95 (L) 417 - 400 K/uL    NEUTROPHILS 72 32 - 75 %    BAND NEUTROPHILS 1 0 - 6 %    LYMPHOCYTES 27 12 - 49 %    MONOCYTES 0 (L) 5 - 13 %    EOSINOPHILS 0 0 - 7 %    BASOPHILS 0 0 - 1 %    ABS. NEUTROPHILS 4.3 1.8 - 8.0 K/UL    ABS. LYMPHOCYTES 1.6 0.8 - 3.5 K/UL    ABS. MONOCYTES 0.0 0.0 - 1.0 K/UL    ABS. EOSINOPHILS 0.0 0.0 - 0.4 K/UL    ABS. BASOPHILS 0.0 0.0 - 0.1 K/UL    DF MANUAL      PLATELET COMMENTS LARGE PLATELETS      RBC COMMENTS ANISOCYTOSIS  1+        WBC COMMENTS REACTIVE LYMPHS     METABOLIC PANEL, COMPREHENSIVE    Collection Time: 01/09/18 11:09 AM   Result Value Ref Range    Sodium 138 136 - 145 mmol/L    Potassium 3.9 3.5 - 5.1 mmol/L    Chloride 106 97 - 108 mmol/L    CO2 26 21 - 32 mmol/L    Anion gap 6 5 - 15 mmol/L    Glucose 109 (H) 65 - 100 mg/dL    BUN 19 6 - 20 MG/DL    Creatinine 1.03 (H) 0.55 - 1.02 MG/DL    BUN/Creatinine ratio 18 12 - 20      GFR est AA >60 >60 ml/min/1.73m2    GFR est non-AA 56 (L) >60 ml/min/1.73m2    Calcium 9.1 8.5 - 10.1 MG/DL    Bilirubin, total 0.3 0.2 - 1.0 MG/DL    ALT (SGPT) 29 12 - 78 U/L    AST (SGOT) 12 (L) 15 - 37 U/L    Alk. phosphatase 99 45 - 117 U/L    Protein, total 7.5 6.4 - 8.2 g/dL    Albumin 4.0 3.5 - 5.0 g/dL    Globulin 3.5 2.0 - 4.0 g/dL    A-G Ratio 1.1 1.1 - 2.2     TROPONIN I    Collection Time: 01/09/18 11:09 AM   Result Value Ref Range    Troponin-I, Qt. <0.04 <0.05 ng/mL   CK W/ CKMB & INDEX    Collection Time: 01/09/18 11:09 AM   Result Value Ref Range    CK 72 26 - 192 U/L    CK - MB <1.0 <3.6 NG/ML    CK-MB Index Cannot be calculated 0 - 2.5         Xr Chest Pa Lat    Result Date: 1/9/2018  Indication: Shortness of breath. Exam: PA and lateral views of the chest. Direct comparison is made to prior CXR dated April 2015. Findings: Cardiomediastinal silhouette is within normal limits. Central venous port catheter extends to the proximal SVC. Lungs are clear bilaterally. Pleural spaces are normal. Osseous structures are intact. IMPRESSION: No acute cardiopulmonary disease.

## 2018-01-15 NOTE — TELEPHONE ENCOUNTER
Pt called to say \"I just can't do this pill thing any more\". Said she is constantly exhausted, short of breath and just sleeping a lot. She says even the sleep she is getting is \"not even good sleep\" and wakes exhausted. Says she is frustrated at how this medication is \"just sort of chipping away at me and this is not living\". Feels she is even more emotional than usual now. We discussed options and right now, she would like to \"just come off for a little while and re-address treatment options in 2 weeks\". I agreed with her and she will stop her Angeline Cassette now and will see Dr. Sha Coates on 1/30 to discuss options. She will have labs drawn a few days prior to that appointment. She was encouraged by this plan and will call with and concerns or questions before the appointment on 1/30.

## 2018-01-25 NOTE — PROGRESS NOTES
[facility] Short Visit Progress Note    1700 Pt admit to [facility] for port flush and labs ambulatory in stable condition. Assessment completed. No new concerns voiced. Please review pending lab results in [address] [place] [address]. Patient Vitals for the past 12 hrs:   Temp Pulse Resp BP   01/25/18 1659 98 °F (36.7 °C) 80 16 110/57       Port accessed with positive blood return. Lab drawn, flushed, heparinized and de-accessed per protocol. Medications:  NS flush  Heparin    1710 Pt tolerated treatment well. D/c home ambulatory in no distress. Pt aware of next appointment scheduled for 3/22/18.

## 2018-01-26 NOTE — PROGRESS NOTES
Lab req faxed to [facility] lab [facility] per vo [place] Tony NP, as pt is having labs repeated d/t low HGB.

## 2018-01-26 NOTE — TELEPHONE ENCOUNTER
Pt returned my call and I reviewed her lab values with her and the recommendation of 2 units of PRBC per Deborah Gomez NP. Pt ask to speak with Paige Valadez, and per their conversation pt will have lab work rechecked on 1/31/18 and if HGB remains low she will schedule a transfusion.   Orders faxed to [facility] lab [facility].

## 2018-01-30 NOTE — PROGRESS NOTES
[address] [place] [address] [address], [address] [facility]  [phone]  MD Kacey Zamarripa MD  Office Note  Patient ID:  Name:  Eduardo Rider  MRN:  [id number]  :  1965/53 y.o. Date:  2018      HISTORY OF PRESENT ILLNESS:  Eduardo Rider is a 48 y.o.  postmenopausal female with stage IIIC ovarian cancer. She underwent X-lap, hysterectomy, and tumor debulking in 2015. She was recommended adjuvant chemotherapy with 6 cycles of Taxol and Carboplatin. Her post-treatment PET/CT was negative for disease. Her My[facility] results also found her to have a deleterious BRCA 1 mutation. As for the BRCA 1 mutation, I had her see one of our breast surgeons, Dr. Tommy Pearson, to talk about options, specifically screening and prophylactic surgery. She is going to hold off on surgery for now. She has decided not to have her daughter tested at this time. In early  she was found to have recurrence. She was clearly platinum sensitive, therefore I recommended a platinum-based regimen. 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 She also wanted to keep her hair. We also discussed options for down the line, specifically a PARP inhibitor, since she is BRCA positive. She has completed 6 cycles of Gemzar/Carboplatin. Her CA-125 responded well, and in fact returned to baseline. Her CT at that time was clear. We discussed maintenance therapy with a PARP inhibitor. She was started on Zejula in late 2017 at 300 mg daily. Her dose was lowered to 200 mg daily due to gastrointestinal complaints. In 2017 it was held due to thrombocytopenia. She elected not to restart the Carie Legato, even at a lower dose. She presented in 2017 to discuss other PARP alternatives. She was started on Lynparza at a one-step dose reduction of 250 mg bid.   Due to side effects she was reduced to 200 mg bid on 1/2/18. On 1/15/18 she stopped therapy due to abdominal pain and nausea, and most of all, fatigue. She presents today to discuss treatment. She says she is feeling a little better since stopping treatment. Her CA-125 is normal.      ROS:   and GI review:  Negative  Cardiopulmonary review:  Negative   Musculoskeletal:  Negative    A comprehensive review of systems was negative except for that written in the History of Present Illness.  , 10 point ROS      Problem List:  Patient Active Problem List    Diagnosis Date Noted    Noninfectious gastroenteritis 11/24/2017    Chemotherapy-induced neutropenia (HCC) 04/06/2017    Thrombocytopenia (HCC) 03/17/2017    CINV (chemotherapy-induced nausea and vomiting) 03/17/2017    Dehydration 03/17/2017    BRCA1 positive 09/10/2015    Malignant neoplasm of both ovaries ([facility] 04/09/2015    S/P total abdominal hysterectomy 03/18/2015    Pelvic mass 03/06/2015     PMH:  Past Medical History:   Diagnosis Date    Anxiety     Calculus of kidney 1/13    right    Hernia, inguinal, left 2012    MS (multiple sclerosis) ([facility] 1996    Nausea & vomiting     Ovarian cancer ([address] 3/2015    high grade, stage 3C papillary serous    Thromboembolus ([address] 8/2007    lower abdomen post fall      PSH:  Past Surgical History:   Procedure Laterality Date    HX GYN  2009    endometrial ablation with punctured uterus    HX OTHER SURGICAL  8/2007    green filter    HX MARIEL AND BSO  3/2014    ovarian cancer      Social History:  Social History   Substance Use Topics    Smoking status: Never Smoker    Smokeless tobacco: Never Used    Alcohol use No      Family History:  Family History   Problem Relation Age of Onset    Cancer Paternal Grandmother      breast    Heart Disease Mother     Heart Disease Father       Medications: (reviewed)  Current Outpatient Prescriptions   Medication Sig    olaparib (LYNPARZA) 100 mg tab tablet Take 1 Tab by mouth two (2) times a day.  LORazepam (ATIVAN) 1 mg tablet TAKE 1 TABLET BY MOUTH AT BEDTIME    XARELTO 20 mg tab tablet     lidocaine-prilocaine (EMLA) topical cream Apply small amount to port area and cover with band aid 1 hour before chemo treatment    oxymetazoline (AFRIN, OXYMETAZOLINE,) 0.05 % nasal spray 2 Sprays two (2) times a day.  promethazine (PHENERGAN) 12.5 mg tablet Take 1 Tab by mouth every six (6) hours as needed for Nausea.  ondansetron hcl (ZOFRAN) 4 mg tablet TAKE 1 TAB BY MOUTH EVERY EIGHT (8) HOURS AS NEEDED FOR NAUSEA.  gabapentin (NEURONTIN) 400 mg capsule TAKE 1 CAPSULE BY MOUTH 3 TIMES DAILY    ondansetron (ZOFRAN ODT) 4 mg disintegrating tablet TAKE 1 TABLET BY MOUTH EVERY 8 HOURS AS NEEDED FOR NAUSEA    dexamethasone (DECADRON) 4 mg tablet Take 1-2 tabs QAM with food the 2 days following chemo as needed    amitriptyline (ELAVIL) 25 mg tablet Take 1 Tab by mouth nightly. No current facility-administered medications for this visit. Allergies: (reviewed)  Allergies   Allergen Reactions    Pcn [Penicillins] Rash          OBJECTIVE:    Physical Exam:  VITAL SIGNS: Vitals:    01/30/18 1459   BP: 112/58   Pulse: 77   Weight: 146 lb 3.2 oz (66.3 kg)   Height: 5' 7.99\" (1.727 m)     Body mass index is 22.23 kg/(m^2). GENERAL FILIBERTO: Conversant, alert, oriented. No acute distress. HEENT: HEENT. No thyroid enlargement. No JVD. Neck: Supple without restrictions. RESPIRATORY: Clear to auscultation and percussion to the bases. No CVAT. CARDIOVASC: RRR without murmur/rub. GASTROINT: Soft, non-distended, without masses or organomegaly. MUSCULOSKEL: no joint tenderness, deformity or swelling   EXTREMITIES: extremities normal, atraumatic, no cyanosis or edema   PELVIC: Deferred   RECTAL: Deferred   CHARMAINE SURVEY: No suspicious lymphadenopathy or edema noted. NEURO: Grossly intact. No acute deficit.          Lab Results   Component Value Date/Time    WBC 3.5 01/25/2018 05:01 PM HGB 8.0 01/25/2018 05:01 PM    HCT 23.9 01/25/2018 05:01 PM    PLATELET 274 45/67/8489 05:01 PM    .6 01/25/2018 05:01 PM     Lab Results   Component Value Date/Time    Sodium 139 01/25/2018 05:01 PM    Potassium 3.8 01/25/2018 05:01 PM    Chloride 107 01/25/2018 05:01 PM    CO2 26 01/25/2018 05:01 PM    Anion gap 6 01/25/2018 05:01 PM    Glucose 79 01/25/2018 05:01 PM    BUN 18 01/25/2018 05:01 PM    Creatinine 0.77 01/25/2018 05:01 PM    BUN/Creatinine ratio 23 01/25/2018 05:01 PM    GFR est AA >60 01/25/2018 05:01 PM    GFR est non-AA >60 01/25/2018 05:01 PM    Calcium 8.7 01/25/2018 05:01 PM       CT of abdomen/pelvis (6/12/17)  No pleural effusion at the lung bases. Trace pericardial fluid. Small hiatal hernia.     LIVER/GALLBLADDER: No mass or biliary dilatation. No evidence of serosal  implant. There is no intrahepatic duct dilatation. The CBD is not dilated. There  is no hepatic parenchymal mass. Hepatic enhancement pattern is within normal  limits. Gallbladder within normal limits by CT criteria.     SPLEEN/PANCREAS: No mass. There is no pancreatic duct dilatation. There is no  evidence of splenomegaly.     ADRENALS/KIDNEYS: Arlyss Agreste is no adrenal mass.] There is no hydroureteronephrosis. There is no renal or ureteral calculus. There is no renal mass. There is no  perinephric mass.     COLON AND SMALL BOWEL: No dilatation or wall thickening. There is no obstruction  or free intraperitoneal air. There is no evidence of incarceration or  obstruction. Paracolic gutter soft tissue is not demonstrated when compared to  the prior examination 2/20/2017   Series 3 image 225 as index focus is correlates to imaging findings at series 3  image 64 of the current study.   Series 3 image 246 just to the right of midline correlates to imaging findings  of series 3 image 79 on the current exam.  APPENDIX: Unremarkable.     BLADDER/REPRODUCTIVE ORGANS: No mass or calculus.     BONES/RETROPERITONEUM: No destructive bone lesion. . IVC filter in place. Left  iliac vein stent. The abdominal aorta is normal in caliber. No aneurysm. No  fracture. No retroperitoneal adenopathy. Trace ascites is resolved. Peritoneal  enhancement has resolved. No focal nodular enhancement or omental implant is  demonstrated. Subcutaneous venous collaterals. Status post hysterectomy. No  external iliac adenopathy. No pelvic adenopathy.     IMPRESSION:   Resolved ascites and peritoneal thickening/ enhancement. Areas of thickened  peritoneum/soft tissue density identified on the PET examination of 2/20/2017  are not demonstrated on the current study. No focal nodular implants suggestive of peritoneal carcinomatosis.     No other significant interval change. IMPRESSION/PLAN:  Kassy Sandoval is a 48 y.o. female with a history of recurrent ovarian cancer. She has no evidence of disease at this time. Her CA-125 is normal.  She has not tolerated Antony very well. She does not really want to start back on treatment. She wants to take a break, but she says she feels like a failure. I reassured her that even oral therapies can have lots of side effects. I recommended that we don't resume therapy and we just follow her conservatively for a while. She is happy with that plan. I will see her back in a few months, unless symptoms dictate she be seen sooner.         Signed By: Paul Contreras MD     1/30/2018/3:54 PM

## 2018-02-16 NOTE — PROGRESS NOTES
ONCOLOGY NURSE NAVIGATOR    Met with Jm Alanis today at her request.  She is interested in integrative supports to bolster her coping and to ease frustrations and disappointments along her cancer journey. Provided empathic presence and active listening as she recounted experiences with cancer, mil her feeling of failure and anxiety about not tolerating PARP inhibitor maintenance therapy for her ovarian cancer. She is grateful that her cancer has responded \"well and immediately\" to chemotherapy and knows now she is disease free. Has some lingering neuropathies in her feet but otherwise feels she's recovered well from chemo. Not so with PARP inhibitor therapy. Felt that SE she experienced on PARP inhibitor therapy were intolerable as she was unable to enjoy her life. She has MS -- \"a mild case\" -- but suspects that treatment related SE, mil fatigue,  may have been worsened by her MS. She is earnest in her declarations about valuing QOL over quantity of life. 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 She is active in her [employer] and leans on her pavel in [facility] [address]. She likes to swim and is proud of being fit and trim. Has many friends and can identify many supports. Her family is involved and actively supports her decision making about cancer treatment. Emotional and detailed descriptions about how difficult decision making has been mil when her perception is that providers encourage her to continue treatment when she feels so rotten. She knows this disease will most likely shorten her life and tries to look at her cancer as a chronic disease that she must manage. She's been very moved and impacted by the book Being Mortal by Jenn Rae and while she says she has an AMD, she is interested on updating her AMD and in working on advance care planning.   This book has promoted many conversations with her family about her values and goals in living. She is interested in learning about Palliative Medicine and how this service might help her. Would like to self refer to Palliative Medicine now or later. She wants to [facility] at [facility]. Likes using My Chart and likes the idea of providers having ease of communication on her behalf. Seeks help finding BS PCP. Described complimentary integrative supports in [facility] and how to access/schedule appts. She is interested in meeting with counselor, as well as massage and relaxation/meditation therapy. Described ONN role and availability through care continuum -- can provide and direct to needed supports. We agreed on the followin. Schedule integrative supports as desired. Acknowledged her positives (e.g., supportive, loving  and family, good response to chemo) as well as the challenges she's living with. 2.  Consider counseling with LCSW in [facility] to support coping    3. Use My Chart to enhance communication with providers and ONN  4. Self refer to PM as desired  -- she has print material and contact info about BS PM  5. ONN can assist or direct her to help to update AMD  -- suggested she look at [facility]. Note that her present AMD is not on file in her EMR.   6.  She has list of BS PCPs     Agata SESAYS

## 2018-02-27 NOTE — PROGRESS NOTES
Due to pt's schedule she needed at late afternoon appt. WE have scheduled Tuesday March 20th at 3:30pm to meet.  Thank you for referral.    Ren Briones LCSW

## 2018-03-12 NOTE — TELEPHONE ENCOUNTER
Requested Prescriptions     Signed Prescriptions Disp Refills    LORazepam (ATIVAN) 1 mg tablet 10 Tab 0     Sig: TAKE 1 TABLET BY MOUTH AT BEDTIME  Indications: anxiety     Authorizing Provider: Army Desai     Ordering User: Jimmy Manley     Rx called to [facility] per vo Dr. Tatiana Cortez.

## 2018-03-16 NOTE — TELEPHONE ENCOUNTER
Pt called to say for 3 days she feels like \"my bowels hurt every time I have a bowel movement\". Denies constipation and has 2 \"normal\" BM's yesterday. Continues with Miralax as needed. Denies any nausea or decrease in appetite. Continues to work without difficulty. She said it \"is not urgent\" and says she can wait until se sees Dr. Balaji Causey next week, but wanted us to know. Will call if it begins to become associated with any nausea, vomiting, fever or other concerns prior to her visit next week with Dr. Balaji Causey. She will proceed with scheduled port-flush and labs prior to her visit.

## 2018-03-20 NOTE — TELEPHONE ENCOUNTER
Initial session with pt today (1 hr 15 min) with this pleasant 48 you  female. Diagnosed with Ovarian cancer 3 yrs ago. She has been  to Lety for 31yrs (happily per her report). They 4 children ranging in age of 25 to 32. Her youngest will graduate this spring and will go to [employer], Dtr graduates [employer] this spring and going to grad school in the [facility]). One son is getting  in November and the oldest is  with one child and one on the way in May! Needless to say pt has lots going on and reports feeling overwhelmed since her recurrence back in March 2017. Reports some PTSD symptoms in trying new maintenance therapies as some have made her very tired and at times has had other symptoms. Stresses the importance of quality of life vs quantity and the juggling it takes to explore that Of note pt says she was diagnosed with Multiple Sclerosis 30 yrs ago (current Neurologist is Dr. 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 Frustrated over trying to find the right balance (work and maintenance drug) to prevent her cancer from coming back but always worries it will. Feels lshe has \"failed\" since she has not been able to take high or recommended doses of her maintenance drugs. Recently having bowel issues, reports she a an \"S\" shaped bowel and when she feels any kind of pain, worries its cancer even though it may not be. Wonders if she is eating correctly wi. Explored her ways of thinking and stress management ways to help her mind from escalating to worst case scenarios. Pt considers a second opinion just to ease her mind but in no rush. Suggested a possible consult from dietician. Pt appropriately tearful at times especially when thinking that time could be shortened but is able to effectively use her pavel and family to have hope.  Pt says she also wants to learn about Palliative Care and how their role could mange some of her symptoms if her cancer came back. Pt says she may look at their website as suggested by [facility] first. Not familiar with all of pt's symptoms or if she meets criteria at this time but pt just wants to be prepared if her health status were to change. Pt seems to be a strong advocate for self    Pt's schedule is a challenge for LCSW but we will attempt to me in April after spring break in late afternoon. LCSW will email options. Will continue to monitor and offer support as needed.     Mirian Ortiz LCSW

## 2018-03-20 NOTE — PROGRESS NOTES
Outpatient [facility]fusion [facility] Short Visit Progress Note    1373  Pt admit to [facility] for port flush/labs ambulatory in stable condition. No acute concerns voiced. Patient Vitals for the past 12 hrs:   Temp Pulse Resp BP   03/20/18 1651 96.8 °F (36 °C) 83 18 121/75     Left chest port accessed with 20 gauge 1.0 inch reynolds needle with positive blood return. Labs drawn as ordered. Port flushed and deaccessed per [employer]. 1705  Pt tolerated treatment well. D/c home ambulatory in no distress. Pt aware of next appointment scheduled for 5/17/18 at 1700.

## 2018-03-22 NOTE — PROGRESS NOTES
[address] [place] [address] [address], [address] [facility]  [phone]  MD Shanae Kiran MD  Office Note  Patient ID:  Name:  Manisha Doherty  MRN:  [id number]  :  1965/53 y.o. Date:  3/22/2018      HISTORY OF PRESENT ILLNESS:  Manisha Doherty is a 48 y.o.  postmenopausal female with stage IIIC ovarian cancer. She underwent X-lap, hysterectomy, and tumor debulking in 2015. She was recommended adjuvant chemotherapy with 6 cycles of Taxol and Carboplatin. Her post-treatment PET/CT was negative for disease. Her Myriad results also found her to have a deleterious BRCA 1 mutation. As for the BRCA 1 mutation, I had her see one of our breast surgeons, Dr. Prudencio Oro, to talk about options, specifically screening and prophylactic surgery. She is going to hold off on surgery for now. She has decided not to have her daughter tested at this time. In early  she was found to have recurrence. She was clearly platinum sensitive, therefore I recommended a platinum-based regimen. 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 She also wanted to keep her hair. We also discussed options for down the line, specifically a PARP inhibitor, since she is BRCA positive. She completed 6 cycles of Gemzar/Carboplatin and her CA-125 returned to baseline. Her CT at that time was clear. We discussed maintenance therapy with a PARP inhibitor. She was started on Zejula in late 2017 at 300 mg daily. Her dose was lowered to 200 mg daily due to gastrointestinal complaints. In 2017 it was held due to thrombocytopenia. She elected not to restart the Cantor Faden, even at a lower dose. She presented in 2017 to discuss other PARP alternatives. She was started on Lynparza at a one-step dose reduction of 250 mg bid. Due to side effects she was reduced to 200 mg bid on 18.   On 1/15/18 she stopped therapy due to abdominal pain and nausea, and most of all, fatigue. She presents today for follow up and reports lower abdominal pain. She denies any nausea, vomiting, diarrhea, or constipation. Her fatigue has improved. Her CA-125 is now rising. It was 41, when it was <4. ROS:   and GI review:  Negative  Cardiopulmonary review:  Negative   Musculoskeletal:  Negative    A comprehensive review of systems was negative except for that written in the History of Present Illness.  , 10 point ROS      Problem List:  Patient Active Problem List    Diagnosis Date Noted    Noninfectious gastroenteritis 11/24/2017    Chemotherapy-induced neutropenia (HCC) 04/06/2017    Thrombocytopenia (HCC) 03/17/2017    CINV (chemotherapy-induced nausea and vomiting) 03/17/2017    Dehydration 03/17/2017    BRCA1 positive 09/10/2015    Malignant neoplasm of both ovaries ([address] 04/09/2015    S/P total abdominal hysterectomy 03/18/2015    Pelvic mass 03/06/2015     PMH:  Past Medical History:   Diagnosis Date    Anxiety     Calculus of kidney 1/13    right    Hernia, inguinal, left 2012    MS (multiple sclerosis) ([address] 1996    Nausea & vomiting     Ovarian cancer ([address] 3/2015    high grade, stage 3C papillary serous    Thromboembolus ([address] 8/2007    lower abdomen post fall      PSH:  Past Surgical History:   Procedure Laterality Date    HX GYN  2009    endometrial ablation with punctured uterus    HX OTHER SURGICAL  8/2007    green filter    HX MARIEL AND BSO  3/2014    ovarian cancer      Social History:  Social History   Substance Use Topics    Smoking status: Never Smoker    Smokeless tobacco: Never Used    Alcohol use No      Family History:  Family History   Problem Relation Age of Onset    Cancer Paternal Grandmother      breast    Heart Disease Mother     Heart Disease Father       Medications: (reviewed)  Current Outpatient Prescriptions   Medication Sig    polyethylene glycol (MIRALAX) 17 gram packet Take 17 g by mouth daily.  LORazepam (ATIVAN) 1 mg tablet TAKE 1 TABLET BY MOUTH AT BEDTIME  Indications: anxiety    promethazine (PHENERGAN) 12.5 mg tablet Take 1 Tab by mouth every six (6) hours as needed for Nausea.  ondansetron hcl (ZOFRAN) 4 mg tablet TAKE 1 TAB BY MOUTH EVERY EIGHT (8) HOURS AS NEEDED FOR NAUSEA.  ondansetron (ZOFRAN ODT) 4 mg disintegrating tablet TAKE 1 TABLET BY MOUTH EVERY 8 HOURS AS NEEDED FOR NAUSEA    XARELTO 20 mg tab tablet     lidocaine-prilocaine (EMLA) topical cream Apply small amount to port area and cover with band aid 1 hour before chemo treatment    olaparib (LYNPARZA) 100 mg tab tablet Take 1 Tab by mouth two (2) times a day.  oxymetazoline (AFRIN, OXYMETAZOLINE,) 0.05 % nasal spray 2 Sprays two (2) times a day.  gabapentin (NEURONTIN) 400 mg capsule TAKE 1 CAPSULE BY MOUTH 3 TIMES DAILY    amitriptyline (ELAVIL) 25 mg tablet Take 1 Tab by mouth nightly. No current facility-administered medications for this visit. Allergies: (reviewed)  Allergies   Allergen Reactions    Pcn [Penicillins] Rash          OBJECTIVE:    Physical Exam:  VITAL SIGNS: Vitals:    03/22/18 0828   BP: 113/77   Pulse: 83   Weight: 145 lb 9.6 oz (66 kg)   Height: 5' 7.99\" (1.727 m)     Body mass index is 22.14 kg/(m^2). GENERAL FILIBERTO: Conversant, alert, oriented. No acute distress. HEENT: HEENT. No thyroid enlargement. No JVD. Neck: Supple without restrictions. RESPIRATORY: Clear to auscultation and percussion to the bases. No CVAT. CARDIOVASC: RRR without murmur/rub. GASTROINT: Soft, non-distended, without masses or organomegaly. MUSCULOSKEL: no joint tenderness, deformity or swelling   EXTREMITIES: extremities normal, atraumatic, no cyanosis or edema   PELVIC: Normal external genitalia. Normal vagina. Uterus, cervix, adnexa surgically absent. No masses appreciated on exam.  No tenderness.    RECTAL: Deferred   CHARMAINE SURVEY: No suspicious lymphadenopathy or edema noted. NEURO: Grossly intact. No acute deficit. Lab Results   Component Value Date/Time    WBC 5.5 03/20/2018 05:05 PM    HGB 11.0 (L) 03/20/2018 05:05 PM    HCT 33.4 (L) 03/20/2018 05:05 PM    PLATELET 662 14/73/1509 05:05 PM    .4 (H) 03/20/2018 05:05 PM     Lab Results   Component Value Date/Time    Sodium 140 03/20/2018 05:05 PM    Potassium 3.9 03/20/2018 05:05 PM    Chloride 106 03/20/2018 05:05 PM    CO2 27 03/20/2018 05:05 PM    Anion gap 7 03/20/2018 05:05 PM    Glucose 89 03/20/2018 05:05 PM    BUN 16 03/20/2018 05:05 PM    Creatinine 0.88 03/20/2018 05:05 PM    BUN/Creatinine ratio 18 03/20/2018 05:05 PM    GFR est AA >60 03/20/2018 05:05 PM    GFR est non-AA >60 03/20/2018 05:05 PM    Calcium 9.3 03/20/2018 05:05 PM       CT of abdomen/pelvis (6/12/17)  No pleural effusion at the lung bases. Trace pericardial fluid. Small hiatal hernia.     LIVER/GALLBLADDER: No mass or biliary dilatation. No evidence of serosal  implant. There is no intrahepatic duct dilatation. The CBD is not dilated. There  is no hepatic parenchymal mass. Hepatic enhancement pattern is within normal  limits. Gallbladder within normal limits by CT criteria.     SPLEEN/PANCREAS: No mass. There is no pancreatic duct dilatation. There is no  evidence of splenomegaly.     ADRENALS/KIDNEYS: Yioy Veto is no adrenal mass.] There is no hydroureteronephrosis. There is no renal or ureteral calculus. There is no renal mass. There is no  perinephric mass.     COLON AND SMALL BOWEL: No dilatation or wall thickening. There is no obstruction  or free intraperitoneal air. There is no evidence of incarceration or  obstruction. Paracolic gutter soft tissue is not demonstrated when compared to  the prior examination 2/20/2017   Series 3 image 225 as index focus is correlates to imaging findings at series 3  image 64 of the current study.   Series 3 image 246 just to the right of midline correlates to imaging findings  of series 3 image 79 on the current exam.  APPENDIX: Unremarkable.     BLADDER/REPRODUCTIVE ORGANS: No mass or calculus.     BONES/RETROPERITONEUM: No destructive bone lesion. . IVC filter in place. Left  iliac vein stent. The abdominal aorta is normal in caliber. No aneurysm. No  fracture. No retroperitoneal adenopathy. Trace ascites is resolved. Peritoneal  enhancement has resolved. No focal nodular enhancement or omental implant is  demonstrated. Subcutaneous venous collaterals. Status post hysterectomy. No  external iliac adenopathy. No pelvic adenopathy.     IMPRESSION:   Resolved ascites and peritoneal thickening/ enhancement. Areas of thickened  peritoneum/soft tissue density identified on the PET examination of 2/20/2017  are not demonstrated on the current study. No focal nodular implants suggestive of peritoneal carcinomatosis.     No other significant interval change. IMPRESSION/PLAN:  Carlyle Hansen is a 48 y.o. female with a history of recurrent ovarian cancer. She now has some complaints of lower abdominal pain and her CA-125 is rising. I don't feel anything on exam, but I suspect she is having symptoms of recurrence. I am going to send her for a CT of the chest, abdomen, pelvis at this time. I am also going to have her see the dietitian. I suspect the CT will not show definitive evidence of disease.         Signed By: Artur Chang MD     8/45/2898/9:54 PM

## 2018-03-26 NOTE — TELEPHONE ENCOUNTER
[facility] [place] [address] [address]: [phone]  F: [phone]      Medical Nutrition Therapy    Nutrition Referral:    Referral received from Dr. Karen Shah regarding nutrition, abdominal pain and ovarian cancer. Called and spoke with Ms. Olvin Katz,  explained that RD is available to address nutrition throughout the spectrum of care. We discussed meeting after appointment with Dr. Karen Shah on 4/5.          Signed By: Orin Cali, [address] [place] [address], [address] [place] [address] , [address] [facility]

## 2018-04-04 NOTE — TELEPHONE ENCOUNTER
ONCOLOGY NURSE NAVIGATOR     Yaya Cortez left  on 3/29 to discuss her thoughts on seeking alternate medical opinion    Returned call to Yaya Cortez  She has appt with Dr. Pebbles Rdz on 4/5  She and her  plan to seek his direction about where to seek alternate advice hoping he'll understand she seeks this for her peace of mind.     She's asked me to be in touch with her via My Chart and I'll send her an opening message to establish connection    Kamron Braden MS RN AOCNS

## 2018-04-05 NOTE — PROGRESS NOTES
[facility] at [address] [place], [place] [address]: [phone]  F: [phone]    Medical Nutrition Therapy      Reason for nutrition visit: Intake and abdominal discomfort. Met with patient in [address] [place] [address] office after MD appointment. She wishes to discuss strategies to help alleviate abdominal pain and constipation. She reports surgery 3 years ago left her bowls in a S formation making it difficult for her to tolerate roughage (fresh vegetables, red meats, hard crackers/chips). She consumes most vegetables, fish, lean chicken, oatmeal, yogurt, and fruit. She finds her abdominal discomfort occurs later in the day often at night when she is trying to sleep. She feels that if she had a bowel movement in the evenings this will help resolve abdominal pain. She works in [employer] and is often walking around which helps with movement of bowels. Results:   Diagnosis: Recurrent ovarian cancer     Wt Readings from Last 7 Encounters:   04/05/18 144 lb 6.4 oz (65.5 kg)   03/22/18 145 lb 9.6 oz (66 kg)   01/30/18 146 lb 3.2 oz (66.3 kg)   01/09/18 148 lb (67.1 kg)   12/20/17 147 lb (66.7 kg)   11/24/17 148 lb (67.1 kg)   11/10/17 145 lb (65.8 kg)     Estimated Nutrition Needs:   Calorie Range: 1950-2275kcal/day  Formula: 30-35kcal/kg/day   Protein Range: 65-78g/day   Formula: 1.0-1.2g/kg/day    Fluid Needs: 2100ml      Assessment:   Altered GI Function related to recurrent disease as evidence by abdominal pain, food avoidance and occasional constipation. Plan:   -  Discussed continuing Miralax daily and depending on bowels she can do 1/2 amount. The hope is that we can create more consistent \"good\" days verses taking miralax one day then skipping 2 days and feeling increased abdominal discomfort.    - Suggested to try a heating pad when abdominal discomfort occurs.   - Try taking the Miralax at night in effort to promote evening bowel movements. - Practice evening bathroom breaks. Sit in bathroom a minimum of 10 minutes. Can try warm beverages prior to bathroom break. - Can have dairy, can try lactaid, A2 or soy milk as desired. - Try smoothie using fruit/vegetable packs with yogurt and ice. Follow up as needed. I appreciate the opportunity to participate in Ms. Johanny Payan's care.     Signed By: Jayda Avalos, [address] [place] [address], [address] [place] [address] , [address]     Contact: [phone]

## 2018-04-05 NOTE — PROGRESS NOTES
[address] [place] [address] [address], [address] [place]  [phone]  MD Yudelka Navarrete MD  Office Note  Patient ID:  Name:  Denise Ayala  MRN:  [id number]  :  1965/53 y.o. Date:  2018      HISTORY OF PRESENT ILLNESS:  Denise Ayala is a 48 y.o.  postmenopausal female with stage IIIC ovarian cancer. She underwent X-lap, hysterectomy, and tumor debulking in 2015. She was recommended adjuvant chemotherapy with 6 cycles of Taxol and Carboplatin. Her post-treatment PET/CT was negative for disease. Her Myriad results also found her to have a deleterious BRCA 1 mutation. As for the BRCA 1 mutation, I had her see one of our breast surgeons, Dr. Kerline Christie, to talk about options, specifically screening and prophylactic surgery. She is going to hold off on surgery for now. She has decided not to have her daughter tested at this time. In early  she was found to have recurrence. She was clearly platinum sensitive, therefore I recommended a platinum-based regimen. 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 She also wanted to keep her hair. We also discussed options for down the line, specifically a PARP inhibitor, since she is BRCA positive. She completed 6 cycles of Gemzar/Carboplatin and her CA-125 returned to baseline. Her CT at that time was clear. We discussed maintenance therapy with a PARP inhibitor. She was started on Zejula in late 2017 at 300 mg daily. Her dose was lowered to 200 mg daily due to gastrointestinal complaints. In 2017 it was held due to thrombocytopenia. She elected not to restart the Nithya Sprawls, even at a lower dose. She presented in 2017 to discuss other PARP alternatives. She was started on Lynparza at a one-step dose reduction of 250 mg bid. Due to side effects she was reduced to 200 mg bid on 18.   On 1/15/18 she stopped therapy due to abdominal pain and nausea, and most of all, fatigue. She presented recently for follow up and reported lower abdominal pain. She denied any nausea, vomiting, diarrhea, or constipation. Her fatigue had improved. Her CA-125 was rising, up to 41, when it was previously <4. I sent her for a CT of the chest/abdomen/pelvis to evaluate for recurrent disease. ROS:   and GI review:  Negative  Cardiopulmonary review:  Negative   Musculoskeletal:  Negative    A comprehensive review of systems was negative except for that written in the History of Present Illness.  , 10 point ROS      Problem List:  Patient Active Problem List    Diagnosis Date Noted    Noninfectious gastroenteritis 11/24/2017    Chemotherapy-induced neutropenia (HCC) 04/06/2017    Thrombocytopenia (HCC) 03/17/2017    CINV (chemotherapy-induced nausea and vomiting) 03/17/2017    Dehydration 03/17/2017    BRCA1 positive 09/10/2015    Malignant neoplasm of both ovaries ([address] 04/09/2015    S/P total abdominal hysterectomy 03/18/2015    Pelvic mass 03/06/2015     PMH:  Past Medical History:   Diagnosis Date    Anxiety     Calculus of kidney 1/13    right    Hernia, inguinal, left 2012    MS (multiple sclerosis) ([address] 1996    Nausea & vomiting     Ovarian cancer ([address] 3/2015    high grade, stage 3C papillary serous    Thromboembolus ([address] 8/2007    lower abdomen post fall      PSH:  Past Surgical History:   Procedure Laterality Date    HX GYN  2009    endometrial ablation with punctured uterus    HX OTHER SURGICAL  8/2007    green filter    HX MARIEL AND BSO  3/2014    ovarian cancer      Social History:  Social History   Substance Use Topics    Smoking status: Never Smoker    Smokeless tobacco: Never Used    Alcohol use No      Family History:  Family History   Problem Relation Age of Onset    Cancer Paternal Grandmother      breast    Breast Cancer Paternal Grandmother     Heart Disease Mother  Heart Disease Father       Medications: (reviewed)  Current Outpatient Prescriptions   Medication Sig    LORazepam (ATIVAN) 1 mg tablet TAKE 1 TABLET BY MOUTH AT BEDTIME  Indications: anxiety    XARELTO 20 mg tab tablet     amitriptyline (ELAVIL) 25 mg tablet Take 1 Tab by mouth nightly. No current facility-administered medications for this visit. Allergies: (reviewed)  Allergies   Allergen Reactions    Pcn [Penicillins] Rash          OBJECTIVE:    Physical Exam:  VITAL SIGNS: Vitals:    04/05/18 0831   BP: 118/70   Pulse: 89   Weight: 144 lb 6.4 oz (65.5 kg)   Height: 5' 7.99\" (1.727 m)     Body mass index is 21.96 kg/(m^2). GENERAL FILIBERTO: Conversant, alert, oriented. No acute distress. HEENT: HEENT. No thyroid enlargement. No JVD. Neck: Supple without restrictions. RESPIRATORY: Clear to auscultation and percussion to the bases. No CVAT. CARDIOVASC: RRR without murmur/rub. GASTROINT: Soft, non-distended, without masses or organomegaly. MUSCULOSKEL: no joint tenderness, deformity or swelling   EXTREMITIES: extremities normal, atraumatic, no cyanosis or edema   PELVIC: Normal external genitalia. Normal vagina. Uterus, cervix, adnexa surgically absent. No masses appreciated on exam.  No tenderness. RECTAL: Deferred   CHARMAINE SURVEY: No suspicious lymphadenopathy or edema noted. NEURO: Grossly intact. No acute deficit.          Lab Results   Component Value Date/Time    WBC 5.5 03/20/2018 05:05 PM    HGB 11.0 (L) 03/20/2018 05:05 PM    HCT 33.4 (L) 03/20/2018 05:05 PM    PLATELET 683 06/79/0011 05:05 PM    .4 (H) 03/20/2018 05:05 PM     Lab Results   Component Value Date/Time    Sodium 140 03/20/2018 05:05 PM    Potassium 3.9 03/20/2018 05:05 PM    Chloride 106 03/20/2018 05:05 PM    CO2 27 03/20/2018 05:05 PM    Anion gap 7 03/20/2018 05:05 PM    Glucose 89 03/20/2018 05:05 PM    BUN 16 03/20/2018 05:05 PM    Creatinine 0.88 03/20/2018 05:05 PM    BUN/Creatinine ratio 18 03/20/2018 05:05 PM    GFR est AA >60 03/20/2018 05:05 PM    GFR est non-AA >60 03/20/2018 05:05 PM    Calcium 9.3 03/20/2018 05:05 PM       CT of chest/abdomen/pelvis (4/2/18)  THYROID: No nodule. MEDIASTINUM: No mass or lymphadenopathy. MITCH: No mass or lymphadenopathy. THORACIC AORTA: No dissection or aneurysm. MAIN PULMONARY ARTERY: Normal in caliber. TRACHEA/BRONCHI: Patent. ESOPHAGUS: No wall thickening or dilatation. HEART:  The visualized heart is normal in size without pericardial effusion. PLEURA: No effusion or pneumothorax. LUNGS: Clear. LIVER: Unremarkable. GALLBLADDER: Unremarkable. SPLEEN: Unremarkable. PANCREAS: No mass or duct dilation. ADRENALS: Unremarkable. KIDNEYS: No mass, calculus, or hydronephrosis. STOMACH: Unremarkable. SMALL BOWEL: No dilatation or wall thickening. COLON: No dilation or wall thickening. APPENDIX: Unremarkable. PERITONEUM: There is small ascites around the margins of the liver and spleen  with trace fluid in the pelvis. There is diffuse uniform appearing peritoneal  thickening and enhancement along the anterior and lateral abdominal cavity. No  intraperitoneal masses are identified. RETROPERITONEUM: No enlarged lymphadenopathy or aortic aneurysm. REPRODUCTIVE ORGANS: Uterus and ovaries are surgically absent. URINARY BLADDER: No mass or calculus. BONES: No acute fracture or aggressive lesion. ADDITIONAL COMMENTS: Port-A-Cath in position in the left chest wall with  catheter extending to the mid superior vena cava. Anterior abdominal wall  midline surgical incisional change. IVC filter. Left common iliac stent. Prominent superficial venous varices in the anterior pelvic wall appear stable.     IMPRESSION:  Diffuse anterior and lateral abdominal wall peritoneal thickening  with small ascites around the liver and spleen margins and trace fluid in the  pelvis compatible with history of peritoneal carcinomatosis.  Additional  incidental findings as above with no new neoplastic disease seen compared to  prior. IMPRESSION/PLAN:  Martha Pringle is a 48 y.o. female with a history of recurrent ovarian cancer. She now has some complaints of lower abdominal pain and her CA-125 is rising. Her CT now demonstrates recurrent disease. It has been about 10 months since her last platinum chemotherapy, so she should hopefully respond to a platinum-based regimen again. She did not tolerate PARP inhibitors very well, so I would like to consider Avastin as well, so that we can continue that as a maintenance therapy afterward. We also talked about having her tumor tested for MSI, to see if she is a candidate for pembrolizumab. With her history of mild MS, I would be a bit concerned about an immunotherapy causing a flair of symptoms. We also discussed a second opinion and possible clinical trials. I suggested that she go to [facility] for a consultation. We will arrange for that ASAP. I will see her back after that consultation if she does not plan on a trial there.         Signed By: Raciel Garcia MD     4/5/2018/3:54 PM

## 2018-04-17 NOTE — TELEPHONE ENCOUNTER
Followed up by phone with pt. She was at home getting ready to go for 2nd opinion at [facility]. 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 We have set a tentative afternoon appt for May 3rd at 3:30pm. Pt to call if any issues regarding this time. Pt knows she can call or email if additional support is needed.     Ezequiel Peralta LCSW

## 2018-04-19 NOTE — PROGRESS NOTES
[address] [place] [address] [address], [address] [facility]  [phone]  Mickiel Slade, MD Kendrick Najjar, MD  Office Note  Patient ID:  Name:  Zetta Lesch  MRN:  [id number]  :  1965/53 y.o. Date:  2018      HISTORY OF PRESENT ILLNESS:  Zetta Lesch is a 48 y.o.  postmenopausal female with stage IIIC ovarian cancer. She underwent X-lap, hysterectomy, and tumor debulking in 2015. She was recommended adjuvant chemotherapy with 6 cycles of Taxol and Carboplatin. Her post-treatment PET/CT was negative for disease. Her Myriad results also found her to have a deleterious BRCA 1 mutation. As for the BRCA 1 mutation, I had her see one of our breast surgeons, Dr. Kaela Shore, to talk about options, specifically screening and prophylactic surgery. She is going to hold off on surgery for now. She has decided not to have her daughter tested at this time. In early  she was found to have recurrence. She was clearly platinum sensitive, therefore I recommended a platinum-based regimen. 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 She also wanted to keep her hair. We also discussed options for down the line, specifically a PARP inhibitor, since she is BRCA positive. She completed 6 cycles of Gemzar/Carboplatin and her CA-125 returned to baseline. Her CT at that time was clear. We discussed maintenance therapy with a PARP inhibitor. She was started on Zejula in late 2017 at 300 mg daily. Her dose was lowered to 200 mg daily due to gastrointestinal complaints. In 2017 it was held due to thrombocytopenia. She elected not to restart the Lawrance Adeline, even at a lower dose. She presented in 2017 to discuss other PARP alternatives. She was started on Lynparza at a one-step dose reduction of 250 mg bid. Due to side effects she was reduced to 200 mg bid on 18.   On 1/15/18 she stopped therapy due to abdominal pain and nausea, and most of all, fatigue. She presented recently for follow up and reported lower abdominal pain. Her CA-125 was rising, up to 41, when it was previously <4. She has some complaints of lower abdominal pain and her CA-125 is rising. Her CT demonstrates recurrent disease. At her last visit I discussed with her and her  multiple treatment options. She went to [facility] for a [facility] opinion. They essentially offered her similar treatment options. She was not a candidate for a clinical trial due to her diagnosis of MS. She presents today to again discuss treatment options. ROS:   and GI review:  Negative  Cardiopulmonary review:  Negative   Musculoskeletal:  Negative    A comprehensive review of systems was negative except for that written in the History of Present Illness.  , 10 point ROS      Problem List:  Patient Active Problem List    Diagnosis Date Noted    Noninfectious gastroenteritis 11/24/2017    Chemotherapy-induced neutropenia (HCC) 04/06/2017    Thrombocytopenia (HCC) 03/17/2017    CINV (chemotherapy-induced nausea and vomiting) 03/17/2017    Dehydration 03/17/2017    BRCA1 positive 09/10/2015    Malignant neoplasm of both ovaries ([address] 04/09/2015    S/P total abdominal hysterectomy 03/18/2015    Pelvic mass 03/06/2015     PMH:  Past Medical History:   Diagnosis Date    Anxiety     Calculus of kidney 1/13    right    Hernia, inguinal, left 2012    MS (multiple sclerosis) ([address] 1996    Nausea & vomiting     Ovarian cancer ([address] 3/2015    high grade, stage 3C papillary serous    Thromboembolus ([address] 8/2007    lower abdomen post fall      PSH:  Past Surgical History:   Procedure Laterality Date    HX GYN  2009    endometrial ablation with punctured uterus    HX OTHER SURGICAL  8/2007    green filter    HX MARIEL AND BSO  3/2014    ovarian cancer      Social History:  Social History   Substance Use Topics    Smoking status: Never Smoker    Smokeless tobacco: Never Used    Alcohol use No      Family History:  Family History   Problem Relation Age of Onset    Cancer Paternal Grandmother      breast    Breast Cancer Paternal Grandmother     Heart Disease Mother     Heart Disease Father       Medications: (reviewed)  Current Outpatient Prescriptions   Medication Sig    ondansetron hcl (ZOFRAN, AS HYDROCHLORIDE,) 4 mg tablet Take 4 mg by mouth every eight (8) hours as needed for Nausea.  XARELTO 20 mg tab tablet     LORazepam (ATIVAN) 1 mg tablet TAKE 1 TABLET BY MOUTH AT BEDTIME  Indications: anxiety    amitriptyline (ELAVIL) 25 mg tablet Take 1 Tab by mouth nightly. No current facility-administered medications for this visit. Allergies: (reviewed)  Allergies   Allergen Reactions    Pcn [Penicillins] Rash          OBJECTIVE:    Physical Exam:  VITAL SIGNS: Vitals:    04/19/18 1538   BP: 123/69   Pulse: 91   Weight: 148 lb 6.4 oz (67.3 kg)   Height: 5' 7.99\" (1.727 m)     Body mass index is 22.57 kg/(m^2). GENERAL FILIBERTO: Conversant, alert, oriented. No acute distress. HEENT: HEENT. No thyroid enlargement. No JVD. Neck: Supple without restrictions. RESPIRATORY: Clear to auscultation and percussion to the bases. No CVAT. CARDIOVASC: RRR without murmur/rub. GASTROINT: Soft, non-distended, without masses or organomegaly. MUSCULOSKEL: no joint tenderness, deformity or swelling   EXTREMITIES: extremities normal, atraumatic, no cyanosis or edema   PELVIC: Deferred   RECTAL: Deferred   CHARMAINE SURVEY: No suspicious lymphadenopathy or edema noted. NEURO: Grossly intact. No acute deficit.          Lab Results   Component Value Date/Time    WBC 5.5 03/20/2018 05:05 PM    HGB 11.0 (L) 03/20/2018 05:05 PM    HCT 33.4 (L) 03/20/2018 05:05 PM    PLATELET 308 75/00/4263 05:05 PM    .4 (H) 03/20/2018 05:05 PM     Lab Results   Component Value Date/Time    Sodium 140 03/20/2018 05:05 PM    Potassium 3.9 03/20/2018 05:05 PM    Chloride 106 03/20/2018 05:05 PM    CO2 27 03/20/2018 05:05 PM    Anion gap 7 03/20/2018 05:05 PM    Glucose 89 03/20/2018 05:05 PM    BUN 16 03/20/2018 05:05 PM    Creatinine 0.88 03/20/2018 05:05 PM    BUN/Creatinine ratio 18 03/20/2018 05:05 PM    GFR est AA >60 03/20/2018 05:05 PM    GFR est non-AA >60 03/20/2018 05:05 PM    Calcium 9.3 03/20/2018 05:05 PM       CT of chest/abdomen/pelvis (4/2/18)  THYROID: No nodule. MEDIASTINUM: No mass or lymphadenopathy. MITCH: No mass or lymphadenopathy. THORACIC AORTA: No dissection or aneurysm. MAIN PULMONARY ARTERY: Normal in caliber. TRACHEA/BRONCHI: Patent. ESOPHAGUS: No wall thickening or dilatation. HEART:  The visualized heart is normal in size without pericardial effusion. PLEURA: No effusion or pneumothorax. LUNGS: Clear. LIVER: Unremarkable. GALLBLADDER: Unremarkable. SPLEEN: Unremarkable. PANCREAS: No mass or duct dilation. ADRENALS: Unremarkable. KIDNEYS: No mass, calculus, or hydronephrosis. STOMACH: Unremarkable. SMALL BOWEL: No dilatation or wall thickening. COLON: No dilation or wall thickening. APPENDIX: Unremarkable. PERITONEUM: There is small ascites around the margins of the liver and spleen  with trace fluid in the pelvis. There is diffuse uniform appearing peritoneal  thickening and enhancement along the anterior and lateral abdominal cavity. No  intraperitoneal masses are identified. RETROPERITONEUM: No enlarged lymphadenopathy or aortic aneurysm. REPRODUCTIVE ORGANS: Uterus and ovaries are surgically absent. URINARY BLADDER: No mass or calculus. BONES: No acute fracture or aggressive lesion. ADDITIONAL COMMENTS: Port-A-Cath in position in the left chest wall with  catheter extending to the mid superior vena cava. Anterior abdominal wall  midline surgical incisional change. IVC filter. Left common iliac stent.   Prominent superficial venous varices in the anterior pelvic wall appear stable.     IMPRESSION:  Diffuse anterior and lateral abdominal wall peritoneal thickening  with small ascites around the liver and spleen margins and trace fluid in the  pelvis compatible with history of peritoneal carcinomatosis. Additional  incidental findings as above with no new neoplastic disease seen compared to  prior. IMPRESSION/PLAN:  Keke Solis is a 48 y.o. female with a history of recurrent ovarian cancer. She has some complaints of lower abdominal pain and her CA-125 is rising. Her CT demonstrates recurrent disease. At her last visit I discussed with her and her  multiple treatment options. She went to [facility] for a second opinion. They essentially offered her similar treatment options. She was not a candidate for a clinical trial due to her diagnosis of MS. I sat with her and her  for about an hour today, discussing different treatment options, including Doxil, Avastin, Topotecan, and Rubraca. She is BRCA positive and platinum sensitive. This should indicate that she would respond well to a PARP. She did not tolerate prior treatment with Lynparza. She has a lot of things planned for the next few months and would like some freedom to do these things. I suggested Nathalia Fuchs, as it would allow her to travel and be less tied down. She agrees that it sounds good in theory, if she can tolerate it. She wants to think about it a little more. She is going to the lab today to have a repeat CA-125 drawn. She is to call tomorrow afternoon for the results.       Signed By: Kimberly Cano MD     4/19/2018/3:54 PM

## 2018-04-23 NOTE — TELEPHONE ENCOUNTER
Called pt back after talking with Dr. Khoa Joy, and he recommended pt start Rubraca 250 mg 2 tablets every 12 hours, as was discussed with her in last OV. Pt is in agreement and I will try to get a 15 day supply delivered while we are working on authorization.   Pt will have baseline labs on 4/24/18 at [facility] lab lukasz.

## 2018-04-25 NOTE — TELEPHONE ENCOUNTER
Pt called to say her \"abdomen is blowing up\". Says she can not fit in any of her pants any more and is not able to eat but small, tiny meals. Says bowels \"still work, but only a little bit at a time\". She says she has to wear \"stretch pants that used to be big to begin with\". She denies any nausea or vomiting or SOB/LYONS. Pt is to begin Rubraca within 4 days (according to pharmacy sending medication)     We have scheduled pt for an Ultrasound this Friday, 4/27 at 0830 with paracentesis if able. She will call for any SOB, worsening of symptoms or if vomiting begins.

## 2018-04-26 NOTE — TELEPHONE ENCOUNTER
Abdominal paracentesis procedure explained to pt. We reviewed diet to help replace lost electrolytes from procedure, and tips for her during Rubraca regimen. She is aware she will need weekly labs X4 after starting the Rubraca.

## 2018-04-30 NOTE — TELEPHONE ENCOUNTER
Pt returned my call and states she is doing much better on [place] than she did on lymparzar, and María Speaker. Eating well, breathing better. Started her Rubrace 250mg every 12hrs on 4/27/18 in PM.  She will get weekly labs X4 on Thursdays, and if labs stable monthly labs with port flushes before MD's visits.

## 2018-05-02 NOTE — PROGRESS NOTES
Pt began Judith Crater last Friday, 4/28, and was doing well until this morning when she said she awoke and had a \"normal bowel movement\". Went about her usual daily routine and began to have \"urgent diarrhea\". She has had 4 so far. She has not taken anything for it yet. Denied any other symptoms and said \"other than this, I feel fine\". I discussed trying Imodoium and have called in Lomotil to her local pharmacy to begin if after one dose of Imodium, she is continuing with diarrhea. If oif persist beyond today, she will call us back and let us know. She was strongly encourage to increase her fluid and electrolyte drinks today and if she felt dehydrated tomorrow, to call and let us know and we could try to get her in for some IV hydration.

## 2018-05-03 NOTE — TELEPHONE ENCOUNTER
Pt called to cancel appt as her grandchild was born this morning. Pt over the moon with this good news. Asked how she was feeling and she state, \"much better\". 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 Feels the second opinion at [facility] was very helpful and has stated a new oral chemo which so far is not giving too many side effect. Pt aware LCSW on vacation May 17th and will be back in office on May 29th. Pt said she would call Rosalinda Singh to set up an appt hopefully for next week to \"catch up\".     Cathi Quintero LCSW

## 2018-05-04 NOTE — TELEPHONE ENCOUNTER
Pt informed of lab results. She states she is taking Tylenol for headache and stomach hurting with relief. No nausea or vomiting.   Pt will have labs drawn 5/10/18 at [place] [id number].

## 2018-05-10 NOTE — TELEPHONE ENCOUNTER
Kandis with [facility] speciality pharmacy calling regarding Stephanie

## 2018-05-10 NOTE — TELEPHONE ENCOUNTER
I spoke with Yovanny Fernandez at [facility] and she states they cannot fill Johanny Strait's Rx for [place] and gave me information to Maribeth Lan.

## 2018-05-11 NOTE — TELEPHONE ENCOUNTER
Lab results given to pt. She will continue Rubraca 250mg Q12hrs.   To have labs 5/17/18 and appt made to see Dr. Nilton Lr on 5/29/18 at 10:30am.

## 2018-05-17 NOTE — PROGRESS NOTES
1100 Pt admit to [facility] for [facility] Flush/Labs ambulatory in stable condition. Assessment completed. No new concerns voiced. Port accessed and flushed with positive blood return. Please see connect care for pending labs results. Visit Vitals    /62    Pulse 83    Temp 97 °F (36.1 °C)    Resp 16    LMP 02/02/2015 (Approximate)    Breastfeeding No       Medications:  Normal Saline Flush  Heparin Flush    1110 Pt tolerated treatment well. Port maintained positive blood return throughout treatment, flushed with positive blood return at conclusion and port heparinized and de-accessed. D/c home ambulatory in no distress. Pt aware of next [facility] appointment scheduled for 7/12/18. Recent Results (from the past 12 hour(s))   CBC WITH AUTOMATED DIFF    Collection Time: 05/17/18 11:07 AM   Result Value Ref Range    WBC 4.7 3.6 - 11.0 K/uL    RBC 3.49 (L) 3.80 - 5.20 M/uL    HGB 11.0 (L) 11.5 - 16.0 g/dL    HCT 34.1 (L) 35.0 - 47.0 %    MCV 97.7 80.0 - 99.0 FL    MCH 31.5 26.0 - 34.0 PG    MCHC 32.3 30.0 - 36.5 g/dL    RDW 12.6 11.5 - 14.5 %    PLATELET 432 953 - 492 K/uL    MPV 10.8 8.9 - 12.9 FL    NRBC 0.0 0  WBC    ABSOLUTE NRBC 0.00 0.00 - 0.01 K/uL    NEUTROPHILS 52 32 - 75 %    LYMPHOCYTES 34 12 - 49 %    MONOCYTES 12 5 - 13 %    EOSINOPHILS 1 0 - 7 %    BASOPHILS 1 0 - 1 %    IMMATURE GRANULOCYTES 0 0.0 - 0.5 %    ABS. NEUTROPHILS 2.4 1.8 - 8.0 K/UL    ABS. LYMPHOCYTES 1.6 0.8 - 3.5 K/UL    ABS. MONOCYTES 0.5 0.0 - 1.0 K/UL    ABS. EOSINOPHILS 0.0 0.0 - 0.4 K/UL    ABS. BASOPHILS 0.1 0.0 - 0.1 K/UL    ABS. IMM.  GRANS. 0.0 0.00 - 0.04 K/UL    DF AUTOMATED     METABOLIC PANEL, COMPREHENSIVE    Collection Time: 05/17/18 11:07 AM   Result Value Ref Range    Sodium 139 136 - 145 mmol/L    Potassium 4.2 3.5 - 5.1 mmol/L    Chloride 106 97 - 108 mmol/L    CO2 26 21 - 32 mmol/L    Anion gap 7 5 - 15 mmol/L    Glucose 94 65 - 100 mg/dL    BUN 17 6 - 20 MG/DL    Creatinine 1.04 (H) 0.55 - 1.02 MG/DL BUN/Creatinine ratio 16 12 - 20      GFR est AA >60 >60 ml/min/1.73m2    GFR est non-AA 55 (L) >60 ml/min/1.73m2    Calcium 9.2 8.5 - 10.1 MG/DL    Bilirubin, total 0.3 0.2 - 1.0 MG/DL    ALT (SGPT) 66 12 - 78 U/L    AST (SGOT) 36 15 - 37 U/L    Alk.  phosphatase 227 (H) 45 - 117 U/L    Protein, total 7.6 6.4 - 8.2 g/dL    Albumin 3.6 3.5 - 5.0 g/dL    Globulin 4.0 2.0 - 4.0 g/dL    A-G Ratio 0.9 (L) 1.1 - 2.2

## 2018-05-17 NOTE — PROGRESS NOTES
Yoselin from [facility] called to say she has submitted Rubraca Rx to [facility] and they will be contacting Yasmine Mcmullen for a delivery date, she also said her co pay was 30.00 and she had submitted it to 0 co pay with Rubraca.

## 2018-05-21 NOTE — TELEPHONE ENCOUNTER
Pt  and requested a return call from kiki, pt complains of having issues regulating her bowesl/diet. She states her bowels are not working well for the past 2 days. She states they are not emptying like the should and when she does go it is only a small amount of stool.   She states the [place] gives her loose stools but then she may over compensate with eating bread, please call, pt also asked for her  results that were drawn on 5/17/18, I gave her the value of 319

## 2018-05-29 NOTE — PROGRESS NOTES
[address] [place] [address] [address] [place]  [phone]  MD Sharmaine Soriano MD  Office Note  Patient ID:  Name:  Annabella Hare  MRN:  [id number]  :  1965/53 y.o. Date:  2018      HISTORY OF PRESENT ILLNESS:  Annabella Hare is a 48 y.o.  postmenopausal female with stage IIIC ovarian cancer. She underwent X-lap, hysterectomy, and tumor debulking in 2015. She was recommended adjuvant chemotherapy with 6 cycles of Taxol and Carboplatin. Her post-treatment PET/CT was negative for disease. Her [facility] results also found her to have a deleterious BRCA 1 mutation. As for the BRCA 1 mutation, I had her see one of our breast surgeons, Dr. Steve Garcia, to talk about options, specifically screening and prophylactic surgery. She is going to hold off on surgery for now. She has decided not to have her daughter tested at this time. In early  she was found to have recurrence. She was clearly platinum sensitive, therefore I recommended a platinum-based regimen. 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 She also wanted to keep her hair. We also discussed options for down the line, specifically a PARP inhibitor, since she is BRCA positive. She completed 6 cycles of Gemzar/Carboplatin and her CA-125 returned to baseline. Her CT at that time was clear. We discussed maintenance therapy with a PARP inhibitor. She was started on Zejula in late 2017 at 300 mg daily. Her dose was lowered to 200 mg daily due to gastrointestinal complaints. In 2017 it was held due to thrombocytopenia. She elected not to restart the Hassan Kays, even at a lower dose. She presented in 2017 to discuss other PARP alternatives. She was started on Lynparza at a one-step dose reduction of 250 mg bid. Due to side effects she was reduced to 200 mg bid on 18.   On 1/15/18 she stopped therapy due to abdominal pain and nausea, and most of all, fatigue. She presented not too long ago for follow up and reported lower abdominal pain. Her CA-125 was rising, up to 41, when it was previously <4. She had some complaints of lower abdominal pain, in addition to her rising CA-125. Her CT demonstrated recurrent disease. I discussed with her and her  multiple treatment options. She went to [facility] for a second opinion. They essentially offered her similar treatment options. She was not a candidate for a clinical trial due to her diagnosis of MS. She presented again discuss treatment options. We ultimately decided on Rubraca. She has been on therapy for about a month. She is tolerating it very well. She denies any nausea/vomiting. She does have some abdominal discomfort and some GI irregularities. She also reports some fatigue. Her CA-125 has responded. ROS:   and GI review:  Negative  Cardiopulmonary review:  Negative   Musculoskeletal:  Negative    A comprehensive review of systems was negative except for that written in the History of Present Illness.  , 10 point ROS      Problem List:  Patient Active Problem List    Diagnosis Date Noted    Noninfectious gastroenteritis 11/24/2017    Chemotherapy-induced neutropenia (HCC) 04/06/2017    Thrombocytopenia (HCC) 03/17/2017    CINV (chemotherapy-induced nausea and vomiting) 03/17/2017    Dehydration 03/17/2017    BRCA1 positive 09/10/2015    Malignant neoplasm of both ovaries ([address] 04/09/2015    S/P total abdominal hysterectomy 03/18/2015    Pelvic mass 03/06/2015     PMH:  Past Medical History:   Diagnosis Date    Anxiety     Calculus of kidney 1/13    right    Hernia, inguinal, left 2012    MS (multiple sclerosis) ([address] 1996    Nausea & vomiting     Ovarian cancer ([address] 3/2015    high grade, stage 3C papillary serous    Thromboembolus ([address] 8/2007    lower abdomen post fall      PSH:  Past Surgical History:   Procedure Laterality Date    HX GYN  2009    endometrial ablation with punctured uterus    HX OTHER SURGICAL  8/2007    green filter    HX MARIEL AND BSO  3/2014    ovarian cancer      Social History:  Social History   Substance Use Topics    Smoking status: Never Smoker    Smokeless tobacco: Never Used    Alcohol use No      Family History:  Family History   Problem Relation Age of Onset    Cancer Paternal Grandmother      breast    Breast Cancer Paternal Grandmother     Heart Disease Mother     Heart Disease Father       Medications: (reviewed)  Current Outpatient Prescriptions   Medication Sig    RUBRACA 250 mg tab     diphenhydrAMINE-acetaminophen (TYLENOL PM EXTRA STRENGTH)  mg tab Take  by mouth.  diphenoxylate-atropine (LOMOTIL) 2.5-0.025 mg per tablet Take 1 Tab by mouth four (4) times daily as needed for Diarrhea. Max Daily Amount: 4 Tabs.  ondansetron hcl (ZOFRAN, AS HYDROCHLORIDE,) 4 mg tablet Take 4 mg by mouth every eight (8) hours as needed for Nausea.  LORazepam (ATIVAN) 1 mg tablet TAKE 1 TABLET BY MOUTH AT BEDTIME  Indications: anxiety    XARELTO 20 mg tab tablet     amitriptyline (ELAVIL) 25 mg tablet Take 1 Tab by mouth nightly. No current facility-administered medications for this visit. Allergies: (reviewed)  Allergies   Allergen Reactions    Pcn [Penicillins] Rash          OBJECTIVE:    Physical Exam:  VITAL SIGNS: Vitals:    05/29/18 1024   BP: 114/65   Pulse: 95   Weight: 140 lb (63.5 kg)   Height: 5' 7.99\" (1.727 m)     Body mass index is 21.29 kg/(m^2). GENERAL FILIBERTO: Conversant, alert, oriented. No acute distress. HEENT: HEENT. No thyroid enlargement. No JVD. Neck: Supple without restrictions. RESPIRATORY: Clear to auscultation and percussion to the bases. No CVAT. CARDIOVASC: RRR without murmur/rub. GASTROINT: Soft, non-distended, without masses or organomegaly.      MUSCULOSKEL: no joint tenderness, deformity or swelling EXTREMITIES: extremities normal, atraumatic, no cyanosis or edema   PELVIC: Deferred   RECTAL: Deferred   CHARMAINE SURVEY: No suspicious lymphadenopathy or edema noted. NEURO: Grossly intact. No acute deficit. Lab Results   Component Value Date/Time    WBC 4.7 05/24/2018 11:02 AM    HGB 11.3 05/24/2018 11:02 AM    HCT 34.6 05/24/2018 11:02 AM    PLATELET 599 37/69/9632 11:02 AM    MCV 97 05/24/2018 11:02 AM     Lab Results   Component Value Date/Time    Sodium 140 05/24/2018 11:02 AM    Potassium 4.8 05/24/2018 11:02 AM    Chloride 104 05/24/2018 11:02 AM    CO2 24 05/24/2018 11:02 AM    Anion gap 7 05/17/2018 11:07 AM    Glucose 90 05/24/2018 11:02 AM    BUN 17 05/24/2018 11:02 AM    Creatinine 0.98 05/24/2018 11:02 AM    BUN/Creatinine ratio 17 05/24/2018 11:02 AM    GFR est AA 76 05/24/2018 11:02 AM    GFR est non-AA 66 05/24/2018 11:02 AM    Calcium 9.6 05/24/2018 11:02 AM     Lab Results   Component Value Date/Time    CA-125 319 (H) 05/17/2018 11:07 AM    Cancer Ag (CA) 125 321.5 (H) 05/24/2018 11:02 AM       CT of chest/abdomen/pelvis (4/2/18)  THYROID: No nodule. MEDIASTINUM: No mass or lymphadenopathy. MICTH: No mass or lymphadenopathy. THORACIC AORTA: No dissection or aneurysm. MAIN PULMONARY ARTERY: Normal in caliber. TRACHEA/BRONCHI: Patent. ESOPHAGUS: No wall thickening or dilatation. HEART:  The visualized heart is normal in size without pericardial effusion. PLEURA: No effusion or pneumothorax. LUNGS: Clear. LIVER: Unremarkable. GALLBLADDER: Unremarkable. SPLEEN: Unremarkable. PANCREAS: No mass or duct dilation. ADRENALS: Unremarkable. KIDNEYS: No mass, calculus, or hydronephrosis. STOMACH: Unremarkable. SMALL BOWEL: No dilatation or wall thickening. COLON: No dilation or wall thickening. APPENDIX: Unremarkable. PERITONEUM: There is small ascites around the margins of the liver and spleen  with trace fluid in the pelvis.  There is diffuse uniform appearing peritoneal  thickening and enhancement along the anterior and lateral abdominal cavity. No  intraperitoneal masses are identified. RETROPERITONEUM: No enlarged lymphadenopathy or aortic aneurysm. REPRODUCTIVE ORGANS: Uterus and ovaries are surgically absent. URINARY BLADDER: No mass or calculus. BONES: No acute fracture or aggressive lesion. ADDITIONAL COMMENTS: Port-A-Cath in position in the left chest wall with  catheter extending to the mid superior vena cava. Anterior abdominal wall  midline surgical incisional change. IVC filter. Left common iliac stent. Prominent superficial venous varices in the anterior pelvic wall appear stable.     IMPRESSION:  Diffuse anterior and lateral abdominal wall peritoneal thickening  with small ascites around the liver and spleen margins and trace fluid in the  pelvis compatible with history of peritoneal carcinomatosis. Additional  incidental findings as above with no new neoplastic disease seen compared to  prior. IMPRESSION/PLAN:  Bandar Maria is a 48 y.o. female with a history of recurrent ovarian cancer. She has some complaints of lower abdominal pain and her CA-125 is rising. Her CT demonstrates recurrent disease. At her last visit I discussed with her and her  multiple treatment options. She went to [facility] for a second opinion. They essentially offered her similar treatment options. She was not a candidate for a clinical trial due to her diagnosis of MS. I sat with her and her  for about an hour today, discussing different treatment options, including Doxil, Avastin, Topotecan, and Rubraca. She is BRCA positive and platinum sensitive. This should indicate that she would respond well to a PARP. She did not tolerate prior treatment with Lynparza. She has a lot of things planned for the next few months and would like some freedom to do these things. I suggested [place], as it would allow her to travel and be less tied down.   She agreed that it sounded good in theory, if she can tolerate it. She ultimately decided to start [address] [place] [address]. She has been on it for about a month now. She appears to be tolerating it well and appear to be responding. Her labs look good and her CA-125 has dropped. We will continue with the Rubraca at the current dose. She is planning a trip to [facility] and [place] next month.       Signed By: Yany Strickland MD     5/29/2018/3:54 PM

## 2018-05-30 NOTE — TELEPHONE ENCOUNTER
Called pt back and she is beginning to feel better this afternoon and is able to keep food down. She feels she will be able to take her pills this evening and get her labs as scheduled tomorrow. She will let us know if there are any other issues.

## 2018-05-30 NOTE — TELEPHONE ENCOUNTER
Patient believes she's picked up a stomach bug and has gotten off track with her meds. Can you call to advise her on how to get back on track?

## 2018-06-01 PROBLEM — C78.6 PERITONEAL CARCINOMATOSIS (HCC): Status: ACTIVE | Noted: 2018-01-01

## 2018-06-01 PROBLEM — R18.0 MALIGNANT ASCITES: Status: ACTIVE | Noted: 2018-01-01

## 2018-06-13 NOTE — TELEPHONE ENCOUNTER
Spoke with [facility] and they have faxed a note wanting most current Progress Note. This was faxed to them at [phone], and pt notified.

## 2018-06-18 NOTE — PROGRESS NOTES
Outpatient Infusion Center Short Visit Progress Note    0820 Pt admit to [facility] for port flush/labs ambulatory in stable condition. Assessment completed. No new concerns voiced. Patient Vitals for the past 12 hrs:   Temp Pulse Resp BP   06/18/18 0829 97.7 °F (36.5 °C) 98 16 98/59       Port accessed with positive blood return. Labs drawn, flushed, heparinized and de-accessed per protocol. Medications:  NS flush  Heparin flush    0840 Pt tolerated treatment well. D/c home ambulatory in no distress. Pt aware of next appointment scheduled for 07/19/2019. Recent Results (from the past 12 hour(s))   CBC WITH AUTOMATED DIFF    Collection Time: 06/18/18  8:32 AM   Result Value Ref Range    WBC 6.8 3.6 - 11.0 K/uL    RBC 3.12 (L) 3.80 - 5.20 M/uL    HGB 10.1 (L) 11.5 - 16.0 g/dL    HCT 30.7 (L) 35.0 - 47.0 %    MCV 98.4 80.0 - 99.0 FL    MCH 32.4 26.0 - 34.0 PG    MCHC 32.9 30.0 - 36.5 g/dL    RDW 16.8 (H) 11.5 - 14.5 %    PLATELET 339 (L) 839 - 400 K/uL    MPV 11.3 8.9 - 12.9 FL    NRBC 0.0 0  WBC    ABSOLUTE NRBC 0.00 0.00 - 0.01 K/uL    NEUTROPHILS 73 32 - 75 %    LYMPHOCYTES 18 12 - 49 %    MONOCYTES 8 5 - 13 %    EOSINOPHILS 0 0 - 7 %    BASOPHILS 0 0 - 1 %    IMMATURE GRANULOCYTES 0 0.0 - 0.5 %    ABS. NEUTROPHILS 4.9 1.8 - 8.0 K/UL    ABS. LYMPHOCYTES 1.2 0.8 - 3.5 K/UL    ABS. MONOCYTES 0.5 0.0 - 1.0 K/UL    ABS. EOSINOPHILS 0.0 0.0 - 0.4 K/UL    ABS. BASOPHILS 0.0 0.0 - 0.1 K/UL    ABS. IMM.  GRANS. 0.0 0.00 - 0.04 K/UL    DF AUTOMATED     METABOLIC PANEL, COMPREHENSIVE    Collection Time: 06/18/18  8:32 AM   Result Value Ref Range    Sodium 142 136 - 145 mmol/L    Potassium 3.9 3.5 - 5.1 mmol/L    Chloride 108 97 - 108 mmol/L    CO2 26 21 - 32 mmol/L    Anion gap 8 5 - 15 mmol/L    Glucose 104 (H) 65 - 100 mg/dL    BUN 14 6 - 20 MG/DL    Creatinine 0.89 0.55 - 1.02 MG/DL    BUN/Creatinine ratio 16 12 - 20      GFR est AA >60 >60 ml/min/1.73m2    GFR est non-AA >60 >60 ml/min/1.73m2    Calcium 8.8 8.5 - 10.1 MG/DL    Bilirubin, total 0.5 0.2 - 1.0 MG/DL    ALT (SGPT) 74 12 - 78 U/L    AST (SGOT) 38 (H) 15 - 37 U/L    Alk.  phosphatase 169 (H) 45 - 117 U/L    Protein, total 6.8 6.4 - 8.2 g/dL    Albumin 3.6 3.5 - 5.0 g/dL    Globulin 3.2 2.0 - 4.0 g/dL    A-G Ratio 1.1 1.1 - 2.2     MAGNESIUM    Collection Time: 06/18/18  8:32 AM   Result Value Ref Range    Magnesium 1.9 1.6 - 2.4 mg/dL   CANCER ANTIGEN 125    Collection Time: 06/18/18  8:32 AM   Result Value Ref Range    CA-125 319 (H) 1.5 - 35.0 U/mL

## 2018-06-19 NOTE — TELEPHONE ENCOUNTER
Yoselin from [address] called me back. She spoke with Mitzy Apodaca at [facility] and was told Rx was packaged to be shipped out today, and arrive to pt tomorrow. Edwin Greenwood will investigate with pts insurance as to [place] # and dates, and if anything needs to be done monthly. Pt was informed of all actions, and will call me back tomorrow when Rx arrives at her home.

## 2018-06-19 NOTE — TELEPHONE ENCOUNTER
Pt calling upset she is going out of the country on Friday, and Wynena Holter states the pharmacist needs to verify they have the drug [place] and will be sent out today, and pt to receive tomorrow. I spoke with Donta from [address] [place] [address] and was told the same information. I ask what the auth # was, and dates of auth and he couldn't tell me, as it was not in her records. Ambreen Gould was told 6/13/18 they had faxed a message needing pt last OV notes (which we did not receive), I ask for the fax again and received it as they said they needed it for pre authorization. Pt has been on Rubraca 250mg 2 tabs Q12 hours since 4/27/18. I have called Yoselin at [facility] at [phone] ext. [phone] for help on expediting the medication and investigate about the auth.

## 2018-06-19 NOTE — PROGRESS NOTES
Follow up visit on Rubraca. Pt c/o increased fatigue. Pt is leaving for [facility] on Friday.

## 2018-06-19 NOTE — PROGRESS NOTES
[address] [place] [address] [address], [address] [facility]  [phone]  MD Kalpesh Salcedo MD  Office Note  Patient ID:  Name:  Martha Pringle  MRN:  [id number]  :  1965/53 y.o. Date:  2018      HISTORY OF PRESENT ILLNESS:  Martha Pringle is a 48 y.o.  postmenopausal female with stage IIIC ovarian cancer. She underwent X-lap, hysterectomy, and tumor debulking in 2015. She was recommended adjuvant chemotherapy with 6 cycles of Taxol and Carboplatin. Her post-treatment PET/CT was negative for disease. Her Myriad results also found her to have a deleterious BRCA 1 mutation. As for the BRCA 1 mutation, I had her see one of our breast surgeons, Dr. Gemma Scott, to talk about options, specifically screening and prophylactic surgery. She is going to hold off on surgery for now. She has decided not to have her daughter tested at this time. In early  she was found to have recurrence. She was clearly platinum sensitive, therefore I recommended a platinum-based regimen. 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 She also wanted to keep her hair. We also discussed options for down the line, specifically a PARP inhibitor, since she is BRCA positive. She completed 6 cycles of Gemzar/Carboplatin and her CA-125 returned to baseline. Her CT at that time was clear. We discussed maintenance therapy with a PARP inhibitor. She was started on Zejula in late 2017 at 300 mg daily. Her dose was lowered to 200 mg daily due to gastrointestinal complaints. In 2017 it was held due to thrombocytopenia. She elected not to restart the Fleta Bernardo, even at a lower dose. She presented in 2017 to discuss other PARP alternatives. She was started on Lynparza at a one-step dose reduction of 250 mg bid. Due to side effects she was reduced to 200 mg bid on 18.   On 1/15/18 she stopped therapy due to abdominal pain and nausea, and most of all, fatigue. She presented not too long ago for follow up and reported lower abdominal pain. Her CA-125 was rising, up to 41, when it was previously <4. She had some complaints of lower abdominal pain, in addition to her rising CA-125. Her CT demonstrated recurrent disease. I discussed with her and her  multiple treatment options. She went to [facility] for a second opinion. They essentially offered her similar treatment options. She was not a candidate for a clinical trial due to her diagnosis of MS. She presented again discuss treatment options. We ultimately decided on Rubraca. She has been on therapy for about 2 months. She is tolerating it very well. She denies any nausea/vomiting. She does have some abdominal discomfort and some GI irregularities. She also reports some fatigue. Her CA-125 has responded. ROS:   and GI review:  Negative  Cardiopulmonary review:  Negative   Musculoskeletal:  Negative    A comprehensive review of systems was negative except for that written in the History of Present Illness.  , 10 point ROS      Problem List:  Patient Active Problem List    Diagnosis Date Noted    Peritoneal carcinomatosis ([address] 06/01/2018    Malignant ascites 06/01/2018    Noninfectious gastroenteritis 11/24/2017    Chemotherapy-induced neutropenia (HCC) 04/06/2017    Thrombocytopenia (HCC) 03/17/2017    CINV (chemotherapy-induced nausea and vomiting) 03/17/2017    Dehydration 03/17/2017    BRCA1 positive 09/10/2015    Malignant neoplasm of both ovaries ([address] 04/09/2015    S/P total abdominal hysterectomy 03/18/2015    Pelvic mass 03/06/2015     PMH:  Past Medical History:   Diagnosis Date    Anxiety     Calculus of kidney 1/13    right    Hernia, inguinal, left 2012    MS (multiple sclerosis) ([address] 1996    Nausea & vomiting     Ovarian cancer ([address] 3/2015    high grade, stage 3C papillary serous    Th[facility] 8/2007    lower abdomen post fall      PSH:  Past Surgical History:   Procedure Laterality Date    HX GYN  2009    endometrial ablation with punctured uterus    HX OTHER SURGICAL  8/2007    green filter    HX MARIEL AND BSO  3/2014    ovarian cancer      Social History:  Social History   Substance Use Topics    Smoking status: Never Smoker    Smokeless tobacco: Never Used    Alcohol use No      Family History:  Family History   Problem Relation Age of Onset    Cancer Paternal Grandmother      breast    Breast Cancer Paternal Grandmother     Heart Disease Mother     Heart Disease Father       Medications: (reviewed)  Current Outpatient Prescriptions   Medication Sig    promethazine (PHENERGAN) 12.5 mg tablet     RUBRACA 250 mg tab     diphenhydrAMINE-acetaminophen (TYLENOL PM EXTRA STRENGTH)  mg tab Take  by mouth.  diphenoxylate-atropine (LOMOTIL) 2.5-0.025 mg per tablet Take 1 Tab by mouth four (4) times daily as needed for Diarrhea. Max Daily Amount: 4 Tabs.  ondansetron hcl (ZOFRAN, AS HYDROCHLORIDE,) 4 mg tablet Take 4 mg by mouth every eight (8) hours as needed for Nausea.  LORazepam (ATIVAN) 1 mg tablet TAKE 1 TABLET BY MOUTH AT BEDTIME  Indications: anxiety    XARELTO 20 mg tab tablet     amitriptyline (ELAVIL) 25 mg tablet Take 1 Tab by mouth nightly. No current facility-administered medications for this visit. Allergies: (reviewed)  Allergies   Allergen Reactions    Pcn [Penicillins] Rash          OBJECTIVE:    Physical Exam:  VITAL SIGNS: Vitals:    06/19/18 0856   BP: 117/60   Pulse: 87   Weight: 141 lb 6.4 oz (64.1 kg)   Height: 5' 7.99\" (1.727 m)     Body mass index is 21.5 kg/(m^2). GENERAL FILIBERTO: Conversant, alert, oriented. No acute distress. HEENT: HEENT. No thyroid enlargement. No JVD. Neck: Supple without restrictions. RESPIRATORY: Clear to auscultation and percussion to the bases. No CVAT.    CARDIOVASC: RRR without murmur/rub. GASTROINT: Soft, non-distended, without masses or organomegaly. MUSCULOSKEL: no joint tenderness, deformity or swelling   EXTREMITIES: extremities normal, atraumatic, no cyanosis or edema   PELVIC: Deferred   RECTAL: Deferred   CHARMAINE SURVEY: No suspicious lymphadenopathy or edema noted. NEURO: Grossly intact. No acute deficit. Lab Results   Component Value Date/Time    WBC 6.8 06/18/2018 08:32 AM    HGB 10.1 (L) 06/18/2018 08:32 AM    HCT 30.7 (L) 06/18/2018 08:32 AM    PLATELET 930 (L) 44/18/8582 08:32 AM    MCV 98.4 06/18/2018 08:32 AM     Lab Results   Component Value Date/Time    Sodium 142 06/18/2018 08:32 AM    Potassium 3.9 06/18/2018 08:32 AM    Chloride 108 06/18/2018 08:32 AM    CO2 26 06/18/2018 08:32 AM    Anion gap 8 06/18/2018 08:32 AM    Glucose 104 (H) 06/18/2018 08:32 AM    BUN 14 06/18/2018 08:32 AM    Creatinine 0.89 06/18/2018 08:32 AM    BUN/Creatinine ratio 16 06/18/2018 08:32 AM    GFR est AA >60 06/18/2018 08:32 AM    GFR est non-AA >60 06/18/2018 08:32 AM    Calcium 8.8 06/18/2018 08:32 AM     Lab Results   Component Value Date/Time    CA-125 319 (H) 06/18/2018 08:32 AM    Cancer Ag (CA) 125 314.7 (H) 05/31/2018 08:36 AM       CT of chest/abdomen/pelvis (4/2/18)  THYROID: No nodule. MEDIASTINUM: No mass or lymphadenopathy. MITCH: No mass or lymphadenopathy. THORACIC AORTA: No dissection or aneurysm. MAIN PULMONARY ARTERY: Normal in caliber. TRACHEA/BRONCHI: Patent. ESOPHAGUS: No wall thickening or dilatation. HEART:  The visualized heart is normal in size without pericardial effusion. PLEURA: No effusion or pneumothorax. LUNGS: Clear. LIVER: Unremarkable. GALLBLADDER: Unremarkable. SPLEEN: Unremarkable. PANCREAS: No mass or duct dilation. ADRENALS: Unremarkable. KIDNEYS: No mass, calculus, or hydronephrosis. STOMACH: Unremarkable. SMALL BOWEL: No dilatation or wall thickening. COLON: No dilation or wall thickening.   APPENDIX: Unremarkable. PERITONEUM: There is small ascites around the margins of the liver and spleen  with trace fluid in the pelvis. There is diffuse uniform appearing peritoneal  thickening and enhancement along the anterior and lateral abdominal cavity. No  intraperitoneal masses are identified. RETROPERITONEUM: No enlarged lymphadenopathy or aortic aneurysm. REPRODUCTIVE ORGANS: Uterus and ovaries are surgically absent. URINARY BLADDER: No mass or calculus. BONES: No acute fracture or aggressive lesion. ADDITIONAL COMMENTS: Port-A-Cath in position in the left chest wall with  catheter extending to the mid superior vena cava. Anterior abdominal wall  midline surgical incisional change. IVC filter. Left common iliac stent. Prominent superficial venous varices in the anterior pelvic wall appear stable.     IMPRESSION:  Diffuse anterior and lateral abdominal wall peritoneal thickening  with small ascites around the liver and spleen margins and trace fluid in the  pelvis compatible with history of peritoneal carcinomatosis. Additional  incidental findings as above with no new neoplastic disease seen compared to  prior. IMPRESSION/PLAN:  Joana Gutierrez is a 48 y.o. female with a history of recurrent ovarian cancer. She has some complaints of lower abdominal pain and her CA-125 is rising. Her CT demonstrates recurrent disease. At her last visit I discussed with her and her  multiple treatment options. She went to [facility] for a second opinion. They essentially offered her similar treatment options. She was not a candidate for a clinical trial due to her diagnosis of MS. I sat with her and her  for about an hour today, discussing different treatment options, including Doxil, Avastin, Topotecan, and Rubraca. She is BRCA positive and platinum sensitive. This should indicate that she would respond well to a PARP. She did not tolerate prior treatment with Lynparza.   She has a lot of things planned for the next few months and would like some freedom to do these things. I suggested Charmaine [place], as it would allow her to travel and be less tied down. She agreed that it sounded good in theory, if she can tolerate it. She ultimately decided to start Charmaine [place]. She has been on it for about 2 months now. She appears to be tolerating it well and appear to be responding. Her labs look good and her CA-125 has dropped. We will continue with the Rubraca at the current dose. She is planning a trip to [facility] and [place] at the end of this week.       Signed By: Rudi Hernandez MD     6/19/2018/3:54 PM

## 2018-06-20 NOTE — TELEPHONE ENCOUNTER
I called pt to check on delivery of Rubrac and she states she is tracking [employer] and it is to be delivered today by 8pm.  She will call if it is not. I also gave pt insurance information collected from [facility] at [employer]. auth # [id number], dates are 6/13/18 to 6/13/2021.

## 2018-07-05 NOTE — TELEPHONE ENCOUNTER
Pt c/o \"major pain that goes across the top of my abdomen, Tylenol doesn't help\". Woke up this AM with the pain ( 5 on scale of 0/10), a 2\" down from rib cage, side to side. Gets relief when lying flat on back. Just got back from [facility] yesterday. Went to ER while in [place] (1st day there), drained 2.6 liters of fluid from abdomen. Did well after that. Having daily BM's, but cannot eat much at any given time. She does not feel she has bloating with fluid. She is requesting medication for pain to carry her until CT scan on 7/9/18. She is also requesting her Rx for Ativan 1 mg at bedtime.

## 2018-07-10 NOTE — PROGRESS NOTES
Lab req given to pt to have labs drawn pre chemo per vo Dr. Julisa Barrios 7/10/18 at 8869.  2D Echo ordered to evaluate EF% prior to starting Doxil per vo Dr. Julisa Barrios 7/10/18 at 3241.

## 2018-07-10 NOTE — PROGRESS NOTES
[address] [facility] [address], [address] [facility]  [phone]  MD Giovany Hood MD  Office Note  Patient ID:  Name:  Judith Ngo  MRN:  [id number]  :  1965/53 y.o. Date:  7/10/2018      HISTORY OF PRESENT ILLNESS:  Judith Ngo is a 48 y.o.  postmenopausal female with stage IIIC ovarian cancer. She underwent X-lap, hysterectomy, and tumor debulking in 2015. She was recommended adjuvant chemotherapy with 6 cycles of Taxol and Carboplatin. Her post-treatment PET/CT was negative for disease. Her Myriad results also found her to have a deleterious BRCA 1 mutation. As for the BRCA 1 mutation, I had her see one of our breast surgeons, Dr. Cedric Stone, to talk about options, specifically screening and prophylactic surgery. She is going to hold off on surgery for now. She has decided not to have her daughter tested at this time. In early  she was found to have recurrence. She was clearly platinum sensitive, therefore I recommended a platinum-based regimen. 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 She also wanted to keep her hair. We also discussed options for down the line, specifically a PARP inhibitor, since she is BRCA positive. She completed 6 cycles of Gemzar/Carboplatin and her CA-125 returned to baseline. Her CT at that time was clear. We discussed maintenance therapy with a PARP inhibitor. She was started on Zejula in late 2017 at 300 mg daily. Her dose was lowered to 200 mg daily due to gastrointestinal complaints. In 2017 it was held due to thrombocytopenia. She elected not to restart the Adam Siskin, even at a lower dose. She presented in 2017 to discuss other PARP alternatives. She was started on Lynparza at a one-step dose reduction of 250 mg bid. Due to side effects she was reduced to 200 mg bid on 18.   On 1/15/18 she stopped therapy due to abdominal pain and nausea, and most of all, fatigue. She presented not too long ago for follow up and reported lower abdominal pain. Her CA-125 was rising, up to 41, when it was previously <4. She had some complaints of lower abdominal pain, in addition to her rising CA-125. Her CT demonstrated recurrent disease. I discussed with her and her  multiple treatment options. She went to [facility] for a second opinion. They essentially offered her similar treatment options. She was not a candidate for a clinical trial due to her diagnosis of MS. She presented again discuss treatment options. We ultimately decided on Rubraca. She has been on therapy for about 2 1/2 months. She has been tolerating it very well. She denies any nausea/vomiting. She has had some abdominal discomfort and some GI irregularities. She also reports some fatigue. Her CA-125 did respond. She was in [facility] for vacation a couple weeks ago when she developed significant ascites, requiring drainage at [address] [place] [address] in [place]. She presents today to review her most recent CT scan. Unfortunately it demonstrates progression. ROS:   and GI review:  Negative  Cardiopulmonary review:  Negative   Musculoskeletal:  Negative    A comprehensive review of systems was negative except for that written in the History of Present Illness.  , 10 point ROS      Problem List:  Patient Active Problem List    Diagnosis Date Noted    Per[address] carcinomatosis ([facility] 06/01/2018    Malignant ascites 06/01/2018    Noninfectious gastroenteritis 11/24/2017    Chemotherapy-induced neutropenia (HCC) 04/06/2017    Thrombocytopenia (HCC) 03/17/2017    CINV (chemotherapy-induced nausea and vomiting) 03/17/2017    Dehydration 03/17/2017    BRCA1 positive 09/10/2015    Malignant neoplasm of both ovaries ([facility] 04/09/2015    S/P total abdominal hysterectomy 03/18/2015    Pelvic mass 03/06/2015     PMH:  Past Medical History:   Diagnosis Date    Anxiety     Calculus of kidney 1/13    right    Hernia, inguinal, left 2012    MS [facility] sclerosis) [facility] 1996    Nausea & vomiting     Ovarian cancer ([facility] 3/2015    high grade, stage 3C papillary serous    Thromboembolus ([facility] 8/2007    lower abdomen post fall      PSH:  Past Surgical History:   Procedure Laterality Date    HX GYN  2009    endometrial ablation with punctured uterus    HX OTHER SURGICAL  8/2007    green filter    HX MARIEL AND BSO  3/2014    ovarian cancer      Social History:  Social History   Substance Use Topics    Smoking status: Never Smoker    Smokeless tobacco: Never Used    Alcohol use No      Family History:  Family History   Problem Relation Age of Onset    Cancer Paternal Grandmother      breast    Breast Cancer Paternal Grandmother     Heart Disease Mother     Heart Disease Father       Medications: (reviewed)  Current Outpatient Prescriptions   Medication Sig    LORazepam (ATIVAN) 1 mg tablet TAKE 1 TABLET BY MOUTH AT BEDTIME as needed  Indications: anxiety    promethazine (PHENERGAN) 12.5 mg tablet     RUBRACA 250 mg tab     diphenhydrAMINE-acetaminophen (TYLENOL PM EXTRA STRENGTH)  mg tab Take  by mouth.  ondansetron hcl (ZOFRAN, AS HYDROCHLORIDE,) 4 mg tablet Take 4 mg by mouth every eight (8) hours as needed for Nausea.  XARELTO 20 mg tab tablet     oxyCODONE-acetaminophen (PERCOCET) 5-325 mg per tablet Take 1-2 Tabs by mouth every six (6) hours as needed for Pain. Max Daily Amount: 8 Tabs.  diphenoxylate-atropine (LOMOTIL) 2.5-0.025 mg per tablet Take 1 Tab by mouth four (4) times daily as needed for Diarrhea. Max Daily Amount: 4 Tabs.  amitriptyline (ELAVIL) 25 mg tablet Take 1 Tab by mouth nightly. No current facility-administered medications for this visit.       Allergies: (reviewed)  Allergies   Allergen Reactions    Pcn [Penicillins] Rash          OBJECTIVE:    Physical Exam:  VITAL SIGNS: Vitals:    07/10/18 0820   BP: 142/75   Pulse: (!) 105   Weight: 139 lb 3.2 oz (63.1 kg)   Height: 5' 7.99\" (1.727 m)     Body mass index is 21.17 kg/(m^2). GENERAL FILIBERTO: Conversant, alert, oriented. No acute distress. HEENT: HEENT. No thyroid enlargement. No JVD. Neck: Supple without restrictions. RESPIRATORY: Clear to auscultation and percussion to the bases. No CVAT. CARDIOVASC: RRR without murmur/rub. GASTROINT: Soft, non-distended, without masses or organomegaly. MUSCULOSKEL: no joint tenderness, deformity or swelling   EXTREMITIES: extremities normal, atraumatic, no cyanosis or edema   PELVIC: Deferred   RECTAL: Deferred   CHARMAINE SURVEY: No suspicious lymphadenopathy or edema noted. NEURO: Grossly intact. No acute deficit. Lab Results   Component Value Date/Time    WBC 6.8 06/18/2018 08:32 AM    HGB 10.1 (L) 06/18/2018 08:32 AM    HCT 30.7 (L) 06/18/2018 08:32 AM    PLATELET 620 (L) 46/11/6629 08:32 AM    MCV 98.4 06/18/2018 08:32 AM     Lab Results   Component Value Date/Time    Sodium 142 06/18/2018 08:32 AM    Potassium 3.9 06/18/2018 08:32 AM    Chloride 108 06/18/2018 08:32 AM    CO2 26 06/18/2018 08:32 AM    Anion gap 8 06/18/2018 08:32 AM    Glucose 104 (H) 06/18/2018 08:32 AM    BUN 14 06/18/2018 08:32 AM    Creatinine 0.89 06/18/2018 08:32 AM    BUN/Creatinine ratio 16 06/18/2018 08:32 AM    GFR est AA >60 06/18/2018 08:32 AM    GFR est non-AA >60 06/18/2018 08:32 AM    Calcium 8.8 06/18/2018 08:32 AM     Lab Results   Component Value Date/Time    CA-125 319 (H) 06/18/2018 08:32 AM    Cancer Ag (CA) 125 314.7 (H) 05/31/2018 08:36 AM       CT of chest/abdomen/pelvis (7/9/18)  LUNG BASES: Clear. INCIDENTALLY IMAGED HEART AND MEDIASTINUM: Incompletely imaged. LIVER: No measurable hepatic mass. GALLBLADDER: Unremarkable. SPLEEN: No mass. PANCREAS: No mass or ductal dilatation. ADRENALS: Unremarkable. KIDNEYS: No solid renal mass or hydronephrosis.  Ureters are not dilated. STOMACH: Partial distention with enteric contrast and food products. SMALL BOWEL: No obstruction. Mural thickening of the serosal surface of small  bowel loops in the superior midline of the pelvis is compatible with peritoneal  disease. COLON: No obstruction. Tortuous course. APPENDIX: Small versus resected. Peritoneal soft tissue attenuation at the  expected location of the appendix measures 2.4 x 1.4 x 2.5 cm. PERITONEUM: Peritoneal metastatic disease is increased. Gastrohepatic ligament  soft tissue measures 3.0 x 1.4 x 3.0 cm. Posterior left upper quadrant  peritoneal soft tissue metastasis is inferior to the spleen and measures 3.3 x  1.7 x 2.0 cm. Peritoneum has nodular thickening posterior, inferior, and lateral  to the right hepatic lobe. Anterior left peritoneal mass along the undersurface  of the left rectus abdominous muscle measures 2.4 x 0.8 x 1.8 cm. Small volume  of ascites is slightly increased. RETROPERITONEUM: No lymphadenopathy or aortic aneurysm. IVC filter is present. REPRODUCTIVE ORGANS: Uterus and ovaries have been resected. URINARY BLADDER: No mass or calculus. BONES: No destructive bone lesion. ADDITIONAL COMMENTS: N/A     IMPRESSION:  1. Progression of peritoneal metastatic disease. Largest volume of disease is in  the upper pelvis, gastrohepatic ligament, and adjacent to the spleen. 2. Small volume of ascites is slightly increased. 3. No bowel obstruction. IMPRESSION/PLAN:  Tahmina Olivas is a 48 y.o. female with a history of recurrent ovarian cancer. She has some complaints of lower abdominal pain and her CA-125 is rising. Her CT demonstrates recurrent disease. At her last visit I discussed with her and her  multiple treatment options. She went to [facility] for a second opinion. They essentially offered her similar treatment options. She was not a candidate for a clinical trial due to her diagnosis of MS.   I sat with her and her  for about an hour today, discussing different treatment options, including Doxil, Avastin, Topotecan, and Rubraca. She is BRCA positive and platinum sensitive. This should indicate that she would respond well to a PARP. She did not tolerate prior treatment with Lynparza. I suggested Aries Paul, as it would allow her to travel and be less tied down. Unfortunately the Aries Paul did not work very long, as she appears to have progression on imaging. I am now recommending a combination of Doxil/Avastin. I feel the Avastin would help most with the ascites. She and her  were counseled on the side effects and potential toxicities of the regimen. Their questions were answered and she wishes to proceed.         Signed By: Calos Pascual MD     7/10/2018/3:54 PM

## 2018-07-16 NOTE — PROGRESS NOTES
Labs ordered to be drawn 7/23/18 to be used for chemo on 7/26/18 per vo Dr. Dawson Marti on 7/16/18 at [phone].

## 2018-07-19 PROBLEM — Z79.01 ON ANTICOAGULANT THERAPY: Status: ACTIVE | Noted: 2018-01-01

## 2018-07-19 NOTE — PATIENT INSTRUCTIONS
Dear Zacarias Shah,     It was a pleasure seeing you today.   We discussed your exam and treatment options        Here is our treatment plan:  · Labs today with ultrasound and attempt paracentesis  · Pain medications as needed with stool softener  · F/u next week for prechemo and planned chemotherapy     · Call if symptoms worsen or change      Bam Apodaca PA-C  [address] [place] [address], [address] [place] [address], [address] [place] [address]

## 2018-07-19 NOTE — PROGRESS NOTES
Problem visit, pt states she is having abdominal fluid and pain that has worsened since Monday, not eating, \"it hurts to eat\", her abdominal pain is a 5 to 6 out of 10 on the pain scale, she is taking tylenol but is is not helping, she also has nausea, shortness of breath, \"my heart races\", extreme fatigue, negative for vomiting, she states that lying on her back was helping but now her back is hurting

## 2018-07-19 NOTE — TELEPHONE ENCOUNTER
Pt c/o abdominal tightness, sob, unable to eat, and not sleeping at night d/t back pain from pressure. appt made with Hong GRAF for 10:30am today.

## 2018-07-19 NOTE — PROGRESS NOTES
[facility] GYNECOLOGIC ONCOLOGY  [address] [place] [address], [address], [address] [place]  [phone] [place] [phone]    Office Note  Patient ID:  Name:  Brenden Kendall  MRN:  [id number]  :  1965/53 y.o. Date:  2018      HISTORY OF PRESENT ILLNESS:  Brenden Kendall is a 48 y.o.  postmenopausal female with stage IIIC ovarian cancer. She underwent X-lap, hysterectomy, and tumor debulking in 2015. She was recommended adjuvant chemotherapy with 6 cycles of Taxol and Carboplatin. Her post-treatment PET/CT was negative for disease. Her [facility] results also found her to have a deleterious BRCA 1 mutation. As for the BRCA 1 mutation, I had her see one of our breast surgeons, Dr. Raven Diaz, to talk about options, specifically screening and prophylactic surgery. She is going to hold off on surgery for now. She has decided not to have her daughter tested at this time. In early  she was found to have recurrence. She was clearly platinum sensitive, therefore I recommended a platinum-based regimen. 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 She also wanted to keep her hair. We also discussed options for down the line, specifically a PARP inhibitor, since she is BRCA positive. She completed 6 cycles of Gemzar/Carboplatin and her CA-125 returned to baseline. Her CT at that time was clear. We discussed maintenance therapy with a PARP inhibitor. She was started on Zejula in late 2017 at 300 mg daily. Her dose was lowered to 200 mg daily due to gastrointestinal complaints. In 2017 it was held due to thrombocytopenia. She elected not to restart the Khadar , even at a lower dose. She presented in 2017 to discuss other PARP alternatives. She was started on Lynparza at a one-step dose reduction of 250 mg bid. Due to side effects she was reduced to 200 mg bid on 18.   On 1/15/18 she stopped therapy due to abdominal pain and nausea, and most of all, fatigue. She presented not too long ago for follow up and reported lower abdominal pain. Her CA-125 was rising, up to 41, when it was previously <4. She had some complaints of lower abdominal pain, in addition to her rising CA-125. Her CT demonstrated recurrent disease. I discussed with her and her  multiple treatment options. She went to [facility] for a second opinion. They essentially offered her similar treatment options. She was not a candidate for a clinical trial due to her diagnosis of MS. She presented again discuss treatment options. We ultimately decided on Rubraca. She has been on therapy for about 2 1/2 months. She has been tolerating it very well. She denies any nausea/vomiting. She has had some abdominal discomfort and some GI irregularities. She also reports some fatigue. Her CA-125 did respond. She was in [facility] for vacation a couple weeks ago when she developed significant ascites, requiring drainage at [address] [place] [address] in [place]. She presents today to review her most recent CT scan. Unfortunately it demonstrates progression. She presents now acutely with worsening abd pain, LYONS, early satiety and nausea w/o emesis. She is requiring sedation for sleep d/t nausea/pain. Currently using tylenol for pain, fearful of percocet d/t constipation. Abd pain is better lying down rates 6/10. Fatigue has progressed. No F/C. Notes increased urine frequency w/o dysuria. ROS:   and GI review:  Negative  Cardiopulmonary review:  above  Musculoskeletal:  Negative    A comprehensive review of systems was negative except for that written in the History of Present Illness.  , 10 point ROS      Problem List:  Patient Active Problem List    Diagnosis Date Noted    On anticoagulant therapy 07/19/2018    Peritoneal carcinomatosis ([facility] 06/01/2018    Malignant ascites 06/01/2018    Noninfectious gastroenteritis 11/24/2017    Chemotherapy-induced neutropenia (HCC) 04/06/2017    Thrombocytopenia (HCC) 03/17/2017    CINV (chemotherapy-induced nausea and vomiting) 03/17/2017    Dehydration 03/17/2017    BRCA1 positive 09/10/2015    Malignant neoplasm of both ovaries ([facility] [address] 04/09/2015    S/P total abdominal hysterectomy 03/18/2015    Pelvic mass 03/06/2015     PMH:  Past Medical History:   Diagnosis Date    Anxiety     Calculus of kidney 1/13    right    Hernia, inguinal, left 2012    MS (multiple sclerosis) ([facility] [address] 1996    Nausea & vomiting     Ovarian cancer ([facility] [address] 3/2015    high grade, stage 3C papillary serous    Thromboembolus ([facility] [address] 8/2007    lower abdomen post fall      PSH:  Past Surgical History:   Procedure Laterality Date    HX GYN  2009    endometrial ablation with punctured uterus    HX OTHER SURGICAL  8/2007    green filter    HX MARIEL AND BSO  3/2014    ovarian cancer      Social History:  Social History   Substance Use Topics    Smoking status: Never Smoker    Smokeless tobacco: Never Used    Alcohol use No      Family History:  Family History   Problem Relation Age of Onset    Cancer Paternal Grandmother      breast    Breast Cancer Paternal Grandmother     Heart Disease Mother     Heart Disease Father       Medications: (reviewed)  Current Outpatient Prescriptions   Medication Sig    LORazepam (ATIVAN) 1 mg tablet TAKE 1 TABLET BY MOUTH AT BEDTIME as needed  Indications: anxiety    promethazine (PHENERGAN) 12.5 mg tablet     diphenhydrAMINE-acetaminophen (TYLENOL PM EXTRA STRENGTH)  mg tab Take  by mouth.  XARELTO 20 mg tab tablet     oxyCODONE-acetaminophen (PERCOCET) 5-325 mg per tablet Take 1-2 Tabs by mouth every six (6) hours as needed for Pain. Max Daily Amount: 8 Tabs.  RUBRACA 250 mg tab     diphenoxylate-atropine (LOMOTIL) 2.5-0.025 mg per tablet Take 1 Tab by mouth four (4) times daily as needed for Diarrhea. Max Daily Amount: 4 Tabs.     ondansetron hcl (ZOFRAN, AS HYDROCHLORIDE,) 4 mg tablet Take 4 mg by mouth every eight (8) hours as needed for Nausea.  amitriptyline (ELAVIL) 25 mg tablet Take 1 Tab by mouth nightly. No current facility-administered medications for this visit. Allergies: (reviewed)  Allergies   Allergen Reactions    Pcn [Penicillins] Rash          OBJECTIVE:    Physical Exam:  VITAL SIGNS: Vitals:    07/19/18 1041   BP: 95/68   Pulse: (!) 111   Weight: 138 lb 3.2 oz (62.7 kg)   Height: 5' 8\" (1.727 m)     Body mass index is 21.01 kg/(m^2). GENERAL FILIBERTO: Conversant, alert, oriented. No acute distress. HEENT: HEENT. No JVD. Neck: Supple without restrictions. RESPIRATORY: Clear to auscultation and percussion to the bases. No CVAT. CARDIOVASC: Tachy without murmur/rub. GASTROINT: Soft, non-distended, without masses or organomegaly. MUSCULOSKEL: no joint tenderness, deformity or swelling   EXTREMITIES: extremities normal, atraumatic, no cyanosis or edema   PELVIC: Deferred   RECTAL: Deferred   CHARMAINE SURVEY: No suspicious lymphadenopathy or edema noted, nontender   NEURO: Grossly intact. No acute deficit.          Lab Results   Component Value Date/Time    WBC 6.8 06/18/2018 08:32 AM    HGB 10.1 (L) 06/18/2018 08:32 AM    HCT 30.7 (L) 06/18/2018 08:32 AM    PLATELET 286 (L) 37/83/8034 08:32 AM    MCV 98.4 06/18/2018 08:32 AM     Lab Results   Component Value Date/Time    Sodium 142 06/18/2018 08:32 AM    Potassium 3.9 06/18/2018 08:32 AM    Chloride 108 06/18/2018 08:32 AM    CO2 26 06/18/2018 08:32 AM    Anion gap 8 06/18/2018 08:32 AM    Glucose 104 (H) 06/18/2018 08:32 AM    BUN 14 06/18/2018 08:32 AM    Creatinine 0.89 06/18/2018 08:32 AM    BUN/Creatinine ratio 16 06/18/2018 08:32 AM    GFR est AA >60 06/18/2018 08:32 AM    GFR est non-AA >60 06/18/2018 08:32 AM    Calcium 8.8 06/18/2018 08:32 AM     Lab Results   Component Value Date/Time    CA-125 319 (H) 06/18/2018 08:32 AM    Cancer Ag (CA) 125 314.7 (H) 05/31/2018 08:36 AM CT of chest/abdomen/pelvis (7/9/18)  LUNG BASES: Clear. INCIDENTALLY IMAGED HEART AND MEDIASTINUM: Incompletely imaged. LIVER: No measurable hepatic mass. GALLBLADDER: Unremarkable. SPLEEN: No mass. PANCREAS: No mass or ductal dilatation. ADRENALS: Unremarkable. KIDNEYS: No solid renal mass or hydronephrosis. Ureters are not dilated. STOMACH: Partial distention with enteric contrast and food products. SMALL BOWEL: No obstruction. Mural thickening of the serosal surface of small  bowel loops in the superior midline of the pelvis is compatible with peritoneal  disease. COLON: No obstruction. Tortuous course. APPENDIX: Small versus resected. Peritoneal soft tissue attenuation at the  expected location of the appendix measures 2.4 x 1.4 x 2.5 cm. PERITONEUM: Peritoneal metastatic disease is increased. Gastrohepatic ligament  soft tissue measures 3.0 x 1.4 x 3.0 cm. Posterior left upper quadrant  peritoneal soft tissue metastasis is inferior to the spleen and measures 3.3 x  1.7 x 2.0 cm. Peritoneum has nodular thickening posterior, inferior, and lateral  to the right hepatic lobe. Anterior left peritoneal mass along the undersurface  of the left rectus abdominous muscle measures 2.4 x 0.8 x 1.8 cm. Small volume  of ascites is slightly increased. RETROPERITONEUM: No lymphadenopathy or aortic aneurysm. IVC filter is present. REPRODUCTIVE ORGANS: Uterus and ovaries have been resected. URINARY BLADDER: No mass or calculus. BONES: No destructive bone lesion. ADDITIONAL COMMENTS: N/A     IMPRESSION:  1. Progression of peritoneal metastatic disease. Largest volume of disease is in  the upper pelvis, gastrohepatic ligament, and adjacent to the spleen. 2. Small volume of ascites is slightly increased. 3. No bowel obstruction. IMPRESSION/PLAN:  Wes Ang is a 48 y.o. female with a history of recurrent ovarian cancer. She has some complaints of lower abdominal pain and her CA-125 is rising.   Her CT demonstrates recurrent disease. At her last visit I discussed with her and her  multiple treatment options. She went to [facility] for a second opinion. They essentially offered her similar treatment options. She was not a candidate for a clinical trial due to her diagnosis of MS. I sat with her and her  for about an hour today, discussing different treatment options, including Doxil, Avastin, Topotecan, and Rubraca. She is BRCA positive and platinum sensitive. This should indicate that she would respond well to a PARP. She did not tolerate prior treatment with Lynparza. I suggested Shirley Prices, as it would allow her to travel and be less tied down. Unfortunately the Shirley Waters did not work very long, as she appears to have progression on imaging. I am now recommending a combination of Doxil/Avastin. I feel the Avastin would help most with the ascites. She and her  were counseled on the side effects and potential toxicities of the regimen. Their questions were answered and she wishes to proceed. As above per Dr. Felisa Gaffney  Discussed her state with Dr. Felisa Gaffney  All sx suspect related to abd disease and ascites. Send for palliative US paracentesis. Exam demonstrates moderate ascites, possible it's not drainable at this point. Discussed option of Aspira, agreed we will wait, understanding possible delayed effect with protocol and need for add'l PRN aspirations. She understands. Labs today, hold [facility], attempt tomorrow. Instructions relayed. Percocet for pain control, Miralax  F/u next week for cycle one new chemo Doxil/Avastin.  ECHO normal  Call if sx change/worsen acutely    Signed By: LESIA Huff     7/19/2018/3:54 PM

## 2018-07-20 NOTE — DISCHARGE INSTRUCTIONS
[phone] [facility]  Department of Interventional Radiology  [facility]      PARACENTESIS DISCHARGE INSTRUCTIONS    General Information:  During this procedure, the doctor will insert a needle into the abdomen to drain fluid. After the procedure, you will be able to take a deep breath much easier. The site of the puncture may ooze the first day. This will decrease and eventually stop. Paracentesis (draining fluid from the abdomen) sometimes makes patients hypotensive (low blood pressure). Your doctor may order for you to receive fluids or albumin (a volume booster) during the procedure through an IV site. Home Care Instructions:  Keep the puncture site clean and dry. No tub baths or swimming until puncture site heals. Showering is acceptable. Resume your normal diet, and resume your normal activity slowly and as you tolerate. If you are short of breath, rest. If shortness of breath does not ease, please call your ordering doctor. Fluid can re-accumulate in the chest and/or in the abdomen. If this should occur, your doctor needs to know as you may need to have the procedure done again. Call If:     You should call your Physician and/or the Radiology Nurse if you notice any signs of infection, like pus draining, or if it is swollen or reddened. Also call if you have a fever, or if you are bleeding from the puncture site more than a small amount on the dressing. Call if the puncture site keeps draining fluid. Some oozing is to be expected, but should slow and then stop. Call if you feel like you have pressure in your abdomen. SEEK IMMEDIATE CARE OR CALL 911 IF YOU SUDDENLY HAVE TROUBLE BREATHING, OR IF YOUR LIPS TURN BLUE, OR IF YOU NOTICE BLOOD IN YOUR SPUTUM. Follow-Up Instructions: Please see your ordering doctor as he/she has requested.      To Reach Us:   Side effects of sedation medications and other medications used today have been reviewed. Notify us of nausea, itching, hives, dizziness, or anything else out of the ordinary. Should you experience any of these significant changes, please call [phone] between the hours of 7:30 am and 10 pm or [phone] after hours.  After hours, ask the  to page the [address] Technologist, and describe the problem to the technologist.      Date: 7/20/2018  Discharging Nurse: Gasper Hairston RN

## 2018-07-20 NOTE — PROCEDURES
PROCEDURE:Paracentesis. INDICATION:ovarian cancer;ascites. ANESTHESIA:local.  COMPLICATION:NONE. SPECIMENS REMOVED:1800cc;sample to lab. BLOOD LOSS:NONE. /ASSISTANT:EDUARDO Gutierrez RECOMMENDATIONS:none. CONSENT OBTAINED:YES.  NOTES:none.

## 2018-07-23 NOTE — PROGRESS NOTES
Pt is here for follow up after paracentesis . Pt reports abdominal pain of 2 on the pain scale. Pt feels much better following the paracentesis.

## 2018-07-23 NOTE — TELEPHONE ENCOUNTER
Andrew from [employer] calling to give auth # for Avastin [id number] X12, [id number] Doxil X6,   Dexamethasone X1, and [id number] Emend X1.  auth dates good for 7/26/18 to 1/31/19. All information sent to [facility].

## 2018-07-23 NOTE — PROGRESS NOTES
[facility]COL[facility]  [address] [facility], [address]  [phone] XKY [phone]    Office Note  Patient ID:  Name:  Alexandria King  MRN:  [id number]  :  1965/53 y.o. Date:  2018      HISTORY OF PRESENT ILLNESS: (per most recent visit with Dr. Baljit Edwards)  Alexandria King is a 48 y.o.  postmenopausal female with stage IIIC ovarian cancer. She underwent X-lap, hysterectomy, and tumor debulking in 2015. She was recommended adjuvant chemotherapy with 6 cycles of Taxol and Carboplatin. Her post-treatment PET/CT was negative for disease. Her My[facility] results also found her to have a deleterious BRCA 1 mutation. As for the BRCA 1 mutation, I had her see one of our breast surgeons, Dr. Alecia Hoff, to talk about options, specifically screening and prophylactic surgery. She is going to hold off on surgery for now. She has decided not to have her daughter tested at this time. In early  she was found to have recurrence. She was clearly platinum sensitive, therefore I recommended a platinum-based regimen. 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 She also wanted to keep her hair. We also discussed options for down the line, specifically a PARP inhibitor, since she is BRCA positive. She completed 6 cycles of Gemzar/Carboplatin and her CA-125 returned to baseline. Her CT at that time was clear. We discussed maintenance therapy with a PARP inhibitor. She was started on Zejula in late 2017 at 300 mg daily. Her dose was lowered to 200 mg daily due to gastrointestinal complaints. In 2017 it was held due to thrombocytopenia. She elected not to restart the [facility], even at a lower dose. She presented in 2017 to discuss other PARP alternatives. She was started on Lynparza at a one-step dose reduction of 250 mg bid. Due to side effects she was reduced to 200 mg bid on 18.   On 1/15/18 she stopped therapy due to abdominal pain and nausea, and most of all, fatigue. She presented not too long ago for follow up and reported lower abdominal pain. Her CA-125 was rising, up to 41, when it was previously <4. She had some complaints of lower abdominal pain, in addition to her rising CA-125. Her CT demonstrated recurrent disease. I discussed with her and her  multiple treatment options. She went to [facility] for a second opinion. They essentially offered her similar treatment options. She was not a candidate for a clinical trial due to her diagnosis of MS. She presented again discuss treatment options. We ultimately decided on Rubraca. She has been on therapy for about 2 1/2 months. She has been tolerating it very well. She denies any nausea/vomiting. She has had some abdominal discomfort and some GI irregularities. She also reports some fatigue. Her CA-125 did respond. She was in [facility] for vacation a couple weeks ago when she developed significant ascites, requiring drainage at [address] [place] [address] in [place]. She presents today to review her most recent CT scan. Unfortunately it demonstrates progression. PT was seen last week and sent for palliative US paracentesis. She had 1800 cc's drained and said it helped her a great deal feel much better. Says her abd pain and YLONS improved \"without all that fluid\"  Pt presented today to \"discuss\" some things of concern. She said is needing to understand \"where things are\". Said she has been thinking a lot over the weekend and this causes her not to sleep well either. She realizes that her cancer is not curable and just wants to be healthy for her son's wedding in November. She continues to have poor pain control and says this affects her overall feeling. She takes Tylenol PM and then 30 minutes later, takes her 1/2 percocet and can sleep only 4 hours and wakes up uncomfortable.  She is afraid to take the percocet too much, but finds the pain is affecting her more now. ROS:  General: Denies wt loss. Acknowledges some unchanged fatigue. Says \"I can handle it\"  HEENT: Denies visual changes, dysphagia or headache  Resp: Denies SOB, LYONS, wheezing or cough  CV: Denies CP, palpitations  GI/: Denies N/V, bloating, diarrhea, constipation or dysuria  MuskSkel: Denies muscle ache or joint pain  Neuro: Denies neuropathy, dizzyness or syncope  Psych: Denies depression or feelings of sadness, but does acknowledge some frustration with how her disease has progressed.   Says she tries to keep positive attitude and exercise. (pt still swims frequently)       Problem List:  Patient Active Problem List    Diagnosis Date Noted    On anticoagulant therapy 07/19/2018    Peritoneal carcinomatosis ([address] 06/01/2018    Malignant ascites 06/01/2018    Noninfectious gastroenteritis 11/24/2017    Chemotherapy-induced neutropenia (HCC) 04/06/2017    Thrombocytopenia (HCC) 03/17/2017    CINV (chemotherapy-induced nausea and vomiting) 03/17/2017    Dehydration 03/17/2017    BRCA1 positive 09/10/2015    Malignant neoplasm of both ovaries ([address] 04/09/2015    S/P total abdominal hysterectomy 03/18/2015    Pelvic mass 03/06/2015     PMH:  Past Medical History:   Diagnosis Date    Anxiety     Calculus of kidney 1/13    right    Hernia, inguinal, left 2012    MS (multiple sclerosis) ([address] 1996    Nausea & vomiting     Ovarian cancer ([address] 3/2015    high grade, stage 3C papillary serous    Thromboembolus ([address] 8/2007    lower abdomen post fall      PSH:  Past Surgical History:   Procedure Laterality Date    HX GYN  2009    endometrial ablation with punctured uterus    HX OTHER SURGICAL  8/2007    green filter    HX MARIEL AND BSO  3/2014    ovarian cancer      Social History:  Social History   Substance Use Topics    Smoking status: Never Smoker    Smokeless tobacco: Never Used    Alcohol use No      Family History:  Family History   Problem Relation Age of Onset    Cancer Paternal Grandmother      breast    Breast Cancer Paternal Grandmother     Heart Disease Mother     Heart Disease Father       Medications: (reviewed)  Current Outpatient Prescriptions   Medication Sig    oxyCODONE ER (OXYCONTIN) 10 mg ER tablet Take 1 Tab by mouth every twelve (12) hours. Max Daily Amount: 20 mg.    dexamethasone (DECADRON) 4 mg tablet Take 2 Tabs by mouth as needed. As needed for nausea    LORazepam (ATIVAN) 1 mg tablet TAKE 1 TABLET BY MOUTH AT BEDTIME as needed  Indications: anxiety    oxyCODONE-acetaminophen (PERCOCET) 5-325 mg per tablet Take 1-2 Tabs by mouth every six (6) hours as needed for Pain. Max Daily Amount: 8 Tabs.  promethazine (PHENERGAN) 12.5 mg tablet     diphenhydrAMINE-acetaminophen (TYLENOL PM EXTRA STRENGTH)  mg tab Take  by mouth.  diphenoxylate-atropine (LOMOTIL) 2.5-0.025 mg per tablet Take 1 Tab by mouth four (4) times daily as needed for Diarrhea. Max Daily Amount: 4 Tabs.  XARELTO 20 mg tab tablet     ondansetron hcl (ZOFRAN, AS HYDROCHLORIDE,) 4 mg tablet Take 4 mg by mouth every eight (8) hours as needed for Nausea. No current facility-administered medications for this visit. Allergies: (reviewed)  Allergies   Allergen Reactions    Pcn [Penicillins] Rash          OBJECTIVE:    Physical Exam:  VITAL SIGNS: Vitals:    07/23/18 0851   BP: 129/59   Pulse: (!) 105   Weight: 135 lb 3.2 oz (61.3 kg)   Height: 5' 7.99\" (1.727 m)     Body mass index is 20.56 kg/(m^2). GENERAL FILIBERTO: A&O X3 in NAD   HEENT Sclera anicteric. Oral mucosa pink, moist   Neck: : Supple without restrictions. No cervical adenopathy appreciated. RESPIRATORY: CTA bilat without wheezing or rales   CARDIOVASC: Regular without murmur/rub.    GASTROINT: Soft, NT/ND without a fluid wave currently   MUSCULOSKEL: no joint tenderness, deformity or swelling   EXTREMITIES: + pedal pulses bilat without edema   NEURO: Grossly intact. No acute deficit. Lab Results   Component Value Date/Time    WBC 6.4 07/19/2018 11:54 AM    HGB 10.5 (L) 07/19/2018 11:54 AM    HCT 29.9 (L) 07/19/2018 11:54 AM    PLATELET 482 84/14/2406 11:54 AM    MCV 96 07/19/2018 11:54 AM     Lab Results   Component Value Date/Time    Sodium 142 06/18/2018 08:32 AM    Potassium 3.9 06/18/2018 08:32 AM    Chloride 108 06/18/2018 08:32 AM    CO2 26 06/18/2018 08:32 AM    Anion gap 8 06/18/2018 08:32 AM    Glucose 104 (H) 06/18/2018 08:32 AM    BUN 14 06/18/2018 08:32 AM    Creatinine 0.89 06/18/2018 08:32 AM    BUN/Creatinine ratio 16 06/18/2018 08:32 AM    GFR est AA >60 06/18/2018 08:32 AM    GFR est non-AA >60 06/18/2018 08:32 AM    Calcium 8.8 06/18/2018 08:32 AM     Lab Results   Component Value Date/Time    CA-125 319 (H) 06/18/2018 08:32 AM    Cancer Ag (CA) 125 314.7 (H) 05/31/2018 08:36 AM       CT of chest/abdomen/pelvis (7/9/18)  LUNG BASES: Clear. INCIDENTALLY IMAGED HEART AND MEDIASTINUM: Incompletely imaged. LIVER: No measurable hepatic mass. GALLBLADDER: Unremarkable. SPLEEN: No mass. PANCREAS: No mass or ductal dilatation. ADRENALS: Unremarkable. KIDNEYS: No solid renal mass or hydronephrosis. Ureters are not dilated. STOMACH: Partial distention with enteric contrast and food products. SMALL BOWEL: No obstruction. Mural thickening of the serosal surface of small  bowel loops in the superior midline of the pelvis is compatible with peritoneal  disease. COLON: No obstruction. Tortuous course. APPENDIX: Small versus resected. Peritoneal soft tissue attenuation at the  expected location of the appendix measures 2.4 x 1.4 x 2.5 cm. PERITONEUM: Peritoneal metastatic disease is increased. Gastrohepatic ligament  soft tissue measures 3.0 x 1.4 x 3.0 cm. Posterior left upper quadrant  peritoneal soft tissue metastasis is inferior to the spleen and measures 3.3 x  1.7 x 2.0 cm.  Peritoneum has nodular thickening posterior, inferior, and lateral  to the right hepatic lobe. Anterior left peritoneal mass along the undersurface  of the left rectus abdominous muscle measures 2.4 x 0.8 x 1.8 cm. Small volume  of ascites is slightly increased. RETROPERITONEUM: No lymphadenopathy or aortic aneurysm. IVC filter is present. REPRODUCTIVE ORGANS: Uterus and ovaries have been resected. URINARY BLADDER: No mass or calculus. BONES: No destructive bone lesion. ADDITIONAL COMMENTS: N/A     IMPRESSION:  1. Progression of peritoneal metastatic disease. Largest volume of disease is in  the upper pelvis, gastrohepatic ligament, and adjacent to the spleen. 2. Small volume of ascites is slightly increased. 3. No bowel obstruction. IMPRESSION/PLAN:  Anyi Pichardo is a 48 y.o. female with a history of recurrent ovarian cancer. She has some complaints of lower abdominal pain and her CA-125 is rising. Her CT demonstrates recurrent disease. At her last visit I discussed with her and her  multiple treatment options. She went to [facility] for a second opinion. They essentially offered her similar treatment options. She was not a candidate for a clinical trial due to her diagnosis of MS. I sat with her and her  for about an hour today, discussing different treatment options, including Doxil, Avastin, Topotecan, and Rubraca. She is BRCA positive and platinum sensitive. This should indicate that she would respond well to a PARP. She did not tolerate prior treatment with Lynparza. I suggested Sneha Hurtado, as it would allow her to travel and be less tied down. Unfortunately the Sneha Hurtado did not work very long, as she appears to have progression on imaging. I am now recommending a combination of Doxil/Avastin. I feel the Avastin would help most with the ascites. She and her  were counseled on the side effects and potential toxicities of the regimen. Their questions were answered and she wishes to proceed.       As above per Dr. Mo Werner      We had long discussion of treatment coming up, work and [employer], management of pain and continuing to have short term goals. Since she feels she is not controlling her pain, especially at night (and therfore causing her to wake in pain and not be able to go back to sleep), we discussed different options. She agreed with Oxycodone 10 mg CR q12 initially. If too strong, she can just use it at night and use only percocet during the day (this was a big concern for her). Reviewed using percocet if needed during the day for breakthrough. She says her bowel are \"working the best they have in a long time\" and does not currently need any assistance. Reviewed with her that with the long acting pain medication, this may cause constipation and to not let it get ahead of her. Said she has success with Miralax if needed. She will call me in a few days to let me know how she is doing and will call anytime for any concerns or questions. She will make another massage appt as well as a meditation apt at the cancer resource center within the next few weeks. F/u this Thursday 7/26, for cycle one new chemo Doxil/Avastin.  (ECHO normal)    Greater than 60 minutes spent with pt reviewing the above with greater than 75% in direct counseling     Signed By: Keira Dangelo NP     7/23/2018/3:54 PM

## 2018-07-26 PROBLEM — D70.1 CHEMOTHERAPY-INDUCED NEUTROPENIA (HCC): Status: RESOLVED | Noted: 2017-04-06 | Resolved: 2018-01-01

## 2018-07-26 PROBLEM — D64.81 ANEMIA DUE TO CHEMOTHERAPY: Status: ACTIVE | Noted: 2018-01-01

## 2018-07-26 PROBLEM — R80.8 OTHER PROTEINURIA: Status: ACTIVE | Noted: 2018-01-01

## 2018-07-26 PROBLEM — T45.1X5A CHEMOTHERAPY-INDUCED NEUTROPENIA (HCC): Status: RESOLVED | Noted: 2017-04-06 | Resolved: 2018-01-01

## 2018-07-26 PROBLEM — T45.1X5A ANEMIA DUE TO CHEMOTHERAPY: Status: ACTIVE | Noted: 2018-01-01

## 2018-07-26 PROBLEM — E86.0 DEHYDRATION: Status: RESOLVED | Noted: 2017-03-17 | Resolved: 2018-01-01

## 2018-07-26 PROBLEM — D69.6 THROMBOCYTOPENIA (HCC): Status: RESOLVED | Noted: 2017-03-17 | Resolved: 2018-01-01

## 2018-07-26 NOTE — PROGRESS NOTES
[facility] Note:  0800Pt arrived at [facility] ambulatory and in no distress for C1. Assessment stable, no new complaints voiced. Labs done prior to and chcecked. Paracentesis done last week. Discussed chemo meds, regimen, possible s/e  And complications. Medications received:  *Decadron  Pepcid  Avastin over 90 min. Doxil    1300 Tolerated treatment well, no adverse reaction noted. D/Cd from [facility] ambulatory and in no distress accompanied by *spouse  Next appt 8/9    0800  Visit Vitals    /67    Pulse 99    Temp 97.1 °F (36.2 °C)    Resp 16    Ht 5' 8.11\" (1.73 m)    Wt 62.1 kg (137 lb)    LMP 02/02/2015 (Approximate)    BMI 20.76 kg/m2     No results found for this or any previous visit (from the past 12 hour(s)).

## 2018-07-26 NOTE — PROGRESS NOTES
[facility]  [address] [facility], [address], [address]  P [phone]  F [phone]      7/26/2018   Yennifer Chew MD: Lucero Del Valle MD  PCP: Lily Greenwood MD     Primary Onc Dx: ovarian cancer, stage IIIC  Date of Dx: 3/2015        HPI:  Maryse Echeverria is a 46 y.o. with stage IIIC ovarian cancer, BRCA1+. She underwent X-lap, hysterectomy, and tumor debulking in March 2015. She was recommended adjuvant chemotherapy with 6 cycles of Taxol and Carboplatin. Her post-treatment PET/CT was negative for disease. S/p recurrent disease early 2017 treated with carbo/Gemzar (3/2017 - 6/2017). Post treatment imaging was w/o evidence of disease and maintenance PARPi (zejula/lynparza). Stopped d/t toxicities  Recurrent disease on imagining, initiated rubraca, subsequent disease progression following 2.5 cycles  S/p paracentesis x 3 most recent 7/20/18    CT Results (most recent):    Results from Hospital Encounter encounter on 07/09/18   CT ABD PELV W CONT   Narrative EXAM:  CT ABD PELV W CONT    INDICATION: Ovarian carcinoma with peritoneal metastasis and ascites. Evaluate  response to therapy. COMPARISON: CT abdomen/pelvis on 4/2/2018 and 11/24/2017. CONTRAST:  100 mL of Isovue-370. TECHNIQUE:   Following the uneventful intravenous administration of contrast, thin axial  images were obtained through the abdomen and pelvis. Coronal and sagittal  reconstructions were generated. Oral contrast was administered. CT dose  reduction was achieved through use of a standardized protocol tailored for this  examination and automatic exposure control for dose modulation. FINDINGS:   LUNG BASES: Clear. INCIDENTALLY IMAGED HEART AND MEDIASTINUM: Incompletely imaged. LIVER: No measurable hepatic mass. GALLBLADDER: Unremarkable. SPLEEN: No mass. PANCREAS: No mass or ductal dilatation. ADRENALS: Unremarkable. KIDNEYS: No solid renal mass or hydronephrosis.  Ureters are not dilated. STOMACH: Partial distention with enteric contrast and food products. SMALL BOWEL: No obstruction. Mural thickening of the serosal surface of small  bowel loops in the superior midline of the pelvis is compatible with peritoneal  disease. COLON: No obstruction. Tortuous course. APPENDIX: Small versus resected. Peritoneal soft tissue attenuation at the  expected location of the appendix measures 2.4 x 1.4 x 2.5 cm. PERITONEUM: Peritoneal metastatic disease is increased. Gastrohepatic ligament  soft tissue measures 3.0 x 1.4 x 3.0 cm. Posterior left upper quadrant  peritoneal soft tissue metastasis is inferior to the spleen and measures 3.3 x  1.7 x 2.0 cm. Peritoneum has nodular thickening posterior, inferior, and lateral  to the right hepatic lobe. Anterior left peritoneal mass along the undersurface  of the left rectus abdominous muscle measures 2.4 x 0.8 x 1.8 cm. Small volume  of ascites is slightly increased. RETROPERITONEUM: No lymphadenopathy or aortic aneurysm. IVC filter is present. REPRODUCTIVE ORGANS: Uterus and ovaries have been resected. URINARY BLADDER: No mass or calculus. BONES: No destructive bone lesion. ADDITIONAL COMMENTS: N/A         Impression IMPRESSION:    1. Progression of peritoneal metastatic disease. Largest volume of disease is in  the upper pelvis, gastrohepatic ligament, and adjacent to the spleen. 2. Small volume of ascites is slightly increased. 3. No bowel obstruction. Current Agent: Doxil/Avastin  Cycle: 1     SUBJECTIVE:  Joe Rangel presents for chemotherapy. She is overall well with feeling increased abd pressure from recurrent ascites. Using oxycodone ER lasts about 8 hours. Using tylenol for breakthrough. Has percocet available. Increased constipation with opioids. Using smoothies/shakes. Prior discussions with nutritionist. Visiting [facility] for therapy. Wt fluctuates with ascites. No F/C, SOB, N/V.  Has zofran/phenergan, using on limited bases if needed d/t side effect profile. ROS  Constitutional: no weight loss, fever, night sweats  Respiratory: no cough, shortness of breath, or wheezing  Cardiovascular: no chest pain or dyspnea on exertion  Heme: No abnormal bleeding. On Xarelto  Gastrointestinal: above  Genito-Urinary: no dysuria, trouble voiding, or hematuria. Hx kidney stone  Musculoskeletal: negative for - no joint pain  Neurological: negative for - confusion, gait disturbance or visual changes  Derm: negative  Psych: seeking counseling session       OBJECTIVE:  Physical Exam  Visit Vitals    /62    Pulse 81    Temp 97 °F (36.1 °C)    Resp 18    Ht 5' 8.11\" (1.73 m)    Wt 137 lb (62.1 kg)    LMP 02/02/2015 (Approximate)    BMI 20.76 kg/m2        General:  alert, cooperative, no distress       HEENT: without pallor, sclera without jaundice, oral mucosa without lesions,      Cardiac:  Regular rate and rhythm          Port:  clean, dry, no drainage  Abdomen:  soft, protuberant/slightly tight, tenderness mild - generalized, without guarding, without rebound       Lymph:  no lymphadenopathy   Extremity: no edema      Lab Results   Component Value Date/Time    WBC 7.0 07/23/2018 10:05 AM    ABS. NEUTROPHILS 5.1 07/23/2018 10:05 AM    HGB 10.7 (L) 07/23/2018 10:05 AM    HCT 34.0 07/23/2018 10:05 AM     (H) 07/23/2018 10:05 AM    MCH 32.8 07/23/2018 10:05 AM    PLATELET 740 14/68/4272 10:05 AM     Lab Results   Component Value Date/Time    Sodium 141 07/23/2018 10:05 AM    Potassium 4.6 07/23/2018 10:05 AM    Chloride 101 07/23/2018 10:05 AM    CO2 23 07/23/2018 10:05 AM    Glucose 93 07/23/2018 10:05 AM    BUN 14 07/23/2018 10:05 AM    Creatinine 0.79 07/23/2018 10:05 AM    Calcium 9.2 07/23/2018 10:05 AM    Albumin 4.2 07/23/2018 10:05 AM    Bilirubin, total <0.2 07/23/2018 10:05 AM    AST (SGOT) 43 (H) 07/23/2018 10:05 AM    ALT (SGPT) 35 (H) 07/23/2018 10:05 AM    Alk.  phosphatase 303 (H) 07/23/2018 10:05 AM     SLAVA 1+protein      Tumor markers  Lab Results   Component Value Date/Time    CA-125 319 (H) 06/18/2018 08:32 AM    Cancer Ag (CA) 125 754.1 (H) 07/23/2018 10:05 AM     Cancer Ag (CA) 125   Date Value Ref Range Status   07/23/2018 754.1 (H) 0.0 - 38.1 U/mL Final     Comment:     Roche ECLIA methodology   05/31/2018 314.7 (H) 0.0 - 38.1 U/mL Final     Comment:     Roche ECLIA methodology   05/24/2018 321.5 (H) 0.0 - 38.1 U/mL Final     Comment:     Roche ECLIA methodology   05/03/2018 613.7 (H) 0.0 - 38.1 U/mL Final     Comment:     Roche ECLIA methodology   04/24/2018 641.8 (H) 0.0 - 38.1 U/mL Final     Comment:     Roche ECLIA methodology   04/20/2018 591.6 (H) 0.0 - 38.1 U/mL Final     Comment:     Roche ECLIA methodology   12/18/2017 15.6 0.0 - 38.1 U/mL Final     Comment:     Roche ECLIA methodology       Patient Active Problem List   Diagnosis Code    Pelvic mass R19.00    Malignant neoplasm of both ovaries (HCC) C56.1, C56.2    CINV (chemotherapy-induced nausea and vomiting) R11.2, T45.1X5A    Noninfectious gastroenteritis K52.9    Peritoneal carcinomatosis (HCC) C78.6, C80.1    Malignant ascites R18.0    On anticoagulant therapy Z79.01    Other proteinuria R80.8    Anemia due to chemotherapy D64.81, T45.1X5A     Past Medical History:   Diagnosis Date    Anxiety     BRCA1 positive     Calculus of kidney 1/13    right    Hernia, inguinal, left 2012    MS (multiple sclerosis) ([facility] 1996    Nausea & vomiting     Ovarian cancer ([facility] 3/2015    high grade, stage 3C papillary serous    Thromboembolus ([facility] 8/2007    lower abdomen post fall     Prior to Admission medications    Medication Sig Start Date End Date Taking? Authorizing Provider   senna-docusate (PERICOLACE) 8.6-50 mg per tablet Take 1 Tab by mouth daily. Indications: constipation 7/26/18  Yes [facility] LESIA Garcia   oxyCODONE ER (OXYCONTIN) 10 mg ER tablet Take 1 Tab by mouth every twelve (12) hours.  Max Daily Amount: 20 mg. 7/23/18   Christian VIRK KYE Barlow   dexamethasone (DECADRON) 4 mg tablet Take 2 Tabs by mouth as needed. As needed for nausea 18   Christina Barlow NP   LORazepam (ATIVAN) 1 mg tablet TAKE 1 TABLET BY MOUTH AT BEDTIME as needed  Indications: anxiety 18   LESIA Deleon   promethazine (PHENERGAN) 12.5 mg tablet  18   Historical Provider   diphenhydrAMINE-acetaminophen (TYLENOL PM EXTRA STRENGTH)  mg tab Take  by mouth. Historical Provider   diphenoxylate-atropine (LOMOTIL) 2.5-0.025 mg per tablet Take 1 Tab by mouth four (4) times daily as needed for Diarrhea. Max Daily Amount: 4 Tabs. 18   Christina Barlow NP   ondansetron hcl (ZOFRAN, AS HYDROCHLORIDE,) 4 mg tablet Take 4 mg by mouth every eight (8) hours as needed for Nausea. Historical Provider   XARELTO 20 mg tab tablet  16   Historical Provider     Allergies   Allergen Reactions    Pcn [Penicillins] Rash     Family History   Problem Relation Age of Onset    Cancer Paternal Grandmother      breast    Breast Cancer Paternal Grandmother     Heart Disease Mother     Heart Disease Father          IMPRESSION/PLAN:  48 y.o. with recurrent ovarian cancer  ECO    Active Hospital Problems    Diagnosis Date Noted    Other proteinuria 2018    Anemia due to chemotherapy 2018    On anticoagulant therapy 2018    Peritoneal carcinomatosis ([facility] 2018    Malignant ascites 2018    Malignant neoplasm of both ovaries ([facility] 2015       Chemotherapy cycle one doxil/avastin continue. Discussed side effect profile. Discussed phototoxicity  Mild anemia - monitor, asx  ppx CINV meds available  Pre-existing mild proteinuria. F/u [facility] next chemo, hx nephrolithiasis  Pain associated with malignancy mostly related to her recurrent ascites. Continue oxycodone ER. May increase to q8hr, tylenol PRN  Constipation continue miralax, addition of pericolace  Alk phos elevation, likely d/t malignancy.  No liver mets or suspicious bone lesions on CT  hx DVT - Xarelto  Psychosocial: coping well overall, supportive family, utilizing resource center    Questions addressed. Advised to call with any concerns.       Carol Miller, PA  Gyn Onc

## 2018-07-26 NOTE — PROGRESS NOTES
Problem: Knowledge Deficit  Goal: *Verbalizes understanding of procedures and medications  Outcome: Progressing Towards Goal  Doxil, avastin

## 2018-07-27 NOTE — TELEPHONE ENCOUNTER
Pt , she states she was \"checking in with you\" as instructed, please call at [phone].  (she would not give me an update, insisted on call back from [facility])

## 2018-08-09 PROBLEM — G89.3 CANCER ASSOCIATED PAIN: Status: ACTIVE | Noted: 2018-01-01

## 2018-08-09 NOTE — PROGRESS NOTES
0805 Pt admit to [facility] for C 1, D 15 Avastin ambulatory in stable condition. Accompanied by supportive , Eli Lynne. Assessment completed, slight abdominal pain well controlled with home medications, intermittent LYONS with ascites, decreased PO intake with reduced appetite. No new concerns voiced. Port accessed with positive blood return. Confirmed with provider that patient needs CBC, as was not done at [facility] prior to admission. Lab drawn from line, line flushed, connected to NS at [facility]. Visit Vitals    BP 94/62 (BP 1 Location: Left arm, BP Patient Position: Sitting)    Pulse 81    Temp 97.4 °F (36.3 °C)    Resp 18    Ht 5' 8.11\" (1.73 m)    Wt 59.8 kg (131 lb 12.8 oz)    LMP 02/02/2015 (Approximate)    SpO2 100%    Breastfeeding No    BMI 19.98 kg/m2       Medications:  NS  Avastin    1150 Pt tolerated treatment well. Port maintained positive blood return throughout treatment, flushed with positive blood return at conclusion and heparinized per protocol. D/c home ambulatory in no distress. Pt aware of next [facility] appointment scheduled for 8/23/18. Recent Results (from the past 12 hour(s))   CBC WITH AUTOMATED DIFF    Collection Time: 08/09/18  8:43 AM   Result Value Ref Range    WBC 6.2 3.6 - 11.0 K/uL    RBC 2.51 (L) 3.80 - 5.20 M/uL    HGB 8.6 (L) 11.5 - 16.0 g/dL    HCT 26.9 (L) 35.0 - 47.0 %    .2 (H) 80.0 - 99.0 FL    MCH 34.3 (H) 26.0 - 34.0 PG    MCHC 32.0 30.0 - 36.5 g/dL    RDW 15.2 (H) 11.5 - 14.5 %    PLATELET 580 (L) 117 - 400 K/uL    MPV 11.5 8.9 - 12.9 FL    NRBC 0.0 0  WBC    ABSOLUTE NRBC 0.00 0.00 - 0.01 K/uL    NEUTROPHILS 69 32 - 75 %    LYMPHOCYTES 27 12 - 49 %    MONOCYTES 4 (L) 5 - 13 %    EOSINOPHILS 0 0 - 7 %    BASOPHILS 0 0 - 1 %    IMMATURE GRANULOCYTES 0 0.0 - 0.5 %    ABS. NEUTROPHILS 4.3 1.8 - 8.0 K/UL    ABS. LYMPHOCYTES 1.7 0.8 - 3.5 K/UL    ABS. MONOCYTES 0.2 0.0 - 1.0 K/UL    ABS. EOSINOPHILS 0.0 0.0 - 0.4 K/UL    ABS.  BASOPHILS 0.0 0.0 - 0.1 K/UL ABS. IMM.  GRANS. 0.0 0.00 - 0.04 K/UL    DF AUTOMATED

## 2018-08-09 NOTE — PROGRESS NOTES
[facility] GYNECOLOGIC ONCOLOGY  [address] [facility], [address], [address]  P [phone]  F [phone]      8/9/2018   Laura Funk MD: Leonidas Mello MD  PCP: Joann Danielson MD     Primary Onc Dx: ovarian cancer, stage IIIC  Date of Dx: 3/2015        HPI:  Ashly Navas is a 46 y.o. with stage IIIC ovarian cancer, BRCA1+. She underwent X-lap, hysterectomy, and tumor debulking in March 2015. She was recommended adjuvant chemotherapy with 6 cycles of Taxol and Carboplatin. Her post-treatment PET/CT was negative for disease. S/p recurrent disease early 2017 treated with carbo/Gemzar (3/2017 - 6/2017). Post treatment imaging was w/o evidence of disease and maintenance PARPi (zejula/lynparza). Stopped d/t toxicities  Recurrent disease on imagining, initiated rubraca, subsequent disease progression following 2.5 cycles  S/p paracentesis x 3 most recent 7/20/18    CT Results (most recent):    Results from Hospital Encounter encounter on 07/09/18   CT ABD PELV W CONT   Narrative EXAM:  CT ABD PELV W CONT    INDICATION: Ovarian carcinoma with peritoneal metastasis and ascites. Evaluate  response to therapy. COMPARISON: CT abdomen/pelvis on 4/2/2018 and 11/24/2017. CONTRAST:  100 mL of Isovue-370. TECHNIQUE:   Following the uneventful intravenous administration of contrast, thin axial  images were obtained through the abdomen and pelvis. Coronal and sagittal  reconstructions were generated. Oral contrast was administered. CT dose  reduction was achieved through use of a standardized protocol tailored for this  examination and automatic exposure control for dose modulation. FINDINGS:   LUNG BASES: Clear. INCIDENTALLY IMAGED HEART AND MEDIASTINUM: Incompletely imaged. LIVER: No measurable hepatic mass. GALLBLADDER: Unremarkable. SPLEEN: No mass. PANCREAS: No mass or ductal dilatation. ADRENALS: Unremarkable. KIDNEYS: No solid renal mass or hydronephrosis.  Ureters are not dilated. STOMACH: Partial distention with enteric contrast and food products. SMALL BOWEL: No obstruction. Mural thickening of the serosal surface of small  bowel loops in the superior midline of the pelvis is compatible with peritoneal  disease. COLON: No obstruction. Tortuous course. APPENDIX: Small versus resected. Peritoneal soft tissue attenuation at the  expected location of the appendix measures 2.4 x 1.4 x 2.5 cm. PERITONEUM: Peritoneal metastatic disease is increased. Gastrohepatic ligament  soft tissue measures 3.0 x 1.4 x 3.0 cm. Posterior left upper quadrant  peritoneal soft tissue metastasis is inferior to the spleen and measures 3.3 x  1.7 x 2.0 cm. Peritoneum has nodular thickening posterior, inferior, and lateral  to the right hepatic lobe. Anterior left peritoneal mass along the undersurface  of the left rectus abdominous muscle measures 2.4 x 0.8 x 1.8 cm. Small volume  of ascites is slightly increased. RETROPERITONEUM: No lymphadenopathy or aortic aneurysm. IVC filter is present. REPRODUCTIVE ORGANS: Uterus and ovaries have been resected. URINARY BLADDER: No mass or calculus. BONES: No destructive bone lesion. ADDITIONAL COMMENTS: N/A         Impression IMPRESSION:    1. Progression of peritoneal metastatic disease. Largest volume of disease is in  the upper pelvis, gastrohepatic ligament, and adjacent to the spleen. 2. Small volume of ascites is slightly increased. 3. No bowel obstruction. Current Agent: Doxil/Avastin  Cycle: 1     SUBJECTIVE:  Lorraine Roberts presents for chemotherapy, D15. She is overall well residual abd pain 3/10. Feels ascites has not returned or has diminished since last treatment alone. Fatigue is major c/o. Still continues to swim. Using oxycodone ER q12hr, lasts about 8 hours. Using tylenol for breakthrough. Has percocet available. Constipation episodes persist, using smoothies/shakes and OTC medications. Overall appetite reduced, mild wt loss noted. Questions sensation with swallowing, no overt pain, denies oral lesions. Prior discussions with nutritionist. Visiting [facility] for therapy. No F/C, SOB, N/V. Has zofran/phenergan, using on limited bases if needed d/t side effect profile. Performs all ADLs. ROS  Constitutional: no fever, night sweats  Respiratory: no cough, shortness of breath, or wheezing  Cardiovascular: no chest pain or dyspnea on exertion  Heme: No abnormal bleeding. On Xarelto  Gastrointestinal: above  Genito-Urinary: no dysuria, trouble voiding, or hematuria. Hx kidney stone  Musculoskeletal: negative for - no joint pain  Neurological: negative for - confusion, gait disturbance or visual changes  Derm: negative  Psych: seeking counseling session       OBJECTIVE:  Physical Exam  Visit Vitals    BP 94/62 (BP 1 Location: Left arm, BP Patient Position: Sitting)    Pulse 81    Temp 97.4 °F (36.3 °C)    Resp 18    Ht 5' 8.11\" (1.73 m)    Wt 131 lb 12.8 oz (59.8 kg)    LMP 02/02/2015 (Approximate)    SpO2 100%    Breastfeeding No    BMI 19.98 kg/m2        General:  alert, cooperative, no distress       HEENT: without pallor, sclera without jaundice, oral mucosa without lesions,      Cardiac:  Regular rate and rhythm          Port:  clean, dry, no drainage  Abdomen:  soft, protuberant/slightly tight, tenderness mild - generalized, without guarding, without rebound       Lymph:  no lymphadenopathy   Extremity: no edema    Wt Readings from Last 3 Encounters:   08/09/18 131 lb 12.8 oz (59.8 kg)   07/26/18 137 lb (62.1 kg)   07/23/18 135 lb 3.2 oz (61.3 kg)       Lab Results   Component Value Date/Time    WBC 6.2 08/09/2018 08:43 AM    ABS.  NEUTROPHILS 4.3 08/09/2018 08:43 AM    HGB 8.6 (L) 08/09/2018 08:43 AM    HCT 26.9 (L) 08/09/2018 08:43 AM    .2 (H) 08/09/2018 08:43 AM    MCH 34.3 (H) 08/09/2018 08:43 AM    PLATELET 757 (L) 40/47/8819 08:43 AM     Lab Results   Component Value Date/Time    Sodium 141 07/23/2018 10:05 AM Potassium 4.6 07/23/2018 10:05 AM    Chloride 101 07/23/2018 10:05 AM    CO2 23 07/23/2018 10:05 AM    Glucose 93 07/23/2018 10:05 AM    BUN 14 07/23/2018 10:05 AM    Creatinine 0.79 07/23/2018 10:05 AM    Calcium 9.2 07/23/2018 10:05 AM    Albumin 4.2 07/23/2018 10:05 AM    Bilirubin, total <0.2 07/23/2018 10:05 AM    AST (SGOT) 43 (H) 07/23/2018 10:05 AM    ALT (SGPT) 35 (H) 07/23/2018 10:05 AM    Alk.  phosphatase 303 (H) 07/23/2018 10:05 AM     UA no protein      Tumor markers  Lab Results   Component Value Date/Time    CA-125 319 (H) 06/18/2018 08:32 AM    Cancer Ag (CA) 125 754.1 (H) 07/23/2018 10:05 AM     Cancer Ag (CA) 125   Date Value Ref Range Status   07/23/2018 754.1 (H) 0.0 - 38.1 U/mL Final     Comment:     Roche ECLIA methodology   05/31/2018 314.7 (H) 0.0 - 38.1 U/mL Final     Comment:     Roche ECLIA methodology   05/24/2018 321.5 (H) 0.0 - 38.1 U/mL Final     Comment:     Roche ECLIA methodology   05/03/2018 613.7 (H) 0.0 - 38.1 U/mL Final     Comment:     Roche ECLIA methodology   04/24/2018 641.8 (H) 0.0 - 38.1 U/mL Final     Comment:     Roche ECLIA methodology   04/20/2018 591.6 (H) 0.0 - 38.1 U/mL Final     Comment:     Roche ECLIA methodology   12/18/2017 15.6 0.0 - 38.1 U/mL Final     Comment:     Roche ECLIA methodology       Patient Active Problem List   Diagnosis Code    Pelvic mass R19.00    Malignant neoplasm of both ovaries (HCC) C56.1, C56.2    CINV (chemotherapy-induced nausea and vomiting) R11.2, T45.1X5A    Noninfectious gastroenteritis K52.9    Peritoneal carcinomatosis (HCC) C78.6, C80.1    Malignant ascites R18.0    On anticoagulant therapy Z79.01    Other proteinuria R80.8    Anemia due to chemotherapy D64.81, T45.1X5A     Past Medical History:   Diagnosis Date    Anxiety     BRCA1 positive     Calculus of kidney 1/13    right    Hernia, inguinal, left 2012    MS (multiple sclerosis) ([facility] [address] 1996    Nausea & vomiting     Ovarian cancer ([facility] 3/2015    high grade, stage 3C papillary serous    Thromboembolus ([facility] [address] 2007    lower abdomen post fall     Prior to Admission medications    Medication Sig Start Date End Date Taking? Authorizing Provider   senna-docusate (PERICOLACE) 8.6-50 mg per tablet Take 1 Tab by mouth daily. Indications: constipation 18   LESIA Cook   oxyCODONE ER (OXYCONTIN) 10 mg ER tablet Take 1 Tab by mouth every twelve (12) hours. Max Daily Amount: 20 mg. 18   Christina Barlow NP   dexamethasone (DECADRON) 4 mg tablet Take 2 Tabs by mouth as needed. As needed for nausea 18   Christina Barlow NP   LORazepam (ATIVAN) 1 mg tablet TAKE 1 TABLET BY MOUTH AT BEDTIME as needed  Indications: anxiety 18   LESIA Cook   promethazine (PHENERGAN) 12.5 mg tablet  18   Historical Provider   diphenhydrAMINE-acetaminophen (TYLENOL PM EXTRA STRENGTH)  mg tab Take  by mouth. Historical Provider   diphenoxylate-atropine (LOMOTIL) 2.5-0.025 mg per tablet Take 1 Tab by mouth four (4) times daily as needed for Diarrhea. Max Daily Amount: 4 Tabs. 18   Christina Barlow NP   ondansetron hcl (ZOFRAN, AS HYDROCHLORIDE,) 4 mg tablet Take 4 mg by mouth every eight (8) hours as needed for Nausea. Historical Provider   XARELTO 20 mg tab tablet  16   Historical Provider     Allergies   Allergen Reactions    Pcn [Penicillins] Rash     Family History   Problem Relation Age of Onset    Cancer Paternal Grandmother      breast    Breast Cancer Paternal Grandmother     Heart Disease Mother     Heart Disease Father          IMPRESSION/PLAN:  48 y.o. with recurrent ovarian cancer  ECO    Present on Admission:   Thrombocytopenia ([facility] [address]   Malignant neoplasm of both ovaries ([facility] [address]   Malignant ascites   Anemia due to chemotherapy   Peritoneal carcinomatosis ([facility] [address]   Cancer associated pain    Chemotherapy cycle one doxil/avastin continue day 2 avastin. Anemia - G2.   Noted mild fatigue multifactorial. May need transfusion prior to/concomitant with next treatment. ppx CINV meds available  Monitor plts  Pre-existing mild proteinuria. Resolved on recheck. hx nephrolithiasis  Pain associated with malignancy mostly related to her recurrent ascites. Improved. Continue oxycodone ER. Tylenol PRN  Constipation continue miralax, addition of pericolace  Alk phos elevation, likely d/t malignancy. No liver mets or suspicious bone lesions on CT  hx DVT - Xarelto  Psychosocial: coping well overall, supportive family, utilizing resource center    Questions addressed. Advised to call with any concerns.       LESIA Malloy  Gyn Onc

## 2018-08-13 NOTE — TELEPHONE ENCOUNTER
Pt called to say she had a lot of difficulty with constipation over this past weekend. After many oral agents, she finally had a \"hard, difficult\" BM yesterday and it aggravated her hemorrhoids. She said she feels this is due to her being dehydrated and feels if she received some hydration, it would help her. Agreed to have pt come in on Wed for a liter of hydration and a CBC (last hgb was 8.6 and pt is feeling very tired). She will continue with daily Miralax and hold for any loose or frequent stools. Discussed that if she felt the Preparation H was not effective enough, I could call in some Annusol . She will call with any further concerns or questions.

## 2018-08-15 NOTE — PROGRESS NOTES
PEDI [facility] VISIT NOTE    7892. Patient arrives for Hydration/Labs without acute problems. Please see connect care for complete assessment and education provided. All central lines follow the Pediatric [facility]. Vital signs stable throughout and prior to discharge. Pt. tolerated treatment well and discharged without incident. Patient is aware of next [facility] appointment on 8/23/2018. Medications Verified by Anastacia Shore RN via [employer]rom[employer]:   1. 1 Liter Normal [address]   Patient Vitals for the past 12 hrs:   Temp Pulse Resp BP   08/15/18 1405 97.9 °F (36.6 °C) 69 18 104/66   08/15/18 1255 97.5 °F (36.4 °C) 84 18 116/70       LAB WORK - Some labs still pending, please see [facility] for final results. Recent Results (from the past 12 hour(s))   CBC WITH AUTOMATED DIFF    Collection Time: 08/15/18  1:05 PM   Result Value Ref Range    WBC 2.7 (L) 3.6 - 11.0 K/uL    RBC 2.60 (L) 3.80 - 5.20 M/uL    HGB 8.9 (L) 11.5 - 16.0 g/dL    HCT 28.3 (L) 35.0 - 47.0 %    .8 (H) 80.0 - 99.0 FL    MCH 34.2 (H) 26.0 - 34.0 PG    MCHC 31.4 30.0 - 36.5 g/dL    RDW 14.8 (H) 11.5 - 14.5 %    PLATELET 825 201 - 444 K/uL    MPV 11.1 8.9 - 12.9 FL    NRBC 0.0 0  WBC    ABSOLUTE NRBC 0.00 0.00 - 0.01 K/uL    NEUTROPHILS PENDING %    LYMPHOCYTES PENDING %    MONOCYTES PENDING %    EOSINOPHILS PENDING %    BASOPHILS PENDING %    IMMATURE GRANULOCYTES PENDING %    ABS. NEUTROPHILS PENDING K/UL    ABS. LYMPHOCYTES PENDING K/UL    ABS. MONOCYTES PENDING K/UL    ABS. EOSINOPHILS PENDING K/UL    ABS. BASOPHILS PENDING K/UL    ABS. IMM. GRANS.  PENDING K/UL    DF PENDING

## 2018-08-21 NOTE — PROGRESS NOTES
[address] [place] [address] Mathias Moritz [address], [address] Saturnino  [phone]  MD Collins Akers MD  Office Note  Patient ID:  Name:  Letty Pickett  MRN:  [id number]  :  1965/53 y.o. Date:  2018      HISTORY OF PRESENT ILLNESS:  Letty Pickett is a 48 y.o.  postmenopausal female with stage IIIC ovarian cancer. She underwent X-lap, hysterectomy, and tumor debulking in 2015. She was recommended adjuvant chemotherapy with 6 cycles of Taxol and Carboplatin. Her post-treatment PET/CT was negative for disease. Her [facility] results also found her to have a deleterious BRCA 1 mutation. As for the BRCA 1 mutation, I had her see one of our breast surgeons, Dr. Abdiel Leigh, to talk about options, specifically screening and prophylactic surgery. She is going to hold off on surgery for now. She has decided not to have her daughter tested at this time. In early  she was found to have recurrence. She was clearly platinum sensitive, therefore I recommended a platinum-based regimen. 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 She also wanted to keep her hair. We also discussed options for down the line, specifically a PARP inhibitor, since she is BRCA positive. She completed 6 cycles of Gemzar/Carboplatin and her CA-125 returned to baseline. Her CT at that time was clear. We discussed maintenance therapy with a PARP inhibitor. She was started on Zejula in late 2017 at 300 mg daily. Her dose was lowered to 200 mg daily due to gastrointestinal complaints. In 2017 it was held due to thrombocytopenia. She elected not to restart the Raeann Parents, even at a lower dose. She presented in 2017 to discuss other PARP alternatives. She was started on Lynparza at a one-step dose reduction of 250 mg bid. Due to side effects she was reduced to 200 mg bid on 18.   On 1/15/18 she stopped therapy due to abdominal pain and nausea, and most of all, fatigue. She presented not too long ago for follow up and reported lower abdominal pain. Her CA-125 was rising, up to 41, when it was previously <4. She had some complaints of lower abdominal pain, in addition to her rising CA-125. Her CT demonstrated recurrent disease. I discussed with her and her  multiple treatment options. She went to [facility] for a second opinion. They essentially offered her similar treatment options. She was not a candidate for a clinical trial due to her diagnosis of MS. She presented again discuss treatment options. We ultimately decided on Rubraca. She was only on therapy for about 2 1/2 months. She had been tolerating it very well. Her CA-125 did respond. She was in [facility] for vacation in June when she developed significant ascites, requiring drainage at [address] in [place]. Her CT scan upon return unfortunately demonstrated progression. I recommended a combination of Doxil/Avastin. I felt the Avastin would help with the ascites. She has completed one cycle so far. She had a paracentesis on 7/20/18, which drained 1800 cc. She seems to be doing well so far with the new regimen. She has some constipation issues. Her appetite is OK, but she has lost some weight. ROS:   and GI review:  Negative  Cardiopulmonary review:  Negative   Musculoskeletal:  Negative    A comprehensive review of systems was negative except for that written in the History of Present Illness.  , 10 point ROS      Problem List:  Patient Active Problem List    Diagnosis Date Noted    Cancer associated pain 08/09/2018    Other proteinuria 07/26/2018    Anemia due to chemotherapy 07/26/2018    On anticoagulant therapy 07/19/2018    Peritoneal carcinomatosis ([facility] 06/01/2018    Malignant ascites 06/01/2018    Noninfectious gastroenteritis 11/24/2017    Thrombocytopenia ([facility] 03/17/2017    CINV (chemotherapy-induced nausea and vomiting) 03/17/2017    Dehydration 03/17/2017    Malignant neoplasm of both ovaries ([facility] 04/09/2015    Pelvic mass 03/06/2015     PMH:  Past Medical History:   Diagnosis Date    Anxiety     BRCA1 positive     Calculus of kidney 1/13    right    Hernia, inguinal, left 2012    MS (multiple sclerosis) ([facility] 1996    Nausea & vomiting     Ovarian cancer ([facility] 3/2015    high grade, stage 3C papillary serous    Thromboembolus ([facility] 8/2007    lower abdomen post fall      PSH:  Past Surgical History:   Procedure Laterality Date    HX GYN  2009    endometrial ablation with punctured uterus    HX OTHER SURGICAL  8/2007    green filter    HX MARIEL AND BSO  3/2014    ovarian cancer      Social History:  Social History   Substance Use Topics    Smoking status: Never Smoker    Smokeless tobacco: Never Used    Alcohol use No      Family History:  Family History   Problem Relation Age of Onset    Cancer Paternal Grandmother      breast    Breast Cancer Paternal Grandmother     Heart Disease Mother     Heart Disease Father       Medications: (reviewed)  Current Outpatient Prescriptions   Medication Sig    oxyCODONE ER (OXYCONTIN) 10 mg ER tablet Take 1 Tab by mouth every twelve (12) hours. Max Daily Amount: 20 mg.    senna-docusate (PERICOLACE) 8.6-50 mg per tablet Take 1 Tab by mouth daily. Indications: constipation    dexamethasone (DECADRON) 4 mg tablet Take 2 Tabs by mouth as needed. As needed for nausea    LORazepam (ATIVAN) 1 mg tablet TAKE 1 TABLET BY MOUTH AT BEDTIME as needed  Indications: anxiety    diphenhydrAMINE-acetaminophen (TYLENOL PM EXTRA STRENGTH)  mg tab Take  by mouth.  XARELTO 20 mg tab tablet     promethazine (PHENERGAN) 12.5 mg tablet     diphenoxylate-atropine (LOMOTIL) 2.5-0.025 mg per tablet Take 1 Tab by mouth four (4) times daily as needed for Diarrhea. Max Daily Amount: 4 Tabs.     ondansetron hcl (ZOFRAN, AS HYDROCHLORIDE,) 4 mg tablet Take 4 mg by mouth every eight (8) hours as needed for Nausea. No current facility-administered medications for this visit. Allergies: (reviewed)  Allergies   Allergen Reactions    Pcn [Penicillins] Rash          OBJECTIVE:    Physical Exam:  VITAL SIGNS: Vitals:    08/21/18 0900   BP: 99/69   Pulse: (!) 103   Weight: 129 lb (58.5 kg)   Height: 5' 8.11\" (1.73 m)     Body mass index is 19.55 kg/(m^2). GENERAL FILIBERTO: Conversant, alert, oriented. No acute distress. HEENT: HEENT. No thyroid enlargement. No JVD. Neck: Supple without restrictions. RESPIRATORY: Clear to auscultation and percussion to the bases. No CVAT. CARDIOVASC: RRR without murmur/rub. GASTROINT: Soft, non-distended, without masses or organomegaly. MUSCULOSKEL: no joint tenderness, deformity or swelling   EXTREMITIES: extremities normal, atraumatic, no cyanosis or edema   PELVIC: Deferred   RECTAL: Deferred   CHARMAINE SURVEY: No suspicious lymphadenopathy or edema noted. NEURO: Grossly intact. No acute deficit.          Lab Results   Component Value Date/Time    WBC 2.7 (L) 08/15/2018 01:05 PM    HGB 8.9 (L) 08/15/2018 01:05 PM    HCT 28.3 (L) 08/15/2018 01:05 PM    PLATELET 498 53/54/9456 01:05 PM    .8 (H) 08/15/2018 01:05 PM     Lab Results   Component Value Date/Time    Sodium 141 07/23/2018 10:05 AM    Potassium 4.6 07/23/2018 10:05 AM    Chloride 101 07/23/2018 10:05 AM    CO2 23 07/23/2018 10:05 AM    Anion gap 8 06/18/2018 08:32 AM    Glucose 93 07/23/2018 10:05 AM    BUN 14 07/23/2018 10:05 AM    Creatinine 0.79 07/23/2018 10:05 AM    BUN/Creatinine ratio 18 07/23/2018 10:05 AM    GFR est AA 99 07/23/2018 10:05 AM    GFR est non-AA 86 07/23/2018 10:05 AM    Calcium 9.2 07/23/2018 10:05 AM     Lab Results   Component Value Date/Time    CA-125 319 (H) 06/18/2018 08:32 AM    Cancer Ag (CA) 125 754.1 (H) 07/23/2018 10:05 AM       CT of chest/abdomen/pelvis (7/9/18)  LUNG BASES: Clear.  INCIDENTALLY IMAGED HEART AND MEDIASTINUM: Incompletely imaged. LIVER: No measurable hepatic mass. GALLBLADDER: Unremarkable. SPLEEN: No mass. PANCREAS: No mass or ductal dilatation. ADRENALS: Unremarkable. KIDNEYS: No solid renal mass or hydronephrosis. Ureters are not dilated. STOMACH: Partial distention with enteric contrast and food products. SMALL BOWEL: No obstruction. Mural thickening of the serosal surface of small  bowel loops in the superior midline of the pelvis is compatible with peritoneal  disease. COLON: No obstruction. Tortuous course. APPENDIX: Small versus resected. Peritoneal soft tissue attenuation at the  expected location of the appendix measures 2.4 x 1.4 x 2.5 cm. PERITONEUM: Peritoneal metastatic disease is increased. Gastrohepatic ligament  soft tissue measures 3.0 x 1.4 x 3.0 cm. Posterior left upper quadrant  peritoneal soft tissue metastasis is inferior to the spleen and measures 3.3 x  1.7 x 2.0 cm. Peritoneum has nodular thickening posterior, inferior, and lateral  to the right hepatic lobe. Anterior left peritoneal mass along the undersurface  of the left rectus abdominous muscle measures 2.4 x 0.8 x 1.8 cm. Small volume  of ascites is slightly increased. RETROPERITONEUM: No lymphadenopathy or aortic aneurysm. IVC filter is present. REPRODUCTIVE ORGANS: Uterus and ovaries have been resected. URINARY BLADDER: No mass or calculus. BONES: No destructive bone lesion. ADDITIONAL COMMENTS: N/A     IMPRESSION:  1. Progression of peritoneal metastatic disease. Largest volume of disease is in  the upper pelvis, gastrohepatic ligament, and adjacent to the spleen. 2. Small volume of ascites is slightly increased. 3. No bowel obstruction. IMPRESSION/PLAN:  Judith Ngo is a 48 y.o. female with a history of recurrent ovarian cancer. She has some complaints of lower abdominal pain and her CA-125 is rising. Her CT demonstrates recurrent disease.   At her last visit I discussed with her and her  multiple treatment options. She went to [facility] for a second opinion. They essentially offered her similar treatment options. She was not a candidate for a clinical trial due to her diagnosis of MS. I sat with her and her  for about an hour today, discussing different treatment options, including Doxil, Avastin, Topotecan, and Rubraca. She is BRCA positive and platinum sensitive. This should indicate that she would respond well to a PARP. She did not tolerate prior treatment with Lynparza. I suggested Janell Chavez, as it would allow her to travel and be less tied down. Unfortunately the Rubraca did not work very long, as she demonstrated progression on imaging. I have now recommended a combination of Doxil/Avastin. I felt the Avastin would help with the ascites. She has completed one cycle so far. She appears to be tolerating well. We will continue with the current regimen.       Signed By: Jarocho Timmons MD     8/21/2018/3:54 PM

## 2018-08-23 NOTE — PROGRESS NOTES
0800 Pt admit to [facility] for C2D1 Avastin/Doxil ambulatory in stable condition. Assessment completed. No new concerns voiced. Port accessed and flushed with positive blood return. Normal Saline bolus 1000ml started. Visit Vitals    BP 93/61    Pulse 88    Temp 98.2 °F (36.8 °C)    Resp 18    Ht 5' 8.11\" (1.73 m)    Wt 58.9 kg (129 lb 12.8 oz)    LMP 02/02/2015 (Approximate)    BMI 19.67 kg/m2       Medications:  Normal Saline 1000ml bolus  Dextrose 5% KVO  Zofran IV Push  Avastin  Liposomal Doxorubicin  Heparin Flush          1115 Pt tolerated treatment well. Port maintained positive blood return throughout treatment, flushed with positive blood return at conclusion and port heparinized and de accessed per protocol. D/c home ambulatory in no distress. Pt aware of next [facility] appointment scheduled for 9/6/18.

## 2018-08-27 NOTE — PROGRESS NOTES
[address] [facility] Office  Nursing Note  [phone]  Fax [phone]      Name:  Benigno Cruz  YOB: 1965    Pt called to self-refer to Palliative Medicine for symptom management and supportive care. Chart  reviewed. Benigno Cruz is a 48 y.o. female with recurrent ovarian cancer (initial diagnosis in March 2015, pt underwent exploratory lap, hysterectomy, and tumor debulking by Dr. Jermaine Wayne). Pt's most recent office visit with Dr. Vira Karimi was on 8/21/18. See his note for complete HPI, treatment history, and plan. Pt began developing ascites in June 2018--- she was in [facility] on vacation in June when she developed significant ascites, requiring drainage at [address] [place] [address] in [place]. Her CT scan upon return demonstrated progression of disease. Pt has completed one cycle of Avastin. Pt's most recent paracentesis was on 7/20/18.     ACP:   None on file                                                                                                                                                        Appt scheduled for 8/30/18 at 9:30am with Dr. David Mejía, [facility] office.  CANDICE DumontN, RN-BC  Clinical Referral Navigator  [phone]

## 2018-08-27 NOTE — PROGRESS NOTES
Pt called to say she was out of her Lorazapam and it has been helping her get a good night's sleep. Says she only uses it at night when she has too much on her mind. She also discussed her current pain regimen. Oxy ER 10 mg Q12. She feels that since she has had a decrease in her ascites, she has less pain and she would like to try to take less of it. We discussed ways to do this beginning with trying to just take it in the morning only and see how she does, but if she finds her pain is worse at night, she can try just taking it then. Discussed with her that if her goal was to just take it once a day, she was welcome to take it any time she felt it would work best for her lifestyle. She will try the above and let me know how she does next week.

## 2018-08-30 NOTE — ACP (ADVANCE CARE PLANNING)
Advance Care Planning 8/30/2018 Patient's Healthcare Decision Maker is: Legal Next of Kin Primary Decision Maker Name Sowmya Smith Primary Decision Maker Phone Number [phone] Primary Decision Maker Relationship to Patient Spouse Confirm Advance Directive -

## 2018-08-30 NOTE — PATIENT INSTRUCTIONS
Dear Earvin Spatz , It was a pleasure seeing you today  at [facility] office Your stated goal:  
-To continue to have good quality of life 
-Know when her prognosis is limited so she can make plans accordingly Your described symptoms were: Fatigue: 4 Drowsiness: 5 Depression: 3 Pain: 2 Anxiety: 3 Nausea: 2 Anorexia: 3 Dyspnea: 2 Best Well-Bein Constipation: No  
Other Problem (Comment): 0 This is the plan we talked about: 1. Abdominal pain 
-You are on OxyContin 10 mg 2 times a day which is making you quite sleepy during the day and it runs out around 4 PM every day leaving you in pain until your next dose. We talked about how OxyContin is a long-acting medicine and it may be a bit too much for you given that you have not been on any intermittent release  opioids in the past. 
-We agreed on the following 
 -Do not take the morning dose of OxyContin. Continue nighttime dose of OxyContin 10 mg. 
 -Start oxycodone 2.5mg tablet every 4-6 hours as needed for pain. If the 2.5 mg dose does not help you in bringing the pain down to a functional level then go ahead and take a 5 mg tablet the next time. 
 -With this, we hope to relieve the excessive fatigue and drowsiness that you have  during the day and add an intermittent release medicine around 3 or 4:00 when you have pain anyway. 2.  Constipation - We talked-at length about the importance of preventing constipation and how you are at a very high risk for constipation given your disease, chemotherapy, opioid pain medications increase. - Increase Senna plus to 1 tablet 2 times a day 
-Take MiraLAX daily 
-Start drinking warm prune juice every morning. 
- If you skip a day of bowel movement while on above regimen, take a Dulcolax suppository. I have sent this to your pharmacy. 3.  Sleep and anxiety 
-You are on Ativan 1 mg at bedtime and you have been on this for many years so we can continue that. 4.  Dehydration - This is a worry especially after chemotherapy. - You are going to get fluids with chemotherapy next week. - If you are feeling poorly a day or 2 after therapy, IV fluids can make a big difference in how you feel. Please call us so we can set up fluids in the outpatient infusion center or if you are really sick, we can do fluids with the help of \"[employer] [facility]\" at home 5. Goals of care 
-We talked about what is important to you. You want to have good quality of life, good symptom management and more time with her family.   
-Your son is getting  in [place] in November and you want to have a good time. - You want doctors to be honest and open about your prognosis so you can make decisions with your care team on what is the next best step for you. 
-You are reassured with the current decision of proceeding with chemotherapy and you are tolerating it quite well.  
-You want to avoid emergency room visits. We talked about how important it is to communicate your symptoms or if you are feeling poorly to either Dr. Geovany [place] office or to our office as early as possible so we can work on alleviating your symptoms without having to bring you to the emergency room. This is what you have shared with us about Advance Care Planning Advance Care Planning 8/30/2018 Patient's Healthcare Decision Maker is: Legal Next of Kin Primary Decision Maker Name Travis Almanzar Primary Decision Maker Phone Number [phone] Primary Decision Maker Relationship to Patient Spouse Confirm Advance Directive - The Palliative Medicine Team is here to support you and your family. We will see you again in 4 weeks Sincerely, Jignesh Peck MD and the Palliative Medicine Team

## 2018-08-30 NOTE — PROGRESS NOTES
Palliative Medicine Nursing Note 
[phone] Fax [phone] Clinic Office Visit Patient Name: Katy Vaca YOB: [birth year] Dr. Dione Tsang After Visit Summary (AVS) reviewed with patient. Patient has no questions at this time. One Oxycodone IR 5mg prescription with start date of 8/30/18 given to patient. Provided Welcome New Patient Packet- Opioid Safety Sheet, Palliative Medicine Introduction Sheet, Welcome Letter, Advance Care Planning information, magnet with on-call phone number and information. Patient verbalized understanding that if any questions or concerns should arise prior to next office visit, patient/family member will call the Palliative Medicine office at [phone] 24 hours a day 7 days a week. Follow up appointment to be scheduled:  9/27/18 at 8:30am 
 
Nurse Navigator to provide a post follow up phone call in approximately: 1-2 weeks Today's AVS was sent to pt via [employer]. Rao Westbrook RN-BC Palliative Medicine

## 2018-08-30 NOTE — MR AVS SNAPSHOT
[address] [facility] [address] [place] [address] 
[phone] Patient: Letty Brain MRN: [id number] :1965 Visit Information Date & Time Provider Department Dept. Phone Encounter #  
 2018  9:30 AM Jason Abreu MD Palliative [address] [place] [address] [id number] Follow-up Instructions Return in about 4 weeks (around 2018). Follow-up and Disposition History Your Appointments 2018 10:00 AM  
CHEMOTHERAPY with Collins Chance MD  
[address] Oncology [facility]) Appt Note: 3rd floor labs today. .for c3d1 doxil/avastin  at [address] on   
 [address] [place] [address] [address] [phone]  
[address] [place] [address] [place] [address] 2018  1:30 PM  
New Patient with Mauricio Munoz MD  
[address] [address] Internal Medicine [facility] Appt Note: to get est; .  
 [address] [facility] [address]  
[address] [facility]  
  
    
 2018  8:30 AM  
Follow Up with Jason Abreu MD  
Palliative [facility] Appt Note: Follow up for symptom management 18  
 [address] [place] [address] [address] [facility] [address]  
[phone]  
  
   
 [address] [place] [address] [address] [place] [address] Upcoming Health Maintenance Date Due Hepatitis C Screening 1965 Pneumococcal 19-64 Highest Risk (1 of 3 - PCV13) 1984 DTaP/Tdap/Td series (1 - Tdap) 1986 PAP AKA CERVICAL CYTOLOGY 1986 FOBT Q 1 YEAR AGE 50-75 2015 Influenza Age 5 to Adult 2018 BREAST CANCER SCRN MAMMOGRAM 2020 Allergies as of 2018  Review Complete On: 2018 By: Mi Weinstein LPN Severity Noted Reaction Type Reactions Pcn [Penicillins]  2013    Rash Current Immunizations  Reviewed on 2018 No immunizations on file. Not reviewed this visit You Were Diagnosed With   
  
 Codes Comments Cancer associated pain    -  Primary ICD-10-CM: G89.3 ICD-9-CM: 338. 3 Abdominal pain, generalized     ICD-10-CM: R10.84 ICD-9-CM: 789.07 Slow transit constipation     ICD-10-CM: K59.01 
ICD-9-CM: 564.01 Neoplastic malignant related fatigue     ICD-10-CM: R53.0 ICD-9-CM: 780.79 Malignant neoplasm of ovary, unspecified laterality ([facility] [address]     ICD-10-CM: C56.9 ICD-9-CM: 183.0 Peritoneal metastases ([facility] [address]     ICD-10-CM: C78.6 ICD-9-CM: 197.6 Vitals BP Pulse Temp Resp Height(growth percentile) Weight(growth percentile) 108/74 (BP 1 Location: Left arm, BP Patient Position: Sitting) 94 96.1 °F (35.6 °C) (Oral) 18 5' 8\" (1.727 m) 128 lb 9.6 oz (58.3 kg) LMP SpO2 BMI OB Status Smoking Status 02/02/2015 (Approximate) 97% 19.55 kg/m2 Hysterectomy Never Smoker Vitals History BMI and BSA Data Body Mass Index Body Surface Area  
 19.55 kg/m 2 1.67 m 2 Preferred Pharmacy Pharmacy Name Phone [id number], [address] [phone] Your Updated Medication List  
  
   
This list is accurate as of 8/30/18  2:43 PM.  Always use your most recent med list.  
  
  
  
  
 dexamethasone 4 mg tablet Commonly known as:  DECADRON Take 2 Tabs by mouth as needed. As needed for nausea diphenoxylate-atropine 2.5-0.025 mg per tablet Commonly known as:  LOMOTIL Take 1 Tab by mouth four (4) times daily as needed for Diarrhea. Max Daily Amount: 4 Tabs. LORazepam 1 mg tablet Commonly known as:  ATIVAN Take 1 Tab by mouth nightly as needed for Anxiety. Max Daily Amount: 1 mg. Indications: CANCER CHEMOTHERAPY-INDUCED NAUSEA AND VOMITING  
  
 * oxyCODONE ER 10 mg ER tablet Commonly known as:  OxyCONTIN Take 1 Tab by mouth every twelve (12) hours. Max Daily Amount: 20 mg. * oxyCODONE IR 5 mg immediate release tablet Commonly known as:  Chares Liss Take 0.5 Tabs by mouth every four (4) hours as needed for Pain. Max Daily Amount: 15 mg.  
  
 promethazine 12.5 mg tablet Commonly known as:  PHENERGAN  
  
 senna-docusate 8.6-50 mg per tablet Commonly known as:  Tory Distad Take 1 Tab by mouth daily. Indications: constipation TYLENOL PM EXTRA STRENGTH  mg Tab Generic drug:  diphenhydrAMINE-acetaminophen Take  by mouth. XARELTO 20 mg Tab tablet Generic drug:  rivaroxaban ZOFRAN 4 mg tablet Generic drug:  ondansetron hcl Take 4 mg by mouth every eight (8) hours as needed for Nausea. * Notice: This list has 2 medication(s) that are the same as other medications prescribed for you. Read the directions carefully, and ask your doctor or other care provider to review them with you. Prescriptions Printed Refills  
 oxyCODONE IR (ROXICODONE) 5 mg immediate release tablet 0 Sig: Take 0.5 Tabs by mouth every four (4) hours as needed for Pain. Max Daily Amount: 15 mg.  
 Class: Print Route: Oral  
  
Follow-up Instructions Return in about 4 weeks (around 2018). To-Do List   
 2018 8:00 AM  
  Appointment with Vinicio Alexandre at [facility] ([phone])  
  
 2018 8:00 AM  
  Appointment with Vinicio Alexandre at [facility] ([phone]) 10/04/2018 8:00 AM  
  Appointment with Anitha Dinero FT CHAIR 1 at [facility] ([phone]) Patient Instructions Dear Benigno Cruz , It was a pleasure seeing you today  at [facility] office Your stated goal:  
-To continue to have good quality of life 
-Know when her prognosis is limited so she can make plans accordingly Your described symptoms were: Fatigue: 4 Drowsiness: 5 Depression: 3 Pain: 2 Anxiety: 3 Nausea: 2 Anorexia: 3 Dyspnea: 2 Best Well-Bein Constipation: No  
Other Problem (Comment): 0 This is the plan we talked about: 1. Abdominal pain 
-You are on OxyContin 10 mg 2 times a day which is making you quite sleepy during the day and it runs out around 4 PM every day leaving you in pain until your next dose. We talked about how OxyContin is a long-acting medicine and it may be a bit too much for you given that you have not been on any intermittent release  opioids in the past. 
-We agreed on the following 
 -Do not take the morning dose of OxyContin. Continue nighttime dose of OxyContin 10 mg. 
 -Start oxycodone 2.5mg tablet every 4-6 hours as needed for pain. If the 2.5 mg dose does not help you in bringing the pain down to a functional level then go ahead and take a 5 mg tablet the next time. 
 -With this, we hope to relieve the excessive fatigue and drowsiness that you have  during the day and add an intermittent release medicine around 3 or 4:00 when you have pain anyway. 2.  Constipation - We talked-at length about the importance of preventing constipation and how you are at a very high risk for constipation given your disease, chemotherapy, opioid pain medications increase. - Increase Senna plus to 1 tablet 2 times a day 
-Take MiraLAX daily 
-Start drinking warm prune juice every morning. 
- If you skip a day of bowel movement while on above regimen, take a Dulcolax suppository. I have sent this to your pharmacy. 3.  Sleep and anxiety 
-You are on Ativan 1 mg at bedtime and you have been on this for many years so we can continue that. 4.  Dehydration - This is a worry especially after chemotherapy. - You are going to get fluids with chemotherapy next week. - If you are feeling poorly a day or 2 after therapy, IV fluids can make a big difference in how you feel. Please call us so we can set up fluids in the outpatient infusion center or if you are really sick, we can do fluids with the help of \"[facility]\" at home 5. Goals of care -We talked about what is important to you. You want to have good quality of life, good symptom management and more time with her family.   
-Your son is getting  in [address] [facility] in November and you want to have a good time. - You want doctors to be honest and open about your prognosis so you can make decisions with your care team on what is the next best step for you. 
-You are reassured with the current decision of proceeding with chemotherapy and you are tolerating it quite well.  
-You want to avoid emergency room visits. We talked about how important it is to communicate your symptoms or if you are feeling poorly to either Dr. Salvador Bowen office or to our office as early as possible so we can work on alleviating your symptoms without having to bring you to the emergency room. This is what you have shared with us about Advance Care Planning Advance Care Planning 8/30/2018 Patient's Healthcare Decision Maker is: Legal Next of Kin Primary Decision Maker Name Karl Gordon Primary Decision Maker Phone Number [phone] Primary Decision Maker Relationship to Patient Spouse Confirm Advance Directive - The Palliative Medicine Team is here to support you and your family. We will see you again in 4 weeks Sincerely, Lisbet Christine MD and the Palliative Medicine Team 
 
 
 Patient Instructions History Introducing [facility]! Dear Nalini Duncan: Thank you for requesting a [employer] account. Our records indicate that you already have an active [employer] account. You can access your account anytime at [email or web address] Did you know that you can access your hospital and ER discharge instructions at any time in [employer]? You can also review all of your test results from your hospital stay or ER visit. Additional Information If you have questions, please visit the Frequently Asked Questions section of the [employer] website at [email or web address] Remember, [employer] is NOT to be used for urgent needs. For medical emergencies, dial 911. Now available from your iPhone and Android! Please provide this summary of care documentation to your next provider. Your primary care clinician is listed as Dillan Judd. If you have any questions after today's visit, please call [phone].

## 2018-08-30 NOTE — PROGRESS NOTES
Palliative Medicine Outpatient Services [place]: [phone]) Patient Name: Benigno Cruz YOB: 1965 Date of Current Visit: 08/30/18 Location of Current Visit:   
[x] [facility] Office 
[] [facility] Office [] [address] [place] [address] 
[] Home 
[] Other:   
 
Date of Initial Visit: 8/30/18 Requesting Physician: Self referral 
Primary Care Physician: Abbie Robles MD 
  
 SUMMARY:  
Benigno Cruz is a 48y.o. year old with a  history of stage III Ovarian cancer diagnosed in 2015 s/p hysterectomy and extensive debulking and chemo with remission until 2017. she has been on several lines of chemo and or immunotherapy with either poor tolerance to medicine or progression of disease. She recently suffered severe ascites and progression of disease on Rubraca. She is currently on Doxol and Avastin. She self referred to palliative medicine for ongoing support and advocacy for her wishes. The patients social history includes worked as a  for  [employer] but on [employer] now.  is an . Has 4 children and 2 grandchildren. Her youngest child just went off to college. PALLIATIVE DIAGNOSES:  
 
  ICD-10-CM ICD-9-CM 1. Cancer associated pain G89.3 338.3 oxyCODONE IR (ROXICODONE) 5 mg immediate release tablet 2. Abdominal pain, generalized R10.84 789.07   
3. Slow transit constipation K59.01 564.01   
4. Neoplastic malignant related fatigue R53.0 780.79   
5. Malignant neoplasm of ovary, unspecified laterality (HCC) C56.9 183.0 6. Peritoneal metastases (HCC) C78.6 197.6 PLAN:  
Patient Instructions Dear Benigno Cruz , It was a pleasure seeing you today  at [facility] office Your stated goal:  
-To continue to have good quality of life 
-Know when her prognosis is limited so she can make plans accordingly Your described symptoms were: Fatigue: 4 Drowsiness: 5 Depression: 3 Pain: 2 Anxiety: 3 Nausea: 2 Anorexia: 3 Dyspnea: 2 Best Well-Bein Constipation: No  
Other Problem (Comment): 0 This is the plan we talked about: 1. Abdominal pain 
-You are on OxyContin 10 mg 2 times a day which is making you quite sleepy during the day and it runs out around 4 PM every day leaving you in pain until your next dose. We talked about how OxyContin is a long-acting medicine and it may be a bit too much for you given that you have not been on any intermittent release  opioids in the past. 
-We agreed on the following 
 -Do not take the morning dose of OxyContin. Continue nighttime dose of OxyContin 10 mg. 
 -Start oxycodone 2.5mg tablet every 4-6 hours as needed for pain. If the 2.5 mg dose does not help you in bringing the pain down to a functional level then go ahead and take a 5 mg tablet the next time. 
 -With this, we hope to relieve the excessive fatigue and drowsiness that you have  during the day and add an intermittent release medicine around 3 or 4:00 when you have pain anyway. 2.  Constipation - We talked-at length about the importance of preventing constipation and how you are at a very high risk for constipation given your disease, chemotherapy, opioid pain medications increase. - Increase Senna plus to 1 tablet 2 times a day 
-Take MiraLAX daily 
-Start drinking warm prune juice every morning. 
- If you skip a day of bowel movement while on above regimen, take a Dulcolax suppository. I have sent this to your pharmacy. 3.  Dehydration - This is a worry especially after chemotherapy. - You are going to get fluids with chemotherapy next week. - If you are feeling poorly a day or 2 after therapy, IV fluids can make a big difference in how you feel. Please call us so we can set up fluids in the outpatient infusion center or if you are really sick, we can do fluids with the help of \"[facility]\" at home 4. Goals of care 
-We talked about what is important to you.   You want to have good quality of life, good symptom management and more time with her family. You want doctors to be honest and open about your prognosis so you can make decisions with your care team on what is the next best step for you. 
-You are reassured with the current decision of proceeding with chemotherapy and you are tolerating it quite well.  
-You want to avoid emergency room visits. We talked about how important it is to communicate your symptoms or if you are feeling poorly to either Dr. Coleen Nava office or to our office as early as possible so we can work on alleviating your symptoms without having to bring you to the emergency room. This is what you have shared with us about Advance Care Planning Advance Care Planning 8/30/2018 Patient's Healthcare Decision Maker is: Legal Next of Kin Primary Decision Maker Name Roman Humphrey Primary Decision Maker Phone Number [phone] Primary Decision Maker Relationship to Patient Spouse Confirm Advance Directive - The Palliative Medicine Team is here to support you and your family. We will see you again in 4 weeks Sincerely, Yo Pace MD and the Palliative Medicine Team 
 
 
Counseling and Coordination:  
See above GOALS OF CARE / TREATMENT PREFERENCES:  
[====Goals of Care====] GOALS OF CARE: 
Patient / health care proxy stated goals: FULL code TREATMENT PREFERENCES:  
Code Status:  [x] Attempt Resuscitation       [] Do Not Attempt Resuscitation Advance Care Planning: 
Advance Care Planning 8/30/2018 Patient's Healthcare Decision Maker is: Legal Next of Kin Primary Decision Maker Name Roman Humphrey Primary Decision Maker Phone Number [phone] Primary Decision Maker Relationship to Patient Spouse Confirm Advance Directive - Other: 
(If patient appropriate for POST, consider using PALLPOST smart phrase here) The palliative care team has discussed with patient / health care proxy about goals of care / treatment preferences for patient. 
[====Goals of Care====] PRESCRIPTIONS GIVEN:  
 
Medications Ordered Today Medications  oxyCODONE IR (ROXICODONE) 5 mg immediate release tablet Sig: Take 0.5 Tabs by mouth every four (4) hours as needed for Pain. Max Daily Amount: 15 mg. Dispense:  60 Tab Refill:  0  
  
 
 
 FOLLOW UP: Future Appointments Date Time Provider [facility] [place] 9/6/2018 8:00 AM [facility] CHAIR 1 [facility]  
9/18/2018 10:00 AM Nilda Galvan MD [address] [facility]  
9/18/2018 1:30 [place] [id number] [facility]  
9/20/2018 8:00 [facility]  
10/4/2018 8:00 AM [facility] 1 [facility]  
  
 
 
 PHYSICIANS INVOLVED IN CARE:  
Patient Care Team: 
Nadine Marie MD as PCP - General (Internal Medicine) Nilda Galvan MD (Gynecologic Oncology) Samantha Dang MD (Breast Surgery) Lorraine Garner RN as Nurse Navigator (Oncology) HISTORY:  
Nursing documentation from date of visit reviewed. Reviewed patient-completed ESAS and advance care planning form. Reviewed patient record in prescription monitoring program. 
 
CHIEF COMPLAINT: Abdominal pain, fatigue HPI/SUBJECTIVE: The patient is: [x] Verbal / [] Nonverbal  
Very pleasant and bright woman. Here with her friend You Valle. She talks about her belief and how palliative medicine can be very helpful especially after reading \"being mortal \". She is a big believer and supportive care and having more people on the care team to the patient and the family can feel supported. She advocated to start palliative medicine amongst her family who was skeptical about the word palliative medicine. Abdominal pain-crampy, colicky abdominal pain that is present mostly in the upper quadrants. Started about 1-1/2 months ago.   Was started on OxyContin 10 mg 2 times a day about 4 weeks ago and she notices that she is very drowsy during the day and has to give everything she is got to be awake and to thinks that she wants to do. However her pain returns around 3 or 4:00 and she suffers until her next dose of OxyContin around 8 PM.  She was so drowsy with the OxyContin she needed a short dose of steroids to keep her awake when she had to attend her daughter's whitecoat ceremony. Constipation-currently she has daily bowel movements but she is very afraid of developing severe constipation has she had many times before. Fatigue-understands that having cancer and being on chemotherapy thus make you tired. But she feels more fatigued over the last few weeks especially since starting oxycodone. She likes to swim and tries to swim almost half a mile every day but she is very tired after that. She loves to read She loves to travel and read. Went to [facility] and July where she ended up in the hospital with severe ascites and thinks that flying made things worse. She has 4 kids her oldest is  and has 2 kids ages 3 and 1 months. Her other son is an  in [place] and is about to get  in November. Her daughter is in veterinary school and had a white coat ceremony recently. Other daughter just started school at [place]. Clinical Pain Assessment (nonverbal scale for nonverbal patients):  
[++++ Clinical Pain Assessment++++] [++++Pain Severity++++]: Pain: 2 
[++++Pain Character++++]: cramping 
[++++Pain Duration++++]: weeks [++++Pain Effect++++]: functional 
[++++Pain Factors++++]: none in particular 
[++++Pain Frequency++++]: constant [++++Pain Location++++]: upper abdomen 
[++++ Clinical Pain Assessment++++] FUNCTIONAL ASSESSMENT:  
 
Palliative Performance Scale (PPS): PPS: 80 PSYCHOSOCIAL/SPIRITUAL SCREENING:  
 
Any spiritual / Roman Catholic concerns: 
[] Yes /  [x] No 
 
Caregiver Burnout: 
[] Yes /  [x] No /  [] No Caregiver Present Anticipatory grief assessment: [x] Normal  / [] Maladaptive ESAS Anxiety: Anxiety: 3 ESAS Depression: Depression: 3 REVIEW OF SYSTEMS:  
 
The following systems were [] reviewed / [] unable to be reviewed Systems: constitutional, ears/nose/mouth/throat, respiratory, gastrointestinal, genitourinary, musculoskeletal, integumentary, neurologic, psychiatric, endocrine. Positive findings noted below. Modified ESAS Completed by: provider Fatigue: 4 Drowsiness: 5 Depression: 3 Pain: 2 Anxiety: 3 Nausea: 2 Anorexia: 3 Dyspnea: 2 Best Well-Bein Constipation: No  
Other Problem (Comment): 0 PHYSICAL EXAM:  
 
Wt Readings from Last 3 Encounters:  
18 128 lb 9.6 oz (58.3 kg) 18 129 lb 12.8 oz (58.9 kg) 18 129 lb (58.5 kg) Blood pressure 108/74, pulse 94, temperature 96.1 °F (35.6 °C), temperature source Oral, resp. rate 18, height 5' 8\" (1.727 m), weight 128 lb 9.6 oz (58.3 kg), last menstrual period 2015, SpO2 97 %. Last bowel movement: See Nursing Note Constitutional: Alert, oriented ×4, appears well Eyes: pupils equal, anicteric ENMT: no nasal discharge, moist mucous membranes Cardiovascular: regular rhythm, distal pulses intact Respiratory: breathing not labored, symmetric Gastrointestinal: soft non-tender, +bowel sounds Musculoskeletal: no deformity, no tenderness to palpation Skin: warm, dry Neurologic: following commands, moving all extremities Psychiatric: full affect, no hallucinations Other: 
 
 
 HISTORY:  
 
Past Medical History:  
Diagnosis Date  Anxiety  BRCA1 positive  Calculus of kidney   
 right  Hernia, inguinal, left   MS (multiple sclerosis) ([address]   Nausea & vomiting  Ovarian cancer ([address] 3/2015  
 high grade, stage 3C papillary serous  Thromboembolus ([address] 2007  
 lower abdomen post fall Past Surgical History:  
Procedure Laterality Date  HX GYN  2009  
 endometrial ablation with punctured uterus  HX OTHER SURGICAL  8/2007  
 green filter  HX MARIEL AND BSO  3/2014  
 ovarian cancer Family History Problem Relation Age of Onset  Cancer Paternal Grandmother   
  breast  
 Breast Cancer Paternal Grandmother  Heart Disease Mother  Heart Disease Father History reviewed, no pertinent family history. Social History Substance Use Topics  Smoking status: Never Smoker  Smokeless tobacco: Never Used  Alcohol use No  
 
Allergies Allergen Reactions  Pcn [Penicillins] Rash Current Outpatient Prescriptions Medication Sig  
 oxyCODONE IR (ROXICODONE) 5 mg immediate release tablet Take 0.5 Tabs by mouth every four (4) hours as needed for Pain. Max Daily Amount: 15 mg.  LORazepam (ATIVAN) 1 mg tablet Take 1 Tab by mouth nightly as needed for Anxiety. Max Daily Amount: 1 mg. Indications: CANCER CHEMOTHERAPY-INDUCED NAUSEA AND VOMITING  
 oxyCODONE ER (OXYCONTIN) 10 mg ER tablet Take 1 Tab by mouth every twelve (12) hours. Max Daily Amount: 20 mg.  
 senna-docusate (PERICOLACE) 8.6-50 mg per tablet Take 1 Tab by mouth daily. Indications: constipation  diphenhydrAMINE-acetaminophen (TYLENOL PM EXTRA STRENGTH)  mg tab Take  by mouth.  XARELTO 20 mg tab tablet  dexamethasone (DECADRON) 4 mg tablet Take 2 Tabs by mouth as needed. As needed for nausea  promethazine (PHENERGAN) 12.5 mg tablet  diphenoxylate-atropine (LOMOTIL) 2.5-0.025 mg per tablet Take 1 Tab by mouth four (4) times daily as needed for Diarrhea. Max Daily Amount: 4 Tabs.  ondansetron hcl (ZOFRAN, AS HYDROCHLORIDE,) 4 mg tablet Take 4 mg by mouth every eight (8) hours as needed for Nausea. No current facility-administered medications for this visit. LAB DATA REVIEWED:  
 
Lab Results Component Value Date/Time WBC 4.4 08/21/2018 09:43 AM  
 HGB 10.7 (L) 08/21/2018 09:43 AM  
 PLATELET 269 81/15/2461 09:43 AM  
 
Lab Results Component Value Date/Time Sodium 140 08/21/2018 09:43 AM  
 Potassium 4.6 08/21/2018 09:43 AM  
 Chloride 99 08/21/2018 09:43 AM  
 CO2 23 08/21/2018 09:43 AM  
 BUN 11 08/21/2018 09:43 AM  
 Creatinine 0.79 08/21/2018 09:43 AM  
 Calcium 9.5 08/21/2018 09:43 AM  
 Magnesium 2.3 08/21/2018 09:43 AM  
  
Lab Results Component Value Date/Time AST (SGOT) 22 08/21/2018 09:43 AM  
 Alk. phosphatase 122 (H) 08/21/2018 09:43 AM  
 Protein, total 6.9 08/21/2018 09:43 AM  
 Albumin 4.3 08/21/2018 09:43 AM  
 Globulin 3.2 06/18/2018 08:32 AM  
 
Lab Results Component Value Date/Time INR 1.0 07/19/2018 11:54 AM  
 Prothrombin time 10.7 07/19/2018 11:54 AM  
  
No results found for: IRON, FE, TIBC, IBCT, PSAT, FERR CONTROLLED SUBSTANCES SAFETY ASSESSMENT (IF ON CONTROLLED SUBSTANCES):  
 
Reviewed opioid safety handout:  [x] Yes   [] No 
24 hour opioid dose >150mg morphine equivalent/day:  [] Yes   [x] No 
Benzodiazepines:  [x] Yes   [] No 
Sleep apnea:  [] Yes   [x] No 
Urine Toxicology Testing within last 6 months:  [] Yes   [x] No 
History of or new aberrant medication taking behaviors:  [] Yes   [x] No 
 
 
   
 
Total time: 70 minutes Counseling / coordination time: 40 minutes 
> 50% counseling / coordination?:  Yes

## 2018-08-30 NOTE — PROGRESS NOTES
Palliative Medicine Office Visit Palliative Medicine Nurse Check In 
[phone] Patient Name: Kirk Liu YOB: 1965 Date of Office Visit: 8/30/2018 Patient states: \"  \" 
 
From Check In Sheet (scanned in Media): 
Has a medical provider talked with you about cardiopulmonary resuscitation (CPR)? [x] Yes   [] No   [] Unable to obtain Nurse reminder to complete or update ACP FlowSheet: 
 
Is ACP on the Problem List?    [] Yes    [x] No 
IF ACP Document is ON FILE; Nurse to place ACP on Problem List  
 
Is there an ACP Note in Chart Review/Note? [] Yes    [x] No  
If NO: ALERT PROVIDER Advance Care Planning 8/30/2018 Patient's Healthcare Decision Maker is: Legal Next of Kin Primary Decision Maker Name Osiel Watkins Primary Decision Maker Phone Number [phone] Primary Decision Maker Relationship to Patient Spouse Confirm Advance Directive - Is there anything that we should know about you as a person in order to provide you the best care possible? Have you been to the ER, urgent care clinic since your last visit? [] Yes   [x] No   [] Unable to obtain Have you been hospitalized since your last visit? [x] Yes   [x] No   [] Unable to obtain Have you seen or consulted any other health care providers outside of the [address] [place] [address] since your last visit? [x] Yes   [] No   [] Unable to obtain Functional status (describe):  
 
 
 
Last BM: 8/30/2018  accessed (date): 8/30/2018 Bottle review (for opioid pain medication): 
Medication 1:  
Date filled:  
Directions:  
# filled: # left: # pills taking per day: 
Last dose taken: 
 
Medication 2:  
Date filled:  
Directions:  
# filled: # left: # pills taking per day: 
Last dose taken: 
 
Medication 3:  
Date filled:  
Directions:  
# filled: # left: # pills taking per day: 
Last dose taken: 
 
Medication 4:  
Date filled:  
Directions:  
# filled: # left: # pills taking per day: 
Last dose taken:

## 2018-09-04 NOTE — PROGRESS NOTES
Phone call returned to patient. C/o mild nose bleed- blood on tissue while sneezing, some soreness in mouth, able to drink room temperature drinks or warm drinks only. Bowel movements more regular with Prune juice but no bm today. Last blood work from [employer] reviewed, no thrombocytopenia. Recommendation  - Do not pick her nose  - If bleeding is moderately severe, call Dr. Manuel Gaines office and arrange for blood work. - Likely grade 1 mucositis in mouth/ nose.   - Dulcolax supp for constipation

## 2018-09-04 NOTE — TELEPHONE ENCOUNTER
Dr. Dominic Doan spoke with patient, answered her questions regarding her chemotherapy.     Jennifer Bowie RN   Palliative Medicine

## 2018-09-06 NOTE — PROGRESS NOTES
Lab req faxed to [facility] lab lukasz to be drawn on 4/21/18 for a baseline to begin Vij[place] per vo Dr. Jaimie Olmos. No acute changes noted. VSS. Patient and spouse update on plan of care with verbalized understanding. Medication given per order. Side rails up x2. Call light within reach.       Gregg Ramirez RN  09/06/18 [id number]

## 2018-09-06 NOTE — PROGRESS NOTES
Outpatient Infusion Center - Chemotherapy Progress Note    0800 Pt admit to [facility] for Avastin/C2D15 ambulatory in stable condition accompanied by spouse. Assessment completed. No new concerns voiced. Port accessed with positive blood return. Labs drawn per order and sent. Line flushed, NS infusing. Visit Vitals    /55    Pulse 79    Resp 18    Ht 5' 8\" (1.727 m)    Wt 58 kg (127 lb 12.8 oz)    LMP 02/02/2015 (Approximate)    Breastfeeding No    BMI 19.43 kg/m2       Medications:  NS  Avastin    1045 Pt tolerated treatment well. Port maintained positive blood return throughout treatment, flushed with positive blood return at conclusion, heparinized and de-accessed. D/c home ambulatory in no distress. Pt aware of next [facility] appointment scheduled for 09/20/2018. Recent Results (from the past 12 hour(s))   CBC WITH AUTOMATED DIFF    Collection Time: 09/06/18  8:28 AM   Result Value Ref Range    WBC 4.1 3.6 - 11.0 K/uL    RBC 2.99 (L) 3.80 - 5.20 M/uL    HGB 10.3 (L) 11.5 - 16.0 g/dL    HCT 32.0 (L) 35.0 - 47.0 %    .0 (H) 80.0 - 99.0 FL    MCH 34.4 (H) 26.0 - 34.0 PG    MCHC 32.2 30.0 - 36.5 g/dL    RDW 13.2 11.5 - 14.5 %    PLATELET 569 246 - 004 K/uL    MPV 10.9 8.9 - 12.9 FL    NRBC 0.0 0  WBC    ABSOLUTE NRBC 0.00 0.00 - 0.01 K/uL    NEUTROPHILS 65 32 - 75 %    LYMPHOCYTES 27 12 - 49 %    MONOCYTES 7 5 - 13 %    EOSINOPHILS 1 0 - 7 %    BASOPHILS 1 0 - 1 %    IMMATURE GRANULOCYTES 0 0.0 - 0.5 %    ABS. NEUTROPHILS 2.7 1.8 - 8.0 K/UL    ABS. LYMPHOCYTES 1.1 0.8 - 3.5 K/UL    ABS. MONOCYTES 0.3 0.0 - 1.0 K/UL    ABS. EOSINOPHILS 0.0 0.0 - 0.4 K/UL    ABS. BASOPHILS 0.0 0.0 - 0.1 K/UL    ABS. IMM.  GRANS. 0.0 0.00 - 0.04 K/UL    DF AUTOMATED     URINALYSIS W/ REFLEX CULTURE    Collection Time: 09/06/18  8:28 AM   Result Value Ref Range    Color YELLOW/STRAW      Appearance CLOUDY (A) CLEAR      Specific gravity 1.025 1.003 - 1.030      pH (UA) 5.5 5.0 - 8.0      Protein NEGATIVE  NEG mg/dL    Glucose NEGATIVE  NEG mg/dL    Ketone NEGATIVE  NEG mg/dL    Bilirubin NEGATIVE  NEG      Blood NEGATIVE  NEG      Urobilinogen 0.2 0.2 - 1.0 EU/dL    Nitrites NEGATIVE  NEG      Leukocyte Esterase MODERATE (A) NEG      WBC 10-20 0 - 4 /hpf    RBC 0-5 0 - 5 /hpf    Epithelial cells MODERATE (A) FEW /lpf    Bacteria 1+ (A) NEG /hpf    UA:UC IF INDICATED URINE CULTURE ORDERED (A) CNI      Mucus 2+ (A) NEG /lpf

## 2018-09-10 NOTE — TELEPHONE ENCOUNTER
Patient states she's Jaswant Nieves issues with the bottoms of her feet and hands burning and tingling\".

## 2018-09-12 NOTE — TELEPHONE ENCOUNTER
Returned pt call, and she c/o her feet feeling sore with some burning after wearing closed toe shoes. She is also experiencing burning and redness of palms of hands. Recommended to pt to roll frozen bag of vegetables on hands and feet, to start B complex vitamins, and continued use of Gold Bond healing lotion. She has a pre chemo appt with Dr. Julisa Barrios on 9/18/18, but will call if she has any concerns before.

## 2018-09-14 NOTE — PROGRESS NOTES
Labs ordered to be drawn 9/18/18 to be used for chemo on 9/20/18 per vo Dr. Yamil Nelson on 9/14/18 at [phone].

## 2018-09-18 PROBLEM — E86.0 DEHYDRATION: Status: RESOLVED | Noted: 2017-03-17 | Resolved: 2018-01-01

## 2018-09-18 PROBLEM — K52.9 NONINFECTIOUS GASTROENTERITIS: Status: RESOLVED | Noted: 2017-11-24 | Resolved: 2018-01-01

## 2018-09-18 NOTE — PATIENT INSTRUCTIONS
Well Visit, Women 48 to 72: Care Instructions Your Care Instructions Physical exams can help you stay healthy. Your doctor has checked your overall health and may have suggested ways to take good care of yourself. He or she also may have recommended tests. At home, you can help prevent illness with healthy eating, regular exercise, and other steps. Follow-up care is a key part of your treatment and safety. Be sure to make and go to all appointments, and call your doctor if you are having problems. It's also a good idea to know your test results and keep a list of the medicines you take. How can you care for yourself at home? · Reach and stay at a healthy weight. This will lower your risk for many problems, such as obesity, diabetes, heart disease, and high blood pressure. · Get at least 30 minutes of exercise on most days of the week. Walking is a good choice. You also may want to do other activities, such as running, swimming, cycling, or playing tennis or team sports. · Do not smoke. Smoking can make health problems worse. If you need help quitting, talk to your doctor about stop-smoking programs and medicines. These can increase your chances of quitting for good. · Protect your skin from too much sun. When you're outdoors from 10 a.m. to 4 p.m., stay in the shade or cover up with clothing and a hat with a wide brim. Wear sunglasses that block UV rays. Even when it's cloudy, put broad-spectrum sunscreen (SPF 30 or higher) on any exposed skin. · See a dentist one or two times a year for checkups and to have your teeth cleaned. · Wear a seat belt in the car. · Limit alcohol to 1 drink a day. Too much alcohol can cause health problems. Follow your doctor's advice about when to have certain tests. These tests can spot problems early. · Cholesterol.  Your doctor will tell you how often to have this done based on your age, family history, or other things that can increase your risk for heart attack and stroke. · Blood pressure. Have your blood pressure checked during a routine doctor visit. Your doctor will tell you how often to check your blood pressure based on your age, your blood pressure results, and other factors. · Mammogram. Ask your doctor how often you should have a mammogram, which is an X-ray of your breasts. A mammogram can spot breast cancer before it can be felt and when it is easiest to treat. · Pap test and pelvic exam. Ask your doctor how often you should have a Pap test. You may not need to have a Pap test as often as you used to. · Vision. Have your eyes checked every year or two or as often as your doctor suggests. Some experts recommend that you have yearly exams for glaucoma and other age-related eye problems starting at age 48. · Hearing. Tell your doctor if you notice any change in your hearing. You can have tests to find out how well you hear. · Diabetes. Ask your doctor whether you should have tests for diabetes. · Colon cancer. You should begin tests for colon cancer at age 48. You may have one of several tests. Your doctor will tell you how often to have tests based on your age and risk. Risks include whether you already had a precancerous polyp removed from your colon or whether your parents, sisters and brothers, or children have had colon cancer. · Thyroid disease. Talk to your doctor about whether to have your thyroid checked as part of a regular physical exam. Women have an increased chance of a thyroid problem. · Osteoporosis. You should begin tests for bone density at age 72. If you are younger than 72, ask your doctor whether you have factors that may increase your risk for this disease. You may want to have this test before age 72. · Heart attack and stroke risk. At least every 4 to 6 years, you should have your risk for heart attack and stroke assessed.  Your doctor uses factors such as your age, blood pressure, cholesterol, and whether you smoke or have diabetes to show what your risk for a heart attack or stroke is over the next 10 years. When should you call for help? Watch closely for changes in your health, and be sure to contact your doctor if you have any problems or symptoms that concern you. Where can you learn more? Go to http://ammon-kalli.info/. Enter K936 in the search box to learn more about \"Well Visit, Women 50 to 72: Care Instructions. \" Current as of: May 16, 2017 Content Version: 11.7 © 8462-0568 [employer]. Care instructions adapted under license by [employer] (which disclaims liability or warranty for this information). If you have questions about a medical condition or this instruction, always ask your healthcare professional. [address] any warranty or liability for your use of this information.

## 2018-09-18 NOTE — TELEPHONE ENCOUNTER
Pt request her Ativan prescription to be changed to a 90 days supply for [facility]. She states it was sent in incorrectly in August. Per vo from  I advised the pt that we will send a new prescription.

## 2018-09-18 NOTE — PROGRESS NOTES
Aparna Min is a 48 y.o. female who was seen in clinic today (9/18/2018) for a full physical.  She is a new patient to me today. She was followed previously by Dr Patricia Lam, transferring to keep everything w/i the [employer] system. Assessment & Plan:  
Diagnoses and all orders for this visit: 1. Routine physical examination -     Previous labs reviewed & discussed with her 2. 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 She is tolerating medication w/o side effects. 3. Malignant neoplasm of both ovaries ([facility] [address]- new dx to me, chronic problem, defer to specialists (surgical oncology and palliative, notes/labs/imaging available have been reviewed). 4. Thrombocytopenia ([facility] [address]- new dx to me, chronic problem, stable, continue to monitor 5. 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   
 
6. Peripheral neuropathy due to chemotherapy [facility])- new dx to me, chronic problem, is on/off depending on treatment, no meds at this time Follow-up Disposition: 
Return in about 1 year (around 9/18/2019) for FULL PHYSICAL - 30 minutes. ------------------------------------------------------------------------------------------ Subjective:  
Ankush Waite is here today for a full physical.   
 
Health Maintenance Immunizations:  
 Influenza: declined due to chemo regimen. Tetanus: up to date. Shingles: not up to date - defer due to chemo. Pneumonia: not up to date - defer due to chemo. She will revisit w/ her specialist.    
Cancer screening:  
 Cervical: n/a. Breast: reviewed guidelines, reviewed SBE with her, UTD. Colon: n/a- reviewed life expectancy, reviewed pros/cons, will stop. Patient Care Team: Benjamin Johnson MD as PCP - General (Internal Medicine) Frances Sorenson MD (Gynecologic Oncology) Elvia Rossi MD (Breast Surgery) Mihaela Sibley RN as Nurse Navigator (Oncology) The following sections were reviewed & updated as appropriate: PMH, PSH, FH, and SH. Patient Active Problem List  
Diagnosis Code  Malignant neoplasm of both ovaries (HCC) C56.1, C56.2  Thrombocytopenia ([address] D69.6  CINV (chemotherapy-induced nausea and vomiting) R11.2, T45.1X5A  Peritoneal carcinomatosis (HCC) C78.6, C80.1  Malignant ascites R18.0  
 On anticoagulant therapy Z79.01  
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 Authorizing Provider  
oxyCODONE ER (OXYCONTIN) 10 mg ER tablet Take 1 Tab by mouth every twelve (12) hours. Max Daily Amount: 20 mg. 9/18/18  Yes Frances Sorenson MD  
LORazepam (ATIVAN) 1 mg tablet Take 1 Tab by mouth nightly as needed for Anxiety. Max Daily Amount: 1 mg. Indications: CANCER CHEMOTHERAPY-INDUCED NAUSEA AND VOMITING 9/18/18  Yes Frances Sorenson MD  
sennosides/docusate sodium (SENNA PLUS PO) Take  by mouth two (2) times a day. Yes Historical Provider  
oxyCODONE IR (ROXICODONE) 5 mg immediate release tablet Take 0.5 Tabs by mouth every four (4) hours as needed for Pain. Max Daily Amount: 15 mg. 8/30/18  Yes Florence Hurtado MD  
diphenhydrAMINE-acetaminophen (TYLENOL PM EXTRA STRENGTH)  mg tab Take  by mouth. Yes Historical Provider XARELTO 20 mg tab tablet  7/11/16  Yes Historical Provider  
senna-docusate (PERICOLACE) 8.6-50 mg per tablet Take 1 Tab by mouth daily. Indications: constipation 9/18/18   Flonnie Paget, MD  
bisacodyl (DULCOLAX) 10 mg suppository Insert 10 mg into rectum daily. 9/4/18   Florence Hurtado MD  
dexamethasone (DECADRON) 4 mg tablet Take 2 Tabs by mouth as needed.  As needed for nausea 7/23/18   Kieran Barlow NP  
promethazine (PHENERGAN) 12.5 mg tablet  6/17/18   Historical Provider  
ondansetron hcl (ZOFRAN, AS HYDROCHLORIDE,) 4 mg tablet Take 4 mg by mouth every eight (8) hours as needed for Nausea. Historical Provider Allergies Allergen Reactions  Pcn [Penicillins] Rash Review of Systems Constitutional: Positive for weight loss. Negative for chills and fever. Respiratory: Negative for cough and shortness of breath. Cardiovascular: Negative for chest pain and palpitations. Gastrointestinal: Positive for abdominal pain and constipation. Negative for blood in stool, diarrhea, heartburn, nausea and vomiting. Musculoskeletal: Negative for joint pain and myalgias. Neurological: Positive for tingling (feet). Negative for focal weakness and headaches. Endo/Heme/Allergies: Does not bruise/bleed easily. Psychiatric/Behavioral: Negative for depression. The patient is not nervous/anxious and does not have insomnia. Objective:  
Physical Exam  
Constitutional: She appears well-developed. No distress. HENT:  
Right Ear: Tympanic membrane, external ear and ear canal normal.  
Left Ear: Tympanic membrane, external ear and ear canal normal.  
Nose: Nose normal.  
Mouth/Throat: Uvula is midline, oropharynx is clear and moist and mucous membranes are normal. No posterior oropharyngeal erythema. Eyes: Conjunctivae and lids are normal. No scleral icterus. Neck: Neck supple. Carotid bruit is not present. No thyromegaly present. Cardiovascular: Regular rhythm and normal heart sounds. No murmur heard. Pulses: 
     Dorsalis pedis pulses are 2+ on the right side, and 2+ on the left side. Posterior tibial pulses are 2+ on the right side, and 2+ on the left side. Pulmonary/Chest: Effort normal and breath sounds normal. She has no wheezes. She has no rales. Abdominal: Soft. Bowel sounds are normal. She exhibits no mass. There is no hepatosplenomegaly. There is no tenderness. Musculoskeletal: Normal range of motion. She exhibits no edema. Cervical back: Normal.  
     Thoracic back: She exhibits no bony tenderness. Lumbar back: Normal.  
Lymphadenopathy:  
  She has no cervical adenopathy. Neurological: She has normal strength. No sensory deficit. Skin: No rash noted. Psychiatric: She has a normal mood and affect. Her behavior is normal.  
  
 
 
Visit Vitals  BP (!) 84/60  Pulse 84  Temp 98.1 °F (36.7 °C) (Oral)  Resp 14  
 Ht 5' 7.2\" (1.707 m)  Wt 130 lb (59 kg)  LMP 02/02/2015 (Approximate)  SpO2 96%  BMI 20.24 kg/m2 Advised her to call back or return to office if symptoms worsen/change/persist. 
Discussed expected course/resolution/complications of diagnosis in detail with patient. Medication risks/benefits/costs/interactions/alternatives discussed with patient. She was given an after visit summary which includes diagnoses, current medications, & vitals. She expressed understanding with the diagnosis and plan. Aspects of this note may have been generated using voice recognition software. Despite editing, there may be some syntax errors.   
 
 
Arina Polk MD

## 2018-09-18 NOTE — ACP (ADVANCE CARE PLANNING)
Advance Care Planning (ACP) Provider Note - Comprehensive Date of ACP Conversation: 09/18/18 Persons included in Conversation:  patient Length of ACP Conversation in minutes: <16 minutes (Non-Billable) Authorized Decision Maker (if patient is incapable of making informed decisions): Other Legally Authorized Decision Maker (e.g. Next of Kin) She has NO advanced directive  - add't info provided. Reviewed DNR/DNI and patient is not interested at this time. She reports having discussion w/ palliative care earlier this year. General ACP for ALL Patients with Decision Making Capacity: 
Importance of advance care planning, including choosing a healthcare agent to communicate patient's healthcare decisions if patient lost the ability to make decisions, such as after a sudden illness or accident Understanding of the healthcare agent role was assessed and information provided Opportunity offered to explore how cultural, Orthodoxy, spiritual, or personal beliefs would affect decisions for future care Exploration of values, goals, and preferences if recovery is not expected, even with continued medical treatment in the event of: Imminent death or severe, permanent brain injury For Serious or Chronic Illness: 
Understanding of CPR, goals and expected outcomes, benefits and burdens discussed. Understanding of medical condition Information on CPR success rates provided (e.g. for CPR in hospital, survival to d/c at two weeks is 22%, for chronically ill or elderly/frail survival is less than 3%) Interventions Provided: 
Recommended communicating the plan and making copies for the healthcare agent, personal physician, and others as appropriate (e.g., health system) Recommended review of completed ACP document annually or upon change in health status

## 2018-09-18 NOTE — TELEPHONE ENCOUNTER
Spoke with Ms. Addis Wiley, prescription will be sent to her [facility]. She was appreciative of the help.     Jennifer Bowie RN   Palliative Medicine

## 2018-09-18 NOTE — PROGRESS NOTES
[address] [place] [address] [address], [address] [facility]  [phone]  MD Wilfrid Hernandez MD  Office Note  Patient ID:  Name:  Wes Ang  MRN:  [id number]  :  1965/53 y.o. Date:  2018      HISTORY OF PRESENT ILLNESS:  Wes Ang is a 48 y.o.  postmenopausal female with stage IIIC ovarian cancer. She underwent X-lap, hysterectomy, and tumor debulking in 2015. She was recommended adjuvant chemotherapy with 6 cycles of Taxol and Carboplatin. Her post-treatment PET/CT was negative for disease. Her My[facility] results also found her to have a deleterious BRCA 1 mutation. As for the BRCA 1 mutation, I had her see one of our breast surgeons, Dr. Zaynab Rodas, to talk about options, specifically screening and prophylactic surgery. She is going to hold off on surgery for now. She has decided not to have her daughter tested at this time. In early  she was found to have recurrence. She was clearly platinum sensitive, therefore I recommended a platinum-based regimen. 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 She also wanted to keep her hair. We also discussed options for down the line, specifically a PARP inhibitor, since she is BRCA positive. She completed 6 cycles of Gemzar/Carboplatin and her CA-125 returned to baseline. Her CT at that time was clear. We discussed maintenance therapy with a PARP inhibitor. She was started on Zejula in late 2017 at 300 mg daily. Her dose was lowered to 200 mg daily due to gastrointestinal complaints. In 2017 it was held due to thrombocytopenia. She elected not to restart the [place], even at a lower dose. She presented in 2017 to discuss other PARP alternatives. She was started on Lynparza at a one-step dose reduction of 250 mg bid. Due to side effects she was reduced to 200 mg bid on 18.   On 1/15/18 she stopped therapy due to abdominal pain and nausea, and most of all, fatigue. She presented not too long ago for follow up and reported lower abdominal pain. Her CA-125 was rising, up to 41, when it was previously <4. She had some complaints of lower abdominal pain, in addition to her rising CA-125. Her CT demonstrated recurrent disease. I discussed with her and her  multiple treatment options. She went to [facility] for a second opinion. They essentially offered her similar treatment options. She was not a candidate for a clinical trial due to her diagnosis of MS. She presented again discuss treatment options. We ultimately decided on Rubraca. She was only on therapy for about 2 1/2 months. She had been tolerating it very well. Her CA-125 did respond. She was in [facility] for vacation in June when she developed significant ascites, requiring drainage at [address] [place] [address] Ave in [place]. Her CT scan upon return unfortunately demonstrated progression. I recommended a combination of Doxil/Avastin. I felt the Avastin would help with the ascites. She has completed 2 cycles so far. Her CA-125 is  responding. She had a paracentesis on 7/20/18, which drained 1800 cc. She has not required one since. She seems to be doing well so far with the new regimen. She has some constipation issues. Her appetite is OK, but she has lost some weight. ROS:   and GI review:  Negative  Cardiopulmonary review:  Negative   Musculoskeletal:  Negative    A comprehensive review of systems was negative except for that written in the History of Present Illness.  , 10 point ROS      Problem List:  Patient Active Problem List    Diagnosis Date Noted    Cancer associated pain 08/09/2018    Other proteinuria 07/26/2018    Anemia due to chemotherapy 07/26/2018    On anticoagulant therapy 07/19/2018    Peritoneal carcinomatosis ([facility] [address] 06/01/2018    Malignant ascites 06/01/2018    Noninfectious gastroenteritis 11/24/2017    Thrombocytopenia (HCC) 03/17/2017    CINV (chemotherapy-induced nausea and vomiting) 03/17/2017    Dehydration 03/17/2017    Malignant neoplasm of both ovaries ([facility] [address] 04/09/2015    Pelvic mass 03/06/2015     PMH:  Past Medical History:   Diagnosis Date    Anxiety     BRCA1 positive     Calculus of kidney 1/13    right    Hernia, inguinal, left 2012    MS (multiple sclerosis) ([facility] [address] 1996    Nausea & vomiting     Ovarian cancer ([facility] [address] 3/2015    high grade, stage 3C papillary serous    Thromboembolus ([facility] [address] 8/2007    lower abdomen post fall      PSH:  Past Surgical History:   Procedure Laterality Date    HX GYN  2009    endometrial ablation with punctured uterus    HX OTHER SURGICAL  8/2007    green filter    HX MARIEL AND BSO  3/2014    ovarian cancer      Social History:  Social History   Substance Use Topics    Smoking status: Never Smoker    Smokeless tobacco: Never Used    Alcohol use No      Family History:  Family History   Problem Relation Age of Onset    Cancer Paternal Grandmother      breast    Breast Cancer Paternal Grandmother     Heart Disease Mother     Heart Disease Father       Medications: (reviewed)  Current Outpatient Prescriptions   Medication Sig    oxyCODONE ER (OXYCONTIN) 10 mg ER tablet Take 1 Tab by mouth every twelve (12) hours. Max Daily Amount: 20 mg.    oxyCODONE IR (ROXICODONE) 5 mg immediate release tablet Take 0.5 Tabs by mouth every four (4) hours as needed for Pain. Max Daily Amount: 15 mg.  LORazepam (ATIVAN) 1 mg tablet Take 1 Tab by mouth nightly as needed for Anxiety. Max Daily Amount: 1 mg. Indications: CANCER CHEMOTHERAPY-INDUCED NAUSEA AND VOMITING    senna-docusate (PERICOLACE) 8.6-50 mg per tablet Take 1 Tab by mouth daily. Indications: constipation    dexamethasone (DECADRON) 4 mg tablet Take 2 Tabs by mouth as needed.  As needed for nausea    promethazine (PHENERGAN) 12.5 mg tablet     diphenhydrAMINE-acetaminophen (TYLENOL PM EXTRA STRENGTH)  mg tab Take  by mouth.  ondansetron hcl (ZOFRAN, AS HYDROCHLORIDE,) 4 mg tablet Take 4 mg by mouth every eight (8) hours as needed for Nausea.  XARELTO 20 mg tab tablet     bisacodyl (DULCOLAX) 10 mg suppository Insert 10 mg into rectum daily. No current facility-administered medications for this visit. Allergies: (reviewed)  Allergies   Allergen Reactions    Pcn [Penicillins] Rash          OBJECTIVE:    Physical Exam:  VITAL SIGNS: Vitals:    09/18/18 0958   BP: 115/60   Pulse: 96   Weight: 129 lb (58.5 kg)   Height: 5' 7.99\" (1.727 m)     Body mass index is 19.62 kg/(m^2). GENERAL FILIBERTO: Conversant, alert, oriented. No acute distress. HEENT: HEENT. No thyroid enlargement. No JVD. Neck: Supple without restrictions. RESPIRATORY: Clear to auscultation and percussion to the bases. No CVAT. CARDIOVASC: RRR without murmur/rub. GASTROINT: Soft, non-distended, without masses or organomegaly. MUSCULOSKEL: no joint tenderness, deformity or swelling   EXTREMITIES: extremities normal, atraumatic, no cyanosis or edema   PELVIC: Deferred   RECTAL: Deferred   CHARMAINE SURVEY: No suspicious lymphadenopathy or edema noted. NEURO: Grossly intact. No acute deficit.          Lab Results   Component Value Date/Time    WBC 4.1 09/06/2018 08:28 AM    HGB 10.3 (L) 09/06/2018 08:28 AM    HCT 32.0 (L) 09/06/2018 08:28 AM    PLATELET 345 70/19/1867 08:28 AM    .0 (H) 09/06/2018 08:28 AM     Lab Results   Component Value Date/Time    Sodium 140 08/21/2018 09:43 AM    Potassium 4.6 08/21/2018 09:43 AM    Chloride 99 08/21/2018 09:43 AM    CO2 23 08/21/2018 09:43 AM    Anion gap 8 06/18/2018 08:32 AM    Glucose 94 08/21/2018 09:43 AM    BUN 11 08/21/2018 09:43 AM    Creatinine 0.79 08/21/2018 09:43 AM    BUN/Creatinine ratio 14 08/21/2018 09:43 AM    GFR est AA 99 08/21/2018 09:43 AM    GFR est non-AA 86 08/21/2018 09:43 AM    Calcium 9.5 08/21/2018 09:43 AM     Lab Results   Component Value Date/Time    CA-125 319 (H) 06/18/2018 08:32 AM    Cancer Ag (CA) 125 558.7 (H) 08/21/2018 09:43 AM       CT of chest/abdomen/pelvis (7/9/18)  LUNG BASES: Clear. INCIDENTALLY IMAGED HEART AND MEDIASTINUM: Incompletely imaged. LIVER: No measurable hepatic mass. GALLBLADDER: Unremarkable. SPLEEN: No mass. PANCREAS: No mass or ductal dilatation. ADRENALS: Unremarkable. KIDNEYS: No solid renal mass or hydronephrosis. Ureters are not dilated. STOMACH: Partial distention with enteric contrast and food products. SMALL BOWEL: No obstruction. Mural thickening of the serosal surface of small  bowel loops in the superior midline of the pelvis is compatible with peritoneal  disease. COLON: No obstruction. Tortuous course. APPENDIX: Small versus resected. Peritoneal soft tissue attenuation at the  expected location of the appendix measures 2.4 x 1.4 x 2.5 cm. PERITONEUM: Peritoneal metastatic disease is increased. Gastrohepatic ligament  soft tissue measures 3.0 x 1.4 x 3.0 cm. Posterior left upper quadrant  peritoneal soft tissue metastasis is inferior to the spleen and measures 3.3 x  1.7 x 2.0 cm. Peritoneum has nodular thickening posterior, inferior, and lateral  to the right hepatic lobe. Anterior left peritoneal mass along the undersurface  of the left rectus abdominous muscle measures 2.4 x 0.8 x 1.8 cm. Small volume  of ascites is slightly increased. RETROPERITONEUM: No lymphadenopathy or aortic aneurysm. IVC filter is present. REPRODUCTIVE ORGANS: Uterus and ovaries have been resected. URINARY BLADDER: No mass or calculus. BONES: No destructive bone lesion. ADDITIONAL COMMENTS: N/A     IMPRESSION:  1. Progression of peritoneal metastatic disease. Largest volume of disease is in  the upper pelvis, gastrohepatic ligament, and adjacent to the spleen. 2. Small volume of ascites is slightly increased.   3. No bowel obstruction. IMPRESSION/PLAN:  Wes Ang is a 48 y.o. female with a history of recurrent ovarian cancer. She has some complaints of lower abdominal pain and her CA-125 is rising. Her CT demonstrates recurrent disease. At her last visit I discussed with her and her  multiple treatment options. She went to [facility] for a second opinion. They essentially offered her similar treatment options. She was not a candidate for a clinical trial due to her diagnosis of MS. I sat with her and her  for about an hour today, discussing different treatment options, including Doxil, Avastin, Topotecan, and Rubraca. She is BRCA positive and platinum sensitive. This should indicate that she would respond well to a PARP. She did not tolerate prior treatment with Lynparza. I suggested Lakisha Campuzano, as it would allow her to travel and be less tied down. Unfortunately the Rubraca did not work very long, as she demonstrated progression on imaging. I recommended a combination of Doxil/Avastin. I felt the Avastin would help with the ascites. She has completed 2 cycles so far. She appears to be tolerating well. We will continue with the current regimen. We will also repeat a scan after this cycle.       Signed By: Wilfrid Hidalgo MD     9/18/2018/3:54 PM

## 2018-09-20 NOTE — PROGRESS NOTES
[facility]fusion [facility] - Chemotherapy Progress Note    3571 Pt admit to [facility] for C3 Doxil/Avastin ambulatory in stable condition accompanied by spouse. Assessment completed. No new concerns voiced. Port accessed with positive blood return. Labs drawn prior to appt, labs reviewed. Line flushed, NS infusing. Treatment started. Chemotherapy Flowsheet 9/20/2018   Cycle C3D1   Date 9/20/2018   Drug / Regimen Doxil/Avastin   Pre Hydration 1000ml bolus   Pre Meds given   Notes given       Visit Vitals    /71    Pulse 80    Temp 96.7 °F (35.9 °C)    Resp 16    Ht 5' 7.52\" (1.715 m)    Wt 59 kg (130 lb)    LMP 02/02/2015 (Approximate)    BMI 20.05 kg/m2       Medications:  NS 1000 ml bolus  Zofran  Avastin  D5 KVO  Liposomal Doxorubicin      1215 Pt tolerated treatment well. Port maintained positive blood return throughout treatment, flushed with positive blood return at conclusion, heparinized and de-accessed. D/c home ambulatory in no distress. Pt aware of next [facility] appointment scheduled for 10/04/2018 at 0800.

## 2018-09-20 NOTE — PROGRESS NOTES
[facility] GYNECOLOGIC ONCOLOGY  [address] [facility], [address], [address]  P [phone]  F [phone]      9/20/2018   Mihaela Crain MD: Wisam Bland MD  PCP: Neri Armenta MD     Primary Onc Dx: ovarian cancer, stage IIIC  Date of Dx: 3/2015        HPI:  Black Clay is a 46 y.o. with stage IIIC ovarian cancer, BRCA1+. She underwent X-lap, hysterectomy, and tumor debulking in March 2015. She was recommended adjuvant chemotherapy with 6 cycles of Taxol and Carboplatin. Her post-treatment PET/CT was negative for disease. S/p recurrent disease early 2017 treated with carbo/Gemzar (3/2017 - 6/2017). Post treatment imaging was w/o evidence of disease and she received maintenance PARPi (zejula/lynparza). Stopped d/t toxicities  Recurrent disease on imagining, initiated rubraca, subsequent disease progression following 2.5 cycles  S/p paracentesis x 3 most recent 7/20/18    CT Results (most recent):    Results from Hospital Encounter encounter on 07/09/18   CT ABD PELV W CONT   Narrative EXAM:  CT ABD PELV W CONT    INDICATION: Ovarian carcinoma with peritoneal metastasis and ascites. Evaluate  response to therapy. COMPARISON: CT abdomen/pelvis on 4/2/2018 and 11/24/2017. CONTRAST:  100 mL of Isovue-370. TECHNIQUE:   Following the uneventful intravenous administration of contrast, thin axial  images were obtained through the abdomen and pelvis. Coronal and sagittal  reconstructions were generated. Oral contrast was administered. CT dose  reduction was achieved through use of a standardized protocol tailored for this  examination and automatic exposure control for dose modulation. FINDINGS:   LUNG BASES: Clear. INCIDENTALLY IMAGED HEART AND MEDIASTINUM: Incompletely imaged. LIVER: No measurable hepatic mass. GALLBLADDER: Unremarkable. SPLEEN: No mass. PANCREAS: No mass or ductal dilatation. ADRENALS: Unremarkable. KIDNEYS: No solid renal mass or hydronephrosis. Ureters are not dilated. STOMACH: Partial distention with enteric contrast and food products. SMALL BOWEL: No obstruction. Mural thickening of the serosal surface of small  bowel loops in the superior midline of the pelvis is compatible with peritoneal  disease. COLON: No obstruction. Tortuous course. APPENDIX: Small versus resected. Peritoneal soft tissue attenuation at the  expected location of the appendix measures 2.4 x 1.4 x 2.5 cm. PERITONEUM: Peritoneal metastatic disease is increased. Gastrohepatic ligament  soft tissue measures 3.0 x 1.4 x 3.0 cm. Posterior left upper quadrant  peritoneal soft tissue metastasis is inferior to the spleen and measures 3.3 x  1.7 x 2.0 cm. Peritoneum has nodular thickening posterior, inferior, and lateral  to the right hepatic lobe. Anterior left peritoneal mass along the undersurface  of the left rectus abdominous muscle measures 2.4 x 0.8 x 1.8 cm. Small volume  of ascites is slightly increased. RETROPERITONEUM: No lymphadenopathy or aortic aneurysm. IVC filter is present. REPRODUCTIVE ORGANS: Uterus and ovaries have been resected. URINARY BLADDER: No mass or calculus. BONES: No destructive bone lesion. ADDITIONAL COMMENTS: N/A         Impression IMPRESSION:    1. Progression of peritoneal metastatic disease. Largest volume of disease is in  the upper pelvis, gastrohepatic ligament, and adjacent to the spleen. 2. Small volume of ascites is slightly increased. 3. No bowel obstruction. Current Agent: Doxil/Avastin  Cycle: 3, D1     SUBJECTIVE:  Marsha Redman presents for chemotherapy  Feeling well, sleeping comfortably.  at bedside. She is seeing Dr. Viviana Babcock palliative care with good control of her pain and bowel function with altered regimens. Her ascites has not returned. She has no c/o otherwise. Utilizing CRC ~q3wks. Performs all ADLs.       OBJECTIVE:  Physical Exam  Visit Vitals    BP 98/67    Pulse 83    Temp 96.7 °F (35.9 °C)    Resp 16    Wt 130 lb (59 kg)    LMP 02/02/2015 (Approximate)    BMI 20.24 kg/m2        General:  Sleeping, resting comfortably. Social visit only. /patient with no overt concerns on questioning. Discussed lab findings. Wt Readings from Last 3 Encounters:   09/20/18 130 lb (59 kg)   09/18/18 130 lb (59 kg)   09/18/18 129 lb (58.5 kg)       Lab Results   Component Value Date/Time    WBC 4.0 09/18/2018 11:22 AM    ABS. NEUTROPHILS 2.4 09/18/2018 11:22 AM    HGB 10.9 (L) 09/18/2018 11:22 AM    HCT 35.0 09/18/2018 11:22 AM     (H) 09/18/2018 11:22 AM    MCH 32.5 09/18/2018 11:22 AM    PLATELET 353 11/77/3069 11:22 AM     Lab Results   Component Value Date/Time    Sodium 141 09/18/2018 11:22 AM    Potassium 4.6 09/18/2018 11:22 AM    Chloride 101 09/18/2018 11:22 AM    CO2 25 09/18/2018 11:22 AM    Glucose 101 (H) 09/18/2018 11:22 AM    BUN 12 09/18/2018 11:22 AM    Creatinine 0.75 09/18/2018 11:22 AM    Calcium 9.6 09/18/2018 11:22 AM    Albumin 4.6 09/18/2018 11:22 AM    Bilirubin, total <0.2 09/18/2018 11:22 AM    AST (SGOT) 20 09/18/2018 11:22 AM    ALT (SGPT) 20 09/18/2018 11:22 AM    Alk.  phosphatase 120 (H) 09/18/2018 11:22 AM     UA no protein      Tumor markers  Lab Results   Component Value Date/Time    CA-125 319 (H) 06/18/2018 08:32 AM    Cancer Ag (CA) 125 726.9 (H) 09/18/2018 11:22 AM     Cancer Ag (CA) 125   Date Value Ref Range Status   09/18/2018 726.9 (H) 0.0 - 38.1 U/mL Final     Comment:     Roche ECLIA methodology   08/21/2018 558.7 (H) 0.0 - 38.1 U/mL Final     Comment:     Roche ECLIA methodology   07/23/2018 754.1 (H) 0.0 - 38.1 U/mL Final     Comment:     Roche ECLIA methodology   05/31/2018 314.7 (H) 0.0 - 38.1 U/mL Final     Comment:     Roche ECLIA methodology   05/24/2018 321.5 (H) 0.0 - 38.1 U/mL Final     Comment:     Roche ECLIA methodology   05/03/2018 613.7 (H) 0.0 - 38.1 U/mL Final     Comment:     Roche ECLIA methodology   04/24/2018 641.8 (H) 0.0 - 38.1 U/mL Final     Comment: Roche ECLIA methodology       Patient Active Problem List   Diagnosis Code    Malignant neoplasm of both ovaries (HCC) C56.1, C56.2    Thrombocytopenia (HCC) D69.6    CINV (chemotherapy-induced nausea and vomiting) R11.2, T45.1X5A    Peritoneal carcinomatosis (HCC) C78.6, C80.1    Malignant ascites R18.0    On anticoagulant therapy Z79.01    Other proteinuria R80.8    Anemia due to chemotherapy D64.81, T45.1X5A    Cancer associated pain G89.3     Past Medical History:   Diagnosis Date    Anxiety     BRCA1 positive     Calculus of kidney 1/13    right    Hernia, inguinal, left 2012    MS (multiple sclerosis) ([facility] [address] 1996    Nausea & vomiting     Ovarian cancer ([facility] [address] 3/2015    high grade, stage 3C papillary serous    Thromboembolus ([facility] [address] 8/2007    lower abdomen post fall     Prior to Admission medications    Medication Sig Start Date End Date Taking? Authorizing Provider   oxyCODONE ER (OXYCONTIN) 10 mg ER tablet Take 1 Tab by mouth every twelve (12) hours. Max Daily Amount: 20 mg. 9/18/18   Leonard Pozo MD   LORazepam (ATIVAN) 1 mg tablet Take 1 Tab by mouth nightly as needed for Anxiety. Max Daily Amount: 1 mg. Indications: CANCER CHEMOTHERAPY-INDUCED NAUSEA AND VOMITING 9/18/18   Leonard Pozo MD   senna-docusate (PERICOLACE) 8.6-50 mg per tablet Take 1 Tab by mouth daily. Indications: constipation 9/18/18   Danis Quijano MD   sennosides/docusate sodium (SENNA PLUS PO) Take  by mouth two (2) times a day. Historical Provider   bisacodyl (DULCOLAX) 10 mg suppository Insert 10 mg into rectum daily. 9/4/18   Elenita Lancaster MD   oxyCODONE IR (ROXICODONE) 5 mg immediate release tablet Take 0.5 Tabs by mouth every four (4) hours as needed for Pain. Max Daily Amount: 15 mg. 8/30/18   Elenita Lancaster MD   dexamethasone (DECADRON) 4 mg tablet Take 2 Tabs by mouth as needed.  As needed for nausea 7/23/18   Fidelina Barlow NP   promethazine (PHENERGAN) 12.5 mg tablet  6/17/18   Historical Provider diphenhydrAMINE-acetaminophen (TYLENOL PM EXTRA STRENGTH)  mg tab Take  by mouth. Historical Provider   ondansetron hcl (ZOFRAN, AS HYDROCHLORIDE,) 4 mg tablet Take 4 mg by mouth every eight (8) hours as needed for Nausea. Historical Provider   XARELTO 20 mg tab tablet  16   Historical Provider     Allergies   Allergen Reactions    Pcn [Penicillins] Rash     Family History   Problem Relation Age of Onset    Cancer Paternal Grandmother      breast    Breast Cancer Paternal Grandmother     Coronary Artery Disease Mother 48     CABG and PCI    Heart Surgery Father      valve replacement    No Known Problems Brother     No Known Problems Brother     No Known Problems Brother     No Known Problems Daughter     No Known Problems Son     No Known Problems Son     No Known Problems Son          IMPRESSION/PLAN:  48 y.o. with recurrent ovarian cancer  ECO    Patient Active Problem List   Diagnosis Code    Malignant neoplasm of both ovaries (HCC) C56.1, C56.2    Thrombocytopenia (HCC) D69.6    CINV (chemotherapy-induced nausea and vomiting) R11.2, T45.1X5A    Peritoneal carcinomatosis (HCC) C78.6, C80.1    Malignant ascites R18.0    On anticoagulant therapy Z79.01    Other proteinuria R80.8    Anemia due to chemotherapy D64.81, T45.1X5A    Cancer associated pain G89.3       Chemotherapy cycle 3 doxil/avastin. Shared/discussed . Variable levels during treatment. Ascites remains at [address]. CT EOD scheduled for next month, f/u with Dr. Lisseth Navarrete  Cytopenias improved - stable  Pain associated with malignancy. Improved. Working with palliative care. Constipation controlled, wt is stable  Alk phos elevation, likely d/t malignancy. No liver mets or suspicious bone lesions on CT  hx DVT - continue Xarelto  Psychosocial: appears coping well overall, no concerns today voiced. Supportive family, utilizing resource center    Patient and  with no concerns or questions today.   Advised to call with any concerns.       LESIA George  Gyn Onc

## 2018-09-24 NOTE — TELEPHONE ENCOUNTER
Pt called to discuss her disappointment with her recent elevation in her CA-125 from 558 in august to 726 last week. She said her pain was worse last week, but after calling Palliative Medicine over the weekend and the new adjustments they made, she feels her pain is currently controlled. She does not have a \"great appetite' but continues to eat well because she knows she has to. She is having regular BM's without difficulty   Mostly, the call was focusing on her frustration with feeling she is going through this treatment with all the side effects \"for what? \". She needed to vent her feelings. When asked if there was anything I could do to help, she said I just needed to talk it out with you. Encouraged her to call back any time and if she felt she needed to be seen prior to her next apt, she was welcome to come in. She is scheduled for a CT after completion of her 3rd cycle on 10/15 with a follow up apt with Dr. Marjan Anderson on 10/16. She does not want to move up the CT scan at this point and wants to [facility] the chemo do as much as it can\". She will call for any concerns, questions or just to talk.

## 2018-09-25 NOTE — TELEPHONE ENCOUNTER
Spoke with Dr. Trena Ragsdale, she will call Ms. Payan to check on her.     Marty Herrera, RN   Palliative Medicine

## 2018-09-25 NOTE — TELEPHONE ENCOUNTER
[facility] call returned aware, informed we are aware of recent dose increase

## 2018-09-25 NOTE — PROGRESS NOTES
Phone call with Mrs. Zeyad Fu about increase in upper abdominal pain. Pain increased after last cycle of chemo last week, pain medications increased per our on call team over the weekend. Current regimen  Oxycontin 10 mg BID + Oxycodone 5 mg every 6 hours as needed- she is reporting that the Oxycodone is not lasting a full 6 hours and she is in considerable pain. No constipation or obstipation, no abdominal distention or weight gain. Recent tumor markers are elevated and follow up scans scheduled for Oct 15. Plan  Increase Oxycontin to 15 mg BID  Increase Oxycodone to 5-7.5 mg every 4 hours as needed.   She will  script from [facility] [place] office today

## 2018-09-26 NOTE — TELEPHONE ENCOUNTER
Called patient to advise/confirm upcoming appt with Dr. Johann Crawford on  09/27/18 8:30 left voicemail  Also advised to please bring in patients photo ID/D[employer] card and any current pain medication patient is taking to bring in the container with the medication in it.

## 2018-09-27 NOTE — ACP (ADVANCE CARE PLANNING)
Advance Care Planning 8/30/2018 Patient's Healthcare Decision Maker is: Legal Next of Kin Primary Decision Maker Name Svitlana Arenas Primary Decision Maker Phone Number [phone] Primary Decision Maker Relationship to Patient Spouse Confirm Advance Directive -  
 
AMD in chart FULL code

## 2018-09-27 NOTE — PROGRESS NOTES
Palliative Medicine Outpatient Services [place]: [phone] ([phone]) Patient Name: Felisa Mccrary YOB: 1965 Date of Current Visit: 09/27/18 Location of Current Visit:   
[x] [address] [place] [address] Office 
[] [facility] Office [] [address] [place] [address] 
[] Home 
[] Other:   
 
Date of Initial Visit: 8/30/18 Requesting Physician: Self referral 
Primary Care Physician: Lucita Gaytan MD 
  
 SUMMARY:  
Felisa Mccrary is a 48y.o. year old with a  history of stage III Ovarian cancer diagnosed in 2015 s/p hysterectomy and extensive debulking and chemo with remission until 2017. she has been on several lines of chemo and or immunotherapy with either poor tolerance to medicine or progression of disease. She recently suffered severe ascites and progression of disease on Rubraca. She is currently on Doxol and Avastin. She self referred to palliative medicine for ongoing support and advocacy for her wishes. The patients social history includes worked as a  for  [employer] but on [employer] now.  is an . Has 4 children and 2 grandchildren. Her youngest child just went off to college. Interim history-  increased from 556 in August to 726.9 in Sept. New epigastric pain PALLIATIVE DIAGNOSES:  
 
  ICD-10-CM ICD-9-CM 1. Abdominal pain, generalized R10.84 789.07   
2. Cancer associated pain G89.3 338.3 oxyCODONE IR (ROXICODONE) 5 mg immediate release tablet 3. Drug-induced constipation K59.03 564.09   
  E980.5 4. Neoplastic malignant related fatigue R53.0 780.79   
5. Malignant neoplasm of ovary, unspecified laterality (HCC) C56.9 183.0 oxyCODONE ER (OXYCONTIN) 15 mg ER tablet 6. Neuropathy G62.9 355.9 amitriptyline (ELAVIL) 10 mg tablet PLAN:  
Patient Instructions Dear Felisa Mccrary , It was a pleasure seeing you today  at [address] [place] [address] office Your stated goal:  
-To continue to have good quality of life -Know when her prognosis is limited so she can make plans accordingly Your described symptoms were: Fatigue: 4 Drowsiness: 3 Depression: 2 Pain: 5 Anxiety: 1 Nausea: 2 Anorexia: 1 Dyspnea: 1 Best Well-Bein Constipation: No  
Other Problem (Comment): 0 This is the plan we talked about: 1. Abdominal pain- cancer related - We believe this is well controlled with the recent increase in dose. Continue Oxycontin 15 mg two times a day and Oxycodone 7.5 mg (1.5 tabs) every 6 hours as needed. 2. New epigastric pain- likely from peptic ulcer or gastric mucositis - You have sharp pain after eating or drinking which is new and very bothersome. - I think this is likely from mucositis, you already have some spots in your mouth. - Please start Protonix 40 mg daily before breakfast, this will help with decreasing your stomach acid production - Also take Carafate, 1 tsp about 15 mins before eating anything. 
- We talked dietary modifications, eat more bread, rice and of course ice cream. 
 
3.  Constipation - You take Senna in the morning and night, and some prune juice - You do a supository on days you skip a bowel movement. 4.  Sleep and anxiety 
-You are on Ativan 1 mg at bedtime and you have been on this for many years so we can continue that. 5. Dry mouth 
- You suffer from severe dry mouth. - Please purchase Biotene mouth wash/ tooth paste and mouth spray. 6. Neuropathy - You are in the starting stages of neuropathy in your hands. - You did not tolerate gabapentin and so we agreed to try a small dose of Elavil 10 mg at bedtime. 7.  Goals of care - We talked about the rise in your  numbers. However, you clinically feel better, we agreed not to worry about numbers at this time. - We discussed your wishes regarding CPR/intubation, we agreed to remain FULL code. Previous visit 
-We talked about what is important to you.   You want to have good quality of life, good symptom management and more time with her family.   
-Your son is getting  in [place] in November and you want to have a good time. - You want doctors to be honest and open about your prognosis so you can make decisions with your care team on what is the next best step for you. 
-You are reassured with the current decision of proceeding with chemotherapy and you are tolerating it quite well.  
-You want to avoid emergency room visits. We talked about how important it is to communicate your symptoms or if you are feeling poorly to either Dr. Manda Ernst office or to our office as early as possible so we can work on alleviating your symptoms without having to bring you to the emergency room. This is what you have shared with us about Advance Care Planning Advance Care Planning 8/30/2018 Patient's Healthcare Decision Maker is: Legal Next of Kin Primary Decision Maker Name Gasper Quach Primary Decision Maker Phone Number [phone] Primary Decision Maker Relationship to Patient Spouse Confirm Advance Directive - The Palliative Medicine Team is here to support you and your family. We will see you again in 4 weeks Sincerely, Flaco Jacques MD and the Palliative Medicine Team 
 
 
Counseling and Coordination:  
See above GOALS OF CARE / TREATMENT PREFERENCES:  
[====Goals of Care====] GOALS OF CARE: 
Patient / health care proxy stated goals: FULL code TREATMENT PREFERENCES:  
Code Status:  [x] Attempt Resuscitation       [] Do Not Attempt Resuscitation Advance Care Planning: 
Advance Care Planning 8/30/2018 Patient's Healthcare Decision Maker is: Legal Next of Kin Primary Decision Maker Name Gasper Quach Primary Decision Maker Phone Number [phone] Primary Decision Maker Relationship to Patient Spouse Confirm Advance Directive - Other: 
(If patient appropriate for POST, consider using PALLPOST smart phrase here) The palliative care team has discussed with patient / health care proxy about goals of care / treatment preferences for patient. 
[====Goals of Care====] PRESCRIPTIONS GIVEN:  
 
Medications Ordered Today Medications  pantoprazole (PROTONIX) 40 mg tablet Sig: Take 1 Tab by mouth daily. Indications: Gastric Ulcer Dispense:  30 Tab Refill:  3  
 sucralfate (CARAFATE) 100 mg/mL suspension Sig: Take 5 mL by mouth four (4) times daily. Dispense:  300 mL Refill:  3  
 oxyCODONE ER (OXYCONTIN) 15 mg ER tablet Sig: Take 1 Tab by mouth every twelve (12) hours. Max Daily Amount: 30 mg. Dispense:  60 Tab Refill:  0  
 oxyCODONE IR (ROXICODONE) 5 mg immediate release tablet Sig: Take 1.5 Tabs by mouth every six (6) hours as needed for Pain. Max Daily Amount: 30 mg. Dispense:  180 Tab Refill:  0  
 amitriptyline (ELAVIL) 10 mg tablet Sig: Take 1 Tab by mouth nightly. Dispense:  30 Tab Refill:  1 FOLLOW UP: Future Appointments Date Time Provider Ike Askew 10/4/2018 8:00 AM [facility] FT CHAIR 1 [facility]  
10/15/2018 10:30 AM [facility] [place] ER 1 [facility]  
10/16/2018 9:45 AM Yulissa Tobin MD [address] [place] [address]  
10/18/2018 8:00 AM [facility] FT CHAIR 1 [facility]  
10/25/2018 8:30 AM Yo Pace MD [address] [place] [address] HUI SCHED  
11/1/2018 8:00 AM [facility] FT CHAIR 1 [facility]  
11/13/2018 9:00 AM Christina Barlow NP [facility] HUI SCHED  
11/15/2018 8:00 AM [facility] FT CHAIR 1 [facility]  
11/29/2018 8:00 AM BRE[facility] FT CHAIR 1 [facility]  
12/11/2018 9:00 AM Yulissa Tobin MD [address] [place] [address]  
12/13/2018 8:00 AM [facility] FT CHAIR 1 [facility]  
12/27/2018 8:00 AM HERNANDEZ WALSH CHAIR 1 [facility]  
  
 
 
 PHYSICIANS INVOLVED IN CARE:  
Patient Care Team: 
Keisha Sanders MD as PCP - General (Internal Medicine) Yulissa Tobin MD (Gynecologic Oncology) Ramon Mejia MD (Breast Surgery) Janee Connolly RN as Nurse Navigator (Oncology) HISTORY:  
Nursing documentation from date of visit reviewed. Reviewed patient-completed ESAS and advance care planning form. Reviewed patient record in prescription monitoring program. 
 
CHIEF COMPLAINT: Abdominal pain, fatigue HPI/SUBJECTIVE: The patient is: [x] Verbal / [] Nonverbal  
Patient here with her  today. Patient started having new epigastric pain with food intake described as sharp pain. We had increased her Oxycontin to 15 mg bid a few days ago and this had helped her tremendously. However, she is having significant pain with eating each time and is in tears worried she may not be able to eat in the future. Also very discouraged about her  rising, she is correlating increase in pain with that. No constipation, her generalized abdominal pain is well controlled. C/o numbness and tingling in her hands upto wrist. Had tried Gabapentin in the past and it made her very groggy even with small dose. 
 
 
-------------------------------- Very pleasant and bright woman. Here with her friend Merly Rodriguez. She talks about her belief and how palliative medicine can be very helpful especially after reading \"being mortal \". She is a big believer and supportive care and having more people on the care team to the patient and the family can feel supported. She advocated to start palliative medicine amongst her family who was skeptical about the word palliative medicine. Abdominal pain-crampy, colicky abdominal pain that is present mostly in the upper quadrants. Started about 1-1/2 months ago. Was started on OxyContin 10 mg 2 times a day about 4 weeks ago and she notices that she is very drowsy during the day and has to give everything she is got to be awake and to thinks that she wants to do.   However her pain returns around 3 or 4:00 and she suffers until her next dose of OxyContin around 8 PM. She was so drowsy with the OxyContin she needed a short dose of steroids to keep her awake when she had to attend her daughter's whitecoat ceremony. Constipation-currently she has daily bowel movements but she is very afraid of developing severe constipation has she had many times before. Fatigue-understands that having cancer and being on chemotherapy thus make you tired. But she feels more fatigued over the last few weeks especially since starting oxycodone. She likes to swim and tries to swim almost half a mile every day but she is very tired after that. She loves to read She loves to travel and read. Went to [facility] and July where she ended up in the hospital with severe ascites and thinks that flying made things worse. She has 4 kids her oldest is  and has 2 kids ages 3 and 1 months. Her other son is an  in [place] and is about to get  in November. Her daughter is in veterinary school and had a white coat ceremony recently. Other daughter just started school at [place]. Clinical Pain Assessment (nonverbal scale for nonverbal patients):  
[++++ Clinical Pain Assessment++++] [++++Pain Severity++++]: Pain: 5 
[++++Pain Character++++]: cramping 
[++++Pain Duration++++]: weeks [++++Pain Effect++++]: functional 
[++++Pain Factors++++]: none in particular 
[++++Pain Frequency++++]: constant [++++Pain Location++++]: upper abdomen 
[++++ Clinical Pain Assessment++++] FUNCTIONAL ASSESSMENT:  
 
Palliative Performance Scale (PPS): PPS: 80 PSYCHOSOCIAL/SPIRITUAL SCREENING:  
 
Any spiritual / Muslim concerns: 
[] Yes /  [x] No 
 
Caregiver Burnout: 
[] Yes /  [x] No /  [] No Caregiver Present Anticipatory grief assessment:  
[x] Normal  / [] Maladaptive ESAS Anxiety: Anxiety: 1 ESAS Depression: Depression: 2 REVIEW OF SYSTEMS:  
 
The following systems were [] reviewed / [] unable to be reviewed Systems: constitutional, ears/nose/mouth/throat, respiratory, gastrointestinal, genitourinary, musculoskeletal, integumentary, neurologic, psychiatric, endocrine. Positive findings noted below. Modified ESAS Completed by: provider Fatigue: 4 Drowsiness: 3 Depression: 2 Pain: 5 Anxiety: 1 Nausea: 2 Anorexia: 1 Dyspnea: 1 Best Well-Bein Constipation: No  
Other Problem (Comment): 0 PHYSICAL EXAM:  
 
Wt Readings from Last 3 Encounters:  
18 127 lb 1.6 oz (57.7 kg) 18 130 lb (59 kg) 18 130 lb (59 kg) Pulse (P) 79, resp. rate 18, height 5' 7\" (1.702 m), weight 127 lb 1.6 oz (57.7 kg), last menstrual period 2015, SpO2 (P) 96 %. Last bowel movement: See Nursing Note Constitutional: Alert, oriented ×4, appears well Eyes: pupils equal, anicteric ENMT: no nasal discharge, moist mucous membranes, some mucositis in hard palate Cardiovascular: regular rhythm, distal pulses intact Respiratory: breathing not labored, symmetric Gastrointestinal: soft non-tender, +bowel sounds, epigastric tenderness present. Musculoskeletal: no deformity, no tenderness to palpation Skin: warm, dry Neurologic: following commands, moving all extremities Psychiatric: full affect, no hallucinations Other: 
 
 
 HISTORY:  
 
Past Medical History:  
Diagnosis Date  Anxiety  BRCA1 positive  Calculus of kidney   
 right  Hernia, inguinal, left   MS (multiple sclerosis) ([address]   Nausea & vomiting  Ovarian cancer ([address] 3/2015  
 high grade, stage 3C papillary serous  Thromboembolus ([address] 2007  
 lower abdomen post fall Past Surgical History:  
Procedure Laterality Date  HX GYN  2009  
 endometrial ablation with punctured uterus  HX OTHER SURGICAL  2007  
 green filter  HX MARIEL AND BSO  3/2014  
 ovarian cancer Family History Problem Relation Age of Onset  Cancer Paternal Grandmother   
  breast  
  Breast Cancer Paternal Grandmother  Coronary Artery Disease Mother 48 CABG and PCI  Heart Surgery Father   
  valve replacement  No Known Problems Brother  No Known Problems Brother  No Known Problems Brother  No Known Problems Daughter  No Known Problems Son  No Known Problems Son  No Known Problems Son History reviewed, no pertinent family history. Social History Substance Use Topics  Smoking status: Never Smoker  Smokeless tobacco: Never Used  Alcohol use No  
 
Allergies Allergen Reactions  Pcn [Penicillins] Rash Current Outpatient Prescriptions Medication Sig  pantoprazole (PROTONIX) 40 mg tablet Take 1 Tab by mouth daily. Indications: Gastric Ulcer  sucralfate (CARAFATE) 100 mg/mL suspension Take 5 mL by mouth four (4) times daily.  [START ON 10/4/2018] oxyCODONE ER (OXYCONTIN) 15 mg ER tablet Take 1 Tab by mouth every twelve (12) hours. Max Daily Amount: 30 mg.  
 oxyCODONE IR (ROXICODONE) 5 mg immediate release tablet Take 1.5 Tabs by mouth every six (6) hours as needed for Pain. Max Daily Amount: 30 mg.  
 amitriptyline (ELAVIL) 10 mg tablet Take 1 Tab by mouth nightly.  LORazepam (ATIVAN) 1 mg tablet Take 1 Tab by mouth nightly as needed for Anxiety. Max Daily Amount: 1 mg. Indications: CANCER CHEMOTHERAPY-INDUCED NAUSEA AND VOMITING  
 senna-docusate (PERICOLACE) 8.6-50 mg per tablet Take 1 Tab by mouth daily. Indications: constipation  sennosides/docusate sodium (SENNA PLUS PO) Take  by mouth two (2) times a day.  bisacodyl (DULCOLAX) 10 mg suppository Insert 10 mg into rectum daily.  promethazine (PHENERGAN) 12.5 mg tablet  diphenhydrAMINE-acetaminophen (TYLENOL PM EXTRA STRENGTH)  mg tab Take  by mouth.  ondansetron hcl (ZOFRAN, AS HYDROCHLORIDE,) 4 mg tablet Take 4 mg by mouth every eight (8) hours as needed for Nausea.  XARELTO 20 mg tab tablet No current facility-administered medications for this visit. LAB DATA REVIEWED:  
 
Lab Results Component Value Date/Time WBC 4.0 09/18/2018 11:22 AM  
 HGB 10.9 (L) 09/18/2018 11:22 AM  
 PLATELET 353 66/90/2105 11:22 AM  
 
Lab Results Component Value Date/Time Sodium 141 09/18/2018 11:22 AM  
 Potassium 4.6 09/18/2018 11:22 AM  
 Chloride 101 09/18/2018 11:22 AM  
 CO2 25 09/18/2018 11:22 AM  
 BUN 12 09/18/2018 11:22 AM  
 Creatinine 0.75 09/18/2018 11:22 AM  
 Calcium 9.6 09/18/2018 11:22 AM  
 Magnesium 2.2 09/18/2018 11:22 AM  
  
Lab Results Component Value Date/Time AST (SGOT) 20 09/18/2018 11:22 AM  
 Alk. phosphatase 120 (H) 09/18/2018 11:22 AM  
 Protein, total 7.1 09/18/2018 11:22 AM  
 Albumin 4.6 09/18/2018 11:22 AM  
 Globulin 3.2 06/18/2018 08:32 AM  
 
Lab Results Component Value Date/Time INR 1.0 07/19/2018 11:54 AM  
 Prothrombin time 10.7 07/19/2018 11:54 AM  
  
No results found for: IRON, FE, TIBC, IBCT, PSAT, FERR CONTROLLED SUBSTANCES SAFETY ASSESSMENT (IF ON CONTROLLED SUBSTANCES):  
 
Reviewed opioid safety handout:  [x] Yes   [] No 
24 hour opioid dose >150mg morphine equivalent/day:  [] Yes   [x] No 
Benzodiazepines:  [x] Yes   [] No 
Sleep apnea:  [] Yes   [x] No 
Urine Toxicology Testing within last 6 months:  [] Yes   [x] No 
History of or new aberrant medication taking behaviors:  [] Yes   [x] No 
 
 
   
 
Total time: 70 minutes Counseling / coordination time: 40 minutes 
> 50% counseling / coordination?:  Yes

## 2018-09-27 NOTE — MR AVS SNAPSHOT
[address] [facility] [address] [place] [address] 
[phone] Patient: Wes Ang MRN: [id number] :[id number] Visit Information Date & Time Provider Department Dept. Phone Encounter #  
 2018  8:30 AM Geovani Lema MD [address] [id number] Your Appointments 10/16/2018  9:45 AM  
CHEMOTHERAPY with Wilfrid Hidalgo MD  
[address] [place] [address]) Appt Note: 3rd floor labs today. .for c4d1 doxil/avastin at [address] on 10/18 [address] [place] [address]-[phone]  
[phone]  
  
    
 2018  9:00 AM  
CHEMOTHERAPY with Harjit Villalta  [facility]) Appt Note: 3rd floor labs today. ...for c5d1 doxil/avastin at [address] on 11/15  
 [address] [facility] [phone]  
[phone]  
  
    
 2018  9:00 AM  
CHEMOTHERAPY with Wilfrid Hidalgo MD  
[address] [place] [address]) Appt Note: 3rd floor labs today. ..for c6d1 doxil/avastin at [address] on  [address] [place] [address] [address] [phone] Upcoming Health Maintenance Date Due Hepatitis C Screening 1965 Shingrix Vaccine Age 50> (1 of 2) 2015 Pneumococcal 19-64 Highest Risk (1 of 3 - PCV13) 2019* Influenza Age 5 to Adult 2019* BREAST CANCER SCRN MAMMOGRAM 2020 DTaP/Tdap/Td series (2 - Td) 3/6/2026 *Topic was postponed. The date shown is not the original due date. Allergies as of 2018  Review Complete On: 2018 By: Scott Bernal LPN Severity Noted Reaction Type Reactions Pcn [Penicillins]  2013    Rash Current Immunizations  Reviewed on 2018 Name Date Influenza Vaccine 10/5/2016 Tdap 3/6/2016 Not reviewed this visit You Were Diagnosed With   
  
 Codes Comments Abdominal pain, generalized    -  Primary ICD-10-CM: R10.84 ICD-9-CM: 789.07 Cancer associated pain     ICD-10-CM: G89.3 ICD-9-CM: 338.3 Drug-induced constipation     ICD-10-CM: K59.03 
ICD-9-CM: 564.09, E980.5 Neoplastic malignant related fatigue     ICD-10-CM: R53.0 ICD-9-CM: 780.79 Malignant neoplasm of ovary, unspecified laterality ([facility]     ICD-10-CM: C56.9 ICD-9-CM: 183.0 Neuropathy     ICD-10-CM: G62.9 ICD-9-CM: 203. 9 Vitals Pulse Resp Height(growth percentile) Weight(growth percentile) LMP SpO2 (P) 79 18 5' 7\" (1.702 m) 127 lb 1.6 oz (57.7 kg) 02/02/2015 (Approximate) (P) 96% BMI OB Status Smoking Status 19.91 kg/m2 Hysterectomy Never Smoker Vitals History BMI and BSA Data Body Mass Index Body Surface Area  
 19.91 kg/m 2 1.65 m 2 Preferred Pharmacy Pharmacy Name Phone [facility]/PHARMACY #[id number] [address], [address] [employer] [facility] 60 [phone] Your Updated Medication List  
  
   
This list is accurate as of 9/27/18  9:33 AM.  Always use your most recent med list.  
  
  
  
  
 amitriptyline 10 mg tablet Commonly known as:  ELAVIL Take 1 Tab by mouth nightly. bisacodyl 10 mg suppository Commonly known as:  DULCOLAX Insert 10 mg into rectum daily. LORazepam 1 mg tablet Commonly known as:  ATIVAN Take 1 Tab by mouth nightly as needed for Anxiety. Max Daily Amount: 1 mg. Indications: CANCER CHEMOTHERAPY-INDUCED NAUSEA AND VOMITING  
  
 * oxyCODONE IR 5 mg immediate release tablet Commonly known as:  Chares Ravel Take 1.5 Tabs by mouth every six (6) hours as needed for Pain. Max Daily Amount: 30 mg.  
  
 * oxyCODONE ER 15 mg ER tablet Commonly known as:  OxyCONTIN Take 1 Tab by mouth every twelve (12) hours. Max Daily Amount: 30 mg. Start taking on:  10/4/2018 pantoprazole 40 mg tablet Commonly known as:  PROTONIX Take 1 Tab by mouth daily. Indications: Gastric Ulcer  
  
 promethazine 12.5 mg tablet Commonly known as:  PHENERGAN  
  
 * SENNA PLUS PO Take  by mouth two (2) times a day. * senna-docusate 8.6-50 mg per tablet Commonly known as:  Euna Brill Take 1 Tab by mouth daily. Indications: constipation  
  
 sucralfate 100 mg/mL suspension Commonly known as:  Luh Luster Take 5 mL by mouth four (4) times daily. TYLENOL PM EXTRA STRENGTH  mg Tab Generic drug:  diphenhydrAMINE-acetaminophen Take  by mouth. XARELTO 20 mg Tab tablet Generic drug:  rivaroxaban ZOFRAN 4 mg tablet Generic drug:  ondansetron hcl Take 4 mg by mouth every eight (8) hours as needed for Nausea. * Notice: This list has 4 medication(s) that are the same as other medications prescribed for you. Read the directions carefully, and ask your doctor or other care provider to review them with you. Prescriptions Printed Refills  
 oxyCODONE ER (OXYCONTIN) 15 mg ER tablet 0 Starting on: 10/4/2018 Sig: Take 1 Tab by mouth every twelve (12) hours. Max Daily Amount: 30 mg.  
 Class: Print Route: Oral  
 oxyCODONE IR (ROXICODONE) 5 mg immediate release tablet 0 Sig: Take 1.5 Tabs by mouth every six (6) hours as needed for Pain. Max Daily Amount: 30 mg.  
 Class: Print Route: Oral  
  
Prescriptions Sent to Pharmacy Refills  
 pantoprazole (PROTONIX) 40 mg tablet 3 Sig: Take 1 Tab by mouth daily. Indications: Gastric Ulcer Class: Normal  
 Pharmacy: [facility]/pharmacy #[id number] Ph #: [phone] Route: Oral  
 sucralfate (CARAFATE) 100 mg/mL suspension 3 Sig: Take 5 mL by mouth four (4) times daily. Class: Normal  
 Pharmacy: [facility]/pharmacy #[id number]- [place], [address] [place] [address] Ph #: [phone]  Route: Oral  
 amitriptyline (ELAVIL) 10 mg tablet 1  
 Sig: Take 1 Tab by mouth nightly. Class: Normal  
 Pharmacy: [facility][id number]- [place], [address] [facility] #: [phone] Route: Oral  
  
To-Do List   
 10/04/2018  8:00 AM  
  Appointment with Jarrod Vyas 1 at [address] ([phone])  
  
 10/15/2018 10:30 AM  
  Appointment with [facility] ER 1 at [facility] [address] ([phone]) CONTRAST STUDY: 1. The patient should not eat solid food four hours before the appointment but should be encouraged to drink clear liquids. 2.  If you have to drink oral contrast, please pick it up any weekday prior to your appointment, if you cannot please check in 2 hrs before appt time. 3.  The patient will require IV access for contrast administration. 4.  The patient should not take Ibuprofen (Advil, Motrin, etc.) and Naproxen Sodium (Aleve, etc.)  on the day of the exam. Stopping non-steroidal anti-inflammatory agents (NSAIDs) like Ibuprofen decreases the risk of kidney damage from the x-ray contrast (dye). 5.  Bring any non [facility] facility films/images pertaining to the area of interest with you on the day of appointment. 6.  Bring current lab work if available (within last 90 days CMP) 7. Check in at registration at least 30 minutes before appt time unless you were instructed to do otherwise. 10/18/2018 8:00 AM  
  Appointment with Pavithra Johnson FT CHAIR 1 at [address] ([phone])  
  
 11/01/2018 8:00 AM  
  Appointment with Pavithra Johnson FT CHAIR 1 at [address] ([phone])  
  
 11/15/2018 8:00 AM  
  Appointment with Jarrod Vyas 1 at [address] ([phone])  
  
 11/29/2018 8:00 AM  
  Appointment with Pavithra Johnson FT CHAIR 1 at [address] ([phone])  
  
 12/13/2018 8:00 AM  
  Appointment with Pavithra Johnson FT CHAIR 1 at [address] ([phone])  
  
 12/27/2018 8:00 AM  
  Appointment with Jarrod Vyas 1 at [address] [place] [address] ([phone]) Patient Instructions Dear Felisa Mccrary , 
 
 It was a pleasure seeing you today  at [address] [place] [address] office Your stated goal:  
-To continue to have good quality of life 
-Know when her prognosis is limited so she can make plans accordingly Your described symptoms were: Fatigue: 4 Drowsiness: 3 Depression: 2 Pain: 5 Anxiety: 1 Nausea: 2 Anorexia: 1 Dyspnea: 1 Best Well-Bein Constipation: No  
Other Problem (Comment): 0 This is the plan we talked about: 1. Abdominal pain- cancer related - We believe this is well controlled with the recent increase in dose. Continue Oxycontin 15 mg two times a day and Oxycodone 7.5 mg (1.5 tabs) every 6 hours as needed. 2. New epigastric pain- likely from peptic ulcer or gastric mucositis - You have sharp pain after eating or drinking which is new and very bothersome. - I think this is likely from mucositis, you already have some spots in your mouth. - Please start Protonix 40 mg daily before breakfast, this will help with decreasing your stomach acid production - Also take Carafate, 1 tsp about 15 mins before eating anything. 
- We talked dietary modifications, eat more bread, rice and of course ice cream. 
 
3.  Constipation - You take Senna in the morning and night, and some prune juice - You do a supository on days you skip a bowel movement. 4.  Sleep and anxiety 
-You are on Ativan 1 mg at bedtime and you have been on this for many years so we can continue that. 5. Dry mouth 
- You suffer from severe dry mouth. - Please purchase Biotene mouth wash/ tooth paste and mouth spray. 6. Neuropathy - You are in the starting stages of neuropathy in your hands. - You did not tolerate gabapentin and so we agreed to try a small dose of Elavil 10 mg at bedtime. 7.  Goals of care - We talked about the rise in your  numbers. However, you clinically feel better, we agreed not to worry about numbers at this time.  
- We discussed your wishes regarding CPR/intubation, we agreed to remain FULL code. Previous visit 
-We talked about what is important to you. You want to have good quality of life, good symptom management and more time with her family.   
-Your son is getting  in [place] in November and you want to have a good time. - You want doctors to be honest and open about your prognosis so you can make decisions with your care team on what is the next best step for you. 
-You are reassured with the current decision of proceeding with chemotherapy and you are tolerating it quite well.  
-You want to avoid emergency room visits. We talked about how important it is to communicate your symptoms or if you are feeling poorly to either Dr. Ros Salmon office or to our office as early as possible so we can work on alleviating your symptoms without having to bring you to the emergency room. This is what you have shared with us about Advance Care Planning Advance Care Planning 8/30/2018 Patient's Healthcare Decision Maker is: Legal Next of Kin Primary Decision Maker Name Mardee Blizzard Primary Decision Maker Phone Number [phone] Primary Decision Maker Relationship to Patient Spouse Confirm Advance Directive - The Palliative Medicine Team is here to support you and your family. We will see you again in 4 weeks Sincerely, Venus Forte MD and the Palliative Medicine Team 
 
 
  
Introducing [facility]! Dear Elia Adams: Thank you for requesting a [employer] account. Our records indicate that you already have an active [employer] account. You can access your account anytime at [email or web address] [employer] Did you know that you can access your hospital and ER discharge instructions at any time in [employer]? You can also review all of your test results from your hospital stay or ER visit. Additional Information If you have questions, please visit the Frequently Asked Questions section of the [employer] website at [email or web address] Remember, [employer] is NOT to be used for urgent needs. For medical emergencies, dial 911. Now available from your iPhone and Android! Please provide this summary of care documentation to your next provider. Your primary care clinician is listed as Lucy Maynard. If you have any questions after today's visit, please call [phone].

## 2018-09-27 NOTE — PROGRESS NOTES
Palliative Medicine Office Visit Palliative Medicine Nurse Check In 
[phone] Patient Name: Cecily Hurtado YOB: 1965 Date of Office Visit: 9/27/2018 Patient states: \"  \" 
 
From Check In Sheet (scanned in Media): 
Has a medical provider talked with you about cardiopulmonary resuscitation (CPR)? [x] Yes   [] No   [] Unable to obtain Nurse reminder to complete or update ACP FlowSheet: 
 
Is ACP on the Problem List?    [] Yes    [x] No 
IF ACP Document is ON FILE; Nurse to place ACP on Problem List  
 
Is there an ACP Note in Chart Review/Note? [] Yes    [x] No  
If NO: ALERT PROVIDER Advance Care Planning 8/30/2018 Patient's Healthcare Decision Maker is: Legal Next of Kin Primary Decision Maker Name Pauline Cortes Primary Decision Maker Phone Number [phone] Primary Decision Maker Relationship to Patient Spouse Confirm Advance Directive - Is there anything that we should know about you as a person in order to provide you the best care possible? Have you been to the ER, urgent care clinic since your last visit? [] Yes   [x] No   [] Unable to obtain Have you been hospitalized since your last visit? [] Yes   [x] No   [] Unable to obtain Have you seen or consulted any other health care providers outside of the [address] [place] [address] since your last visit? [] Yes   [x] No   [] Unable to obtain Functional status (describe):  
 
 
Last BM: 9/26/2018  accessed (date): 9/27/2018 Bottle review (for opioid pain medication): 
Medication 1:  
Date filled:  
Directions:  
# filled: # left: # pills taking per day: 
Last dose taken: 
 
Medication 2:  
Date filled:  
Directions:  
# filled: # left: # pills taking per day: 
Last dose taken: 
 
Medication 3:  
Date filled:  
Directions:  
# filled: # left: # pills taking per day: 
Last dose taken: 
 
Medication 4:  
Date filled:  
Directions:  
# filled: # left: # pills taking per day: 
Last dose taken:

## 2018-09-27 NOTE — PATIENT INSTRUCTIONS
Dear Rich Mello , It was a pleasure seeing you today  at [facility] office Your stated goal:  
-To continue to have good quality of life 
-Know when her prognosis is limited so she can make plans accordingly Your described symptoms were: Fatigue: 4 Drowsiness: 3 Depression: 2 Pain: 5 Anxiety: 1 Nausea: 2 Anorexia: 1 Dyspnea: 1 Best Well-Bein Constipation: No  
Other Problem (Comment): 0 This is the plan we talked about: 1. Abdominal pain- cancer related - We believe this is well controlled with the recent increase in dose. Continue Oxycontin 15 mg two times a day and Oxycodone 7.5 mg (1.5 tabs) every 6 hours as needed. 2. New epigastric pain- likely from peptic ulcer or gastric mucositis - You have sharp pain after eating or drinking which is new and very bothersome. - I think this is likely from mucositis, you already have some spots in your mouth. - Please start Protonix 40 mg daily before breakfast, this will help with decreasing your stomach acid production - Also take Carafate, 1 tsp about 15 mins before eating anything. 
- We talked dietary modifications, eat more bread, rice and of course ice cream. 
 
3.  Constipation - You take Senna in the morning and night, and some prune juice - You do a supository on days you skip a bowel movement. 4.  Sleep and anxiety 
-You are on Ativan 1 mg at bedtime and you have been on this for many years so we can continue that. 5. Dry mouth 
- You suffer from severe dry mouth. - Please purchase Biotene mouth wash/ tooth paste and mouth spray. 6. Neuropathy - You are in the starting stages of neuropathy in your hands. - You did not tolerate gabapentin and so we agreed to try a small dose of Elavil 10 mg at bedtime. 7.  Goals of care - We talked about the rise in your  numbers. However, you clinically feel better, we agreed not to worry about numbers at this time. - We discussed your wishes regarding CPR/intubation, we agreed to remain FULL code. Previous visit 
-We talked about what is important to you. You want to have good quality of life, good symptom management and more time with her family.   
-Your son is getting  in [place] in November and you want to have a good time. - You want doctors to be honest and open about your prognosis so you can make decisions with your care team on what is the next best step for you. 
-You are reassured with the current decision of proceeding with chemotherapy and you are tolerating it quite well.  
-You want to avoid emergency room visits. We talked about how important it is to communicate your symptoms or if you are feeling poorly to either Dr. Coleen Nava office or to our office as early as possible so we can work on alleviating your symptoms without having to bring you to the emergency room. This is what you have shared with us about Advance Care Planning Advance Care Planning 8/30/2018 Patient's Healthcare Decision Maker is: Legal Next of Kin Primary Decision Maker Name Roman Humphrey Primary Decision Maker Phone Number [phone] Primary Decision Maker Relationship to Patient Spouse Confirm Advance Directive - The Palliative Medicine Team is here to support you and your family. We will see you again in 4 weeks Sincerely, Yo Pace MD and the Palliative Medicine Team

## 2018-10-02 NOTE — TELEPHONE ENCOUNTER
Patient is calling regarding having constipation and bloody stools. Patient states she had a bowel movement about 30 minutes ago and \"it was awful\"  Advised patient that nurse would call her back.

## 2018-10-02 NOTE — TELEPHONE ENCOUNTER
Called pt after she had spoken to Dr. Heavenly Calzada who made good recommendations. Pt feel the liquid Carafate is helping with mucositis and will continue on with this. Instructed her that if it stopped improving or got worse, to let us know and we would try either Magic Mouthwash or KLACK's solution. She will increase her water intake as she feels she was drinking less because of her mouth irritation. She will continue with Senna, 2 tabs BID as per Dr. Heavenly Calzada and let us know if she feels it is not improving. She wants to see how the preparation H works and if not improved, she will call and we will try Anusol AC. She has a follow up apt with Dr. Boogie Thibodeaux on 10/16 and will call if she feels she needs to be seen sooner.

## 2018-10-02 NOTE — PROGRESS NOTES
Patient phone call  - Severe urge to have bowel movement but none until later this afternoon. She had to strain significantly and caused severe 8/10 pain in her rectum followed by which she had soft stool with bright red blood in it. She was not in pain after BM but still un comfortable. Has been having daily Bm until yesterday. Oral, esophageal and gastric mucositis improving well. She feels her abdomen hurts and burns. High concern for proctitis or rectal mucositis. Sucralfate enema not available in pharmacy. Plan  - Drink more water  - Apply Hemorrhoid cream every 6 hours  - Do not strain for BM  - Increase Senna to 2 tabs two times a day and daily Miralax  - If she has continued rectal urgency and or bleeding, proctitis need to be considered. At that time, suggest GI eval or Dr. Cindy Floyd for anoscopy or treat empirically with Hydrocortisone or Mesalamine enema.

## 2018-10-02 NOTE — TELEPHONE ENCOUNTER
Spoke with Jesse Valdivia regarding her constipation issues and the fact that when she moved her bowels she was having what she described as hard, round stool and that prior to having a bowel movement she had to use a dulcolax suppository. Once she moved her bowels she had some bright red blood, she thinks it was from hemorroids but she was very upset and she is having pain and feels it is tied to mucositis. She became very emotional, PM nurse explained that she would explain to Dr. Carrington Rey would is going on with Jesse Valdivia and that one of us would call her back. Upon speaking with Dr. Carrington Rey she stated she would call Jesse Valdivia back and check in on her.     Cameron Jurado, RN   Palliative Medicine

## 2018-10-03 NOTE — TELEPHONE ENCOUNTER
Spoke with Jus Pap, she continued to have constipation issues this morning and had to sit in bathtub and disimpact herself. She stated that it was very painful and that her hemorrhoids were very sore and bleeding. She feels that she was able to remove most of the stool in her colon but she is afraid to eat anything for fear that she will become constipated again. PM nurse discussed with her the importance of taking her bowel regimen as prescribed and to add prune juice or wet prunes to her daily diet. This information will be shared with Dr. Geovanna Vyas and Jus Temple will be notified of changes to her regimen. Cha Costello RN  Palliative Medicine    After speaking with Dr. Geovanna Vyas, she asked that lactulose 20 grams be called into [facility]'s pharmacy. Jus Temple was called and instructed to take the lactulose every 4 hours on the days that she felt like she needs to have a bowel movement but is unable to do so and to take it twice a day on the other days in addition to her already prescribed bowel regimen. She verbalized understanding. She is very nervous about eating because of the risk of getting constipated again. PM nurse went over with her the need to eat, the need to do her normal activities such as walking and eating in order to help keep her bowels moving. Jus Temple agreed and she is going to go for a short walk, she agreed to call PM nurse if she has any questions or concerns.     Cha Costello RN

## 2018-10-04 NOTE — PROGRESS NOTES
[facility] - Chemotherapy Progress Note    0800 Pt admit to [facility] for Avastin ambulatory in stable condition. Assessment completed. Accessed port per protocol, port with positive blood return, labs drawn and sent. Chemotherapy Flowsheet 10/4/2018   Cycle C3d15   Date 10/4/2018   Drug / Regimen avastin   Pre Hydration 500ml bolus   Pre Meds none   Notes given         Visit Vitals    /58    Pulse 67    Temp 97.6 °F (36.4 °C)    Ht 5' 7.52\" (1.715 m)    Wt 58.2 kg (128 lb 6.4 oz)    LMP 02/02/2015 (Approximate)    BMI 19.8 kg/m2       Medications:  NS 500ml Bolus  Avastin IV    1045 Pt tolerated treatment well. Port maintained positive blood return throughout treatment. Flushed, heparinized and de-accessed per protocol. D/c home ambulatory in no distress. Pt aware of next appointment scheduled for 10/18/18. Recent Results (from the past 12 hour(s))   CBC WITH AUTOMATED DIFF    Collection Time: 10/04/18  8:16 AM   Result Value Ref Range    WBC 3.5 (L) 3.6 - 11.0 K/uL    RBC 2.84 (L) 3.80 - 5.20 M/uL    HGB 9.5 (L) 11.5 - 16.0 g/dL    HCT 29.8 (L) 35.0 - 47.0 %    .9 (H) 80.0 - 99.0 FL    MCH 33.5 26.0 - 34.0 PG    MCHC 31.9 30.0 - 36.5 g/dL    RDW 13.0 11.5 - 14.5 %    PLATELET 077 (L) 433 - 400 K/uL    MPV 11.4 8.9 - 12.9 FL    NRBC 0.0 0  WBC    ABSOLUTE NRBC 0.00 0.00 - 0.01 K/uL    NEUTROPHILS 69 32 - 75 %    LYMPHOCYTES 22 12 - 49 %    MONOCYTES 6 5 - 13 %    EOSINOPHILS 2 0 - 7 %    BASOPHILS 1 0 - 1 %    IMMATURE GRANULOCYTES 0 0.0 - 0.5 %    ABS. NEUTROPHILS 2.4 1.8 - 8.0 K/UL    ABS. LYMPHOCYTES 0.8 0.8 - 3.5 K/UL    ABS. MONOCYTES 0.2 0.0 - 1.0 K/UL    ABS. EOSINOPHILS 0.1 0.0 - 0.4 K/UL    ABS. BASOPHILS 0.0 0.0 - 0.1 K/UL    ABS. IMM.  GRANS. 0.0 0.00 - 0.04 K/UL    DF SMEAR SCANNED      RBC COMMENTS MACROCYTOSIS  1+        RBC COMMENTS HYPOCHROMIA  1+       URINALYSIS W/ REFLEX CULTURE    Collection Time: 10/04/18  8:16 AM   Result Value Ref Range    Color YELLOW/STRAW      Appearance CLEAR CLEAR      Specific gravity 1.006 1.003 - 1.030      pH (UA) 6.0 5.0 - 8.0      Protein NEGATIVE  NEG mg/dL    Glucose NEGATIVE  NEG mg/dL    Ketone NEGATIVE  NEG mg/dL    Bilirubin NEGATIVE  NEG      Blood NEGATIVE  NEG      Urobilinogen 0.2 0.2 - 1.0 EU/dL    Nitrites NEGATIVE  NEG      Leukocyte Esterase TRACE (A) NEG      WBC 0-4 0 - 4 /hpf    RBC 0-5 0 - 5 /hpf    Epithelial cells FEW FEW /lpf    Bacteria NEGATIVE  NEG /hpf    UA:UC IF INDICATED CULTURE NOT INDICATED BY UA RESULT CNI      Hyaline cast 0-2 0 - 5 /lpf

## 2018-10-04 NOTE — PROGRESS NOTES
Returned call to . [address]. She states that she had been constipated and she had increased her Senna intake to 2 tabs in AM and PM, per Palliative recommendation. She has since had multiple soft stools and now 2 bouts of diarrhea. She is inquiring as to what she should do now. She provided some recent history and change in diet of increased Dairy intake and now with resulting constipation. She previously had been fairly regular with bowel movements taking 1 tab Senna in Am and PM and Miralax daily. After some discussion, she is most comfortable with resuming her previous regimen and decreasing her dairy intake. She shared that she is having increased burning, due to neuropathy, to her hands and it is becoming quite painful. She has been using ice to try to minimize it, but that only lasts temporarily. She is taking her Elavil at night and does not know if it is working because she is sleeping well at night. She is wondering if there may be anything else that could help with the burning. Discussed with her that Dr. Roger Tidwell will be updated and Palliative will follow up with her. She was very appreciative.     Abbey Frias RN  Palliative Medicine

## 2018-10-04 NOTE — TELEPHONE ENCOUNTER
Pt returned the phone call and states she does not need any refills of the stool softener. She gets the medication otc.

## 2018-10-05 NOTE — PROGRESS NOTES
Phone call regarding worsening neuropathy in hands    - She has been feeling very tired and quite poorly since starting Avastin. She is going back and forth from diarrhea and constipation. No further rectal/ hemorrhoidal bleeding. Severe tingling in hands along with skin peeling. Grade 2-3 neuropathy. Feet symptoms are not that significant. Symptoms limited to hands up to wrist.    She was recently started on Elavil 10 mg at bedtime which she is tolerating well. 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     Plan  - Start Lyrica 25 mg BID- discussed how is it better tolerated  - Increase Elavil to 20 mg at bedtime.  - Re assess next week and make changes accordingly

## 2018-10-08 NOTE — PROGRESS NOTES
[address] [facility] Office  Nursing Note  [phone]  Fax [phone]     Name:  Marlena Alejandro  YOB: 1965     Pt called to give an update on how the amitriptyline 20mg nightly + lyrica 25mg twice daily are working. See Dr. Melyssa Siddiqui 10/5/18 note. Pt says she is still experiencing pain, swelling, and burning in her hands and feet (this started after the 9/20/18 Doxil/Avastin infusion). She has noticed a slight improvement in her symptoms with the amitriptyline and lyrica, but not much yet. She has become more drowsy which is concerning to her. She says she if fine with waiting a little longer before adjusting medication dose as she does not want to be drowsier. Pt reports an improvement in her abdominal pain. Pt was previously taking oxycodone ER 15 mg every 12 hrs and oxycodone IR 5mg every 6 hours-- she now says she is not experiencing as much breakthrough pain and she hasn't needed the oxycodone IR very often this week. Nurse will let Dr. Timothy Perez know and will call pt back.     CANDICE DaveyN, RN  Clinical Referral Navigator

## 2018-10-08 NOTE — PROGRESS NOTES
Nurse contacted Dr. Helen Shah regarding pt's call this morning. Dr. Lilibeth Montez order:  Stay on current dose of amitriptyline and lyrica. Get plenty of rest.  Give the medicine time to build up in her system. The drowsiness should balance itself out in a few days. Pt is going to discuss the possibility of chemo dose reduction with Dr. Lanis Koyanagi following her scans on 10/15/18. Nurse called pt and relayed Dr. Lilibeth Montez order above. Pt voices understanding.

## 2018-10-10 PROBLEM — C56.9 OVARIAN CANCER (HCC): Status: ACTIVE | Noted: 2018-01-01

## 2018-10-10 PROBLEM — R10.9 ABDOMINAL PAIN: Status: ACTIVE | Noted: 2018-01-01

## 2018-10-10 PROBLEM — R11.2 NAUSEA AND VOMITING: Status: ACTIVE | Noted: 2018-01-01

## 2018-10-10 NOTE — PROGRESS NOTES
TRANSFER - IN REPORT: 
 
Verbal report received from [facility]) on [address]  being received from Direct Adm(unit) for routine progression of care Report consisted of patients Situation, Background, Assessment and  
Recommendations(SBAR). Information from the following report(s) SBAR, Kardex, Intake/Output and MAR was reviewed with the receiving nurse. Opportunity for questions and clarification was provided. Assessment completed upon patients arrival to unit and care assumed. 1300- patient arrived on unit, port accessed, pain controlled, fluids started, assessment complete 1400- patient stable and comfortable.

## 2018-10-10 NOTE — TELEPHONE ENCOUNTER
Advised patient is on her way to ED and we will touch back with her as patient may require changes in medication dosages, as patient is too early for Oxycontin Refill

## 2018-10-10 NOTE — PROGRESS NOTES
Palliative Medicine  Nursing Note  [phone]  Fax [phone]      Telephone Call  Patient Name: Leonora Skinner  YOB: 1965    10/10/2018      Advance Care Planning 8/30/2018   Patient's Healthcare Decision Maker is: Legal Next of Kin   Primary Decision Maker Name Carlos Eduardo Cunha    Primary Decision Maker Phone Number [phone]   Primary Decision Maker Relationship to Patient Spouse   Confirm Advance Directive -       Per Dr. Daniella West request, call placed Dr. Ally Wright office and spoke with a nurse, Samson Concepcion, who stated they spoke with Ms. Aleksandra Edward about the fact that she was feeling worse and she knew that Ms. Aleksandra Edward was in touch with PM.  PM nurse explained that Dr. Cece Michael wanted Safia  to be informed at Dr. Ally Wright office that Ms. Aleksandra Edward is going to have an ultrasound and possible paracentesis done today due to increased abdominal pain, nausea and vomiting episode times one. PM will keep Dr. Ally lau informed of any necessary updates as necessary.        Elvia Alanis, CANDICEN, RN, OCN, [facility]  Palliative Medicine

## 2018-10-10 NOTE — TELEPHONE ENCOUNTER
Patient calling stating that 1)  She feels like she wants to throw up; 2) her abdominal cavity feels like it is filled up with fluid; 3)  She is in pain. Advised patient that nurse would call her back to discuss.

## 2018-10-10 NOTE — H&P
[address] [facility], [address] [place], [address] 
P [phone]  F [phone] Siddhartha Wan       [id number]  1965 Admitted 10/10/2018 Date 10/10/2018 HPI: 
 
Siddhartha Wan is a 48 y.o. with stage IIIC ovarian cancer, BRCA1+. She underwent X-lap, hysterectomy, and tumor debulking in 2015. She was recommended adjuvant chemotherapy with 6 cycles of Taxol and Carboplatin. Her post-treatment PET/CT was negative for disease. S/p recurrent disease early  treated with carbo/Gemzar (3/2017 - 2017). Post treatment imaging was w/o evidence of disease and she received maintenance PARPi (zejula/lynparza). Stopped d/t toxicities Recurrent disease on imagining, initiated rubraca, subsequent disease progression following 2.5 cycles transitioned to Doxil/Avastin(2018) having completed 3 cycles, last 10/4/18 and there is a plan for disease status at end of month. She is seeing [facility] medicine for control of abd pain and constipation on regimen as reviewed in notes. S/p paracentesis x 3 most recent 18 SUBJECTIVE: 
 
Siddhartha Wan calls our office and palliative medicine today for acute nausea and subsequent emesis with abd bloating/pain this morning. Bilious emesis early AM. Used rectal supp with good result. Tried PO liquids, crackers, again emesis. She is unable to keep pain and nausea meds down. Denies F/C, recent illness. No CP/SOB, dysuria. Mucositis/reflux controlled. PPE symptoms discussed, mild with palmar/planter tenderness. Using lotion hourly. No blisters. +epistaxis daily with episodic rectal bleeding, hemorrhoids. +fatigue. Denies wt loss. Discussed her sx earlier over the phone and recommended admit for control/prevention of exacerbation, evaluation abd for possible ascites. On admission, she is extremely fatigued. Just had dose of morphine, abd pain better. No current nausea. Past Medical History:  
Diagnosis Date  Anxiety  BRCA1 positive  Calculus of kidney 1/13  
 right  Hernia, inguinal, left 2012  MS (multiple sclerosis) ([facility] [address] 1996  Nausea & vomiting  Ovarian cancer ([facility] [address] 3/2015  
 high grade, stage 3C papillary serous  Thromboembolus ([facility] [address] [facility] 8/2007  
 lower abdomen post fall Past Surgical History:  
Procedure Laterality Date  HX GYN  2009  
 endometrial ablation with punctured uterus  HX OTHER SURGICAL  8/2007  
 green filter  HX MARIEL AND BSO  3/2014  
 ovarian cancer Social History Substance Use Topics  Smoking status: Never Smoker  Smokeless tobacco: Never Used  Alcohol use No  
  
Family History Problem Relation Age of Onset  Cancer Paternal Grandmother   
  breast  
 Breast Cancer Paternal Grandmother  Coronary Artery Disease Mother 48 CABG and PCI  Heart Surgery Father   
  valve replacement  No Known Problems Brother  No Known Problems Brother  No Known Problems Brother  No Known Problems Daughter  No Known Problems Son  No Known Problems Son  No Known Problems Son   
  
Current Facility-Administered Medications Medication Dose Route Frequency  amitriptyline (ELAVIL) tablet 10 mg  10 mg Oral QHS  lactulose (CHRONULAC) 10 gram/15 mL solution 20 g  20 g Oral Q4H PRN  
 oxyCODONE ER (OxyCONTIN) tablet 15 mg  15 mg Oral Q12H  
 oxyCODONE IR (ROXICODONE) tablet 7.5 mg  7.5 mg Oral Q6H PRN  pregabalin (LYRICA) capsule 25 mg  25 mg Oral BID  senna-docusate (PERICOLACE) 8.6-50 mg per tablet 1 Tab  1 Tab Oral BID  sucralfate (CARAFATE) 100 mg/mL oral suspension 0.5 g  0.5 g Oral QID  [START ON 10/11/2018] rivaroxaban (XARELTO) tablet 20 mg  20 mg Oral DAILY WITH BREAKFAST  sodium chloride (NS) flush 5-10 mL  5-10 mL IntraVENous Q8H  
 sodium chloride (NS) flush 5-10 mL  5-10 mL IntraVENous PRN  
 0.9% sodium chloride with KCl 20 mEq/L infusion   IntraVENous CONTINUOUS  
  morphine injection 4 mg  4 mg IntraVENous Q2H PRN  
 ondansetron (ZOFRAN) injection 4 mg  4 mg IntraVENous Q4H PRN  
 diphenhydrAMINE (BENADRYL) injection 12.5 mg  12.5 mg IntraVENous ONCE PRN  
 LORazepam (ATIVAN) injection 1 mg  1 mg IntraVENous Q6H PRN  prochlorperazine (COMPAZINE) injection 10 mg  10 mg IntraVENous Q6H PRN Allergies Allergen Reactions  Pcn [Penicillins] Rash Review of Systems A comprehensive review of systems was negative except for that written in the History of Present Illness. , 10 point ROS OBJECTIVE: 
 
Physical Exam 
There were no vitals taken for this visit. General appearance: fatigued, cooperative, no distress, pale Eyes: conjunctivae/corneas clear. PER, EOM's intact. Back: symmetric, no curvature. ROM normal. No CVA tenderness. Lungs: clear to auscultation bilaterally Heart: regular rate and rhythm, S1, S2 normal, no murmur, click, rub or gallop Abdomen: soft, non-tender. Bowel sounds normal. No masses,  no organomegaly Pelvic: deferred Extremities: no edema, redness or tenderness in the calves or thighs Pulses: 2+ and symmetric Skin: no lesion. No skin tenting Neurologic: Grossly normal 
 
Data Review No results found for this or any previous visit (from the past 24 hour(s)). Imaging US bedside minimal ascites. AXR pending IMPRESSION/PLAN: 
48 y.o. with progressive ovarian cancer on current Doxil/Avastin s/p cycle 3 on 10/4/18. Admission for acute N/V, intolerant of PO, possible dehydration with uncontrolled abdominal pain. I reviewed with Merlin Peng, her  and present family her medical records, physical exam, and review of symptoms. Admitted for labs, IVFs, antiemetics, imaging studies as above. Consideration for CT while in patient for EOD. Resume home meds for pain and GI transit Dr. Sujey Ruiz to see, discussed.  
 
 
Signed By: LESIA Bar   
 10/10/2018/1:20 PM

## 2018-10-10 NOTE — PROGRESS NOTES
Bedside and Verbal shift change report given to Lynsey Ang RN (oncoming nurse) by Michelle Parmar RN (offgoing nurse). Report included the following information SBAR, Kardex, Procedure Summary, Intake/Output, MAR, Accordion and Recent Results.

## 2018-10-10 NOTE — PROGRESS NOTES
Phone call with patient  - Increased abdominal pain different from before- pressure like. Feeling bloated. Very nauseated, had a good bowel movement this morning. Has been feeling up and down the last couple of weeks. Feels there is fluid accumulation in her abdomen. Plan  - Abdomen USG to see if she needs a therapeutic paracentesis.

## 2018-10-10 NOTE — IP AVS SNAPSHOT
2700 [facility] [address] [place] [address] 
[phone] Patient: Haley Rogel MRN: [id number] :1965 About your hospitalization You were admitted on:  October 10, 2018 You last received care in the:  [address] [place] [address] You were discharged on:  2018 Why you were hospitalized Your primary diagnosis was:  Not on File Your diagnoses also included:  Ovarian Cancer (Hcc), Abdominal Pain, Non-Intractable Vomiting With Nausea, Epigastric Pain, Xerostomia, Moderate Malnutrition (Hcc), Gastroparesis Follow-up Information Follow up With Details Comments Contact Info MD Brenda Mckeon [address] [place] [address] 
[phone] Your Scheduled Appointments Monday October 15, 2018 10:30 AM EDT  
CT ABD PELV W CONT with [facility] CT ER 1 [facility] RAD CT ([address]) [address]  
[phone] CONTRAST STUDY: 1. The patient should not eat solid food four hours before the appointment but should be encouraged to drink clear liquids. 2.  If you have to drink oral contrast, please pick it up any weekday prior to your appointment, if you cannot please check in 2 hrs before appt time. 3.  The patient will require IV access for contrast administration. 4.  The patient should not take Ibuprofen (Advil, Motrin, etc.) and Naproxen Sodium (Aleve, etc.)  on the day of the exam. Stopping non-steroidal anti-inflammatory agents (NSAIDs) like Ibuprofen decreases the risk of kidney damage from the x-ray contrast (dye). 5.  Bring any non [facility] facility films/images pertaining to the area of interest with you on the day of appointment. 6.  Bring current lab work if available (within last 90 days CMP) 7. Check in at registration at least 30 minutes before appt time unless you were instructed to do otherwise. Patient should report to outpatient registration ([address] [place] [address]), 30 minutes prior to the appointment time. (NOT FOR MRI)  After the time of 4:30pm, please go to Admitting (Main Entrance, 1st office on the left) to be registered until 7:00pm (closing). After 7pm pt goes to ER registration On Saturday during the hours of 7:00am - 3:00pm patients can be registered for outpatient services at Admitting. After 3pm go to ER registration. On Sunday pt goes to ER registration only. Tuesday October 16, 2018  9:45 AM EDT CHEMOTHERAPY with Rob Torres MD  
[facility] [address]) 200 [facility] [address] [address]  
[phone] Thursday October 18, 2018  8:00 AM EDT INFUSION 240 RI with BREMO FT CHAIR 1  
455 [facility] ([address]) [address] [facility] [address] [phone] Go to [address] [facility] [address], ground floor. Thursday October 25, 2018  8:30 AM EDT Follow Up with Gavin Jimenez MD  
[facility] ([address] [facility]) 200 [facility] 1200 [facility] [address] [place] [address]  
[phone] Thursday November 01, 2018  8:00 AM EDT INFUSION 240 RI with BREMO FT CHAIR 1  
455 [facility] ([address]) [address] [facility] [address] [phone] [phone] Go to [address] [facility] [address], ground floor. Tuesday November 13, 2018  9:00 AM EST CHEMOTHERAPY with Saleem Burks,  N. [facility] ([address]) 200 [facility] [address] [phone]  
[phone] Thursday November 15, 2018  8:00 AM EST INFUSION 240 RI with BREMO FT CHAIR 1  
455 [facility] ([address]) [address] [facility] [address] [phone]  
[phone] Go to [address], ground floor. Thursday November 29, 2018  8:00 AM EST INFUSION 240 RI with BREMO FT CHAIR 1  
455 [facility] [address] ) [address] [address]  
[phone] Go to [facility], ground [facility]. Discharge Orders None A check kam indicates which time of day the medication should be taken. My Medications START taking these medications Instructions Each Dose to Equal  
 Morning Noon Evening Bedtime  
 metoclopramide HCl 5 mg tablet Commonly known as:  REGLAN Your last dose was: Your next dose is: Take 1 Tab by mouth three (3) times daily (with meals) for 20 days. 5 mg CHANGE how you take these medications Instructions Each Dose to Equal  
 Morning Noon Evening Bedtime  
 pantoprazole 40 mg tablet Commonly known as:  PROTONIX What changed:  when to take this Your last dose was: Your next dose is: Take 1 Tab by mouth every twelve (12) hours. Indications: Gastric Ulcer 40 mg SENNA PLUS PO What changed:  Another medication with the same name was removed. Continue taking this medication, and follow the directions you see here. Your last dose was: Your next dose is: Take  by mouth two (2) times a day. sucralfate 100 mg/mL suspension Commonly known as:  Jacinto Innocent What changed:  how much to take Your last dose was: Your next dose is: Take 10 mL by mouth four (4) times daily. Indications: SCLEROTHERAPY INDUCED ESOPHAGEAL ULCER  
 1 g CONTINUE taking these medications Instructions Each Dose to Equal  
 Morning Noon Evening Bedtime  
 amitriptyline 10 mg tablet Commonly known as:  ELAVIL  
 Your last dose was: Your next dose is: Take 1 Tab by mouth nightly. 10 mg  
    
   
   
   
  
 bisacodyl 10 mg suppository Commonly known as:  DULCOLAX Your last dose was: Your next dose is: Insert 10 mg into rectum daily. 10 mg  
    
   
   
   
  
 lactulose 10 gram/15 mL solution Commonly known as:  Adrien Carrier Your last dose was: Your next dose is: Take 30 mL by mouth every four (4) hours as needed. 20 g LORazepam 1 mg tablet Commonly known as:  ATIVAN Your last dose was: Your next dose is: Take 1 Tab by mouth nightly as needed for Anxiety. Max Daily Amount: 1 mg. Indications: CANCER CHEMOTHERAPY-INDUCED NAUSEA AND VOMITING  
 1 mg * oxyCODONE IR 5 mg immediate release tablet Commonly known as:  Dasha Ayala Your last dose was: Your next dose is: Take 1.5 Tabs by mouth every six (6) hours as needed for Pain. Max Daily Amount: 30 mg.  
 7.5 mg  
    
   
   
   
  
 * oxyCODONE ER 15 mg ER tablet Commonly known as:  OxyCONTIN Your last dose was: Your next dose is: Take 1 Tab by mouth every twelve (12) hours. Max Daily Amount: 30 mg.  
 15 mg  
    
   
   
   
  
 pregabalin 25 mg capsule Commonly known as:  Bartley Heimlich Your last dose was: Your next dose is: Take 1 Cap by mouth two (2) times a day. Max Daily Amount: 50 mg.  
 25 mg  
    
   
   
   
  
 promethazine 12.5 mg tablet Commonly known as:  PHENERGAN Your last dose was: Your next dose is: XARELTO 20 mg Tab tablet Generic drug:  rivaroxaban Your last dose was: Your next dose is:    
   
   
      
   
   
   
  
 ZOFRAN 4 mg tablet Generic drug:  ondansetron hcl Your last dose was: Your next dose is: Take 4 mg by mouth every eight (8) hours as needed for Nausea. 4 mg * Notice: This list has 2 medication(s) that are the same as other medications prescribed for you. Read the directions carefully, and ask your doctor or other care provider to review them with you. ASK your doctor about these medications Instructions Each Dose to Equal  
 Morning Noon Evening Bedtime TYLENOL PM EXTRA STRENGTH  mg Tab Generic drug:  diphenhydrAMINE-acetaminophen Your last dose was: Your next dose is: Take  by mouth. Where to Get Your Medications These medications were sent to [facility]/pharmacy #[id number]- Parvez Chambers, [address]  32[address] [facility], [address] [place] [address] Phone:  [phone]  
  metoclopramide HCl 5 mg tablet  
 pantoprazole 40 mg tablet  
 sucralfate 100 mg/mL suspension Opioid Education Prescription Opioids: What You Need to Know: 
 
Prescription opioids can be used to help relieve moderate-to-severe pain and are often prescribed following a surgery or injury, or for certain health conditions. These medications can be an important part of treatment but also come with serious risks. Opioids are strong pain medicines. Examples include hydrocodone, oxycodone, fentanyl, and morphine. Heroin is an example of an illegal opioid. It is important to work with your health care provider to make sure you are getting the safest, most effective care. WHAT ARE THE RISKS AND SIDE EFFECTS OF OPIOID USE? Prescription opioids carry serious risks of addiction and overdose, especially with prolonged use. An opioid overdose, often marked by slow breathing, can cause sudden death. The use of prescription opioids can have a number of side effects as well, even when taken as directed. · Tolerance-meaning you might need to take more of a medication for the same pain relief · Physical dependence-meaning you have symptoms of withdrawal when the medication is stopped. Withdrawal symptoms can include nausea, sweating, chills, diarrhea, stomach cramps, and muscle aches. Withdrawal can last up to several weeks, depending on which drug you took and how long you took it. · Increased sensitivity to pain · Constipation · Nausea, vomiting, and dry mouth · Sleepiness and dizziness · Confusion · Depression · Low levels of testosterone that can result in lower sex drive, energy, and strength · Itching and sweating RISKS ARE GREATER WITH:      
· History of drug misuse, substance use disorder, or overdose · Mental health conditions (such as depression or anxiety) · Sleep apnea · Older age (72 years or older) · Pregnancy Avoid alcohol while taking prescription opioids. Also, unless specifically advised by your health care provider, medications to avoid include: · Benzodiazepines (such as Xanax or Valium) · Muscle relaxants (such as Soma or Flexeril) · Hypnotics (such as Ambien or Lunesta) · Other prescription opioids KNOW YOUR OPTIONS Talk to your health care provider about ways to manage your pain that don't involve prescription opioids. Some of these options may actually work better and have fewer risks and side effects. Consult your physician before adding or stopping any medications, treatments, or physical activity. Options may include: 
· Pain relievers such as acetaminophen, ibuprofen, and naproxen · Some medications that are also used for depression or seizures · Physical therapy and exercise · Counseling to help patients learn how to cope better with triggers of pain and stress. · Application of heat or cold compress · Massage therapy · Relaxation techniques Be Informed Make sure you know the name of your medication, how much and how often to take it, and its potential risks & side effects.  
 
IF YOU ARE PRESCRIBED OPIOIDS FOR PAIN: 
 · Never take opioids in greater amounts or more often than prescribed. Remember the goal is not to be pain-free but to manage your pain at a tolerable level. · Follow up with your primary care provider to: · Work together to create a plan on how to manage your pain. · Talk about ways to help manage your pain that don't involve prescription opioids. · Talk about any and all concerns and side effects. · Help prevent misuse and abuse. · Never sell or share prescription opioids · Help prevent misuse and abuse. · Store prescription opioids in a secure place and out of reach of others (this may include visitors, children, friends, and family). · Safely dispose of unused/unwanted prescription opioids: Find your community drug take-back program or your pharmacy mail-back program, or flush them down the toilet, following guidance from the Food and Drug Administration (www.fda.gov/Drugs/ResourcesForYou). · Visit www.cdc.gov/drugoverdose to learn about the risks of opioid abuse and overdose. · If you believe you may be struggling with addiction, tell your health care provider and ask for guidance or call [facility] at 4-087-575-GUUV. Discharge Instructions Upper GI Endoscopy: What to Expect at [facility] Your Recovery After you have an endoscopy, you will stay at the hospital or clinic for 1 to 2 hours. This will allow the medicine to wear off. You will be able to go home after your doctor or nurse checks to make sure you are not having any problems. You may have a sore throat for a day or two after the test. 
This care sheet gives you a general idea about what to expect after the test. 
How can you care for yourself at home? Activity · Rest as much as you need to after you go home. · You should be able to go back to your usual activities the day after the test. 
Diet · Follow your doctor's directions for eating after the test. Small frequent meals · Drink plenty of fluids Medications · If you have a sore throat the day after the test, use an over-the-counter spray to numb your throat. · Do not take your Xarelto for 48 hours, then resume as normal 
Follow-up care is a key part of your treatment and safety. Be sure to make and go to all appointments, and call your doctor if you are having problems. It's also a good idea to know your test results and keep a list of the medicines you take. When should you call for help? Call 911 anytime you think you may need emergency care. For example, call if: 
  · You passed out (loses consciousness).  
  · You have trouble breathing.  
  · You pass maroon or bloody stools.  
 Call your doctor now or seek immediate medical care if: 
  · You have pain that does not get better after your take pain medicine.  
  · You have new or worse belly pain.  
  · You have blood in your stools.  
  · You are sick to your stomach and cannot keep fluids down.  
  · You have a fever.  
  · You cannot pass stools or gas.  
 Watch closely for changes in your health, and be sure to contact your doctor if: 
  · Your throat still hurts after a day or two.  
  · You do not get better as expected. Where can you learn more? Go to http://ammon-kalli.info/. Enter ([phone] 394-624 in the search box to learn more about \"Upper GI Endoscopy: What to Expect at Home. \" Current as of: March 28, 2018 Content Version: 11.8 © 1002-2838 Healthwise, Incorporated. Care instructions adapted under license by [employer] (which disclaims liability or warranty for this information). If you have questions about a medical condition or this instruction, always ask your healthcare professional. [place] [address] any warranty or liability for your use of this information. [employer] Announcement We are excited to announce that we are making your provider's discharge notes available to you in [employer]. You will see these notes when they are completed and signed by the physician that discharged you from your recent hospital stay. If you have any questions or concerns about any information you see in [employer], please call the Health Information Department where you were seen or reach out to your Primary Care Provider for more information about your plan of care. Introducing [facility]! Dear Lyly Poon: Thank you for requesting a [employer] account. Our records indicate that you already have an active [employer] account. You can access your account anytime at [email or web address] Did you know that you can access your hospital and ER discharge instructions at any time in [employer]? You can also review all of your test results from your hospital stay or ER visit. Additional Information If you have questions, please visit the Frequently Asked Questions section of the [employer] website at [email or web address] Remember, [employer] is NOT to be used for urgent needs. For medical emergencies, dial 911. Now available from your iPhone and Android! Introducing Guerrero Kaiser As a [employer] patient, I wanted to make you aware of our electronic visit tool called Guerrero Ramboobey. [employer] 24/7 allows you to connect within minutes with a medical provider 24 hours a day, seven days a week via a mobile device or tablet or logging into a secure website from your computer. You can access Guerrero Kaiser from anywhere in the [place].  
 
A virtual visit might be right for you when you have a simple condition and feel like you just dont want to get out of bed, or cant get away from work for an appointment, when your regular [employer] provider is not available (evenings, weekends or holidays), or when youre out of town and need minor care. Electronic visits cost only $49 and if the [address] [place] [address] 24/7 provider determines a prescription is needed to treat your condition, one can be electronically transmitted to a nearby pharmacy*. Please take a moment to enroll today if you have not already done so. The enrollment process is free and takes just a few minutes. To enroll, please download the [address] [place] [address] 24/7 ronald to your tablet or phone, or visit [email or web address] to enroll on your computer. And, as an [address] [place] [address] patient with a [employer] account, the results of your visits will be scanned into your electronic medical record and your primary care provider will be able to view the scanned results. We urge you to continue to see your regular [address] [place] [address] provider for your ongoing medical care. And while your primary care provider may not be the one available when you seek a [employer] virtual visit, the peace of mind you get from getting a real diagnosis real time can be priceless. For more information on [employer], view our Frequently Asked Questions (FAQs) at [email or web address]. Sincerely, 
 
Thalia Boone MD 
Chief Medical Officer Delphine Andrade *:  certain medications cannot be prescribed via [employer] Providers Seen During Your Hospitalization Provider Specialty Primary office phone Juan Li MD Gynecologic Oncology [phone] Your Primary Care Physician (PCP) Primary Care Physician Office Phone Office Fax Renny Neri [phone] You are allergic to the following Allergen Reactions Pcn (Penicillins) Rash Recent Documentation OB Status Smoking Status Pregnant Never Smoker Emergency Contacts Name Discharge Info Relation Home Work Mobile Jose Payan DISCHARGE CAREGIVER [3] Spouse [3] [phone] Patient Belongings The following personal items are in your possession at time of discharge: 
  Dental Appliances: None  Visual Aid: Glasses, With patient      Home Medications: None   Jewelry: With patient  Clothing: At bedside    Other Valuables: None Please provide this summary of care documentation to your next provider. Signatures-by signing, you are acknowledging that this After Visit Summary has been reviewed with you and you have received a copy. Patient Signature:  ____________________________________________________________ Date:  ____________________________________________________________  
  
Melissa Figures Provider Signature:  ____________________________________________________________ Date:  ____________________________________________________________

## 2018-10-10 NOTE — TELEPHONE ENCOUNTER
Pt  to state that she feels like yesterday her fluid is starting to build back up, she is having abdominal pain, feels like she is going to throw up, \"this is different than what I have felt in the past\".     I saw where she had called palliative earlier this morning with the same concerns, we are going to let palliative call her back with their recommendations and then we will ensure our office is in agreement and go from there, pt verbalized understanding

## 2018-10-10 NOTE — IP AVS SNAPSHOT
[address] [place] [address] 
[phone] Patient: Stanford Flynn MRN: [id number] :1965 A check kam indicates which time of day the medication should be taken. My Medications START taking these medications Instructions Each Dose to Equal  
 Morning Noon Evening Bedtime  
 metoclopramide HCl 5 mg tablet Commonly known as:  REGLAN Your last dose was: Your next dose is: Take 1 Tab by mouth three (3) times daily (with meals) for 20 days. 5 mg CHANGE how you take these medications Instructions Each Dose to Equal  
 Morning Noon Evening Bedtime  
 pantoprazole 40 mg tablet Commonly known as:  PROTONIX What changed:  when to take this Your last dose was: Your next dose is: Take 1 Tab by mouth every twelve (12) hours. Indications: Gastric Ulcer 40 mg SENNA PLUS PO What changed:  Another medication with the same name was removed. Continue taking this medication, and follow the directions you see here. Your last dose was: Your next dose is: Take  by mouth two (2) times a day. sucralfate 100 mg/mL suspension Commonly known as:  Evonne Dec What changed:  how much to take Your last dose was: Your next dose is: Take 10 mL by mouth four (4) times daily. Indications: SCLEROTHERAPY INDUCED ESOPHAGEAL ULCER  
 1 g CONTINUE taking these medications Instructions Each Dose to Equal  
 Morning Noon Evening Bedtime  
 amitriptyline 10 mg tablet Commonly known as:  ELAVIL Your last dose was: Your next dose is: Take 1 Tab by mouth nightly. 10 mg  
    
   
   
   
  
 bisacodyl 10 mg suppository Commonly known as:  DULCOLAX Your last dose was: Your next dose is: Insert 10 mg into rectum daily. 10 mg  
    
   
   
   
  
 lactulose 10 gram/15 mL solution Commonly known as:  Charles Face Your last dose was: Your next dose is: Take 30 mL by mouth every four (4) hours as needed. 20 g LORazepam 1 mg tablet Commonly known as:  ATIVAN Your last dose was: Your next dose is: Take 1 Tab by mouth nightly as needed for Anxiety. Max Daily Amount: 1 mg. Indications: CANCER CHEMOTHERAPY-INDUCED NAUSEA AND VOMITING  
 1 mg * oxyCODONE IR 5 mg immediate release tablet Commonly known as:  Nicci Reasons Your last dose was: Your next dose is: Take 1.5 Tabs by mouth every six (6) hours as needed for Pain. Max Daily Amount: 30 mg.  
 7.5 mg  
    
   
   
   
  
 * oxyCODONE ER 15 mg ER tablet Commonly known as:  OxyCONTIN Your last dose was: Your next dose is: Take 1 Tab by mouth every twelve (12) hours. Max Daily Amount: 30 mg.  
 15 mg  
    
   
   
   
  
 pregabalin 25 mg capsule Commonly known as:  Aguilar Dura Your last dose was: Your next dose is: Take 1 Cap by mouth two (2) times a day. Max Daily Amount: 50 mg.  
 25 mg  
    
   
   
   
  
 promethazine 12.5 mg tablet Commonly known as:  PHENERGAN Your last dose was: Your next dose is: XARELTO 20 mg Tab tablet Generic drug:  rivaroxaban Your last dose was: Your next dose is:    
   
   
      
   
   
   
  
 ZOFRAN 4 mg tablet Generic drug:  ondansetron hcl Your last dose was: Your next dose is: Take 4 mg by mouth every eight (8) hours as needed for Nausea. 4 mg * Notice: This list has 2 medication(s) that are the same as other medications prescribed for you.  Read the directions carefully, and ask your doctor or other care provider to review them with you. ASK your doctor about these medications Instructions Each Dose to Equal  
 Morning Noon Evening Bedtime TYLENOL PM EXTRA STRENGTH  mg Tab Generic drug:  diphenhydrAMINE-acetaminophen Your last dose was: Your next dose is: Take  by mouth. Where to Get Your Medications These medications were sent to [facility]/pharmacy #[id number]- [place], [address]  [address] [place] [address] Phone:  [phone]  
  metoclopramide HCl 5 mg tablet  
 pantoprazole 40 mg tablet  
 sucralfate 100 mg/mL suspension

## 2018-10-10 NOTE — TELEPHONE ENCOUNTER
Per note from palliative medicine, ultrasound with possible paracentesis today at 12:30 pm at [facility]

## 2018-10-11 PROBLEM — E44.0 MODERATE MALNUTRITION (HCC): Status: ACTIVE | Noted: 2018-01-01

## 2018-10-11 NOTE — CONSULTS
[address] SULLY Zackary 
 [address] [place] [id number] GASTROENTEROLOGY CONSULTATION Ernesto Smith, [phone] office [phone] NP/PA in-hospital cell phone M-F until 4:30PM 
After 5PM or on weekends, please call  for physician on call NAME:  Bertha Shah :   1965 MRN:   [id number] Referring Physician: Harinder Santos NP Consult Date: 10/11/2018 11:19 AM 
 
Chief Complaint: nausea, vomiting, and abdominal pain History of Present Illness:  Patient is a 48 y.o. who is seen in consultation at the request of Harinder Santos NP, for possible esophagitis, may need EGD, is on clears only. Patient has a past medical history significant for stage IIIC ovarian cancer. She initially underwent exploratory lap, hysterctomy, and tumor debulking in 2015 followed adjuvant chemotherapy. She had recurrent disease treated with cheotherapy in . Most recently, patient has had recurrent disease in 2018 followed by chemotherapy. She is followed by palliative medicine. She has had 3 paracenteses with the most recent in 2018. She has had complaints of nausea, vomiting, and abdominal pain. Patient was admitted to the hospital on 10/10/18. Patient complains of epigastric abdominal pain for the last approximately 2-3 weeks that she describes as an intermittent, sharp, stabbing sensation. Pain is concentrated to the epigastrium with radiation to both sides. Pain is worse with eating and drinking. No clear triggers. She was started on Protonix and Carafate with improvement for a period of time. Yesterday, patient had a sudden onset of nausea and vomiting followed by worsening abdominal pain. No hematemesis. No dysphagia or odynophagia. She has 1 bowel movement daily that is soft, brown, and easy to pass with the use of Senna. She reports a longstanding history of intermittent bright red blood per rectum. No melena. No fevers or chills. No NSAIDs. She is on Xarelto (last dose 2 days ago). No alcohol or tobacco use. History of colonoscopy reportedly 2 years ago. No history of EGD. I have reviewed the emergency room note, hospital admission note, notes by all other clinicians who have seen the patient during this hospitalization to date. I have reviewed the problem list and the reason for this hospitalization. I have reviewed the allergies and the medications the patient was taking at home prior to this hospitalization. PMH: 
Past Medical History:  
Diagnosis Date  Anxiety  BRCA1 positive  Calculus of kidney 1/13  
 right  Hernia, inguinal, left 2012  MS (multiple sclerosis) ([facility] [address] 1996  Nausea & vomiting  Ovarian cancer ([facility] [address] 3/2015  
 high grade, stage 3C papillary serous  Thromboembolus ([facility] [address] 8/2007  
 lower abdomen post fall PSH: 
Past Surgical History:  
Procedure Laterality Date  HX GYN  2009  
 endometrial ablation with punctured uterus  HX OTHER SURGICAL  8/2007  
 green filter  HX MARIEL AND BSO  3/2014  
 ovarian cancer Allergies: Allergies Allergen Reactions  Pcn [Penicillins] Rash Home Medications: 
Prior to Admission Medications Prescriptions Last Dose Informant Patient Reported? Taking? LORazepam (ATIVAN) 1 mg tablet   No No  
Sig: Take 1 Tab by mouth nightly as needed for Anxiety. Max Daily Amount: 1 mg. Indications: CANCER CHEMOTHERAPY-INDUCED NAUSEA AND VOMITING  
XARELTO 20 mg tab tablet   Yes No  
amitriptyline (ELAVIL) 10 mg tablet   No No  
Sig: Take 1 Tab by mouth nightly. bisacodyl (DULCOLAX) 10 mg suppository   No No  
Sig: Insert 10 mg into rectum daily. diphenhydrAMINE-acetaminophen (TYLENOL PM EXTRA STRENGTH)  mg tab   Yes No  
Sig: Take  by mouth. lactulose (CHRONULAC) 10 gram/15 mL solution   No No  
Sig: Take 30 mL by mouth every four (4) hours as needed.   
ondansetron hcl (ZOFRAN, AS HYDROCHLORIDE,) 4 mg tablet   Yes No  
 Sig: Take 4 mg by mouth every eight (8) hours as needed for Nausea. oxyCODONE ER (OXYCONTIN) 15 mg ER tablet   No No  
Sig: Take 1 Tab by mouth every twelve (12) hours. Max Daily Amount: 30 mg.  
oxyCODONE IR (ROXICODONE) 5 mg immediate release tablet   No No  
Sig: Take 1.5 Tabs by mouth every six (6) hours as needed for Pain. Max Daily Amount: 30 mg.  
pantoprazole (PROTONIX) 40 mg tablet   No No  
Sig: Take 1 Tab by mouth daily. Indications: Gastric Ulcer  
pregabalin (LYRICA) 25 mg capsule   No No  
Sig: Take 1 Cap by mouth two (2) times a day. Max Daily Amount: 50 mg.  
promethazine (PHENERGAN) 12.5 mg tablet   Yes No  
senna-docusate (PERICOLACE) 8.6-50 mg per tablet   No No  
Sig: Take 1 Tab by mouth daily. Indications: constipation  
sennosides/docusate sodium (SENNA PLUS PO)   Yes No  
Sig: Take  by mouth two (2) times a day. sucralfate (CARAFATE) 100 mg/mL suspension   No No  
Sig: Take 5 mL by mouth four (4) times daily. Facility-Administered Medications: None Hospital Medications: 
Current Facility-Administered Medications Medication Dose Route Frequency  prochlorperazine (COMPAZINE) with saline injection 5 mg  5 mg IntraVENous Q4H PRN  
 amitriptyline (ELAVIL) tablet 10 mg  10 mg Oral QHS  lactulose (CHRONULAC) 10 gram/15 mL solution 20 g  20 g Oral Q4H PRN  
 oxyCODONE ER (OxyCONTIN) tablet 10 mg  10 mg Oral Q8H  pregabalin (LYRICA) capsule 25 mg  25 mg Oral BID  senna-docusate (PERICOLACE) 8.6-50 mg per tablet 1 Tab  1 Tab Oral BID  sucralfate (CARAFATE) 100 mg/mL oral suspension 0.5 g  0.5 g Oral QID  sodium chloride (NS) flush 5-10 mL  5-10 mL IntraVENous Q8H  
 sodium chloride (NS) flush 5-10 mL  5-10 mL IntraVENous PRN  
 0.9% sodium chloride with KCl 20 mEq/L infusion   IntraVENous CONTINUOUS  
 morphine injection 4 mg  4 mg IntraVENous Q2H PRN  
 ondansetron (ZOFRAN) injection 4 mg  4 mg IntraVENous Q4H PRN  
  diphenhydrAMINE (BENADRYL) injection 12.5 mg  12.5 mg IntraVENous ONCE PRN  
 LORazepam (ATIVAN) injection 1 mg  1 mg IntraVENous Q6H PRN  prochlorperazine (COMPAZINE) injection 10 mg  10 mg IntraVENous Q6H PRN  
 rivaroxaban (XARELTO) tablet 20 mg  20 mg Oral DAILY WITH BREAKFAST  pantoprazole (PROTONIX) 40 mg in sodium chloride 0.9% 10 mL injection  40 mg IntraVENous Q12H  
 oxyCODONE IR (ROXICODONE) tablet 7.5 mg  7.5 mg Oral Q4H PRN  
 artificial saliva (MOUTH KOTE) 1 Spray  1 Spray Oral PRN Social History: 
Social History Substance Use Topics  Smoking status: Never Smoker  Smokeless tobacco: Never Used  Alcohol use No  
 
 
Family History: 
Family History Problem Relation Age of Onset  Cancer Paternal Grandmother   
  breast  
 Breast Cancer Paternal Grandmother  Coronary Artery Disease Mother 48 CABG and PCI  Heart Surgery Father   
  valve replacement  No Known Problems Brother  No Known Problems Brother  No Known Problems Brother  No Known Problems Daughter  No Known Problems Son  No Known Problems Son  No Known Problems Son Review of Systems: 
Constitutional: negative fever, negative chills, negative weight loss Eyes:   negative visual changes ENT:   negative sore throat, tongue or lip swelling Respiratory:  negative cough, negative dyspnea Cards:  negative for chest pain, palpitations, lower extremity edema GI:   See HPI 
:  negative for frequency, dysuria Integument:  negative for rash and pruritus Heme:  + for easy bruising, negative gum/nose bleeding Musculoskeletal:negative for myalgias, back pain and muscle weakness Neuro:    negative for headaches, dizziness, vertigo Psych: negative for feelings of anxiety, depression Objective:  
Patient Vitals for the past 8 hrs: 
 BP Temp Pulse Resp SpO2  
10/11/18 0919 96/60 97.7 °F (36.5 °C) 76 15 96 % 10/09 1901 - 10/11 0700 In: 1156.3 [I.V.:1156.3] Out: 700 [Urine:300] EXAM:   
 CONST:  Pleasant female lying in bed, no acute distress NEURO:  Alert and oriented x 3 HEENT: EOMI, no scleral icterus LUNGS: CTA bilaterally anteriorly CARD:  S1 S2 
 ABD:  Soft, non distended, epigastric tenderness, no rebound, no guarding. + Bowel sounds. EXT:  Warm PSYCH: Full, not anxious Data Review Recent Labs 10/11/18 
 0723  10/10/18 
 1349 WBC  2.4*  3.1* HGB  8.1*  9.6* HCT  26.1*  30.1*  
PLT  122*  138* Recent Labs 10/11/18 
 0723  10/10/18 
 1349 NA  140  139  
K  4.6  4.5  
CL  108  103 CO2  23  27 BUN  11  11 CREA  0.72  0.69 GLU  76  90 PHOS  4.1  3.6 CA  8.1*  8.9 Recent Labs 10/11/18 
 0723  10/10/18 
 1349 SGOT  14*  16  
AP  90  119* TP  5.7*  6.8 ALB  2.4*  2.9*  
GLOB  3.3  3.9 No results for input(s): INR, PTP, APTT in the last 72 hours. No lab exists for component: INREXT Assessment:  
· Intractable nausea/vomiting and epigastric abdominal pain: WBC 2.4, Hgb 8.1, platelets 665, LFTs normal. Abdominal xray (10/10/18): no obstruction or free air. · History of progressive ovarian cancer on chemotherapy Patient Active Problem List  
Diagnosis Code  Malignant neoplasm of both ovaries (HCC) C56.1, C56.2  Thrombocytopenia ([address] D69.6  CINV (chemotherapy-induced nausea and vomiting) R11.2, T45.1X5A  Peritoneal carcinomatosis (HCC) C78.6, C80.1  Malignant ascites R18.0  
 On anticoagulant therapy Z79.01  
 Other proteinuria R80.8  Anemia due to chemotherapy D64.81, T45.1X5A  
 Pain of metastatic malignancy G89.3  Ovarian cancer ([address] C56.9  Abdominal pain R10.9  Non-intractable vomiting with nausea R11.2  Epigastric pain R10.13  Xerostomia R68.2 Plan: · Clear liquids. NPO after midnight. · PPI and Carafate · Continue supportive measures: antiemetics PRN and analgesics PRN 
· Palliative medicine following · Plan for EGD tomorrow with Dr. Vasiliy Ascencio. Risks of the procedure to include (but not limited to) anesthesia, bleeding, infection, and perforation were discussed. Patient understands and is in agreement. · Thank you for allowing me to participate in care of [address] [place] [address] Signed By: Vanda Monsalve [facility]   
 10/11/2018  11:19 AM 
  
 
GI Attending: Patient seen and examined by me earlier today. Plan for EGD tomorrow. Continue clear liquids until midnight. NPO after midnight. Rodolfo Ascencio MD

## 2018-10-11 NOTE — PROGRESS NOTES
Spiritual Care Assessment/Progress Note [address] 
 
 
NAME: Debora Hoffman      MRN: [id number] AGE: 48 y.o. SEX: female [employer] Affiliation: Temple Language: English  
 
10/11/2018     Total Time (in minutes): 9 Spiritual Assessment begun in [address] through conversation with: 
  
    [x]Patient        [x] Family    [] Friend(s) Reason for Consult: Palliative Care, Initial/Spiritual Assessment Spiritual beliefs: (Please include comment if needed) [x] Identifies with a pavel tradition:     
   [] Supported by a pavel community:        
   [] Claims no spiritual orientation:       
   [] Seeking spiritual identity:            
   [] Adheres to an individual form of spirituality:       
   [] Not able to assess:                   
 
    
Identified resources for coping:  
   [] Prayer                           
   [] Music                  [] Guided Imagery 
   [] Family/friends                 [] Pet visits [] Devotional reading                         [] Unknown 
   [] Other Interventions offered during this visit: (See comments for more details) Patient Interventions: Iconic (affirming the presence of God/Higher Power), Initial/Spiritual assessment, patient floor, Prayer (assurance of) Family/Friend(s): Prayer (assurance of) Plan of Care: 
 
 [] Support spiritual and/or cultural needs  
 [] Support AMD and/or advance care planning process    
 [] Support grieving process 
 [] Coordinate Rites and/or Rituals  
 [] Coordination with community clergy [] No spiritual needs identified at this time 
 [] Detailed Plan of Care below (See Comments)  [] Make referral to Music Therapy 
[] Make referral to Pet Therapy    
[] Make referral to Addiction services 
[] Make referral to [facility] 
[] Make referral to Spiritual Care Partner 
[] No future visits requested [x] Follow up visits as needed Comments: Visited with Ms. Julianna Escalera for initial spiritual assessment. Pt had several family members/visitors at bedside. Emotional support offered. Explored with pt how she is feeling and how our spiritual care team might be of support. No specific requests at this time. Pt expressed her hope that she might go home tomorrow. Pt spoke of her belief in God and that prayers are being shared on her behalf. Chaplains are available for support as needed; please page at [phone]. Paola Malik, Palliative

## 2018-10-11 NOTE — CONSULTS
Palliative Medicine Consult Ruperto: [phone] ([phone] Patient Name: Kadeem Mora YOB: 1965 Date of Initial Consult: 10/10/18 Reason for Consult: Cancer related symptom management Requesting Provider: Dr. Boogie Thibodeaux Primary Care Physician: Parth Keenan MD 
 
 SUMMARY:  
Kadeem Mora is a 48 y.o. with recurrent ovarian malignancy on Doxil/Avastin per Dr. Boogie Thibodeaux, known to outpatient palliative medicine as she follows with Dr. Heavenly Calzada. She was admitted 10/10/2018 from home due to worsening nausea and vomiting followed by acute worsening of epigastric abdominal pain. Last chemotherapy date was 10/4. Current medical issues leading to Palliative Medicine involvement include: cancer related symptoms including pain, nausea, vomiting. PALLIATIVE DIAGNOSES:  
1. Epigastric abdominal pain 2. Chronic neoplasm related lower abdominal pain 3. Nausea with emesis 4. Xerostomia PLAN:  
1. Epigastric abdominal pain for several weeks, suspected peptic ulcer or gastric mucositis,  most recently under good control with Carafate and Protonix but uncontrolled nausea and epigastric pain yesterday leading to hospitalization. She feels well now. 2. Continue current regimen for pain and anti emetics. 3. Plan for EGD tomorrow. 4. Plan to resume home regimen of opioids upon discharge. 5. Communicated plan of care with: Palliative IDT 
 
 
 GOALS OF CARE / TREATMENT PREFERENCES:  
 
GOALS OF CARE:   Full care escalation TREATMENT PREFERENCES:  
Code Status: Full Code Advance Care Planning: 
Advance Care Planning 10/10/2018 Patient's Healthcare Decision Maker is: Legal Next of Kin Primary Decision Maker Name Cassandra Carrillo Primary Decision Maker Phone Number [phone] Primary Decision Maker Relationship to Patient Spouse Confirm Advance Directive None Other: As far as possible, the palliative care team has discussed with patient / health care proxy about goals of care / treatment preferences for patient. HISTORY:  
 
History obtained from:  Pt, , EMR 
 
CHIEF COMPLAINT: upper abdominal pain HPI/SUBJECTIVE: The patient is:  
[x] Verbal and participatory [] Non-participatory due to:  
 
 
10/11- feeling much better than yesterday. Tolerating clears. EGD tomorrow. Pt presented as direct admit from home after calling gyn/onc and palliative clinics with worsening upper abdominal pain. States she had severe nausea this a.m. \"out of the blue\" with emesis. Afterwards she had severe worsening of an epigastric pain she has been treating for 2-3 weeks now. Pain had subsided with regular use of Carafate, Protonix and bland diet. She is frustrated and scared that it has returned. Concerned that today we are quite behind the pain, after weeks of \"getting on top of it. \"   
 
Endorses control of pain and lower abdomen. Neuropathy is in the background as well. Recently initiated Lyrica. Pain is 2/10 at time of assessment:  4:30 pm 
On presentation 8/10 Functional /typical level for her is 0-1/10 Clinical Pain Assessment (nonverbal scale for severity on nonverbal patients):  
Clinical Pain Assessment Severity: 2 Location: epigastrium Character: sharp/burning Duration: weeks Factors: worsens with eating/drinking Frequency: constant with episodic worsening Duration: for how long has pt been experiencing pain (e.g., 2 days, 1 month, years) Frequency: how often pain is an issue (e.g., several times per day, once every few days, constant) FUNCTIONAL ASSESSMENT:  
 
Palliative Performance Scale (PPS): PPS: 80 PSYCHOSOCIAL/SPIRITUAL SCREENING:  
 
Palliative IDT has assessed this patient for cultural preferences / practices and a referral made as appropriate to needs (Cultural Services, Patient Advocacy, Ethics, etc.) Advance Care Planning: 
Advance Care Planning 10/10/2018 Patient's Healthcare Decision Maker is: Legal Next of Kin Primary Decision Maker Name Lamonte Davis Primary Decision Maker Phone Number [phone] Primary Decision Maker Relationship to Patient Spouse Confirm Advance Directive None Any spiritual / Mosque concerns: 
[] Yes /  [x] No 
 
Caregiver Burnout: 
[] Yes /  [x] No /  [] No Caregiver Present Anticipatory grief assessment:  
[] Normal  / [] Maladaptive ESAS Anxiety: Anxiety: 0 
 
ESAS Depression: Depression: 0 REVIEW OF SYSTEMS:  
 
Positive and pertinent negative findings in ROS are noted above in HPI. The following systems were [x] reviewed / [] unable to be reviewed as noted in HPI Other findings are noted below. Systems: constitutional, ears/nose/mouth/throat, respiratory, gastrointestinal, genitourinary, musculoskeletal, integumentary, neurologic, psychiatric, endocrine. Positive findings noted below. Modified ESAS Completed by: provider Fatigue: 0 Drowsiness: 0 Depression: 0 Pain: 2 Anxiety: 0 Nausea: 1 Anorexia: 3 Dyspnea: 0 Constipation: No  
     
 
 
 PHYSICAL EXAM:  
 
From RN flowsheet: 
Wt Readings from Last 3 Encounters:  
10/04/18 128 lb 6.4 oz (58.2 kg) 09/27/18 127 lb 1.6 oz (57.7 kg) 09/20/18 130 lb (59 kg) Blood pressure 96/60, pulse 76, temperature 97.7 °F (36.5 °C), resp. rate 15, SpO2 96 %. Pain Scale 1: Numeric (0 - 10) Pain Intensity 1: 3 Pain Onset 1: poa Pain Location 1: Abdomen Pain Orientation 1: Medial, Upper Pain Description 1: Aching Pain Intervention(s) 1: Family Support Last bowel movement, if known:  
 
Constitutional: mild distress due to pain/concern Eyes: pupils equal, anicteric ENMT: no nasal discharge, moist mucous membranes, no lesions or thrush in mouth Cardiovascular: regular rhythm, distal pulses intact, no edema Respiratory: breathing not labored, symmetric Gastrointestinal: abdomen is flat, bs active, mild TTP in epigastrium w/o rebound or guarding Musculoskeletal: no deformity, no tenderness to palpation Skin: warm, dry Neurologic: following commands, moving all extremities Psychiatric: full affect, no hallucinations HISTORY:  
 
Active Problems: 
  Ovarian cancer ([address] (10/10/2018) Abdominal pain (10/10/2018) Non-intractable vomiting with nausea (10/10/2018) Epigastric pain () Xerostomia () Past Medical History:  
Diagnosis Date  Anxiety  BRCA1 positive  Calculus of kidney 1/13  
 right  Hernia, inguinal, left 2012  MS (multiple sclerosis) ([facility] [address] 1996  Nausea & vomiting  Ovarian cancer ([facility] [address] 3/2015  
 high grade, stage 3C papillary serous  Thromboembolus ([facility] [address] 8/2007  
 lower abdomen post fall Past Surgical History:  
Procedure Laterality Date  HX GYN  2009  
 endometrial ablation with punctured uterus  HX OTHER SURGICAL  8/2007  
 green filter  HX MARIEL AND BSO  3/2014  
 ovarian cancer Family History Problem Relation Age of Onset  Cancer Paternal Grandmother   
  breast  
 Breast Cancer Paternal Grandmother  Coronary Artery Disease Mother 48 CABG and PCI  Heart Surgery Father   
  valve replacement  No Known Problems Brother  No Known Problems Brother  No Known Problems Brother  No Known Problems Daughter  No Known Problems Son  No Known Problems Son  No Known Problems Son History reviewed, no pertinent family history. Social History Substance Use Topics  Smoking status: Never Smoker  Smokeless tobacco: Never Used  Alcohol use No  
 
Allergies Allergen Reactions  Pcn [Penicillins] Rash Current Facility-Administered Medications Medication Dose Route Frequency  prochlorperazine (COMPAZINE) with saline injection 5 mg  5 mg IntraVENous Q4H PRN  
 amitriptyline (ELAVIL) tablet 10 mg  10 mg Oral QHS  lactulose (CHRONULAC) 10 gram/15 mL solution 20 g  20 g Oral Q4H PRN  
  oxyCODONE ER (OxyCONTIN) tablet 10 mg  10 mg Oral Q8H  pregabalin (LYRICA) capsule 25 mg  25 mg Oral BID  senna-docusate (PERICOLACE) 8.6-50 mg per tablet 1 Tab  1 Tab Oral BID  sucralfate (CARAFATE) 100 mg/mL oral suspension 0.5 g  0.5 g Oral QID  sodium chloride (NS) flush 5-10 mL  5-10 mL IntraVENous Q8H  
 sodium chloride (NS) flush 5-10 mL  5-10 mL IntraVENous PRN  
 0.9% sodium chloride with KCl 20 mEq/L infusion   IntraVENous CONTINUOUS  
 morphine injection 4 mg  4 mg IntraVENous Q2H PRN  
 ondansetron (ZOFRAN) injection 4 mg  4 mg IntraVENous Q4H PRN  
 diphenhydrAMINE (BENADRYL) injection 12.5 mg  12.5 mg IntraVENous ONCE PRN  
 LORazepam (ATIVAN) injection 1 mg  1 mg IntraVENous Q6H PRN  prochlorperazine (COMPAZINE) injection 10 mg  10 mg IntraVENous Q6H PRN  
 rivaroxaban (XARELTO) tablet 20 mg  20 mg Oral DAILY WITH BREAKFAST  pantoprazole (PROTONIX) 40 mg in sodium chloride 0.9% 10 mL injection  40 mg IntraVENous Q12H  
 oxyCODONE IR (ROXICODONE) tablet 7.5 mg  7.5 mg Oral Q4H PRN  
 artificial saliva (MOUTH KOTE) 1 Spray  1 Spray Oral PRN  
 
 
 
 LAB AND IMAGING FINDINGS:  
 
Lab Results Component Value Date/Time WBC 2.4 (L) 10/11/2018 07:23 AM  
 HGB 8.1 (L) 10/11/2018 07:23 AM  
 PLATELET 514 (L) 58/89/8787 07:23 AM  
 
Lab Results Component Value Date/Time Sodium 140 10/11/2018 07:23 AM  
 Potassium 4.6 10/11/2018 07:23 AM  
 Chloride 108 10/11/2018 07:23 AM  
 CO2 23 10/11/2018 07:23 AM  
 BUN 11 10/11/2018 07:23 AM  
 Creatinine 0.72 10/11/2018 07:23 AM  
 Calcium 8.1 (L) 10/11/2018 07:23 AM  
 Magnesium 1.8 10/11/2018 07:23 AM  
 Phosphorus 4.1 10/11/2018 07:23 AM  
  
Lab Results Component Value Date/Time AST (SGOT) 14 (L) 10/11/2018 07:23 AM  
 Alk. phosphatase 90 10/11/2018 07:23 AM  
 Protein, total 5.7 (L) 10/11/2018 07:23 AM  
 Albumin 2.4 (L) 10/11/2018 07:23 AM  
 Globulin 3.3 10/11/2018 07:23 AM  
 
Lab Results Component Value Date/Time INR 1.0 07/19/2018 11:54 AM  
 Prothrombin time 10.7 07/19/2018 11:54 AM  
  
No results found for: IRON, FE, TIBC, IBCT, PSAT, FERR No results found for: PH, PCO2, PO2 No components found for: [place] Lab Results Component Value Date/Time CK 72 01/09/2018 11:09 AM  
 CK - MB <1.0 01/09/2018 11:09 AM  
  
 
 
   
 
Total time:  50m Counseling / coordination time, spent as noted above:  25m 
> 50% counseling / coordination?:  Yes Prolonged service was provided for  []30 min   []75 min in face to face time in the presence of the patient, spent as noted above. Time Start:  
Time End:  
Note: this can only be billed with 84298 (initial) or 45513 (follow up). If multiple start / stop times, list each separately.

## 2018-10-11 NOTE — PROGRESS NOTES
[address], [address] [facility], [address] 
P [phone]  F [phone] Vniayak Amanda       [id number]  1965 Admitted 10/10/2018 Date 10/11/2018 HPI: 
 
Vinayak Amanda is a 48 y.o. with stage IIIC ovarian cancer, BRCA1+. She underwent X-lap, hysterectomy, and tumor debulking in 2015. She was recommended adjuvant chemotherapy with 6 cycles of Taxol and Carboplatin. Her post-treatment PET/CT was negative for disease. S/p recurrent disease early  treated with carbo/Gemzar (3/2017 - 2017). Post treatment imaging was w/o evidence of disease and she received maintenance PARPi (zejula/lynparza). Stopped d/t toxicities Recurrent disease on imagining, initiated rubraca, subsequent disease progression following 2.5 cycles transitioned to Doxil/Avastin(2018) having completed 3 cycles, last 10/4/18 and there is a plan for disease status at end of month. She is seeing [facility] for control of abd pain and constipation on regimen as reviewed in notes. S/p paracentesis x 3 most recent 18 SUBJECTIVE: 
She did fairly well over night. Last threw up around 9PM after trying to take her oral pain meds. She was given IV MS after vomiting. [place] Pool it gave her little relief, but stated taking the Carafate gave her the most relief. Tolerating ice chips and ice pops only. Voiding QS and had BM yesterday She is still unable to keep pain and nausea meds down but is tolerating pain meds po with small amount of applesauce. Past Medical History:  
Diagnosis Date  Anxiety  BRCA1 positive  Calculus of kidney   
 right  Hernia, inguinal, left   MS (multiple sclerosis) ([address]   Nausea & vomiting  Ovarian cancer ([address] 3/2015  
 high grade, stage 3C papillary serous  Thromboembolus ([address] 2007  
 lower abdomen post fall Past Surgical History:  
Procedure Laterality Date  HX GYN   endometrial ablation with punctured uterus  HX OTHER SURGICAL  8/2007  
 green filter  HX MARIEL AND BSO  3/2014  
 ovarian cancer Social History Substance Use Topics  Smoking status: Never Smoker  Smokeless tobacco: Never Used  Alcohol use No  
  
Family History Problem Relation Age of Onset  Cancer Paternal Grandmother   
  breast  
 Breast Cancer Paternal Grandmother  Coronary Artery Disease Mother 48 CABG and PCI  Heart Surgery Father   
  valve replacement  No Known Problems Brother  No Known Problems Brother  No Known Problems Brother  No Known Problems Daughter  No Known Problems Son  No Known Problems Son  No Known Problems Son   
  
Current Facility-Administered Medications Medication Dose Route Frequency  amitriptyline (ELAVIL) tablet 10 mg  10 mg Oral QHS  lactulose (CHRONULAC) 10 gram/15 mL solution 20 g  20 g Oral Q4H PRN  
 oxyCODONE ER (OxyCONTIN) tablet 10 mg  10 mg Oral Q8H  pregabalin (LYRICA) capsule 25 mg  25 mg Oral BID  senna-docusate (PERICOLACE) 8.6-50 mg per tablet 1 Tab  1 Tab Oral BID  sucralfate (CARAFATE) 100 mg/mL oral suspension 0.5 g  0.5 g Oral QID  sodium chloride (NS) flush 5-10 mL  5-10 mL IntraVENous Q8H  
 sodium chloride (NS) flush 5-10 mL  5-10 mL IntraVENous PRN  
 0.9% sodium chloride with KCl 20 mEq/L infusion   IntraVENous CONTINUOUS  
 morphine injection 4 mg  4 mg IntraVENous Q2H PRN  
 ondansetron (ZOFRAN) injection 4 mg  4 mg IntraVENous Q4H PRN  
 diphenhydrAMINE (BENADRYL) injection 12.5 mg  12.5 mg IntraVENous ONCE PRN  
 LORazepam (ATIVAN) injection 1 mg  1 mg IntraVENous Q6H PRN  prochlorperazine (COMPAZINE) injection 10 mg  10 mg IntraVENous Q6H PRN  
 rivaroxaban (XARELTO) tablet 20 mg  20 mg Oral DAILY WITH BREAKFAST  pantoprazole (PROTONIX) 40 mg in sodium chloride 0.9% 10 mL injection  40 mg IntraVENous Q12H  oxyCODONE IR (ROXICODONE) tablet 7.5 mg  7.5 mg Oral Q4H PRN  
 artificial saliva (MOUTH KOTE) 1 Spray  1 Spray Oral PRN Allergies Allergen Reactions  Pcn [Penicillins] Rash Review of Systems: 
General: Acknowledges some improvement from admission yesterday HEENT: Denies dysphagia or mucositis. Denies visual changes or headache Resp: Denies SOB, LYONS, wheezing or cough CV: Denies CP, palpitations GI/:Acknowledges continued epigastric pain. Some relief with Carafate. Continued nausea. Last emeses. Denies bloating, diarrhea, constipation or dysuria MuskSkel: Denies muscle ache or joint pain Neuro: Denies dizzyness or syncope Psych: Denies depression or feelings of sadness OBJECTIVE: 
 
Physical Exam 
Visit Vitals  /68 (BP 1 Location: Right arm, BP Patient Position: At rest)  Pulse 76  Temp 98.3 °F (36.8 °C)  Resp 14  SpO2 96% General appearance: fatigued, but improved, cooperative, no distress, pale Eyes: conjunctivae/corneas clear. Pupils equal and reactive to light. Back: symmetric, no curvature. ROM normal. No CVA tenderness. Lungs:CTA bilat without wheezing or rales Heart: Regular without M/R/G Abdomen: soft, non-distended. Non tender to lower charisse, but persistent tenderness to epigastric area. + BS throughout. No masses,  no obvious organomegaly Extremities: no edema, redness or tenderness in the calves or thighs. + DT/PT bilat Skin: no lesion. No skin tenting Neurologic: Grossly intact Data Review Recent Results (from the past 24 hour(s)) METABOLIC PANEL, COMPREHENSIVE Collection Time: 10/10/18  1:49 PM  
Result Value Ref Range Sodium 139 136 - 145 mmol/L Potassium 4.5 3.5 - 5.1 mmol/L Chloride 103 97 - 108 mmol/L  
 CO2 27 21 - 32 mmol/L Anion gap 9 5 - 15 mmol/L Glucose 90 65 - 100 mg/dL BUN 11 6 - 20 MG/DL  Creatinine 0.69 0.55 - 1.02 MG/DL  
 BUN/Creatinine ratio 16 12 - 20    
 GFR est AA >60 >60 ml/min/1.73m2 GFR est non-AA >60 >60 ml/min/1.73m2 Calcium 8.9 8.5 - 10.1 MG/DL Bilirubin, total 0.3 0.2 - 1.0 MG/DL  
 ALT (SGPT) 19 12 - 78 U/L  
 AST (SGOT) 16 15 - 37 U/L Alk. phosphatase 119 (H) 45 - 117 U/L Protein, total 6.8 6.4 - 8.2 g/dL Albumin 2.9 (L) 3.5 - 5.0 g/dL Globulin 3.9 2.0 - 4.0 g/dL A-G Ratio 0.7 (L) 1.1 - 2.2 MAGNESIUM Collection Time: 10/10/18  1:49 PM  
Result Value Ref Range Magnesium 1.9 1.6 - 2.4 mg/dL PHOSPHORUS Collection Time: 10/10/18  1:49 PM  
Result Value Ref Range Phosphorus 3.6 2.6 - 4.7 MG/DL  
CBC WITH AUTOMATED DIFF Collection Time: 10/10/18  1:49 PM  
Result Value Ref Range WBC 3.1 (L) 3.6 - 11.0 K/uL  
 RBC 2.88 (L) 3.80 - 5.20 M/uL HGB 9.6 (L) 11.5 - 16.0 g/dL HCT 30.1 (L) 35.0 - 47.0 % .5 (H) 80.0 - 99.0 FL  
 MCH 33.3 26.0 - 34.0 PG  
 MCHC 31.9 30.0 - 36.5 g/dL  
 RDW 13.4 11.5 - 14.5 % PLATELET 055 (L) 287 - 400 K/uL MPV 10.9 8.9 - 12.9 FL  
 NRBC 0.0 0  WBC ABSOLUTE NRBC 0.00 0.00 - 0.01 K/uL NEUTROPHILS 74 32 - 75 % LYMPHOCYTES 13 12 - 49 % MONOCYTES 13 5 - 13 % EOSINOPHILS 0 0 - 7 % BASOPHILS 0 0 - 1 % IMMATURE GRANULOCYTES 0 0.0 - 0.5 % ABS. NEUTROPHILS 2.3 1.8 - 8.0 K/UL  
 ABS. LYMPHOCYTES 0.4 (L) 0.8 - 3.5 K/UL  
 ABS. MONOCYTES 0.4 0.0 - 1.0 K/UL  
 ABS. EOSINOPHILS 0.0 0.0 - 0.4 K/UL  
 ABS. BASOPHILS 0.0 0.0 - 0.1 K/UL  
 ABS. IMM. GRANS. 0.0 0.00 - 0.04 K/UL  
 DF SMEAR SCANNED    
 PLATELET COMMENTS Large Platelets RBC COMMENTS MACROCYTOSIS 
1+ Imaging US 10/10/18: bedside showed minimal ascites. AXR 10/10/18 FINDINGS: Supine and upright views of the abdomen demonstrate a normal gas 
pattern. There is no free intraperitoneal air. No soft tissue masses or 
pathologic calcifications are seen. Osseous structures are intact. There is 
stent material noted overlying L4 through the left sacrum and a vena cava filter noted at the level of L2-3. IMPRESSION: No obstruction or free air. IMPRESSION/PLAN: 
48 y.o. with progressive ovarian cancer on current Doxil/Avastin s/p cycle 3 on 10/4/18. Admitted yesterday for acute N/V, intolerant of PO, possible dehydration with uncontrolled abdominal pain. Will consult GI this morning for possible EDG either today or first thing in the morning. Will continue on clears with no red colored liquids. Continue with Carafate as this is currently giving pt the most relief and await GI input Anemia: will monitor. Possible dilutional today Will hold on CT at this point as we would like to give the last treatment its full time since it appears this is more GI than GYN/Onc related at this time Mobilize throughout the day Will hold Xarelto today in case possible EDG later today. Did not take yesterday's dose due to vomiting (last dose 10/9/18) Will monitor throughout the day.  
 
Signed By: Ham Ho NP   
 10/11/2018/1:20 PM

## 2018-10-11 NOTE — CONSULTS
Palliative Medicine Consult Ruperto: [phone] Patient Name: Esvin Miller YOB: 1965 Date of Initial Consult: 10/10/18 Reason for Consult: Cancer related symptom management Requesting Provider: Dr. Christen Dimas Primary Care Physician: Felipe Antoine MD 
 
 SUMMARY:  
Esvin Miller is a 48 y.o. with recurrent ovarian malignancy on Doxil/Avastin per Dr. Christen Dimas, known to outpatient palliative medicine as she follows with Dr. Seun Figueroa. She was admitted 10/10/2018 from home due to worsening nausea and vomiting followed by acute worsening of epigastric abdominal pain. Last chemotherapy date was 10/4. Current medical issues leading to Palliative Medicine involvement include: cancer related symptoms including pain, nausea, vomiting. PALLIATIVE DIAGNOSES:  
1. Epigastric abdominal pain 2. Chronic neoplasm related lower abdominal pain 3. Nausea with emesis 4. Xerostomia PLAN:  
1. Epigastric abdominal pain for several weeks, suspected peptic ulcer or gastric mucositis,  most recently under good control with regimen outlined per Dr. Seun Figueroa:  Routine use of Carafate and daily Protonix, bland diet. 2. New nausea with emesis this am triggered severe acute worsening of the same epigastric pain, unclear etiology of the nausea today. 3. Neoplasm related lower abdominal pain has been well controlled on Oxycontin 15 mg BID, oxy 5 mg q5-6 hrs prn. Has not needed escalation to the 7.5 mg dose. 4. She lost morning doses of meds (including Oxycontin 15) with emesis. Feels now quite behind in pain control, though rates now at 2/10 after recent dose of IV morphine. 5. She remains fearful of taking meds by mouth, putting too much on stomach. Talked about potential to utilize basal rate of IV opioid if truly unable to tolerate oral.  She prefers to remain on the Oxycontin at least for the overnight period and reassess tomorrow once we catch up on her pain. 6. Plan: - continue Oxycontin 10mg q8h as ordered as pharmacy does not have the 15 mg dose 
- increase oxy IR to 7.5mg q4h prn in setting of acute pain, may use oral dose if feeling ok enough to tolerate.  
- focus on use of IV for breakthrough tonight, tomorrow morning. Currently ordered Morphine 4 mg IV q2h prn. She prefers to continue with the morphine for now as it has been effective, lowering pain to 2/10 at time of assessment. - continue scheduled Carafate 
- increase Protonix to 40 mg BID and give IV   
- continue home doses of amitriptyline, Lyrica as long as she can tolerate the pills. - agree with liquid diet, encouraged her to stick with this to rest stomach until tomorrow 
- trial of artificial saliva for severe xerostomia 7. Consider GI eval / EGD if no marked improvement in next 24-48 hrs 8. Communicated plan of care with: Palliative IDT 
 
 
 GOALS OF CARE / TREATMENT PREFERENCES:  
 
GOALS OF CARE:   Full care escalation TREATMENT PREFERENCES:  
Code Status: Full Code Advance Care Planning: 
Advance Care Planning 10/10/2018 Patient's Healthcare Decision Maker is: Legal Next of Kin Primary Decision Maker Name Vipul Fernandez Primary Decision Maker Phone Number [phone] Primary Decision Maker Relationship to Patient Spouse Confirm Advance Directive None Other: As far as possible, the palliative care team has discussed with patient / health care proxy about goals of care / treatment preferences for patient. HISTORY:  
 
History obtained from:  Pt, , EMR 
 
CHIEF COMPLAINT: upper abdominal pain HPI/SUBJECTIVE: The patient is:  
[x] Verbal and participatory [] Non-participatory due to: Pt presented as direct admit from home after calling gyn/onc and palliative clinics with worsening upper abdominal pain. States she had severe nausea this a.m. \"out of the blue\" with emesis.   Afterwards she had severe worsening of an epigastric pain she has been treating for 2-3 weeks now. Pain had subsided with regular use of Carafate, Protonix and bland diet. She is frustrated and scared that it has returned. Concerned that today we are quite behind the pain, after weeks of \"getting on top of it. \"   
 
Endorses control of pain and lower abdomen. Neuropathy is in the background as well. Recently initiated Lyrica. Pain is 2/10 at time of assessment:  4:30 pm 
On presentation 8/10 Functional /typical level for her is 0-1/10 Clinical Pain Assessment (nonverbal scale for severity on nonverbal patients):  
Clinical Pain Assessment Severity: 2 Location: epigastrium Character: sharp/burning Duration: weeks Factors: worsens with eating/drinking Frequency: constant with episodic worsening Duration: for how long has pt been experiencing pain (e.g., 2 days, 1 month, years) Frequency: how often pain is an issue (e.g., several times per day, once every few days, constant) FUNCTIONAL ASSESSMENT:  
 
Palliative Performance Scale (PPS): PPS: 80 PSYCHOSOCIAL/SPIRITUAL SCREENING:  
 
Palliative IDT has assessed this patient for cultural preferences / practices and a referral made as appropriate to needs (Cultural Services, Patient Advocacy, Ethics, etc.) Advance Care Planning: 
Advance Care Planning 10/10/2018 Patient's Healthcare Decision Maker is: Legal Next of Kin Primary Decision Maker Name Katlyn Wills Primary Decision Maker Phone Number [phone] Primary Decision Maker Relationship to Patient Spouse Confirm Advance Directive None Any spiritual / Anglican concerns: 
[] Yes /  [x] No 
 
Caregiver Burnout: 
[] Yes /  [x] No /  [] No Caregiver Present Anticipatory grief assessment:  
[] Normal  / [] Maladaptive ESAS Anxiety: Anxiety: 0 
 
ESAS Depression: Depression: 0  REVIEW OF SYSTEMS:  
 
 Positive and pertinent negative findings in ROS are noted above in HPI. The following systems were [x] reviewed / [] unable to be reviewed as noted in HPI Other findings are noted below. Systems: constitutional, ears/nose/mouth/throat, respiratory, gastrointestinal, genitourinary, musculoskeletal, integumentary, neurologic, psychiatric, endocrine. Positive findings noted below. Modified ESAS Completed by: provider Fatigue: 0 Drowsiness: 0 Depression: 0 Pain: 2 Anxiety: 0 Nausea: 1 Anorexia: 3 Dyspnea: 0 Constipation: No  
     
 
 
 PHYSICAL EXAM:  
 
From RN flowsheet: 
Wt Readings from Last 3 Encounters:  
10/04/18 58.2 kg (128 lb 6.4 oz) 09/27/18 57.7 kg (127 lb 1.6 oz)  
09/20/18 59 kg (130 lb) Blood pressure 119/71, pulse 74, temperature 98.4 °F (36.9 °C), resp. rate 12, SpO2 99 %. Pain Scale 1: Numeric (0 - 10) Pain Intensity 1: 5 Pain Location 1: Abdomen Pain Orientation 1: Anterior, Medial, Upper Pain Description 1: Aching Pain Intervention(s) 1: Family Support Last bowel movement, if known:  
 
Constitutional: mild distress due to pain/concern Eyes: pupils equal, anicteric ENMT: no nasal discharge, moist mucous membranes, no lesions or thrush in mouth Cardiovascular: regular rhythm, distal pulses intact, no edema Respiratory: breathing not labored, symmetric Gastrointestinal: abdomen is flat, bs active, mild TTP in epigastrium w/o rebound or guarding Musculoskeletal: no deformity, no tenderness to palpation Skin: warm, dry Neurologic: following commands, moving all extremities Psychiatric: full affect, no hallucinations HISTORY:  
 
Active Problems: 
  Ovarian cancer ([facility] (10/10/2018) Abdominal pain (10/10/2018) Nausea and vomiting (10/10/2018) Past Medical History:  
Diagnosis Date  Anxiety  BRCA1 positive  Calculus of kidney 1/13  
 right  Hernia, inguinal, left 2012  MS (multiple sclerosis) ([facility] 1996  Nausea & vomiting  Ovarian cancer ([facility] 3/2015  
 high grade, stage 3C papillary serous  Thromboembolus ([facility] 8/2007  
 lower abdomen post fall Past Surgical History:  
Procedure Laterality Date  HX GYN  2009  
 endometrial ablation with punctured uterus  HX OTHER SURGICAL  8/2007  
 green filter  HX MARIEL AND BSO  3/2014  
 ovarian cancer Family History Problem Relation Age of Onset  Cancer Paternal Grandmother   
  breast  
 Breast Cancer Paternal Grandmother  Coronary Artery Disease Mother 48 CABG and PCI  Heart Surgery Father   
  valve replacement  No Known Problems Brother  No Known Problems Brother  No Known Problems Brother  No Known Problems Daughter  No Known Problems Son  No Known Problems Son  No Known Problems Son History reviewed, no pertinent family history. Social History Substance Use Topics  Smoking status: Never Smoker  Smokeless tobacco: Never Used  Alcohol use No  
 
Allergies Allergen Reactions  Pcn [Penicillins] Rash Current Facility-Administered Medications Medication Dose Route Frequency  amitriptyline (ELAVIL) tablet 10 mg  10 mg Oral QHS  lactulose (CHRONULAC) 10 gram/15 mL solution 20 g  20 g Oral Q4H PRN  
 oxyCODONE ER (OxyCONTIN) tablet 10 mg  10 mg Oral Q8H  pregabalin (LYRICA) capsule 25 mg  25 mg Oral BID  senna-docusate (PERICOLACE) 8.6-50 mg per tablet 1 Tab  1 Tab Oral BID  sucralfate (CARAFATE) 100 mg/mL oral suspension 0.5 g  0.5 g Oral QID  sodium chloride (NS) flush 5-10 mL  5-10 mL IntraVENous Q8H  
 sodium chloride (NS) flush 5-10 mL  5-10 mL IntraVENous PRN  
 0.9% sodium chloride with KCl 20 mEq/L infusion   IntraVENous CONTINUOUS  
 morphine injection 4 mg  4 mg IntraVENous Q2H PRN  
 ondansetron (ZOFRAN) injection 4 mg  4 mg IntraVENous Q4H PRN  
 diphenhydrAMINE (BENADRYL) injection 12.5 mg  12.5 mg IntraVENous ONCE PRN  
  LORazepam (ATIVAN) injection 1 mg  1 mg IntraVENous Q6H PRN  prochlorperazine (COMPAZINE) injection 10 mg  10 mg IntraVENous Q6H PRN  
 ondansetron (ZOFRAN) 4 mg/2 mL injection  morphine 2 mg/mL injection  rivaroxaban (XARELTO) tablet 20 mg  20 mg Oral DAILY WITH BREAKFAST  pantoprazole (PROTONIX) 40 mg in sodium chloride 0.9% 10 mL injection  40 mg IntraVENous Q12H  
 oxyCODONE IR (ROXICODONE) tablet 7.5 mg  7.5 mg Oral Q4H PRN  
 artificial saliva (MOUTH KOTE) 1 Spray  1 Spray Oral PRN  
 
 
 
 LAB AND IMAGING FINDINGS:  
 
Lab Results Component Value Date/Time WBC 3.1 (L) 10/10/2018 01:49 PM  
 HGB 9.6 (L) 10/10/2018 01:49 PM  
 PLATELET 707 (L) 32/90/5172 01:49 PM  
 
Lab Results Component Value Date/Time Sodium 139 10/10/2018 01:49 PM  
 Potassium 4.5 10/10/2018 01:49 PM  
 Chloride 103 10/10/2018 01:49 PM  
 CO2 27 10/10/2018 01:49 PM  
 BUN 11 10/10/2018 01:49 PM  
 Creatinine 0.69 10/10/2018 01:49 PM  
 Calcium 8.9 10/10/2018 01:49 PM  
 Magnesium 1.9 10/10/2018 01:49 PM  
 Phosphorus 3.6 10/10/2018 01:49 PM  
  
Lab Results Component Value Date/Time AST (SGOT) 16 10/10/2018 01:49 PM  
 Alk. phosphatase 119 (H) 10/10/2018 01:49 PM  
 Protein, total 6.8 10/10/2018 01:49 PM  
 Albumin 2.9 (L) 10/10/2018 01:49 PM  
 Globulin 3.9 10/10/2018 01:49 PM  
 
Lab Results Component Value Date/Time INR 1.0 07/19/2018 11:54 AM  
 Prothrombin time 10.7 07/19/2018 11:54 AM  
  
No results found for: IRON, FE, TIBC, IBCT, PSAT, FERR No results found for: PH, PCO2, PO2 No components found for: [place] Lab Results Component Value Date/Time CK 72 01/09/2018 11:09 AM  
 CK - MB <1.0 01/09/2018 11:09 AM  
  
 
 
   
 
Total time:  50m Counseling / coordination time, spent as noted above:  25m 
> 50% counseling / coordination?:  Yes Prolonged service was provided for  []30 min   []75 min in face to face time in the presence of the patient, spent as noted above. Time Start:  
Time End:  
Note: this can only be billed with 83576 (initial) or 22353 (follow up). If multiple start / stop times, list each separately.

## 2018-10-11 NOTE — PROGRESS NOTES
2018 In pt's room for assessment - pt complains of frustration over her overall situation and rates pain 5/10. On bedside table, pt has home bottle of Carafate and metal spoon with about 3-4 pills. I discarded the pills and asked her  to take home the home medication 2038 Gave pt Shante 7.5mg PO. Pt vomited almost immediately after. 2054 Gave Zofran and Morphine IV for pain.

## 2018-10-12 NOTE — PROGRESS NOTES
Music Therapy Assessment Vi Bolaños [id number]  [id number]   
1965  48 y.o.  female Patient Telephone Number: [phone] (home) [employer] Affiliation: Oriental orthodox Language: [place] Extended Emergency Contact Information Primary Emergency Contact: Jose Payan Address: [address] Home Phone: [phone] Mobile Phone: [phone] Relation: Spouse Patient Active Problem List  
 Diagnosis Date Noted  Gastroparesis  Moderate malnutrition ([address] 10/11/2018  Ovarian cancer ([address] 10/10/2018  Abdominal pain 10/10/2018  Non-intractable vomiting with nausea 10/10/2018  Epigastric pain  Xerostomia  Pain of metastatic malignancy 08/09/2018  Other proteinuria 07/26/2018  Anemia due to chemotherapy 07/26/2018  On anticoagulant therapy 07/19/2018  Peritoneal carcinomatosis ([address] 06/01/2018  Malignant ascites 06/01/2018  Thrombocytopenia ([address] 03/17/2017  
 CINV (chemotherapy-induced nausea and vomiting) 03/17/2017  Malignant neoplasm of both ovaries ([address] 04/09/2015 Date: 10/12/2018 Mental Status:   [x] Alert [  ] Barbar Allis [  ]  Confused  [  ] Minimally responsive Communication Status: [  ] Impaired Speech [  ] Nonverbal -N/A Physical Status:   [  ] Oxygen in use  [  ] Hard of Hearing [  ] Vision Impaired [  ] Ambulatory  [  ] Ambulatory with assistance [  ] Non-ambulatory -N/A Music Preferences, Background: Contemporary Anabaptism and Traditional Hymns. Clinical Problem addressed: Spiritual support. Goal(s) met in session: 
Physical/Pain management (Scale of 1-10): Pre-session rating: Pt denied pain. Post-session rating: Pt denied pain. [  ] Increased relaxation   [  ] Regulated breathing patterns [  ] Decreased muscle tension   [  ] Minimized physical distress Emotional/Psychological: [  ] Increased self-expression   [  ] Decreased aggressive behavior [  ] Decreased sadness   [  ] Discussed healthy coping skills [x] Improved mood    [  ] Decreased withdrawn behavior Social: 
[  ] Decreased feelings of isolation/loneliness [  ] Positive social interaction  
[x] Provided support and/or comfort for family/friends Spiritual: 
[x] Spiritual support    [  ] Expressed peace [  ] Expressed pavel    [  ] Discussed beliefs Techniques Utilized (Check all that apply):  
[  ] Procedural support MT [x] Music for relaxation [x] Patient preferred music 
[  ] Jocelyn analysis  [x] Song choice  [  ] Music for validation [  ] Entrainment  [  ] Progressive muscle relax. [  ] Guided visualization [  ] Micaela Braden  [  ] Patient instrument playing [  ] Ted Delacruz writing [  ] Juan J Lopes along   [  ] Jimena Shah  [  ] Sensory stimulation [  ] Active Listening  [x] Music for spiritual support [  ] Making of CDs as gifts Session Observations:  F/u visit; Patient (pt) was observed to be alert lying comfortably in bed with her mother at bedside. She requested to hear a hymn by David Gutierrez. MT sat at bedside and sang and played 199 [address] Grid with Capptain. Pt closed her eyes and her facial expression relaxed. Pt's spouse entered during this song. Pt smiled as she opened her eyes when the song ended. She and her mother expressed enjoyment in the music. Pt chose to hear Be Thou My Vision. MT sang and played this with Science Fantasyr. Pt's father entered as this hymn began. Pt and her family members' affects were bright as they listened. They thanked MT for the music and the visit. Will follow as able. Ninfa Lundborg, MT-BC (Music Therapist-Board Certified) Spiritual Care Department Referral-based service

## 2018-10-12 NOTE — PROGRESS NOTES
Bedside and Verbal shift change report given to Maurizio Spencer RN (oncoming nurse) by Nicolle Montanez RN (offgoing nurse). Report included the following information SBAR, Kardex, Procedure Summary, Intake/Output, MAR, Accordion and Recent Results.

## 2018-10-12 NOTE — PROGRESS NOTES
Bedside and Verbal shift change report given to Johnathon Esqueda RN (oncoming nurse) by Dianne Murillo (offgoing nurse). Report included the following information SBAR, Kardex, Procedure Summary, Intake/Output, MAR, Accordion and Recent Results.

## 2018-10-12 NOTE — PROGRESS NOTES
[address] [facility], [address] [place], [address] 
P [phone]  F [phone] Vinayak Amanda       [id number]  1965 Admitted 10/10/2018 Date 10/12/2018 HPI: 
 
Vinayak Amanda is a 48 y.o. with stage IIIC ovarian cancer, BRCA1+. She underwent X-lap, hysterectomy, and tumor debulking in 2015. She was recommended adjuvant chemotherapy with 6 cycles of Taxol and Carboplatin. Her post-treatment PET/CT was negative for disease. S/p recurrent disease early  treated with carbo/Gemzar (3/2017 - 2017). Post treatment imaging was w/o evidence of disease and she received maintenance PARPi (zejula/lynparza). Stopped d/t toxicities Recurrent disease on imagining, initiated rubraca, subsequent disease progression following 2.5 cycles transitioned to Doxil/Avastin(2018) having completed 3 cycles, last 10/4/18 and there is a plan for disease status at end of month. She is seeing [facility] for control of abd pain and constipation on regimen as reviewed in notes. S/p paracentesis x 3 most recent 18 SUBJECTIVE: 
Pt did better overnight. Went down early this morning for EDG. Results noted Pt remains slightly drowsy. Continues to obtain most relief with Carafate Noted  decrease from 726 on 18 to 406 yesterday. Both pt and her  are pleased with this Past Medical History:  
Diagnosis Date  Anxiety  BRCA1 positive  Calculus of kidney   
 right  Hernia, inguinal, left   MS (multiple sclerosis) ([address]   Nausea & vomiting  Ovarian cancer ([address] 3/2015  
 high grade, stage 3C papillary serous  Thromboembolus ([address] 2007  
 lower abdomen post fall Past Surgical History:  
Procedure Laterality Date  HX GYN  2009  
 endometrial ablation with punctured uterus  HX OTHER SURGICAL  2007  
 green filter  HX MARIEL AND BSO  3/2014  
 ovarian cancer Social History Substance Use Topics  Smoking status: Never Smoker  Smokeless tobacco: Never Used  Alcohol use No  
  
Family History Problem Relation Age of Onset  Cancer Paternal Grandmother   
  breast  
 Breast Cancer Paternal Grandmother  Coronary Artery Disease Mother 48 CABG and PCI  Heart Surgery Father   
  valve replacement  No Known Problems Brother  No Known Problems Brother  No Known Problems Brother  No Known Problems Daughter  No Known Problems Son  No Known Problems Son  No Known Problems Son   
  
Current Facility-Administered Medications Medication Dose Route Frequency  0.9% sodium chloride infusion  100 mL/hr IntraVENous CONTINUOUS  
 sodium chloride (NS) flush 5-10 mL  5-10 mL IntraVENous Q8H  
 sodium chloride (NS) flush 5-10 mL  5-10 mL IntraVENous PRN  
 fentaNYL citrate (PF) 50 mcg/mL injection  prochlorperazine (COMPAZINE) with saline injection 5 mg  5 mg IntraVENous Q4H PRN  
 amitriptyline (ELAVIL) tablet 10 mg  10 mg Oral QHS  lactulose (CHRONULAC) 10 gram/15 mL solution 20 g  20 g Oral Q4H PRN  
 oxyCODONE ER (OxyCONTIN) tablet 10 mg  10 mg Oral Q8H  pregabalin (LYRICA) capsule 25 mg  25 mg Oral BID  senna-docusate (PERICOLACE) 8.6-50 mg per tablet 1 Tab  1 Tab Oral BID  sucralfate (CARAFATE) 100 mg/mL oral suspension 0.5 g  0.5 g Oral QID  sodium chloride (NS) flush 5-10 mL  5-10 mL IntraVENous Q8H  
 sodium chloride (NS) flush 5-10 mL  5-10 mL IntraVENous PRN  
 0.9% sodium chloride with KCl 20 mEq/L infusion   IntraVENous CONTINUOUS  
 morphine injection 4 mg  4 mg IntraVENous Q2H PRN  
 ondansetron (ZOFRAN) injection 4 mg  4 mg IntraVENous Q4H PRN  
 LORazepam (ATIVAN) injection 1 mg  1 mg IntraVENous Q6H PRN  
 rivaroxaban (XARELTO) tablet 20 mg  20 mg Oral DAILY WITH BREAKFAST  pantoprazole (PROTONIX) 40 mg in sodium chloride 0.9% 10 mL injection  40 mg IntraVENous Q12H  oxyCODONE IR (ROXICODONE) tablet 7.5 mg  7.5 mg Oral Q4H PRN  
 artificial saliva (MOUTH KOTE) 1 Spray  1 Spray Oral PRN Allergies Allergen Reactions  Pcn [Penicillins] Rash Review of Systems: 
General: Acknowledges some improvement again today HEENT: Denies dysphagia or mucositis. Denies visual changes or headache Resp: Denies SOB, LYONS, wheezing or cough CV: Denies CP, palpitations GI/:Acknowledges continued epigastric pain, but says she feels it is a little better and obtains relief with Carafate. Continues to denies bloating, diarrhea, constipation or dysuria MuskSkel: Denies muscle ache or joint pain Neuro: Denies dizzyness or syncope Psych: Denies depression or feelings of sadness OBJECTIVE: 
 
Physical Exam 
Visit Vitals  /62 (BP 1 Location: Right arm, BP Patient Position: At rest)  Pulse 74  Temp 99.1 °F (37.3 °C)  Resp 16  SpO2 94% General appearance: Slightly lethargic from EDG, but, cooperative, no distress, and remains pale, but unchanged Eyes: conjunctivae/corneas clear. Pupils equal and reactive to light. Lungs:CTA bilat without wheezing or rales Heart: Regular without M/R/G Abdomen: soft, non-distended. Non tender to lower abd. Persistent, but improved tenderness to epigastric area. + BS throughout. Extremities: no edema, redness or tenderness in the calves or thighs. + DT/PT bilat Skin: no lesion. No skin tenting Neurologic: Grossly intact Data Review Labs from yesterday reviewed. No need for new ones today Procedure: EDG 10/12/18 Findings:  
Esophagus:normal 
Stomach: retained liquid contents in the stomach. Patchy erythema in gastric body and antrum - cold biopsied. Patent pylorus. No evidence of gastric outlet obstruction Duodenum: normal to second portion Impression: 1. Suspect underlying gastroparesis 2. Mild gastritis 
  
Recommendations: 
1. Follow up surgical pathology 2. BID PPI 3. PRN ranitidine 150 mg daily 4. Carafate 1g liquid QID for 30 days 5. Consider empiric trial of reglan 5 mg TID with meals if she is willing to accept risks of medication 6. Minimize opiate use 7. If above is not beneficial and she continues to have progressive symptoms, could consider UGI series with SBFT next 8. Hold Xarelto for another two days in setting of gastric biopsies 9. Weekend team to see on request. Please call with any questions. Imaging US 10/10/18: bedside showed minimal ascites. AXR 10/10/18 FINDINGS: Supine and upright views of the abdomen demonstrate a normal gas 
pattern. There is no free intraperitoneal air. No soft tissue masses or 
pathologic calcifications are seen. Osseous structures are intact. There is 
stent material noted overlying L4 through the left sacrum and a vena cava filter 
noted at the level of L2-3. IMPRESSION: No obstruction or free air. IMPRESSION/PLAN: 
48 y.o. with progressive ovarian cancer on current Doxil/Avastin s/p cycle 3 on 10/4/18. Admitted with acute N/V, intolerant of PO, possible dehydration with uncontrolled abdominal pain. Appreciate GI input and EDG today. Results and rec's noted Will continue on clears today and discussed need to eat bland diet for a few days with meals being small and frequent Continue with Carafate 1g QID X 30 days and PPI BID with occ Reglan daily PRN as per recommendations above Anemia: will monitor with follow up labs CT as previously scheduled as out pt with follow up with Dr. Darron Escamilla Will hold Xarelto for 2 days as per recommendations from GI Discharge home today and she will call for any concerns or questions.   
 
Signed By: Vani Davidson NP   
 10/12/2018/1:20 PM
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
## 2018-10-12 NOTE — DISCHARGE SUMMARY
[facility], [address] [place] 
P [phone]  F [phone] Discharge Summary Patient ID: 
Michael Delgado [id number] 
32 y.o. 
1965 Admit date: 10/10/2018 PCP: Macie Trevizo MD 
 
Admit MD: Zhao Taveras MD 
 
Discharge MD: Dr, Dodson Skiff. Discharge date and time: October 12, 2018 Admission Diagnoses:  
 
[address] Nausea and vomiting Ovarian cancer ([facility] Abdominal pain Nausea and vomiting Patient Active Problem List  
Diagnosis Code  Malignant neoplasm of both ovaries (HCC) C56.1, C56.2  Thrombocytopenia ([facility] D69.6  CINV (chemotherapy-induced nausea and vomiting) R11.2, T45.1X5A  Peritoneal carcinomatosis (HCC) C78.6, C80.1  Malignant ascites R18.0  
 On anticoagulant therapy Z79.01  
 Other proteinuria R80.8  Anemia due to chemotherapy D64.81, T45.1X5A  
 Pain of metastatic malignancy G89.3  Ovarian cancer ([facility] [address] C56.9  Abdominal pain R10.9  Non-intractable vomiting with nausea R11.2  Epigastric pain R10.13  Xerostomia R68.2  Moderate malnutrition (HCC) E44.0 Discharge Diagnoses:   
 
 
OR Date: 10/12/2018 OR procedure: Procedure(s): ESOPHAGOGASTRODUODENOSCOPY (EGD) ESOPHAGOGASTRODUODENAL (EGD) BIOPSY Hospital Course:  
Michael Delgado is a 48 y.o. with stage IIIC ovarian cancer, BRCA1+. She underwent X-lap, hysterectomy, and tumor debulking in March 2015. She was recommended adjuvant chemotherapy with 6 cycles of Taxol and Carboplatin. Her post-treatment PET/CT was negative for disease. S/p recurrent disease early 2017 treated with carbo/Gemzar (3/2017 - 6/2017). Post treatment imaging was w/o evidence of disease and she received maintenance PARPi (zejula/lynparza). Stopped d/t toxicities Recurrent disease on imagining, initiated rubraca, subsequent disease progression following 2.5 cycles transitioned to Doxil/Avastin(7/2018) having completed 3 cycles, last 10/4/18 and there is a plan for disease status at end of month. She is seeing [facility] medicine for control of abd pain and constipation on regimen as reviewed in notes. S/p paracentesis x 3 most recent 7/20/18 She called our office on 10/10 and palliative medicine for acute nausea and subsequent emesis with abd bloating/pain that morning with bilious emesis early AM. Used rectal supp with good result. Tried PO liquids, crackers, again emesis. She was unable to keep pain and nausea meds down. Denied F/C, recent illness. No CP/SOB, dysuria. Mucositis/reflux controlled. Denied wt loss. On admission, she was extremely fatigued. Evaluated by [address] for ascites (pt felt this had returned) and showed no increase in ascites Palliative follow pt as in pt and her nausea and pain medications were adjusted. GI consult was obtained and and EDG was performed on 10/12 and showed: 1. Suspect underlying gastroparesis 2. Mild gastritis Pt returned to the floor and GI recommendations were reviewed with pt and . She was felt stable for discharge with follow up next week. She was encouraged to call for any concerns or questions. Followup, meds and education provided as below. Pathology: Gastric specimen pending at discharge Consults: GI Significant Diagnostic Studies: 
Lab Results Component Value Date/Time WBC 2.4 (L) 10/11/2018 07:23 AM  
 ABS. NEUTROPHILS 1.4 (L) 10/11/2018 07:23 AM  
 HGB 8.1 (L) 10/11/2018 07:23 AM  
 HCT 26.1 (L) 10/11/2018 07:23 AM  
 .5 (H) 10/11/2018 07:23 AM  
 MCH 33.1 10/11/2018 07:23 AM  
 PLATELET 545 (L) 24/12/6923 07:23 AM  
 
Lab Results Component Value Date/Time  Sodium 140 10/11/2018 07:23 AM  
 Potassium 4.6 10/11/2018 07:23 AM  
 Chloride 108 10/11/2018 07:23 AM  
 CO2 23 10/11/2018 07:23 AM  
 Glucose 76 10/11/2018 07:23 AM  
 BUN 11 10/11/2018 07:23 AM  
 Creatinine 0.72 10/11/2018 07:23 AM  
 Calcium 8.1 (L) 10/11/2018 07:23 AM  
 Albumin 2.4 (L) 10/11/2018 07:23 AM  
 Bilirubin, total 0.3 10/11/2018 07:23 AM  
 AST (SGOT) 14 (L) 10/11/2018 07:23 AM  
 ALT (SGPT) 16 10/11/2018 07:23 AM  
 Alk. phosphatase 90 10/11/2018 07:23 AM  
 
 
Condition on Discharge: good Disposition: home Patient Instructions:  
Current Discharge Medication List  
  
START taking these medications Details  
metoclopramide HCl (REGLAN) 10 mg tablet Take 1 Tab by mouth as needed for up to 10 days. Indications: CANCER CHEMOTHERAPY-INDUCED NAUSEA AND VOMITING Qty: 40 Tab, Refills: 0 Comments: Take as directed CONTINUE these medications which have CHANGED Details  
pantoprazole (PROTONIX) 40 mg tablet Take 1 Tab by mouth every twelve (12) hours. Indications: Gastric Ulcer 
Qty: 60 Tab, Refills: 3 CONTINUE these medications which have NOT CHANGED Details  
pregabalin (LYRICA) 25 mg capsule Take 1 Cap by mouth two (2) times a day. Max Daily Amount: 50 mg. 
Qty: 30 Cap, Refills: 0 Associated Diagnoses: Chemotherapy-induced peripheral neuropathy (HCC) lactulose (CHRONULAC) 10 gram/15 mL solution Take 30 mL by mouth every four (4) hours as needed. Qty: 480 mL, Refills: 2 Associated Diagnoses: Secondary malignant neoplasm of retroperitoneum and peritoneum ([address] Constipation, unspecified constipation type  
  
sucralfate (CARAFATE) 100 mg/mL suspension Take 5 mL by mouth four (4) times daily. Qty: 300 mL, Refills: 3  
  
oxyCODONE ER (OXYCONTIN) 15 mg ER tablet Take 1 Tab by mouth every twelve (12) hours. Max Daily Amount: 30 mg. 
Qty: 60 Tab, Refills: 0 Associated Diagnoses: Malignant neoplasm of ovary, unspecified laterality ([address] oxyCODONE IR (ROXICODONE) 5 mg immediate release tablet Take 1.5 Tabs by mouth every six (6) hours as needed for Pain. Max Daily Amount: 30 mg. 
Qty: 180 Tab, Refills: 0 Associated Diagnoses: Cancer associated pain amitriptyline (ELAVIL) 10 mg tablet Take 1 Tab by mouth nightly. Qty: 30 Tab, Refills: 1 Associated Diagnoses: Neuropathy LORazepam (ATIVAN) 1 mg tablet Take 1 Tab by mouth nightly as needed for Anxiety. Max Daily Amount: 1 mg. Indications: CANCER CHEMOTHERAPY-INDUCED NAUSEA AND VOMITING Qty: 90 Tab, Refills: 0 Associated Diagnoses: Anxiety; Malignant neoplasm of both ovaries ([address] Peritoneal carcinomatosis ([address] CINV (chemotherapy-induced nausea and vomiting); Malignant ascites; Cancer associated pain; Anxiety about health  
  
sennosides/docusate sodium (SENNA PLUS PO) Take  by mouth two (2) times a day. bisacodyl (DULCOLAX) 10 mg suppository Insert 10 mg into rectum daily. Qty: 30 Suppository, Refills: 3  
  
promethazine (PHENERGAN) 12.5 mg tablet   
  
ondansetron hcl (ZOFRAN, AS HYDROCHLORIDE,) 4 mg tablet Take 4 mg by mouth every eight (8) hours as needed for Nausea. XARELTO 20 mg tab tablet Refills: 1 Activity: no driving while on analgesics Walk six or more times daily Continue pain nausea and bowel regimen as set by palliative/GI Diet: Regular Diet with small frequent meals and Encourage fluids Follow-up with CT scan next week as scheduled and then with Dr. Tiera Dao as scheduled Signed: 
Crista Perez NP 
10/12/2018/11:46 AM

## 2018-10-12 NOTE — ANESTHESIA POSTPROCEDURE EVALUATION
Post-Anesthesia Evaluation and Assessment Patient: Ozzy Abel MRN: [id number]  SSN: [id number] YOB: 1965  Age: 48 y.o. Sex: female Cardiovascular Function/Vital Signs Visit Vitals  /62 (BP 1 Location: Right arm, BP Patient Position: At rest)  Pulse 74  Temp 37.3 °C (99.1 °F)  Resp 16  SpO2 94% Patient is status post MAC anesthesia for Procedure(s): ESOPHAGOGASTRODUODENOSCOPY (EGD) ESOPHAGOGASTRODUODENAL (EGD) BIOPSY. Nausea/Vomiting: None Postoperative hydration reviewed and adequate. Pain: 
Pain Scale 1: Numeric (0 - 10) (10/12/18 0947) Pain Intensity 1: 0 (10/12/18 0947) Managed Neurological Status: At baseline Mental Status and Level of Consciousness: Arousable Pulmonary Status:  
O2 Device: Room air (10/12/18 0924) Adequate oxygenation and airway patent Complications related to anesthesia: None Post-anesthesia assessment completed. No concerns Signed By: Abilio Cool MD   
 October 12, 2018

## 2018-10-12 NOTE — ANESTHESIA PREPROCEDURE EVALUATION
Anesthetic History No history of anesthetic complications Review of Systems / Medical History Patient summary reviewed, nursing notes reviewed and pertinent labs reviewed Pulmonary Within defined limits Neuro/Psych Neuromuscular disease Comments: MS Cardiovascular Within defined limits GI/Hepatic/Renal 
Within defined limits Endo/Other Blood dyscrasia, cancer and anemia Other Findings Comments: Low platelets Physical Exam 
 
Airway Mallampati: II 
TM Distance: > 6 cm Neck ROM: normal range of motion Mouth opening: Normal 
 
 Cardiovascular Regular rate and rhythm,  S1 and S2 normal,  no murmur, click, rub, or gallop Dental 
No notable dental hx Pulmonary Breath sounds clear to auscultation Abdominal 
GI exam deferred Other Findings Anesthetic Plan ASA: 3 Anesthesia type: MAC Induction: Intravenous Anesthetic plan and risks discussed with: Patient

## 2018-10-12 NOTE — PERIOP NOTES
TRANSFER - OUT REPORT: 
 
Verbal report given to Karen(name) on [address]  being transferred to [facility](unit) for routine post - op Report consisted of patients Situation, Background, Assessment and  
Recommendations(SBAR). Information from the following report(s) Procedure Summary was reviewed with the receiving nurse. Lines:  
Venous Access Device Port (Active) Central Line Being Utilized Yes 10/12/2018  5:50 AM  
Criteria for Appropriate Use Limited/no vessel suitable for conventional peripheral access 10/12/2018  5:50 AM  
Site Assessment Clean, dry, & intact 10/12/2018  5:50 AM  
Date of Last Dressing Change 10/10/18 10/11/2018  8:05 PM  
Dressing Status Clean, dry, & intact 10/12/2018  5:50 AM  
Dressing Type Transparent;Tape 10/12/2018  5:50 AM  
Action Taken Open ports on tubing capped 10/12/2018  5:50 AM  
Date Accessed (Medial Site) 10/10/18 10/11/2018  8:05 PM  
Access Time (Medial Site) 1300 10/10/2018  1:20 PM  
Access Needle Size (Site #1) 20 G 10/10/2018  1:20 PM  
Access Needle Length (Medial Site) 1 inch 10/10/2018  1:20 PM  
Positive Blood Return (Medial Site) Yes 10/12/2018  5:50 AM  
Action Taken (Medial Site) Infusing 10/12/2018  5:50 AM  
Alcohol Cap Used Yes 10/12/2018  5:50 AM  
  
 
Opportunity for questions and clarification was provided.

## 2018-10-12 NOTE — PERIOP NOTES
TRANSFER - IN REPORT: 
 
Verbal report received from [facility]) on [address]  being received from [facility](unit) for ordered procedure Report consisted of patients Situation, Background, Assessment and  
Recommendations(SBAR). Information from the following report(s) SBAR was reviewed with the receiving nurse. Opportunity for questions and clarification was provided.

## 2018-10-12 NOTE — PROGRESS NOTES
TRANSFER - IN REPORT: 
 
Verbal report received from Jani Madden RN(name) on Tom Costa  being received from [facility]) for routine progression of care Report consisted of patients Situation, Background, Assessment and  
Recommendations(SBAR). Information from the following report(s) SBAR, Kardex, Intake/Output, MAR and Recent Results was reviewed with the receiving nurse. Opportunity for questions and clarification was provided. Assessment completed upon patients arrival to unit and care assumed.

## 2018-10-12 NOTE — ROUTINE PROCESS
[address] [facility] 1965 
[id number] Situation: 
Verbal report received from: Diane Kim RN Procedure: Procedure(s): ESOPHAGOGASTRODUODENOSCOPY (EGD) ESOPHAGOGASTRODUODENAL (EGD) BIOPSY Background: 
 
Preoperative diagnosis: Nausea and vomiting Postoperative diagnosis: Mild gastritis, retained stomach contents :  Dr. Jersey Rubio Assistant(s): Endoscopy Technician-1: Bhavana Vyas Endoscopy RN-1: Brenda Doyle RN Specimens:  
ID Type Source Tests Collected by Time Destination 1 : gastric biopsy Preservative   Rodolfo Rubio MD 10/12/2018 [address]. Pylori  no Assessment: 
Intra-procedure medications Anesthesia gave intra-procedure sedation and medications, see anesthesia flow sheet yes Intravenous fluids: NS@ Peola Ing Vital signs stable Abdominal assessment: round and soft and distended Recommendation: 
Return to floor Family or Friend Pt  Permission to share finding with family or friend yes

## 2018-10-12 NOTE — DISCHARGE INSTRUCTIONS
Upper GI Endoscopy: What to Expect at [address] [place] [address]  After you have an endoscopy, you will stay at the hospital or clinic for 1 to 2 hours. This will allow the medicine to wear off. You will be able to go home after your doctor or nurse checks to make sure you are not having any problems. You may have a sore throat for a day or two after the test.  This care sheet gives you a general idea about what to expect after the test.  How can you care for yourself at home? Activity  · Rest as much as you need to after you go home. · You should be able to go back to your usual activities the day after the test.  Diet  · Follow your doctor's directions for eating after the test. Small frequent meals  · Drink plenty of fluids  Medications  · If you have a sore throat the day after the test, use an over-the-counter spray to numb your throat. · Do not take your Xarelto for 48 hours, then resume as normal  Follow-up care is a key part of your treatment and safety. Be sure to make and go to all appointments, and call your doctor if you are having problems. It's also a good idea to know your test results and keep a list of the medicines you take. When should you call for help? Call 911 anytime you think you may need emergency care. For example, call if:    · You passed out (loses consciousness).     · You have trouble breathing.     · You pass maroon or bloody stools.    Call your doctor now or seek immediate medical care if:    · You have pain that does not get better after your take pain medicine.     · You have new or worse belly pain.     · You have blood in your stools.     · You are sick to your stomach and cannot keep fluids down.     · You have a fever.     · You cannot pass stools or gas.    Watch closely for changes in your health, and be sure to contact your doctor if:    · Your throat still hurts after a day or two.     · You do not get better as expected. Where can you learn more?   Go to http://ammon-kalli.info/. Enter [phone] in the search box to learn more about \"Upper GI Endoscopy: What to Expect at Home. \"  Current as of: March 28, 2018  Content Version: 11.8  © 0327-6236 Healthwise, Incorporated. Care instructions adapted under license by [employer] (which disclaims liability or warranty for this information). If you have questions about a medical condition or this instruction, always ask your healthcare professional. [place] [address] any warranty or liability for your use of this information.

## 2018-10-12 NOTE — PROGRESS NOTES
Problem: Falls - Risk of 
Goal: *Absence of Falls Document Pedro Luis Levels Fall Risk and appropriate interventions in the flowsheet. Outcome: Progressing Towards Goal 
Fall Risk Interventions: 
  
 
  
 
Medication Interventions: Teach patient to arise slowly

## 2018-10-12 NOTE — PROGRESS NOTES
Music Therapy Assessment Jena Enciso [id number]  [id number]   
1965  48 y.o.  female Patient Telephone Number: [phone] (home) Synagogue Affiliation: Restorationist Language: [place] Extended Emergency Contact Information Primary Emergency Contact: SammieDenton quiñonezl Address: [address] Home Phone: [phone] Mobile Phone: [phone] Relation: Spouse Patient Active Problem List  
 Diagnosis Date Noted  Moderate malnutrition ([address] 10/11/2018  Ovarian cancer ([address] 10/10/2018  Abdominal pain 10/10/2018  Non-intractable vomiting with nausea 10/10/2018  Epigastric pain  Xerostomia  Pain of metastatic malignancy 08/09/2018  Other proteinuria 07/26/2018  Anemia due to chemotherapy 07/26/2018  On anticoagulant therapy 07/19/2018  Peritoneal carcinomatosis ([address] 06/01/2018  Malignant ascites 06/01/2018  Thrombocytopenia ([facility] 03/17/2017  
 CINV (chemotherapy-induced nausea and vomiting) 03/17/2017  Malignant neoplasm of both ovaries ([address] 04/09/2015 Date: 10/12/2018 Mental Status:   [x] Alert-but drowsy [  ] Gab Paganini [  ]  Confused  [  ] Minimally responsive Communication Status: [  ] Impaired Speech [  ] Nonverbal -N/A Physical Status:   [  ] Oxygen in use  [  ] Hard of Hearing [  ] Vision Impaired [  ] Ambulatory  [  ] Ambulatory with assistance [  ] Non-ambulatory -N/A Music Preferences, Background: Contemporary Jain. Clinical Problem addressed: Spiritual support, support healthy pt/family coping. Goal(s) met in session: N/A: Please see Session Observations below. Physical/Pain management (Scale of 1-10): Pre-session rating ___________    Post-session rating __________  
[  ] Increased relaxation   [  ] Regulated breathing patterns [  ] Decreased muscle tension   [  ] Minimized physical distress Emotional/Psychological: [  ] Increased self-expression   [  ] Decreased aggressive behavior [  ] Decreased sadness   [  ] Discussed healthy coping skills [  ] Improved mood    [  ] Decreased withdrawn behavior Social: 
[  ] Decreased feelings of isolation/loneliness [  ] Positive social interaction [  ] Provided support and/or comfort for family/friends Spiritual: 
[  ] Spiritual support    [  ] Expressed peace [  ] Expressed pavel    [  ] Discussed beliefs Techniques Utilized (Check all that apply): N/A: Please see Session Observations below. [  ] Procedural support MT [  ] Music for relaxation [  ] Patient preferred music 
[  ] Jocelyn analysis  [  ] Song choice  [  ] Music for validation [  ] Entrainment  [  ] Progressive muscle relax. [  ] Guided visualization [  ] Dulce Stamp  [  ] Patient instrument playing [  ] Rodrigueze Landy writing [  ] Inell Brightly along   [  ] Jessee Hutchins  [  ] Sensory stimulation [  ] Active Listening  [  ] Music for spiritual support [  ] Making of CDs as gifts Session Observations:  Referral from Trey Polk, Patient Advocate at the [facility]. Patient (pt) was observed to be lying comfortably in bed with her eyes closed. Her spouse Jose L Castro and mother were at bedside. This music therapist (MT) introduced self. Pt briefly opened her eyes and appeared drowsy before closing her eyes again. MT acknowledged that pt appeared to want to rest and offered music therapy for her, whether now or later. Pt's spouse thanked MT for this and said pt had a procedure this morning from which she is now tired. MT expressed understanding and pt's spouse was open to a follow up visit at a later time, as MT is able. He shared pt's music preferences. Will follow as able. TYREL MezaBC (Music Therapist-Board Certified) Spiritual Care Department Referral-based service

## 2018-10-12 NOTE — PERIOP NOTES
Patient has been evaluated by anesthesia pre-procedure. Patient alert and oriented. Vital signs will not be charted by the Endoscopy nurse. All vitals, airway, and loc are monitored by anesthesia staff throughout procedure. Pt and  waiting for procedure.

## 2018-10-12 NOTE — PROCEDURES
[address] [facility] 
[address] [facility], [address] [place] [address] Esophago- Gastroduodenoscopy (EGD) Procedure Note [address] 1965 
[id number] Procedure: Endoscopic Gastroduodenoscopy --diagnostic, with biopsy Indication:  nausea and vomiting, epigastric pain Pre-operative Diagnosis: see indication above Post-operative Diagnosis: see findings below : Judith Lai. Corrina Erazo MD 
 
Referring Provider:  Bryant Sandifer, MD 
 
 
Anesthesia/Sedation:  MAC anesthesia Propofol Procedure Details After informed consent was obtained for the procedure, with all risks and benefits of procedure explained the patient was taken to the endoscopy suite and placed in the left lateral decubitus position. Following sequential administration of sedation as per above, the endoscope was inserted into the mouth and advanced under direct vision to second portion of the duodenum. A careful inspection was made as the gastroscope was withdrawn, including a retroflexed view of the proximal stomach; findings and interventions are described below. Findings:  
Esophagus:normal 
Stomach: retained liquid contents in the stomach. Patchy erythema in gastric body and antrum - cold biopsied. Patent pylorus. No evidence of gastric outlet obstruction Duodenum: normal to second portion Therapies: as above Specimens: 1. Gastric EBL: None Complications:   None; patient tolerated the procedure well. Impression: 1. Suspect underlying gastroparesis 2. Mild gastritis Recommendations: 
1. Follow up surgical pathology 2. BID PPI 3. PRN ranitidine 150 mg daily 4. Carafate 1g liquid QID for 30 days 5. Consider empiric trial of reglan 5 mg TID with meals if she is willing to accept risks of medication 6. Minimize opiate use 7. If above is not beneficial and she continues to have progressive symptoms, could consider UGI series with SBFT next 8. Hold Xarelto for another two days in setting of gastric biopsies 9. Weekend team to see on request. Please call with any questions. Signed By: Codie Stephen.  Yovanny Vital MD   
 10/12/2018  8:29 AM

## 2018-10-15 NOTE — PROGRESS NOTES
Labs ordered to be drawn 10/16/18 to be used for chemo on 10/18 per vo Dr. Elaine Esparza today at [phone].

## 2018-10-15 NOTE — TELEPHONE ENCOUNTER
Rcvd call from pt evening of Sat 10/14. She was concerned about emesis post meals, no prodrome of nausea. Eating small frequent meals as instructed. Greatest concern is what meds to retake if she sees pills in the emesis. Instructed ok to retake Reglan 5 mg if she vomits post taking. Would not repeat long acting opioid if it has been > 30 min since taking. Ok to repeat short acting opioid if she sees it come up and is still in pain. Provided supportive counseling and discussion re: emesis, which is likely multifactorial and related to slow gut motility.

## 2018-10-15 NOTE — PROGRESS NOTES
Palliative Medicine  Nursing Note  [phone]  Fax [phone]        Telephone Call  Patient Name: Speedy Solis  YOB: 1965    10/15/2018      Advance Care Planning 10/10/2018   Patient's Healthcare Decision Maker is: Legal Next of Cornelia 69   Primary Decision Maker Name Eric Braga    Primary Decision Maker Phone Number [phone]   Primary Decision Maker Relationship to Patient Spouse   Confirm Advance Directive None     Spoke with Johanny to see how she was doing since being discharged from the hospital.  There was no answer, left a message for her to call PM nurse back.     Elias Ashraf, BSN, RN, OCN, VIA [facility]  Palliative Medicine

## 2018-10-16 PROBLEM — K56.609 SBO (SMALL BOWEL OBSTRUCTION) (HCC): Status: ACTIVE | Noted: 2018-01-01

## 2018-10-16 PROBLEM — E43 SEVERE PROTEIN-CALORIE MALNUTRITION (HCC): Status: ACTIVE | Noted: 2018-01-01

## 2018-10-16 PROBLEM — E46 MALNUTRITION (HCC): Status: ACTIVE | Noted: 2018-01-01

## 2018-10-16 PROBLEM — E86.0 DEHYDRATION: Status: ACTIVE | Noted: 2018-01-01

## 2018-10-16 NOTE — PROGRESS NOTES
pre chemo visit, labs to be drawn today for C4D1 Doxil/Avastin at [facility] on 10/18/18, pt reports upper abdominal pain that is a 3/10 on pain scale

## 2018-10-16 NOTE — PROGRESS NOTES
[address] [place] [address] Mathias Moritz [address], [address] [facility]  [phone]  MD Susan Floyd MD  Office Note  Patient ID:  Name:  Tom Costa  MRN:  [id number]  :  1965/53 y.o. Date:  10/16/2018      HISTORY OF PRESENT ILLNESS:  Tom Costa is a 48 y.o.  postmenopausal female with stage IIIC ovarian cancer. She underwent X-lap, hysterectomy, and tumor debulking in 2015. She was recommended adjuvant chemotherapy with 6 cycles of Taxol and Carboplatin. Her post-treatment PET/CT was negative for disease. Her My[facility] results also found her to have a deleterious BRCA 1 mutation. As for the BRCA 1 mutation, I had her see one of our breast surgeons, Dr. Jesusita Phillip, to talk about options, specifically screening and prophylactic surgery. She is going to hold off on surgery for now. She has decided not to have her daughter tested at this time. In early  she was found to have recurrence. She was clearly platinum sensitive, therefore I recommended a platinum-based regimen. 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 She also wanted to keep her hair. We also discussed options for down the line, specifically a PARP inhibitor, since she is BRCA positive. She completed 6 cycles of Gemzar/Carboplatin and her CA-125 returned to baseline. Her CT at that time was clear. We discussed maintenance therapy with a PARP inhibitor. She was started on Zejula in late 2017 at 300 mg daily. Her dose was lowered to 200 mg daily due to gastrointestinal complaints. In 2017 it was held due to thrombocytopenia. She elected not to restart the Tura Orchard, even at a lower dose. She presented in 2017 to discuss other PARP alternatives. She was started on Lynparza at a one-step dose reduction of 250 mg bid. Due to side effects she was reduced to 200 mg bid on 18. On 1/15/18 she stopped therapy due to abdominal pain and nausea, and most of all, fatigue. She presented not too long ago for follow up and reported lower abdominal pain. Her CA-125 was rising, up to 41, when it was previously <4. She had some complaints of lower abdominal pain, in addition to her rising CA-125. Her CT demonstrated recurrent disease. I discussed with her and her  multiple treatment options. She went to [facility] for a second opinion. They essentially offered her similar treatment options. She was not a candidate for a clinical trial due to her diagnosis of MS. She presented again discuss treatment options. We ultimately decided on Rubraca. She was only on therapy for about 2 1/2 months. She had been tolerating it very well. Her CA-125 did respond. She was in [facility] for vacation in June when she developed significant ascites, requiring drainage at [address] [place] [address] Av in [place]. Her CT scan upon return unfortunately demonstrated progression. I recommended a combination of Doxil/Avastin. I felt the Avastin would help with the ascites. She has completed 3 cycles so far. Her CA-125 is  responding. She had a paracentesis on 7/20/18, which drained 1800 cc. She has not required one since, but still has some ascites on imaging. She was hospitalized last week with abdominal pain and some nausea/vomiting. She was seen by GI and had an EGD. There appeared to be some gastric emptying issues. Plain films at that time showed no evidence of obstruction, and she was still having BMs. Her newest CT from yesterday demonstrates proximal small bowel distention with decompressed distal loops, consistent with partial obstruction. There was more free fluid than on the previous scan, but her peritoneal implants appeared to be improved. She presents today to discuss the results. She is very weak today and says that she is having trouble keeping things down.   She is very anxious and depressed. ROS:   and GI review:  Negative  Cardiopulmonary review:  Negative   Musculoskeletal:  Negative    A comprehensive review of systems was negative except for that written in the History of Present Illness.  , 10 point ROS      Problem List:  Patient Active Problem List    Diagnosis Date Noted    Gastroparesis     Moderate malnutrition ([address] 10/11/2018    Ovarian cancer ([address] 10/10/2018    Abdominal pain 10/10/2018    Non-intractable vomiting with nausea 10/10/2018    Epigastric pain     Xerostomia     Pain of metastatic malignancy 08/09/2018    Other proteinuria 07/26/2018    Anemia due to chemotherapy 07/26/2018    On anticoagulant therapy 07/19/2018    Peritoneal carcinomatosis ([address] 06/01/2018    Malignant ascites 06/01/2018    Thrombocytopenia (HCC) 03/17/2017    CINV (chemotherapy-induced nausea and vomiting) 03/17/2017    Malignant neoplasm of both ovaries ([address] 04/09/2015     PMH:  Past Medical History:   Diagnosis Date    Anxiety     BRCA1 positive     Calculus of kidney 1/13    right    Hernia, inguinal, left 2012    MS (multiple sclerosis) ([address] 1996    Nausea & vomiting     Ovarian cancer ([address] 3/2015    high grade, stage 3C papillary serous    Thromboembolus ([address] 8/2007    lower abdomen post fall      PSH:  Past Surgical History:   Procedure Laterality Date    HX GYN  2009    endometrial ablation with punctured uterus    HX OTHER SURGICAL  8/2007    green filter    HX MARIEL AND BSO  3/2014    ovarian cancer      Social History:  Social History   Substance Use Topics    Smoking status: Never Smoker    Smokeless tobacco: Never Used    Alcohol use No      Family History:  Family History   Problem Relation Age of Onset    Cancer Paternal Grandmother      breast    Breast Cancer Paternal Grandmother     Coronary Artery Disease Mother 48     CABG and PCI    Heart Surgery Father      valve replacement    No Known Problems Brother     No Known Problems Brother     No Known Problems Brother     No Known Problems Daughter     No Known Problems Son     No Known Problems Son     No Known Problems Son       Medications: (reviewed)  Current Outpatient Prescriptions   Medication Sig    OXYCONTIN 10 mg ER tablet TAKE 1 TABLET BY MOUTH EVERY 12 HOURS    pantoprazole (PROTONIX) 40 mg tablet Take 1 Tab by mouth every twelve (12) hours. Indications: Gastric Ulcer    sucralfate (CARAFATE) 100 mg/mL suspension Take 10 mL by mouth four (4) times daily. Indications: SCLEROTHERAPY INDUCED ESOPHAGEAL ULCER    metoclopramide HCl (REGLAN) 5 mg tablet Take 1 Tab by mouth three (3) times daily (with meals) for 20 days.  amitriptyline (ELAVIL) 10 mg tablet Take 1 Tab by mouth nightly.  LORazepam (ATIVAN) 1 mg tablet Take 1 Tab by mouth nightly as needed for Anxiety. Max Daily Amount: 1 mg. Indications: CANCER CHEMOTHERAPY-INDUCED NAUSEA AND VOMITING    XARELTO 20 mg tab tablet     dexamethasone (DECADRON) 2 mg tablet Take 1 Tab by mouth every eight (8) hours.  scopolamine (TRANSDERM-SCOP) 1 mg over 3 days pt3d 1 Patch by TransDERmal route every seventy-two (72) hours.  pregabalin (LYRICA) 25 mg capsule Take 1 Cap by mouth two (2) times a day. Max Daily Amount: 50 mg.    lactulose (CHRONULAC) 10 gram/15 mL solution Take 30 mL by mouth every four (4) hours as needed.  oxyCODONE ER (OXYCONTIN) 15 mg ER tablet Take 1 Tab by mouth every twelve (12) hours. Max Daily Amount: 30 mg.    oxyCODONE IR (ROXICODONE) 5 mg immediate release tablet Take 1.5 Tabs by mouth every six (6) hours as needed for Pain. Max Daily Amount: 30 mg.    sennosides/docusate sodium (SENNA PLUS PO) Take  by mouth two (2) times a day.  bisacodyl (DULCOLAX) 10 mg suppository Insert 10 mg into rectum daily.  promethazine (PHENERGAN) 12.5 mg tablet     diphenhydrAMINE-acetaminophen (TYLENOL PM EXTRA STRENGTH)  mg tab Take  by mouth.      No current facility-administered medications for this visit. Allergies: (reviewed)  Allergies   Allergen Reactions    Pcn [Penicillins] Rash          OBJECTIVE:    Physical Exam:  VITAL SIGNS: Vitals:    10/16/18 0945   BP: 102/72   Pulse: (!) 120   Weight: 127 lb 12.8 oz (58 kg)   Height: 5' 7.5\" (1.715 m)     Body mass index is 19.72 kg/(m^2). GENERAL FILIBERTO: Conversant, alert, oriented. No acute distress. HEENT: HEENT. No thyroid enlargement. No JVD. Neck: Supple without restrictions. RESPIRATORY: Clear to auscultation and percussion to the bases. No CVAT. CARDIOVASC: RRR without murmur/rub. GASTROINT: Soft, non-distended, without masses or organomegaly. MUSCULOSKEL: no joint tenderness, deformity or swelling   EXTREMITIES: extremities normal, atraumatic, no cyanosis or edema   PELVIC: Deferred   RECTAL: Deferred   CHARMAINE SURVEY: No suspicious lymphadenopathy or edema noted. NEURO: Grossly intact. No acute deficit.          Lab Results   Component Value Date/Time    WBC 2.4 (L) 10/11/2018 07:23 AM    HGB 8.1 (L) 10/11/2018 07:23 AM    HCT 26.1 (L) 10/11/2018 07:23 AM    PLATELET 294 (L) 70/44/5539 07:23 AM    .5 (H) 10/11/2018 07:23 AM     Lab Results   Component Value Date/Time    Sodium 140 10/11/2018 07:23 AM    Potassium 4.6 10/11/2018 07:23 AM    Chloride 108 10/11/2018 07:23 AM    CO2 23 10/11/2018 07:23 AM    Anion gap 9 10/11/2018 07:23 AM    Glucose 76 10/11/2018 07:23 AM    BUN 11 10/11/2018 07:23 AM    Creatinine 0.72 10/11/2018 07:23 AM    BUN/Creatinine ratio 15 10/11/2018 07:23 AM    GFR est AA >60 10/11/2018 07:23 AM    GFR est non-AA >60 10/11/2018 07:23 AM    Calcium 8.1 (L) 10/11/2018 07:23 AM     Lab Results   Component Value Date/Time    CA-125 406 (H) 10/11/2018 07:23 AM    Cancer Ag (CA) 125 726.9 (H) 09/18/2018 11:22 AM       CT of chest/abdomen/pelvis (10/10/18)  LUNG BASES: There are trace bilateral pleural effusions with underlying  atelectasis  INCIDENTALLY IMAGED HEART AND MEDIASTINUM: Unremarkable. LIVER: No mass or biliary dilatation. GALLBLADDER: Unremarkable. SPLEEN: No mass. PANCREAS: No mass or ductal dilatation. ADRENALS: Unremarkable. KIDNEYS: No mass, calculus, or hydronephrosis. STOMACH: Unremarkable. SMALL BOWEL: Proximal small bowel distention is noted with decompressed loops  distally. COLON: No dilatation or wall thickening. APPENDIX: Not visualized. PERITONEUM: Moderate free fluid is noted, increased compared to the prior exam.  Gastrohepatic soft tissue abnormality appears to have improved compared to the  prior exam. Previously described lesion measuring 3.0 cm now measures 1.2 cm. Soft tissue nodule in the left upper quadrant now measures 16 mm, previously  measuring 3.3 cm. Soft tissue density in the mesenteric root has improved. RETROPERITONEUM: Left common iliac vein stent is noted. IVC filter projects  within the infrarenal vena cava. REPRODUCTIVE ORGANS: The uterus and ovaries are surgically absent. URINARY BLADDER: No mass or calculus. BONES: No destructive bone lesion. ADDITIONAL COMMENTS: N/A    IMPRESSION:  Proximal small bowel distention with decompressed loops distally is compatible  with obstruction and likely related to peritoneal disease in the mid abdomen. Overall, peritoneal implants have improved in size. Free fluid has increased in  the interval. Trace bilateral effusions with underlying atelectasis. IMPRESSION/PLAN:  Bertha Shah is a 48 y.o. female with a history of recurrent ovarian cancer. She has some complaints of lower abdominal pain and her CA-125 is rising. Her CT demonstrates recurrent disease. At her last visit I discussed with her and her  multiple treatment options. She went to [facility] for a second opinion. They essentially offered her similar treatment options. She was not a candidate for a clinical trial due to her diagnosis of MS.   I sat with her and her  for about an hour today, discussing different treatment options, including Doxil, Avastin, Topotecan, and Rubraca. She is BRCA positive and platinum sensitive. This should indicate that she would respond well to a PARP. She did not tolerate prior treatment with Lynparza. I suggested Lornasebastiann Srinivas, as it would allow her to travel and be less tied down. Unfortunately the Rubraca did not work very long, as she demonstrated progression on imaging. I recommended a combination of Doxil/Avastin. I felt the Avastin would help with the ascites. She has completed 3 cycles so far. Her CA-125 has responded. Her CT demonstrates partial obstruction and increased ascites, though her peritoneal implants have improved. She appears very weak and dehydrated today. She is also very depressed and dealing with the plans of her son's upcoming destination wedding. I will admit her to [facility] at this time for IV fluids, NGT decompression, bowel rest, and TPN. We will call and arrange for a bed at this time.         Signed By: Zaira Woodward MD     10/16/2018/3:54 PM

## 2018-10-16 NOTE — ACP (ADVANCE CARE PLANNING)
Advance Care Planning 10/10/2018 Patient's Healthcare Decision Maker is: Legal Next of Kin Primary Decision Maker Name Harpreet Acosta Primary Decision Maker Phone Number [phone] Primary Decision Maker Relationship to Patient Spouse Confirm Advance Directive None  Harpreet Acosta is medical and financial POA. He is a  and they have living will and AMD completed. No copy in chart. Patient wishes to remain FULL code for now.

## 2018-10-16 NOTE — PROGRESS NOTES
TRANSFER - IN REPORT: 
 
Verbal report received from [address] NP(name) on [address]  being received from Direct Adm(unit) for routine progression of care Report consisted of patients Situation, Background, Assessment and  
Recommendations(SBAR). Information from the following report(s) SBAR, Kardex, Procedure Summary, Intake/Output and MAR was reviewed with the receiving nurse. Opportunity for questions and clarification was provided. Assessment completed upon patients arrival to unit and care assumed. Shiva Jones 14 accessed, NG tube placed, patient assessed 1200- MD at bedside

## 2018-10-16 NOTE — PROGRESS NOTES
Bedside shift change report given to [facility], RN and HERSON Smith (oncoming nurse) by Woodrow Khan (offgoing nurse). Report included the following information SBAR.

## 2018-10-16 NOTE — PROGRESS NOTES
NUTRITION COMPLETE ASSESSMENT 
 
RECOMMENDATIONS:  
1. Check labs, lytes for trends daily and adjust and replete prior to every TPN advance. 2. Goal TPN: AA 6% D20 @ 70 ml/hr with 20% 500 ml IVL x 3 week = 1680 ml, 1970 total calories and 101 grams protein (1.7 gm/kg) 3. Check wt twice weekly Interventions/Plan:  
Food/Nutrient Delivery:          Initiate parenteral nutrition, Modify rate, concentration, composition, and schedule Assessment:  
Reason for Assessment:  
[x] Provider Consult Diet:  NPO Nutritionally Significant Medications: [x] Reviewed and includes: Carafate, Reglan, lactulose Subjective: 
Pt shook head to yes and no questions. Obtained past diet and wt history from . Objective: 
48year old admitted for abdominal pain, nausea, vomiting - SBO. PMH: ovarian cancer stage IIIC. Chart reviewed and consult received to assist with TPN. Pt noted to have ~ 17# or 11.8% wt loss over the past 6 months reflecting severe wt change. NGT placed for decompression. TPN to start tonight and will provide: 1512 ml, 1506 total calories, 75.6 gm protein (1.3 gm/kg), GIR: 2.7 mgCHO/kg/min, 1.7 gm/kg/day lipids. Check labs, lytes for trends daily and adjust prior to every advance. Goal TPN: AA 6% D20 @ 70 ml/hr with 20% 500 ml IVL x 3 week = 1680 ml, 1970 total calories and 101 grams protein (1.7 gm/kg) Estimated Nutrition Needs:  
Kcals/day: 2030 Kcals/day (6537-6748 kcal/day ( 35-40 kcal/kg)) Protein: 75 g (75-87 gm/day ( 1.3-1.5 gm/kg)) Fluid: 2000 ml Based On: Kcal/kg - specify (Comment) Weight Used: Actual wt Pt expected to meet estimated nutrient needs:  []   Yes     []  No  [x] Unable to predict at this time Nutrition Diagnosis:  
1. Inadequate oral intake related to SBO as evidenced by nausa and vomiting PTA. Goals:   
 Meet 90% estimated needs via TPN x 5-7 days. Monitoring & Evaluation: - Total energy intake - Weight/weight change, Acid-base balance Previous Nutrition Goals Met: N/A Previous Recommendations: N/A Education & Discharge Needs: 
 [x] None Identified 
 [] Identified and addressed [x] Participated in care plan, discharge planning, and/or interdisciplinary rounds Cultural, Sabianist and ethnic food preferences identified: 
 None Skin Integrity: [x]Intact  []Other Edema: []None [x]Other - abdominal ascites Food Allergies: [x]None []Other Anthropometrics:   
Weight Loss Metrics 10/16/2018 10/16/2018 10/4/2018 9/27/2018 9/20/2018 9/18/2018 9/18/2018 Today's Wt 127 lb 13.9 oz 127 lb 12.8 oz 128 lb 6.4 oz 127 lb 1.6 oz 130 lb 129 lb 130 lb BMI 19.73 kg/m2 19.72 kg/m2 19.8 kg/m2 19.91 kg/m2 20.05 kg/m2 19.62 kg/m2 20.24 kg/m2 Last 3 Recorded Weights in this Encounter 10/16/18 1452 Weight: 58 kg (127 lb 13.9 oz)  
  
  
 , Body mass index is 19.73 kg/(m^2). IBW : 61.2 kg (135 lb), % IBW (Calculated): 94.72 % 
 ,   
 
Labs:   
Lab Results Component Value Date/Time Sodium 136 10/16/2018 11:18 AM  
 Potassium 3.6 10/16/2018 11:18 AM  
 Chloride 103 10/16/2018 11:18 AM  
 CO2 19 (L) 10/16/2018 11:18 AM  
 Glucose 62 (L) 10/16/2018 11:18 AM  
 BUN 10 10/16/2018 11:18 AM  
 Creatinine 0.54 (L) 10/16/2018 11:18 AM  
 Calcium 8.9 10/16/2018 11:18 AM  
 Magnesium 1.8 10/16/2018 11:18 AM  
 Phosphorus 2.6 10/16/2018 11:18 AM  
 Albumin 2.7 (L) 10/16/2018 11:18 AM  
 
No results found for: HBA1C, HGBE8, UIM1BPSF, [id number] Lab Results Component Value Date/Time Glucose 62 (L) 10/16/2018 11:18 AM  
  
Lab Results Component Value Date/Time ALT (SGPT) 16 10/16/2018 11:18 AM  
 AST (SGOT) 20 10/16/2018 11:18 AM  
 Alk.  phosphatase 84 10/16/2018 11:18 AM  
 Bilirubin, total 0.3 10/16/2018 11:18 AM  
  
 
Miesha Khanna RD

## 2018-10-16 NOTE — CONSULTS
Palliative Medicine Consult Ruperto: [phone] ([id number]) Patient Name: Nghia Sanders YOB: 1965 Date of Initial Consult: 10/16/18 Reason for Consult: Cancer related symptom management Requesting Provider: Dr. Cindy Floyd Primary Care Physician: Yamileth Aponte MD 
 
 SUMMARY:  
Nghia Sanders is a 48 y.o. with recurrent ovarian malignancy on Doxil/Avastin per Dr. Cindy Floyd, known to outpatient palliative medicine as she follows closely with Palliative medicine outpatient clinic for pain management. She was admitted 10/10/2018 from home due to worsening nausea and vomiting followed by acute worsening of epigastric abdominal pain, EGD showed gastritis. Follow up CT shows proximal SBO leading to current admission for medical management and TPN for nutrition. Last chemotherapy date was 10/4. Current medical issues leading to Palliative Medicine involvement include: cancer related symptoms including pain, nausea, vomiting. PALLIATIVE DIAGNOSES:  
1. Epigastric abdominal pain 2. Chronic neoplasm related lower abdominal pain 3. Nausea with emesis 4. Xerostomia PLAN:  
1. SBO- on scopolamine patch, NGT to intermittent suction. Consider IV dexamethasone 2 mg TID. 2. Abd usg to see if she needs paracentesis 3. Abdominal pain- will avoid po opioids while on NG suction.  
 - Start Morphine pca. Her daily oral Oxycodone use is 50mg/ day. This accounts to about 20 mg IV morphine/ day with 25% reduction for cross tolerance. - Basal 1 mg, bolus 1 mg with 6 min lockout. Expect good pain control with this. Will adjust based on her needs. She may not need a continuous basal. 
 
4. Lactulose ordered for bowel regimen. 5. Reglan changed to IV 6. Needs to continue Amitriptyline for neuropathy which has helped her very much. Will need to stop suctioning for about an 1 hour after getting Amitriptyline. 7. Continue Carafate and IV protonix. 8. Communicated plan of care with: Palliative IDT, patient and , bedside RN and Zaida Ortiz NP 
 
 
 GOALS OF CARE / TREATMENT PREFERENCES:  
 
GOALS OF CARE:   Full care escalation Patient/Health Care Proxy Stated Goals: Prolong life TREATMENT PREFERENCES:  
Code Status: Full Code Advance Care Planning: 
Advance Care Planning 10/10/2018 Patient's Healthcare Decision Maker is: Legal Next of Kin Primary Decision Maker Name Jordana Borrego Primary Decision Maker Phone Number [phone] Primary Decision Maker Relationship to Patient Spouse Confirm Advance Directive None Medical Interventions: Full interventions Other: As far as possible, the palliative care team has discussed with patient / health care proxy about goals of care / treatment preferences for patient. HISTORY:  
 
History obtained from:  Pt, , EMR 
 
CHIEF COMPLAINT: upper abdominal pain HPI/SUBJECTIVE: The patient is:  
[x] Verbal and participatory [] Non-participatory due to:  
 
 
Patient has been feeling poorly for over a week now. She has been having severe nausea, vomiting and epigastric pain. She felt a bit better during hospitalization last week but continued with inability to tolerate po intake, etc. CT from 10/15 showed SBO and she is admitted to manage that and help with nutrition. Clinical Pain Assessment (nonverbal scale for severity on nonverbal patients):  
Clinical Pain Assessment Severity: 3 Location: upper abdomen Character: sharp, burning Duration: weeks Effect: functional 
Factors: none in particular Frequency: constant Duration: for how long has pt been experiencing pain (e.g., 2 days, 1 month, years) Frequency: how often pain is an issue (e.g., several times per day, once every few days, constant) FUNCTIONAL ASSESSMENT:  
 
Palliative Performance Scale (PPS): PPS: 70  PSYCHOSOCIAL/SPIRITUAL SCREENING:  
 
 Palliative IDT has assessed this patient for cultural preferences / practices and a referral made as appropriate to needs (Cultural Services, Patient Advocacy, Ethics, etc.) Advance Care Planning: 
Advance Care Planning 10/10/2018 Patient's Healthcare Decision Maker is: Legal Next of Kin Primary Decision Maker Name Harpreet Acosta Primary Decision Maker Phone Number [phone] Primary Decision Maker Relationship to Patient Spouse Confirm Advance Directive None Any spiritual / Druze concerns: 
[] Yes /  [x] No 
 
Caregiver Burnout: 
[] Yes /  [x] No /  [] No Caregiver Present Anticipatory grief assessment:  
[] Normal  / [] Maladaptive ESAS Anxiety: Anxiety: 3 ESAS Depression: Depression: 3 REVIEW OF SYSTEMS:  
 
Positive and pertinent negative findings in ROS are noted above in HPI. The following systems were [x] reviewed / [] unable to be reviewed as noted in HPI Other findings are noted below. Systems: constitutional, ears/nose/mouth/throat, respiratory, gastrointestinal, genitourinary, musculoskeletal, integumentary, neurologic, psychiatric, endocrine. Positive findings noted below. Modified ESAS Completed by: provider Fatigue: 4 Drowsiness: 0 Depression: 3 Pain: 3 Anxiety: 3 Nausea: 3 Anorexia: 7 Dyspnea: 0 Constipation: No  
     
 
 
 PHYSICAL EXAM:  
 
From RN flowsheet: 
Wt Readings from Last 3 Encounters:  
10/16/18 127 lb 12.8 oz (58 kg) 10/04/18 128 lb 6.4 oz (58.2 kg) 09/27/18 127 lb 1.6 oz (57.7 kg) Blood pressure 112/69, pulse (!) 111, temperature 97.7 °F (36.5 °C), resp. rate 16, last menstrual period 02/02/2015, SpO2 97 %, unknown if currently breastfeeding. Last bowel movement, if known:  
 
Constitutional: mild distress due to pain/concern Eyes: pupils equal, anicteric ENMT: no nasal discharge, moist mucous membranes, no lesions or thrush in mouth Cardiovascular: regular rhythm, distal pulses intact, no edema Respiratory: breathing not labored, symmetric Gastrointestinal: abdomen is flat, bs active, mild TTP in epigastrium w/o rebound or guarding Musculoskeletal: no deformity, no tenderness to palpation Skin: warm, dry Neurologic: following commands, moving all extremities Psychiatric: full affect, no hallucinations HISTORY:  
 
Active Problems: 
  Ovarian cancer ([address] (10/10/2018) Dehydration (10/16/2018) Malnutrition ([address] (10/16/2018) SBO (small bowel obstruction) ([address] (10/16/2018) Past Medical History:  
Diagnosis Date  Anxiety  BRCA1 positive  Calculus of kidney 1/13  
 right  Hernia, inguinal, left 2012  MS (multiple sclerosis) ([address] 1996  Nausea & vomiting  Ovarian cancer ([address] 3/2015  
 high grade, stage 3C papillary serous  Thromboembolus ([address] 8/2007  
 lower abdomen post fall Past Surgical History:  
Procedure Laterality Date  HX GYN  2009  
 endometrial ablation with punctured uterus  HX OTHER SURGICAL  8/2007  
 green filter  HX MARIEL AND BSO  3/2014  
 ovarian cancer Family History Problem Relation Age of Onset  Cancer Paternal Grandmother   
  breast  
 Breast Cancer Paternal Grandmother  Coronary Artery Disease Mother 48 CABG and PCI  Heart Surgery Father   
  valve replacement  No Known Problems Brother  No Known Problems Brother  No Known Problems Brother  No Known Problems Daughter  No Known Problems Son  No Known Problems Son  No Known Problems Son History reviewed, no pertinent family history. Social History Substance Use Topics  Smoking status: Never Smoker  Smokeless tobacco: Never Used  Alcohol use No  
 
Allergies Allergen Reactions  Pcn [Penicillins] Rash Current Facility-Administered Medications Medication Dose Route Frequency  amitriptyline (ELAVIL) tablet 10 mg  10 mg Oral QHS  scopolamine (TRANSDERM-SCOP) 1 mg over 3 days 1 Patch  1 Patch TransDERmal Q72H  sucralfate (CARAFATE) 100 mg/mL oral suspension 1 g  1 g Per NG tube QID  enoxaparin (LOVENOX) injection 40 mg  40 mg SubCUTAneous Q24H  
 metoclopramide HCl (REGLAN) injection 5 mg  5 mg IntraVENous Q6H  
 LORazepam (ATIVAN) injection 1 mg  1 mg IntraVENous Q4H PRN  prochlorperazine (COMPAZINE) with saline injection 5 mg  5 mg IntraVENous Q4H PRN  
 sodium chloride (NS) flush 5-10 mL  5-10 mL IntraVENous Q8H  
 sodium chloride (NS) flush 5-10 mL  5-10 mL IntraVENous PRN  
 diphenhydrAMINE (BENADRYL) injection 12.5 mg  12.5 mg IntraVENous Q4H PRN  
 TPN ADULT - CENTRAL AA 5% D15% W/ ELECTROLYTES AND CA   IntraVENous CONTINUOUS  
 insulin lispro (HUMALOG) injection   SubCUTAneous Q6H  
 glucose chewable tablet 16 g  4 Tab Oral PRN  
 dextrose (D50W) injection syrg 12.5-25 g  12.5-25 g IntraVENous PRN  
 glucagon (GLUCAGEN) injection 1 mg  1 mg IntraMUSCular PRN  
 dextrose 5% - 0.45% NaCl with KCl 10 mEq/L infusion  100 mL/hr IntraVENous CONTINUOUS  
 fat emulsion 20% (LIPOSYN, INTRAlipid) infusion 500 mL  500 mL IntraVENous Q TUE, THU & SAT  lactulose (CHRONULAC) solution 20 g  20 g Per NG tube BID  morphine  mg / 30 mL   IntraVENous CONTINUOUS  
 
 
 
 LAB AND IMAGING FINDINGS:  
 
Lab Results Component Value Date/Time WBC 4.7 10/16/2018 11:18 AM  
 HGB 9.2 (L) 10/16/2018 11:18 AM  
 PLATELET 576 45/82/1352 11:18 AM  
 
Lab Results Component Value Date/Time Sodium 140 10/11/2018 07:23 AM  
 Potassium 4.6 10/11/2018 07:23 AM  
 Chloride 108 10/11/2018 07:23 AM  
 CO2 23 10/11/2018 07:23 AM  
 BUN 11 10/11/2018 07:23 AM  
 Creatinine 0.72 10/11/2018 07:23 AM  
 Calcium 8.1 (L) 10/11/2018 07:23 AM  
 Magnesium 1.8 10/11/2018 07:23 AM  
 Phosphorus 4.1 10/11/2018 07:23 AM  
  
Lab Results Component Value Date/Time  AST (SGOT) 14 (L) 10/11/2018 07:23 AM  
 Alk. phosphatase 90 10/11/2018 07:23 AM  
 Protein, total 5.7 (L) 10/11/2018 07:23 AM  
 Albumin 2.4 (L) 10/11/2018 07:23 AM  
 Globulin 3.3 10/11/2018 07:23 AM  
 
Lab Results Component Value Date/Time INR 1.0 07/19/2018 11:54 AM  
 Prothrombin time 10.7 07/19/2018 11:54 AM  
  
No results found for: IRON, FE, TIBC, IBCT, PSAT, FERR No results found for: PH, PCO2, PO2 No components found for: [place] Lab Results Component Value Date/Time CK 72 01/09/2018 11:09 AM  
 CK - MB <1.0 01/09/2018 11:09 AM  
  
 
 
   
 
 
 
Prolonged service was provided for  []30 min   []75 min in face to face time in the presence of the patient, spent as noted above. Time Start:  
Time End:  
Note: this can only be billed with 60475 (initial) or 82610 (follow up). If multiple start / stop times, list each separately.

## 2018-10-16 NOTE — H&P
[address] [facility], [address] [place] [address] 
[phone] MD Jeannie Hayes MD 
Office Note Patient ID: 
Name:  Debora Hoffman MRN:  [id number] :  1965/53 y.o. Date:  10/16/2018 HISTORY OF PRESENT ILLNESS: 
Debora Hoffman is a 48 y.o.  postmenopausal female with stage IIIC ovarian cancer. She underwent X-lap, hysterectomy, and tumor debulking in 2015. She was recommended adjuvant chemotherapy with 6 cycles of Taxol and Carboplatin. Her post-treatment PET/CT was negative for disease. Her [facility] results also found her to have a deleterious BRCA 1 mutation. As for the BRCA 1 mutation, we had her see one of our breast surgeons, Dr. Merlin Root, to talk about options, specifically screening and prophylactic surgery. She is going to hold off on surgery for now. She has decided not to have her daughter tested at that time. In early  she was found to have recurrence. 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 She also wanted to keep her hair. We also discussed options for down the line, specifically a PARP inhibitor, since she is BRCA positive. She completed 6 cycles of Gemzar/Carboplatin and her CA-125 returned to baseline. Her CT at that time was clear. We discussed maintenance therapy with a PARP inhibitor. She was started on Zejula in late 2017 at 300 mg daily. Her dose was lowered to 200 mg daily due to gastrointestinal complaints. In 2017 it was held due to thrombocytopenia. She elected not to restart the Kristen , even at a lower dose. She presented in 2017 to discuss other PARP alternatives. She was started on Lynparza at a one-step dose reduction of 250 mg bid. Due to side effects she was reduced to 200 mg bid on 1/2/18. On 1/15/18 she stopped therapy due to abdominal pain and nausea, and most of all, fatigue. She presented not too long ago for follow up and reported lower abdominal pain. Her CA-125 was rising, up to 41, when it was previously <4. She had some complaints of lower abdominal pain, in addition to her rising CA-125. Her CT demonstrated recurrent disease. Dr. Janna Mcdowell discussed with her and her  multiple treatment options. She went to [facility] for a second opinion. They essentially offered her similar treatment options. She was not a candidate for a clinical trial due to her diagnosis of MS. She presented again discuss treatment options. We ultimately decided on Rubraca. She was only on therapy for about 2 1/2 months. She had been tolerating it very well. Her CA-125 did respond. She was in [facility] for vacation in June when she developed significant ascites, requiring drainage at [address] [place] [address] in [place]. Her CT scan upon return unfortunately demonstrated progression. She was then recommended a combination of Doxil/Avastin as we felt the Avastin would help with the ascites. She has completed 3 cycles so far. Her CA-125 has been responding. She had a paracentesis on 7/20/18, which drained 1800 cc. She has not required one since, but still has some ascites on imaging. She was hospitalized last week with abdominal pain and some nausea/vomiting. She was seen by GI and had an EGD. There appeared to be some gastric emptying issues. Plain films at that time showed no evidence of obstruction, and she was still having BMs. Her newest CT from yesterday demonstrates proximal small bowel distention with decompressed distal loops, consistent with partial obstruction. There was more free fluid than on the previous scan, but her peritoneal implants appeared to be improved.   She presents today to our office to discuss the results. She is very weak today and says that she is having trouble keeping things down. She is very anxious and depressed.  is supportive and present with her Review of Systems: 
General: Acknowledges both wt loss and increasing fatigue/weakness HEENT: Denies visual changes, dysphagia or headache Resp: Denies SOB, LYONS, wheezing or cough CV: Denies CP, palpitations GI/: Acknowledges bloating, constipation and upper abd discomfort. Says she feel nauseated a lot of the time. + flatus. Has not been eating due to these symptoms. Denies diarrhea or dysuria MuskSkel: Denies muscle ache or joint pain Neuro: Denies neuropathy, dizzyness or syncope Psych: Acknowledges feelings of depression with her current symptoms Problem List: 
Patient Active Problem List  
 Diagnosis Date Noted  Dehydration 10/16/2018  Malnutrition ([address] 10/16/2018  
 SBO (small bowel obstruction) ([address] 10/16/2018  Gastroparesis  Severe protein-calorie malnutrition ([address] 10/11/2018  Ovarian cancer ([address] 10/10/2018  Abdominal pain 10/10/2018  Non-intractable vomiting with nausea 10/10/2018  Epigastric pain  Xerostomia  Pain of metastatic malignancy 08/09/2018  Other proteinuria 07/26/2018  Anemia due to chemotherapy 07/26/2018  On anticoagulant therapy 07/19/2018  Peritoneal carcinomatosis ([address] 06/01/2018  Malignant ascites 06/01/2018  Thrombocytopenia ([address] 03/17/2017  
 CINV (chemotherapy-induced nausea and vomiting) 03/17/2017  Malignant neoplasm of both ovaries ([address] 04/09/2015 PMH: 
Past Medical History:  
Diagnosis Date  Anxiety  BRCA1 positive  Calculus of kidney 1/13  
 right  Hernia, inguinal, left 2012  MS (multiple sclerosis) ([address] 1996  Nausea & vomiting  Ovarian cancer ([address] 3/2015  
 high grade, stage 3C papillary serous  Thromboembolus ([address] 8/2007  
 lower abdomen post fall PSH: 
Past Surgical History: Procedure Laterality Date  HX GYN  2009  
 endometrial ablation with punctured uterus  HX OTHER SURGICAL  8/2007  
 green filter  HX MARIEL AND BSO  3/2014  
 ovarian cancer Social History: 
Social History Substance Use Topics  Smoking status: Never Smoker  Smokeless tobacco: Never Used  Alcohol use No  
  
Family History: 
Family History Problem Relation Age of Onset  Cancer Paternal Grandmother   
  breast  
 Breast Cancer Paternal Grandmother  Coronary Artery Disease Mother 48 CABG and PCI  Heart Surgery Father   
  valve replacement  No Known Problems Brother  No Known Problems Brother  No Known Problems Brother  No Known Problems Daughter  No Known Problems Son  No Known Problems Son  No Known Problems Son Medications: (reviewed) Current Facility-Administered Medications Medication Dose Route Frequency  amitriptyline (ELAVIL) tablet 10 mg  10 mg Oral QHS  scopolamine (TRANSDERM-SCOP) 1 mg over 3 days 1 Patch  1 Patch TransDERmal Q72H  sucralfate (CARAFATE) 100 mg/mL oral suspension 1 g  1 g Per NG tube QID  enoxaparin (LOVENOX) injection 40 mg  40 mg SubCUTAneous Q24H  
 metoclopramide HCl (REGLAN) injection 5 mg  5 mg IntraVENous Q6H  
 LORazepam (ATIVAN) injection 1 mg  1 mg IntraVENous Q4H PRN  prochlorperazine (COMPAZINE) with saline injection 5 mg  5 mg IntraVENous Q4H PRN  
 sodium chloride (NS) flush 5-10 mL  5-10 mL IntraVENous Q8H  
 sodium chloride (NS) flush 5-10 mL  5-10 mL IntraVENous PRN  
 diphenhydrAMINE (BENADRYL) injection 12.5 mg  12.5 mg IntraVENous Q4H PRN  
 insulin lispro (HUMALOG) injection   SubCUTAneous Q6H  
 glucose chewable tablet 16 g  4 Tab Oral PRN  
 dextrose (D50W) injection syrg 12.5-25 g  12.5-25 g IntraVENous PRN  
 glucagon (GLUCAGEN) injection 1 mg  1 mg IntraMUSCular PRN  
 dextrose 5% - 0.45% NaCl with KCl 10 mEq/L infusion  100 mL/hr IntraVENous CONTINUOUS  
  fat emulsion 20% (LIPOSYN, INTRAlipid) infusion 500 mL  500 mL IntraVENous Q TUE, THU & SAT  lactulose (CHRONULAC) solution 20 g  20 g Per NG tube BID  morphine  mg / 30 mL   IntraVENous CONTINUOUS  
 pantoprazole (PROTONIX) 40 mg in sodium chloride 0.9% 10 mL injection  40 mg IntraVENous DAILY  TPN ADULT - CENTRAL AA 5% D15% W/ ELECTROLYTES AND CA   IntraVENous CONTINUOUS Allergies: (reviewed) Allergies Allergen Reactions  Pcn [Penicillins] Rash OBJECTIVE: 
Visit Vitals  /69 (BP 1 Location: Left arm)  Pulse (!) 111  Temp 97.7 °F (36.5 °C)  Resp 16  
 LMP 02/02/2015 (Approximate)  SpO2 97% Physical Exam: 
GENERAL FILIBERTO: A&O X3, weak and pale appearing HEENT: Oral mucosa pale and dry. Sclera anicteric. Pupils equal and reactive to light Neck:  Supple without restrictions. No cervical adenopathy appreciated RESPIRATORY: CTA bilat without wheezing or rales CARDIOVASC: Regular without M/R/G  
GASTROINT: Soft, with mild distention and tenderness to upper abd. + BS throughout MUSCULOSKEL: no joint tenderness, deformity or swelling EXTREMITIES: extremities normal, atraumatic, no cyanosis or edema CHARMAINE SURVEY: No suspicious lymphadenopathy or edema noted. NEURO: Grossly intact. No acute deficit. Lab Results Component Value Date/Time WBC 4.7 10/16/2018 11:18 AM  
 HGB 9.2 (L) 10/16/2018 11:18 AM  
 HCT 28.6 (L) 10/16/2018 11:18 AM  
 PLATELET 265 46/15/9044 11:18 AM  
 .5 (H) 10/16/2018 11:18 AM  
 
Lab Results Component Value Date/Time  Sodium 136 10/16/2018 11:18 AM  
 Potassium 3.6 10/16/2018 11:18 AM  
 Chloride 103 10/16/2018 11:18 AM  
 CO2 19 (L) 10/16/2018 11:18 AM  
 Anion gap 14 10/16/2018 11:18 AM  
 Glucose 62 (L) 10/16/2018 11:18 AM  
 BUN 10 10/16/2018 11:18 AM  
 Creatinine 0.54 (L) 10/16/2018 11:18 AM  
 BUN/Creatinine ratio 19 10/16/2018 11:18 AM  
 GFR est AA >60 10/16/2018 11:18 AM  
 GFR est non-AA >60 10/16/2018 11:18 AM  
 Calcium 8.9 10/16/2018 11:18 AM  
 
Lab Results Component Value Date/Time CA-125 406 (H) 10/11/2018 07:23 AM  
 Cancer Ag (CA) 125 726.9 (H) 09/18/2018 11:22 AM  
 
 
CT of chest/abdomen/pelvis (10/10/18) LUNG BASES: There are trace bilateral pleural effusions with underlying 
atelectasis INCIDENTALLY IMAGED HEART AND MEDIASTINUM: Unremarkable. LIVER: No mass or biliary dilatation. GALLBLADDER: Unremarkable. SPLEEN: No mass. PANCREAS: No mass or ductal dilatation. ADRENALS: Unremarkable. KIDNEYS: No mass, calculus, or hydronephrosis. STOMACH: Unremarkable. SMALL BOWEL: Proximal small bowel distention is noted with decompressed loops 
distally. COLON: No dilatation or wall thickening. APPENDIX: Not visualized. PERITONEUM: Moderate free fluid is noted, increased compared to the prior exam. 
Gastrohepatic soft tissue abnormality appears to have improved compared to the 
prior exam. Previously described lesion measuring 3.0 cm now measures 1.2 cm. Soft tissue nodule in the left upper quadrant now measures 16 mm, previously 
measuring 3.3 cm. Soft tissue density in the mesenteric root has improved. RETROPERITONEUM: Left common iliac vein stent is noted. IVC filter projects 
within the infrarenal vena cava. REPRODUCTIVE ORGANS: The uterus and ovaries are surgically absent. URINARY BLADDER: No mass or calculus. BONES: No destructive bone lesion. ADDITIONAL COMMENTS: N/A IMPRESSION: 
Proximal small bowel distention with decompressed loops distally is compatible 
with obstruction and likely related to peritoneal disease in the mid abdomen. Overall, peritoneal implants have improved in size. Free fluid has increased in 
the interval. Trace bilateral effusions with underlying atelectasis. IMPRESSION/PLAN: 
Will admit today for hydration and TPN/lipids and overall management of symptoms Check labs NG tube for decompression/ bowel rest 
 Consult palliative for pain management Lovenox in place of Xarelto Continue antidepressants and monitor closely Consult nutrition for TPN rec's Mobilize as tolerated Signed By: Preston Sanchez NP   
 10/16/2018/3:54 PM

## 2018-10-17 PROBLEM — R11.0 NAUSEA: Status: ACTIVE | Noted: 2018-01-01

## 2018-10-17 NOTE — PROGRESS NOTES
[address], [address] [place], [facility] Martha Mallory 
P [phone]  F [phone] Patient: Bertha Shah Admit Date: 10/16/2018 Admit Dx: Partial small bowel obstruction Ovarian cancer ([address] Malnutrition ([address] Dehydration SBO (small bowel obstruction) ([address] Subjective:  
 
Pain remains same, band like and bloating mid abd/epigastric. Ambulating. No N/V, continues eructation. No flatus/BM. NGT ~800/24hr. Objective: Intake/Output Summary (Last 24 hours) at 10/17/2018 4935 Last data filed at 10/17/2018 6529 Gross per 24 hour Intake 3327.65 ml Output 2950 ml Net 377.65 ml Physical Exam 
/75 (BP 1 Location: Right arm, BP Patient Position: At rest)   Pulse 89   Temp 97.8 °F (36.6 °C)   Resp 16   Wt 127 lb 13.9 oz (58 kg)   LMP 02/02/2015 (Approximate)   SpO2 96%   BMI 19.73 kg/m² General:  fatigued, cooperative, no distress,  present Cardiac:  Regular rate and rhythm Lungs:  Diminished bases Abdomen:  soft, protuberant, nondistended, hypoactive bowel sounds - NGT bilious Extremity: no edema, redness or tenderness in the calves or thighs Data Review Lab Results Component Value Date/Time WBC 4.7 10/16/2018 11:18 AM  
 ABS. NEUTROPHILS 2.7 10/16/2018 11:18 AM  
 HGB 9.2 (L) 10/16/2018 11:18 AM  
 HCT 28.6 (L) 10/16/2018 11:18 AM  
 .5 (H) 10/16/2018 11:18 AM  
 MCH 33.0 10/16/2018 11:18 AM  
 PLATELET 977 21/81/5493 11:18 AM  
 
Lab Results Component Value Date/Time  Sodium 138 10/17/2018 05:20 AM  
 Potassium 3.4 (L) 10/17/2018 05:20 AM  
 Chloride 104 10/17/2018 05:20 AM  
 CO2 23 10/17/2018 05:20 AM  
 Glucose 164 (H) 10/17/2018 05:20 AM  
 BUN 8 10/17/2018 05:20 AM  
 Creatinine 0.59 10/17/2018 05:20 AM  
 Calcium 8.6 10/17/2018 05:20 AM  
 Albumin 2.4 (L) 10/17/2018 05:20 AM  
 Bilirubin, total 0.2 10/17/2018 05:20 AM  
 AST (SGOT) 18 10/17/2018 05:20 AM  
 ALT (SGPT) 16 10/17/2018 05:20 AM  
 Alk. phosphatase 71 10/17/2018 05:20 AM  
 
CT Results (most recent): 
Results from Hospital Encounter encounter on 10/15/18 CT ABD PELV W CONT Narrative EXAM:  CT ABD PELV W CONT INDICATION: Ovarian cancer restaging COMPARISON: 7/9/2018 CONTRAST:  100 mL of Isovue-370. TECHNIQUE:  
Following the uneventful intravenous administration of contrast, thin axial 
images were obtained through the abdomen and pelvis. Coronal and sagittal 
reconstructions were generated. Oral contrast was not administered. CT dose 
reduction was achieved through use of a standardized protocol tailored for this 
examination and automatic exposure control for dose modulation. FINDINGS:  
LUNG BASES: There are trace bilateral pleural effusions with underlying 
atelectasis INCIDENTALLY IMAGED HEART AND MEDIASTINUM: Unremarkable. LIVER: No mass or biliary dilatation. GALLBLADDER: Unremarkable. SPLEEN: No mass. PANCREAS: No mass or ductal dilatation. ADRENALS: Unremarkable. KIDNEYS: No mass, calculus, or hydronephrosis. STOMACH: Unremarkable. SMALL BOWEL: Proximal small bowel distention is noted with decompressed loops 
distally. COLON: No dilatation or wall thickening. APPENDIX: Not visualized. PERITONEUM: Moderate free fluid is noted, increased compared to the prior exam. 
Gastrohepatic soft tissue abnormality appears to have improved compared to the 
prior exam. Previously described lesion measuring 3.0 cm now measures 1.2 cm. Soft tissue nodule in the left upper quadrant now measures 16 mm, previously 
measuring 3.3 cm. Soft tissue density in the mesenteric root has improved. RETROPERITONEUM: Left common iliac vein stent is noted. IVC filter projects 
within the infrarenal vena cava. REPRODUCTIVE ORGANS: The uterus and ovaries are surgically absent. URINARY BLADDER: No mass or calculus. BONES: No destructive bone lesion.  
ADDITIONAL COMMENTS: N/A 
  
 Impression IMPRESSION: 
Proximal small bowel distention with decompressed loops distally is compatible 
with obstruction and likely related to peritoneal disease in the mid abdomen. Overall, peritoneal implants have improved in size. Free fluid has increased in 
the interval. Trace bilateral effusions with underlying atelectasis. Assessment/Plan:  
 
Admitted for Partial small bowel obstruction Ovarian cancer ([address] Malnutrition ([address] Dehydration SBO (small bowel obstruction) ([address] Active Hospital Problems Diagnosis Date Noted  Dehydration 10/16/2018  Malnutrition ([address] 10/16/2018  
 SBO (small bowel obstruction) ([address] 10/16/2018  Severe protein-calorie malnutrition ([address] 10/11/2018  Ovarian cancer ([address] 10/10/2018  Epigastric pain  Anemia due to chemotherapy 07/26/2018  Peritoneal carcinomatosis ([address] 06/01/2018 [id number] [facility] admitted with carcinomatosis, SBO, calorie malnutrition d/t chronic disease and obstruction. Onc: Undergoing Doxil/Avastin s/p 3 cycles last 10/4/18. Recent EOD CT with peritoneal disease response. Partial vs complete SBO Heme/CV: Hemodynamically stable, anemia d/t chemo/chronic disease FEN/GI: obstruction with malnutrition - continue TPN, NGT. Hold reglan to promote GI rest. Continue PPI/H2 d/t recent EGD dx gastritis - path negative. ID/Wound: afeb, abd c/w obstruction Neuro: pain/antidpression regimen per palliative care, following. PPX: lovenox hx of VTE, ppx dose. Holding xarelto Disposition: stable, holding course today. Discussed watchful waiting with .  
 
 
LESIA Lowe

## 2018-10-17 NOTE — PROGRESS NOTES
Bedside shift change report given to aClos Barahona RN (oncoming nurse) Corrina Aguila RN (offgoing nurse). Report included the following information SBAR, Kardex and Intake/Output.

## 2018-10-17 NOTE — PROGRESS NOTES
Problem: Mobility Impaired (Adult and Pediatric) Goal: *Acute Goals and Plan of Care (Insert Text) Physical Therapy Goals Initiated 10/17/2018 1. Patient will move from supine to sit and sit to supine  in bed with independence within 7 day(s). 2.  Patient will transfer from bed to chair and chair to bed with independence using the least restrictive device within 7 day(s). 3.  Patient will perform sit to stand with independence within 7 day(s). 4.  Patient will ambulate with independence for 500 feet with the least restrictive device within 7 day(s). 5.  Patient will ascend/descend 12 stairs with 1 handrail(s) with independence within 7 day(s). physical Therapy EVALUATION Patient: Sangita St (55 y.o. female) Date: 10/17/2018 Primary Diagnosis: Partial small bowel obstruction Ovarian cancer ([facility] Malnutrition ([facility] Dehydration SBO (small bowel obstruction) ([facility] Precautions:     
 
ASSESSMENT : 
Based on the objective data described below, the patient presents with decreased activity tolerance, impaired balance and generalized weakness/deconditioning following admission for n/v. Pt with extensive history of ovarian cancer and multiple rounds of treatment. 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 Pt became tearful during evaluation when speaking of her job. Pt is fearful of becoming weaker during admission and states it takes weeks for her to regain strength after each admission. Today pt performed all mobility at a CGA-SBA level, ambulated with support of IV pole or wall railing and required increased time for all transfers and gait. Oncology MD arriving during gait and returned to room for his assessment. Pt has very supportive family members and is safe to ambulate with their assistance in the hallway with pt holding on IV pole.  Will continue to follow to ensure pt does not decline further, progress OOB mobility/stairs, balance and activity tolerance. Recommend pt up in the halls ambulating at least 3x/daily Patient will benefit from skilled intervention to address the above impairments. Patients rehabilitation potential is considered to be Fair Factors which may influence rehabilitation potential include:  
[]         None noted 
[]         Mental ability/status [x]         Medical condition 
[]         Home/family situation and support systems 
[]         Safety awareness 
[]         Pain tolerance/management 
[]         Other: PLAN : 
Recommendations and Planned Interventions: 
[x]           Bed Mobility Training             [x]    Neuromuscular Re-Education 
[x]           Transfer Training                   []    Orthotic/Prosthetic Training 
[x]           Gait Training                         []    Modalities [x]           Therapeutic Exercises           []    Edema Management/Control 
[x]           Therapeutic Activities            [x]    Patient and Family Training/Education 
[]           Other (comment): Frequency/Duration: Patient will be followed by physical therapy  3 times a week to address goals. Discharge Recommendations: None Further Equipment Recommendations for Discharge: None SUBJECTIVE:  
Patient stated I have four children and two grandchildren.  OBJECTIVE DATA SUMMARY:  
HISTORY:   
Past Medical History:  
Diagnosis Date  Anxiety  BRCA1 positive  Calculus of kidney 1/13  
 right  Hernia, inguinal, left 2012  MS (multiple sclerosis) ([facility] 1996  Nausea & vomiting  Ovarian cancer ([facility] 3/2015  
 high grade, stage 3C papillary serous  Thromboembolus ([facility] 8/2007  
 lower abdomen post fall Past Surgical History:  
Procedure Laterality Date  HX GYN  2009  
 endometrial ablation with punctured uterus  HX OTHER SURGICAL  8/2007  
 green filter  HX MARIEL AND BSO  3/2014  
 ovarian cancer Prior Level of Function/Home Situation: Independent. Does not drive per pt preference Personal factors and/or comorbidities impacting plan of care: ovarian cancer, anxiety Home Situation Home Environment: Private residence # Steps to Enter: 6 One/Two Story Residence: Two story # of Interior Steps: 12 Living Alone: No 
Support Systems: Family member(s), Spouse/Significant Other/Partner Patient Expects to be Discharged to[de-identified] Private residence Current DME Used/Available at Home: Shower chair Tub or Shower Type: Shower EXAMINATION/PRESENTATION/DECISION MAKING: Critical Behavior: 
Neurologic State: Alert Orientation Level: Disoriented X4 Hearing: 
 Skin:   
Edema:  
Range Of Motion: 
AROM: Within functional limits Strength:   
Strength: Generally decreased, functional 
  
  
  
  
  
  
Tone & Sensation:  
Tone: Normal 
  
  
  
  
Sensation: Impaired Coordination: 
Coordination: Within functional limits Vision:  
  
Functional Mobility: 
Bed Mobility: 
Rolling: Contact guard assistance Supine to Sit: Contact guard assistance Sit to Supine: Contact guard assistance Transfers: 
Sit to Stand: Stand-by assistance Stand to Sit: Stand-by assistance Balance:  
Sitting: Intact Standing: Impaired Standing - Static: Good Standing - Dynamic : FairAmbulation/Gait Training:Distance (ft): 220 Feet (ft) Assistive Device: (IV pole/hand railing) Ambulation - Level of Assistance: Contact guard assistance Gait Abnormalities: Decreased step clearance;Shuffling gait; Path deviations Base of Support: Narrowed Speed/Karin: Pace decreased (<100 feet/min); Slow Step Length: Right shortened;Left shortened Stairs: Therapeutic Exercises:  
 
 
Functional Measure: 
Tinetti test: 
 
Sitting Balance: 1 Arises: 1 Attempts to Rise: 2 Immediate Standing Balance: 2 Standing Balance: 2 Nudged: 1 Eyes Closed: 1 Turn 360 Degrees - Continuous/Discontinuous: 1 Turn 360 Degrees - Steady/Unsteady: 1 Sitting Down: 1 Balance Score: 13 Indication of Gait: 1 
R Step Length/Height: 0 
L Step Length/Height: 0 
R Foot Clearance: 1 L Foot Clearance: 1 Step Symmetry: 1 Step Continuity: 1 Path: 0 Trunk: 1 Walking Time: 0 Gait Score: 6 Total Score: 19 Tinetti Test and G-code impairment scale: 
Percentage of Impairment CH 
 
0% 
 CI 
 
1-19% CJ 
 
20-39% CK 
 
40-59% CL 
 
60-79% CM 
 
80-99% CN  
 
100% Tinetti Score 0-28 28 23-27 17-22 12-16 6-11 1-5 0 Tinetti Tool Score Risk of Falls 
<19 = High Fall Risk 19-24 = Moderate Fall Risk 25-28 = Low Fall Risk Tinetti ME. Performance-Oriented Assessment of Mobility Problems in Elderly Patients. Scott 66; [id number]. (Scoring Description: PT Bulletin Feb. 10, 1993) Older adults: Emily Yañez et al, 2009; n = 1601 [facility] elderly evaluated with ABC, HOLLY, ADL, and IADL) · Mean HOLLY score for males aged 69-68 years = 26.21(3.40) · Mean HOLLY score for females age 69-68 years = 25.16(4.30) · Mean HOLLY score for males over 80 years = 23.29(6.02) · Mean HOLLY score for females over 80 years = 17.20(8.32) G codes: In compliance with CMSs Claims Based Outcome Reporting, the following G-code set was chosen for this patient based on their primary functional limitation being treated: The outcome measure chosen to determine the severity of the functional limitation was the Tinetti with a score of 19/28 which was correlated with the impairment scale. ? Mobility - Walking and Moving Around:  
  - CURRENT STATUS: CJ - 20%-39% impaired, limited or restricted  - GOAL STATUS: CI - 1%-19% impaired, limited or restricted  - D/C STATUS:  ---------------To be determined--------------- Physical Therapy Evaluation Charge Determination History Examination Presentation Decision-Making MEDIUM  Complexity : 1-2 comorbidities / personal factors will impact the outcome/ POC  MEDIUM Complexity : 3 Standardized tests and measures addressing body structure, function, activity limitation and / or participation in recreation  LOW Complexity : Stable, uncomplicated  Other outcome measures Tinetti  LOW Based on the above components, the patient evaluation is determined to be of the following complexity level: LOW Pain: 
Pain Scale 1: Numeric (0 - 10) Pain Intensity 1: 2 Pain Location 1: Abdomen Pain Description 1: Constant; Sore Pain Intervention(s) 1: Medication (see MAR) Activity Tolerance:  
Good Please refer to the flowsheet for vital signs taken during this treatment. After treatment:  
[]         Patient left in no apparent distress sitting up in chair 
[x]         Patient left in no apparent distress in bed 
[x]         Call bell left within reach [x]         Nursing notified 
[x]         Caregiver present 
[]         Bed alarm activated COMMUNICATION/EDUCATION:  
The patients plan of care was discussed with: Registered Nurse. [x]         Fall prevention education was provided and the patient/caregiver indicated understanding. [x]         Patient/family have participated as able in goal setting and plan of care. [x]         Patient/family agree to work toward stated goals and plan of care. []         Patient understands intent and goals of therapy, but is neutral about his/her participation. []         Patient is unable to participate in goal setting and plan of care. Thank you for this referral. 
Latha Enriquez, PT Time Calculation: 26 mins

## 2018-10-17 NOTE — PROGRESS NOTES
Spiritual Care Assessment/Progress Note [address] 
 
 
NAME: Courtney Hutchison      MRN: [id number] AGE: 48 y.o. SEX: female Hindu Affiliation: Christian Language: English  
 
10/17/2018     Total Time (in minutes): 5 Spiritual Assessment begun in [address] through conversation with: 
  
    []Patient        [] Family    [] Friend(s) Reason for Consult: Palliative Care, Initial/Spiritual Assessment Spiritual beliefs: (Please include comment if needed) 
   [] Identifies with a pavel tradition:     
   [] Supported by a pavel community:        
   [] Claims no spiritual orientation:       
   [] Seeking spiritual identity:            
   [] Adheres to an individual form of spirituality:       
   [x] Not able to assess:  See comments below Identified resources for coping:  
   [] Prayer                           
   [] Music                  [] Guided Imagery 
   [] Family/friends                 [] Pet visits [] Devotional reading                         [] Unknown 
   [] Other Interventions offered during this visit: (See comments for more details) Patient Interventions: Initial visit Plan of Care: 
 
 [] Support spiritual and/or cultural needs  
 [] Support AMD and/or advance care planning process    
 [] Support grieving process 
 [] Coordinate Rites and/or Rituals  
 [] Coordination with community clergy [] No spiritual needs identified at this time 
 [] Detailed Plan of Care below (See Comments)  [] Make referral to Music Therapy 
[] Make referral to Pet Therapy    
[] Make referral to Addiction services 
[] Make referral to [facility] 
[] Make referral to Spiritual Care Partner 
[] No future visits requested       
[x] Follow up visits as needed Comments: Attempted to visit pt for initial spiritual assessment.   Pt had several visitors at bedside, so this  will attempt to visit again at a future time as able. Please page [phone] for  support as needed. Paola Bruce, Palliative

## 2018-10-17 NOTE — PROGRESS NOTES
Problem: Falls - Risk of 
Goal: *Absence of Falls Document Sarita Saba Fall Risk and appropriate interventions in the flowsheet. Outcome: Progressing Towards Goal 
Fall Risk Interventions: 
  
 
  
 
Medication Interventions: Patient to call before getting OOB, Teach patient to arise slowly Elimination Interventions: Call light in reach, Patient to call for help with toileting needs Problem: Pressure Injury - Risk of 
Goal: *Prevention of pressure injury Document Joni Scale and appropriate interventions in the flowsheet. Outcome: Progressing Towards Goal 
Pressure Injury Interventions: Activity Interventions: Increase time out of bed Nutrition Interventions: Document food/fluid/supplement intake

## 2018-10-17 NOTE — PROGRESS NOTES
Bedside and Verbal shift change report given to Js Garcia RN (oncoming nurse) by J Luis Guerin RN (offgoing nurse). Report included the following information SBAR.

## 2018-10-18 NOTE — PROGRESS NOTES
[address], [address] [place], [facility] Martha Mallory 
P [phone]  F [phone] Patient: Celso Mac Admit Date: 10/16/2018 Admit Dx: Partial small bowel obstruction Ovarian cancer ([address] Malnutrition ([address] Dehydration SBO (small bowel obstruction) ([facility] Subjective:  
Pt says she feels better today. Feels hydration and TPN have helped as well as NG tube Pain improved this morning. Continues to feel bloated, but says \"it feels like it has improved some\" Ambulating well. No N/V in last 24 hours, continues eructation. Unsure if she is passing flatus. No BM. NGT ~410/24hr. Objective: Intake/Output Summary (Last 24 hours) at 10/18/2018 8960 Last data filed at 10/18/2018 0940 Gross per 24 hour Intake 1406.66 ml Output 2210 ml Net -803.34 ml Physical Exam 
/73 (BP Patient Position: Post activity;Supine)   Pulse 88   Temp 97.4 °F (36.3 °C)   Resp 15   Wt 130 lb 1.1 oz (59 kg)   LMP 02/02/2015 (Approximate)   SpO2 96%   BMI 20.07 kg/m² General:  More alert and in better spirits today. Remains cooperative, no distress,  present Cardiac:  Regular without M/R/G Lungs:  CTA Bilat without wheezing or rales. IS performed well Abdomen:  soft, slightly protuberant, nondistended, hypoactive bowel sounds persist - NGT non-bilious today, decreased amount last 24 hours Extremity: no edema, redness or tenderness in the calves or thighs Data Review Lab Results Component Value Date/Time WBC 4.7 10/16/2018 11:18 AM  
 ABS. NEUTROPHILS 2.7 10/16/2018 11:18 AM  
 HGB 9.2 (L) 10/16/2018 11:18 AM  
 HCT 28.6 (L) 10/16/2018 11:18 AM  
 .5 (H) 10/16/2018 11:18 AM  
 MCH 33.0 10/16/2018 11:18 AM  
 PLATELET 091 89/79/8777 11:18 AM  
 
Lab Results Component Value Date/Time  Sodium 141 10/18/2018 03:12 AM  
 Potassium 3.7 10/18/2018 03:12 AM  
 Chloride 109 (H) 10/18/2018 03:12 AM  
 CO2 23 10/18/2018 03:12 AM  
 Glucose 132 (H) 10/18/2018 03:12 AM  
 BUN 13 10/18/2018 03:12 AM  
 Creatinine 0.63 10/18/2018 03:12 AM  
 Calcium 8.7 10/18/2018 03:12 AM  
 Albumin 2.5 (L) 10/18/2018 03:12 AM  
 Bilirubin, total 0.2 10/18/2018 03:12 AM  
 AST (SGOT) 15 10/18/2018 03:12 AM  
 ALT (SGPT) 17 10/18/2018 03:12 AM  
 Alk. phosphatase 77 10/18/2018 03:12 AM  
 
CT Results (most recent): 
Results from Hospital Encounter encounter on 10/15/18 CT ABD PELV W CONT Narrative EXAM:  CT ABD PELV W CONT INDICATION: Ovarian cancer restaging COMPARISON: 7/9/2018 CONTRAST:  100 mL of Isovue-370. TECHNIQUE:  
Following the uneventful intravenous administration of contrast, thin axial 
images were obtained through the abdomen and pelvis. Coronal and sagittal 
reconstructions were generated. Oral contrast was not administered. CT dose 
reduction was achieved through use of a standardized protocol tailored for this 
examination and automatic exposure control for dose modulation. FINDINGS:  
LUNG BASES: There are trace bilateral pleural effusions with underlying 
atelectasis INCIDENTALLY IMAGED HEART AND MEDIASTINUM: Unremarkable. LIVER: No mass or biliary dilatation. GALLBLADDER: Unremarkable. SPLEEN: No mass. PANCREAS: No mass or ductal dilatation. ADRENALS: Unremarkable. KIDNEYS: No mass, calculus, or hydronephrosis. STOMACH: Unremarkable. SMALL BOWEL: Proximal small bowel distention is noted with decompressed loops 
distally. COLON: No dilatation or wall thickening. APPENDIX: Not visualized. PERITONEUM: Moderate free fluid is noted, increased compared to the prior exam. 
Gastrohepatic soft tissue abnormality appears to have improved compared to the 
prior exam. Previously described lesion measuring 3.0 cm now measures 1.2 cm. Soft tissue nodule in the left upper quadrant now measures 16 mm, previously 
measuring 3.3 cm. Soft tissue density in the mesenteric root has improved. RETROPERITONEUM: Left common iliac vein stent is noted. IVC filter projects 
within the infrarenal vena cava. REPRODUCTIVE ORGANS: The uterus and ovaries are surgically absent. URINARY BLADDER: No mass or calculus. BONES: No destructive bone lesion. ADDITIONAL COMMENTS: N/A Impression IMPRESSION: 
Proximal small bowel distention with decompressed loops distally is compatible 
with obstruction and likely related to peritoneal disease in the mid abdomen. Overall, peritoneal implants have improved in size. Free fluid has increased in 
the interval. Trace bilateral effusions with underlying atelectasis. Assessment/Plan:  
 
Admitted for Partial small bowel obstruction Ovarian cancer ([address] Malnutrition ([address] Dehydration SBO (small bowel obstruction) ([address] Active Hospital Problems Diagnosis Date Noted  Dehydration 10/16/2018  Malnutrition ([address] 10/16/2018  
 SBO (small bowel obstruction) ([address] 10/16/2018  Severe protein-calorie malnutrition ([address] 10/11/2018  Ovarian cancer ([address] 10/10/2018  Nausea 10/10/2018  Epigastric pain  Pain of metastatic malignancy 08/09/2018  Anemia due to chemotherapy 07/26/2018  Peritoneal carcinomatosis ([address] 06/01/2018 [id number] [facility] admitted with carcinomatosis, SBO, calorie malnutrition d/t chronic disease and obstruction. Onc: Undergoing Doxil/Avastin s/p 3 cycles last 10/4/18. Recent EOD CT with peritoneal disease response. Partial vs complete SBO Heme/CV: Hemodynamically stable, anemia d/t chemo/chronic disease FEN/GI: Partial SBO with delayed gastric emptying (by endoscopy last Friday) with malnutrition - continue TPN with Lipid TIW, NGT. Check KUB today. Resume Reglan per GI and continue PPI/H2 d/t recent EGD dx gastritis - path negative. Ascites: will obtain US to evaluate if paracentesis possible today. ID/Wound: afeb, abd c/w obstruction. Neuro:  pain/antidpression regimen per palliative care, following. PPX: lovenox hx of VTE, ppx dose. Holding xarelto Disposition: stable, holding course today. Discussed watchful waiting with .  
 
 
Ham Ho NP

## 2018-10-18 NOTE — PROGRESS NOTES
Dr. Sravan Young performed ultrasound-guided paracenetesis, draining 1000 ml olive-yellow ascites fluid.

## 2018-10-18 NOTE — PROGRESS NOTES
TRANSFER - OUT REPORT: 
 
Verbal report given to Gonzales Huang RN(name) on Corrina Morales  being transferred to [facility](unit) for routine progression of care Report consisted of patients Situation, Background, Assessment and  
Recommendations(SBAR). Information from the following report(s) SBAR, Procedure Summary and Intake/Output was reviewed with the receiving nurse. Lines:  
Venous Access Device Port (Active) Central Line Being Utilized Yes 10/18/2018  3:00 AM  
Criteria for Appropriate Use Total parenteral nutrition 10/18/2018  3:00 AM  
Site Assessment Clean, dry, & intact 10/18/2018  3:00 AM  
Date of Last Dressing Change 10/16/18 10/18/2018  3:00 AM  
Dressing Status Clean, dry, & intact 10/18/2018  3:00 AM  
Dressing Type Disk with Chlorhexadine gluconate (CHG); Transparent 10/18/2018  3:00 AM  
Action Taken Open ports on tubing capped 10/18/2018  3:00 AM  
Date Accessed (Medial Site) 10/16/18 10/17/2018  9:00 PM  
Access Time (Medial Site) 1100 10/16/2018 11:57 AM  
Access Needle Size (Site #1) 20 G 10/16/2018 11:57 AM  
Access Needle Length (Medial Site) 1.5 inches 10/16/2018 11:57 AM  
Positive Blood Return (Medial Site) Yes 10/17/2018  9:00 PM  
Action Taken (Medial Site) Infusing 10/18/2018  3:00 AM  
Alcohol Cap Used Yes 10/18/2018  3:00 AM  
  
 
Opportunity for questions and clarification was provided.

## 2018-10-18 NOTE — PROGRESS NOTES
Bedside and Verbal shift change report given to Alice JUAN (oncoming nurse) by Yahaira Moon (offgoing nurse). Report included the following information SBAR, Kardex, Intake/Output, MAR and Recent Results.

## 2018-10-18 NOTE — CONSULTS
Palliative Medicine Consult Ruperto: [phone] ([phone]) Patient Name: Debora Hoffman YOB: 1965 Date of Initial Consult: 10/16/18 Reason for Consult: Cancer related symptom management Requesting Provider: Dr. Rick Sierra Primary Care Physician: Patria Soares MD 
 
 SUMMARY:  
Debora Hoffman is a 48 y.o. with recurrent ovarian malignancy on Doxil/Avastin per Dr. Rick Sierra, known to outpatient palliative medicine as she follows closely with Palliative medicine outpatient clinic for pain management. She was admitted 10/10/2018 from home due to worsening nausea and vomiting followed by acute worsening of epigastric abdominal pain, EGD showed gastritis. Follow up CT shows proximal SBO leading to current admission for medical management and TPN for nutrition. Last chemotherapy date was 10/4. Current medical issues leading to Palliative Medicine involvement include: cancer related symptoms including pain, nausea, vomiting. PALLIATIVE DIAGNOSES:  
1. Epigastric abdominal pain 2. Chronic neoplasm related lower abdominal pain 3. Nausea with emesis 4. Xerostomia PLAN:  
1. SBO- on scopolamine patch, NGT to intermittent suction. 400cc in basin overnight, spent all day off suction however dt procedures/walking and has done well by 15:00. No BM since Mon evening but may have had flatulence. Appreciate bowel sounds at time of assessment (14:30). Consider removal of NG tube tonight or tomorrow. 2. Continue IV Dex 2 mg TID. 3. S/p paracentesis today; 1 L serous fluid drained. This was her 3rd (April, July, Oct). Talked about potential need for repeat drainage in future. Others have talked about Aspira (she mentions it - but is not yet ready for this step). 4. On TPN. [id number] feels this has been very helpful. 5. Abdominal pain- well controlled, including prior to para 
- cont off po opioids while on NG suction. - Continue morphine PCA w/o changes. 1 mg basal rate + 1 mg bolus q8min prn.   
 - Total use 15 mg IV / 12 hrs. (down from 20 mg previous day) - tolerating opioid w/o significant side effects 6. Cont lactulose for bowel regimen. 7. IV Reglan restarted by gyn onc team - agree. May need to continue again at home at higher dose than prior. 8. Continue Amitriptyline for neuropathy which has helped her very much. 9. Continue Carafate and IV protonix. 10. Communicated plan of care with: Palliative IDT, patient and , bedside RN and Bella Dominguez NP 
 
 
 GOALS OF CARE / TREATMENT PREFERENCES:  
 
GOALS OF CARE:   Full care escalation Patient/Health Care Proxy Stated Goals: Prolong life TREATMENT PREFERENCES:  
Code Status: Full Code Advance Care Planning: 
Advance Care Planning 10/17/2018 Patient's Healthcare Decision Maker is: Legal Next of Kin Primary Decision Maker Name -  
Primary Decision Maker Phone Number -  
Primary Decision Maker Relationship to Patient -  
Confirm Advance Directive None Medical Interventions: Full interventions Other: As far as possible, the palliative care team has discussed with patient / health care proxy about goals of care / treatment preferences for patient. HISTORY:  
 
History obtained from:  Pt, , EMR 
 
CHIEF COMPLAINT: upper abdominal pain HPI/SUBJECTIVE: The patient is:  
[x] Verbal and participatory [] Non-participatory due to:  
 
Steven Frye is doing well today. Evaluated post paracentesis. Felt better even prior to procedure, but does feel some relief from procedure as well. This was her 3rd para. She denies nausea, has had no emesis today off suction. Continues to ambulate in halls. Last BM 10/15, ? Flatulence this morning while walking. Clinical Pain Assessment (nonverbal scale for severity on nonverbal patients):  
Clinical Pain Assessment Severity: 3 Location: abd bl upper quads, LLQ 
 Character: crampy Duration: weeks Effect: functional 
Factors: none in particular Frequency: chronic Duration: for how long has pt been experiencing pain (e.g., 2 days, 1 month, years) Frequency: how often pain is an issue (e.g., several times per day, once every few days, constant) FUNCTIONAL ASSESSMENT:  
 
Palliative Performance Scale (PPS): PPS: 70 PSYCHOSOCIAL/SPIRITUAL SCREENING:  
 
Palliative IDT has assessed this patient for cultural preferences / practices and a referral made as appropriate to needs (Cultural Services, Patient Advocacy, Ethics, etc.) Advance Care Planning: 
Advance Care Planning 10/17/2018 Patient's Healthcare Decision Maker is: Legal Next of Kin Primary Decision Maker Name -  
Primary Decision Maker Phone Number -  
Primary Decision Maker Relationship to Patient -  
Confirm Advance Directive None Any spiritual / Zoroastrian concerns: 
[] Yes /  [x] No 
 
Caregiver Burnout: 
[] Yes /  [x] No /  [] No Caregiver Present Anticipatory grief assessment:  
[] Normal  / [] Maladaptive ESAS Anxiety: Anxiety: 3 ESAS Depression: Depression: 3 REVIEW OF SYSTEMS:  
 
Positive and pertinent negative findings in ROS are noted above in HPI. The following systems were [x] reviewed / [] unable to be reviewed as noted in HPI Other findings are noted below. Systems: constitutional, ears/nose/mouth/throat, respiratory, gastrointestinal, genitourinary, musculoskeletal, integumentary, neurologic, psychiatric, endocrine. Positive findings noted below. Modified ESAS Completed by: provider Fatigue: 5 Drowsiness: 3 Depression: 3 Pain: 3 Anxiety: 3 Nausea: 0 Anorexia: 7 Dyspnea: 0 Constipation: No  
     
 
 
 PHYSICAL EXAM:  
 
From RN flowsheet: 
Wt Readings from Last 3 Encounters:  
10/18/18 59 kg (130 lb 1.1 oz) 10/16/18 58 kg (127 lb 12.8 oz) 10/04/18 58.2 kg (128 lb 6.4 oz) Blood pressure 131/77, pulse 85, temperature 97.4 °F (36.3 °C), resp. rate 16, weight 59 kg (130 lb 1.1 oz), last menstrual period 02/02/2015, SpO2 99 %, unknown if currently breastfeeding. Pain Scale 1: Numeric (0 - 10) Pain Intensity 1: 0 Pain Onset 1: poa Pain Location 1: Abdomen Pain Orientation 1: Upper Pain Description 1: Constant Pain Intervention(s) 1: Encouraged PCA Last bowel movement, if known:  
 
Constitutional: cachectic, chronically ill-appearing, no distress Eyes: pupils equal, anicteric ENMT: NGT in place, off suction Cardiovascular: regular rhythm, distal pulses intact, no edema Respiratory: breathing not labored, symmetric Gastrointestinal: abdomen is flat, bs active, mild TTP in epigastrium w/o rebound or guarding Musculoskeletal: no deformity, no tenderness to palpation Skin: warm, dry Neurologic: following commands, moving all extremities. Drowsy during interview. Psychiatric: full affect, no hallucinations or agitation. HISTORY:  
 
Active Problems: 
  Peritoneal carcinomatosis ([address] (6/1/2018) Anemia due to chemotherapy (7/26/2018) Pain of metastatic malignancy (8/9/2018) Ovarian cancer ([address] (10/10/2018) Nausea (10/10/2018) Epigastric pain () Severe protein-calorie malnutrition ([address] (10/11/2018) Dehydration (10/16/2018) Malnutrition ([address] (10/16/2018) SBO (small bowel obstruction) ([address] (10/16/2018) Past Medical History:  
Diagnosis Date  Anxiety  BRCA1 positive  Calculus of kidney 1/13  
 right  Hernia, inguinal, left 2012  MS (multiple sclerosis) ([address] 1996  Nausea & vomiting  Ovarian cancer ([address] 3/2015  
 high grade, stage 3C papillary serous  Thromboembolus ([address] 8/2007  
 lower abdomen post fall Past Surgical History:  
Procedure Laterality Date  HX GYN  2009  
 endometrial ablation with punctured uterus  HX OTHER SURGICAL  8/2007  
 green filter  HX MARIEL AND BSO  3/2014  
 ovarian cancer Family History Problem Relation Age of Onset  Cancer Paternal Grandmother   
     breast  
 Breast Cancer Paternal Grandmother  Coronary Artery Disease Mother 48 CABG and PCI  Heart Surgery Father   
     valve replacement  No Known Problems Brother  No Known Problems Brother  No Known Problems Brother  No Known Problems Daughter  No Known Problems Son  No Known Problems Son  No Known Problems Son History reviewed, no pertinent family history. Social History Tobacco Use  Smoking status: Never Smoker  Smokeless tobacco: Never Used Substance Use Topics  Alcohol use: No  
 
Allergies Allergen Reactions  Pcn [Penicillins] Rash Current Facility-Administered Medications Medication Dose Route Frequency  metoclopramide HCl (REGLAN) injection 5 mg  5 mg IntraVENous Q6H  
 TPN ADULT - CENTRAL   IntraVENous CONTINUOUS  
 TPN ADULT - CENTRAL   IntraVENous CONTINUOUS  
 0.9% sodium chloride infusion  40 mL/hr IntraVENous CONTINUOUS  
 dexamethasone (DECADRON) 4 mg/mL injection 2 mg  2 mg IntraVENous TID  amitriptyline (ELAVIL) tablet 10 mg  10 mg Oral QHS  scopolamine (TRANSDERM-SCOP) 1 mg over 3 days 1 Patch  1 Patch TransDERmal Q72H  sucralfate (CARAFATE) 100 mg/mL oral suspension 1 g  1 g Per NG tube QID  enoxaparin (LOVENOX) injection 40 mg  40 mg SubCUTAneous Q24H  
 LORazepam (ATIVAN) injection 1 mg  1 mg IntraVENous Q4H PRN  prochlorperazine (COMPAZINE) with saline injection 5 mg  5 mg IntraVENous Q4H PRN  
 sodium chloride (NS) flush 5-10 mL  5-10 mL IntraVENous Q8H  
 sodium chloride (NS) flush 5-10 mL  5-10 mL IntraVENous PRN  
 diphenhydrAMINE (BENADRYL) injection 12.5 mg  12.5 mg IntraVENous Q4H PRN  
 insulin lispro (HUMALOG) injection   SubCUTAneous Q6H  
 glucose chewable tablet 16 g  4 Tab Oral PRN  
  dextrose (D50W) injection syrg 12.5-25 g  12.5-25 g IntraVENous PRN  
 glucagon (GLUCAGEN) injection 1 mg  1 mg IntraMUSCular PRN  
 fat emulsion 20% (LIPOSYN, INTRAlipid) infusion 500 mL  500 mL IntraVENous Q TUE, THU & SAT  lactulose (CHRONULAC) solution 20 g  20 g Per NG tube BID  morphine  mg / 30 mL   IntraVENous CONTINUOUS  
 
 
 
 LAB AND IMAGING FINDINGS:  
 
Lab Results Component Value Date/Time WBC 4.7 10/16/2018 11:18 AM  
 HGB 9.2 (L) 10/16/2018 11:18 AM  
 PLATELET 464 74/42/4060 11:18 AM  
 
Lab Results Component Value Date/Time Sodium 141 10/18/2018 03:12 AM  
 Potassium 3.7 10/18/2018 03:12 AM  
 Chloride 109 (H) 10/18/2018 03:12 AM  
 CO2 23 10/18/2018 03:12 AM  
 BUN 13 10/18/2018 03:12 AM  
 Creatinine 0.63 10/18/2018 03:12 AM  
 Calcium 8.7 10/18/2018 03:12 AM  
 Magnesium 2.1 10/18/2018 03:12 AM  
 Phosphorus 3.1 10/18/2018 03:12 AM  
  
Lab Results Component Value Date/Time AST (SGOT) 15 10/18/2018 03:12 AM  
 Alk. phosphatase 77 10/18/2018 03:12 AM  
 Protein, total 6.8 10/18/2018 03:12 AM  
 Albumin 2.5 (L) 10/18/2018 03:12 AM  
 Globulin 4.3 (H) 10/18/2018 03:12 AM  
 
Lab Results Component Value Date/Time INR 1.0 07/19/2018 11:54 AM  
 Prothrombin time 10.7 07/19/2018 11:54 AM  
  
No results found for: IRON, FE, TIBC, IBCT, PSAT, FERR No results found for: PH, PCO2, PO2 No components found for: [place] Lab Results Component Value Date/Time CK 72 01/09/2018 11:09 AM  
 CK - MB <1.0 01/09/2018 11:09 AM  
  
 
 
   
 
 
 
Prolonged service was provided for  []30 min   []75 min in face to face time in the presence of the patient, spent as noted above. Time Start:  
Time End:  
Note: this can only be billed with 05645 (initial) or 28702 (follow up). If multiple start / stop times, list each separately.

## 2018-10-19 NOTE — PROGRESS NOTES
Physical Therapy: 
 
Patient received sitting up in chair with family, notes she just returned to the room from walking around unit and in main hallway area for 30 minutes duration (sitting and resting and enjoying sun in the waiting areas intermittently). Reporting daily walks with family and improved endurance, activity tolerance. Declining further gait or therex at this time due to fatigue with PT in agreement to rest at this time. Will try to catch in am prior to walking with family and reassess, likely has met goals and is ready for d/c to supervision level with family assistance.  
 
Kiana Cazares, MS, PT

## 2018-10-19 NOTE — CONSULTS
Palliative Medicine Consult Ruperto: [phone]) Patient Name: Tiara Saravia YOB: 1965 Date of Initial Consult: 10/16/18 Reason for Consult: Cancer related symptom management Requesting Provider: Dr. Sharmila Leigh Primary Care Physician: Christopher Guerin MD 
 
 SUMMARY:  
Tiara Saravia is a 48 y.o. with recurrent ovarian malignancy on Doxil/Avastin per Dr. Sharmila Leigh, known to outpatient palliative medicine as she follows closely with Palliative medicine outpatient clinic for pain management. She was admitted 10/10/2018 from home due to worsening nausea and vomiting followed by acute worsening of epigastric abdominal pain, EGD showed gastritis. Follow up CT shows proximal SBO leading to current admission for medical management and TPN for nutrition. Last chemotherapy date was 10/4. Current medical issues leading to Palliative Medicine involvement include: cancer related symptoms including pain, nausea, vomiting. PALLIATIVE DIAGNOSES:  
1. Epigastric abdominal pain 2. Chronic neoplasm related lower abdominal pain 3. Nausea with emesis 4. Xerostomia PLAN:  
1. SBO- on scopolamine patch, NGT to intermittent suction. She is able to tolerate longer periods without suctioning. Feels a lot better since paracentesis and able to pass flatus freely. Still no solid or loose stools but she feels empty and able to eat jello and broth. 2. Malignant Ascites- this is her 3rd paracentesis in 6 months. She is open to [address] placement in anticipation of her son's wedding on Nov 10th in [place]. She plans to travel Nov 6th. She is willing to have anything done to be able to have a symptom free time at her son's wedding. 
 - Will d/w primary team and make sure she gets an abdominal usg prior to her travels and place an aspira if needed. 3. On TPN. Dean Burgess feels this has been very helpful. 4. Abdominal pain- well controlled, including prior to para - cont off po opioids while on NG suction.  
 - Continue morphine PCA w/o changes. 1 mg basal rate + 1 mg bolus q8min prn.   
 - Has not needed any boluses. - tolerating opioid w/o significant side effects 5. Cont lactulose for bowel regimen. 6. IV Reglan restarted by gyn onc team - agree. May need to continue again at home at higher dose than prior. 7. Continue Amitriptyline for neuropathy which has helped her very much. Ok to restart Lyrica via NGT. 8. Continue Carafate and IV protonix. 9. Communicated plan of care with: Palliative IDT, patient and , bedside RN and Bella Dominguez NP 
 
 
 GOALS OF CARE / TREATMENT PREFERENCES:  
 
GOALS OF CARE:   Full care escalation Patient/Health Care Proxy Stated Goals: Prolong life TREATMENT PREFERENCES:  
Code Status: Full Code Advance Care Planning: 
Advance Care Planning 10/17/2018 Patient's Healthcare Decision Maker is: Legal Next of Kin Primary Decision Maker Name -  
Primary Decision Maker Phone Number -  
Primary Decision Maker Relationship to Patient -  
Confirm Advance Directive None Medical Interventions: Full interventions Other: As far as possible, the palliative care team has discussed with patient / health care proxy about goals of care / treatment preferences for patient. HISTORY:  
 
History obtained from:  Pt, , EMR 
 
CHIEF COMPLAINT: upper abdominal pain HPI/SUBJECTIVE: The patient is:  
[x] Verbal and participatory [] Non-participatory due to:  
 
[address] is doing well today. Feels happy that her symptoms have improved. Reflected on how bad the last few weeks have been. Feels good she had us to advocate for her. She wants to make sure she is feeling well enough for her son's wedding. Clinical Pain Assessment (nonverbal scale for severity on nonverbal patients):  
Clinical Pain Assessment Severity: 3 Location: abd bl upper quads, LLQ Character: crampy Duration: weeks Effect: functional 
Factors: none in particular Frequency: chronic Duration: for how long has pt been experiencing pain (e.g., 2 days, 1 month, years) Frequency: how often pain is an issue (e.g., several times per day, once every few days, constant) FUNCTIONAL ASSESSMENT:  
 
Palliative Performance Scale (PPS): PPS: 70 PSYCHOSOCIAL/SPIRITUAL SCREENING:  
 
Palliative IDT has assessed this patient for cultural preferences / practices and a referral made as appropriate to needs (Cultural Services, Patient Advocacy, Ethics, etc.) Advance Care Planning: 
Advance Care Planning 10/17/2018 Patient's Healthcare Decision Maker is: Legal Next of Kin Primary Decision Maker Name -  
Primary Decision Maker Phone Number -  
Primary Decision Maker Relationship to Patient -  
Confirm Advance Directive None Any spiritual / Mandaeism concerns: 
[] Yes /  [x] No 
 
Caregiver Burnout: 
[] Yes /  [x] No /  [] No Caregiver Present Anticipatory grief assessment:  
[] Normal  / [] Maladaptive ESAS Anxiety: Anxiety: 3 ESAS Depression: Depression: 3 REVIEW OF SYSTEMS:  
 
Positive and pertinent negative findings in ROS are noted above in HPI. The following systems were [x] reviewed / [] unable to be reviewed as noted in HPI Other findings are noted below. Systems: constitutional, ears/nose/mouth/throat, respiratory, gastrointestinal, genitourinary, musculoskeletal, integumentary, neurologic, psychiatric, endocrine. Positive findings noted below. Modified ESAS Completed by: provider Fatigue: 5 Drowsiness: 3 Depression: 3 Pain: 3 Anxiety: 3 Nausea: 0 Anorexia: 7 Dyspnea: 0 Constipation: No  
     
 
 
 PHYSICAL EXAM:  
 
From RN flowsheet: 
Wt Readings from Last 3 Encounters:  
10/19/18 125 lb (56.7 kg) 10/16/18 127 lb 12.8 oz (58 kg) 10/04/18 128 lb 6.4 oz (58.2 kg) Blood pressure 115/70, pulse 61, temperature 98 °F (36.7 °C), resp.  rate 16, weight 125 lb (56.7 kg), last menstrual period 02/02/2015, SpO2 97 %, unknown if currently breastfeeding. Pain Scale 1: Numeric (0 - 10) Pain Intensity 1: 0 Pain Onset 1: poa Pain Location 1: Abdomen Pain Orientation 1: Upper Pain Description 1: Constant Pain Intervention(s) 1: Encouraged PCA Last bowel movement, if known:  
 
Constitutional: cachectic, chronically ill-appearing, no distress Eyes: pupils equal, anicteric ENMT: NGT in place, off suction Cardiovascular: regular rhythm, distal pulses intact, no edema Respiratory: breathing not labored, symmetric Gastrointestinal: abdomen is flat, bs active, mild TTP in epigastrium w/o rebound or guarding Musculoskeletal: no deformity, no tenderness to palpation Skin: warm, dry Neurologic: following commands, moving all extremities. Drowsy during interview. Psychiatric: full affect, no hallucinations or agitation. HISTORY:  
 
Active Problems: 
  Peritoneal carcinomatosis ([address] (6/1/2018) Anemia due to chemotherapy (7/26/2018) Pain of metastatic malignancy (8/9/2018) Ovarian cancer ([address] (10/10/2018) Nausea (10/10/2018) Epigastric pain () Severe protein-calorie malnutrition ([address] (10/11/2018) Dehydration (10/16/2018) Malnutrition ([address] (10/16/2018) SBO (small bowel obstruction) ([address] (10/16/2018) Past Medical History:  
Diagnosis Date  Anxiety  BRCA1 positive  Calculus of kidney 1/13  
 right  Hernia, inguinal, left 2012  MS (multiple sclerosis) ([address] 1996  Nausea & vomiting  Ovarian cancer ([address] 3/2015  
 high grade, stage 3C papillary serous  Thromboembolus ([address] 8/2007  
 lower abdomen post fall Past Surgical History:  
Procedure Laterality Date  HX GYN  2009  
 endometrial ablation with punctured uterus  HX OTHER SURGICAL  8/2007  
 green filter  HX MARIEL AND BSO  3/2014  
 ovarian cancer Family History Problem Relation Age of Onset  Cancer Paternal Grandmother   
     breast  
 Breast Cancer Paternal Grandmother  Coronary Artery Disease Mother 48 CABG and PCI  Heart Surgery Father   
     valve replacement  No Known Problems Brother  No Known Problems Brother  No Known Problems Brother  No Known Problems Daughter  No Known Problems Son  No Known Problems Son  No Known Problems Son History reviewed, no pertinent family history. Social History Tobacco Use  Smoking status: Never Smoker  Smokeless tobacco: Never Used Substance Use Topics  Alcohol use: No  
 
Allergies Allergen Reactions  Pcn [Penicillins] Rash Current Facility-Administered Medications Medication Dose Route Frequency  TPN ADULT - CENTRAL   IntraVENous CONTINUOUS  
 metoclopramide HCl (REGLAN) injection 5 mg  5 mg IntraVENous Q6H  
 TPN ADULT - CENTRAL   IntraVENous CONTINUOUS  
 0.9% sodium chloride infusion  40 mL/hr IntraVENous CONTINUOUS  
 dexamethasone (DECADRON) 4 mg/mL injection 2 mg  2 mg IntraVENous TID  amitriptyline (ELAVIL) tablet 10 mg  10 mg Oral QHS  scopolamine (TRANSDERM-SCOP) 1 mg over 3 days 1 Patch  1 Patch TransDERmal Q72H  sucralfate (CARAFATE) 100 mg/mL oral suspension 1 g  1 g Per NG tube QID  enoxaparin (LOVENOX) injection 40 mg  40 mg SubCUTAneous Q24H  
 LORazepam (ATIVAN) injection 1 mg  1 mg IntraVENous Q4H PRN  prochlorperazine (COMPAZINE) with saline injection 5 mg  5 mg IntraVENous Q4H PRN  
 sodium chloride (NS) flush 5-10 mL  5-10 mL IntraVENous Q8H  
 sodium chloride (NS) flush 5-10 mL  5-10 mL IntraVENous PRN  
 diphenhydrAMINE (BENADRYL) injection 12.5 mg  12.5 mg IntraVENous Q4H PRN  
 insulin lispro (HUMALOG) injection   SubCUTAneous Q6H  
 glucose chewable tablet 16 g  4 Tab Oral PRN  
 dextrose (D50W) injection syrg 12.5-25 g  12.5-25 g IntraVENous PRN  
 glucagon (GLUCAGEN) injection 1 mg  1 mg IntraMUSCular PRN  
  fat emulsion 20% (LIPOSYN, INTRAlipid) infusion 500 mL  500 mL IntraVENous Q TUE, THU & SAT  lactulose (CHRONULAC) solution 20 g  20 g Per NG tube BID  morphine  mg / 30 mL   IntraVENous CONTINUOUS  
 
 
 
 LAB AND IMAGING FINDINGS:  
 
Lab Results Component Value Date/Time WBC 4.7 10/16/2018 11:18 AM  
 HGB 9.2 (L) 10/16/2018 11:18 AM  
 PLATELET 408 97/97/6635 11:18 AM  
 
Lab Results Component Value Date/Time Sodium 140 10/19/2018 03:37 AM  
 Potassium 4.0 10/19/2018 03:37 AM  
 Chloride 109 (H) 10/19/2018 03:37 AM  
 CO2 22 10/19/2018 03:37 AM  
 BUN 14 10/19/2018 03:37 AM  
 Creatinine 0.56 10/19/2018 03:37 AM  
 Calcium 8.5 10/19/2018 03:37 AM  
 Magnesium 2.1 10/19/2018 03:37 AM  
 Phosphorus 3.3 10/19/2018 03:37 AM  
  
Lab Results Component Value Date/Time AST (SGOT) 16 10/19/2018 03:37 AM  
 Alk. phosphatase 76 10/19/2018 03:37 AM  
 Protein, total 6.7 10/19/2018 03:37 AM  
 Albumin 2.6 (L) 10/19/2018 03:37 AM  
 Globulin 4.1 (H) 10/19/2018 03:37 AM  
 
Lab Results Component Value Date/Time INR 1.0 07/19/2018 11:54 AM  
 Prothrombin time 10.7 07/19/2018 11:54 AM  
  
No results found for: IRON, FE, TIBC, IBCT, PSAT, FERR No results found for: PH, PCO2, PO2 No components found for: [place] Lab Results Component Value Date/Time CK 72 01/09/2018 11:09 AM  
 CK - MB <1.0 01/09/2018 11:09 AM  
  
 
 
   
 
 
 
Prolonged service was provided for  []30 min   []75 min in face to face time in the presence of the patient, spent as noted above. Time Start:  
Time End:  
Note: this can only be billed with 08743 (initial) or 80939 (follow up). If multiple start / stop times, list each separately.

## 2018-10-19 NOTE — PROGRESS NOTES
Bedside and Verbal shift change report given to Fernando Meléndez RN (oncoming nurse) by Ashley Bosch (offgoing nurse). Report included the following information SBAR and Kardex.

## 2018-10-19 NOTE — PROGRESS NOTES
Bedside and Verbal shift change report given to REBECCA Lundberg RN (oncoming nurse) by JULIO Snider RN (offgoing nurse). Report included the following information SBAR, Intake/Output, MAR and Recent Results. 1955  Patient transferred to [facility], room 343.

## 2018-10-19 NOTE — PROGRESS NOTES
[address], [address] [place], [facility] Martha NGUYEN [phone]  F [phone] Patient: Edwardo Rao Admit Date: 10/16/2018 Hospital Day:4 Admit Dx: Partial small bowel obstruction Ovarian cancer ([address] Malnutrition ([address] Dehydration SBO (small bowel obstruction) ([address] Subjective:  
Pt continues to feel better and \"stronger\". Has begun passing flatus. Did have one episode of nausea yesterday, but no other's. NGT output last 24 hours 420, but remains clear and not bilious as was in the beginning. Ambulating multiple times in the hallway without difficulty or complaint Is happy they were able to drain off 1 liter from her paracentesis yesterday Continues to feels hydration and TPN have helped her feel stronger. Objective: Intake/Output Summary (Last 24 hours) at 10/19/2018 0700 Last data filed at 10/19/2018 7738 Gross per 24 hour Intake 2135.66 ml Output 3020 ml Net -884.34 ml Physical Exam 
/68 (BP 1 Location: Left arm, BP Patient Position: At rest)   Pulse 66   Temp 97.5 °F (36.4 °C)   Resp 16   Wt 125 lb (56.7 kg)   LMP 02/02/2015 (Approximate)   SpO2 98%   BMI 19.29 kg/m² General: Alert and remains in good spirits without distress,  present Cardiac:  Regular without M/R/G Lungs:  CTA Bilat without wheezing or rales. IS performed well Abdomen:  soft, slightly protuberant, nondistended, hypoactive bowel sounds persist -                        NGT remains non-bilious. Unchanged amount last 24 hours Extremity: no edema, redness or tenderness in the calves or thighs Data Review Lab Results Component Value Date/Time WBC 4.7 10/16/2018 11:18 AM  
 ABS.  NEUTROPHILS 2.7 10/16/2018 11:18 AM  
 HGB 9.2 (L) 10/16/2018 11:18 AM  
 HCT 28.6 (L) 10/16/2018 11:18 AM  
 .5 (H) 10/16/2018 11:18 AM  
 MCH 33.0 10/16/2018 11:18 AM  
 PLATELET 805 22/16/5915 11:18 AM  
 
 Lab Results Component Value Date/Time Sodium 140 10/19/2018 03:37 AM  
 Potassium 4.0 10/19/2018 03:37 AM  
 Chloride 109 (H) 10/19/2018 03:37 AM  
 CO2 22 10/19/2018 03:37 AM  
 Glucose 157 (H) 10/19/2018 03:37 AM  
 BUN 14 10/19/2018 03:37 AM  
 Creatinine 0.56 10/19/2018 03:37 AM  
 Calcium 8.5 10/19/2018 03:37 AM  
 Albumin 2.6 (L) 10/19/2018 03:37 AM  
 Bilirubin, total 0.1 (L) 10/19/2018 03:37 AM  
 AST (SGOT) 16 10/19/2018 03:37 AM  
 ALT (SGPT) 18 10/19/2018 03:37 AM  
 Alk. phosphatase 76 10/19/2018 03:37 AM  
 
 
IMAGING: 
KUB 10/18/2018 FINDINGS: There are multiple dilated loops of small bowel throughout the abdomen  
and pelvis is seen on prior CT dated October 15, 2018. NG tube tip and side port  
overlie the stomach. There is no free intraperitoneal gas. Filter overlies the  
expected location of the IVC at the L2 level. There is a vascular stent  
overlying SPECT dislocation of the left common iliac vein. IMPRESSION: Multiple, persistent dilated loops of small bowel throughout the  
abdomen and pelvis. No evidence of perforation. NG tube in appropriate position CT Results (most recent): 
Results from Hospital Encounter encounter on 10/15/18 CT ABD PELV W CONT Narrative EXAM:  CT ABD PELV W CONT INDICATION: Ovarian cancer restaging COMPARISON: 7/9/2018 CONTRAST:  100 mL of Isovue-370. TECHNIQUE:  
Following the uneventful intravenous administration of contrast, thin axial 
images were obtained through the abdomen and pelvis. Coronal and sagittal 
reconstructions were generated. Oral contrast was not administered. CT dose 
reduction was achieved through use of a standardized protocol tailored for this 
examination and automatic exposure control for dose modulation. FINDINGS:  
LUNG BASES: There are trace bilateral pleural effusions with underlying 
atelectasis INCIDENTALLY IMAGED HEART AND MEDIASTINUM: Unremarkable. LIVER: No mass or biliary dilatation. GALLBLADDER: Unremarkable. SPLEEN: No mass. PANCREAS: No mass or ductal dilatation. ADRENALS: Unremarkable. KIDNEYS: No mass, calculus, or hydronephrosis. STOMACH: Unremarkable. SMALL BOWEL: Proximal small bowel distention is noted with decompressed loops 
distally. COLON: No dilatation or wall thickening. APPENDIX: Not visualized. PERITONEUM: Moderate free fluid is noted, increased compared to the prior exam. 
Gastrohepatic soft tissue abnormality appears to have improved compared to the 
prior exam. Previously described lesion measuring 3.0 cm now measures 1.2 cm. Soft tissue nodule in the left upper quadrant now measures 16 mm, previously 
measuring 3.3 cm. Soft tissue density in the mesenteric root has improved. RETROPERITONEUM: Left common iliac vein stent is noted. IVC filter projects 
within the infrarenal vena cava. REPRODUCTIVE ORGANS: The uterus and ovaries are surgically absent. URINARY BLADDER: No mass or calculus. BONES: No destructive bone lesion. ADDITIONAL COMMENTS: N/A Impression IMPRESSION: 
Proximal small bowel distention with decompressed loops distally is compatible 
with obstruction and likely related to peritoneal disease in the mid abdomen. Overall, peritoneal implants have improved in size. Free fluid has increased in 
the interval. Trace bilateral effusions with underlying atelectasis. Assessment/Plan:  
 
Admitted for Partial small bowel obstruction Ovarian cancer ([address] Malnutrition ([address] Dehydration SBO (small bowel obstruction) ([address] Active Hospital Problems Diagnosis Date Noted  Dehydration 10/16/2018  Malnutrition ([address] 10/16/2018  
 SBO (small bowel obstruction) ([address] 10/16/2018  Severe protein-calorie malnutrition ([address] 10/11/2018  Ovarian cancer ([address] 10/10/2018  Nausea 10/10/2018  Epigastric pain  Pain of metastatic malignancy 08/09/2018  Anemia due to chemotherapy 07/26/2018  Peritoneal carcinomatosis [facility] 06/01/2018 [address] admitted with carcinomatosis, SBO, calorie malnutrition d/t chronic disease and obstruction. Onc: Undergoing Doxil/Avastin s/p 3 cycles last 10/4/18. Recent EOD CT with peritoneal disease response. Partial vs complete SBO Heme/CV: Hemodynamically stable, anemia d/t chemo/chronic disease, but improving even with hydration FEN/GI: Partial SBO with delayed gastric emptying (by endoscopy last Friday) with malnutrition - continue TPN with Lipid TIW. Pt now passing flatus and says she now feels hungry. Will begin clamping NGT today for 4-6 hour periods since nausea has decreased and drainage is clear. Will begin clear liquid diet and discussed with pt to begin slowly. Will unclamp for any nausea. Continue Reglan per GI and continue PPI/H2 d/t recent EGD dx gastritis - path negative. Ascites:Improved after drainage 10/18. Neuro:  pain/antidpression regimen per palliative care, following. PPX: lovenox hx of VTE, ppx dose. Holding xarelto Disposition: Pt improving as of this morning. Will cautiously begin advancing treatment as above. Discussed with pt and .  
 
 
Jagjit Stephens NP

## 2018-10-19 NOTE — PROGRESS NOTES
Music Therapy Assessment Nathaniel Marroquin [id number]  [id number]   
1965  48 y.o.  female Patient Telephone Number: [phone] (home) Jewish Affiliation: Zoroastrianism Language: [place] Extended Emergency Contact Information Primary Emergency Contact: Jose Payan Address: [address], [address] Home Phone: [phone] Mobile Phone: [phone] Relation: Spouse Patient Active Problem List  
 Diagnosis Date Noted  Dehydration 10/16/2018  Malnutrition ([address] 10/16/2018  
 SBO (small bowel obstruction) ([address] 10/16/2018  Gastroparesis  Severe protein-calorie malnutrition ([address] 10/11/2018  Ovarian cancer ([address] 10/10/2018  Abdominal pain 10/10/2018  Nausea 10/10/2018  Epigastric pain  Xerostomia  Pain of metastatic malignancy 08/09/2018  Other proteinuria 07/26/2018  Anemia due to chemotherapy 07/26/2018  On anticoagulant therapy 07/19/2018  Peritoneal carcinomatosis ([address] 06/01/2018  Malignant ascites 06/01/2018  Thrombocytopenia ([address] 03/17/2017  
 CINV (chemotherapy-induced nausea and vomiting) 03/17/2017  Malignant neoplasm of both ovaries ([address] 04/09/2015 Date: 10/19/2018 Mental Status:   [x] Alert [  ] Diamond Sanjana [  ]  Confused  [  ] Minimally responsive Communication Status: [  ] Impaired Speech [  ] Nonverbal -N/A Physical Status:   [  ] Oxygen in use  [  ] Hard of Hearing [  ] Vision Impaired [  ] Ambulatory  [  ] Ambulatory with assistance [  ] Non-ambulatory -N/A Music Preferences, Background: Traditional Hymns, Contemporary [place] Earnest, songs from [facility]. Clinical Problem addressed: Positive social interaction, spiritual support. Goal(s) met in session: 
Physical/Pain management (Scale of 1-10): Pre-session rating: Pt denied pain. Post-session rating: Pt denied pain. [  ] Increased relaxation   [  ] Regulated breathing patterns [  ] Decreased muscle tension   [  ] Minimized physical distress Emotional/Psychological: 
[  ] Increased self-expression   [  ] Decreased aggressive behavior [  ] Decreased sadness   [  ] Discussed healthy coping skills [  ] Improved mood    [  ] Decreased withdrawn behavior Social: 
[  ] Decreased feelings of isolation/loneliness [x] Positive social interaction  
[x] Provided support and/or comfort for family/friends Spiritual: 
[x] Spiritual support    [  ] Expressed peace [  ] Expressed pavel    [  ] Discussed beliefs Techniques Utilized (Check all that apply):  
[  ] Procedural support MT [  ] Music for relaxation [x] Patient preferred music 
[  ] Jocelyn analysis  [x] Song choice  [  ] Music for validation [  ] Entrainment  [  ] Progressive muscle relax. [  ] Guided visualization [  ] Emmie Carrel  [  ] Patient instrument playing [  ] Candice Barthel writing [  ] Judyann Kidney along   [  ] Delchastity Juan Miguel  [  ] Sensory stimulation 
[x] Active Listening  [x] Music for spiritual support [  ] Making of CDs as gifts Session Observations:  Referral from Madi Irwin, Patient Advocate at the [facility]. Patient (pt) is known to this music therapist (MT) from a recent previous hospitalization. Pt was alert lying comfortably in bed. Pt's spouse Saad Avalos, her mother, and her daughter were at bedside. Pt's spouse exited the room before MT sang the first song. Pt requested to hear a spiritual song and something that was \"not too melancholy. \" MT sang and played the [employer] [facility]) with gusadiar. Pt's affect brightened in response to the music as evidenced by (AEB) smiling with brightened eyes and expressing enjoyment in the music. Pt requested an up-tempo Motown song next and MT sang and played the [employer] song Sugar Pie, Honey Gerry. Pt's spouse entered during this song.  Pt engaged in positive social interaction with him, smiling and saying she chose this song just for him. Pt's spouse and other family members smiled during this song. Pt asked her spouse to choose the next song and he chose the [employer]. MT sang and played this with [employer] Pt and her family expressed gratitude for the session. Will follow as able. Chuy Hightower MT-BC (Music Therapist-Board Certified) Spiritual Care Department Referral-based service

## 2018-10-19 NOTE — PROGRESS NOTES
Problem: Pressure Injury - Risk of 
Goal: *Prevention of pressure injury Document Joni Scale and appropriate interventions in the flowsheet. Outcome: Progressing Towards Goal 
Pressure Injury Interventions: Activity Interventions: Increase time out of bed Mobility Interventions: Pressure redistribution bed/mattress (bed type) Nutrition Interventions: Document food/fluid/supplement intake

## 2018-10-20 NOTE — PROGRESS NOTES
Bedside and Verbal shift change report given to [employer] (oncoming nurse) by Chintan Pettit RN (offgoing nurse). Report included the following information SBAR, Kardex, Intake/Output, MAR, Accordion and Recent Results.

## 2018-10-20 NOTE — PROGRESS NOTES
[address], [address] [place], [facility] 
P [phone]  F [phone] Patient: Kadeem Mora Admit Date: 10/16/2018 Hospital Day:4 Admit Dx: Partial small bowel obstruction Ovarian cancer ([address] Malnutrition ([address] Dehydration SBO (small bowel obstruction) ([address] Subjective:  
Pt continues to feel better and \"stronger\", though she doesn't feel as good today as she did yesterday. Has begun passing flatus, but no BM. NGT has been clamped since yesterday, except once when it was hooked back up to check. Ambulating multiple times in the hallway without difficulty or complaint Continues to feels hydration and TPN have helped her feel stronger. Objective: Intake/Output Summary (Last 24 hours) at 10/20/2018 1110 Last data filed at 10/20/2018 3676 Gross per 24 hour Intake 1879.67 ml Output 1750 ml Net 129.67 ml Physical Exam 
/72 (BP 1 Location: Right arm, BP Patient Position: At rest)   Pulse 80   Temp 96.8 °F (36 °C)   Resp 16   Wt 125 lb (56.7 kg)   LMP 02/02/2015 (Approximate)   SpO2 98%   BMI 19.29 kg/m² General: Alert and remains in good spirits without distress,  present Cardiac:  Regular without M/R/G Lungs:  CTA Bilat without wheezing or rales. IS performed well Abdomen:  soft, slightly protuberant, nondistended, hypoactive bowel sounds persist -                        NGT remains non-bilious. Unchanged amount last 24 hours Extremity: no edema, redness or tenderness in the calves or thighs Data Review Lab Results Component Value Date/Time WBC 4.7 10/16/2018 11:18 AM  
 ABS. NEUTROPHILS 2.7 10/16/2018 11:18 AM  
 HGB 9.2 (L) 10/16/2018 11:18 AM  
 HCT 28.6 (L) 10/16/2018 11:18 AM  
 .5 (H) 10/16/2018 11:18 AM  
 MCH 33.0 10/16/2018 11:18 AM  
 PLATELET 322 17/97/6861 11:18 AM  
 
Lab Results Component Value Date/Time  Sodium 139 10/20/2018 05:51 AM  
 Potassium 4.5 10/20/2018 05:51 AM  
 Chloride 108 10/20/2018 05:51 AM  
 CO2 22 10/20/2018 05:51 AM  
 Glucose 121 (H) 10/20/2018 05:51 AM  
 BUN 18 10/20/2018 05:51 AM  
 Creatinine 0.52 (L) 10/20/2018 05:51 AM  
 Calcium 8.9 10/20/2018 05:51 AM  
 Albumin 2.6 (L) 10/20/2018 05:51 AM  
 Bilirubin, total 0.3 10/20/2018 05:51 AM  
 AST (SGOT) 105 (H) 10/20/2018 05:51 AM  
 ALT (SGPT) 103 (H) 10/20/2018 05:51 AM  
 Alk. phosphatase 92 10/20/2018 05:51 AM  
 
 
IMAGING: 
KUB 10/18/2018 FINDINGS: There are multiple dilated loops of small bowel throughout the abdomen  
and pelvis is seen on prior CT dated October 15, 2018. NG tube tip and side port  
overlie the stomach. There is no free intraperitoneal gas. Filter overlies the  
expected location of the IVC at the L2 level. There is a vascular stent  
overlying SPECT dislocation of the left common iliac vein. IMPRESSION: Multiple, persistent dilated loops of small bowel throughout the  
abdomen and pelvis. No evidence of perforation. NG tube in appropriate position CT Results (most recent): 
Results from Hospital Encounter encounter on 10/15/18 CT ABD PELV W CONT Narrative EXAM:  CT ABD PELV W CONT INDICATION: Ovarian cancer restaging COMPARISON: 7/9/2018 CONTRAST:  100 mL of Isovue-370. TECHNIQUE:  
Following the uneventful intravenous administration of contrast, thin axial 
images were obtained through the abdomen and pelvis. Coronal and sagittal 
reconstructions were generated. Oral contrast was not administered. CT dose 
reduction was achieved through use of a standardized protocol tailored for this 
examination and automatic exposure control for dose modulation. FINDINGS:  
LUNG BASES: There are trace bilateral pleural effusions with underlying 
atelectasis INCIDENTALLY IMAGED HEART AND MEDIASTINUM: Unremarkable. LIVER: No mass or biliary dilatation. GALLBLADDER: Unremarkable. SPLEEN: No mass. PANCREAS: No mass or ductal dilatation. ADRENALS: Unremarkable. KIDNEYS: No mass, calculus, or hydronephrosis. STOMACH: Unremarkable. SMALL BOWEL: Proximal small bowel distention is noted with decompressed loops 
distally. COLON: No dilatation or wall thickening. APPENDIX: Not visualized. PERITONEUM: Moderate free fluid is noted, increased compared to the prior exam. 
Gastrohepatic soft tissue abnormality appears to have improved compared to the 
prior exam. Previously described lesion measuring 3.0 cm now measures 1.2 cm. Soft tissue nodule in the left upper quadrant now measures 16 mm, previously 
measuring 3.3 cm. Soft tissue density in the mesenteric root has improved. RETROPERITONEUM: Left common iliac vein stent is noted. IVC filter projects 
within the infrarenal vena cava. REPRODUCTIVE ORGANS: The uterus and ovaries are surgically absent. URINARY BLADDER: No mass or calculus. BONES: No destructive bone lesion. ADDITIONAL COMMENTS: N/A Impression IMPRESSION: 
Proximal small bowel distention with decompressed loops distally is compatible 
with obstruction and likely related to peritoneal disease in the mid abdomen. Overall, peritoneal implants have improved in size. Free fluid has increased in 
the interval. Trace bilateral effusions with underlying atelectasis. Assessment/Plan:  
 
Admitted for Partial small bowel obstruction Ovarian cancer ([address] Malnutrition ([address] Dehydration SBO (small bowel obstruction) ([address] Active Hospital Problems Diagnosis Date Noted  Dehydration 10/16/2018  Malnutrition ([address] 10/16/2018  
 SBO (small bowel obstruction) ([address] 10/16/2018  Severe protein-calorie malnutrition ([address] 10/11/2018  Ovarian cancer ([address] 10/10/2018  Nausea 10/10/2018  Epigastric pain  Pain of metastatic malignancy 08/09/2018  Anemia due to chemotherapy 07/26/2018  Peritoneal carcinomatosis ([address] 06/01/2018 Haley Rogel admitted with carcinomatosis, SBO, calorie malnutrition d/t chronic disease and obstruction. Onc: Undergoing Doxil/Avastin s/p 3 cycles last 10/4/18. Recent EOD CT with peritoneal disease response. Partial vs complete SBO Heme/CV: Hemodynamically stable, anemia d/t chemo/chronic disease, but improving even with hydration FEN/GI: Partial SBO with delayed gastric emptying (by endoscopy last Friday) with malnutrition - continue TPN with Lipid TIW. Pt passing flatus and tolerating liquids. Has tolerated clamping NGT. Continue clear liquid diet. Continue Reglan per GI and continue PPI/H2 d/t recent EGD dx gastritis - path negative. Will hook NGT up to suction this morning. If minimal output, will pull NGT. Ascites:Improved after drainage 10/18. Neuro:  pain/antidpression regimen per palliative care, following. PPX: lovenox hx of VTE, ppx dose. Holding xarelto Disposition: Pt improving as of this morning. Will cautiously begin advancing treatment as above. Discussed with pt and .  
 
 
Juan Li MD

## 2018-10-21 NOTE — PROGRESS NOTES
[address], [address] [facility], [address] 
P [phone]  F [phone] Patient Name: Felicia Martinez Admit Date: 10/16/2018 Visit Date: 10/21/2018 SUBJECTIVE: 
 
Rough past 24 hours. Feeling worse. NGT reattached yesterday morning with ~1100 of bilious fluid removed. Left to wall suction. NGT came out overnight when she was up to the bathroom and had to be replaced. She reports a lot more pain with the NGT than before. Says \"the nurse put it in too fast\". She reports flatus, but no BM. Had a bad headache this morning. She remains very depressed, maybe more than usual.   
 
OBJECTIVE: 
 
Patient Vitals for the past 24 hrs: 
 Temp Pulse Resp BP SpO2  
10/21/18 0404 98.3 °F (36.8 °C) 82 16 114/73 97 % 10/21/18 0010 98.2 °F (36.8 °C) 62 16 145/80 95 % 10/20/18 1709 98.1 °F (36.7 °C) 65 16 136/84 97 % 10/20/18 1250 98.1 °F (36.7 °C) 67 16 126/76 96 % Date 10/20/18 0700 - 10/21/18 8464 10/21/18 0700 - 10/22/18 6874 Shift 3270-2536 1331-6029 24 Hour Total 4155-9968 8293-1106 24 Hour Total  
INTAKE  
I.V.(mL/kg/hr)  1527.8(2.2) 1527. 8(1.1) Volume (0.9% sodium chloride infusion)  810 810 Volume (fat emulsion 20% (LIPOSYN, INTRAlipid) infusion 500 mL)  316.5 316.5 Volume (TPN ADULT - CENTRAL)  401.3 401.3 NG/  180 Water Flush Volume (mL) (Nasogastric Tube 10/16/18) 60  60 Intake (ml) (Nasogastric Tube 10/16/18) 120  120 Shift Total(mL/kg) 180(3.2) 1527. 8(26.9) 1707. 8(30.1) OUTPUT Urine(mL/kg/hr) 1750(2.6) 650(1) 2400(1.8) Urine Voided 5408 505 2565 Emesis/NG output 1150  1150 Output (ml) (Nasogastric Tube 10/16/18) 1150  1150 Shift Total(mL/kg) 1865(75.3) 674(99.2) [id number]) NET -0925 877.8 -1842.2 Weight (kg) 56.7 56.7 56.7 56.7 56.7 56.7 Physical Exam 
   General:  alert, cooperative; appears uncomfortable Cardiac:  Regular rate and rhythm Lungs:  clear to auscultation bilaterally Abdomen:  Mildly distended, diffusely tender. No guarding/rebound. Bowel sounds present. Wound:  n/a Extremity: extremities normal, atraumatic, no cyanosis or edema Data Review Lab Results Component Value Date/Time WBC 4.7 10/16/2018 11:18 AM  
 ABS. NEUTROPHILS 2.7 10/16/2018 11:18 AM  
 HGB 9.2 (L) 10/16/2018 11:18 AM  
 HCT 28.6 (L) 10/16/2018 11:18 AM  
 .5 (H) 10/16/2018 11:18 AM  
 MCH 33.0 10/16/2018 11:18 AM  
 PLATELET 016 30/64/2007 11:18 AM  
 
Lab Results Component Value Date/Time Sodium 139 10/21/2018 06:07 AM  
 Potassium 4.6 10/21/2018 06:07 AM  
 Chloride 106 10/21/2018 06:07 AM  
 CO2 21 10/21/2018 06:07 AM  
 Glucose 117 (H) 10/21/2018 06:07 AM  
 BUN 19 10/21/2018 06:07 AM  
 Creatinine 0.56 10/21/2018 06:07 AM  
 Calcium 9.0 10/21/2018 06:07 AM  
 Albumin 2.7 (L) 10/21/2018 06:07 AM  
 Bilirubin, total 0.4 10/21/2018 06:07 AM  
 AST (SGOT) 199 (H) 10/21/2018 06:07 AM  
 ALT (SGPT) 264 (H) 10/21/2018 06:07 AM  
 Alk. phosphatase 115 10/21/2018 06:07 AM  
 
 
IMPRESSION/PLAN: 
 
Oncologic:  Recurrent ovarian cancer. Current regimen is Doxil/Avastin. She has completed 3 cycles. Her disease appears improved on her last CT, although she had more ascites. Her CA-125 has responded. She was due for her 4th cycle this past week. Hopefully we will be able to resume therapy soon. Heme/CV:  Hemodynamically stable. Renal:  Good UOP. Normal creatinine. FEN/GI:  Partial small bowel obstruction. NGT remains to suction with bilious drainage, although she is having some flatus but no BM. Will repeat a flat/upright today. Liver enzymes up a little more today. Will continue to monitor. Continue with TPN. ID/Wound:  Afebrile. PPX:  Lovenox, ambulation. Dispostion:  Took a slight turn for the worse yesterday.   This will likely delay any discharge plans for a few more days. I suspect her obstructive symptoms is from disease, so unlikely that surgical intervention would be of much benefit.     
 
  
Trevorece Forward, MD

## 2018-10-21 NOTE — PROGRESS NOTES
Bedside and Verbal shift change report given to [employer] (oncoming nurse) by Lilliam Salazar RN (offgoing nurse). Report included the following information SBAR, Kardex, Intake/Output, MAR and Accordion.

## 2018-10-22 NOTE — PROGRESS NOTES
Pt. Requested NG tube to be stopped stating it was irritating her sinuses and face . No c/o nausea during the night. Pt. Hoping to be able to discontinue NG tube .

## 2018-10-22 NOTE — PROGRESS NOTES
NUTRITION COMPLETE ASSESSMENT 
 
RECOMMENDATIONS:  
1. Suggest to begin cycling TPN today: Today: 18 hours (further cycling recommendations see below) 50 ml/hr x 1 hour 100 ml/hr x 16 hours 50 ml/hr x 1 hour 2. Continue  adding IVL lipids x 3/week per hourly rate. 3. Check BG 2 hours into infusion and 1 hour after discontinuing  TPN to monitor for hyper/hypoglycemia. 4. Continue to check wt twice weekly. 5. Begin to check CMP weekly after cycling complete as labs are stable. Interventions/Plan:  
Food/Nutrient Delivery:          Initiate parenteral nutrition, Modify rate, concentration, composition, and schedule Assessment:  
Reason for Assessment:  
[x] Provider Consult Diet:   TPN: AA 6% D20 @ 70 ml/hr Lipids: 20% 500 ml IVL x 3 week Nutritionally Significant Medications: [x] Reviewed Objective: 
Chart reviewed and consult received for cycled TPN. Noted NGT clamped over the weekend and pt with needed to have NGT placed back to suction with 1150 ml bilious output. -137, lytes stable. Continue with Thiamin and Vit C x 10 total days (last day 10/23). Wt down 1.3 kg suspect from fluid losses. TPN providin ml, 1970 calories, 101 grams protein and with insulin. Begin to cycle TPN:  
 
Today: 18 hours 50 ml/hr x 1 hour 100 ml/hr x 16 hours 50 ml/hr x 1 hour 10/24: 16 hours 56 ml/hr x 1 hour 112 ml/hr x 14 hours 56 ml/hr x 1 hour 10/23: 14 hours 65 ml/hr x 1 hour 130 ml/hr x 12 hours 65 ml/hr x 1 hour Check BG 2 hours into infusion and 1 hour after discontinuing  TPN to monitor for hyper/hypoglycemia. Estimated Nutrition Needs:  
Kcals/day: 2030 Kcals/day(0326-3159 kcal/day ( 35-40 kcal/kg)) Protein: 75 g(75-87 gm/day ( 1.3-1.5 gm/kg)) Fluid: 2000 ml Based On: Kcal/kg - specify (Comment) Weight Used: Actual wt Pt expected to meet estimated nutrient needs:  [x]   Yes     []  No  [] Unable to predict at this time Nutrition Diagnosis:  
1. Inadequate oral intake related to SBO as evidenced by nausa and vomiting PTA. Goals:   
 Meet 90% estimated needs via TPN x 5-7 days. Monitoring & Evaluation: - Total energy intake - Weight/weight change, Acid-base balance Previous Nutrition Goals Met: 
Yes Previous Recommendations:     
Yes 
 
Education & Discharge Needs: 
 [x] None Identified 
 [] Identified and addressed [x] Participated in care plan, discharge planning, and/or interdisciplinary rounds Cultural, Orthodox and ethnic food preferences identified: 
 None Skin Integrity: [x]Intact  []Other Edema: [x]None []Other NGT output: 10/20: 1150 ml 
  10/21: 550 ml Food Allergies: [x]None []Other Anthropometrics:   
Weight Loss Metrics 10/19/2018 10/16/2018 10/16/2018 10/16/2018 10/4/2018 9/27/2018 9/20/2018 Today's Wt 125 lb - 127 lb 12.8 oz - 128 lb 6.4 oz 127 lb 1.6 oz 130 lb BMI - 19.29 kg/m2 - 19.72 kg/m2 19.8 kg/m2 19.91 kg/m2 20.05 kg/m2 Last 3 Recorded Weights in this Encounter 10/17/18 2112 10/18/18 0654 10/19/18 1069 Weight: 58.4 kg (128 lb 12 oz) 59 kg (130 lb 1.1 oz) 56.7 kg (125 lb) Weight Source: Standing scale (comment) 
 , Body mass index is 19.29 kg/m². IBW : 61.2 kg (135 lb), % IBW (Calculated): 94.72 % 
 ,   
 
Labs:   
Lab Results Component Value Date/Time Sodium 137 10/22/2018 06:16 AM  
 Potassium 4.8 10/22/2018 06:16 AM  
 Chloride 106 10/22/2018 06:16 AM  
 CO2 24 10/22/2018 06:16 AM  
 Glucose 120 (H) 10/22/2018 06:16 AM  
 BUN 21 (H) 10/22/2018 06:16 AM  
 Creatinine 0.54 (L) 10/22/2018 06:16 AM  
 Calcium 8.8 10/22/2018 06:16 AM  
 Magnesium 2.1 10/22/2018 06:16 AM  
 Phosphorus 3.7 10/22/2018 06:16 AM  
 Albumin 2.6 (L) 10/22/2018 06:16 AM  
 
No results found for: HBA1C, HGBE8, IQV7CPDB, [id number] Lab Results Component Value Date/Time Glucose 120 (H) 10/22/2018 06:16 AM  
 Glucose (POC) 137 (H) 10/22/2018 06:05 AM  
  
Lab Results Component Value Date/Time ALT (SGPT) 282 (H) 10/22/2018 06:16 AM  
 AST (SGOT) 148 (H) 10/22/2018 06:16 AM  
 Alk.  phosphatase 129 (H) 10/22/2018 06:16 AM  
 Bilirubin, total 0.5 10/22/2018 06:16 AM  
  
 
Ganesh Rodriguez RD

## 2018-10-22 NOTE — PROGRESS NOTES
Bedside and Verbal shift change report given to [address] [place] [address] Kavya (oncoming nurse) by [address] RN (offgoing nurse). Report included the following information SBAR, Kardex, OR Summary, Procedure Summary, Intake/Output and MAR. 
 
0750- MD at bedside 
0800- assessment complete, patient comfortable and stable appears sad with flat affect but talkative, waiting for breakfast, NG clamped, labs drawn. 1100- therapy dog to come see patient, patient transferred over to 3E 
 1230- NG hooked up to low suction, output 300mL 1245- NG clamped 1500- walked around unit 1610- hooked up to low int suction, output 350mL 
1630- NG clamped 1645- patient appears to be in a better mood, joking and talking. No report of pain or nausea. 1820- held evening meds per patient request, patient is tearful, family at bedside

## 2018-10-22 NOTE — PROGRESS NOTES
1930: Bedside and Verbal shift change report given to HERSON Smith (oncoming nurse) by Worthy Curling, RN (offgoing nurse). Report included the following information SBAR, Kardex, Intake/Output, MAR, Accordion and Recent Results. 2030:  Patient would like her care to be clustered and to no be bothered at this time. Patient requested to do assessment to coincide with her medications for the night. RN will return at 2300 for vitals, assessment and medications.

## 2018-10-22 NOTE — PROGRESS NOTES
Bedside shift change report given to Dylan Wagner (oncoming nurse) by [place] (offgoing nurse). Report included the following information SBAR.

## 2018-10-22 NOTE — PROGRESS NOTES
Chart checked, pt cleared by nursing. Attempted to work with pt who politely declined stating that she had already amb for 30 minutes today and had spent time up to the chair (confirmed by her nurse) and requested to not amb at this time. PT will defer, continue to follow, and see as appropriate.  Thank you, Jacinto Jara, PT

## 2018-10-22 NOTE — PROGRESS NOTES
[address], [address] [place], [facility] Martha Mallory 
P [phone]  F [phone] Patient: Pb Payne Admit Date: 10/16/2018 Hospital Day:7 Admit Dx: Partial small bowel obstruction Ovarian cancer ([address] Malnutrition ([address] Dehydration SBO (small bowel obstruction) ([address] Subjective:  
Somewhat better last 24 hours. No nausea and pain fairly well controlled with PCA 
NG currently clamped as per pt's request last hour. Wanted to \"just be disconnected\" for a little while. NGT continues draining bilious fluid. 550 last 24 hours. Still acknowledges + flatus, no BM since admission Noted KUB lisa from 10/21 with decrease in small bowel dilation dilation. (see below) Objective: Intake/Output Summary (Last 24 hours) at 10/22/2018 7930 Last data filed at 10/22/2018 5148 Gross per 24 hour Intake 2411.92 ml Output 3375 ml Net -963.08 ml Physical Exam 
/74 (BP 1 Location: Right arm, BP Patient Position: At rest)   Pulse 69   Temp 97.8 °F (36.6 °C)   Resp 16   Wt 125 lb (56.7 kg)   LMP 02/02/2015 (Approximate)   SpO2 97%   BMI 19.29 kg/m² General: Little more sedate in affect today. Cardiac:  Regular without M/R/G Lungs:  CTA Bilat without wheezing or rales. Abdomen:  soft, slightly tender to palpation, non distended, but slight firmness to                              upper abd, almost like a band across abd,  bowel sounds remain present                            persist -NGT with bilious drainage. Extremity: no edema, redness or tenderness in the calves or thighs Data Review Lab Results Component Value Date/Time WBC 13.5 (H) 10/22/2018 08:14 AM  
 ABS. NEUTROPHILS 11.6 (H) 10/22/2018 08:14 AM  
 HGB 9.4 (L) 10/22/2018 08:14 AM  
 HCT 29.5 (L) 10/22/2018 08:14 AM  
 .1 (H) 10/22/2018 08:14 AM  
 MCH 32.5 10/22/2018 08:14 AM  
 PLATELET 328 86/20/6285 08:14 AM  
 
Lab Results Component Value Date/Time Sodium 137 10/22/2018 06:16 AM  
 Potassium 4.8 10/22/2018 06:16 AM  
 Chloride 106 10/22/2018 06:16 AM  
 CO2 24 10/22/2018 06:16 AM  
 Glucose 120 (H) 10/22/2018 06:16 AM  
 BUN 21 (H) 10/22/2018 06:16 AM  
 Creatinine 0.54 (L) 10/22/2018 06:16 AM  
 Calcium 8.8 10/22/2018 06:16 AM  
 Albumin 2.6 (L) 10/22/2018 06:16 AM  
 Bilirubin, total 0.5 10/22/2018 06:16 AM  
 AST (SGOT) 148 (H) 10/22/2018 06:16 AM  
 ALT (SGPT) 282 (H) 10/22/2018 06:16 AM  
 Alk. phosphatase 129 (H) 10/22/2018 06:16 AM  
 
 
IMAGING: 
KUB 10/18/2018 FINDINGS: There are multiple dilated loops of small bowel throughout the abdomen  
and pelvis is seen on prior CT dated October 15, 2018. NG tube tip and side port  
overlie the stomach. There is no free intraperitoneal gas. Filter overlies the  
expected location of the IVC at the L2 level. There is a vascular stent  
overlying SPECT dislocation of the left common iliac vein. IMPRESSION: Multiple, persistent dilated loops of small bowel throughout the  
abdomen and pelvis. No evidence of perforation. NG tube in appropriate position CT Results (most recent): 
Results from Hospital Encounter encounter on 10/15/18 CT ABD PELV W CONT Narrative EXAM:  CT ABD PELV W CONT INDICATION: Ovarian cancer restaging COMPARISON: 7/9/2018 CONTRAST:  100 mL of Isovue-370. TECHNIQUE:  
Following the uneventful intravenous administration of contrast, thin axial 
images were obtained through the abdomen and pelvis. Coronal and sagittal 
reconstructions were generated. Oral contrast was not administered. CT dose 
reduction was achieved through use of a standardized protocol tailored for this 
examination and automatic exposure control for dose modulation. FINDINGS:  
LUNG BASES: There are trace bilateral pleural effusions with underlying 
atelectasis INCIDENTALLY IMAGED HEART AND MEDIASTINUM: Unremarkable. LIVER: No mass or biliary dilatation. GALLBLADDER: Unremarkable. SPLEEN: No mass. PANCREAS: No mass or ductal dilatation. ADRENALS: Unremarkable. KIDNEYS: No mass, calculus, or hydronephrosis. STOMACH: Unremarkable. SMALL BOWEL: Proximal small bowel distention is noted with decompressed loops 
distally. COLON: No dilatation or wall thickening. APPENDIX: Not visualized. PERITONEUM: Moderate free fluid is noted, increased compared to the prior exam. 
Gastrohepatic soft tissue abnormality appears to have improved compared to the 
prior exam. Previously described lesion measuring 3.0 cm now measures 1.2 cm. Soft tissue nodule in the left upper quadrant now measures 16 mm, previously 
measuring 3.3 cm. Soft tissue density in the mesenteric root has improved. RETROPERITONEUM: Left common iliac vein stent is noted. IVC filter projects 
within the infrarenal vena cava. REPRODUCTIVE ORGANS: The uterus and ovaries are surgically absent. URINARY BLADDER: No mass or calculus. BONES: No destructive bone lesion. ADDITIONAL COMMENTS: N/A Impression IMPRESSION: 
Proximal small bowel distention with decompressed loops distally is compatible 
with obstruction and likely related to peritoneal disease in the mid abdomen. Overall, peritoneal implants have improved in size. Free fluid has increased in 
the interval. Trace bilateral effusions with underlying atelectasis. Assessment/Plan:  
Admitted 10/16/2018 for Partial small bowel obstruction Ovarian cancer Malnutrition Dehydration Cancer related pain Depression Nausea and vomiting. Active Hospital Problems Diagnosis Date Noted  Dehydration 10/16/2018  Malnutrition ([address] 10/16/2018  
 SBO (small bowel obstruction) ([address] 10/16/2018  Severe protein-calorie malnutrition ([address] 10/11/2018  Ovarian cancer ([address] 10/10/2018  Nausea 10/10/2018  Epigastric pain  Pain of metastatic malignancy 08/09/2018  Anemia due to chemotherapy 07/26/2018  Peritoneal carcinomatosis ([facility] [address] 06/01/2018 [address] admitted with carcinomatosis, SBO, protein/calorie malnutrition d/t chronic disease and obstruction. Onc: Undergoing Doxil/Avastin s/p 3 cycles last 10/4/18. Recent EOD CT with peritoneal disease response. Partial vs complete SBO. KUB yesterday showed improvement in dlialtion of small bowel Heme/CV: Hemodynamically stable, anemia d/t chemo/chronic disease, but improving even with hydration. Noted WBC's today. Will monitor FEN/GI: Partial SBO with delayed gastric emptying (by endoscopy 10/12/18) with malnutrition - continue TPN with Lipid TIW. Will try to begin cycling TPN incase she goes home on it. Pt continues passing flatus and says she continues to feel  Hungry at times. Will allow her to take clear liquid and clamp NGT again today (Pt denies nausea and knows to aske nurse to unclamp if nausea occurs) Will unclamp for 15-20 minutes at noon and 1600 and evaluate output at that time. May unclamp for sleep. May consider g-tube for decompression as outpt. Continue Reglan per GI, but will increase to 10 mg Q6 for a short time and see if there is any improvement in gastric emptying. Will also continue PPI/H2 d/t recent EGD dx gastritis - path negative. Ascites:Improved after drainage 10/18. Neuro:  pain/antidpression regimen per palliative care, following. Will monitor closely with changes over weekend PPX: lovenox hx of VTE, ppx dose. Holding xarelto Disposition: Pt better this morning then yesterday,  Discussed with pt Bravo Mcdowell, NP

## 2018-10-23 NOTE — PROGRESS NOTES
Problem: Falls - Risk of 
Goal: *Absence of Falls Document Falguni Ferris Fall Risk and appropriate interventions in the flowsheet. Outcome: Progressing Towards Goal 
Fall Risk Interventions: 
Mobility Interventions: Patient to call before getting OOB Medication Interventions: Patient to call before getting OOB, Teach patient to arise slowly Elimination Interventions: Call light in reach Problem: Pressure Injury - Risk of 
Goal: *Prevention of pressure injury Document Joni Scale and appropriate interventions in the flowsheet. Outcome: Progressing Towards Goal 
Pressure Injury Interventions: Activity Interventions: Increase time out of bed Mobility Interventions: Pressure redistribution bed/mattress (bed type) Nutrition Interventions: Document food/fluid/supplement intake

## 2018-10-23 NOTE — CONSULTS
Palliative Medicine Consult Ruperto: [phone] ([phone]) Patient Name: Viet Toscano YOB: 1965 Date of Initial Consult: 10/16/18 Reason for Consult: Cancer related symptom management Requesting Provider: Dr. Trisha Galvez Primary Care Physician: Sharri Canales MD 
 
 SUMMARY:  
Viet Toscano is a 48 y.o. with recurrent ovarian malignancy on Doxil/Avastin per Dr. Trisha Galvez, known to outpatient palliative medicine as she follows closely with Palliative medicine outpatient clinic for pain management. She was admitted 10/10/2018 from home due to worsening nausea and vomiting followed by acute worsening of epigastric abdominal pain, EGD showed gastritis. Follow up CT shows proximal SBO leading to current admission for medical management and TPN for nutrition. Last chemotherapy date was 10/4. Current medical issues leading to Palliative Medicine involvement include: cancer related symptoms including pain, nausea, vomiting. PALLIATIVE DIAGNOSES:  
1. Epigastric abdominal pain 2. Chronic neoplasm related lower abdominal pain 3. Nausea with emesis 4. Feeding difficulty PLAN:  
1. SBO-  Not markedly improved from clinical standpoint. Passing flatus, no BMs. Tolerating only 3-4 hrs off suction prior to asking to be put back on for symptom relief. Moderate volume emesis this am after 6 hours overnight off suction. She is exhausted. NG tube quite burdensome and interferes with sleep. Aware of option of venting PEG. Further chemotherapy may be challenging.  
- agree with titration of metoclopramide to 10 mg IV q6h   
 - on scopolamine patch, consider change to glycopyrrolate tomorrow given fatigue / self-reported reduced clarity 
 - rcvd 6 d. Decadron, will hold tonight as greatest complaint today is lack of sleep. Steroid may be contributing and hasn't proven markedly beneficial to date  
 - can consider octreotide - d/c Compazine in favor of IV haloperidol for nausea (less sedation and anticholinergic side effects) 2. Mild gastritis:  EGD 10/12 
- cont PPI  
- d/c sucralfate as burdensome at this juncture 3. Neoplasm related abdominal pain:   
- very well controlled on morphine pca, 1 mg/hr + 1 mg bolus prn. Used 3 boluses overnight. Reports using more for when need to get OOB. - as pain is exceptionally well controlled and bowels continue to be sluggish + c/o lethargy and mild confusion. Opt to reduce basal rate to 0.8 mg/hr. Cont to have 1 mg available prn.     
- dependent upon next 24-48 hrs, consider rotation to fentanyl td for pain management in order to optimize prior to transition out of hospital 
4. Malignant Ascites-  3rd paracentesis in 6 months this admit. She is open to [address] placement. 5. Nutrition. On TPN throughout admission. Becoming burdensome due to urinary frequency and frequent blood sugar checks. Talked about running over less hours rather than 24.  
6. Neuropathy:  Clamping tube to receive amitriptyline and Lyrica. Continue for now. 7. Goals:  J Luis Maria is quite tearful this afternoon. Worried now about potential inability to travel safely to son's wedding on Nov 10th in [place]. Concerned about potential options for care given lack of improvement. Talked about potential care paths, including Hospice as an option for support at home if venting PEG is pursued and therefore poor candidacy for further chemo. She was open and receptive to all care options - recognizing how her symptoms are stagnant. Currently her only care is to obtain improved sleep. 8. Plan repeat ECG tomorrow to re-evaluate QTc; may introduce another agent at night for sleep. 9. Communicated plan of care with: Palliative IDT, patient and , bedside RN and Leo Bridges NP 
 
 
 GOALS OF CARE / TREATMENT PREFERENCES:  
 
GOALS OF CARE:   Full care escalation Patient/Health Care Proxy Stated Goals: Prolong life TREATMENT PREFERENCES:  
Code Status: Full Code Advance Care Planning: 
Advance Care Planning 10/17/2018 Patient's Healthcare Decision Maker is: Legal Next of Kin Primary Decision Maker Name -  
Primary Decision Maker Phone Number -  
Primary Decision Maker Relationship to Patient -  
Confirm Advance Directive None Medical Interventions: Full interventions Other: As far as possible, the palliative care team has discussed with patient / health care proxy about goals of care / treatment preferences for patient. HISTORY:  
 
History obtained from:  Pt, , EMR 
 
CHIEF COMPLAINT: upper abdominal pain HPI/SUBJECTIVE: The patient is:  
[x] Verbal and participatory [] Non-participatory due to:  
 
Dean Burgess is tearful this afternoon noting lack of global improvement. She is exhausted with the NG tube and feels sleep deprived. Had a long discussion this am with Dr. Sharmila Leigh and Alejandro Mario re: potential need for venting PEG and what this may mean. Clinical Pain Assessment (nonverbal scale for severity on nonverbal patients):  
Clinical Pain Assessment Severity: 0 Location: abd bl upper quads, LLQ Character: crampy Duration: weeks Effect: functional 
Factors: none in particular Frequency: chronic Duration: for how long has pt been experiencing pain (e.g., 2 days, 1 month, years) Frequency: how often pain is an issue (e.g., several times per day, once every few days, constant) FUNCTIONAL ASSESSMENT:  
 
Palliative Performance Scale (PPS): PPS: 50 PSYCHOSOCIAL/SPIRITUAL SCREENING:  
 
Palliative IDT has assessed this patient for cultural preferences / practices and a referral made as appropriate to needs (Cultural Services, Patient Advocacy, Ethics, etc.) Advance Care Planning: 
Advance Care Planning 10/17/2018 Patient's Healthcare Decision Maker is: Legal Next of Kin Primary Decision Maker Name -  
Primary Decision Maker Phone Number -  
Primary Decision Maker Relationship to Patient -  
Confirm Advance Directive None Any spiritual / Jew concerns: 
[] Yes /  [x] No 
 
Caregiver Burnout: 
[] Yes /  [x] No /  [] No Caregiver Present Anticipatory grief assessment:  
[] Normal  / [] Maladaptive ESAS Anxiety: Anxiety: 3 ESAS Depression: Depression: 3 REVIEW OF SYSTEMS:  
 
Positive and pertinent negative findings in ROS are noted above in HPI. The following systems were [x] reviewed / [] unable to be reviewed as noted in HPI Other findings are noted below. Systems: constitutional, ears/nose/mouth/throat, respiratory, gastrointestinal, genitourinary, musculoskeletal, integumentary, neurologic, psychiatric, endocrine. Positive findings noted below. Modified ESAS Completed by: provider Fatigue: 9 Drowsiness: 3 Depression: 3 Pain: 0 Anxiety: 3 Nausea: 3 Anorexia: 9 Dyspnea: 0 Constipation: Yes Stool Occurrence(s): 1 PHYSICAL EXAM:  
 
From RN flowsheet: 
Wt Readings from Last 3 Encounters:  
10/23/18 54.2 kg (119 lb 8 oz) 10/16/18 58 kg (127 lb 12.8 oz) 10/04/18 58.2 kg (128 lb 6.4 oz) Blood pressure 129/84, pulse 70, temperature 98 °F (36.7 °C), resp. rate 15, weight 54.2 kg (119 lb 8 oz), last menstrual period 02/02/2015, SpO2 95 %, unknown if currently breastfeeding. Pain Scale 1: Visual 
Pain Intensity 1: 0 Pain Onset 1: poa Pain Location 1: Abdomen Pain Orientation 1: Anterior Pain Description 1: Constant Pain Intervention(s) 1: Encouraged PCA Last bowel movement, if known:  
 
Constitutional: cachectic, chronically ill-appearing, no acute distress Eyes: pupils equal, anicteric ENMT: NGT in place to suction Cardiovascular: regular rhythm, distal pulses intact, no edema Respiratory: breathing not labored, symmetric Gastrointestinal: abdomen is flat, bs active, mild TTP in epigastrium w/o rebound or guarding Musculoskeletal: no deformity, no tenderness to palpation Skin: warm, dry Neurologic: following commands, moving all extremities. Drowsy during interview. Psychiatric: full affect, no hallucinations or agitation. HISTORY:  
 
Active Problems: 
  Peritoneal carcinomatosis ([address] (6/1/2018) Anemia due to chemotherapy (7/26/2018) Pain of metastatic malignancy (8/9/2018) Ovarian cancer ([address] (10/10/2018) Nausea (10/10/2018) Epigastric pain () Severe protein-calorie malnutrition ([address] (10/11/2018) Dehydration (10/16/2018) Malnutrition ([address] (10/16/2018) SBO (small bowel obstruction) ([address] (10/16/2018) Past Medical History:  
Diagnosis Date  Anxiety  BRCA1 positive  Calculus of kidney 1/13  
 right  Hernia, inguinal, left 2012  MS (multiple sclerosis) ([address] 1996  Nausea & vomiting  Ovarian cancer ([address] 3/2015  
 high grade, stage 3C papillary serous  Thromboembolus ([address] 8/2007  
 lower abdomen post fall Past Surgical History:  
Procedure Laterality Date  HX GYN  2009  
 endometrial ablation with punctured uterus  HX OTHER SURGICAL  8/2007  
 green filter  HX MARIEL AND BSO  3/2014  
 ovarian cancer Family History Problem Relation Age of Onset  Cancer Paternal Grandmother   
     breast  
 Breast Cancer Paternal Grandmother  Coronary Artery Disease Mother 48 CABG and PCI  Heart Surgery Father   
     valve replacement  No Known Problems Brother  No Known Problems Brother  No Known Problems Brother  No Known Problems Daughter  No Known Problems Son  No Known Problems Son  No Known Problems Son History reviewed, no pertinent family history. Social History Tobacco Use  Smoking status: Never Smoker  Smokeless tobacco: Never Used Substance Use Topics  Alcohol use: No  
 
Allergies Allergen Reactions  Pcn [Penicillins] Rash Current Facility-Administered Medications Medication Dose Route Frequency  TPN ADULT - CENTRAL  0-100 mL/hr IntraVENous TITRATE  metoclopramide HCl (REGLAN) injection 10 mg  10 mg IntraVENous Q6H  
 aspirin-acetaminophen-caffeine (EXCEDRIN ES) per tablet 1-2 Tab  (Patient Supplied)  1-2 Tab Oral Q6H PRN  pantoprazole (PROTONIX) 40 mg in sodium chloride 0.9% 10 mL injection  40 mg IntraVENous Q24H  
 amitriptyline (ELAVIL) tablet 10 mg  10 mg Oral PCD  pregabalin (LYRICA) capsule 25 mg  25 mg Oral BID  
 0.9% sodium chloride infusion  75 mL/hr IntraVENous CONTINUOUS  
 scopolamine (TRANSDERM-SCOP) 1 mg over 3 days 1 Patch  1 Patch TransDERmal Q72H  enoxaparin (LOVENOX) injection 40 mg  40 mg SubCUTAneous Q24H  
 LORazepam (ATIVAN) injection 1 mg  1 mg IntraVENous Q4H PRN  prochlorperazine (COMPAZINE) with saline injection 5 mg  5 mg IntraVENous Q4H PRN  
 sodium chloride (NS) flush 5-10 mL  5-10 mL IntraVENous Q8H  
 sodium chloride (NS) flush 5-10 mL  5-10 mL IntraVENous PRN  
 diphenhydrAMINE (BENADRYL) injection 12.5 mg  12.5 mg IntraVENous Q4H PRN  
 insulin lispro (HUMALOG) injection   SubCUTAneous Q6H  
 glucose chewable tablet 16 g  4 Tab Oral PRN  
 dextrose (D50W) injection syrg 12.5-25 g  12.5-25 g IntraVENous PRN  
 glucagon (GLUCAGEN) injection 1 mg  1 mg IntraMUSCular PRN  
 fat emulsion 20% (LIPOSYN, INTRAlipid) infusion 500 mL  500 mL IntraVENous Q TUE, THU & SAT  lactulose (CHRONULAC) solution 20 g  20 g Per NG tube BID  morphine  mg / 30 mL   IntraVENous CONTINUOUS  
 
 
 
 LAB AND IMAGING FINDINGS:  
 
Lab Results Component Value Date/Time WBC 13.5 (H) 10/22/2018 08:14 AM  
 HGB 9.4 (L) 10/22/2018 08:14 AM  
 PLATELET 915 18/72/0921 08:14 AM  
 
Lab Results Component Value Date/Time  Sodium 136 10/23/2018 09:08 AM  
 Potassium 4.6 10/23/2018 09:08 AM  
 Chloride 106 10/23/2018 09:08 AM  
 CO2 22 10/23/2018 09:08 AM  
 BUN 23 (H) 10/23/2018 09:08 AM  
 Creatinine 0.56 10/23/2018 09:08 AM  
 Calcium 8.8 10/23/2018 09:08 AM  
 Magnesium 2.1 10/23/2018 09:08 AM  
 Phosphorus 3.6 10/23/2018 09:08 AM  
  
Lab Results Component Value Date/Time AST (SGOT) 114 (H) 10/23/2018 09:08 AM  
 Alk. phosphatase 150 (H) 10/23/2018 09:08 AM  
 Protein, total 6.9 10/23/2018 09:08 AM  
 Albumin 2.7 (L) 10/23/2018 09:08 AM  
 Globulin 4.2 (H) 10/23/2018 09:08 AM  
 
Lab Results Component Value Date/Time INR 1.0 07/19/2018 11:54 AM  
 Prothrombin time 10.7 07/19/2018 11:54 AM  
  
No results found for: IRON, FE, TIBC, IBCT, PSAT, FERR No results found for: PH, PCO2, PO2 No components found for: [place] Lab Results Component Value Date/Time CK 72 01/09/2018 11:09 AM  
 CK - MB <1.0 01/09/2018 11:09 AM  
  
 
 
   
 
  
 
Prolonged service was provided for  []30 min   []75 min in face to face time in the presence of the patient, spent as noted above. Time Start:  
Time End:  
Note: this can only be billed with 97225 (initial) or 50709 (follow up). If multiple start / stop times, list each separately.

## 2018-10-23 NOTE — PROGRESS NOTES
Bedside and Verbal shift change report given to [address] [place] [address] (oncoming nurse) by Alex Guillaume RN (offgoing nurse). Report included the following information SBAR, Kardex, OR Summary, Procedure Summary, Intake/Output and MAR.  
 
0730- report given, MD and NP at [facility]. Hooked up to NG tube suction for 30 min output 300mL 0800- assessment complete, labs to be drawn, new port access needed, patient stable and comfortable, no report of pain or anymore nausea. 0830- orders to low interm suction NG tube Q4 hrs for 30min 
0900- meds given, NG clamped, patient up walking 1055-pt complained of nausea, hooked up to low intermittent suction, nausea relieved. 1230- medications given, port needle changed out, 1in needle used, flushed and gave great blood return. Patient is compliant but appears upset and tearful. 1500- NG hooked to low interm suction 1530- NG clamped, 300mL removed from tube, pt ambulated in hallway. 1830- NG hooked up to low interm suction, nose take redone and stabilized. 1900- NG tube clamped put out 150 mL, meds given, TPN started, PCA orders changed, patient joking around and laughing, comfortable with no complaints.

## 2018-10-23 NOTE — PROGRESS NOTES
Spiritual Care Assessment/Progress Note [address] 
 
 
NAME: Speedy Solis      MRN: [id number] AGE: 48 y.o. SEX: female Hindu Affiliation: Jewish Language: English  
 
10/23/2018     Total Time (in minutes): 56  
 
Spiritual Assessment begun in [address] [facility] through conversation with: 
  
    [x]Patient        [x] Family    [] Friend(s) Reason for Consult: Palliative Care, Initial/Spiritual Assessment Spiritual beliefs: (Please include comment if needed) [x] Identifies with a pavel tradition:     
   [] Supported by a pavel community:        
   [] Claims no spiritual orientation:       
   [] Seeking spiritual identity:            
   [] Adheres to an individual form of spirituality:       
   [] Not able to assess:                   
 
    
Identified resources for coping:  
   [] Prayer                           
   [] Music                  [] Guided Imagery [x] Family/friends                 [] Pet visits [] Devotional reading                         [] Unknown 
   [x] Other: spiritual beliefs Interventions offered during this visit: (See comments for more details) Patient Interventions: Affirmation of emotions/emotional suffering, Coping skills reviewed/reinforced, Iconic (affirming the presence of God/Higher Power), Initial/Spiritual assessment, patient floor, End of life issues discussed, Hindu beliefs/image of God discussed, Affirmation of pavel, Catharsis/review of pertinent events in supportive environment, Life review/legacy Family/Friend(s): Affirmation of emotions/emotional suffering, Catharsis/review of pertinent events in supportive environment, End of life issues discussed, Prayer (assurance of) Plan of Care: 
 
 [] Support spiritual and/or cultural needs  
 [] Support AMD and/or advance care planning process    
 [] Support grieving process [] Coordinate Rites and/or Rituals  
 [] Coordination with community clergy [] No spiritual needs identified at this time 
 [] Detailed Plan of Care below (See Comments)  [] Make referral to Music Therapy 
[] Make referral to Pet Therapy    
[] Make referral to Addiction services 
[] Make referral to [facility] 
[] Make referral to Spiritual Care Partner 
[] No future visits requested       
[x] Follow up visits as needed Comments: Visited Ms. Catherine Rubio with Palliative Dr Larry Dangelo. Offered support as pt's symptoms, care, and concerns were discussed. Pt has good support from her family, her  and parents were both present at bedside. Pt struggling with a sense of uncertainty related to the future, especially as it relates to decisions around her care. Her son is getting  in November, and pt was working towards the goal of attending the wedding; she expressed awareness of barriers related to her condition that may complicate this goal. She expresses having good communication with her children as they consider all of the implications of her illness. Pt shared of her Theresa Grapes pavel which a source of comfort. Offered spoken words of assurance and explored any additional ways that we may be of support. Pt expressed gratitude for all of her care team and feels well supported at this time. Chaplains are available for continued support as needed. Paola Gonzalez, Palliative

## 2018-10-23 NOTE — PROGRESS NOTES
Physical Therapy 10/23/2018 Chart reviewed and cleared by RN. Spoke with pt and her parents who were present in room. Pt is not feeling well today and reports she has not been OOB but is not feeling up to it today. She has been walking nearly 30 minutes at a time with family throughout the day. RN reports she is stable mobilizing. Will sign off at this time given increased activity tolerance and strength/balance improvements. Pt agreeable she currently has no physical therapy needs.  
 
Thank Cody William, PT, DPT

## 2018-10-23 NOTE — PROGRESS NOTES
Bedside and Verbal shift change report given to Sania Tsai RN (oncoming nurse) by Man Michael RN (offgoing nurse). Report included the following information SBAR, Kardex, Intake/Output, MAR and Recent Results. 2017: pt resting comfortably. No n/v. Changed TPN rate to 100ml/hr. 2100: pt refuse blood sugar check ordered for 2 hrs after start of TPN.  
2305: NG tube connected to low intermittent suction. 2335: NG tube disconnected. 250ml output recorded. No n/v at this time. 0220: Pt rang out states some mild nausea and wants NG tube to be reconnected to suction after using the restroom 0250: Pt connected to low intermittent suction. 0320: PCA refill changed. 0330: Pt disconnected from suction. 350mL output recorded. No more nausea. 0545: Pt up to recliner.

## 2018-10-23 NOTE — PROGRESS NOTES
[address], [address] [place], [facility] Martha Mallory 
P [phone]  F [phone] Patient: Esvin Miller Admit Date: 10/16/2018 Hospital Day:7 Admit Dx: Partial small bowel obstruction Ovarian cancer ([address] Malnutrition ([address] Dehydration SBO (small bowel obstruction) ([address] Subjective: Pt with nausea this morning after NG had been clamped for ~6 hours (per pt's request to sleep better) Once she vomited small amount, felt \"much better\". NGT re-hooked up with bile fluid returned. Continues passing flatus, but no BM Pain fairly well controlled with PCA. Palliative managing NGT continues draining bilious fluid. 800 last 24 hours. Objective: Intake/Output Summary (Last 24 hours) at 10/23/2018 0756 Last data filed at 10/23/2018 1640 Gross per 24 hour Intake 2952.57 ml Output 1800 ml Net 1152.57 ml Physical Exam 
/79 (BP 1 Location: Left arm, BP Patient Position: At rest)   Pulse 81   Temp 97.7 °F (36.5 °C)   Resp 16   Wt 125 lb (56.7 kg)   LMP 02/02/2015 (Approximate)   SpO2 97%   BMI 19.29 kg/m² General: Tired appearing today. [place] and father present with pt 
   Cardiac:  Regular without M/R/G Lungs:  CTA Bilat without wheezing or rales. Abdomen:  soft,  tender to palpation to upper abdomen,slight firmness to                              upper abd persist, almost like a band across abd. Bowel sounds remain present                            NGT with bilious drainage. Extremity: no edema, redness or tenderness in the calves or thighs Data Review Lab Results Component Value Date/Time WBC 13.5 (H) 10/22/2018 08:14 AM  
 ABS. NEUTROPHILS 11.6 (H) 10/22/2018 08:14 AM  
 HGB 9.4 (L) 10/22/2018 08:14 AM  
 HCT 29.5 (L) 10/22/2018 08:14 AM  
 .1 (H) 10/22/2018 08:14 AM  
 MCH 32.5 10/22/2018 08:14 AM  
 PLATELET 766 88/63/4307 08:14 AM  
 
Lab Results Component Value Date/Time Sodium 137 10/22/2018 06:16 AM  
 Potassium 4.8 10/22/2018 06:16 AM  
 Chloride 106 10/22/2018 06:16 AM  
 CO2 24 10/22/2018 06:16 AM  
 Glucose 120 (H) 10/22/2018 06:16 AM  
 BUN 21 (H) 10/22/2018 06:16 AM  
 Creatinine 0.54 (L) 10/22/2018 06:16 AM  
 Calcium 8.8 10/22/2018 06:16 AM  
 Albumin 2.6 (L) 10/22/2018 06:16 AM  
 Bilirubin, total 0.5 10/22/2018 06:16 AM  
 AST (SGOT) 148 (H) 10/22/2018 06:16 AM  
 ALT (SGPT) 282 (H) 10/22/2018 06:16 AM  
 Alk. phosphatase 129 (H) 10/22/2018 06:16 AM  
 
 
IMAGING: 
KUB 10/18/2018 FINDINGS: There are multiple dilated loops of small bowel throughout the abdomen  
and pelvis is seen on prior CT dated October 15, 2018. NG tube tip and side port  
overlie the stomach. There is no free intraperitoneal gas. Filter overlies the  
expected location of the IVC at the L2 level. There is a vascular stent  
overlying SPECT dislocation of the left common iliac vein. IMPRESSION: Multiple, persistent dilated loops of small bowel throughout the  
abdomen and pelvis. No evidence of perforation. NG tube in appropriate position CT Results (most recent): 
Results from Hospital Encounter encounter on 10/15/18 CT ABD PELV W CONT Narrative EXAM:  CT ABD PELV W CONT INDICATION: Ovarian cancer restaging COMPARISON: 7/9/2018 CONTRAST:  100 mL of Isovue-370. TECHNIQUE:  
Following the uneventful intravenous administration of contrast, thin axial 
images were obtained through the abdomen and pelvis. Coronal and sagittal 
reconstructions were generated. Oral contrast was not administered. CT dose 
reduction was achieved through use of a standardized protocol tailored for this 
examination and automatic exposure control for dose modulation. FINDINGS:  
LUNG BASES: There are trace bilateral pleural effusions with underlying 
atelectasis INCIDENTALLY IMAGED HEART AND MEDIASTINUM: Unremarkable. LIVER: No mass or biliary dilatation. GALLBLADDER: Unremarkable. SPLEEN: No mass. PANCREAS: No mass or ductal dilatation. ADRENALS: Unremarkable. KIDNEYS: No mass, calculus, or hydronephrosis. STOMACH: Unremarkable. SMALL BOWEL: Proximal small bowel distention is noted with decompressed loops 
distally. COLON: No dilatation or wall thickening. APPENDIX: Not visualized. PERITONEUM: Moderate free fluid is noted, increased compared to the prior exam. 
Gastrohepatic soft tissue abnormality appears to have improved compared to the 
prior exam. Previously described lesion measuring 3.0 cm now measures 1.2 cm. Soft tissue nodule in the left upper quadrant now measures 16 mm, previously 
measuring 3.3 cm. Soft tissue density in the mesenteric root has improved. RETROPERITONEUM: Left common iliac vein stent is noted. IVC filter projects 
within the infrarenal vena cava. REPRODUCTIVE ORGANS: The uterus and ovaries are surgically absent. URINARY BLADDER: No mass or calculus. BONES: No destructive bone lesion. ADDITIONAL COMMENTS: N/A Impression IMPRESSION: 
Proximal small bowel distention with decompressed loops distally is compatible 
with obstruction and likely related to peritoneal disease in the mid abdomen. Overall, peritoneal implants have improved in size. Free fluid has increased in 
the interval. Trace bilateral effusions with underlying atelectasis. Assessment/Plan:  
Admitted 10/16/2018 for Partial small bowel obstruction Ovarian cancer Malnutrition Dehydration Cancer related pain Depression Nausea and vomiting. Active Hospital Problems Diagnosis Date Noted  Dehydration 10/16/2018  Malnutrition ([address] 10/16/2018  
 SBO (small bowel obstruction) ([address] 10/16/2018  Severe protein-calorie malnutrition ([address] 10/11/2018  Ovarian cancer ([address] 10/10/2018  Nausea 10/10/2018  Epigastric pain  Pain of metastatic malignancy 08/09/2018  Anemia due to chemotherapy 07/26/2018  Peritoneal carcinomatosis ([address] 06/01/2018 Edwardo Rao admitted with carcinomatosis, SBO, protein/calorie malnutrition d/t chronic disease and obstruction with N/V and cancer related pain. Onc: Undergoing Doxil/Avastin s/p 3 cycles last 10/4/18. Recent EOD CT with peritoneal disease response. Partial vs complete SBO. KUB 10/21/18 showed improvement in dlialtion of small bowel Heme/CV: Hemodynamically stable, anemia d/t chemo/chronic disease. Labs from today pending FEN/GI: Partial SBO with delayed gastric emptying (by endoscopy 10/12/18) with malnutrition - continue TPN with Lipid TIW. Will try to begin cycling TPN today Pt continues passing flatus and says she continues to feel  Hungry at times. Will have her continue with clears only and unclamp NGT every 4 hours for 30 minutes. Unclamp at any time for nausea Unclamp for sleep. May consider g-tube for decompression. Dr. Darron Escamilla began discussed this with pt today with family present, but would like to wait for now and hope SBO to continue to improve. Continue Reglan per GI, but at 10 mg Q6 for a short time and see if there is any improvement in gastric emptying. Will also continue PPI/H2 d/t recent EGD dx gastritis - path negative. Ascites:Improved after drainage 10/18. Neuro:  pain/antidpression regimen per palliative care, following. Will monitor closely with changes over weekend PPX: lovenox hx of VTE, ppx dose. Holding xarelto Disposition: Pt disappointed with nausea and vomiting this morning. Will monitor. Seen with Darron Yepez, KYE

## 2018-10-24 NOTE — PROGRESS NOTES
5 [address], [address] [place], [facility] Martha NGUYEN [phone]  F [phone] Patient: Bertha Shah Admit Date: 10/16/2018 Hospital Day:7 Admit Dx: Partial small bowel obstruction Ovarian cancer ([address] Malnutrition ([address] Dehydration SBO (small bowel obstruction) ([address] Subjective:  
Poor evening, overall feels fatigued, not progressing, discomfort with NGT. Nausea with 3-4 hours clamped NG. Continues passing flatus, but no BM Pain fairly well controlled with PCA. Palliative managing NGT continues draining bilious fluid. 1800 last 24 hours. Objective: Intake/Output Summary (Last 24 hours) at 10/24/2018 0745 Last data filed at 10/24/2018 3739 Gross per 24 hour Intake 2295.33 ml Output 4100 ml Net -1804.67 ml Physical Exam 
/75 (BP 1 Location: Right arm, BP Patient Position: At rest)   Pulse 76   Temp 98.7 °F (37.1 °C)   Resp 16   Wt 119 lb 8 oz (54.2 kg)   LMP 02/02/2015 (Approximate)   SpO2 98%   BMI 18.44 kg/m² General: Tired appearing today, more cachetic appearing.  and father present Cardiac:  Regular without M/R/G Lungs:  CTA Bilat without wheezing or rales. Abdomen:  soft, nondistended, tender subjectively w/o guarding/rebound. Bowel sounds remain present. NGT with bilious drainage. Extremity: no edema Data Review Lab Results Component Value Date/Time WBC 13.5 (H) 10/22/2018 08:14 AM  
 ABS. NEUTROPHILS 11.6 (H) 10/22/2018 08:14 AM  
 HGB 9.4 (L) 10/22/2018 08:14 AM  
 HCT 29.5 (L) 10/22/2018 08:14 AM  
 .1 (H) 10/22/2018 08:14 AM  
 MCH 32.5 10/22/2018 08:14 AM  
 PLATELET 499 72/35/5273 08:14 AM  
 
Lab Results Component Value Date/Time  Sodium 135 (L) 10/24/2018 03:22 AM  
 Potassium 4.1 10/24/2018 03:22 AM  
 Chloride 102 10/24/2018 03:22 AM  
 CO2 22 10/24/2018 03:22 AM  
 Glucose 87 10/24/2018 03:22 AM  
 BUN 25 (H) 10/24/2018 03:22 AM  
 Creatinine 0.60 10/24/2018 03:22 AM  
 Calcium 8.8 10/24/2018 03:22 AM  
 Albumin 2.6 (L) 10/24/2018 03:22 AM  
 Bilirubin, total 0.2 10/24/2018 03:22 AM  
 AST (SGOT) 85 (H) 10/24/2018 03:22 AM  
 ALT (SGPT) 293 (H) 10/24/2018 03:22 AM  
 Alk. phosphatase 157 (H) 10/24/2018 03:22 AM  
 
 
IMAGING: 
KUB 10/18/2018 FINDINGS: There are multiple dilated loops of small bowel throughout the abdomen  
and pelvis is seen on prior CT dated October 15, 2018. NG tube tip and side port  
overlie the stomach. There is no free intraperitoneal gas. Filter overlies the  
expected location of the IVC at the L2 level. There is a vascular stent  
overlying SPECT dislocation of the left common iliac vein. IMPRESSION: Multiple, persistent dilated loops of small bowel throughout the  
abdomen and pelvis. No evidence of perforation. NG tube in appropriate position CT Results (most recent): 
Results from Hospital Encounter encounter on 10/15/18 CT ABD PELV W CONT Narrative EXAM:  CT ABD PELV W CONT INDICATION: Ovarian cancer restaging COMPARISON: 7/9/2018 CONTRAST:  100 mL of Isovue-370. TECHNIQUE:  
Following the uneventful intravenous administration of contrast, thin axial 
images were obtained through the abdomen and pelvis. Coronal and sagittal 
reconstructions were generated. Oral contrast was not administered. CT dose 
reduction was achieved through use of a standardized protocol tailored for this 
examination and automatic exposure control for dose modulation. FINDINGS:  
LUNG BASES: There are trace bilateral pleural effusions with underlying 
atelectasis INCIDENTALLY IMAGED HEART AND MEDIASTINUM: Unremarkable. LIVER: No mass or biliary dilatation. GALLBLADDER: Unremarkable. SPLEEN: No mass. PANCREAS: No mass or ductal dilatation. ADRENALS: Unremarkable. KIDNEYS: No mass, calculus, or hydronephrosis. STOMACH: Unremarkable. SMALL BOWEL: Proximal small bowel distention is noted with decompressed loops 
distally. COLON: No dilatation or wall thickening. APPENDIX: Not visualized. PERITONEUM: Moderate free fluid is noted, increased compared to the prior exam. 
Gastrohepatic soft tissue abnormality appears to have improved compared to the 
prior exam. Previously described lesion measuring 3.0 cm now measures 1.2 cm. Soft tissue nodule in the left upper quadrant now measures 16 mm, previously 
measuring 3.3 cm. Soft tissue density in the mesenteric root has improved. RETROPERITONEUM: Left common iliac vein stent is noted. IVC filter projects 
within the infrarenal vena cava. REPRODUCTIVE ORGANS: The uterus and ovaries are surgically absent. URINARY BLADDER: No mass or calculus. BONES: No destructive bone lesion. ADDITIONAL COMMENTS: N/A Impression IMPRESSION: 
Proximal small bowel distention with decompressed loops distally is compatible 
with obstruction and likely related to peritoneal disease in the mid abdomen. Overall, peritoneal implants have improved in size. Free fluid has increased in 
the interval. Trace bilateral effusions with underlying atelectasis. Assessment/Plan:  
Admitted 10/16/2018 for Partial small bowel obstruction Ovarian cancer Malnutrition Dehydration Cancer related pain Depression Nausea and vomiting. Active Hospital Problems Diagnosis Date Noted  Dehydration 10/16/2018  Malnutrition ([address] 10/16/2018  
 SBO (small bowel obstruction) ([address] 10/16/2018  Severe protein-calorie malnutrition ([address] 10/11/2018  Ovarian cancer ([address] 10/10/2018  Nausea 10/10/2018  Epigastric pain  Pain of metastatic malignancy 08/09/2018  Anemia due to chemotherapy 07/26/2018  Peritoneal carcinomatosis ([address] 06/01/2018 Esvin Miller admitted with carcinomatosis, SBO, protein/calorie malnutrition d/t chronic disease and obstruction with N/V and cancer related pain. Onc: Undergoing Doxil/Avastin s/p 3 cycles last 10/4/18. Recent EOD CT with peritoneal disease response. Partial vs complete SBO. KUB 10/21/18 showed improvement in dlialtion of small bowel Heme/CV: Hemodynamically stable, anemia d/t chemo/chronic disease. Labs from today pending FEN/GI: Partial SBO with delayed gastric emptying (by endoscopy 10/12/18) with malnutrition - cyclic TPN with Lipid TIW. Palliative venting Gtube discussed. Meaningful, prolonged resolution of obstruction unlikely in setting. Family understands. Will check again today for decision. Continue Reglan, PPI/H2. Octreotide discussed by palliative. Would agree if output remains high. Ascites:Improved after drainage 10/18. Neuro:  pain/antidpression regimen per palliative care, following. Will monitor closely with changes over weekend PPX: Hold lovenox this AM. Holding home xarelto Disposition: Stable obstructive without meaningful resolution, difficult course with her disease these last few months. Gtube for palliation - deciding. Discussed interventions possible in hospice setting. Son getting  in November, discussed palliative options moving toward this goal.  Will revisit with family this morning.    
 
 
LESIA Bar

## 2018-10-24 NOTE — PROGRESS NOTES
Bedside and Verbal shift change report given to Ruiz Hoff (oncoming nurse) by Cinda Hernández (offgoing nurse). Report included the following information SBAR, Kardex, OR Summary, Procedure Summary, Intake/Output and MAR.  
 
0730- PA at bedside, hooked up to low interm suction 0800- suction off, NG clamped output 50mL 
0900- meds given, up walking after. 1130- Blood Sugar check refused 1150- Angio called to try and figure out a rough time frame for GTube placement. No time set. Patient made aware. Patient is NPO 
1200- went to hook up patient to low interm suction and verify TPN rate, patient asked me to come back when she calls out. 1230- hooked up to low interm suction, TPN rate changed to 50mL/hr, patient comfortable with only mild nausea. No other needs expressed. 1300- clamped NG output 250mL,  
1330- TPN completed, flushed line, changed into gown, walking in joyce. 1510- went to Angio for tube placement 1800- Arrived back on unit 1830- orders to remove NG tube, hooked peg up to low interm suction throughout night, can be clamped to go to bathroom, resume all meds. 1900- refused TPN to be started and all other afternoon meds, NG tube removed per PA orders. Patient is having pain wants to take time with peg tube being hooked back to suction. Patient eating ice chips. 1930- patient still wants time to think about being hooked up to suction, and starting TPN. 2000-patient up to bedside commode

## 2018-10-24 NOTE — ROUTINE PROCESS
TRANSFER - OUT REPORT: 
 
Verbal report given to Elvia(name) on [address]  being transferred to [facility](unit) for routine progression of care Report consisted of patients Situation, Background, Assessment and  
Recommendations(SBAR). Information from the following report(s) SBAR, Procedure Summary and MAR was reviewed with the receiving nurse. Lines:  
Venous Access Device Port (Active) Central Line Being Utilized Yes 10/24/2018  7:41 AM  
Criteria for Appropriate Use Total parenteral nutrition 10/24/2018  7:41 AM  
Site Assessment Clean, dry, & intact 10/24/2018  7:41 AM  
Date of Last Dressing Change 10/23/18 10/24/2018  3:17 AM  
Dressing Status Clean, dry, & intact 10/24/2018  7:41 AM  
Dressing Type Disk with Chlorhexadine gluconate (CHG); Transparent 10/24/2018  3:17 AM  
Action Taken Open ports on tubing capped 10/24/2018  3:17 AM  
Date Accessed (Medial Site) 10/16/18 10/24/2018  3:17 AM  
Access Time (Medial Site) 1100 10/16/2018 11:57 AM  
Access Needle Size (Site #1) 20 G 10/23/2018 12:19 PM  
Access Needle Length (Medial Site) 1 inch 10/23/2018 12:19 PM  
Positive Blood Return (Medial Site) Yes 10/24/2018  3:17 AM  
Action Taken (Medial Site) Blood drawn;Flushed; Infusing 10/24/2018  7:41 AM  
Alcohol Cap Used Yes 10/24/2018  3:17 AM  
  
 
Opportunity for questions and clarification was provided. Patient transported with: 
 [employer]

## 2018-10-24 NOTE — H&P
Radiology History and Physical 
 
Patient: Ridge Ochoa 48 y.o. female Chief Complaint: No chief complaint on file. History of Present Illness: bowel obstruction History: 
 
Past Medical History:  
Diagnosis Date  Anxiety  BRCA1 positive  Calculus of kidney 1/13  
 right  Hernia, inguinal, left 2012  MS (multiple sclerosis) ([facility] 1996  Nausea & vomiting  Ovarian cancer ([facility] 3/2015  
 high grade, stage 3C papillary serous  Thromboembolus ([facility] 8/2007  
 lower abdomen post fall Family History Problem Relation Age of Onset  Cancer Paternal Grandmother   
     breast  
 Breast Cancer Paternal Grandmother  Coronary Artery Disease Mother 48 CABG and PCI  Heart Surgery Father   
     valve replacement  No Known Problems Brother  No Known Problems Brother  No Known Problems Brother  No Known Problems Daughter  No Known Problems Son  No Known Problems Son  No Known Problems Son   
 
Social History Socioeconomic History  Marital status:  Spouse name: Not on file  Number of children: Not on file  Years of education: Not on file  Highest education level: Not on file Social Needs  Financial resource strain: Not on file  Food insecurity - worry: Not on file  Food insecurity - inability: Not on file  Transportation needs - medical: Not on file  Transportation needs - non-medical: Not on file Occupational History  Not on file Tobacco Use  Smoking status: Never Smoker  Smokeless tobacco: Never Used Substance and Sexual Activity  Alcohol use: No  
 Drug use: No  
 Sexual activity: Yes  
  Partners: Male Birth control/protection: None, Surgical  
Other Topics Concern  Not on file Social History Narrative  Not on file Allergies: Allergies Allergen Reactions  Pcn [Penicillins] Rash Current Medications: 
Current Facility-Administered Medications Medication Dose Route Frequency  TPN ADULT - CENTRAL  0-100 mL/hr IntraVENous TITRATE  fentaNYL citrate (PF) injection 200 mcg  200 mcg IntraVENous Multiple  midazolam (VERSED) injection 5 mg  5 mg IntraVENous Multiple  clindamycin (CLEOCIN) 900mg D5W 50mL IVPB (premix)  900 mg IntraVENous RAD ONCE  
 lidocaine (XYLOCAINE) 10 mg/mL (1 %) injection 1 mL  10 mg IntraDERMal RAD ONCE  
 haloperidol lactate (HALDOL) injection 1 mg  1 mg IntraVENous Q4H PRN  
 metoclopramide HCl (REGLAN) injection 10 mg  10 mg IntraVENous Q6H  
 aspirin-acetaminophen-caffeine (EXCEDRIN ES) per tablet 1-2 Tab  (Patient Supplied)  1-2 Tab Oral Q6H PRN  pantoprazole (PROTONIX) 40 mg in sodium chloride 0.9% 10 mL injection  40 mg IntraVENous Q24H  
 amitriptyline (ELAVIL) tablet 10 mg  10 mg Oral PCD  pregabalin (LYRICA) capsule 25 mg  25 mg Oral BID  
 0.9% sodium chloride infusion  75 mL/hr IntraVENous CONTINUOUS  
 scopolamine (TRANSDERM-SCOP) 1 mg over 3 days 1 Patch  1 Patch TransDERmal Q72H  enoxaparin (LOVENOX) injection 40 mg  40 mg SubCUTAneous Q24H  
 LORazepam (ATIVAN) injection 1 mg  1 mg IntraVENous Q4H PRN  
 sodium chloride (NS) flush 5-10 mL  5-10 mL IntraVENous Q8H  
 sodium chloride (NS) flush 5-10 mL  5-10 mL IntraVENous PRN  
 diphenhydrAMINE (BENADRYL) injection 12.5 mg  12.5 mg IntraVENous Q4H PRN  
 insulin lispro (HUMALOG) injection   SubCUTAneous Q6H  
 glucose chewable tablet 16 g  4 Tab Oral PRN  
 dextrose (D50W) injection syrg 12.5-25 g  12.5-25 g IntraVENous PRN  
 glucagon (GLUCAGEN) injection 1 mg  1 mg IntraMUSCular PRN  
 fat emulsion 20% (LIPOSYN, INTRAlipid) infusion 500 mL  500 mL IntraVENous Q TUE, THU & SAT  lactulose (CHRONULAC) solution 20 g  20 g Per NG tube BID  morphine  mg / 30 mL   IntraVENous CONTINUOUS Physical Exam: 
Blood pressure 122/67, pulse 77, temperature 98.1 °F (36.7 °C), resp.  rate 12, weight 54.2 kg (119 lb 8 oz), last menstrual period 02/02/2015, SpO2 99 %, unknown if currently breastfeeding. LUNG: clear to auscultation bilaterally, HEART: regular rate and rhythm, S1, S2 normal, no murmur, click, rub or gallop Alerts:   
Hospital Problems  Date Reviewed: 10/16/2018 Codes Class Noted POA Dehydration ICD-10-CM: E86.0 ICD-9-CM: 276.51  10/16/2018 Unknown Malnutrition ([facility] ICD-10-CM: E46 
ICD-9-CM: 263.9  10/16/2018 Yes SBO (small bowel obstruction) ([facility] ICD-10-CM: V84.666 ICD-9-CM: 560.9  10/16/2018 Unknown Severe protein-calorie malnutrition ([facility] ICD-10-CM: F96 ICD-9-CM: 189  10/11/2018 Yes Ovarian cancer ([facility] ICD-10-CM: C56.9 ICD-9-CM: 183.0  10/10/2018 Yes Nausea ICD-10-CM: R11.0 ICD-9-CM: 787.02  10/10/2018 Unknown Epigastric pain ICD-10-CM: R10.13 ICD-9-CM: 789.06  Unknown Yes Pain of metastatic malignancy ICD-10-CM: G89.3 ICD-9-CM: 338.3  8/9/2018 Yes Anemia due to chemotherapy ICD-10-CM: D64.81, T45.1X5A 
ICD-9-CM: 285.3, E933.1  7/26/2018 Yes Peritoneal carcinomatosis ([facility] ICD-10-CM: C78.6, C80.1 ICD-9-CM: 197.6, 199.1  6/1/2018 Yes Laboratory:     
Recent Labs 10/24/18 
3227 10/24/18 
4961 HGB 9.5*  --   
HCT 30.2*  --   
WBC 15.5*  --   
  --   
BUN  --  25* CREA  --  0.60 K  --  4.1 Plan of Care/Planned Procedure: 
Risks, benefits, and alternatives reviewed with patient and she agrees to proceed with the procedure. Plan is for G tube placement Jean Pretty MD

## 2018-10-25 NOTE — PROGRESS NOTES
Bedside and Verbal shift change report given to [address] (oncoming nurse) by Mandy Locke RN (offgoing nurse). Report included the following information SBAR, Kardex, Intake/Output, MAR and Recent Results. 2016: pt agree to have TPN hooked up. Still debating about connecting PEG to suction. In lots of pain. Paging palliative medicine now. 2045: spoke with Dr. Constantin Orellana. Updated him on patient refusing to be hooked to suction. Received orders to give ativan and call back if not effective in managing patient's pain/anxiety. 2130: Patient sleeping 
 
2240: Assisted patient to bedside commode for successful void. Emptied PEG drainage bag 150ml. States pain is starting to feel better controlled. Patient refuse BS check and vitals until closer to 1am. Will continue to monitor and cluster care. 0059: Emptied PEG drainage bag 50mL. 0500: Pt in much better spirits. PEG drainage bag emptied 120mL. Pt open to hooking up to suction. Missing valve to hook up to suction. Paging ICU for replacement part.

## 2018-10-25 NOTE — PROGRESS NOTES
[address], [address] [place], [address] [place] 
P [phone]  F [phone] Patient: Kathy Kunz Admit Date: 10/16/2018 Hospital Day:9 Admit Dx: Partial small bowel obstruction Ovarian cancer ([address] Malnutrition ([address] Dehydration SBO (small bowel obstruction) ([address] Subjective: Had palliative G-Tube placed yesterday. Now to gravity drainage with bilious drainage. Pt is much happier today having had the NGT removed. Said she slept best she had in a week after pain was controlled last evening. Nausea improved this morning. Pain under better control this morning after Ativan and increase in demand dose on PCA. Currently denies nausea Continues passing flatus, but no BM Objective: Intake/Output Summary (Last 24 hours) at 10/25/2018 8549 Last data filed at 10/25/2018 1336 Gross per 24 hour Intake 1728.82 ml Output 1350 ml Net 378.82 ml Physical Exam 
/73 (BP 1 Location: Right arm, BP Patient Position: At rest)   Pulse 97   Temp 98.1 °F (36.7 °C)   Resp 16   Wt 118 lb 14.4 oz (53.9 kg)   LMP 02/02/2015 (Approximate)   SpO2 98%   BMI 18.35 kg/m² General: Remains tired appearing but better then last seen. Remains cachetic appearing.  and father present again this morning Cardiac:  Regular without M/R/G Lungs:  CTA Bilat without wheezing or rales. Abdomen:  soft, nondistended, tender especially to the area of g-tub insertion as expected. Bowel sounds remain present, but higher pitched today. G-tube with bilious drainage. Extremity: no edema Data Review Lab Results Component Value Date/Time WBC 13.7 (H) 10/25/2018 04:18 AM  
 ABS. NEUTROPHILS 10.5 (H) 10/25/2018 04:18 AM  
 HGB 9.0 (L) 10/25/2018 04:18 AM  
 HCT 28.5 (L) 10/25/2018 04:18 AM  
 .9 (H) 10/25/2018 04:18 AM  
 MCH 32.5 10/25/2018 04:18 AM  
 PLATELET 020 39/33/2085 04:18 AM  
 
Lab Results Component Value Date/Time Sodium 135 (L) 10/25/2018 04:18 AM  
 Potassium 4.3 10/25/2018 04:18 AM  
 Chloride 104 10/25/2018 04:18 AM  
 CO2 21 10/25/2018 04:18 AM  
 Glucose 80 10/25/2018 04:18 AM  
 BUN 28 (H) 10/25/2018 04:18 AM  
 Creatinine 0.49 (L) 10/25/2018 04:18 AM  
 Calcium 9.0 10/25/2018 04:18 AM  
 Albumin 2.5 (L) 10/25/2018 04:18 AM  
 Bilirubin, total 0.4 10/25/2018 04:18 AM  
 AST (SGOT) 28 10/25/2018 04:18 AM  
 ALT (SGPT) 174 (H) 10/25/2018 04:18 AM  
 Alk. phosphatase 145 (H) 10/25/2018 04:18 AM  
 
 
IMAGING: 
KUB 10/18/2018 FINDINGS: There are multiple dilated loops of small bowel throughout the abdomen  
and pelvis is seen on prior CT dated October 15, 2018. NG tube tip and side port  
overlie the stomach. There is no free intraperitoneal gas. Filter overlies the  
expected location of the IVC at the L2 level. There is a vascular stent  
overlying SPECT dislocation of the left common iliac vein. IMPRESSION: Multiple, persistent dilated loops of small bowel throughout the  
abdomen and pelvis. No evidence of perforation. NG tube in appropriate position CT Results (most recent): 
Results from Hospital Encounter encounter on 10/16/18 CT INTRO GASTROSTOMY TUBE PERC Narrative PROCEDURE: Gastrostomy tube placement INDICATION: Decompression, bowel obstruction OPERATING PHYSICIAN: Ludwin Alaniz M.D. 
 
ESTIMATED BLOOD LOSS: None SPECIMENS REMOVED: None FLOURO TIME: None COMPLICATIONS: None immediate. Procedure and findings:  
 
CT dose reduction was achieved through use of a standardized protocol tailored 
for this examination and automatic exposure control for dose modulation. After obtaining informed consent, patient was brought the angiographic suite and 
placed supine angiographic table. An 5 [place] catheter was positioned in the 
stomach under fluoroscopy. Subsequently, the patient was moved to the CT table. Sedation was obtained with intravenous Versed and fentanyl. The upper abdomen 
was prepped and draped in maximal sterile barrier technique. Prophylactic 
antibiotic of Ancef 2 g was administered prior to the intervention and 
discontinued prior to the completion of the procedure. Approximately 600 cc of air was used to insufflate the stomach. The puncture 
site was identified and local anesthesia was achieved with 1% lidocaine 
injection. Antegrade puncture into the stomach was performed. 2 T-tacks were 
delivered and traction was applied to appose the anterior stomach wall against 
the anterior abdomen. A needle was then advanced into the stomach lumen and 
contrast injection was performed to confirm intraluminal location. An Amplatz 
wire was then advanced through the needle and the tract was serially dilated up 
to [address] [place]. A 18 [place] gastrostomy tube was advanced over the wire into the 
stomach. The balloon was inflated with 8 cc of dilute contrast. Final 
intraluminal position of the gastrostomy tube was confirmed with gentle contrast 
injection under CT. The patient tolerated the procedure well and there were no 
immediate complications. A dry sterile dressing was applied. Impression IMPRESSION:  
 
Successful placement of 25 [place] gastrostomy tube under CT guidance. Assessment/Plan:  
Admitted 10/16/2018 for Partial small bowel obstruction Ovarian cancer Malnutrition Dehydration Cancer related pain Depression Nausea and vomiting. Active Hospital Problems Diagnosis Date Noted  Dehydration 10/16/2018  Malnutrition ([address] 10/16/2018  
 SBO (small bowel obstruction) ([address] 10/16/2018  Severe protein-calorie malnutrition ([address] 10/11/2018  Ovarian cancer ([address] 10/10/2018  Nausea 10/10/2018  Epigastric pain  Pain of metastatic malignancy 08/09/2018  Anemia due to chemotherapy 07/26/2018  Peritoneal carcinomatosis ([address] 06/01/2018 [id number] [facility] admitted with carcinomatosis, SBO, protein/calorie malnutrition d/t chronic disease and obstruction with N/V and cancer related pain. Onc: Undergoing Doxil/Avastin s/p 3 cycles last 10/4/18. Recent EOD CT with peritoneal disease response. Partial vs complete SBO. KUB 10/21/18 showed improvement in dlialtion of small bowel. Palliative G-tube placed 10/24. Heme/CV: Hemodynamically stable, anemia d/t chemo/chronic disease. FEN/GI: Partial SBO with delayed gastric emptying (by endoscopy 10/12/18) with malnutrition - cyclic TPN over 12 hours with Lipid TIW. Palliative venting G-tube in place 10/25. Meaningful, prolonged resolution of obstruction unlikely in setting. Family understands. Continue Reglan, PPI/H2. Octreotide discussed by palliative. Would agree if output remains high. Ascites:Improved after drainage 10/18. Neuro:  pain/antidpression regimen per palliative care, following. Will monitor closely with changes over weekend PPX: Resume lovenox this AM. Holding home xarelto Disposition: Stable obstructive without meaningful resolution, difficult course with her disease these last few months. G-tube for palliation in place. Discussed possible hospice at some point. Son getting  in November, discussed palliative options moving toward this goal.  
Discussed with Dr. Jacki Madrigal today. Pt is aware that since the wedding is 7 hours away and at a remote location being 2 hours from the nearest medical facility, she is realizing she may have to be \"remotly\" present at the wedding.  did discuss option of small ceremony her locally and a smaller celebration. Will monitor throughout the day Albertina Lopez NP

## 2018-10-25 NOTE — PROGRESS NOTES
NUTRITION Recommendations: 1. Switch to 55CI cyclic TPN schedule: 
 6%AA, D20 @ 65ml/hr x 1 hr 
 6%AA, D20 @ 155ml/hr x 1 hr 
 6%AA, D20 @ 65ml/hr x 1 hr 
2. Continue lipids as ordered 3. Monitor BG levels with slight decrease in insulin if BG drops 4. Daily weights while on TPN Diet: clear liquids Supplements: Ensure Clear TID 
TPN:  6%AA, D20 @ 50ml/hr x 1 hr 
 6%AA, D20 @ 100ml/hr x 16 hr 
 6%AA, D20 @ 50ml/hr x 1 hr 
 + 1mg folic acid, 6538TD vit C, 100mg thiamine, 5mg zinc sulfate + 20 units insulin/liter + 500ml, 20% lipids 3x/week Meds: lactulose, reglan, protonix, scopolamine, lyrica, NS @ 75ml/hr, morphine PCA Chart reviewed for 12hr TPN recommendations. PEG placed yesterday for palliative decompression. TPN in place but will switch to 12hr schedule to allow time off pump during the day. Recommend:  
 6%AA, D20 @ 65ml/hr x 1 hr 
 6%AA, D20 @ 155ml/hr x 1 hr 
 6%AA, D20 @ 65ml/hr x 1 hr 
Provide same volume/nutrition as current regimen: 1680ml, 1970kcal, 101g protein, 336g CHO. **BG well controlled with current insulin but if levels drop may consider slight reduction to avoid risk of hypoglycemia. See previous notes for additional details. Will continue to follow with goals and monitoring/evaluation per previous notes. Estimated Nutrition Needs:  
Kcals/day: 2030 Kcals/day(6915-3795 kcal/day ( 35-40 kcal/kg)) Protein: 75 g(75-87 gm/day ( 1.3-1.5 gm/kg)) Fluid: 2000 ml Based On: Kcal/kg - specify (Comment) Weight Used: Actual wt Leatha Camargo RD

## 2018-10-25 NOTE — PROGRESS NOTES
Palliative Medicine Consult Ruperto: [phone] ([phone]) Patient Name: Sangita St YOB: 1965 Date of Initial Consult: 10/10/18 Reason for Consult: Cancer related symptom management Requesting Provider: Dr. Janna Mcdowell Primary Care Physician: Brain Mock MD 
 
 SUMMARY:  
Sangita St is a 48 y.o. with recurrent ovarian malignancy on Doxil/Avastin per Dr. Janna Mcdowell, known to outpatient palliative medicine. She was admitted 10/10/2018 from home due to worsening nausea and vomiting followed by acute worsening of epigastric abdominal pain. Last chemotherapy date was 10/4. Current medical issues leading to Palliative Medicine involvement include: cancer related symptoms including pain, nausea, vomiting. S/p venting peg placement on 10/24 by IR. PALLIATIVE DIAGNOSES:  
1. Epigastric abdominal pain 2. Bowel obstruction 3. Chronic neoplasm related lower abdominal pain 4. Nausea with emesis 5. Delayed gastric motility - by EGD PLAN:  
1. Abdominal pain around peg tube- she understands this is post operative and will improve. She had uncontrolled pain for many hours yesterday and used 9 boluses in 3 hours this morning. Will increase bolus dose to 2 mg every 8 mins to help with peg tube pain. - generalized abdominal pain from malignancy is well controlled. Continue basal rate of morphine pca for now. 2. Nausea- better now with peg placement. Has not needed suction yet. On Reglan and Scopolamine. 3. 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 4. Goals of care- Mrs. Yash Lozoya is tearful with the turn of events. She was hoping to have symptom free management of her metastatic cancer and is disappointed her symptoms have made her very weak. We talked at length about the possibility of her travelling to her son's wedding in [place].  The car ride is 8 hours and she will be 2 hours away from a medical facility which she is not comfortable with. She is considering skyping in for the events. I suggested that they do a ceremony here with her present prior to the big event and she will d/w her family. 5. We agreed to discuss code status and further options including Hospice based on how she does with the venting peg. 
6. Communicated plan of care with: Palliative IDT, Miguel Pacheco NP 
 
 
 GOALS OF CARE / TREATMENT PREFERENCES:  
 
GOALS OF CARE:   Full care escalation Patient/Health Care Proxy Stated Goals: Prolong life TREATMENT PREFERENCES:  
Code Status: Full Code Advance Care Planning: 
Advance Care Planning 10/17/2018 Patient's Healthcare Decision Maker is: Legal Next of Kin Primary Decision Maker Name -  
Primary Decision Maker Phone Number -  
Primary Decision Maker Relationship to Patient -  
Confirm Advance Directive None Medical Interventions: Full interventions Other: As far as possible, the palliative care team has discussed with patient / health care proxy about goals of care / treatment preferences for patient. HISTORY:  
 
History obtained from:  Pt, , EMR 
 
CHIEF COMPLAINT: upper abdominal pain HPI/SUBJECTIVE: The patient is:  
[x] Verbal and participatory [] Non-participatory due to:  
 
Had a rough few hours after the peg placement due to pain. She was able to sleep after getting some ativan. She feels she slept the best in over a week. Pain around peg is still present and she is using the pca for that. Clinical Pain Assessment (nonverbal scale for severity on nonverbal patients):  
Clinical Pain Assessment Severity: 0 Location: abd bl upper quads, LLQ Character: crampy Duration: weeks Effect: functional 
Factors: none in particular Frequency: chronic Duration: for how long has pt been experiencing pain (e.g., 2 days, 1 month, years) Frequency: how often pain is an issue (e.g., several times per day, once every few days, constant) FUNCTIONAL ASSESSMENT:  
 
Palliative Performance Scale (PPS): PPS: 50 PSYCHOSOCIAL/SPIRITUAL SCREENING:  
 
Palliative IDT has assessed this patient for cultural preferences / practices and a referral made as appropriate to needs (Cultural Services, Patient Advocacy, Ethics, etc.) Advance Care Planning: 
Advance Care Planning 10/17/2018 Patient's Healthcare Decision Maker is: Legal Next of Kin Primary Decision Maker Name -  
Primary Decision Maker Phone Number -  
Primary Decision Maker Relationship to Patient -  
Confirm Advance Directive None Any spiritual / Orthodoxy concerns: 
[] Yes /  [x] No 
 
Caregiver Burnout: 
[] Yes /  [x] No /  [] No Caregiver Present Anticipatory grief assessment:  
[] Normal  / [] Maladaptive   Did not assess ESAS Anxiety: Anxiety: 3 ESAS Depression: Depression: 3 REVIEW OF SYSTEMS:  
 
Positive and pertinent negative findings in ROS are noted above in HPI. The following systems were [x] reviewed / [] unable to be reviewed as noted in HPI Other findings are noted below. Systems: constitutional, ears/nose/mouth/throat, respiratory, gastrointestinal, genitourinary, musculoskeletal, integumentary, neurologic, psychiatric, endocrine. Positive findings noted below. Modified ESAS Completed by: provider Fatigue: 9 Drowsiness: 3 Depression: 3 Pain: 0 Anxiety: 3 Nausea: 3 Anorexia: 9 Dyspnea: 0 Constipation: Yes Stool Occurrence(s): 1 PHYSICAL EXAM:  
 
From RN flowsheet: 
Wt Readings from Last 3 Encounters:  
10/25/18 118 lb 14.4 oz (53.9 kg) 10/16/18 127 lb 12.8 oz (58 kg) 10/04/18 128 lb 6.4 oz (58.2 kg) Blood pressure 126/73, pulse 97, temperature 98.1 °F (36.7 °C), resp. rate 16, weight 118 lb 14.4 oz (53.9 kg), last menstrual period 02/02/2015, SpO2 98 %, unknown if currently breastfeeding. Pain Scale 1: Visual 
Pain Intensity 1: 0 Pain Onset 1: chronic Pain Location 1: Abdomen Pain Orientation 1: Upper Pain Description 1: Constant Pain Intervention(s) 1: Encouraged PCA Last bowel movement, if known:  
 
Constitutional: chronically ill appearing but generally comfortable Eyes: pupils equal, anicteric ENMT: no nasal discharge, moist mucous membranes, no lesions or thrush in mouth Cardiovascular: regular rhythm, distal pulses intact, no edema Respiratory: breathing not labored, symmetric Gastrointestinal: abdomen is flat, bs active, mild TTP in epigastrium w/o rebound or guarding Musculoskeletal: no deformity, no tenderness to palpation Skin: warm, dry Neurologic: following commands, moving all extremities Psychiatric: full affect, no hallucinations HISTORY:  
 
Active Problems: 
  Peritoneal carcinomatosis ([address] (6/1/2018) Anemia due to chemotherapy (7/26/2018) Pain of metastatic malignancy (8/9/2018) Ovarian cancer ([address] (10/10/2018) Nausea (10/10/2018) Epigastric pain () Severe protein-calorie malnutrition ([address] (10/11/2018) Dehydration (10/16/2018) Malnutrition ([address] (10/16/2018) SBO (small bowel obstruction) ([address] (10/16/2018) Past Medical History:  
Diagnosis Date  Anxiety  BRCA1 positive  Calculus of kidney 1/13  
 right  Hernia, inguinal, left 2012  MS (multiple sclerosis) ([address] 1996  Nausea & vomiting  Ovarian cancer ([address] 3/2015  
 high grade, stage 3C papillary serous  Thromboembolus ([address] 8/2007  
 lower abdomen post fall Past Surgical History:  
Procedure Laterality Date  HX GYN  2009  
 endometrial ablation with punctured uterus  HX OTHER SURGICAL  8/2007  
 green filter  HX MARIEL AND BSO  3/2014  
 ovarian cancer Family History Problem Relation Age of Onset  Cancer Paternal Grandmother   
     breast  
 Breast Cancer Paternal Grandmother  Coronary Artery Disease Mother 48 CABG and PCI  Heart Surgery Father   
     valve replacement  No Known Problems Brother  No Known Problems Brother  No Known Problems Brother  No Known Problems Daughter  No Known Problems Son  No Known Problems Son  No Known Problems Son History reviewed, no pertinent family history. Social History Tobacco Use  Smoking status: Never Smoker  Smokeless tobacco: Never Used Substance Use Topics  Alcohol use: No  
 
Allergies Allergen Reactions  Pcn [Penicillins] Rash Current Facility-Administered Medications Medication Dose Route Frequency  TPN ADULT - CENTRAL  0-155 mL/hr IntraVENous TITRATE  TPN ADULT - CENTRAL  0-100 mL/hr IntraVENous TITRATE  haloperidol lactate (HALDOL) injection 1 mg  1 mg IntraVENous Q4H PRN  
 metoclopramide HCl (REGLAN) injection 10 mg  10 mg IntraVENous Q6H  
 aspirin-acetaminophen-caffeine (EXCEDRIN ES) per tablet 1-2 Tab   (Patient Supplied)  1-2 Tab Oral Q6H PRN  pantoprazole (PROTONIX) 40 mg in sodium chloride 0.9% 10 mL injection  40 mg IntraVENous Q24H  
 amitriptyline (ELAVIL) tablet 10 mg  10 mg Oral PCD  pregabalin (LYRICA) capsule 25 mg  25 mg Oral BID  
 0.9% sodium chloride infusion  75 mL/hr IntraVENous CONTINUOUS  
 scopolamine (TRANSDERM-SCOP) 1 mg over 3 days 1 Patch  1 Patch TransDERmal Q72H  enoxaparin (LOVENOX) injection 40 mg  40 mg SubCUTAneous Q24H  
 LORazepam (ATIVAN) injection 1 mg  1 mg IntraVENous Q4H PRN  
 sodium chloride (NS) flush 5-10 mL  5-10 mL IntraVENous Q8H  
 sodium chloride (NS) flush 5-10 mL  5-10 mL IntraVENous PRN  
 diphenhydrAMINE (BENADRYL) injection 12.5 mg  12.5 mg IntraVENous Q4H PRN  
 insulin lispro (HUMALOG) injection   SubCUTAneous Q6H  
 glucose chewable tablet 16 g  4 Tab Oral PRN  
 dextrose (D50W) injection syrg 12.5-25 g  12.5-25 g IntraVENous PRN  
 glucagon (GLUCAGEN) injection 1 mg  1 mg IntraMUSCular PRN  
 fat emulsion 20% (LIPOSYN, INTRAlipid) infusion 500 mL  500 mL IntraVENous Q TUE, THU & SAT  
  lactulose (CHRONULAC) solution 20 g  20 g Per NG tube BID  morphine  mg / 30 mL   IntraVENous CONTINUOUS  
 
 
 
 LAB AND IMAGING FINDINGS:  
 
Lab Results Component Value Date/Time WBC 13.7 (H) 10/25/2018 04:18 AM  
 HGB 9.0 (L) 10/25/2018 04:18 AM  
 PLATELET 802 49/73/6926 04:18 AM  
 
Lab Results Component Value Date/Time Sodium 135 (L) 10/25/2018 04:18 AM  
 Potassium 4.3 10/25/2018 04:18 AM  
 Chloride 104 10/25/2018 04:18 AM  
 CO2 21 10/25/2018 04:18 AM  
 BUN 28 (H) 10/25/2018 04:18 AM  
 Creatinine 0.49 (L) 10/25/2018 04:18 AM  
 Calcium 9.0 10/25/2018 04:18 AM  
 Magnesium 1.9 10/25/2018 04:18 AM  
 Phosphorus 3.7 10/25/2018 04:18 AM  
  
Lab Results Component Value Date/Time AST (SGOT) 28 10/25/2018 04:18 AM  
 Alk. phosphatase 145 (H) 10/25/2018 04:18 AM  
 Protein, total 6.4 10/25/2018 04:18 AM  
 Albumin 2.5 (L) 10/25/2018 04:18 AM  
 Globulin 3.9 10/25/2018 04:18 AM  
 
Lab Results Component Value Date/Time INR 1.0 07/19/2018 11:54 AM  
 Prothrombin time 10.7 07/19/2018 11:54 AM  
  
No results found for: IRON, FE, TIBC, IBCT, PSAT, FERR No results found for: PH, PCO2, PO2 No components found for: [place] Lab Results Component Value Date/Time CK 72 01/09/2018 11:09 AM  
 CK - MB <1.0 01/09/2018 11:09 AM  
  
 
 
   
 
Total time:  60m Counseling / coordination time, spent as noted above:  60m 
> 50% counseling / coordination?:  Yes Prolonged service was provided for  []30 min   []75 min in face to face time in the presence of the patient, spent as noted above. Time Start:  
Time End:  
Note: this can only be billed with 20937 (initial) or 75911 (follow up). If multiple start / stop times, list each separately.

## 2018-10-26 NOTE — PROGRESS NOTES
Music Therapy Assessment Michael Delgado [id number]  [id number]   
1965  48 y.o.  female Patient Telephone Number: [phone] (home) Taoist Affiliation: Mandaen Language: [place] Extended Emergency Contact Information Primary Emergency Contact: Jose Payan Address: [address], [address] Home Phone: [phone] Mobile Phone: [phone] Relation: Spouse Patient Active Problem List  
 Diagnosis Date Noted  Dehydration 10/16/2018  Malnutrition ([address] 10/16/2018  
 SBO (small bowel obstruction) ([address] 10/16/2018  Gastroparesis  Severe protein-calorie malnutrition ([address] 10/11/2018  Ovarian cancer ([address] 10/10/2018  Abdominal pain 10/10/2018  Nausea 10/10/2018  Epigastric pain  Xerostomia  Pain of metastatic malignancy 08/09/2018  Other proteinuria 07/26/2018  Anemia due to chemotherapy 07/26/2018  On anticoagulant therapy 07/19/2018  Peritoneal carcinomatosis ([address] 06/01/2018  Malignant ascites 06/01/2018  Thrombocytopenia ([address] 03/17/2017  
 CINV (chemotherapy-induced nausea and vomiting) 03/17/2017  Malignant neoplasm of both ovaries ([address] 04/09/2015 Date: 10/26/2018 Mental Status:   [x] Alert [  ] [place] [  ]  Confused  [  ] Minimally responsive Communication Status: [  ] Impaired Speech [  ] Nonverbal -N/A Physical Status:   [  ] Oxygen in use  [  ] Hard of Hearing [  ] Vision Impaired [  ] Ambulatory  [  ] Ambulatory with assistance [  ] Non-ambulatory -N/A Music Preferences, Background: Traditional Hymns, Contemporary Shinto, [place], songs from [facility]. 
  
Clinical Problem to be addressed: Positive social interaction, spiritual support. Goal(s) met in session: N/A: Please see Session Observations below. Physical/Pain management (Scale of 1-10): Pre-session rating ___________    Post-session rating __________ [  ] Increased relaxation   [  ] Regulated breathing patterns [  ] Decreased muscle tension   [  ] Minimized physical distress Emotional/Psychological: 
[  ] Increased self-expression   [  ] Decreased aggressive behavior [  ] Decreased sadness   [  ] Discussed healthy coping skills [  ] Improved mood    [  ] Decreased withdrawn behavior Social: 
[  ] Decreased feelings of isolation/loneliness [  ] Positive social interaction [  ] Provided support and/or comfort for family/friends Spiritual: 
[  ] Spiritual support    [  ] Expressed peace [  ] Expressed pavel    [  ] Discussed beliefs Techniques Utilized (Check all that apply): N/A: Please see Session Observations below. [  ] Procedural support MT [  ] Music for relaxation [  ] Patient preferred music 
[  ] Jocelyn analysis  [  ] Song choice  [  ] Music for validation [  ] Entrainment  [  ] Progressive muscle relax. [  ] Guided visualization [  ] Viktoria Gone  [  ] Patient instrument playing [  ] Adelina Friend writing [  ] John Harvey along   [  ] Prudencio Candy  [  ] Sensory stimulation [  ] Active Listening  [  ] Music for spiritual support [  ] Making of CDs as gifts Session Observations:  F/u visit; Patient (pt) was receiving care from her nurse and requested this music therapist (MT) follow up at a later time. Will follow as able. JERRY Serra (Music Therapist-Board Certified) Spiritual Care Department Referral-based service

## 2018-10-26 NOTE — PROGRESS NOTES
Problem: Falls - Risk of 
Goal: *Absence of Falls Document Lelo Bashir Fall Risk and appropriate interventions in the flowsheet. Outcome: Progressing Towards Goal 
Fall Risk Interventions: 
Mobility Interventions: Patient to call before getting OOB Medication Interventions: Evaluate medications/consider consulting pharmacy, Patient to call before getting OOB, Teach patient to arise slowly Elimination Interventions: Call light in reach, Patient to call for help with toileting needs Problem: Pressure Injury - Risk of 
Goal: *Prevention of pressure injury Document Joni Scale and appropriate interventions in the flowsheet. Outcome: Progressing Towards Goal 
Pressure Injury Interventions: Activity Interventions: Increase time out of bed Mobility Interventions: Pressure redistribution bed/mattress (bed type) Nutrition Interventions: Document food/fluid/supplement intake

## 2018-10-26 NOTE — PROGRESS NOTES
Bedside shift change report given to Korey De La O RN and Jen Gauthier RN (oncoming nurse) by Jame Allen RN and Mai Rivera RN (offgoing nurse). Report included the following information SBAR, Kardex, Intake/Output and MAR.

## 2018-10-26 NOTE — PROGRESS NOTES
Follow up visit attempted. Had brief conversation with pt's  Janel Lazaro at doorway; pt unavailable at this time. He shared that he would let Jarold Closs know that I stopped by and that they would page for  support if needed/desired over the weekend. Assurance of continued prayers offered. Paola Dougherty, Palliative

## 2018-10-26 NOTE — PROGRESS NOTES
PM Rounds Gyn Onc 
 
Good day overall. Transitioning to duragesic patch. Gtube functional.  No N/V. Questions regarding the coming week(s). Discussed plan for family meeting Monday following a weekend to titrate pain mgmt protocol and more deliniation will follow, though understanding goal toward wedding is understood. Educated on flushing and clamping of Gtube, demonstrated with Katie Mason and Luis Yañez. Continue clamp/flushing/clear liquids over the weekend to understand tolerability of intake and ongoing obstruction. Discussed code status and advised, as able, to complete or update AMD/living will paperwork over the weekend; they think they have completed this at some point. Advised given her current performance status as ECOG 3, Karnofsky score of 50>60% in our patient population as we near palliative/hospice management, this translating to near futility of survival to discharge or with meaningful quality of life. They are appreciative, questions answered. Going to think about it tonight and address with Dr. Destiny Bustamante in the morning.   
 
>60 minutes spent today with patient and family on the unit, >50% allotted to direct counseling and answering questions, floor mgmt and coordination of care related to the above.  
 
LESIA Gardner

## 2018-10-26 NOTE — PROGRESS NOTES
[address], [address] [place], [facility] Martha Mallory 
P [phone]  F [phone] Patient: Kathy Kunz Admit Date: 10/16/2018 Hospital Day:9 Admit Dx: Partial small bowel obstruction Ovarian cancer ([address] Malnutrition ([address] Dehydration SBO (small bowel obstruction) ([address] Subjective:  
Comfortable with Gtube, slept well 2 nights. No N/V. Gtube with less output overall. +flatus. No BM Pain currently controlled. Remains sleepy.  at bedside. Ambulating during the day. Objective: Intake/Output Summary (Last 24 hours) at 10/26/2018 [facility] Last data filed at 10/26/2018 4183 Gross per 24 hour Intake 1181.34 ml Output 1660 ml Net -478.66 ml  
 
I/O 100/24 hr 
 
Physical Exam 
/52 (BP 1 Location: Right arm, BP Patient Position: At rest)   Pulse 83   Temp 97.7 °F (36.5 °C)   Resp 14   Wt 123 lb 3.2 oz (55.9 kg)   LMP 02/02/2015 (Approximate)   SpO2 96%   BMI 19.01 kg/m² General: tired and cachetic appearing.  present again this morning Cardiac:  Regular Lungs:  nonlabored Abdomen:  soft, nondistended, nontender. G-tube bag empty, bilious staining. Extremity: no edema Data Review Lab Results Component Value Date/Time WBC 13.7 (H) 10/25/2018 04:18 AM  
 ABS. NEUTROPHILS 10.5 (H) 10/25/2018 04:18 AM  
 HGB 9.0 (L) 10/25/2018 04:18 AM  
 HCT 28.5 (L) 10/25/2018 04:18 AM  
 .9 (H) 10/25/2018 04:18 AM  
 MCH 32.5 10/25/2018 04:18 AM  
 PLATELET 219 76/80/6319 04:18 AM  
 
Lab Results Component Value Date/Time  Sodium 135 (L) 10/26/2018 06:45 AM  
 Potassium 4.0 10/26/2018 06:45 AM  
 Chloride 105 10/26/2018 06:45 AM  
 CO2 22 10/26/2018 06:45 AM  
 Glucose 59 (L) 10/26/2018 06:45 AM  
 BUN 27 (H) 10/26/2018 06:45 AM  
 Creatinine 0.53 (L) 10/26/2018 06:45 AM  
 Calcium 8.3 (L) 10/26/2018 06:45 AM  
 Albumin 2.0 (L) 10/26/2018 06:45 AM  
 Bilirubin, total 0.3 10/26/2018 06:45 AM  
 AST (SGOT) 19 10/26/2018 06:45 AM  
 ALT (SGPT) 105 (H) 10/26/2018 06:45 AM  
 Alk. phosphatase 126 (H) 10/26/2018 06:45 AM  
 
 
IMAGING: 
KUB 10/18/2018 FINDINGS: There are multiple dilated loops of small bowel throughout the abdomen  
and pelvis is seen on prior CT dated October 15, 2018. NG tube tip and side port  
overlie the stomach. There is no free intraperitoneal gas. Filter overlies the  
expected location of the IVC at the L2 level. There is a vascular stent  
overlying SPECT dislocation of the left common iliac vein. IMPRESSION: Multiple, persistent dilated loops of small bowel throughout the  
abdomen and pelvis. No evidence of perforation. NG tube in appropriate position CT Results (most recent): 
Results from Hospital Encounter encounter on 10/16/18 CT INTRO GASTROSTOMY TUBE PERC Narrative PROCEDURE: Gastrostomy tube placement INDICATION: Decompression, bowel obstruction OPERATING PHYSICIAN: Safia Arias M.D. 
 
ESTIMATED BLOOD LOSS: None SPECIMENS REMOVED: None FLOURO TIME: None COMPLICATIONS: None immediate. Procedure and findings:  
 
CT dose reduction was achieved through use of a standardized protocol tailored 
for this examination and automatic exposure control for dose modulation. After obtaining informed consent, patient was brought the angiographic suite and 
placed supine angiographic table. An 5 Jamaican catheter was positioned in the 
stomach under fluoroscopy. Subsequently, the patient was moved to the CT table. Sedation was obtained with intravenous Versed and fentanyl. The upper abdomen 
was prepped and draped in maximal sterile barrier technique. Prophylactic 
antibiotic of Ancef 2 g was administered prior to the intervention and 
discontinued prior to the completion of the procedure. Approximately 600 cc of air was used to insufflate the stomach. The puncture site was identified and local anesthesia was achieved with 1% lidocaine 
injection. Antegrade puncture into the stomach was performed. 2 T-tacks were 
delivered and traction was applied to appose the anterior stomach wall against 
the anterior abdomen. A needle was then advanced into the stomach lumen and 
contrast injection was performed to confirm intraluminal location. An Amplatz 
wire was then advanced through the needle and the tract was serially dilated up 
to 18 [place]. A 18 Martiniquais gastrostomy tube was advanced over the wire into the 
stomach. The balloon was inflated with 8 cc of dilute contrast. Final 
intraluminal position of the gastrostomy tube was confirmed with gentle contrast 
injection under CT. The patient tolerated the procedure well and there were no 
immediate complications. A dry sterile dressing was applied. Impression IMPRESSION:  
 
Successful placement of 25 Martiniquais gastrostomy tube under CT guidance. Assessment/Plan:  
Admitted 10/16/2018 for Partial small bowel obstruction Ovarian cancer Malnutrition Dehydration Cancer related pain Depression Nausea and vomiting. Active Hospital Problems Diagnosis Date Noted  Dehydration 10/16/2018  Malnutrition ([address] 10/16/2018  
 SBO (small bowel obstruction) ([address] 10/16/2018  Severe protein-calorie malnutrition ([address] 10/11/2018  Ovarian cancer ([address] 10/10/2018  Nausea 10/10/2018  Epigastric pain  Pain of metastatic malignancy 08/09/2018  Anemia due to chemotherapy 07/26/2018  Peritoneal carcinomatosis ([address] 06/01/2018 [id number] [facility] admitted with carcinomatosis, SBO, protein/calorie malnutrition d/t chronic disease and obstruction with N/V and cancer related pain. Onc: Undergoing Doxil/Avastin s/p 3 cycles last 10/4/18. Recent EOD CT with peritoneal disease response.   Partial vs complete SBO. KUB 10/21/18 showed improvement in dlialtion of small bowel. Palliative G-tube placed 10/24. Will likely need to address hospice and DNR status this admit. Rapid decline expected. Heme/CV: Hemodynamically stable, anemia d/t chemo/chronic disease. FEN/GI: Partial SBO with delayed gastric emptying (by endoscopy 10/12/18) with malnutrition - cyclic TPN over 12 hours with Lipid TIW. Palliative venting G-tube in place 10/25. No expected resolution. Family understands. Continue Reglan, PPI/H2. Octreotide discussed by palliative. Would agree if output remains high. Ascites:Improved after drainage 10/18. Neuro:  pain/antidpression regimen per palliative care, following. Will monitor closely with changes over weekend PPX: lovenox. Holding home xarelto Disposition: Stable obstructive without meaningful resolution, difficult course with her disease these last few months. G-tube for palliation in place. Discussed possible hospice at some point. Son getting  in November, discussed palliative options moving toward this goal.  
Discussed with Dr. Duncan Quezada today. Pt is aware that since the wedding is 7 hours away and at a remote location being 2 hours from the nearest medical facility, she is realizing she may have to be \"remotly\" present at the wedding.  did discuss option of small ceremony her locally and a smaller celebration. Will monitor throughout the day LESIA Vallejo Attending Attestation I have seen and examined the above patient and agree with the care provider's assessment and plan. Will f/u with Palliative Care recommendations as well.   
 
Yanelis Ledesma MD

## 2018-10-26 NOTE — PROGRESS NOTES
Pt refused VS because \"someone took them early this morning\" and pt is requesting not to be bothered as much as possible today. She will call us when she wants us to come into room.   
Bhavana Mann

## 2018-10-26 NOTE — PROGRESS NOTES
Palliative Medicine Consult Ruperto: [phone] ([phone]) Patient Name: Siddhartha Wan YOB: 1965 Date of Initial Consult: 10/10/18 Reason for Consult: Cancer related symptom management Requesting Provider: Dr. David Whittaker Primary Care Physician: Alex Moscoso MD 
 
 SUMMARY:  
Siddhartha Wan is a 48 y.o. with recurrent ovarian malignancy on Doxil/Avastin per Dr. David Whittaker, known to outpatient palliative medicine. She was admitted 10/10/2018 from home due to worsening nausea and vomiting followed by acute worsening of epigastric abdominal pain. Last chemotherapy date was 10/4. Current medical issues leading to Palliative Medicine involvement include: cancer related symptoms including pain, nausea, vomiting. S/p venting peg placement on 10/24 by IR. PALLIATIVE DIAGNOSES:  
1. Epigastric abdominal pain - resolved 2. Post-PEG placement pain - resolved 3. Bowel obstruction - ongoing 4. Chronic neoplasm related lower abdominal pain - stable 5. Nausea with emesis  - stable 6. Delayed gastric motility - clinically and by EGD PLAN:  
1. Abdominal pain around peg tube- post surgical pain. Resolved with use of IV morphine. 2. Malignancy associated abdominal pain. Well controlled on current dose morphine pca.    
 - Used 41 mg IV morphine past 24 hrs - higher than prior to G tube as pca dose was inc from 1 -->2 mg post op. With goal of transitioning to home next week, discussed options for pain management in that setting. Will place fentanyl td 25 mcg patch (dose reduced by nearly 40% as use prior to G tube was lower and to adjust for cross tolerance). Will turn basal rate off on pca this evening but continue pca boluses as ordered for breakthrough pain as we allow fentanyl td to reach steady state. 3. Nausea- improved post G tube placement. No emesis or need for suction. On Reglan and Scopolamine.   Talked about clamping G tube for short periods during the day to allow pill and some nutrient absorption. Discussed this specifically with bedside nurse and LESIA Chowdary Has to assist with mechanics of this and teaching pt/famliy for home use. Would like her to try this over the weekend. 4. Nutrition - remains on TPN, now at 12hrs/d. Discussed long term risks/benefits of utilizing. She understands it is not a good long term plan and in larger picture does not prevent overall nutritional decline or weight loss in the context of advanced malignancy. 5. 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 6. Goals of care- Mrs. Jackeline Quiroga remains aware of her shortened life expectancy. We talked about her personal goals. She is now planning to Skype into her son's wedding on Nov 9th. Both of her children are coming in from out of town this weekend to visit with her. She is concerned about her son as he has a honeymoon planned after the wedding which will take him out of the country. We talked about asking children to stay for Monday (or be available for phone call) so that we can discuss as a family the next steps and expectations. We did not review Hospice specifics today but had talked about Hospice as a support to be at home earlier this week. Family mtg tentative for Monday with  and children - time TBD. 7. We agreed to discuss code status and further options including Hospice based on how she does with the venting peg. 
8. Communicated plan of care with: Palliative IDTEpi Has PA, bedside RN. GOALS OF CARE / TREATMENT PREFERENCES:  
 
GOALS OF CARE:   Full care escalation Patient/Health Care Proxy Stated Goals: Prolong life TREATMENT PREFERENCES:  
Code Status: Full Code Advance Care Planning: 
Advance Care Planning 10/17/2018 Patient's Healthcare Decision Maker is: Legal Next of Kin Primary Decision Maker Name -  
Primary Decision Maker Phone Number -  
 Primary Decision Maker Relationship to Patient -  
Confirm Advance Directive None Medical Interventions: Full interventions Other: As far as possible, the palliative care team has discussed with patient / health care proxy about goals of care / treatment preferences for patient. HISTORY:  
 
History obtained from:  Pt, , EMR 
 
CHIEF COMPLAINT: upper abdominal pain HPI/SUBJECTIVE: The patient is:  
[x] Verbal and participatory [] Non-participatory due to:  
 
Yuri Cortez is doing better today. Happy with NG out. Tolerating periods up in a chair and walking as she desires. Painting water colors when I arrived. She reports pain is now back to good control after 12-24 hrs of pain flare post PEG placement. No nausea or vomiting with G tube remaining to gravity. No BMs.  + flatus. Clinical Pain Assessment (nonverbal scale for severity on nonverbal patients):  
Clinical Pain Assessment Severity: 2 Location: abd bl upper quads, LLQ Character: crampy Duration: weeks Effect: functional 
Factors: none in particular Frequency: chronic Duration: for how long has pt been experiencing pain (e.g., 2 days, 1 month, years) Frequency: how often pain is an issue (e.g., several times per day, once every few days, constant) FUNCTIONAL ASSESSMENT:  
 
Palliative Performance Scale (PPS): PPS: 50 PSYCHOSOCIAL/SPIRITUAL SCREENING:  
 
Palliative IDT has assessed this patient for cultural preferences / practices and a referral made as appropriate to needs (Cultural Services, Patient Advocacy, Ethics, etc.) Advance Care Planning: 
Advance Care Planning 10/17/2018 Patient's Healthcare Decision Maker is: Legal Next of Kin Primary Decision Maker Name -  
Primary Decision Maker Phone Number -  
Primary Decision Maker Relationship to Patient -  
Confirm Advance Directive None Any spiritual / Sabianism concerns: 
[] Yes /  [x] No 
 
Caregiver Burnout: 
 [] Yes /  [x] No /  [] No Caregiver Present Anticipatory grief assessment:  
[] Normal  / [] Maladaptive   Did not assess ESAS Anxiety: Anxiety: 3 ESAS Depression: Depression: 3 REVIEW OF SYSTEMS:  
 
Positive and pertinent negative findings in ROS are noted above in HPI. The following systems were [x] reviewed / [] unable to be reviewed as noted in HPI Other findings are noted below. Systems: constitutional, ears/nose/mouth/throat, respiratory, gastrointestinal, genitourinary, musculoskeletal, integumentary, neurologic, psychiatric, endocrine. Positive findings noted below. Modified ESAS Completed by: provider Fatigue: 9 Drowsiness: 3 Depression: 3 Pain: 2 Anxiety: 3 Nausea: 0 Anorexia: 9 Dyspnea: 0 Constipation: Yes Stool Occurrence(s): 1 PHYSICAL EXAM:  
 
From RN flowsheet: 
Wt Readings from Last 3 Encounters:  
10/26/18 55.9 kg (123 lb 3.2 oz) 10/16/18 58 kg (127 lb 12.8 oz) 10/04/18 58.2 kg (128 lb 6.4 oz) Blood pressure 119/52, pulse 83, temperature 97.7 °F (36.5 °C), resp. rate 14, weight 55.9 kg (123 lb 3.2 oz), last menstrual period 02/02/2015, SpO2 96 %, unknown if currently breastfeeding. Pain Scale 1: Numeric (0 - 10) Pain Intensity 1: 0 Pain Onset 1: chronic Pain Location 1: Abdomen Pain Orientation 1: Anterior Pain Description 1: Aching Pain Intervention(s) 1: Emotional support Last bowel movement, if known:  
 
Constitutional: chronically ill appearing but generally comfortable Eyes: pupils equal, anicteric ENMT: no nasal discharge, moist mucous membranes, no lesions or thrush in mouth Cardiovascular: regular rhythm, distal pulses intact, no edema Respiratory: breathing not labored, symmetric Gastrointestinal: abdomen is flat, bs active, no TTP Musculoskeletal: no deformity, no tenderness to palpation Skin: warm, dry Neurologic: following commands, moving all extremities Psychiatric: full affect, no hallucinations HISTORY:  
 
Active Problems: 
  Peritoneal carcinomatosis ([address] (6/1/2018) Anemia due to chemotherapy (7/26/2018) Pain of metastatic malignancy (8/9/2018) Ovarian cancer ([address] (10/10/2018) Nausea (10/10/2018) Epigastric pain () Severe protein-calorie malnutrition ([address] (10/11/2018) Dehydration (10/16/2018) Malnutrition ([address] (10/16/2018) SBO (small bowel obstruction) ([address] (10/16/2018) Past Medical History:  
Diagnosis Date  Anxiety  BRCA1 positive  Calculus of kidney 1/13  
 right  Hernia, inguinal, left 2012  MS (multiple sclerosis) ([address] 1996  Nausea & vomiting  Ovarian cancer ([address] 3/2015  
 high grade, stage 3C papillary serous  Thromboembolus ([address] 8/2007  
 lower abdomen post fall Past Surgical History:  
Procedure Laterality Date  HX GYN  2009  
 endometrial ablation with punctured uterus  HX OTHER SURGICAL  8/2007  
 green filter  HX MARIEL AND BSO  3/2014  
 ovarian cancer Family History Problem Relation Age of Onset  Cancer Paternal Grandmother   
     breast  
 Breast Cancer Paternal Grandmother  Coronary Artery Disease Mother 48 CABG and PCI  Heart Surgery Father   
     valve replacement  No Known Problems Brother  No Known Problems Brother  No Known Problems Brother  No Known Problems Daughter  No Known Problems Son  No Known Problems Son  No Known Problems Son History reviewed, no pertinent family history. Social History Tobacco Use  Smoking status: Never Smoker  Smokeless tobacco: Never Used Substance Use Topics  Alcohol use: No  
 
Allergies Allergen Reactions  Pcn [Penicillins] Rash Current Facility-Administered Medications Medication Dose Route Frequency  TPN ADULT - CENTRAL  0-155 mL/hr IntraVENous TITRATE  fentaNYL (DURAGESIC) 25 mcg/hr patch 1 Patch  1 Patch TransDERmal Q72H  haloperidol lactate (HALDOL) injection 1 mg  1 mg IntraVENous Q4H PRN  
 metoclopramide HCl (REGLAN) injection 10 mg  10 mg IntraVENous Q6H  
 aspirin-acetaminophen-caffeine (EXCEDRIN ES) per tablet 1-2 Tab   (Patient Supplied)  1-2 Tab Oral Q6H PRN  pantoprazole (PROTONIX) 40 mg in sodium chloride 0.9% 10 mL injection  40 mg IntraVENous Q24H  
 amitriptyline (ELAVIL) tablet 10 mg  10 mg Oral PCD  pregabalin (LYRICA) capsule 25 mg  25 mg Oral BID  
 0.9% sodium chloride infusion  75 mL/hr IntraVENous CONTINUOUS  
 scopolamine (TRANSDERM-SCOP) 1 mg over 3 days 1 Patch  1 Patch TransDERmal Q72H  enoxaparin (LOVENOX) injection 40 mg  40 mg SubCUTAneous Q24H  
 LORazepam (ATIVAN) injection 1 mg  1 mg IntraVENous Q4H PRN  
 sodium chloride (NS) flush 5-10 mL  5-10 mL IntraVENous Q8H  
 sodium chloride (NS) flush 5-10 mL  5-10 mL IntraVENous PRN  
 diphenhydrAMINE (BENADRYL) injection 12.5 mg  12.5 mg IntraVENous Q4H PRN  
 insulin lispro (HUMALOG) injection   SubCUTAneous Q6H  
 glucose chewable tablet 16 g  4 Tab Oral PRN  
 dextrose (D50W) injection syrg 12.5-25 g  12.5-25 g IntraVENous PRN  
 glucagon (GLUCAGEN) injection 1 mg  1 mg IntraMUSCular PRN  
 fat emulsion 20% (LIPOSYN, INTRAlipid) infusion 500 mL  500 mL IntraVENous Q TUE, THU & SAT  lactulose (CHRONULAC) solution 20 g  20 g Per NG tube BID  morphine  mg / 30 mL   IntraVENous CONTINUOUS  
 
 
 
 LAB AND IMAGING FINDINGS:  
 
Lab Results Component Value Date/Time WBC 13.7 (H) 10/25/2018 04:18 AM  
 HGB 9.0 (L) 10/25/2018 04:18 AM  
 PLATELET 956 90/09/9687 04:18 AM  
 
Lab Results Component Value Date/Time  Sodium 135 (L) 10/26/2018 06:45 AM  
 Potassium 4.0 10/26/2018 06:45 AM  
 Chloride 105 10/26/2018 06:45 AM  
 CO2 22 10/26/2018 06:45 AM  
 BUN 27 (H) 10/26/2018 06:45 AM  
 Creatinine 0.53 (L) 10/26/2018 06:45 AM  
 Calcium 8.3 (L) 10/26/2018 06:45 AM  
 Magnesium 2.0 10/26/2018 06:45 AM  
 Phosphorus 2.9 10/26/2018 06:45 AM  
  
Lab Results Component Value Date/Time AST (SGOT) 19 10/26/2018 06:45 AM  
 Alk. phosphatase 126 (H) 10/26/2018 06:45 AM  
 Protein, total 6.0 (L) 10/26/2018 06:45 AM  
 Albumin 2.0 (L) 10/26/2018 06:45 AM  
 Globulin 4.0 10/26/2018 06:45 AM  
 
Lab Results Component Value Date/Time INR 1.0 07/19/2018 11:54 AM  
 Prothrombin time 10.7 07/19/2018 11:54 AM  
  
No results found for: IRON, FE, TIBC, IBCT, PSAT, FERR No results found for: PH, PCO2, PO2 No components found for: [place] Lab Results Component Value Date/Time CK 72 01/09/2018 11:09 AM  
 CK - MB <1.0 01/09/2018 11:09 AM  
  
 
 
   
 
Total time:  45m Counseling / coordination time, spent as noted above:  40m 
> 50% counseling / coordination?:  Yes Prolonged service was provided for  []30 min   []75 min in face to face time in the presence of the patient, spent as noted above. Time Start:  
Time End:  
Note: this can only be billed with 41176 (initial) or 87301 (follow up). If multiple start / stop times, list each separately.

## 2018-10-26 NOTE — PROGRESS NOTES
Bedside shift change report given to Dana Gramajo RN and HERSON Smith (oncoming nurse) by Ryan Devine RN (offgoing nurse). Report included the following information SBAR.

## 2018-10-26 NOTE — PROGRESS NOTES
Music Therapy Assessment Jena Enciso [id number]  [id number]   
1965  48 y.o.  female Patient Telephone Number: [phone] (home) Oriental orthodox Affiliation: Confucianism Language: [place] Extended Emergency Contact Information Primary Emergency Contact: Jose Payan Address: [address], [address] Home Phone: [phone] Mobile Phone: [phone] Relation: Spouse Patient Active Problem List  
 Diagnosis Date Noted  Dehydration 10/16/2018  Malnutrition ([address] 10/16/2018  
 SBO (small bowel obstruction) ([address] 10/16/2018  Gastroparesis  Severe protein-calorie malnutrition ([address] 10/11/2018  Ovarian cancer ([address] 10/10/2018  Abdominal pain 10/10/2018  Nausea 10/10/2018  Epigastric pain  Xerostomia  Pain of metastatic malignancy 08/09/2018  Other proteinuria 07/26/2018  Anemia due to chemotherapy 07/26/2018  On anticoagulant therapy 07/19/2018  Peritoneal carcinomatosis ([address] 06/01/2018  Malignant ascites 06/01/2018  Thrombocytopenia ([address] 03/17/2017  
 CINV (chemotherapy-induced nausea and vomiting) 03/17/2017  Malignant neoplasm of both ovaries ([address] 04/09/2015 Date: 10/26/2018 Mental Status:   [x] Alert [  ] Gab Paganini [  ]  Confused  [  ] Minimally responsive Communication Status: [  ] Impaired Speech [  ] Nonverbal -N/A Physical Status:   [  ] Oxygen in use  [  ] Hard of Hearing [  ] Vision Impaired [  ] Ambulatory  [  ] Ambulatory with assistance [  ] Non-ambulatory -N/A Music Preferences, Background: Traditional Hymns, Contemporary Jose Luis Knee, songs from [employer] [place]. Clinical Problem addressed: Improve mood, positive social interaction. Goal(s) met in session: 
Physical/Pain management (Scale of 1-10): Pre-session rating: Pt denied pain. Post-session rating: Pt denied pain. [  ] Increased relaxation   [  ] Regulated breathing patterns [  ] Decreased muscle tension   [  ] Minimized physical distress Emotional/Psychological: 
[  ] Increased self-expression   [  ] Decreased aggressive behavior [  ] Decreased sadness   [  ] Discussed healthy coping skills [x] Improved mood    [  ] Decreased withdrawn behavior Social: 
[  ] Decreased feelings of isolation/loneliness [x] Positive social interaction [  ] Provided support and/or comfort for family/friends Spiritual: 
[  ] Spiritual support    [  ] Expressed peace [  ] Expressed pavel    [  ] Discussed beliefs Techniques Utilized (Check all that apply):  
[  ] Procedural support MT [  ] Music for relaxation [x] Patient preferred music 
[  ] Jocelyn analysis  [x] Song choice  [  ] Music for validation [  ] Entrainment  [  ] Progressive muscle relax. [  ] Guided visualization [  ] Eliel Shires  [  ] Patient instrument playing [  ] Wilfrid Hyde writing [  ] Karishma Single along   [  ] Shotalisha Laird  [  ] Sensory stimulation [  ] Active Listening  [  ] Music for spiritual support [  ] Making of CDs as gifts Session Observations:  F/u visit; Patient (pt) was alert and feeling tired sitting up in a chair. Her spouse Jenn Quinn was sitting next to her. They requested to hear silly music or bright, cynthia music since the weather was rainy and cloudy. This music therapist (MT) sat across from pt and sang and played the [employer] in [employer] with Adynxxlaura. Pt's affect brightened in response to the music and she and her spouse expressed enjoyment in it. Next, MT sang and played the [employer] song Save the Last Dance for Me. Pt chose to hear the [facility] and [place] sang and played this. Pt's spouse said the music brightened their day. He and the pt expressed gratitude for MT's visit. Will follow as able. TYREL CejaBC (Music Therapist-Board Certified) Spiritual Care Department Referral-based service

## 2018-10-27 NOTE — PROGRESS NOTES
[address], [address] [facility], [address] 
P [phone]  F [phone] Patient: Tiera Aceves Admit Date: 10/16/2018 Hospital Day:9 Admit Dx: Partial small bowel obstruction Ovarian cancer ([address] Malnutrition ([address] Dehydration SBO (small bowel obstruction) ([address] Subjective:  
Comfortable with Gtube, slept well 2 nights. No N/V. Gtube with less output overall. +flatus. No BM Pain currently controlled. Remains sleepy.  at bedside. Ambulating during the day. Objective: Intake/Output Summary (Last 24 hours) at 10/27/2018 6377 Last data filed at 10/27/2018 4216 Gross per 24 hour Intake 2406.33 ml Output 800 ml Net 1606.33 ml  
 
I/O 100/24 hr 
 
Physical Exam 
/70 (BP 1 Location: Right arm, BP Patient Position: At rest)   Pulse 96   Temp 98.3 °F (36.8 °C)   Resp 14   Wt 123 lb 3.2 oz (55.9 kg)   LMP 02/02/2015 (Approximate)   SpO2 95%   BMI 19.01 kg/m² General: tired and cachetic appearing.  present again this morning Cardiac:  Regular Lungs:  nonlabored Abdomen:  soft, nondistended, nontender. G-tube bag empty, bilious staining. Extremity: no edema Data Review Lab Results Component Value Date/Time WBC 13.7 (H) 10/25/2018 04:18 AM  
 ABS. NEUTROPHILS 10.5 (H) 10/25/2018 04:18 AM  
 HGB 9.0 (L) 10/25/2018 04:18 AM  
 HCT 28.5 (L) 10/25/2018 04:18 AM  
 .9 (H) 10/25/2018 04:18 AM  
 MCH 32.5 10/25/2018 04:18 AM  
 PLATELET 314 83/05/3842 04:18 AM  
 
Lab Results Component Value Date/Time  Sodium 135 (L) 10/26/2018 06:45 AM  
 Potassium 4.0 10/26/2018 06:45 AM  
 Chloride 105 10/26/2018 06:45 AM  
 CO2 22 10/26/2018 06:45 AM  
 Glucose 59 (L) 10/26/2018 06:45 AM  
 BUN 27 (H) 10/26/2018 06:45 AM  
 Creatinine 0.53 (L) 10/26/2018 06:45 AM  
 Calcium 8.3 (L) 10/26/2018 06:45 AM  
 Albumin 2.0 (L) 10/26/2018 06:45 AM  
 Bilirubin, total 0.3 10/26/2018 06:45 AM  
 AST (SGOT) 19 10/26/2018 06:45 AM  
 ALT (SGPT) 105 (H) 10/26/2018 06:45 AM  
 Alk. phosphatase 126 (H) 10/26/2018 06:45 AM  
 
 
IMAGING: 
KUB 10/18/2018 FINDINGS: There are multiple dilated loops of small bowel throughout the abdomen  
and pelvis is seen on prior CT dated October 15, 2018. NG tube tip and side port  
overlie the stomach. There is no free intraperitoneal gas. Filter overlies the  
expected location of the IVC at the L2 level. There is a vascular stent  
overlying SPECT dislocation of the left common iliac vein. IMPRESSION: Multiple, persistent dilated loops of small bowel throughout the  
abdomen and pelvis. No evidence of perforation. NG tube in appropriate position CT Results (most recent): 
Results from Hospital Encounter encounter on 10/16/18 CT INTRO GASTROSTOMY TUBE PERC Narrative PROCEDURE: Gastrostomy tube placement INDICATION: Decompression, bowel obstruction OPERATING PHYSICIAN: Delbert Fisher M.D. 
 
ESTIMATED BLOOD LOSS: None SPECIMENS REMOVED: None FLOURO TIME: None COMPLICATIONS: None immediate. Procedure and findings:  
 
CT dose reduction was achieved through use of a standardized protocol tailored 
for this examination and automatic exposure control for dose modulation. After obtaining informed consent, patient was brought the angiographic suite and 
placed supine angiographic table. An 5 [place] catheter was positioned in the 
stomach under fluoroscopy. Subsequently, the patient was moved to the CT table. Sedation was obtained with intravenous Versed and fentanyl. The upper abdomen 
was prepped and draped in maximal sterile barrier technique. Prophylactic 
antibiotic of Ancef 2 g was administered prior to the intervention and 
discontinued prior to the completion of the procedure. Approximately 600 cc of air was used to insufflate the stomach. The puncture site was identified and local anesthesia was achieved with 1% lidocaine 
injection. Antegrade puncture into the stomach was performed. 2 T-tacks were 
delivered and traction was applied to appose the anterior stomach wall against 
the anterior abdomen. A needle was then advanced into the stomach lumen and 
contrast injection was performed to confirm intraluminal location. An Amplatz 
wire was then advanced through the needle and the tract was serially dilated up 
to 18 [place]. A 18 Italian gastrostomy tube was advanced over the wire into the 
stomach. The balloon was inflated with 8 cc of dilute contrast. Final 
intraluminal position of the gastrostomy tube was confirmed with gentle contrast 
injection under CT. The patient tolerated the procedure well and there were no 
immediate complications. A dry sterile dressing was applied. Impression IMPRESSION:  
 
Successful placement of 25 Italian gastrostomy tube under CT guidance. Assessment/Plan:  
Admitted 10/16/2018 for Partial small bowel obstruction Ovarian cancer Malnutrition Dehydration Cancer related pain Depression Nausea and vomiting. Active Hospital Problems Diagnosis Date Noted  Dehydration 10/16/2018  Malnutrition ([address] 10/16/2018  
 SBO (small bowel obstruction) ([address] 10/16/2018  Severe protein-calorie malnutrition ([address] 10/11/2018  Ovarian cancer ([address] 10/10/2018  Nausea 10/10/2018  Epigastric pain  Pain of metastatic malignancy 08/09/2018  Anemia due to chemotherapy 07/26/2018  Peritoneal carcinomatosis ([address] 06/01/2018 [id number] [facility] admitted with carcinomatosis, SBO, protein/calorie malnutrition d/t chronic disease and obstruction with N/V and cancer related pain. Onc: Undergoing Doxil/Avastin s/p 3 cycles last 10/4/18. Recent EOD CT with peritoneal disease response.   Partial vs complete SBO. KUB 10/21/18 showed improvement in dlialtion of small bowel. Palliative G-tube placed 10/24. Steffen Mae PA-C had brought up DNR with the patient and her  yesterday. Addressed again this morning. Patient and her  will continue to discuss and will let our team know once they have made a decision. Also brought up the idea of hospice, and they would like to discuss this more and have conversations with their family/children. Heme/CV: Hemodynamically stable, anemia d/t chemo/chronic disease. FEN/GI: Partial SBO with delayed gastric emptying (by endoscopy 10/12/18) with malnutrition - cyclic TPN over 12 hours with Lipid TIW. Palliative venting G-tube in place 10/25. No expected resolution. Family understands. Continue Reglan, PPI/H2. Octreotide discussed by palliative. Would agree if output remains high. Ascites:Improved after drainage 10/18. Neuro:  pain/antidpression regimen per palliative care, following. Will monitor closely with changes over weekend PPX: lovenox. Holding home xarelto Disposition: Stable obstructive without meaningful resolution, difficult course with her disease these last few months. G-tube for palliation in place since 10/24. This morning I readdressed code status, and the patient and her  will continue to think about it. They are undecided as of yet. Also addressed the role of hospice at some point. They would like to think about things and discuss their current status with their children before making any decisions. Son getting  in November, discussed palliative options moving toward this goal.  
Discussed with Dr. Karyle Minor today. Pt is aware that since the wedding is 7 hours away and at a remote location being 2 hours from the nearest medical facility, she is realizing she may have to be \"remotly\" present at the wedding.  did discuss option of small ceremony her locally and a smaller celebration. Will follow-up Palliative Care recommendations. Vilma Tomlinson, MD

## 2018-10-27 NOTE — PROGRESS NOTES
Bedside and Verbal shift change report given to Nyla Toussaint (oncoming nurse) by Chantelle Mireles RN (offgoing nurse). Report included the following information SBAR, Kardex, Intake/Output, MAR and Recent Results. 0915: pt refuse blood sugar check 1 hr post TPN 
1300: Pt sitting up in chair in good spirits. 1715: pt walked 1/2 lap 1730: pt and  spoke about fentanyl not holding patient over and pt needing to use PCA more often, causing more fatigue and feeling \"out of it\". Planning to speak to Dr. Geovany Pruitt in the AM. PT states she will allow blood sugar check now but will refuse midnight check. Will let night shift nurse know. Pt states she has flushed PEG tube 3 times today each time with 20mL. Patient and  comfortable with PEG care, emptying, clamping, and flushing.

## 2018-10-28 NOTE — PROGRESS NOTES
[address] [place] [address], [address] [place], [facility] Martha Mallory 
P [phone]  F [phone] Patient: Stanford Flynn Admit Date: 10/16/2018 Hospital Day:9 Admit Dx: Partial small bowel obstruction Ovarian cancer ([address] Malnutrition ([address] Dehydration SBO (small bowel obstruction) ([address] Subjective:  
Comfortable with the G-tube. Overnight some discomfort and pain control issues as trying to transition to fentanyl patch. Denies nausea/vomiting. Gtube with less output overall. +flatus. No BM Remains sleepy.  at bedside. Ambulating during the day. Objective: Intake/Output Summary (Last 24 hours) at 10/28/2018 [facility] Last data filed at 10/28/2018 7193 Gross per 24 hour Intake 2790.18 ml Output 1050 ml Net 1740.18 ml  
 
I/O 100/24 hr 
 
Physical Exam 
/69 (BP 1 Location: Left arm, BP Patient Position: At rest)   Pulse (!) 112   Temp 98.1 °F (36.7 °C)   Resp 14   Wt 125 lb 11.2 oz (57 kg)   LMP 02/02/2015 (Approximate)   SpO2 95%   BMI 19.40 kg/m² General: tired and cachetic appearing.  present again this morning Cardiac:  Regular Lungs:  nonlabored Abdomen:  soft, still moderately distended, nontender. G-tube bag with 200cc present. Tympanic abdomen. Extremity: no edema Data Review Lab Results Component Value Date/Time WBC 13.7 (H) 10/25/2018 04:18 AM  
 ABS. NEUTROPHILS 10.5 (H) 10/25/2018 04:18 AM  
 HGB 9.0 (L) 10/25/2018 04:18 AM  
 HCT 28.5 (L) 10/25/2018 04:18 AM  
 .9 (H) 10/25/2018 04:18 AM  
 MCH 32.5 10/25/2018 04:18 AM  
 PLATELET 431 47/59/2063 04:18 AM  
 
Lab Results Component Value Date/Time  Sodium 137 10/28/2018 06:10 AM  
 Potassium 3.9 10/28/2018 06:10 AM  
 Chloride 106 10/28/2018 06:10 AM  
 CO2 21 10/28/2018 06:10 AM  
 Glucose 114 (H) 10/28/2018 06:10 AM  
 BUN 23 (H) 10/28/2018 06:10 AM  
 Creatinine 0.52 (L) 10/28/2018 06:10 AM  
 Calcium 8.2 (L) 10/28/2018 06:10 AM  
 Albumin 2.0 (L) 10/28/2018 06:10 AM  
 Bilirubin, total 0.4 10/28/2018 06:10 AM  
 AST (SGOT) 19 10/28/2018 06:10 AM  
 ALT (SGPT) 75 10/28/2018 06:10 AM  
 Alk. phosphatase 153 (H) 10/28/2018 06:10 AM  
 
 
IMAGING: 
KUB 10/18/2018 FINDINGS: There are multiple dilated loops of small bowel throughout the abdomen  
and pelvis is seen on prior CT dated October 15, 2018. NG tube tip and side port  
overlie the stomach. There is no free intraperitoneal gas. Filter overlies the  
expected location of the IVC at the L2 level. There is a vascular stent  
overlying SPECT dislocation of the left common iliac vein. IMPRESSION: Multiple, persistent dilated loops of small bowel throughout the  
abdomen and pelvis. No evidence of perforation. NG tube in appropriate position CT Results (most recent): 
Results from Hospital Encounter encounter on 10/16/18 CT INTRO GASTROSTOMY TUBE PERC Narrative PROCEDURE: Gastrostomy tube placement INDICATION: Decompression, bowel obstruction OPERATING PHYSICIAN: Ivana Covarrubias M.D. 
 
ESTIMATED BLOOD LOSS: None SPECIMENS REMOVED: None FLOURO TIME: None COMPLICATIONS: None immediate. Procedure and findings:  
 
CT dose reduction was achieved through use of a standardized protocol tailored 
for this examination and automatic exposure control for dose modulation. After obtaining informed consent, patient was brought the angiographic suite and 
placed supine angiographic table. An 5 [place] catheter was positioned in the 
stomach under fluoroscopy. Subsequently, the patient was moved to the CT table. Sedation was obtained with intravenous Versed and fentanyl. The upper abdomen 
was prepped and draped in maximal sterile barrier technique. Prophylactic 
antibiotic of Ancef 2 g was administered prior to the intervention and 
discontinued prior to the completion of the procedure. Approximately 600 cc of air was used to insufflate the stomach. The puncture 
site was identified and local anesthesia was achieved with 1% lidocaine 
injection. Antegrade puncture into the stomach was performed. 2 T-tacks were 
delivered and traction was applied to appose the anterior stomach wall against 
the anterior abdomen. A needle was then advanced into the stomach lumen and 
contrast injection was performed to confirm intraluminal location. An Amplatz 
wire was then advanced through the needle and the tract was serially dilated up 
to 18 [place]. A 18 [place] gastrostomy tube was advanced over the wire into the 
stomach. The balloon was inflated with 8 cc of dilute contrast. Final 
intraluminal position of the gastrostomy tube was confirmed with gentle contrast 
injection under CT. The patient tolerated the procedure well and there were no 
immediate complications. A dry sterile dressing was applied. Impression IMPRESSION:  
 
Successful placement of 25 [place] gastrostomy tube under CT guidance. Assessment/Plan:  
Admitted 10/16/2018 for Partial small bowel obstruction. G-tube place 10/24/18. Ovarian cancer Malnutrition Dehydration Cancer related pain Depression Nausea and vomiting. Active Hospital Problems Diagnosis Date Noted  Dehydration 10/16/2018  Malnutrition ([address] 10/16/2018  
 SBO (small bowel obstruction) ([address] 10/16/2018  Severe protein-calorie malnutrition ([address] 10/11/2018  Ovarian cancer ([address] 10/10/2018  Nausea 10/10/2018  Epigastric pain  Pain of metastatic malignancy 08/09/2018  Anemia due to chemotherapy 07/26/2018  Peritoneal carcinomatosis ([address] 06/01/2018 Felicia Martinez is a 48 y.o. female with ovarian cancer admitted with carcinomatosis, SBO, protein/calorie malnutrition d/t chronic disease and obstruction with N/V and cancer related pain. Onc: Undergoing Doxil/Avastin s/p 3 cycles last 10/4/18.   Recent EOD CT with peritoneal disease response. Partial vs complete SBO. KUB 10/21/18 showed improvement in dlialtion of small bowel. Palliative G-tube placed 10/24. Juan Garrett PA-C had brought up DNR with the patient and her  on Friday I have readdressed this issue over the weekend, and the patient and her  have decided on DNR status. They would still like other medical measures taken if necessary, but they do not want to intubated or to undergo chest compressions/shock. Also brought up the idea of hospice, and they would like to discuss this more and have conversations with their family/children. Heme/CV: Hemodynamically stable, anemia d/t chemo/chronic disease. FEN/GI: Partial SBO with delayed gastric emptying (by endoscopy 10/12/18) with malnutrition - cyclic TPN over 12 hours with Lipid TIW. Palliative venting G-tube in place 10/25. No expected resolution. Family understands. Continue Reglan, PPI/H2. Octreotide discussed by palliative; although will hold off at this time as output is low. Ascites:Improved after drainage 10/18. Neuro:  pain/antidpression regimen per palliative care, following. Some issues with pain control over night as trying to transition to a fentanyl patch. Will reach out to Palliative Care today to see the patient and make adjustments. PPX: lovenox. Holding home xarelto. The patient inquired about restarting back on Xarelto. Discussed the possible need to remain on Lovenox while patient's oral nutrition remains poor. Disposition: Stable obstructive without meaningful resolution, difficult course with her disease these last few months. G-tube for palliation in place since 10/24. Addressed code status again this morning, and the patient and her  have decided upon DNR status. Also addressed the role of hospice at some point.  They would like to think about things and discuss their current status with their children before making any decisions. Son getting  in November, discussed palliative options moving toward this goal. Will plan to discuss these issues as things move forward. Will touch base with Palliative Care today and follow-up their recommendations for improving pain control.   
 
Devi García MD

## 2018-10-28 NOTE — PROGRESS NOTES
Bedside and Verbal shift change report given to Julián Fine (oncoming nurse) by Ciera Romero RN (offgoing nurse). Report included the following information SBAR, Kardex, Intake/Output, MAR and Recent Results. 0750: Pt resting in bed comfortably. Asking about pain medication regimen. Wants to put off taking 9am medicines until later in the morning. 0820: Dr. Carmen Curran at bedside 0840: Paged on call palliative MD. Dr. Errol De La Torre plans to come by patient's room this morning/afternoon. 1000: Dr. Errol De La Torre at bedside. New pain medicine added. 1105: Pt refuse afternoon vitals and BS check. Will check back later in afternoon. PEG bag emptied 100mL 
 
1600: Pt ambulating in hallway. 1840: pt upset and in pain. Given roxycodone. States does not want BS checked again until the morning. PEG has been flushed twice today

## 2018-10-28 NOTE — PALLIATIVE CARE
Courtney Gutiérrez is a 48year old female with hx of recurrent ovarian cancer. She is followed by inpatient Palliative Medicine team. 
Pt seen by me and met with family discussing pain regimen. Pt's continuous dose of morphine was discontinued and she was placed on low dose 25 mcg fentanyl patch, she also has the ability to press the demand dose on the PCA machine which she states does help her. She is complaining of additional abdominal pain since the basal dose of morphine was discontinued. Also note that the continuous dose of morphine had caused her to feel lethargic. Family also wanted to know what the plan for her would be once she was discharged from the hospital. 
Pt's states nausea is better with placement of venting G tube,  but continues with abdominal pain. She is able to take PO medications at this time. Plan: added oxycodone 10 mg immediate release every 1 hour as needed for pain as a test to see how and if this would work for her. Nurses instructed to call me is this is not effective and if not more adjustments can be made for pain relief. Explained to family that the palliative medicine team will return Monday and help transition her to effective doses of medication which might also be effective at home. Home regimen most likely will not include PCA/Cadd prism pump with IV opioids as these medications are currently and widjoseph not available.  
Evangelina Davidson NP

## 2018-10-29 PROBLEM — Z51.5 END OF LIFE CARE: Status: ACTIVE | Noted: 2018-01-01

## 2018-10-29 NOTE — PROGRESS NOTES
Problem: Falls - Risk of 
Goal: *Absence of Falls Document Genevive Camera Fall Risk and appropriate interventions in the flowsheet. Outcome: Progressing Towards Goal 
Fall Risk Interventions: 
Mobility Interventions: Patient to call before getting OOB Medication Interventions: Teach patient to arise slowly Elimination Interventions: Call light in reach

## 2018-10-29 NOTE — PROGRESS NOTES
[address], [address] [place], [address] [place] 
P [phone]  F [phone] Patient: Siddhartha Wan Admit Date: 10/16/2018 Hospital Day:9 Admit Dx: Partial small bowel obstruction Ovarian cancer ([address] Malnutrition ([address] Dehydration SBO (small bowel obstruction) ([address] Subjective:  
Comfortable with the G-tube. Required adjustment in pain management yesterday. Says Shante has helped some, but says she remains in pain while at the same time, slightly groggy. Denies nausea/vomiting. Gtube with increase in output last 24 hours to 400 cc's. Continues passing flatus, but she says it is less frequently. No BM  at bedside. Has been ambulating during the day with assistance. Objective: Intake/Output Summary (Last 24 hours) at 10/29/2018 5622 Last data filed at 10/29/2018 1783 Gross per 24 hour Intake 2695.34 ml Output 2125 ml Net 570.34 ml Physical Exam 
/67 (BP 1 Location: Left arm, BP Patient Position: Sitting)   Pulse (!) 107   Temp 97.9 °F (36.6 °C)   Resp 18   Wt 128 lb 11.2 oz (58.4 kg)   LMP 02/02/2015 (Approximate)   SpO2 98%   BMI 19.86 kg/m² General: tired and cachetic appearing. Oriented, but groggy sitting up in chair (improved comfort per pt) Cardiac:  Regular, tachy without M/R/G Lungs:  CTA bilat. Non labored without rales Abdomen:  soft, still firm and moderately distended with mild tenderness to palpation, increased around G-Tube insertion site.,  G-tube bag with small amount bile colored fluid . Tympanic abdomen. Extremity: no edema Data Review Lab Results Component Value Date/Time WBC 13.7 (H) 10/25/2018 04:18 AM  
 ABS. NEUTROPHILS 10.5 (H) 10/25/2018 04:18 AM  
 HGB 9.0 (L) 10/25/2018 04:18 AM  
 HCT 28.5 (L) 10/25/2018 04:18 AM  
 .9 (H) 10/25/2018 04:18 AM  
 MCH 32.5 10/25/2018 04:18 AM  
 PLATELET 373 75/31/4185 04:18 AM  
 
Lab Results Component Value Date/Time Sodium 139 10/29/2018 06:54 AM  
 Potassium 4.1 10/29/2018 06:54 AM  
 Chloride 107 10/29/2018 06:54 AM  
 CO2 23 10/29/2018 06:54 AM  
 Glucose 105 (H) 10/29/2018 06:54 AM  
 BUN 24 (H) 10/29/2018 06:54 AM  
 Creatinine 0.41 (L) 10/29/2018 06:54 AM  
 Calcium 8.0 (L) 10/29/2018 06:54 AM  
 Albumin 1.9 (L) 10/29/2018 06:54 AM  
 Bilirubin, total 0.3 10/29/2018 06:54 AM  
 AST (SGOT) 12 (L) 10/29/2018 06:54 AM  
 ALT (SGPT) 57 10/29/2018 06:54 AM  
 Alk. phosphatase 136 (H) 10/29/2018 06:54 AM  
 
 
IMAGING: 
KUB 10/18/2018 FINDINGS: There are multiple dilated loops of small bowel throughout the abdomen  
and pelvis is seen on prior CT dated October 15, 2018. NG tube tip and side port  
overlie the stomach. There is no free intraperitoneal gas. Filter overlies the  
expected location of the IVC at the L2 level. There is a vascular stent  
overlying SPECT dislocation of the left common iliac vein. IMPRESSION: Multiple, persistent dilated loops of small bowel throughout the  
abdomen and pelvis. No evidence of perforation. NG tube in appropriate position CT Results (most recent): 
Results from Hospital Encounter encounter on 10/16/18 CT INTRO GASTROSTOMY TUBE PERC Narrative PROCEDURE: Gastrostomy tube placement INDICATION: Decompression, bowel obstruction OPERATING PHYSICIAN: Ilya Santana M.D. 
 
ESTIMATED BLOOD LOSS: None SPECIMENS REMOVED: None FLOURO TIME: None COMPLICATIONS: None immediate. Procedure and findings:  
 
CT dose reduction was achieved through use of a standardized protocol tailored 
for this examination and automatic exposure control for dose modulation. After obtaining informed consent, patient was brought the angiographic suite and 
placed supine angiographic table. An 5 [place] catheter was positioned in the 
stomach under fluoroscopy. Subsequently, the patient was moved to the CT table. Sedation was obtained with intravenous Versed and fentanyl. The upper abdomen 
was prepped and draped in maximal sterile barrier technique. Prophylactic 
antibiotic of Ancef 2 g was administered prior to the intervention and 
discontinued prior to the completion of the procedure. Approximately 600 cc of air was used to insufflate the stomach. The puncture 
site was identified and local anesthesia was achieved with 1% lidocaine 
injection. Antegrade puncture into the stomach was performed. 2 T-tacks were 
delivered and traction was applied to appose the anterior stomach wall against 
the anterior abdomen. A needle was then advanced into the stomach lumen and 
contrast injection was performed to confirm intraluminal location. An Amplatz 
wire was then advanced through the needle and the tract was serially dilated up 
to 18 [place]. A 18 [place] gastrostomy tube was advanced over the wire into the 
stomach. The balloon was inflated with 8 cc of dilute contrast. Final 
intraluminal position of the gastrostomy tube was confirmed with gentle contrast 
injection under CT. The patient tolerated the procedure well and there were no 
immediate complications. A dry sterile dressing was applied. Impression IMPRESSION:  
 
Successful placement of 25 [place] gastrostomy tube under CT guidance. Assessment/Plan:  
Admitted 10/16/2018 for Partial small bowel obstruction. G-tube place 10/24/18. Active Hospital Problems Diagnosis Date Noted  End of life care 10/29/2018  Dehydration 10/16/2018  Malnutrition ([address] 10/16/2018  
 SBO (small bowel obstruction) ([address] 10/16/2018  Severe protein-calorie malnutrition ([address] 10/11/2018  Ovarian cancer ([address] 10/10/2018  Nausea 10/10/2018  Epigastric pain  Pain of metastatic malignancy 08/09/2018  Anemia due to chemotherapy 07/26/2018  Peritoneal carcinomatosis ([address] 06/01/2018 Elizabeth Alexander is a 48 y.o. female with ovarian cancer admitted with carcinomatosis, SBO, protein/calorie malnutrition d/t chronic disease and obstruction with N/V and cancer related pain. Onc: Undergoing Doxil/Avastin s/p 3 cycles last 10/4/18. Recent EOD CT with peritoneal disease response. Partial vs complete SBO. KUB 10/21/18 showed improvement in dlialtion of small bowel. Palliative G-tube placed 10/24. Heme/CV: Hemodynamically stable, anemia d/t chemo/chronic disease. FEN/GI: Partial SBO with delayed gastric emptying (by endoscopy 10/12/18) with malnutrition - cyclic TPN over 12 hours with Lipid TIW. Palliative venting G-tube in place 10/25. No expected resolution. Family understands. Continue Reglan, PPI/H2. Ascites:Improved after drainage 10/18. Neuro: Lethargic: pain/antidpression regimen per palliative care, following. Some issues with pain control over night again as trying to transition to a fentanyl patch. Have discussed with Palliative Medicine this morning already and will see first thing this morning PPX: lovenox. Holding home xarelto. Disposition: Stable obstructive without meaningful resolution, difficult course with her disease these last few months. G-tube for palliation in place on 10/24. Dr. Pau Navas addressed code status yesterday again and the patient and her  have decided upon DNR status. They would still like other medical measures taken if necessary, but they do not want to intubated or to undergo chest compressions/shock. He also discussed the role of hospice again. They continue to want to think and discuss their current status with their children before making a decision. Son getting  in November, discussed palliative options moving toward this goal.  
 said he may be able to have whole family here over next weekend to discuss options.  Discussed with pt and  to try to make this happen before the weekend if possible. Discussed that possibility of \"Face timing\" family members if they were not able to make it here and be involved in the meeting that way. He liked this idea and will discuss it with them. Will await Palliative Care input this morning and will monitor throughout the day.  
 
Saleem Burks, NP

## 2018-10-29 NOTE — PROGRESS NOTES
Bedside and Verbal shift change report given to Rogelio Benz RN (oncoming nurse) by Regi Rainey (offgoing nurse). Report included the following information SBAR, Kardex, Procedure Summary, Intake/Output, MAR, Accordion and Recent Results Pt has refused Q4hr vitals due to wanting to sleep.

## 2018-10-29 NOTE — PROGRESS NOTES
Palliative Medicine Consult [place]: [phone] ([phone]) Patient Name: Sangita St YOB: 1965 Date of Initial Consult: 10/10/18 Reason for Consult: Cancer related symptom management Requesting Provider: Dr. Janna Mcdowell Primary Care Physician: Brain Mock MD 
 
 SUMMARY:  
Sangita St is a 48 y.o. with recurrent ovarian malignancy on Doxil/Avastin per Dr. Janna Mcdowell, known to outpatient palliative medicine. She was admitted 10/10/2018 from home due to worsening nausea and vomiting followed by acute worsening of epigastric abdominal pain. Last chemotherapy date was 10/4. Current medical issues leading to Palliative Medicine involvement include: cancer related symptoms including pain, nausea, vomiting. S/p venting peg placement on 10/24 by IR. PALLIATIVE DIAGNOSES:  
1. Epigastric abdominal pain - resolved 2. Post-PEG placement pain - resolved 3. Malignancy related small bowel obstruction - ongoing 4. Chronic neoplasm related lower abdominal pain - stable 5. Nausea with emesis  - stable 6. Delayed gastric motility - clinically and by EGD PLAN:  
1. Pain:  Malignancy related abd pain + new msk pain. - placed new fentanyl td 25 mcg Fri 10/26, stopped basal morphine shortly therafter - Inc pain reporting on Sat; requiring frequent use of Morphine 2 mg IV via pca 
- Palliative on call added oxycodone 10 mg tab q1h prn - Total MEDD past 24 hrs 228; (up from Friday [id number] mg) - Today:  Now significantly drowsy. - cont Fentanyl Td 25 mcg patch; rotate application site tonight at 17:00 (c/o discomfort at site) 
 - use oxycodone IR for INITIAL breakthrough; reduce to 5 mg q1h prn 
 - use Morphine via pca for SECOND breakthrough; reduce to 1 mg q10 min prn 
 -  Jose Armando Zamorano or bedside nurse to call me directly if changes needed throughout the day 2. Drowsiness:  Progressive over weekend.   Can attribute to increased opioid administration. Also on multiple other sedating meds. - See above re: opioid adjustments 
- d/c scopolamine patch. Added glycopyrrolate 0.2 mg IV q4h prn for secretions if they become an issue.  
- d/c prn Benadryl (hasn't received) - lorazepam; typically uses 1-2 doses/ day. Will have additive effect with opioid. She is accustomed to it (home med) - will change to oral liquid formulation in prep for discharge. Keep at 1 mg, reduce to q8 prn. Will discuss more with her tomorrow. - monitor closely 3. Malignancy related SBO:   
- no c/o nausea/vomiting today 
- remains on standing IV metoclopramide. Can consider transitioning this to oral tomorrow. - use Haldol 1 mg IV for breakthrough nausea 1st line. 4. Nutrition:   
 - remains on TPN 12 hrs / d. 
 - no good evidence to date to support in advanced malignancy 
 - reasonable to cont in hospital as no adverse effects noted to date 
 - will review again with patient/family when more alert in next coming days - they are aware not typically an option to cont at home 5. 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 6. Goals of care- ongoing conversations during hospitalization. Code status revisited over weekend; she is now DNR/I. Will need Durable DNR prior to discharge. Gyn-onc placed consult this morning to Hospice. Agree with this plan.  Katie Mason ready to gather information on which agency/setting works best for him. - plan to meet with family alongside Dr. Christen Dimas tentative 10/30 3:15 pm with children via phone. 7. Communicated plan of care with: Palliative IDTEverette NP, bedside RN. GOALS OF CARE / TREATMENT PREFERENCES:  
 
GOALS OF CARE:   Full care escalation Patient/Health Care Proxy Stated Goals: Prolong life TREATMENT PREFERENCES:  
Code Status: DNR Advance Care Planning: 
Advance Care Planning 10/17/2018 Patient's Healthcare Decision Maker is: Legal Next of Kin Primary Decision Maker Name -  
Primary Decision Maker Phone Number -  
Primary Decision Maker Relationship to Patient -  
Confirm Advance Directive None Medical Interventions: Full interventions Other: As far as possible, the palliative care team has discussed with patient / health care proxy about goals of care / treatment preferences for patient. HISTORY:  
 
History obtained from:  Pt, , EMR 
 
CHIEF COMPLAINT: \"I feel overdrugged\" HPI/SUBJECTIVE: The patient is:  
[x] Verbal and participatory [] Non-participatory due to:  
 
David Edward is quite drowsy today. She admits to having difficulty following conversation. She reports new pains at site of Fentanyl Td (left shoulder) and in her low back/ sacrum. \"It's hard to get in and out of bed. \"  Further history re: sx difficult to ascertain as she continuously falls asleep during assessment. Clinical Pain Assessment (nonverbal scale for severity on nonverbal patients):  
Clinical Pain Assessment Severity: 2 Location: abd bl upper quads, LLQ Character: crampy Duration: weeks Effect: functional 
Factors: none in particular Frequency: chronic Duration: for how long has pt been experiencing pain (e.g., 2 days, 1 month, years) Frequency: how often pain is an issue (e.g., several times per day, once every few days, constant) FUNCTIONAL ASSESSMENT:  
 
Palliative Performance Scale (PPS): PPS: 50 PSYCHOSOCIAL/SPIRITUAL SCREENING:  
 
Palliative IDT has assessed this patient for cultural preferences / practices and a referral made as appropriate to needs (Cultural Services, Patient Advocacy, Ethics, etc.) Advance Care Planning: 
Advance Care Planning 10/17/2018 Patient's Healthcare Decision Maker is: Legal Next of Kin Primary Decision Maker Name -  
Primary Decision Maker Phone Number -  
Primary Decision Maker Relationship to Patient -  
Confirm Advance Directive None Any spiritual / Protestant concerns: 
[] Yes /  [x] No 
 
Caregiver Burnout: 
[] Yes /  [x] No /  [] No Caregiver Present Anticipatory grief assessment:  
[] Normal  / [] Maladaptive   Did not assess ESAS Anxiety: Anxiety: 3 ESAS Depression: Depression: 3 REVIEW OF SYSTEMS:  
 
Positive and pertinent negative findings in ROS are noted above in HPI. The following systems were [x] reviewed / [] unable to be reviewed as noted in HPI Other findings are noted below. Systems: constitutional, ears/nose/mouth/throat, respiratory, gastrointestinal, genitourinary, musculoskeletal, integumentary, neurologic, psychiatric, endocrine. Positive findings noted below. Modified ESAS Completed by: provider Fatigue: 9 Drowsiness: 3 Depression: 3 Pain: 2 Anxiety: 3 Nausea: 0 Anorexia: 9 Dyspnea: 0 Constipation: Yes Stool Occurrence(s): 1 PHYSICAL EXAM:  
 
From RN flowsheet: 
Wt Readings from Last 3 Encounters:  
10/28/18 58.4 kg (128 lb 11.2 oz) 10/16/18 58 kg (127 lb 12.8 oz) 10/04/18 58.2 kg (128 lb 6.4 oz) Blood pressure 100/67, pulse (!) 107, temperature 97.9 °F (36.6 °C), resp. rate 18, weight 58.4 kg (128 lb 11.2 oz), last menstrual period 02/02/2015, SpO2 98 %, unknown if currently breastfeeding. Pain Scale 1: Visual 
Pain Intensity 1: 0 Pain Onset 1: chronic Pain Location 1: Abdomen Pain Orientation 1: Mid 
Pain Description 1: Aching Pain Intervention(s) 1: Medication (see MAR) Last bowel movement, if known:  
 
Constitutional: chronically ill appearing; drowsy Eyes: pupils equal, anicteric ENMT: no nasal discharge, moist mucous membranes, no lesions or thrush in mouth Skin: warm, dry Neurologic: arouses to voice and touch; drowsy; intermittently participates in conversation. Follows all commands. Psychiatric: unable to fully assess today HISTORY:  
 
Active Problems: 
  Peritoneal carcinomatosis ([facility] (6/1/2018) Anemia due to chemotherapy (7/26/2018) Pain of metastatic malignancy (8/9/2018) Ovarian cancer ([address] (10/10/2018) Nausea (10/10/2018) Epigastric pain () Severe protein-calorie malnutrition ([address] (10/11/2018) Dehydration (10/16/2018) Malnutrition ([address] (10/16/2018) SBO (small bowel obstruction) ([address] (10/16/2018) End of life care (10/29/2018) Past Medical History:  
Diagnosis Date  Anxiety  BRCA1 positive  Calculus of kidney 1/13  
 right  Hernia, inguinal, left 2012  MS (multiple sclerosis) ([address] 1996  Nausea & vomiting  Ovarian cancer ([address] 3/2015  
 high grade, stage 3C papillary serous  Thromboembolus ([address] 8/2007  
 lower abdomen post fall Past Surgical History:  
Procedure Laterality Date  HX GYN  2009  
 endometrial ablation with punctured uterus  HX OTHER SURGICAL  8/2007  
 green filter  HX MARIEL AND BSO  3/2014  
 ovarian cancer Family History Problem Relation Age of Onset  Cancer Paternal Grandmother   
     breast  
 Breast Cancer Paternal Grandmother  Coronary Artery Disease Mother 48 CABG and PCI  Heart Surgery Father   
     valve replacement  No Known Problems Brother  No Known Problems Brother  No Known Problems Brother  No Known Problems Daughter  No Known Problems Son  No Known Problems Son  No Known Problems Son History reviewed, no pertinent family history. Social History Tobacco Use  Smoking status: Never Smoker  Smokeless tobacco: Never Used Substance Use Topics  Alcohol use: No  
 
Allergies Allergen Reactions  Pcn [Penicillins] Rash Current Facility-Administered Medications Medication Dose Route Frequency  TPN ADULT - CENTRAL  0-155 mL/hr IntraVENous TITRATE  oxyCODONE IR (ROXICODONE) tablet 10 mg  10 mg Oral Q1H PRN  
 fentaNYL (DURAGESIC) 25 mcg/hr patch 1 Patch  1 Patch TransDERmal Q72H  insulin lispro (HUMALOG) injection   SubCUTAneous Q6H  
 haloperidol lactate (HALDOL) injection 1 mg  1 mg IntraVENous Q4H PRN  
 metoclopramide HCl (REGLAN) injection 10 mg  10 mg IntraVENous Q6H  
 aspirin-acetaminophen-caffeine (EXCEDRIN ES) per tablet 1-2 Tab   (Patient Supplied)  1-2 Tab Oral Q6H PRN  pantoprazole (PROTONIX) 40 mg in sodium chloride 0.9% 10 mL injection  40 mg IntraVENous Q24H  
 amitriptyline (ELAVIL) tablet 10 mg  10 mg Oral PCD  pregabalin (LYRICA) capsule 25 mg  25 mg Oral BID  
 0.9% sodium chloride infusion  75 mL/hr IntraVENous CONTINUOUS  
 scopolamine (TRANSDERM-SCOP) 1 mg over 3 days 1 Patch  1 Patch TransDERmal Q72H  enoxaparin (LOVENOX) injection 40 mg  40 mg SubCUTAneous Q24H  
 LORazepam (ATIVAN) injection 1 mg  1 mg IntraVENous Q4H PRN  
 sodium chloride (NS) flush 5-10 mL  5-10 mL IntraVENous Q8H  
 sodium chloride (NS) flush 5-10 mL  5-10 mL IntraVENous PRN  
 diphenhydrAMINE (BENADRYL) injection 12.5 mg  12.5 mg IntraVENous Q4H PRN  
 glucose chewable tablet 16 g  4 Tab Oral PRN  
 dextrose (D50W) injection syrg 12.5-25 g  12.5-25 g IntraVENous PRN  
 glucagon (GLUCAGEN) injection 1 mg  1 mg IntraMUSCular PRN  
 fat emulsion 20% (LIPOSYN, INTRAlipid) infusion 500 mL  500 mL IntraVENous Q TUE, THU & SAT  lactulose (CHRONULAC) solution 20 g  20 g Per NG tube BID  morphine  mg / 30 mL   IntraVENous CONTINUOUS  
 
 
 
 LAB AND IMAGING FINDINGS:  
 
Lab Results Component Value Date/Time WBC 13.7 (H) 10/25/2018 04:18 AM  
 HGB 9.0 (L) 10/25/2018 04:18 AM  
 PLATELET 971 00/93/5265 04:18 AM  
 
Lab Results Component Value Date/Time  Sodium 139 10/29/2018 06:54 AM  
 Potassium 4.1 10/29/2018 06:54 AM  
 Chloride 107 10/29/2018 06:54 AM  
 CO2 23 10/29/2018 06:54 AM  
 BUN 24 (H) 10/29/2018 06:54 AM  
 Creatinine 0.41 (L) 10/29/2018 06:54 AM  
 Calcium 8.0 (L) 10/29/2018 06:54 AM  
 Magnesium 2.0 10/29/2018 06:54 AM  
 Phosphorus 2.5 (L) 10/29/2018 06:54 AM  
  
Lab Results Component Value Date/Time AST (SGOT) 12 (L) 10/29/2018 06:54 AM  
 Alk. phosphatase 136 (H) 10/29/2018 06:54 AM  
 Protein, total 5.8 (L) 10/29/2018 06:54 AM  
 Albumin 1.9 (L) 10/29/2018 06:54 AM  
 Globulin 3.9 10/29/2018 06:54 AM  
 
Lab Results Component Value Date/Time INR 1.0 07/19/2018 11:54 AM  
 Prothrombin time 10.7 07/19/2018 11:54 AM  
  
No results found for: IRON, FE, TIBC, IBCT, PSAT, FERR No results found for: PH, PCO2, PO2 No components found for: [place] Lab Results Component Value Date/Time CK 72 01/09/2018 11:09 AM  
 CK - MB <1.0 01/09/2018 11:09 AM  
  
 
 
   
 
Total time:  45m Counseling / coordination time, spent as noted above:  40m 
> 50% counseling / coordination?:  Yes Prolonged service was provided for  []30 min   []75 min in face to face time in the presence of the patient, spent as noted above. Time Start:  
Time End:  
Note: this can only be billed with 51716 (initial) or 93215 (follow up). If multiple start / stop times, list each separately.

## 2018-10-29 NOTE — PROGRESS NOTES
Bedside shift change report given to Larry Marie (oncoming nurse) by Kishor Crawford (offgoing nurse). Report included the following information SBAR, Kardex, Intake/Output, MAR and Accordion.  Patient refused midday blood sugar. Will check again at 1800.  
 
Cheyanne Cao 136 from [facility] at bedside to discuss options for the future. Patient and spouse verbalized understanding. 1600 Patient requesting to go off floor with family in wheelchair. Okay per Sunitha Fox NP. 
 
8248 Blood sugar 65. IV Dextrose 12.5 mg and 120 mL cranberry juice given. Patient refuses repeat sugar check.

## 2018-10-30 NOTE — PALLIATIVE CARE
Palliative Medicine[place]le [place]ille [phone] (COPE) [facility] [phone] ([place]) Diagnoses: Metastatic ovarian cancer with bowel obstruction GOALS OF CARE: 
Patient/Health Care Proxy Stated Goals: Prolong life TREATMENT PREFERENCES:  
Code Status: DNR Advance Care Planning: AMD in chart Medical Interventions: Limited additional interventions(TPN / other interventions which are helpful. No escalation to ICU, CPR or intubation. ) Family Meeting Documentation Participants: Patient, , son and daughter-in-law along with 3 children over the phone. Dr. Los Zuleta while NP Discussion:  
Dr. Elaine Esparza provided family with details regarding patient's care over the last 3-1/2 years along with the recent challenges given bowel obstruction. He went on to explain what a bowel obstruction is and various methods of treating it. Described in detail the attempts at relieving her obstruction and how further surgery is not an option. With the presence of an obstruction we talked about how she no longer is a candidate for cancer directed therapy. We discussed with family that the goal of care at this time is geared towards symptom management than cancer management. And the way to achieve that is with the help of hospice care. Discussed hospice care in detail, its philosophy, with its benefits and limitations are. Discussed that prognosis is many weeks to months if her bowel starts moving and she is able to eat a little bit intermittently. However prognosis is in days if she develops a full-blown obstruction and/or an infection. Answered all the children's questions to their apparent satisfaction. Outcome / Plan: 1. Adequate pain management 
-She still struggles with not taking pain medicine appropriately and letting the pain get to a point where oral oxycodone is not helping. We agreed on the following 
-Start oxycodone 5 mg every 4 hours scheduled.   If patient is sleeping it is okay to hold the medicine and leave it at her bedside for her to use later. Discussed with RN and they are comfortable leaving a dose at bedside. 
-Start liquid morphine 5 mg sublingually every hour as needed for moderate to severe pain. She will use liquid morphine instead of using the bolus on the PCA to see how the liquid helps. -Our goal is to eventually change all her oral medicines to liquid medicine for better absorption. We will continue the PCA for now. 2.  Nutrition 
-Agreed to allow her to eat more than clear liquid diet. At this time family will bring her food that she desires. She wants to try the autumn special soup from [employer]. She knows to take only 1-2 teaspoons at a time to see how her stomach is able to tolerate it. Okay to clamp the G-tube for 2 hours after eating and okay to unclamp tube if she develops nausea. -Agreed to start weaning TPN starting tonight. 3.  G-tube 
-We will work with nursing to unclamp the tube for about an hour every 8 hours without interruptions at nighttime. 
-Unclamp tube for nausea or abdominal pain 4. Constipation 
-Stop lactulose. She does not like the taste. Start senna liquid 2 times a day and MiraLAX daily. She had a small \"excrement \"today after Dulcolax suppository. 5.  Decrease IV fluids to 50 mL/h with hopes that she would not have to go to the bathroom as often. 
-Stop all blood work including fingersticks. 6. Dispo- plan to see how she does over the next few days and then go home with hospice. She most likely will stay here until after the wedding of her son on Nov 10th. Follow Up:  
Continued close follow-up of palliative medicine Total Time: 2 hours Time spent in counseling / coordination: 2 PM to 4 PM 
>50% of time in counseling / coordination?  Yes

## 2018-10-30 NOTE — PROGRESS NOTES
Called Bella Dominguez NP with results of ultrasound. NP would like patient to have her Doculax suppository for gas, as the ultrasound showed no ascites.

## 2018-10-30 NOTE — PROGRESS NOTES
Per patient, assessment and VS not done so that patient could rest and not be disturbed for the rest of the morning. Pt stated she had a \"very bad night\" and just wants to rest without anyone, including staff, coming into room. Patients  is in the room, and will call if they need anything.

## 2018-10-30 NOTE — PROGRESS NOTES
Bedside shift change report given to Valeri Parson RN and HERSON Smith (oncoming nurse) by Jonathan Ochoa RN (offgoing nurse). Report included the following information SBAR. 2030: RN at bedside doing assessment, asking patient about pain. Patient states pain 5/10, Roxicodone 5mg given at this time. 2055: Pt calls out to go to the bathroom, PCT helps patient and afterwards informs RN patient is crying and reporting a lot of pain. 2059: HERSON Smith gave an additional 5mg dose of Roxicodone to patient and educated her to use PCA. 2155: Pt calls out again and tearfully states that she is still in a lot of pain, a 5 mg dose of Roxicodone was given by RN and patient was reminded to use PCA. 7389: After sleeping for about two hours, patient calls out and requests another dose of Roxicodne, RN gives 5mg dose and reminds pt to use PCA 
 
0130: Pt calls out with reports of pain 5/10 and unable to get comfortable, RN gives 5mg dose of Roxicodone and reminds pt of PCA 
 
0342: Pt calls out to use bathroom, and again reports uncontrolled pain. RN gives another 5mg dose of Roxicodne and helps reposition patient. Patient reports some confusion about which pain medication to use when and how to use the PCA pump.  
 
0502: Pt calls out because IV pump is beeping. While RN in room, patient reports pain at 5/10. RN gives 5mg dose of Roxicodone and reminds pt to also use the PCA. 0600: RN walks into patient room to assess pain and draw labs, patient is sleeping soundly. 0630: RN tries again to see if patient is awake for lab draw, patient is still sleeping. Will reevaluate again at 0700 to see if patient is awake.

## 2018-10-30 NOTE — PROGRESS NOTES
Bedside shift change report given to HERSON Umanzor and Chilo Scherer RN (oncoming nurse) by Valeri Parson RN and HERSON Smith (offgoing nurse). Report included the following information SBAR, Kardex, Intake/Output and MAR.

## 2018-10-30 NOTE — PROGRESS NOTES
Problem: Falls - Risk of 
Goal: *Absence of Falls Document Acosta Vinson Fall Risk and appropriate interventions in the flowsheet. Outcome: Progressing Towards Goal 
Fall Risk Interventions: 
Mobility Interventions: Patient to call before getting OOB Medication Interventions: Teach patient to arise slowly, Patient to call before getting OOB Elimination Interventions: Call light in reach Problem: Pressure Injury - Risk of 
Goal: *Prevention of pressure injury Document Joni Scale and appropriate interventions in the flowsheet. Outcome: Progressing Towards Goal 
Pressure Injury Interventions: Activity Interventions: Increase time out of bed Mobility Interventions: Turn and reposition approx. every two hours(pillow and wedges) Nutrition Interventions: Document food/fluid/supplement intake

## 2018-10-30 NOTE — PROGRESS NOTES
Jerzy Moy Afternoon note: 
Dr. Warden Zhao, Dr. Mary uHff and myself met with pt, her , son, daughter-in-law with 3 other children on speaker phone. Long discussion by Dr. Warden Zhao reviewing progression of disease over last 3.5 years and discussed recent developments with bowel obstruction and difficulties involved with progression of disease. Dr. Mary Huff discussed Palliative supportive measures as well as Hospice and what is all involved going forward. Patient, family and physicians exchanged many questions with clarifications of goals and support. Plan going forward is to wean TPN off, stop labs, unclamp G-Tube Q 8 hours for 1 hour unless pt is nauseated and it will then be unclamped immediately for relief. Continue to mobilize as pt tolerates and continue to improve on patients pain control and symptom relief. Discussion was greater than 90 minutes with pt and family very appreciative of discussion and understanding of pt's limited life expectancy. We will continue to be available to pt and family at all times for questions or concerns at all times.   
 
Lisandro Johnson, NP

## 2018-10-30 NOTE — PROGRESS NOTES
[address] [place] [address], [address] [place], [facility] Martha Mallory 
P [phone]  F [phone] Patient: Tiera Aceves Admit Date: 10/16/2018 Hospital Day:9 Admit Dx: Partial small bowel obstruction Ovarian cancer ([address] Malnutrition ([address] Dehydration SBO (small bowel obstruction) ([address] Subjective:  
Pt had difficult night with pain. Not well controlled and got very little sleep.  remained with pt throughout the night Acknowledges some nausea, no vomiting. G-Tube with 355 cc's bilious output. Due to pt receiving several PO doses of Shante, G-Tube required several hours of being clamped. Says passing less flatus and not sure when she passed any recently. Had been ambulating during the day yesterday with assistance. Pt and her  had \"good meeting\" with hospice yesterday. Still thinking over options Objective: Intake/Output Summary (Last 24 hours) at 10/30/2018 6977 Last data filed at 10/30/2018 0972 Gross per 24 hour Intake 2063 ml Output 1155 ml Net 908 ml Physical Exam 
/77 (BP 1 Location: Right arm, BP Patient Position: At rest)   Pulse 100   Temp 97.9 °F (36.6 °C)   Resp 16   Wt 131 lb 4.8 oz (59.6 kg)   LMP 02/02/2015 (Approximate)   SpO2 97%   BMI 20.26 kg/m² General:Remains tired and cachetic appearing. Frustrated this morning due to lack of sleep. Oriented and coherent. Teary on and off 
   Cardiac:  Regular, without M/R/G Lungs:  CTA bilat. Non labored without rales Abdomen:  soft, still firm with increase in distention this morning and slight increase in tenderness to palpation. Area around G-Tube insertion site unchanged. G-tube bag with small amount bile colored fluid . Tympanic abdomen. Extremity: no edema Data Review Lab Results Component Value Date/Time WBC 13.7 (H) 10/25/2018 04:18 AM  
 ABS.  NEUTROPHILS 10.5 (H) 10/25/2018 04:18 AM  
 HGB 9.0 (L) 10/25/2018 04:18 AM  
 HCT 28.5 (L) 10/25/2018 04:18 AM  
 .9 (H) 10/25/2018 04:18 AM  
 MCH 32.5 10/25/2018 04:18 AM  
 PLATELET 865 02/74/5969 04:18 AM  
 
Lab Results Component Value Date/Time Sodium 139 10/29/2018 06:54 AM  
 Potassium 4.1 10/29/2018 06:54 AM  
 Chloride 107 10/29/2018 06:54 AM  
 CO2 23 10/29/2018 06:54 AM  
 Glucose 105 (H) 10/29/2018 06:54 AM  
 BUN 24 (H) 10/29/2018 06:54 AM  
 Creatinine 0.41 (L) 10/29/2018 06:54 AM  
 Calcium 8.0 (L) 10/29/2018 06:54 AM  
 Albumin 1.9 (L) 10/29/2018 06:54 AM  
 Bilirubin, total 0.3 10/29/2018 06:54 AM  
 AST (SGOT) 12 (L) 10/29/2018 06:54 AM  
 ALT (SGPT) 57 10/29/2018 06:54 AM  
 Alk. phosphatase 136 (H) 10/29/2018 06:54 AM  
 
 
IMAGING: 
KUB 10/18/2018 FINDINGS: There are multiple dilated loops of small bowel throughout the abdomen  
and pelvis is seen on prior CT dated October 15, 2018. NG tube tip and side port  
overlie the stomach. There is no free intraperitoneal gas. Filter overlies the  
expected location of the IVC at the L2 level. There is a vascular stent  
overlying SPECT dislocation of the left common iliac vein. IMPRESSION: Multiple, persistent dilated loops of small bowel throughout the  
abdomen and pelvis. No evidence of perforation. NG tube in appropriate position CT Results (most recent): 
Results from Hospital Encounter encounter on 10/16/18 CT INTRO GASTROSTOMY TUBE PERC Narrative PROCEDURE: Gastrostomy tube placement INDICATION: Decompression, bowel obstruction OPERATING PHYSICIAN: Jean Pretty M.D. 
 
ESTIMATED BLOOD LOSS: None SPECIMENS REMOVED: None FLOURO TIME: None COMPLICATIONS: None immediate. Procedure and findings:  
 
CT dose reduction was achieved through use of a standardized protocol tailored 
for this examination and automatic exposure control for dose modulation.   
 
After obtaining informed consent, patient was brought the angiographic suite and 
 placed supine angiographic table. An 5 Cymro catheter was positioned in the 
stomach under fluoroscopy. Subsequently, the patient was moved to the CT table. Sedation was obtained with intravenous Versed and fentanyl. The upper abdomen 
was prepped and draped in maximal sterile barrier technique. Prophylactic 
antibiotic of Ancef 2 g was administered prior to the intervention and 
discontinued prior to the completion of the procedure. Approximately 600 cc of air was used to insufflate the stomach. The puncture 
site was identified and local anesthesia was achieved with 1% lidocaine 
injection. Antegrade puncture into the stomach was performed. 2 T-tacks were 
delivered and traction was applied to appose the anterior stomach wall against 
the anterior abdomen. A needle was then advanced into the stomach lumen and 
contrast injection was performed to confirm intraluminal location. An Amplatz 
wire was then advanced through the needle and the tract was serially dilated up 
to 18 [place]. A 18 Cymro gastrostomy tube was advanced over the wire into the 
stomach. The balloon was inflated with 8 cc of dilute contrast. Final 
intraluminal position of the gastrostomy tube was confirmed with gentle contrast 
injection under CT. The patient tolerated the procedure well and there were no 
immediate complications. A dry sterile dressing was applied. Impression IMPRESSION:  
 
Successful placement of 25 Cymro gastrostomy tube under CT guidance. Assessment/Plan:  
Admitted 10/16/2018 for Partial small bowel obstruction. G-tube place 10/24/18. Active Hospital Problems Diagnosis Date Noted  End of life care 10/29/2018  Drowsiness  Goals of care, counseling/discussion  Dehydration 10/16/2018  Malnutrition ([address] 10/16/2018  
 SBO (small bowel obstruction) ([address] 10/16/2018  Severe protein-calorie malnutrition ([address] 10/11/2018  Ovarian cancer ([address] 10/10/2018  Nausea 10/10/2018  Epigastric pain  Pain of metastatic malignancy 08/09/2018  Anemia due to chemotherapy 07/26/2018  Peritoneal carcinomatosis ([address] 06/01/2018 Felicia Martinez is a 48 y.o. female with ovarian cancer admitted with carcinomatosis, SBO, protein/calorie malnutrition d/t chronic disease and obstruction with N/V and cancer related pain. Onc: Undergoing Doxil/Avastin s/p 3 cycles last 10/4/18. Recent EOD CT with peritoneal disease response. Partial vs complete SBO. KUB 10/21/18 showed improvement in dlialtion of small bowel. Palliative G-tube placed 10/24. Heme/CV: Hemodynamically stable, anemia d/t chemo/chronic disease. FEN/GI: Partial SBO with delayed gastric emptying (by endoscopy 10/12/18) with malnutrition - cyclic TPN over 12 hours with Lipid TIW. Palliative venting G-tube in place 10/25. No expected resolution. Family understands. Continue Reglan, PPI/H2. Ascites: there was improvement after drainage 10/18, but with new abd distention today, will obtain US and drain if able. If ascites has re-accumulated this quickly, will place ASPIRA cath if able. Neuro/Psych: More alert, but teary: pain/antidpression regimen per palliative care, following. Some issues with pain control again over night again as trying to transition to a fentanyl patch. Have discussed with Palliative Medicine this morning again and they will address. Since her GI tract does not function well, will need to use other avenues to administer pain medication as G-Tube is requiring too much time clamped after PO meds PPX: Lovenox daily, but will hold today in anticipation of possible paracentesis. Holding home xarelto. Disposition: Stable obstructive without meaningful resolution, difficult course with her disease these last few months. G-tube for palliation in place on 10/24 The patient and her  have decided upon DNR status.  They would still like other medical measures taken if necessary, but they do not want to intubated or to undergo chest compressions/shock. They continue to want to think and discuss their current status with their children before making a decision. Son getting  in November, discussed palliative options moving toward this goal.  
Family meeting is scheduled for today at 1515 with Dr. Vira Rutledge and Palliative medicine. Will monitor throughout the day.  
 
Paul Duffy, KYE

## 2018-10-30 NOTE — PROGRESS NOTES
Palliative Medicine Consult Ruperto: [phone] ([id number]) Patient Name: Zena Heath YOB: 1965 Date of Initial Consult: 10/10/18 Reason for Consult: Cancer related symptom management Requesting Provider: Dr. Lanis Koyanagi Primary Care Physician: Ondina Simpson MD 
 
 SUMMARY:  
Zena Heath is a 48 y.o. with recurrent ovarian malignancy on Doxil/Avastin per Dr. Lanis Koyanagi, known to outpatient palliative medicine. She was admitted 10/10/2018 from home due to worsening nausea and vomiting followed by acute worsening of epigastric abdominal pain. Last chemotherapy date was 10/4. Current medical issues leading to Palliative Medicine involvement include: cancer related symptoms including pain, nausea, vomiting. S/p venting peg placement on 10/24 by IR. PALLIATIVE DIAGNOSES:  
1. Abdominal pain - multifactorial (malignancy related, bowel obstruction, post-PEG site) 2. Malignancy related small bowel obstruction - ongoing 3. Malignancy related fatigue 4. Nausea with emesis  - stable 5. Delayed gastric motility - clinically and by EGD PLAN:  
1. Pain:  Malignancy related abd pain. - placed new fentanyl td 25 mcg Fri 10/26 
- now utilizing oxy 5 mg q1h prn for breakthrough pain first line, 2nd morphine IV 
- she remains distressed about when to hit pca button --> consider d/c pca in favor of IV pushes for backup; IV not feasible in outpatient setting and need to work on transitioning to home regimen. Needs encouragement and reassurance that this is possible. - Total MEDD 236 past 24 hrs (up from 228 yesterday, 100-200 range last week) - Per  --> she did well in the evening; did not push pca all day yesterday during day hours - Per nursing notes / patient --> had bad overnight.   Pain scores 0-5.   
- Johanny reports significant distress due to frequent hospital interventions / frequent need to urinate / slowness to move and difficulty ambulating in middle of night. \"The need for pokes and pricks\" is distressing.   
- note \"Total pain\" difficult to distinguish from physical pain (sleep deprivation, confusion, anxiety all playing a role) -  Drowsiness improved compared to yesterday - Plan: 
 - cont Fentanyl Td 25 mcg patch (consider increase tomorrow depending on next 24 hrs) 
 - cont oxy 5 q1h for breakthrough pain 
 - cont morphine IV if in bad spot--> talked about taking down the pca and using nurse boluses as I think it will minimize the amount of pressure she feels to push it on her own ---> this can become quite difficult to do in advanced malignancy when fatigue, drowsiness and delirium play a more prominent role. Will NOT do this at present; but consider later today or tomorrow 2. Anxiety:  Chronic Ativan use - pre-dates cancer 
 - add olanzapine 5 mg disintegrating qhs for anxiety/agitation overnight 
 - Ok to use with prn lorazepam for now. 3. Malignancy related SBO:   
- no c/o nausea/vomiting today - Reduce frequence of IV Reglan to TID 8 am, 2 pm, 9 pm to minimize nocturnal interruption  
- use Haldol 1 mg IV for breakthrough nausea 1st line. 4. Nutrition:   
 - oral tolerance poor; inc in crampy abdominal pain with fluids/ ice 
 - remains on TPN 12 hrs / overnight. - no good evidence to date to support in advanced malignancy 
 - talked with pt and  about lack of good evidence to support use; greatest issue currently is its use is causing distress due to more frequent need for medical interventions (lab draws, glucose checks, etc). Further to be discussed at family meeting today. 5. 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 6. Goals of care- meeting this afternoon with Dr. Gallo Pardo and Dr. Markus Cramer. Hospice is best option to support more comfort focused and quality care at end of life.   Patient agrees this is her goal.   reasonably concerned about how to support her outside hospital.  Spent time today reviewing goals and supporting her and  in this process. 7. Communicated plan of care with: Palliative IDTZoe Peer NP, bedside RN. GOALS OF CARE / TREATMENT PREFERENCES:  
 
GOALS OF CARE:    
Patient/Health Care Proxy Stated Goals: Prolong life TREATMENT PREFERENCES:  
Code Status: DNR Advance Care Planning: 
Advance Care Planning 10/17/2018 Patient's Healthcare Decision Maker is: Legal Next of Kin Primary Decision Maker Name -  
Primary Decision Maker Phone Number -  
Primary Decision Maker Relationship to Patient -  
Confirm Advance Directive None Medical Interventions: Limited additional interventions(TPN / other interventions which are helpful. No escalation to ICU, CPR or intubation. ) Other: As far as possible, the palliative care team has discussed with patient / health care proxy about goals of care / treatment preferences for patient. HISTORY:  
 
History obtained from:  Pt, , EMR 
 
CHIEF COMPLAINT:  Fatigue HPI/SUBJECTIVE: The patient is:  
[x] Verbal and participatory [] Non-participatory due to:  
 
See above. Had a bad night due to frequent interruptions. Feels exhausted and wants to get more sleep. Stressed about how to manage her pain - anxious she can't \"figure it out. \"   
Pain is largely unchanged, in upper abdomen due to bloating; pressure. Crampy when she takes in liquids or food. Clinical Pain Assessment (nonverbal scale for severity on nonverbal patients):  
Clinical Pain Assessment Severity: 2 Location: abd bl upper quads, LLQ Character: crampy Duration: weeks Effect: functional 
Factors: none in particular Frequency: chronic Duration: for how long has pt been experiencing pain (e.g., 2 days, 1 month, years) Frequency: how often pain is an issue (e.g., several times per day, once every few days, constant) FUNCTIONAL ASSESSMENT:  
 
Palliative Performance Scale (PPS): PPS: 40 PSYCHOSOCIAL/SPIRITUAL SCREENING:  
 
Palliative IDT has assessed this patient for cultural preferences / practices and a referral made as appropriate to needs (Cultural Services, Patient Advocacy, Ethics, etc.) Advance Care Planning: 
Advance Care Planning 10/17/2018 Patient's Healthcare Decision Maker is: Legal Next of Kin Primary Decision Maker Name -  
Primary Decision Maker Phone Number -  
Primary Decision Maker Relationship to Patient -  
Confirm Advance Directive None Any spiritual / Mu-ism concerns: 
[] Yes /  [x] No 
 
Caregiver Burnout: 
[] Yes /  [x] No /  [] No Caregiver Present Anticipatory grief assessment:  
[] Normal  / [] Maladaptive   Did not assess ESAS Anxiety: Anxiety: 3 ESAS Depression: Depression: 3 REVIEW OF SYSTEMS:  
 
Positive and pertinent negative findings in ROS are noted above in HPI. The following systems were [x] reviewed / [] unable to be reviewed as noted in HPI Other findings are noted below. Systems: constitutional, ears/nose/mouth/throat, respiratory, gastrointestinal, genitourinary, musculoskeletal, integumentary, neurologic, psychiatric, endocrine. Positive findings noted below. Modified ESAS Completed by: provider Fatigue: 9 Drowsiness: 3 Depression: 3 Pain: 2 Anxiety: 3 Nausea: 0 Anorexia: 9 Dyspnea: 0 Constipation: Yes Stool Occurrence(s): 1 PHYSICAL EXAM:  
 
From RN flowsheet: 
Wt Readings from Last 3 Encounters:  
10/29/18 59.6 kg (131 lb 4.8 oz) 10/16/18 58 kg (127 lb 12.8 oz) 10/04/18 58.2 kg (128 lb 6.4 oz) Blood pressure 128/77, pulse 100, temperature 97.9 °F (36.6 °C), resp. rate 16, weight 59.6 kg (131 lb 4.8 oz), last menstrual period 02/02/2015, SpO2 97 %, unknown if currently breastfeeding. Pain Scale 1: Visual 
Pain Intensity 1: 0 Pain Onset 1: chronic Pain Location 1: Abdomen Pain Orientation 1: Anterior Pain Description 1: Aching Pain Intervention(s) 1: Medication (see MAR) Last bowel movement, if known:  
 
Constitutional: chronically ill appearing; drowsy Eyes: pupils equal, anicteric ENMT: no nasal discharge, moist mucous membranes, no lesions or thrush in mouth Skin: warm, dry Neurologic: arouses to voice and touch; drowsy; intermittently participates in conversation. Follows all commands. Psychiatric: unable to fully assess today HISTORY:  
 
Active Problems: 
  Peritoneal carcinomatosis ([address] (6/1/2018) Anemia due to chemotherapy (7/26/2018) Pain of metastatic malignancy (8/9/2018) Ovarian cancer ([address] (10/10/2018) Nausea (10/10/2018) Epigastric pain () Severe protein-calorie malnutrition ([address] (10/11/2018) Dehydration (10/16/2018) Malnutrition ([address] (10/16/2018) SBO (small bowel obstruction) ([address] (10/16/2018) End of life care (10/29/2018) Drowsiness () Goals of care, counseling/discussion () Past Medical History:  
Diagnosis Date  Anxiety  BRCA1 positive  Calculus of kidney 1/13  
 right  Hernia, inguinal, left 2012  MS (multiple sclerosis) ([address] 1996  Nausea & vomiting  Ovarian cancer ([address] 3/2015  
 high grade, stage 3C papillary serous  Thromboembolus ([address] 8/2007  
 lower abdomen post fall Past Surgical History:  
Procedure Laterality Date  HX GYN  2009  
 endometrial ablation with punctured uterus  HX OTHER SURGICAL  8/2007  
 green filter  HX MARIEL AND BSO  3/2014  
 ovarian cancer Family History Problem Relation Age of Onset  Cancer Paternal Grandmother   
     breast  
 Breast Cancer Paternal Grandmother  Coronary Artery Disease Mother 48 CABG and PCI  Heart Surgery Father   
     valve replacement  No Known Problems Brother  No Known Problems Brother  No Known Problems Brother  No Known Problems Daughter  No Known Problems Son   
  No Known Problems Son  No Known Problems Son History reviewed, no pertinent family history. Social History Tobacco Use  Smoking status: Never Smoker  Smokeless tobacco: Never Used Substance Use Topics  Alcohol use: No  
 
Allergies Allergen Reactions  Pcn [Penicillins] Rash Current Facility-Administered Medications Medication Dose Route Frequency  [START ON 10/31/2018] enoxaparin (LOVENOX) injection 40 mg  40 mg SubCUTAneous Q24H  TPN ADULT - CENTRAL  0-155 mL/hr IntraVENous TITRATE  oxyCODONE IR (ROXICODONE) tablet 5 mg  5 mg Oral Q1H PRN  
 glycopyrrolate (ROBINUL) injection 0.2 mg  0.2 mg IntraVENous Q4H PRN  
 LORazepam (ATIVAN) tablet 1 mg  1 mg Oral Q8H PRN  
 fentaNYL (DURAGESIC) 25 mcg/hr patch 1 Patch  1 Patch TransDERmal Q72H  
 insulin lispro (HUMALOG) injection   SubCUTAneous Q6H  
 haloperidol lactate (HALDOL) injection 1 mg  1 mg IntraVENous Q4H PRN  
 metoclopramide HCl (REGLAN) injection 10 mg  10 mg IntraVENous Q6H  
 aspirin-acetaminophen-caffeine (EXCEDRIN ES) per tablet 1-2 Tab   (Patient Supplied)  1-2 Tab Oral Q6H PRN  pantoprazole (PROTONIX) 40 mg in sodium chloride 0.9% 10 mL injection  40 mg IntraVENous Q24H  
 amitriptyline (ELAVIL) tablet 10 mg  10 mg Oral PCD  pregabalin (LYRICA) capsule 25 mg  25 mg Oral BID  
 0.9% sodium chloride infusion  75 mL/hr IntraVENous CONTINUOUS  
 sodium chloride (NS) flush 5-10 mL  5-10 mL IntraVENous Q8H  
 sodium chloride (NS) flush 5-10 mL  5-10 mL IntraVENous PRN  
 glucose chewable tablet 16 g  4 Tab Oral PRN  
 dextrose (D50W) injection syrg 12.5-25 g  12.5-25 g IntraVENous PRN  
 glucagon (GLUCAGEN) injection 1 mg  1 mg IntraMUSCular PRN  
 fat emulsion 20% (LIPOSYN, INTRAlipid) infusion 500 mL  500 mL IntraVENous Q TUE, THU & SAT  lactulose (CHRONULAC) solution 20 g  20 g Per NG tube BID  morphine  mg / 30 mL   IntraVENous CONTINUOUS  
 
 
 
 LAB AND IMAGING FINDINGS:  
 
Lab Results Component Value Date/Time WBC 13.7 (H) 10/25/2018 04:18 AM  
 HGB 9.0 (L) 10/25/2018 04:18 AM  
 PLATELET 991 77/49/5022 04:18 AM  
 
Lab Results Component Value Date/Time Sodium 139 10/30/2018 07:06 AM  
 Potassium 4.4 10/30/2018 07:06 AM  
 Chloride 107 10/30/2018 07:06 AM  
 CO2 23 10/30/2018 07:06 AM  
 BUN 20 10/30/2018 07:06 AM  
 Creatinine 0.37 (L) 10/30/2018 07:06 AM  
 Calcium 8.1 (L) 10/30/2018 07:06 AM  
 Magnesium 1.9 10/30/2018 07:06 AM  
 Phosphorus 3.3 10/30/2018 07:06 AM  
  
Lab Results Component Value Date/Time AST (SGOT) 13 (L) 10/30/2018 07:06 AM  
 Alk. phosphatase 136 (H) 10/30/2018 07:06 AM  
 Protein, total 6.0 (L) 10/30/2018 07:06 AM  
 Albumin 2.0 (L) 10/30/2018 07:06 AM  
 Globulin 4.0 10/30/2018 07:06 AM  
 
Lab Results Component Value Date/Time INR 1.0 07/19/2018 11:54 AM  
 Prothrombin time 10.7 07/19/2018 11:54 AM  
  
No results found for: IRON, FE, TIBC, IBCT, PSAT, FERR No results found for: PH, PCO2, PO2 No components found for: [place] Lab Results Component Value Date/Time CK 72 01/09/2018 11:09 AM  
 CK - MB <1.0 01/09/2018 11:09 AM  
  
 
 
   
 
Total time:  45m Counseling / coordination time, spent as noted above:  40m 
> 50% counseling / coordination?:  Yes Prolonged service was provided for  []30 min   []75 min in face to face time in the presence of the patient, spent as noted above. Time Start:  
Time End:  
Note: this can only be billed with 06709 (initial) or 82235 (follow up). If multiple start / stop times, list each separately.

## 2018-10-31 NOTE — PROGRESS NOTES
Bedside and Verbal shift change report given to Ruiz Hoff (oncoming nurse) by Terrance Hussein (offgoing nurse). Report included the following information SBAR, Kardex, OR Summary, Procedure Summary, Intake/Output and MAR.  
 
16:00- report given, pain meds given, patient with family and visitors.

## 2018-10-31 NOTE — PROGRESS NOTES
[address], [address] [place], [address] [place] 
P [phone]  F [phone] Patient: Vi See Admit Date: 10/16/2018 Hospital Day:9 Admit Dx: Partial small bowel obstruction Ovarian cancer ([address] Malnutrition ([address] Dehydration SBO (small bowel obstruction) ([address] Subjective:  
Currently comfortable with the G-tube. Had difficult period last night with significant abd pain which ended up being just prior to having large BM. Pain improved somewhat after BM. Also had nausea prior to BM and G-Tube was opened and pt did obtain relief of nausea without need of antisemitic's. Pt is sitting in the chair currently awake and oriented. Feels MS del castillo was an improvement in pain management G-tube output last 24 hours to 200 cc's. Continues passing flatus.  remaining at bedside. Objective: Intake/Output Summary (Last 24 hours) at 10/31/2018 0404 Last data filed at 10/31/2018 0531 Gross per 24 hour Intake 737.41 ml Output 800 ml Net -62.59 ml Physical Exam 
/77 (BP 1 Location: Right arm, BP Patient Position: At rest)   Pulse 100   Temp 97.9 °F (36.6 °C)   Resp 16   Wt 131 lb 4.8 oz (59.6 kg)   LMP 02/02/2015 (Approximate)   SpO2 97%   BMI 20.26 kg/m² General: tired and cachetic appearing, but in pretty good spirits today. Cardiac:  Regular, without M/R/G Lungs:  CTA bilat. Non labored without rales Abdomen:  soft, less firm today and less distended with continued mild tenderness to palpation. G-tube bag with clear fluid . Extremity: no edema Data Review Lab Results Component Value Date/Time WBC 13.7 (H) 10/25/2018 04:18 AM  
 ABS. NEUTROPHILS 10.5 (H) 10/25/2018 04:18 AM  
 HGB 9.0 (L) 10/25/2018 04:18 AM  
 HCT 28.5 (L) 10/25/2018 04:18 AM  
 .9 (H) 10/25/2018 04:18 AM  
 MCH 32.5 10/25/2018 04:18 AM  
 PLATELET 095 30/47/8203 04:18 AM  
 
Lab Results Component Value Date/Time Sodium 139 10/30/2018 07:06 AM  
 Potassium 4.4 10/30/2018 07:06 AM  
 Chloride 107 10/30/2018 07:06 AM  
 CO2 23 10/30/2018 07:06 AM  
 Glucose 87 10/30/2018 07:06 AM  
 BUN 20 10/30/2018 07:06 AM  
 Creatinine 0.37 (L) 10/30/2018 07:06 AM  
 Calcium 8.1 (L) 10/30/2018 07:06 AM  
 Albumin 2.0 (L) 10/30/2018 07:06 AM  
 Bilirubin, total 0.3 10/30/2018 07:06 AM  
 AST (SGOT) 13 (L) 10/30/2018 07:06 AM  
 ALT (SGPT) 44 10/30/2018 07:06 AM  
 Alk. phosphatase 136 (H) 10/30/2018 07:06 AM  
 
 
IMAGING: 
US 10/30/2018 FINDINGS: Sonography of the entire abdomen shows no ascites. 
  
IMPRESSION: No ascites KUB 10/18/2018 FINDINGS: There are multiple dilated loops of small bowel throughout the abdomen  
and pelvis is seen on prior CT dated October 15, 2018. NG tube tip and side port  
overlie the stomach. There is no free intraperitoneal gas. Filter overlies the  
expected location of the IVC at the L2 level. There is a vascular stent  
overlying SPECT dislocation of the left common iliac vein. IMPRESSION: Multiple, persistent dilated loops of small bowel throughout the  
abdomen and pelvis. No evidence of perforation. NG tube in appropriate position CT Results (most recent): 
Results from Hospital Encounter encounter on 10/16/18 CT INTRO GASTROSTOMY TUBE PERC Narrative PROCEDURE: Gastrostomy tube placement INDICATION: Decompression, bowel obstruction OPERATING PHYSICIAN: Chacha Kay M.D. 
 
ESTIMATED BLOOD LOSS: None SPECIMENS REMOVED: None FLOURO TIME: None COMPLICATIONS: None immediate. Procedure and findings:  
 
CT dose reduction was achieved through use of a standardized protocol tailored 
for this examination and automatic exposure control for dose modulation. After obtaining informed consent, patient was brought the angiographic suite and 
placed supine angiographic table.  An 5 Bulgarian catheter was positioned in the 
 stomach under fluoroscopy. Subsequently, the patient was moved to the CT table. Sedation was obtained with intravenous Versed and fentanyl. The upper abdomen 
was prepped and draped in maximal sterile barrier technique. Prophylactic 
antibiotic of Ancef 2 g was administered prior to the intervention and 
discontinued prior to the completion of the procedure. Approximately 600 cc of air was used to insufflate the stomach. The puncture 
site was identified and local anesthesia was achieved with 1% lidocaine 
injection. Antegrade puncture into the stomach was performed. 2 T-tacks were 
delivered and traction was applied to appose the anterior stomach wall against 
the anterior abdomen. A needle was then advanced into the stomach lumen and 
contrast injection was performed to confirm intraluminal location. An Amplatz 
wire was then advanced through the needle and the tract was serially dilated up 
to 18 [place]. A 18 Norwegian gastrostomy tube was advanced over the wire into the 
stomach. The balloon was inflated with 8 cc of dilute contrast. Final 
intraluminal position of the gastrostomy tube was confirmed with gentle contrast 
injection under CT. The patient tolerated the procedure well and there were no 
immediate complications. A dry sterile dressing was applied. Impression IMPRESSION:  
 
Successful placement of 25 Norwegian gastrostomy tube under CT guidance. Assessment/Plan:  
Admitted 10/16/2018 for Partial small bowel obstruction. G-tube place 10/24/18. Active Hospital Problems Diagnosis Date Noted  Malignant cachexia ([address]  End of life care 10/29/2018  Drowsiness  Goals of care, counseling/discussion  Dehydration 10/16/2018  Malnutrition ([address] 10/16/2018  
 SBO (small bowel obstruction) ([address] 10/16/2018  Severe protein-calorie malnutrition ([address] 10/11/2018  Ovarian cancer ([address] 10/10/2018  Nausea 10/10/2018  Epigastric pain  Pain of metastatic malignancy 08/09/2018  Anemia due to chemotherapy 07/26/2018  Peritoneal carcinomatosis ([address] 06/01/2018 Zena Heath is a 48 y.o. female with ovarian cancer admitted with carcinomatosis, SBO, protein/calorie malnutrition d/t chronic disease and obstruction with N/V and cancer related pain. Onc: Undergoing Doxil/Avastin s/p 3 cycles last 10/4/18. Recent EOD CT with peritoneal disease response. Partial SBO with flatus. BM during night was first since admission. Palliative G-tube placed 10/24 and relieving nausea as needed. Unlamping for 1 hour Q8h or as needed for nausea. Heme/CV: Hemodynamically stable by VS. No further lab draws at this time FEN/GI: Partial SBO with delayed gastric emptying (by endoscopy 10/12/18) with malnutrition - cyclic TPN over 12 hours with Lipid TIW until today. Will now stop TPN and Lipids and allow pt to begin small, frequent eating periods to evaluate how she tolerates. Continue Reglan, PPI/H2. Ascites:Improved after drainage 10/18. No new ascites on US 10/30. Neuro: Lethargy improved this morning: pain/antidpression regimen per palliative care, following. Palliative Medicine monitoring this closely. PPX: lovenox. Holding home xarelto. Disposition: Stable partial obstructive at this point without meaningful resolution, difficult course with her disease these last few months. G-tube for palliation in place on 10/24. Long family meeting last evening (see note) with goals of care, plan of care going forward reviewed. Pt and  very happy with meeting this morning and talked with entire family at length after meeting. They now feel they are in a \"good place\" as far as plan of care and continued need for pain management improvement. Pt remains DNR Will monitor throughout the day.  
 
Mariela Ramsey NP

## 2018-10-31 NOTE — PROGRESS NOTES
NUTRITION Consult received and spoke with [facility]. Events noted and pt to be discharged with hospice. TPN ended today at 7am and suggested not reorder for discontinuation. Agree with allowing small amounts of desired foods as tolerated. Offered to provide desired items from cafe. Pt declined at this time and family providing foods.  
Continue to follow and assist.  
 
Kindra Velasco RD

## 2018-10-31 NOTE — PROGRESS NOTES
Problem: Falls - Risk of 
Goal: *Absence of Falls Document Acosta Vinson Fall Risk and appropriate interventions in the flowsheet. Outcome: Progressing Towards Goal 
Fall Risk Interventions: 
Mobility Interventions: PT Consult for mobility concerns Medication Interventions: Teach patient to arise slowly, Patient to call before getting OOB Elimination Interventions: Call light in reach, Patient to call for help with toileting needs

## 2018-10-31 NOTE — PROGRESS NOTES
Bedside and Verbal shift change report given to [address] JUAN (oncoming nurse) by Massiel Slade (offgoing nurse). Report included the following information SBAR, Kardex, Intake/Output, MAR and Recent Results. 0740: Patient resting in chair. Feeling nauseas, PEG tube unclamped. [id number] Rebolledo NP at bedside. PT expressed interest in showering today. Refused some morning meds. PEG tube clamped. 0930: Pt ambulating the halls 1100: Pt sitting up in chair in good spirits. 1200: Discussed changes to pain medicine with palliative MD Dr. Nanci Khan 
 
1500: Pt unclamped early to prepare to reclamp after oral pain administration at 1600. 
 
1535: Bedside and Verbal shift change report given to [facility] (oncoming nurse) by Edmond Epley RN (offgoing nurse). Report included the following information SBAR, Kardex, Intake/Output, MAR and Recent Results. Pt states she has flushed PEG once today so far.

## 2018-10-31 NOTE — PROGRESS NOTES
Bedside and Verbal shift change report given to Maurizio Spencer RN (oncoming nurse) by Nicolle Montanez RN (offgoing nurse). Report included the following information SBAR, Kardex, Procedure Summary, Intake/Output, MAR, Accordion, Recent Results and Med Rec Status.

## 2018-10-31 NOTE — PROGRESS NOTES
Palliative Medicine Consult Ruperto: [phone] ([phone]) Patient Name: Stephany Odonnell YOB: 1965 Date of Initial Consult: 10/10/18 Reason for Consult: Cancer related symptom management Requesting Provider: Dr. Vira Rutledge Primary Care Physician: Ramos Dominguez MD 
 
 SUMMARY:  
Stephany Odonnell is a 48 y.o. with recurrent ovarian malignancy on Doxil/Avastin per Dr. Vira Rutledge, known to outpatient palliative medicine. She was admitted 10/10/2018 from home due to worsening nausea and vomiting followed by acute worsening of epigastric abdominal pain. Last chemotherapy date was 10/4. Current medical issues leading to Palliative Medicine involvement include: cancer related symptoms including pain, nausea, vomiting. S/p venting peg placement on 10/24 by IR. PALLIATIVE DIAGNOSES:  
1. Abdominal pain - multifactorial (malignancy related, bowel obstruction, post-PEG site) - ongoing 2. Malignancy related small bowel obstruction - improved today 3. Malignancy related fatigue - unchanged 4. Nausea with emesis  - stable 5. Delayed gastric motility - clinically and by EGD PLAN:  
1. Pain:  Malignancy related abd pain. Pain continues to flare intermittently. She continues to require frequent prn doses of both oral and IV breakthrough meds. - significant reduction in opioid use past 24 hrs - BM last night; caused worsening of pain while having and post BM, but also some relief of abd distension - Total MEDD 158 / 24 hrs (down from 236 yesterday) - Increase fentanyl Td by adding 12 mcg to current 25 mcg patch 
- cont standing short acting q4h now --> opt to d/c oxycodone tabs in favor of 10 mg liquid morphine   
- cont close monitoring as anticipate need to hold standing morphine as fentanyl reaches steady state 
- add liquid morphine 10 mg q2h prn for breakthrough pain. - d/c pca today. Will use IV morphine as backup for breakthrough severe pain unrelieved by oral agents. 2. Anxiety:  Chronic Ativan use - pre-dates cancer 
 - cont olanzapine 5 mg disintegrating qhs for anxiety/agitation overnight 
 - Ok to use with prn lorazepam for now. She is aware of risks of combo opioid/benzo. Advised to only take if needed. 3. Fatigue. Multifactorial due to malignancy, med SEs and sleep deprivation.  
 - had a good night last night 
 - cont olanzapine at night 
 - reduce frequency of lorazepam use if possible 
 - maximize balance of opioid pain relief vs fatigue SE.   
4. Malignancy related SBO:   
- finally had large BM overnight!!! 
- nausea and abd distension improved 
- cont standing IV Reglan 10 mg TID; consider transition to oral once we have her pain under better control 
- use Haldol 1 mg IV for breakthrough nausea 1st line. 5. Nutrition:   
 - decision made to stop TPN during fam mtg yesterday 
 - Johanny comfortable with this plan of care 
 - agree with small bits / bites throughout the day as tolerated. - Cont IVF for now at reduced rate of 50 cc/hr 6. Neuropathy- chemo induced neuropathy - resolved on current regimen of amitriptyline and Lyrica 
- unclear how much she is absorbing of these meds vs neuropathy improvement as a result of time 
- d/c low dose amitriptyline (favor reduced pill burden; other sedating meds) 
- cont Lyrica for now; can consider trial off this as well later in the week 7. Goals of care- positive family meeting yesterday afternoon with gyn/onc and palliative teams. Goal is to eventually transition home with Hospice support once we maximize her pain control and comfort here in the hospital.     
8. Communicated plan of care with: Palliative IDT, bedside RN, Dr. Navarrete Cancer. GOALS OF CARE / TREATMENT PREFERENCES:  
 
GOALS OF CARE:    
Patient/Health Care Proxy Stated Goals: Comfort TREATMENT PREFERENCES:  
Code Status: DNR Advance Care Planning: 
Advance Care Planning 10/17/2018 Patient's Healthcare Decision Maker is: Legal Next of Kin Primary Decision Maker Name -  
Primary Decision Maker Phone Number -  
Primary Decision Maker Relationship to Patient -  
Confirm Advance Directive None Medical Interventions: Comfort measures Artificially Administered Nutrition: No feeding tube(Venting PEG in place - not for enteral feeding) Other: As far as possible, the palliative care team has discussed with patient / health care proxy about goals of care / treatment preferences for patient. HISTORY:  
 
History obtained from:  Pt, , EMR 
 
CHIEF COMPLAINT:  Fatigue HPI/SUBJECTIVE: The patient is:  
[x] Verbal and participatory [] Non-participatory due to:  
 
See above. Had a bad night due to frequent interruptions. Feels exhausted and wants to get more sleep. Stressed about how to manage her pain - anxious she can't \"figure it out. \"   
Pain is largely unchanged, in upper abdomen due to bloating; pressure. Crampy when she takes in liquids or food. Clinical Pain Assessment (nonverbal scale for severity on nonverbal patients):  
Clinical Pain Assessment Severity: 2 Location: abd bl upper quads, LLQ Character: crampy Duration: weeks Effect: functional 
Factors: none in particular Frequency: chronic Duration: for how long has pt been experiencing pain (e.g., 2 days, 1 month, years) Frequency: how often pain is an issue (e.g., several times per day, once every few days, constant) FUNCTIONAL ASSESSMENT:  
 
Palliative Performance Scale (PPS): PPS: 50 PSYCHOSOCIAL/SPIRITUAL SCREENING:  
 
Palliative IDT has assessed this patient for cultural preferences / practices and a referral made as appropriate to needs (Cultural Services, Patient Advocacy, Ethics, etc.) Advance Care Planning: 
Advance Care Planning 10/17/2018 Patient's Healthcare Decision Maker is: Legal Next of Kin Primary Decision Maker Name -  
 Primary Decision Maker Phone Number -  
Primary Decision Maker Relationship to Patient -  
Confirm Advance Directive None Any spiritual / Islam concerns: 
[] Yes /  [x] No 
 
Caregiver Burnout: 
[] Yes /  [x] No /  [] No Caregiver Present Anticipatory grief assessment:  
[] Normal  / [] Maladaptive   Did not assess ESAS Anxiety: Anxiety: 3 ESAS Depression: Depression: 3 REVIEW OF SYSTEMS:  
 
Positive and pertinent negative findings in ROS are noted above in HPI. The following systems were [x] reviewed / [] unable to be reviewed as noted in HPI Other findings are noted below. Systems: constitutional, ears/nose/mouth/throat, respiratory, gastrointestinal, genitourinary, musculoskeletal, integumentary, neurologic, psychiatric, endocrine. Positive findings noted below. Modified ESAS Completed by: provider Fatigue: 9 Drowsiness: 3 Depression: 3 Pain: 2 Anxiety: 3 Nausea: 0 Anorexia: 9 Dyspnea: 0 Constipation: Yes Stool Occurrence(s): 1 PHYSICAL EXAM:  
 
From RN flowsheet: 
Wt Readings from Last 3 Encounters:  
10/16/18 58 kg (127 lb 12.8 oz) 10/04/18 58.2 kg (128 lb 6.4 oz) 09/27/18 57.7 kg (127 lb 1.6 oz) Blood pressure 103/68, pulse (!) 113, temperature 98.3 °F (36.8 °C), resp. rate 13, weight 59.6 kg (131 lb 4.8 oz), last menstrual period 02/02/2015, SpO2 100 %, unknown if currently breastfeeding. Pain Scale 1: Numeric (0 - 10) Pain Intensity 1: 1 Pain Onset 1: chronic Pain Location 1: Abdomen Pain Orientation 1: Lower Pain Description 1: Pressure Pain Intervention(s) 1: Medication (see MAR) Last bowel movement, if known:  
 
Constitutional: chronically ill appearing; drowsy Eyes: pupils equal, anicteric ENMT: no nasal discharge, moist mucous membranes, no lesions or thrush in mouth Skin: warm, dry Neurologic: arouses to voice and touch; drowsy; intermittently participates in conversation. Follows all commands. Psychiatric: unable to fully assess today HISTORY:  
 
Active Problems: 
  Peritoneal carcinomatosis ([address] (6/1/2018) Anemia due to chemotherapy (7/26/2018) Pain of metastatic malignancy (8/9/2018) Ovarian cancer ([address] (10/10/2018) Nausea (10/10/2018) Epigastric pain () Severe protein-calorie malnutrition ([address] (10/11/2018) Dehydration (10/16/2018) Malnutrition ([address] (10/16/2018) SBO (small bowel obstruction) ([address] (10/16/2018) End of life care (10/29/2018) Drowsiness () Goals of care, counseling/discussion () Malignant cachexia (HCC) () Past Medical History:  
Diagnosis Date  Anxiety  BRCA1 positive  Calculus of kidney 1/13  
 right  Hernia, inguinal, left 2012  MS (multiple sclerosis) ([address] 1996  Nausea & vomiting  Ovarian cancer ([address] 3/2015  
 high grade, stage 3C papillary serous  Thromboembolus ([address] 8/2007  
 lower abdomen post fall Past Surgical History:  
Procedure Laterality Date  HX GYN  2009  
 endometrial ablation with punctured uterus  HX OTHER SURGICAL  8/2007  
 green filter  HX MARIEL AND BSO  3/2014  
 ovarian cancer Family History Problem Relation Age of Onset  Cancer Paternal Grandmother   
     breast  
 Breast Cancer Paternal Grandmother  Coronary Artery Disease Mother 48 CABG and PCI  Heart Surgery Father   
     valve replacement  No Known Problems Brother  No Known Problems Brother  No Known Problems Brother  No Known Problems Daughter  No Known Problems Son  No Known Problems Son  No Known Problems Son History reviewed, no pertinent family history. Social History Tobacco Use  Smoking status: Never Smoker  Smokeless tobacco: Never Used Substance Use Topics  Alcohol use: No  
 
Allergies Allergen Reactions  Pcn [Penicillins] Rash Current Facility-Administered Medications Medication Dose Route Frequency  fentaNYL (DURAGESIC) 12 mcg/hr patch 1 Patch  1 Patch TransDERmal Q72H  enoxaparin (LOVENOX) injection 40 mg  40 mg SubCUTAneous Q24H  
 metoclopramide HCl (REGLAN) injection 10 mg  10 mg IntraVENous TID  OLANZapine (ZyPREXA zydis) disintegrating tablet 5 mg  5 mg Oral QHS  oxyCODONE IR (ROXICODONE) tablet 5 mg  5 mg Oral Q4H  
 morphine 10 mg/5 mL oral solution 5 mg  5 mg Oral Q2H PRN  
 sennosides (SENOKOT) 8.8 mg/5 mL syrup 8.8 mg  5 mL Oral BID  polyethylene glycol (MIRALAX) packet 17 g  17 g Oral DAILY  morphine (ROXANOL) 100 mg/5 mL (20 mg/mL) concentrated solution 5 mg  5 mg Oral Q2H PRN  
 oxyCODONE IR (ROXICODONE) tablet 5 mg  5 mg Oral Q1H PRN  
 glycopyrrolate (ROBINUL) injection 0.2 mg  0.2 mg IntraVENous Q4H PRN  
 LORazepam (ATIVAN) tablet 1 mg  1 mg Oral Q8H PRN  
 fentaNYL (DURAGESIC) 25 mcg/hr patch 1 Patch  1 Patch TransDERmal Q72H  
 haloperidol lactate (HALDOL) injection 1 mg  1 mg IntraVENous Q4H PRN  
 aspirin-acetaminophen-caffeine (EXCEDRIN ES) per tablet 1-2 Tab   (Patient Supplied)  1-2 Tab Oral Q6H PRN  pantoprazole (PROTONIX) 40 mg in sodium chloride 0.9% 10 mL injection  40 mg IntraVENous Q24H  pregabalin (LYRICA) capsule 25 mg  25 mg Oral BID  
 0.9% sodium chloride infusion  50 mL/hr IntraVENous CONTINUOUS  
 sodium chloride (NS) flush 5-10 mL  5-10 mL IntraVENous Q8H  
 sodium chloride (NS) flush 5-10 mL  5-10 mL IntraVENous PRN  
 morphine  mg / 30 mL   IntraVENous CONTINUOUS  
 
 
 
 LAB AND IMAGING FINDINGS:  
 
Lab Results Component Value Date/Time WBC 13.7 (H) 10/25/2018 04:18 AM  
 HGB 9.0 (L) 10/25/2018 04:18 AM  
 PLATELET 619 84/83/5374 04:18 AM  
 
Lab Results Component Value Date/Time  Sodium 139 10/30/2018 07:06 AM  
 Potassium 4.4 10/30/2018 07:06 AM  
 Chloride 107 10/30/2018 07:06 AM  
 CO2 23 10/30/2018 07:06 AM  
 BUN 20 10/30/2018 07:06 AM  
 Creatinine 0.37 (L) 10/30/2018 07:06 AM  
 Calcium 8.1 (L) 10/30/2018 07:06 AM  
 Magnesium 1.9 10/30/2018 07:06 AM  
 Phosphorus 3.3 10/30/2018 07:06 AM  
  
Lab Results Component Value Date/Time AST (SGOT) 13 (L) 10/30/2018 07:06 AM  
 Alk. phosphatase 136 (H) 10/30/2018 07:06 AM  
 Protein, total 6.0 (L) 10/30/2018 07:06 AM  
 Albumin 2.0 (L) 10/30/2018 07:06 AM  
 Globulin 4.0 10/30/2018 07:06 AM  
 
Lab Results Component Value Date/Time INR 1.0 07/19/2018 11:54 AM  
 Prothrombin time 10.7 07/19/2018 11:54 AM  
  
No results found for: IRON, FE, TIBC, IBCT, PSAT, FERR No results found for: PH, PCO2, PO2 No components found for: [place] Lab Results Component Value Date/Time CK 72 01/09/2018 11:09 AM  
 CK - MB <1.0 01/09/2018 11:09 AM  
  
 
 
   
 
Total time:  
Counseling / coordination time, spent as noted above:   
> 50% counseling / coordination?:   
 
Prolonged service was provided for  []30 min   []75 min in face to face time in the presence of the patient, spent as noted above. Time Start:  
Time End:  
Note: this can only be billed with 38699 (initial) or 77361 (follow up). If multiple start / stop times, list each separately.

## 2018-10-31 NOTE — PROGRESS NOTES
2004 Scheduled 5mg Shante PO given. 2255 PCA used for breakthrough pain - Med room was out of PRN Morphine solution. Called Pharmacy for a restock. Pharmacy pulled med from another floor, but pt was not able to wait for medication to arrive.  
 
2301 Spoke with Pharmacy, hospital has a different Morphine concentration for PRN option. [address] Francia Zendejas to get new dose approved. Approved and order entered. 2336 Scheduled 5mg Shante PO given. 0056 PRN 5mg Morphine oral dose given for breakthrough pain. 0230 Pt's  reports PEG tube needed to be/was unclamped due to nausea. 1334 Scheduled 5mg Shante PO given. 0324 PEG tube clamped again. 0403 PRN 5mg Morphine oral dose given for breakthrough pain. 0500 Pt called out requesting that Palliative care be paged re pain control. [phone] Paged Dr. Machelle Botello. Per order, pt offered one time dose of 8mg oral Morphine or PCA dose. Pt chose PCA. Upon assessment pt's pain was rated at [facility] 3 or a 4\" 5 Pt had bowel movement - frustrated with lack of control and urgency with bowel movements and vomiting. Also reports feeling nauseous. PEG unclamped. 0545 Pt states she feels a little less nauseous. PEG tube clamped and PRN 5mg Shante PO given. Pt scale was 4/10.

## 2018-11-01 NOTE — PROGRESS NOTES
Palliative Medicine Consult Ruperto: [phone]) Patient Name: Bhanu Aguilar YOB: 1965 Date of Initial Consult: 10/10/18 Reason for Consult: Cancer related symptom management Requesting Provider: Dr. Malu Hdz Primary Care Physician: Marti Alexander MD 
 
 SUMMARY:  
Bhanu Aguilar is a 48 y.o. with recurrent ovarian malignancy on Doxil/Avastin per Dr. Malu Hdz, known to outpatient palliative medicine. She was admitted 10/10/2018 from home due to worsening nausea and vomiting followed by acute worsening of epigastric abdominal pain. Last chemotherapy date was 10/4. Current medical issues leading to Palliative Medicine involvement include: cancer related symptoms including pain, nausea, vomiting. S/p venting peg placement on 10/24 by IR. PALLIATIVE DIAGNOSES:  
1. Abdominal pain - multifactorial (malignancy related, bowel obstruction, post-PEG site) - ongoing 2. Malignancy related small bowel obstruction - improved. BM 10/31.  
3. Malignancy related fatigue - ongoing 4. Nausea with emesis  - improved 5. Delayed gastric motility - clinically and by EGD PLAN:  
1. Pain:  Malignancy related abd pain. Improved last 24 hrs. - Fent Td now 25 + 12 mcg (inc 10/31 - 2 pm) 
- cont standing morphine liquid 10 mg q4h:  Communicated again with  and nursing to [facility] for excessive drowsiness or if sleeping - Morphine liquid 10 mg prn (used 2 prns in 24 hrs) - Morphine 4 mg IV q1h prn for severe breakthrough (NOT used in 24 hrs) 
- may need inc of fentanyl td by next week if standing morphine continues to be of benefit. 2. Anxiety:  Chronic Ativan use - pre-dates cancer 
 - cont olanzapine 5 mg disintegrating qhs for anxiety/agitation overnight:  Working well. - counseled pt/ about drug interaction with opioids, clarified questions 
 - reduced lorazepam 1 --> 0.5 mg, told her to hold tonight unless absolutely needed for anxiety 3. Fatigue and drowsiness. Multifactorial due to malignancy, med SEs and sleep deprivation.  
 - had a good night again last night 
 - cont olanzapine 5 mg qhs 
 - reduce / eliminate lorazepam as above 
 - maximize balance of opioid pain relief vs fatigue SE.   
4. Malignancy related SBO:   
- finally had large BM 10/31.  
- nausea and abd distension improved 
- cont standing IV Reglan 10 mg TID; consider transition to oral Reglan tomorrow 
- use Haldol 1 mg IV for breakthrough nausea 1st line. 5. Nutrition:   
 - decision made to stop TPN during fam mtg 10/30 
 - Johanny comfortable with this plan of care - tolerating small bits / bites throughout the day thus far - Cont IVF for now at reduced rate of 50 cc/hr 6. Neuropathy- chemo induced neuropathy - resolved on current regimen of amitriptyline and Lyrica 
- unclear how much she is absorbing of these meds vs neuropathy improvement as a result of time 
- stopped [id number] 
- has been holding / at least 50% of Lyrica doses. Talked it over with her, will discontinue for now. Can restart if sx return. 7. Goals of care- comfort focused care as no longer a candidate for disease directed therapy. Pt/family interviewing Hospice agencies now. Discharge home when pain and nausea adequately controlled off IV regimen. 8. Communicated plan of care with: Palliative IDT, bedside RN, Dr. Shakeel Lamb. GOALS OF CARE / TREATMENT PREFERENCES:  
 
GOALS OF CARE:    
Patient/Health Care Proxy Stated Goals: Comfort TREATMENT PREFERENCES:  
Code Status: DNR Advance Care Planning: 
Advance Care Planning 10/17/2018 Patient's Healthcare Decision Maker is: Legal Next of Kin Primary Decision Maker Name -  
Primary Decision Maker Phone Number -  
Primary Decision Maker Relationship to Patient -  
Confirm Advance Directive None Medical Interventions: Comfort measures Artificially Administered Nutrition: No feeding tube(Venting PEG in place - not for enteral feeding) Other: As far as possible, the palliative care team has discussed with patient / health care proxy about goals of care / treatment preferences for patient. HISTORY:  
 
History obtained from:  Pt, , EMR 
 
CHIEF COMPLAINT:  Drowsiness HPI/SUBJECTIVE: The patient is:  
[x] Verbal and participatory [] Non-participatory due to:  
 
Had a very good night - slept well. Did not experience as much breakthrough pain. Feels concentrated liquid morphine is working very well, did not require IV breakthrough. Initially off floor in wheelchair when I first arrived enjoying time off floor. Continues to endorse drowsiness - feels more so today than yesterday. Clinical Pain Assessment (nonverbal scale for severity on nonverbal patients):  
Clinical Pain Assessment Severity: 2 Location: abd bl upper quads, LLQ Character: crampy Duration: weeks Effect: functional 
Factors: none in particular Frequency: chronic Duration: for how long has pt been experiencing pain (e.g., 2 days, 1 month, years) Frequency: how often pain is an issue (e.g., several times per day, once every few days, constant) FUNCTIONAL ASSESSMENT:  
 
Palliative Performance Scale (PPS): PPS: 50 PSYCHOSOCIAL/SPIRITUAL SCREENING:  
 
Palliative IDT has assessed this patient for cultural preferences / practices and a referral made as appropriate to needs (Cultural Services, Patient Advocacy, Ethics, etc.) Advance Care Planning: 
Advance Care Planning 10/17/2018 Patient's Healthcare Decision Maker is: Legal Next of Kin Primary Decision Maker Name -  
Primary Decision Maker Phone Number -  
Primary Decision Maker Relationship to Patient -  
Confirm Advance Directive None Any spiritual / Cheondoism concerns: 
[] Yes /  [x] No 
 
Caregiver Burnout: 
[] Yes /  [x] No /  [] No Caregiver Present Anticipatory grief assessment: [] Normal  / [] Maladaptive   Did not assess ESAS Anxiety: Anxiety: 3 ESAS Depression: Depression: 3 REVIEW OF SYSTEMS:  
 
Positive and pertinent negative findings in ROS are noted above in HPI. The following systems were [x] reviewed / [] unable to be reviewed as noted in HPI Other findings are noted below. Systems: constitutional, ears/nose/mouth/throat, respiratory, gastrointestinal, genitourinary, musculoskeletal, integumentary, neurologic, psychiatric, endocrine. Positive findings noted below. Modified ESAS Completed by: provider Fatigue: 9 Drowsiness: 3 Depression: 3 Pain: 2 Anxiety: 3 Nausea: 0 Anorexia: 9 Dyspnea: 0 Constipation: Yes Stool Occurrence(s): 1 PHYSICAL EXAM:  
 
From RN flowsheet: 
Wt Readings from Last 3 Encounters:  
10/16/18 58 kg (127 lb 12.8 oz) 10/04/18 58.2 kg (128 lb 6.4 oz) 09/27/18 57.7 kg (127 lb 1.6 oz) Blood pressure 93/59, pulse 98, temperature 98.1 °F (36.7 °C), resp. rate 13, weight 59.6 kg (131 lb 4.8 oz), last menstrual period 02/02/2015, SpO2 99 %, unknown if currently breastfeeding. Pain Scale 1: Numeric (0 - 10) Pain Intensity 1: 5 Pain Onset 1: chronic Pain Location 1: Incisional 
Pain Orientation 1: Left Pain Description 1: Aching Pain Intervention(s) 1: Medication (see MAR) Last bowel movement, if known:  
 
Constitutional: chronically ill appearing but NAD, up in chair, alert Eyes: pupils equal, anicteric ENMT: no nasal discharge, moist mucous membranes, no lesions or thrush in mouth Abd:  Mildly distended but soft, no tenderness on light palpation, quiet bowel sounds. Skin: warm, dry Neurologic: alert and fully oriented, gross cognition intact Psychiatric: in good spirits, no agitation HISTORY:  
 
Active Problems: 
  Peritoneal carcinomatosis ([address] (6/1/2018) Anemia due to chemotherapy (7/26/2018) Pain of metastatic malignancy (8/9/2018) Ovarian cancer ([address] (10/10/2018) Nausea (10/10/2018) Epigastric pain () Severe protein-calorie malnutrition ([address] (10/11/2018) Dehydration (10/16/2018) Malnutrition ([address] (10/16/2018) SBO (small bowel obstruction) ([address] (10/16/2018) End of life care (10/29/2018) Drowsiness () Goals of care, counseling/discussion () Malignant cachexia (HCC) () Past Medical History:  
Diagnosis Date  Anxiety  BRCA1 positive  Calculus of kidney 1/13  
 right  Hernia, inguinal, left 2012  MS (multiple sclerosis) ([address] 1996  Nausea & vomiting  Ovarian cancer ([address] 3/2015  
 high grade, stage 3C papillary serous  Thromboembolus ([address] 8/2007  
 lower abdomen post fall Past Surgical History:  
Procedure Laterality Date  HX GYN  2009  
 endometrial ablation with punctured uterus  HX OTHER SURGICAL  8/2007  
 green filter  HX MARIEL AND BSO  3/2014  
 ovarian cancer Family History Problem Relation Age of Onset  Cancer Paternal Grandmother   
     breast  
 Breast Cancer Paternal Grandmother  Coronary Artery Disease Mother 48 CABG and PCI  Heart Surgery Father   
     valve replacement  No Known Problems Brother  No Known Problems Brother  No Known Problems Brother  No Known Problems Daughter  No Known Problems Son  No Known Problems Son  No Known Problems Son History reviewed, no pertinent family history. Social History Tobacco Use  Smoking status: Never Smoker  Smokeless tobacco: Never Used Substance Use Topics  Alcohol use: No  
 
Allergies Allergen Reactions  Pcn [Penicillins] Rash Current Facility-Administered Medications Medication Dose Route Frequency  LORazepam (ATIVAN) tablet 0.5 mg  0.5 mg Oral Q8H PRN  
 fentaNYL (DURAGESIC) 12 mcg/hr patch 1 Patch  1 Patch TransDERmal Q72H  morphine (ROXANOL) 100 mg/5 mL (20 mg/mL) concentrated solution 10 mg  10 mg Oral Q4H  
  morphine (ROXANOL) 100 mg/5 mL (20 mg/mL) concentrated solution 10 mg  10 mg Oral Q1H PRN  
 morphine injection 4 mg  4 mg IntraVENous Q1H PRN  
 enoxaparin (LOVENOX) injection 40 mg  40 mg SubCUTAneous Q24H  
 metoclopramide HCl (REGLAN) injection 10 mg  10 mg IntraVENous TID  OLANZapine (ZyPREXA zydis) disintegrating tablet 5 mg  5 mg Oral QHS  sennosides (SENOKOT) 8.8 mg/5 mL syrup 8.8 mg  5 mL Oral BID  polyethylene glycol (MIRALAX) packet 17 g  17 g Oral DAILY  glycopyrrolate (ROBINUL) injection 0.2 mg  0.2 mg IntraVENous Q4H PRN  
 fentaNYL (DURAGESIC) 25 mcg/hr patch 1 Patch  1 Patch TransDERmal Q72H  
 haloperidol lactate (HALDOL) injection 1 mg  1 mg IntraVENous Q4H PRN  
 aspirin-acetaminophen-caffeine (EXCEDRIN ES) per tablet 1-2 Tab   (Patient Supplied)  1-2 Tab Oral Q6H PRN  pantoprazole (PROTONIX) 40 mg in sodium chloride 0.9% 10 mL injection  40 mg IntraVENous Q24H  
 0.9% sodium chloride infusion  50 mL/hr IntraVENous CONTINUOUS  
 sodium chloride (NS) flush 5-10 mL  5-10 mL IntraVENous Q8H  
 sodium chloride (NS) flush 5-10 mL  5-10 mL IntraVENous PRN  
 
 
 
 LAB AND IMAGING FINDINGS:  
 
Lab Results Component Value Date/Time WBC 13.7 (H) 10/25/2018 04:18 AM  
 HGB 9.0 (L) 10/25/2018 04:18 AM  
 PLATELET 057 45/50/7934 04:18 AM  
 
Lab Results Component Value Date/Time Sodium 139 10/30/2018 07:06 AM  
 Potassium 4.4 10/30/2018 07:06 AM  
 Chloride 107 10/30/2018 07:06 AM  
 CO2 23 10/30/2018 07:06 AM  
 BUN 20 10/30/2018 07:06 AM  
 Creatinine 0.37 (L) 10/30/2018 07:06 AM  
 Calcium 8.1 (L) 10/30/2018 07:06 AM  
 Magnesium 1.9 10/30/2018 07:06 AM  
 Phosphorus 3.3 10/30/2018 07:06 AM  
  
Lab Results Component Value Date/Time AST (SGOT) 13 (L) 10/30/2018 07:06 AM  
 Alk. phosphatase 136 (H) 10/30/2018 07:06 AM  
 Protein, total 6.0 (L) 10/30/2018 07:06 AM  
 Albumin 2.0 (L) 10/30/2018 07:06 AM  
 Globulin 4.0 10/30/2018 07:06 AM  
 
Lab Results Component Value Date/Time INR 1.0 07/19/2018 11:54 AM  
 Prothrombin time 10.7 07/19/2018 11:54 AM  
  
No results found for: IRON, FE, TIBC, IBCT, PSAT, FERR No results found for: PH, PCO2, PO2 No components found for: [place] Lab Results Component Value Date/Time CK 72 01/09/2018 11:09 AM  
 CK - MB <1.0 01/09/2018 11:09 AM  
  
 
 
   
 
Total time:  
Counseling / coordination time, spent as noted above:   
> 50% counseling / coordination?:   
 
Prolonged service was provided for  []30 min   []75 min in face to face time in the presence of the patient, spent as noted above. Time Start:  
Time End:  
Note: this can only be billed with 12685 (initial) or 14930 (follow up). If multiple start / stop times, list each separately.

## 2018-11-01 NOTE — PROGRESS NOTES
Bedside and Verbal shift change report given to Ruiz Hoff (oncoming nurse) by Sharyle Loth (offgoing nurse). Report included the following information SBAR, Kardex, Procedure Summary, Intake/Output and MAR.  
0730- MD and NP at bedside 
6128- patient received pain meds, comfortable and stable, patient would like to rest some more - patient went outside with family in wheelchair 1630- linens changed, patient comfortable no complaints of discomfort 1800- taking a shower, drain dressing changed 2000- small red kam on sacrum, applied barrier cream, patient has been rotating from side to side or sitting on cushion all day.

## 2018-11-01 NOTE — PROGRESS NOTES
1754 Pt notes some soreness on coccyx. Skin breakdown is evident. Barrier cream applied, waffle cusion used and wound consult entered. 2002 Bedside and Verbal shift change report given to Caridad Pham RN (oncoming nurse) by Cherylene Peeks, RN (offgoing nurse). Report included the following information SBAR, Kardex, Procedure Summary, Intake/Output, MAR, Accordion and Recent Results.

## 2018-11-01 NOTE — PROGRESS NOTES
Physical therapy Orders received to resume PT as patient weaker than when discharged from PT services last month. Chart reviewed and noted that patient walking in joyce this week. Spoke with nursing who states it is variable as to what patient able to do. Attempted to see  Patient but spouse  Present and stated she just laid down to rest.  Provided education and supplies of theraband with spouse and stated will follow up either later today or tomorrow.  
Yumi Escalante, PT

## 2018-11-01 NOTE — PROGRESS NOTES
Problem: Falls - Risk of 
Goal: *Absence of Falls Document Lisbeth Hobson Fall Risk and appropriate interventions in the flowsheet. Outcome: Progressing Towards Goal 
Fall Risk Interventions: 
Mobility Interventions: Strengthening exercises (ROM-active/passive), Patient to call before getting OOB Medication Interventions: Teach patient to arise slowly Elimination Interventions: Toilet paper/wipes in reach, Call light in reach

## 2018-11-01 NOTE — PROGRESS NOTES
2315 Pt feels like she had a better night. She states she was able to get a little bit of rest and \"almost feels like a normal person\". q4h scheduled Morphine was given as ordered. Only 1 dose of PRN 10mg Morphine given at 2293

## 2018-11-01 NOTE — PROGRESS NOTES
[facility] Good Help to Those in Need 
[phone] Patient Name: Edwardo Rao YOB: 1965 Age: 48 y.o. [facility] RN Note:  Hospice consult received, reviewing chart. Will follow up with Unit Nurse and Care Manager to discuss plan of care, patient status and discharge disposition within the hour. Thank you for the opportunity to be of service to this patient. 46 Met with family for hospice information session, they are checking out multiple agencies and have not made any decisions at this time. I discussed what hospice care would look like at home, including respite care and availability of hospice house. We discussed equipment needs and hospice philosophy. Left information packet with family to look over. Patient does not meet inpatient criteria for hospice care but certainly qualifies for home hospice. Family would like time to think about their choices and reflect best on what their goals are. Please let us know if we can be of service in the future. Eris Lopez RN [facility]

## 2018-11-01 NOTE — PROGRESS NOTES
[address], [address] [place], [facility] Martha Mallory 
P [phone]  F [phone] Patient: Asia Torres Admit Date: 10/16/2018 Hospital Day:9 Admit Dx: Partial small bowel obstruction Ovarian cancer ([address] Malnutrition ([address] Dehydration SBO (small bowel obstruction) ([address] Subjective: Much better night last night. Pain better controled, but still working to improve more. Feels bloated and distended this morning after taking in just small amounts. Feels like stomach not empting G-Tube insertion site pain improved some today Continues to feel MS elixir is an improvement in pain management G-tube output last 24 hours to 225 cc's. Continues passing flatus, but she feels it has decreased.  remaining at bedside. Objective: Intake/Output Summary (Last 24 hours) at 11/1/2018 4370 Last data filed at 11/1/2018 6220 Gross per 24 hour Intake 694.92 ml Output 650 ml Net 44.92 ml Physical Exam 
/68 (BP 1 Location: Left arm, BP Patient Position: At rest;Sitting)   Pulse (!) 113   Temp 98.3 °F (36.8 °C)   Resp 13   Wt 131 lb 4.8 oz (59.6 kg)   LMP 02/02/2015 (Approximate)   SpO2 100%   BMI 20.26 kg/m² General: More alert and in better spirits this morning Cardiac:  Regular, without M/R/G Lungs:  CTA bilat. Non labored without rales Abdomen:  soft, mildly firm and more distended this morning with continued mild tenderness to palpation. G-tube bag with clear fluid . Extremity: no edema Data Review Lab Results Component Value Date/Time WBC 13.7 (H) 10/25/2018 04:18 AM  
 ABS. NEUTROPHILS 10.5 (H) 10/25/2018 04:18 AM  
 HGB 9.0 (L) 10/25/2018 04:18 AM  
 HCT 28.5 (L) 10/25/2018 04:18 AM  
 .9 (H) 10/25/2018 04:18 AM  
 MCH 32.5 10/25/2018 04:18 AM  
 PLATELET 913 08/97/2460 04:18 AM  
 
Lab Results Component Value Date/Time  Sodium 139 10/30/2018 07:06 AM  
 Potassium 4.4 10/30/2018 07:06 AM  
 Chloride 107 10/30/2018 07:06 AM  
 CO2 23 10/30/2018 07:06 AM  
 Glucose 87 10/30/2018 07:06 AM  
 BUN 20 10/30/2018 07:06 AM  
 Creatinine 0.37 (L) 10/30/2018 07:06 AM  
 Calcium 8.1 (L) 10/30/2018 07:06 AM  
 Albumin 2.0 (L) 10/30/2018 07:06 AM  
 Bilirubin, total 0.3 10/30/2018 07:06 AM  
 AST (SGOT) 13 (L) 10/30/2018 07:06 AM  
 ALT (SGPT) 44 10/30/2018 07:06 AM  
 Alk. phosphatase 136 (H) 10/30/2018 07:06 AM  
 
 
IMAGING: 
US 10/30/2018 FINDINGS: Sonography of the entire abdomen shows no ascites. 
  
IMPRESSION: No ascites KUB 10/18/2018 FINDINGS: There are multiple dilated loops of small bowel throughout the abdomen  
and pelvis is seen on prior CT dated October 15, 2018. NG tube tip and side port  
overlie the stomach. There is no free intraperitoneal gas. Filter overlies the  
expected location of the IVC at the L2 level. There is a vascular stent  
overlying SPECT dislocation of the left common iliac vein. IMPRESSION: Multiple, persistent dilated loops of small bowel throughout the  
abdomen and pelvis. No evidence of perforation. NG tube in appropriate position CT Results (most recent): 
Results from Hospital Encounter encounter on 10/16/18 CT INTRO GASTROSTOMY TUBE PERC Narrative PROCEDURE: Gastrostomy tube placement INDICATION: Decompression, bowel obstruction OPERATING PHYSICIAN: Deborah Romo M.D. 
 
ESTIMATED BLOOD LOSS: None SPECIMENS REMOVED: None FLOURO TIME: None COMPLICATIONS: None immediate. Procedure and findings:  
 
CT dose reduction was achieved through use of a standardized protocol tailored 
for this examination and automatic exposure control for dose modulation. After obtaining informed consent, patient was brought the angiographic suite and 
placed supine angiographic table. An 5 [place] catheter was positioned in the 
stomach under fluoroscopy. Subsequently, the patient was moved to the CT table. Sedation was obtained with intravenous Versed and fentanyl. The upper abdomen 
was prepped and draped in maximal sterile barrier technique. Prophylactic 
antibiotic of Ancef 2 g was administered prior to the intervention and 
discontinued prior to the completion of the procedure. Approximately 600 cc of air was used to insufflate the stomach. The puncture 
site was identified and local anesthesia was achieved with 1% lidocaine 
injection. Antegrade puncture into the stomach was performed. 2 T-tacks were 
delivered and traction was applied to appose the anterior stomach wall against 
the anterior abdomen. A needle was then advanced into the stomach lumen and 
contrast injection was performed to confirm intraluminal location. An Amplatz 
wire was then advanced through the needle and the tract was serially dilated up 
to 18 [place]. A 18 Greek gastrostomy tube was advanced over the wire into the 
stomach. The balloon was inflated with 8 cc of dilute contrast. Final 
intraluminal position of the gastrostomy tube was confirmed with gentle contrast 
injection under CT. The patient tolerated the procedure well and there were no 
immediate complications. A dry sterile dressing was applied. Impression IMPRESSION:  
 
Successful placement of 25 Greek gastrostomy tube under CT guidance. Assessment/Plan:  
Admitted 10/16/2018 for Partial small bowel obstruction. G-tube place 10/24/18. Active Hospital Problems Diagnosis Date Noted  Malignant cachexia ([address]  End of life care 10/29/2018  Drowsiness  Goals of care, counseling/discussion  Dehydration 10/16/2018  Malnutrition ([address] 10/16/2018  
 SBO (small bowel obstruction) ([address] 10/16/2018  Severe protein-calorie malnutrition ([address] 10/11/2018  Ovarian cancer ([address] 10/10/2018  Nausea 10/10/2018  Epigastric pain  Pain of metastatic malignancy 08/09/2018  Anemia due to chemotherapy 07/26/2018  Peritoneal carcinomatosis ([facility] 06/01/2018 Zena Heath is a [address] y.o. female with ovarian cancer admitted with carcinomatosis, SBO, protein/calorie malnutrition d/t chronic disease and obstruction with N/V and cancer related pain. Onc: Undergoing Doxil/Avastin s/p 3 cycles last 10/4/18. Recent EOD CT with peritoneal disease response. Partial SBO with flatus. BM 10/30. Palliative G-tube placed 10/24 and relieving nausea as needed. Unlamping for 1 hour Q8h or as needed for nausea. Heme/CV: Hemodynamically stable by VS. No further lab draws at this time FEN/GI: Partial SBO with delayed gastric emptying (by endoscopy 10/12/18) with malnutrition - cyclic TPN over 12 hours with Lipid TIW until today. Will now stop TPN and Lipids and allow pt to begin small, frequent eating periods to evaluate how she tolerates. Continue Reglan, PPI/H2. Ascites:Improved after drainage 10/18. No new ascites on US 10/30. Neuro: Lethargy improved this morning: pain/antidpression regimen per palliative care, following. Palliative Medicine monitoring this closely. PPX: lovenox. Holding home xarelto. Disposition: Stable partial obstructive at this point without meaningful resolution, difficult course with her disease these last few months. G-tube for palliation in place on 10/24. Long family meeting (10/30) with goals of care, plan of care going forward reviewed. Continued need for pain management improvement and abdominal discomfort/nausea. Pt remains DNR Due to pt's increased weakness, debility, will re-consult PT for strength/stamina suggestions. Also, she would like to meet with oncology nutritionist and will arrange this today Will advance diet to regular, soft with her able to have greater selection of food choices. Will monitor throughout the day. Seen with Dr. Lanis Koyanagi Carie Mckusick, KYE

## 2018-11-01 NOTE — PROGRESS NOTES
Bedside and Verbal shift change report given to Leidy Milan RN (oncoming nurse) by Wander Ramires RN (offgoing nurse). Report included the following information SBAR, Kardex, Procedure Summary, Intake/Output, MAR, Accordion and Recent Results.

## 2018-11-02 NOTE — WOUND CARE
WOCN Note:  
 
New consult placed for sacral assessment. Chart reviewed. Admitted DX:  Partial small bowel obstruction Ovarian cancer ([facility] Malnutrition ([facility] Dehydration SBO (small bowel obstruction) ([facility] Assessment:  
Patient is A&O x 3, communicative, continent and mobile Bed: total care Patient reports no pain. 1. Sacrum, partial thickness wound = 0.5 x 0.5 x 0.1 cm  100% blanching pink. no exudate, odor. Cleansed and applied Navajo. Recommendations:   
Sacrum:  Every 3 days cleanse and apply Navajo. Skin Care & Pressure Prevention: 
Minimize layers of linen/pads under patient to optimize support surface. Turn/reposition approximately every 2 hours and offload heels. Discussed above plan with patient and RN. Transition of Care: Plan to follow weekly and as needed while admitted to hospital. 
 
Sandee Olszewski, BSN RN [facility], [facility]. Wound Care Office [phone] Pager [phone]

## 2018-11-02 NOTE — PROGRESS NOTES
[address], [address] [facility], [address] [place] 
P [phone]  F [phone] Patient: Valeri Atkins Admit Date: 10/16/2018 Hospital Day:18 Admit Dx: Partial small bowel obstruction Ovarian cancer ([address] Malnutrition ([address] Dehydration SBO (small bowel obstruction) ([address] Subjective:  
PT did well overnight. Said she had some trouble sleeping more due to the Ativan switch to Zyprexa. Feels she has been on Ativan for so many years, she needs a slow taper rather than just a complete switch. Pain was fairly well controlled, but still working to improve more. Continues to feel bloated and distended this morning Says she  Passed flatus only twice yesterday. She is happy about being able to eat some mashed potatoes last evening. G-Tube insertion site pain improved some today, but \"still sore\" Continues to feel MS elixir is an improvement in pain management G-tube output last 24 hours to 375 cc's.  remaining at bedside. Objective: Intake/Output Summary (Last 24 hours) at 11/2/2018 8765 Last data filed at 11/2/2018 0700 Gross per 24 hour Intake 1271.67 ml Output 650 ml Net 621.67 ml Physical Exam 
BP 93/59 (BP 1 Location: Left arm, BP Patient Position: At rest)   Pulse 98   Temp 98.1 °F (36.7 °C)   Resp 13   Wt 131 lb 4.8 oz (59.6 kg)   LMP 02/02/2015 (Approximate)   SpO2 99%   BMI 20.26 kg/m² General: A&O this morning sitting in the chair and remaining in good spirits this morning Cardiac:  Regular, tachy at times without M/R/G Lungs:  CTA bilat. Slight decrease to left base (pt says it is uncomfortable to take a deep breath) Non labored without rales. Abdomen:  soft, remains mildly firm and and slightly increase noted in  distention this morning with continued mild tenderness to palpation. G-tube bag with clear fluid. Extremity: no edema Data Review Lab Results Component Value Date/Time WBC 13.7 (H) 10/25/2018 04:18 AM  
 ABS. NEUTROPHILS 10.5 (H) 10/25/2018 04:18 AM  
 HGB 9.0 (L) 10/25/2018 04:18 AM  
 HCT 28.5 (L) 10/25/2018 04:18 AM  
 .9 (H) 10/25/2018 04:18 AM  
 MCH 32.5 10/25/2018 04:18 AM  
 PLATELET 755 69/80/9082 04:18 AM  
 
Lab Results Component Value Date/Time Sodium 139 10/30/2018 07:06 AM  
 Potassium 4.4 10/30/2018 07:06 AM  
 Chloride 107 10/30/2018 07:06 AM  
 CO2 23 10/30/2018 07:06 AM  
 Glucose 87 10/30/2018 07:06 AM  
 BUN 20 10/30/2018 07:06 AM  
 Creatinine 0.37 (L) 10/30/2018 07:06 AM  
 Calcium 8.1 (L) 10/30/2018 07:06 AM  
 Albumin 2.0 (L) 10/30/2018 07:06 AM  
 Bilirubin, total 0.3 10/30/2018 07:06 AM  
 AST (SGOT) 13 (L) 10/30/2018 07:06 AM  
 ALT (SGPT) 44 10/30/2018 07:06 AM  
 Alk. phosphatase 136 (H) 10/30/2018 07:06 AM  
 
 
IMAGING: 
US 10/30/2018 FINDINGS: Sonography of the entire abdomen shows no ascites. 
  
IMPRESSION: No ascites KUB 10/18/2018 FINDINGS: There are multiple dilated loops of small bowel throughout the abdomen  
and pelvis is seen on prior CT dated October 15, 2018. NG tube tip and side port  
overlie the stomach. There is no free intraperitoneal gas. Filter overlies the  
expected location of the IVC at the L2 level. There is a vascular stent  
overlying SPECT dislocation of the left common iliac vein. IMPRESSION: Multiple, persistent dilated loops of small bowel throughout the  
abdomen and pelvis. No evidence of perforation. NG tube in appropriate position CT Results (most recent): 
Results from Hospital Encounter encounter on 10/16/18 CT INTRO GASTROSTOMY TUBE PERC Narrative PROCEDURE: Gastrostomy tube placement INDICATION: Decompression, bowel obstruction OPERATING PHYSICIAN: Tati Montlila M.D. 
 
ESTIMATED BLOOD LOSS: None SPECIMENS REMOVED: None FLOURO TIME: None COMPLICATIONS: None immediate. Procedure and findings: CT dose reduction was achieved through use of a standardized protocol tailored 
for this examination and automatic exposure control for dose modulation. After obtaining informed consent, patient was brought the angiographic suite and 
placed supine angiographic table. An 5 Yakut catheter was positioned in the 
stomach under fluoroscopy. Subsequently, the patient was moved to the CT table. Sedation was obtained with intravenous Versed and fentanyl. The upper abdomen 
was prepped and draped in maximal sterile barrier technique. Prophylactic 
antibiotic of Ancef 2 g was administered prior to the intervention and 
discontinued prior to the completion of the procedure. Approximately 600 cc of air was used to insufflate the stomach. The puncture 
site was identified and local anesthesia was achieved with 1% lidocaine 
injection. Antegrade puncture into the stomach was performed. 2 T-tacks were 
delivered and traction was applied to appose the anterior stomach wall against 
the anterior abdomen. A needle was then advanced into the stomach lumen and 
contrast injection was performed to confirm intraluminal location. An Amplatz 
wire was then advanced through the needle and the tract was serially dilated up 
to 18 [place]. A 18 [place]ut gastrostomy tube was advanced over the wire into the 
stomach. The balloon was inflated with 8 cc of dilute contrast. Final 
intraluminal position of the gastrostomy tube was confirmed with gentle contrast 
injection under CT. The patient tolerated the procedure well and there were no 
immediate complications. A dry sterile dressing was applied. Impression IMPRESSION:  
 
Successful placement of 25 Y[place] gastrostomy tube under CT guidance. Assessment/Plan:  
Admitted 10/16/2018 for Partial small bowel obstruction. G-tube place 10/24/18. Active Hospital Problems Diagnosis Date Noted  Malignant cachexia ([facility]  End of life care 10/29/2018  Drowsiness  Goals of care, counseling/discussion  Dehydration 10/16/2018  Malnutrition ([facility] 10/16/2018  
 SBO (small bowel obstruction) ([facility] 10/16/2018  Severe protein-calorie malnutrition ([facility] 10/11/2018  Ovarian cancer ([facility] 10/10/2018  Nausea 10/10/2018  Epigastric pain  Pain of metastatic malignancy 08/09/2018  Anemia due to chemotherapy 07/26/2018  Peritoneal carcinomatosis ([facility] 06/01/2018 Nghia Sanders is a 48 y.o. female with ovarian cancer admitted with carcinomatosis, SBO, protein/calorie malnutrition d/t chronic disease and obstruction with N/V and cancer related pain. Onc: Had been receiving Doxil/Avastin s/p 3 cycles last 10/4/18. This will now be stopped due to progression of disease, continued SBO and need for G-Tube with increasing debility Partial SBO with flatus. BM 10/30. Palliative G-tube placed 10/24 and relieving nausea as needed. Unlamping for 1 hour Q8h or as needed for nausea. Heme/CV: Hemodynamically stable by VS. No further lab draws at this time FEN/GI: Partial SBO with delayed gastric emptying (by endoscopy 10/12/18) with malnutrition - cyclic TPN over 12 hours with Lipid TIW until today. Have now stopped TPN and Lipids and allow pt to begin small, frequent eating periods to evaluate how she tolerates. Continue Reglan, PPI/H2. Ascites:Improved after drainage 10/18. No new ascites on US 10/30. Neuro: Lethargy improved. : pain/antidpression regimen per palliative care, following. Palliative Medicine monitoring this closely. Will discuss Ativan weaning with Palliative to more gradually convert to Zyprexa PPX: lovenox. Holding home xarelto. Disposition: Stable partial obstructive at this point without meaningful resolution, difficult course with her disease these last few months. G-tube for palliation in place on 10/24.   
Long family meeting (10/30) with goals of care, plan of care going forward reviewed and rational for not being able to proceed with chemotherapy Continued need for pain management improvement and abdominal discomfort/nausea. Pt remains DNR Due to pt's increased weakness, debility, have re-consulted PT for strength/stamina suggestions. Also, will try to have her meet with oncology nutritionist today. Nutritionist was unable to see yesterday Will continue regular, soft diet with her able to have greater selection of food choices. Will monitor throughout the day.  
 
 
Alyx Neal NP

## 2018-11-02 NOTE — PROGRESS NOTES
Bedside and Verbal shift change report given to Donita Tompkins RN  (oncoming nurse) by Iona Cabral (offgoing nurse). Report included the following information SBAR, Kardex, Intake/Output, MAR and Recent Results. 1230: Pt sitting up in chair resting comfortably. 1300: pt had small liquid BM while sitting in chair when trying to pass gas. Pt cleaned up and given a brief to wear as requested. 1415: Sunita GRAF aware of small BM. 1430: breakthrough pain medicine given for 3/10 pain rating 1450: Another medium BM while passing gas. Up to bedside commode. 1615: 3rd medium sized liquid BM passed accidentally while ambulating. Dr. Venita Sherwood notified and some adjustments made to meds. 1600: RN and CCL attempted to change port dressing and needle per policy. No blood return with new needle. Port educator HERSON Murphy called for assistance. Still no blood flow. Altepase ordered. 1716: Positive brisk freeflow but no blood return. Altepase given with Millie Murphy RN at bedside. 1850: Altepase removed, positive blood return.

## 2018-11-02 NOTE — PROGRESS NOTES
Brief palliative note: 
 
Unable to fully assess and examine Johanny this afternoon as she was walking the halls with  and friends. She looked well and was in good spirits. Reviewed chart and briefly discussed with Johanny re: lorazepam.  She hadn't taken it last night (was still available as a prn but at a lower dose of 0.5 mg) and didn't sleep as well. Pain management is working well w/o use of IV morphine. Also per discussion with nursing she had 3 loose watery BMs today with incontinence causing distress. Will schedule lorazepam 0.5 mg qhs at 8 pm.   
Ok to take with olanzapine 5 mg ODT. Change Miralax to prn. Reduce Sennosides to daily instead of BID. Both nursing and I have reiterated with Johanny importance of continuing some bowel regimen so as not to go back to where we were, she understands. Revisit on Monday with plan to consider transition of Reglan from IV to PO. Please call on call palliative provider over the weekend if symptom management needs or change in clinical condition. [phone]

## 2018-11-02 NOTE — PROGRESS NOTES
Date of Service: 11/29/2017    SUBJECTIVE:  The patient is here today for a red spot on his right thigh.  He was actually here 20 days ago, had his MMRV in his right lateral thigh and had his Prevnar and his hepatitis-B in his left anterior lateral thigh.  He has had no issues at all.  Mom says he has maybe been a little bit stuffy the last couple of days, but really noticed this red spot yesterday.  She thought maybe it was a little bit redder today and that is why she made the appointment.  He has had no fever or other symptoms.  Otherwise, he is not allergic to anything, vaccines or medications.     Vitals are per Epic.    PHYSICAL EXAMINATION:  HEENT:  TMs are normal bilaterally.  He has no bulbar conjunctival injection or drainage from either eye.  Nasal mucosa is normal, just maybe sounds a little congested.  No oral lesions, tonsillar injection or exudate.  LUNGS:  Breathing comfortably.  Clear, breath sounds.  No wheezes, rales or rhonchi.  HEART:  Regular rate and rhythm, normal S1, S2, no murmur.  SKIN:  He has got a 4x2.5 area of induration, I would say it is more pink than red, but it is pretty intensely pink and mom agrees it does not look quite as red as it did this morning.  It does feel a little bit warm.  He did not seem to mind when I palpated it, but mom said she felt it was somewhat tender to palpation.  No other rashes were seen.    ASSESSMENT AND PLAN:  Probable immune response to the MMRV. I do not really think it is a cellulitis, but certainly I outlined it with a surgical marker and mom will let me know if it spreads much beyond the border that I marked.  Again, he has no other evidence of rashes, but we will certainly keep in contact with me if anything changes or worsens.  This is certainly not a contraindication to further vaccinations, it is just an indication of sort of an over immune response based on the vaccine as it is not a hive.      Dictated By: Sonia Cordero MD  Signing  Music Therapy Assessment Jena Enciso [phone]  [id number]   
1965  48 y.o.  female Patient Telephone Number: [phone] (home) Anabaptist Affiliation: Anabaptist Language: [place] Extended Emergency Contact Information Primary Emergency Contact: Jose Payan Address: [address], [address] Home Phone: [phone] Mobile Phone: [phone] Relation: Spouse Patient Active Problem List  
 Diagnosis Date Noted  Malignant cachexia ([address]  End of life care 10/29/2018  Drowsiness  Goals of care, counseling/discussion  Dehydration 10/16/2018  Malnutrition ([address] 10/16/2018  
 SBO (small bowel obstruction) ([address] 10/16/2018  Gastroparesis  Severe protein-calorie malnutrition ([address] 10/11/2018  Ovarian cancer ([address] 10/10/2018  Abdominal pain 10/10/2018  Nausea 10/10/2018  Epigastric pain  Xerostomia  Pain of metastatic malignancy 08/09/2018  Other proteinuria 07/26/2018  Anemia due to chemotherapy 07/26/2018  On anticoagulant therapy 07/19/2018  Peritoneal carcinomatosis ([address] 06/01/2018  Malignant ascites 06/01/2018  Thrombocytopenia ([address] 03/17/2017  
 CINV (chemotherapy-induced nausea and vomiting) 03/17/2017  Malignant neoplasm of both ovaries ([address] 04/09/2015 Date: 11/2/2018 Mental Status:   [x] Alert [  ] Gab Paganini [  ]  Confused  [  ] Minimally responsive Communication Status: [  ] Impaired Speech [  ] Nonverbal -N/A Physical Status:   [  ] Oxygen in use  [  ] Hard of Hearing [  ] Vision Impaired [  ] Ambulatory  [  ] Ambulatory with assistance [  ] Non-ambulatory -N/A Music Preferences, Background: Traditional Hymns, Contemporary [place], songs from [place]. Clinical Problem to be addressed: Support self-expression. Goal(s) met in session: N/A: Please see Session Observations below. Physical/Pain management (Scale of 1-10): Pre-session rating ___________    Post-session rating __________  
[  ] Increased relaxation   [  ] Regulated breathing patterns [  ] Decreased muscle tension   [  ] Minimized physical distress Emotional/Psychological: 
[  ] Increased self-expression   [  ] Decreased aggressive behavior [  ] Decreased sadness   [  ] Discussed healthy coping skills [  ] Improved mood    [  ] Decreased withdrawn behavior Social: 
[  ] Decreased feelings of isolation/loneliness [  ] Positive social interaction [  ] Provided support and/or comfort for family/friends Spiritual: 
[  ] Spiritual support    [  ] Expressed peace [  ] Expressed pavel    [  ] Discussed beliefs Techniques Utilized (Check all that apply): N/A: Please see Session Observations below. [  ] Procedural support MT [  ] Music for relaxation [  ] Patient preferred music 
[  ] Jocelyn analysis  [  ] Song choice  [  ] Music for validation [  ] Entrainment  [  ] Progressive muscle relax. [  ] Guided visualization [  ] Viktoria Gone  [  ] Patient instrument playing [  ] Adelina Friend writing [  ] John Harvey along   [  ] Prudencio Tillman  [  ] Sensory stimulation [  ] Active Listening  [  ] Music for spiritual support [  ] Making of CDs as gifts Session Observations:  F/u visit; Patient (pt) was alert sitting up in chair. Her father and spouse were sitting in room with her. This music therapist (MT) asked pt how she was feeling and then offered music therapy. Pt expressed gratitude for MT's visit but declined music therapy. She said she was expecting two of her friends to arrive shortly for a visit. MT expressed understanding and will follow as able. JERRY Serra (Music Therapist-Board Certified) Spiritual Care Department Referral-based service Provider: MD JAKI Zuniga/TRACEY ([id number])  DD: 11/29/2017 13:00:58 TD: 11/30/2017 07:35:05    Copy Sent To:

## 2018-11-02 NOTE — PROGRESS NOTES
Problem: Falls - Risk of 
Goal: *Absence of Falls Document Martine Tobar Fall Risk and appropriate interventions in the flowsheet. Outcome: Progressing Towards Goal 
Fall Risk Interventions: 
Mobility Interventions: Strengthening exercises (ROM-active/passive) Medication Interventions: Teach patient to arise slowly Elimination Interventions: Call light in reach

## 2018-11-02 NOTE — PROGRESS NOTES
Physical Therapy: 
 
Consult received, chart reviewed and nursing consulted. RN notes patient up in hallway earlier this am with family. Patient resting in bed upon arrival.  Discussed patient's recent activity level with patient and . Patient does note that she has been a little less active but still tries to get up and walk every day with family assistance and to/from bathroom as needed. Family and patient currently discussing hospice options. Declining evaluation at this time 2/2 fatigue but discussed trying to f/u first thing Monday am to assess prior to patient getting up/walking. Patient and family feel confident in continuing to walk with patient per her tolerance and recommended up to chair three times a day as goal, walking once a day in halls. Will f/u Monday early for evaluation and likely will put on caseload a couple days a week to check in after instruction in [address].    
 
Leah Saucedo, MS, PT

## 2018-11-03 NOTE — PROGRESS NOTES
[address], [address] [place], [address] [place] [phone]  F [phone] Patient: Jena Enciso Admit Date: 10/16/2018 Hospital Day:18 Admit Dx: Partial small bowel obstruction Ovarian cancer ([address] Malnutrition ([address] Dehydration SBO (small bowel obstruction) ([address] Subjective:  
PT did well overnight. Had another good night. Slept well. Feels she has been on Ativan for so many years, she needs a slow taper rather than just a complete switch. Pain was fairly well controlled, but still working to improve more. Continues to feel bloated and distended this morning Says she  Passed flatus only twice yesterday. She is happy about being able to eat some mashed potatoes last evening. G-Tube insertion site pain improved some today, but \"still sore\" Continues to feel MS elixir is an improvement in pain management  remaining at bedside. Objective: Intake/Output Summary (Last 24 hours) at 11/3/2018 1029 Last data filed at 11/2/2018 2038 Gross per 24 hour Intake 869.17 ml Output 125 ml Net 744.17 ml Physical Exam 
/70 (BP 1 Location: Left arm, BP Patient Position: At rest)   Pulse 92   Temp 97.8 °F (36.6 °C)   Resp 14   Wt 131 lb 4.8 oz (59.6 kg)   LMP 02/02/2015 (Approximate)   SpO2 99%   BMI 20.26 kg/m² General: A&O this morning sitting in the chair and remaining in good spirits this morning Cardiac:  Regular, tachy at times without M/R/G Lungs:  CTA bilat. Slight decrease to left base (pt says it is uncomfortable to take a deep breath) Non labored without rales. Abdomen:  soft, remains mildly firm and and slightly increase noted in  distention this morning with continued mild tenderness to palpation. G-tube bag with clear fluid. Extremity: no edema Data Review Lab Results Component Value Date/Time  WBC 13.7 (H) 10/25/2018 04:18 AM  
 ABS. NEUTROPHILS 10.5 (H) 10/25/2018 04:18 AM  
 HGB 9.0 (L) 10/25/2018 04:18 AM  
 HCT 28.5 (L) 10/25/2018 04:18 AM  
 .9 (H) 10/25/2018 04:18 AM  
 MCH 32.5 10/25/2018 04:18 AM  
 PLATELET 198 71/77/9516 04:18 AM  
 
Lab Results Component Value Date/Time Sodium 139 10/30/2018 07:06 AM  
 Potassium 4.4 10/30/2018 07:06 AM  
 Chloride 107 10/30/2018 07:06 AM  
 CO2 23 10/30/2018 07:06 AM  
 Glucose 87 10/30/2018 07:06 AM  
 BUN 20 10/30/2018 07:06 AM  
 Creatinine 0.37 (L) 10/30/2018 07:06 AM  
 Calcium 8.1 (L) 10/30/2018 07:06 AM  
 Albumin 2.0 (L) 10/30/2018 07:06 AM  
 Bilirubin, total 0.3 10/30/2018 07:06 AM  
 AST (SGOT) 13 (L) 10/30/2018 07:06 AM  
 ALT (SGPT) 44 10/30/2018 07:06 AM  
 Alk. phosphatase 136 (H) 10/30/2018 07:06 AM  
 
 
IMAGING: 
US 10/30/2018 FINDINGS: Sonography of the entire abdomen shows no ascites. 
  
IMPRESSION: No ascites KUB 10/18/2018 FINDINGS: There are multiple dilated loops of small bowel throughout the abdomen  
and pelvis is seen on prior CT dated October 15, 2018. NG tube tip and side port  
overlie the stomach. There is no free intraperitoneal gas. Filter overlies the  
expected location of the IVC at the L2 level. There is a vascular stent  
overlying SPECT dislocation of the left common iliac vein. IMPRESSION: Multiple, persistent dilated loops of small bowel throughout the  
abdomen and pelvis. No evidence of perforation. NG tube in appropriate position CT Results (most recent): 
Results from Hospital Encounter encounter on 10/16/18 CT INTRO GASTROSTOMY TUBE PERC Narrative PROCEDURE: Gastrostomy tube placement INDICATION: Decompression, bowel obstruction OPERATING PHYSICIAN: Yordy Leon M.D. 
 
ESTIMATED BLOOD LOSS: None SPECIMENS REMOVED: None FLOURO TIME: None COMPLICATIONS: None immediate. Procedure and findings:  
 
CT dose reduction was achieved through use of a standardized protocol tailored for this examination and automatic exposure control for dose modulation. After obtaining informed consent, patient was brought the angiographic suite and 
placed supine angiographic table. An 5 Tanzanian catheter was positioned in the 
stomach under fluoroscopy. Subsequently, the patient was moved to the CT table. Sedation was obtained with intravenous Versed and fentanyl. The upper abdomen 
was prepped and draped in maximal sterile barrier technique. Prophylactic 
antibiotic of Ancef 2 g was administered prior to the intervention and 
discontinued prior to the completion of the procedure. Approximately 600 cc of air was used to insufflate the stomach. The puncture 
site was identified and local anesthesia was achieved with 1% lidocaine 
injection. Antegrade puncture into the stomach was performed. 2 T-tacks were 
delivered and traction was applied to appose the anterior stomach wall against 
the anterior abdomen. A needle was then advanced into the stomach lumen and 
contrast injection was performed to confirm intraluminal location. An Amplatz 
wire was then advanced through the needle and the tract was serially dilated up 
to 18 [place]. A 18 [place]anian gastrostomy tube was advanced over the wire into the 
stomach. The balloon was inflated with 8 cc of dilute contrast. Final 
intraluminal position of the gastrostomy tube was confirmed with gentle contrast 
injection under CT. The patient tolerated the procedure well and there were no 
immediate complications. A dry sterile dressing was applied. Impression IMPRESSION:  
 
Successful placement of 25 Tanz[place] gastrostomy tube under CT guidance. Assessment/Plan:  
Admitted 10/16/2018 for Partial small bowel obstruction. G-tube place 10/24/18. Active Hospital Problems Diagnosis Date Noted  Malignant [facility]xia [facility]  End of life care 10/29/2018  Drowsiness  Goals of care, counseling/discussion  Dehydration 10/16/2018  Malnutrition ([facility] 10/16/2018  
 SBO (small bowel obstruction) ([facility] 10/16/2018  Severe protein-calorie malnutrition ([address] 10/11/2018  Ovarian cancer ([facility] 10/10/2018  Nausea 10/10/2018  Epigastric pain  Pain of metastatic malignancy 08/09/2018  Anemia due to chemotherapy 07/26/2018  Peritoneal carcinomatosis ([facility] 06/01/2018 Nathaniel Marroquin is a 48 y.o. female with ovarian cancer admitted with carcinomatosis, SBO, protein/calorie malnutrition d/t chronic disease and obstruction with N/V and cancer related pain. Onc: Had been receiving Doxil/Avastin s/p 3 cycles last 10/4/18. This will now be stopped due to progression of disease, continued SBO and need for G-Tube with increasing debility Partial SBO with flatus. BM 10/30. Palliative G-tube placed 10/24 and relieving nausea as needed. Unlamping for 1 hour Q8h or as needed for nausea. Heme/CV: Hemodynamically stable by VS. No further lab draws at this time FEN/GI: Partial SBO with delayed gastric emptying (by endoscopy 10/12/18) with malnutrition. Have now stopped TPN and Lipids and allow pt to begin small, frequent eating periods to evaluate how she tolerates. Continue Reglan, PPI/H2. Ascites:Improved after drainage 10/18. No new ascites on US 10/30. Neuro: Lethargy improved. Pain/antidpression regimen per palliative care. Palliative Medicine monitoring this closely. PPX: lovenox. Holding home xarelto. Disposition: Stable partial obstructive at this point without meaningful resolution, difficult course with her disease these last few months. G-tube for palliation in place on 10/24. Long family meeting (10/30) with goals of care, plan of care going forward reviewed and rational for not being able to proceed with chemotherapy. Continued need for pain management improvement and abdominal discomfort/nausea. Pt remains DNR.  
Due to pt's increased weakness, debility, have re-consulted PT for strength/stamina suggestions. Oncology nutritionist on board. Will continue regular, soft diet with her able to have greater selection of food choices. Plan is for a wedding ceremony for her son tomorrow in the [address] [place] [address].  
 
 
Jennifer Tobin MD

## 2018-11-03 NOTE — PROGRESS NOTES
Bedside shift change report given to Shedrick Burkitt, RN and Luis RN (oncoming nurse) by Mayelin Cardona RN (offgoing nurse). Report included the following information SBAR.

## 2018-11-03 NOTE — PROGRESS NOTES
Chief Complaint   Patient presents with    Well Child     2 year Problem: Falls - Risk of 
Goal: *Absence of Falls Document Santiagoifm Furzee Fall Risk and appropriate interventions in the flowsheet. Outcome: Progressing Towards Goal 
Fall Risk Interventions: 
Mobility Interventions: Patient to call before getting OOB Medication Interventions: Patient to call before getting OOB, Teach patient to arise slowly Elimination Interventions: Call light in reach, Patient to call for help with toileting needs Problem: Pressure Injury - Risk of 
Goal: *Prevention of pressure injury Document Joni Scale and appropriate interventions in the flowsheet. Outcome: Progressing Towards Goal 
Pressure Injury Interventions: 
  
 
Moisture Interventions: Maintain skin hydration (lotion/cream), Limit adult briefs Activity Interventions: Increase time out of bed Mobility Interventions: Pressure redistribution bed/mattress (bed type) Nutrition Interventions: Offer support with meals,snacks and hydration

## 2018-11-04 NOTE — PROGRESS NOTES
Follow up visit in advance of a wedding planned for her son and future D-I-L in the [phone] this afternoon. Pt's father and two of her brothers were with pt. Pt was sitting in bed side chair and spoke of her jimi at being able to see the wedding. The couple are planning a destination wedding next week and the pt will not be able to travel for the event. Chaplains will follow as needed. Chaplain Alston MDiv, MS, [facility] 
[phone])

## 2018-11-04 NOTE — PROGRESS NOTES
[address], [address] [place], [address] [place] [phone]  F [phone] Patient: Leonora Skinner Admit Date: 10/16/2018 Hospital Day:18 Admit Dx: Partial small bowel obstruction Ovarian cancer ([address] Malnutrition ([address] Dehydration SBO (small bowel obstruction) ([address] Subjective:  
PT did well overnight. Had another good night. Slept well. Feels she has been on Ativan for so many years, she needs a slow taper rather than just a complete switch. Pain was fairly well controlled, but still working to improve more. G-Tube insertion site pain improved some today, but \"still sore\" Continues to feel MS elixir is an improvement in pain management  remaining at bedside. Objective: Intake/Output Summary (Last 24 hours) at 11/4/2018 0800 Last data filed at 11/4/2018 6331 Gross per 24 hour Intake 494.17 ml Output 250 ml Net 244.17 ml Physical Exam 
/67 (BP 1 Location: Left arm, BP Patient Position: At rest)   Pulse 90   Temp 98.1 °F (36.7 °C)   Resp 14   Wt 131 lb 4.8 oz (59.6 kg)   LMP 02/02/2015 (Approximate)   SpO2 96%   BMI 20.26 kg/m² General: A&O this morning sitting in the chair and remaining in good spirits this morning Cardiac:  Regular, tachy at times without M/R/G Lungs:  CTA bilat. Slight decrease to left base (pt says it is uncomfortable to take a deep breath) Non labored without rales. Abdomen:  soft, remains mildly firm and and slightly increase noted in  distention this morning with continued mild tenderness to palpation. G-tube bag with clear fluid. Extremity: no edema Data Review Lab Results Component Value Date/Time WBC 13.7 (H) 10/25/2018 04:18 AM  
 ABS.  NEUTROPHILS 10.5 (H) 10/25/2018 04:18 AM  
 HGB 9.0 (L) 10/25/2018 04:18 AM  
 HCT 28.5 (L) 10/25/2018 04:18 AM  
 .9 (H) 10/25/2018 04:18 AM  
 MCH 32.5 10/25/2018 04:18 AM  
 PLATELET 738 01/95/9948 04:18 AM  
 
Lab Results Component Value Date/Time Sodium 139 10/30/2018 07:06 AM  
 Potassium 4.4 10/30/2018 07:06 AM  
 Chloride 107 10/30/2018 07:06 AM  
 CO2 23 10/30/2018 07:06 AM  
 Glucose 87 10/30/2018 07:06 AM  
 BUN 20 10/30/2018 07:06 AM  
 Creatinine 0.37 (L) 10/30/2018 07:06 AM  
 Calcium 8.1 (L) 10/30/2018 07:06 AM  
 Albumin 2.0 (L) 10/30/2018 07:06 AM  
 Bilirubin, total 0.3 10/30/2018 07:06 AM  
 AST (SGOT) 13 (L) 10/30/2018 07:06 AM  
 ALT (SGPT) 44 10/30/2018 07:06 AM  
 Alk. phosphatase 136 (H) 10/30/2018 07:06 AM  
 
 
IMAGING: 
US 10/30/2018 FINDINGS: Sonography of the entire abdomen shows no ascites. 
  
IMPRESSION: No ascites KUB 10/18/2018 FINDINGS: There are multiple dilated loops of small bowel throughout the abdomen  
and pelvis is seen on prior CT dated October 15, 2018. NG tube tip and side port  
overlie the stomach. There is no free intraperitoneal gas. Filter overlies the  
expected location of the IVC at the L2 level. There is a vascular stent  
overlying SPECT dislocation of the left common iliac vein. IMPRESSION: Multiple, persistent dilated loops of small bowel throughout the  
abdomen and pelvis. No evidence of perforation. NG tube in appropriate position CT Results (most recent): 
Results from Hospital Encounter encounter on 10/16/18 CT INTRO GASTROSTOMY TUBE PERC Narrative PROCEDURE: Gastrostomy tube placement INDICATION: Decompression, bowel obstruction OPERATING PHYSICIAN: Ludwin Alaniz M.D. 
 
ESTIMATED BLOOD LOSS: None SPECIMENS REMOVED: None FLOURO TIME: None COMPLICATIONS: None immediate. Procedure and findings:  
 
CT dose reduction was achieved through use of a standardized protocol tailored 
for this examination and automatic exposure control for dose modulation.   
 
After obtaining informed consent, patient was brought the angiographic suite and 
 placed supine angiographic table. An 5 [place]iv[place] catheter was positioned in the 
stomach under fluoroscopy. Subsequently, the patient was moved to the CT table. Sedation was obtained with intravenous Versed and fentanyl. The upper abdomen 
was prepped and draped in maximal sterile barrier technique. Prophylactic 
antibiotic of Ancef 2 g was administered prior to the intervention and 
discontinued prior to the completion of the procedure. Approximately 600 cc of air was used to insufflate the stomach. The puncture 
site was identified and local anesthesia was achieved with 1% lidocaine 
injection. Antegrade puncture into the stomach was performed. 2 T-tacks were 
delivered and traction was applied to appose the anterior stomach wall against 
the anterior abdomen. A needle was then advanced into the stomach lumen and 
contrast injection was performed to confirm intraluminal location. An Amplatz 
wire was then advanced through the needle and the tract was serially dilated up 
to 18 [place]. A 18 [place]ian gastrostomy tube was advanced over the wire into the 
stomach. The balloon was inflated with 8 cc of dilute contrast. Final 
intraluminal position of the gastrostomy tube was confirmed with gentle contrast 
injection under CT. The patient tolerated the procedure well and there were no 
immediate complications. A dry sterile dressing was applied. Impression IMPRESSION:  
 
Successful placement of 25 [place] gastrostomy tube under CT guidance. Assessment/Plan:  
Admitted 10/16/2018 for Partial small bowel obstruction. G-tube place 10/24/18. Active Hospital Problems Diagnosis Date Noted  Malignant cachexia ([address]  End of life care 10/29/2018  Drowsiness  Goals of care, counseling/discussion  Dehydration 10/16/2018  Malnutrition ([address] 10/16/2018  
 SBO (small bowel obstruction) ([address] 10/16/2018  Severe protein-calorie malnutrition ([address] 10/11/2018  Ovarian cancer ([facility] 10/10/2018  Nausea 10/10/2018  Epigastric pain  Pain of metastatic malignancy 08/09/2018  Anemia due to chemotherapy 07/26/2018  Peritoneal carcinomatosis ([facility] 06/01/2018 Tiara Saravia is a 48 y.o. female with ovarian cancer admitted with carcinomatosis, SBO, protein/calorie malnutrition d/t chronic disease and obstruction with N/V and cancer related pain. Onc: Had been receiving Doxil/Avastin s/p 3 cycles last 10/4/18. This will now be stopped due to progression of disease, continued SBO and need for G-Tube with increasing debility Partial SBO with flatus. BM 10/30. Palliative G-tube placed 10/24 and relieving nausea as needed. Unlamping for 1 hour Q8h or as needed for nausea. Heme/CV: Hemodynamically stable by VS. No further lab draws at this time FEN/GI: Partial SBO with delayed gastric emptying (by endoscopy 10/12/18) with malnutrition. Have now stopped TPN and Lipids and allow pt to begin small, frequent eating periods to evaluate how she tolerates. Continue Reglan, PPI/H2. Ascites:Improved after drainage 10/18. No new ascites on US 10/30. Neuro: Lethargy improved. Pain/antidpression regimen per palliative care. Palliative Medicine monitoring this closely. PPX: lovenox. Holding home xarelto. Disposition: Stable partial obstructive at this point without meaningful resolution, difficult course with her disease these last few months. G-tube for palliation in place on 10/24. Long family meeting (10/30) with goals of care, plan of care going forward reviewed and rational for not being able to proceed with chemotherapy. Continued need for pain management improvement and abdominal discomfort/nausea. Pt remains DNR. Due to pt's increased weakness, debility, have re-consulted PT for strength/stamina suggestions. Oncology nutritionist on board. Will continue regular, soft diet with her able to have greater selection of food choices. Plan is for a wedding ceremony for her son today at 2 pm in the [address] [place] [address].  
 
 
Nellie Shah MD

## 2018-11-04 NOTE — PROGRESS NOTES
Bedside shift change report given to Shedrick Burkitt, RN and Luis RN (oncoming nurse) by Dagmar Calderón RN (offgoing nurse). Report included the following information SBAR.

## 2018-11-04 NOTE — PROGRESS NOTES
CM follow up regarding hospice consult. GOLDEN spoke with Priya Warren RN, [facility] on call liaison, who confirmed that [facility] continues to follow patient and her family.

## 2018-11-05 NOTE — PROGRESS NOTES
Physical Therapy Attempted PT treatment. RN states that patient had just returned from walk with . Will check back as time permits.  
Woo Casanova, PT, DPT

## 2018-11-05 NOTE — PROGRESS NOTES
Follow up visit with Jacinto Mendoza. Spoke with pt's father in the hallway and then spoke with Jacinto Mendoza and other family members who were at bedside. Jacinto Mendoza and family affirmed events of the weekend with the wedding of pt's son in the [address] [place] [address]. Celebrated their jimi as they reflected on the event. Explored any needs - no additional needs expressed by pt at this time. Assured them of ongoing prayers and of  availability. Paola Cruz, Palliative

## 2018-11-05 NOTE — PROGRESS NOTES
Verbal shift change report given to Yadira Villatoro RN and HERSON Smith (oncoming nurse) by Lisseth Greenwood RN (offgoing nurse). Report included the following information SBAR, Kardex, Intake/Output and MAR.

## 2018-11-05 NOTE — PROGRESS NOTES
Palliative Medicine Consult Ruperto: [phone] ([phone]) Patient Name: Marlena Alejandro YOB: 1965 Date of Initial Consult: 10/10/18 Reason for Consult: Cancer related symptom management Requesting Provider: Dr. Lauri Davis Primary Care Physician: Carmen Du MD 
 
 SUMMARY:  
Marlena Alejandro is a 48 y.o. with recurrent ovarian malignancy on Doxil/Avastin per Dr. Lauri Davis, known to outpatient palliative medicine. She was admitted 10/10/2018 from home due to worsening nausea and vomiting followed by acute worsening of epigastric abdominal pain. Last chemotherapy date was 10/4. Current medical issues leading to Palliative Medicine involvement include: cancer related symptoms including pain, nausea, vomiting. S/p venting peg placement on 10/24 by IR. PALLIATIVE DIAGNOSES:  
1. Nociceptive neoplasm related abdominal pain 2. Malignancy related small bowel obstruction 3. Malignancy related fatigue 4. Chronic and disease related anxiety with sleep disturbance 5. Delayed gastric motility - clinically and by EGD PLAN:  
1. Nociceptive neoplasm related abdominal pain:   
- multifactorial (malignancy related, SBO, post-PEG soreness) - well managed on current regimen 
- Fentanyl td last inc 10/31.   
- on standing conc morphine 10 mg q4h + used 1 dose prn last 24 hrs 
- tolerating regimen well w/o SEs, including drowsiness.   
- Plan: - Inc Fentanyl Td to 50 mcg today 
- cont standing con liquid morphine 10 mg q4h for another 24-48 hrs; communicated again with nursing to hold for sedation, excessive drowsiness or if sleeping 
- cont prn conc liquid morphine - Morphine 4 mg IV q1h prn for severe breakthrough (NOT used in over 72 hrs) 2. Anxiety:  Chronic Ativan use - pre-dates cancer 
 - cont olanzapine 5 mg disintegrating @ 8 pm for anxiety/agitation overnight:  Working well. - cont dose reduced lorazepam 0.5 mg po @ 8 pm (reduced from 1 mg 11/2) 3. Fatigue and drowsiness. Multifactorial due to malignancy, med SEs and sleep deprivation.  
 - improved 
 - continue to balance opioid pain relief vs fatigue SE.   
4. Malignancy related SBO:   
- BMs returned 10/31-11/2. Nothing again over the weekend off bowel regimen (held in setting of wedding at [address] [place] [address]) 
- restart senna liquid today at BID 
- plans for suppository today 
- nausea and abd distension improved 
- transition IV Reglan to oral today; QID ac-hs 
- use Haldol 1 mg IV for breakthrough nausea 1st line. 5. Nutrition:   
 - decision made to stop TPN during fam mtg 10/30 
 - she continues to tolerate small bites/sips throughout the day 
 - un-clamping venting PEG to gravity every 8 hours; otherwise doing well keeping it clamped - Cont IVF for now at reduced rate of 50 cc/hr 6. Neuropathy- chemo induced neuropathy - resolved on regimen of amitriptyline and Lyrica 
- both meds stopped week of 10/29 w/o return of sx 
- cont to monitor 7. Goals of care- comfort focused care as no longer a candidate for disease directed therapy. Pt/family interviewing several Hospice agencies. Discharge to home when pain and nausea adequately controlled off IV regimen / per primary team.    
8. Communicated plan of care with: Palliative IDT, Homero Bateman NP, bedside RN, Dr. Bartolo Laws. GOALS OF CARE / TREATMENT PREFERENCES:  
 
GOALS OF CARE:    
Patient/Health Care Proxy Stated Goals: Comfort TREATMENT PREFERENCES:  
Code Status: DNR Advance Care Planning: 
Advance Care Planning 10/17/2018 Patient's Healthcare Decision Maker is: Legal Next of Kin Primary Decision Maker Name -  
Primary Decision Maker Phone Number -  
Primary Decision Maker Relationship to Patient -  
Confirm Advance Directive None Medical Interventions: Comfort measures Artificially Administered Nutrition: No feeding tube(Venting PEG in place - not for enteral feeding) Other: As far as possible, the palliative care team has discussed with patient / health care proxy about goals of care / treatment preferences for patient. HISTORY:  
 
History obtained from:  Pt, , EMR 
 
CHIEF COMPLAINT:  Drowsiness HPI/SUBJECTIVE: The patient is:  
[x] Verbal and participatory [] Non-participatory due to: Met with patient today along with  Patti Figueroa. Had a stable good weekend. Son and daughter-in-law were  in [address] [place] [address] over the weekend ahead of planned ceremony in [place] She enjoyed this very much. Pain is well controlled on current regimen; previous drowsiness with opioid adjustment last week has resolved. Sleeping well on olanzapine and lorazepam at night; did well with dose reduction of later from 1 --> 0.5 mg qhs. No recurrent vomiting. No BM or flatus since Friday 11/2 --> bowel regimen held over weekend in setting of wedding ceremony. Tolerating small meals off and on. Tolerating almost 8 hrs of clamped PEG. Clinical Pain Assessment (nonverbal scale for severity on nonverbal patients):  
Clinical Pain Assessment Severity: 2 Location: abd bl upper quads, LLQ Character: crampy Duration: weeks Effect: functional 
Factors: none in particular Frequency: chronic Duration: for how long has pt been experiencing pain (e.g., 2 days, 1 month, years) Frequency: how often pain is an issue (e.g., several times per day, once every few days, constant) FUNCTIONAL ASSESSMENT:  
 
Palliative Performance Scale (PPS): PPS: 50 PSYCHOSOCIAL/SPIRITUAL SCREENING:  
 
Palliative IDT has assessed this patient for cultural preferences / practices and a referral made as appropriate to needs (Cultural Services, Patient Advocacy, Ethics, etc.) Advance Care Planning: 
Advance Care Planning 10/17/2018 Patient's Healthcare Decision Maker is: Legal Next of Kin Primary Decision Maker Name -  
Primary Decision Maker Phone Number -  
 Primary Decision Maker Relationship to Patient -  
Confirm Advance Directive None Any spiritual / Restorationism concerns: 
[] Yes /  [x] No 
 
Caregiver Burnout: 
[] Yes /  [x] No /  [] No Caregiver Present Anticipatory grief assessment:  
[] Normal  / [] Maladaptive   Did not assess ESAS Anxiety: Anxiety: 3 ESAS Depression: Depression: 3 REVIEW OF SYSTEMS:  
 
Positive and pertinent negative findings in ROS are noted above in HPI. The following systems were [x] reviewed / [] unable to be reviewed as noted in HPI Other findings are noted below. Systems: constitutional, ears/nose/mouth/throat, respiratory, gastrointestinal, genitourinary, musculoskeletal, integumentary, neurologic, psychiatric, endocrine. Positive findings noted below. Modified ESAS Completed by: provider Fatigue: 9 Drowsiness: 3 Depression: 3 Pain: 2 Anxiety: 3 Nausea: 0 Anorexia: 9 Dyspnea: 0 Constipation: Yes Stool Occurrence(s): 1 PHYSICAL EXAM:  
 
From RN flowsheet: 
Wt Readings from Last 3 Encounters:  
10/16/18 58 kg (127 lb 12.8 oz) 10/04/18 58.2 kg (128 lb 6.4 oz) 09/27/18 57.7 kg (127 lb 1.6 oz) Blood pressure 112/71, pulse 95, temperature 97.7 °F (36.5 °C), resp. rate 14, weight 59.6 kg (131 lb 4.8 oz), last menstrual period 02/02/2015, SpO2 98 %, unknown if currently breastfeeding. Pain Scale 1: Numeric (0 - 10) Pain Intensity 1: 0 Pain Onset 1: chronic Pain Location 1: Abdomen Pain Orientation 1: Anterior Pain Description 1: Sore, Aching Pain Intervention(s) 1: Medication (see MAR) Last bowel movement, if known:  
 
Constitutional: chronically ill appearing but NAD, up in chair, alert. Also witness ambulating halls. Eyes: pupils equal, anicteric ENMT: no nasal discharge, moist mucous membranes, no lesions or thrush in mouth Abd:  Soft, mild tenderness on palpation of abdomen all quads, no rebound or guarding.  + bowel sounds. Skin: warm, dry Neurologic: alert and fully oriented, gross cognition intact Psychiatric: in good spirits, no agitation or hallucinations HISTORY:  
 
Active Problems: 
  Peritoneal carcinomatosis ([address] (6/1/2018) Anemia due to chemotherapy (7/26/2018) Pain of metastatic malignancy (8/9/2018) Ovarian cancer ([address] (10/10/2018) Nausea (10/10/2018) Epigastric pain () Severe protein-calorie malnutrition ([address] (10/11/2018) Dehydration (10/16/2018) Malnutrition ([address] (10/16/2018) SBO (small bowel obstruction) ([address] (10/16/2018) End of life care (10/29/2018) Drowsiness () Goals of care, counseling/discussion () Malignant cachexia (HCC) () Past Medical History:  
Diagnosis Date  Anxiety  BRCA1 positive  Calculus of kidney 1/13  
 right  Hernia, inguinal, left 2012  MS (multiple sclerosis) ([address] 1996  Nausea & vomiting  Ovarian cancer ([address] 3/2015  
 high grade, stage 3C papillary serous  Thromboembolus ([address] 8/2007  
 lower abdomen post fall Past Surgical History:  
Procedure Laterality Date  HX GYN  2009  
 endometrial ablation with punctured uterus  HX OTHER SURGICAL  8/2007  
 green filter  HX MARIEL AND BSO  3/2014  
 ovarian cancer Family History Problem Relation Age of Onset  Cancer Paternal Grandmother   
     breast  
 Breast Cancer Paternal Grandmother  Coronary Artery Disease Mother 48 CABG and PCI  Heart Surgery Father   
     valve replacement  No Known Problems Brother  No Known Problems Brother  No Known Problems Brother  No Known Problems Daughter  No Known Problems Son  No Known Problems Son  No Known Problems Son History reviewed, no pertinent family history. Social History Tobacco Use  Smoking status: Never Smoker  Smokeless tobacco: Never Used Substance Use Topics  Alcohol use: No  
 
Allergies Allergen Reactions  Pcn [Penicillins] Rash Current Facility-Administered Medications Medication Dose Route Frequency  bisacodyl (DULCOLAX) suppository 10 mg  10 mg Rectal NOW  
 LORazepam (ATIVAN) tablet 0.5 mg  0.5 mg Oral QHS  polyethylene glycol (MIRALAX) packet 17 g  17 g Oral DAILY PRN  
 sennosides (SENOKOT) 8.8 mg/5 mL syrup 8.8 mg  5 mL Oral DAILY  sodium chloride (NS) flush 20 mL  20 mL InterCATHeter PRN  
 sodium chloride (NS) flush 10 mL  10 mL InterCATHeter Q24H  
 sodium chloride (NS) flush 10 mL  10 mL InterCATHeter PRN  
 sodium chloride (NS) flush 10 mL  10 mL InterCATHeter Q8H  
 alteplase (CATHFLO) 1 mg in sterile water (preservative free) 1 mL injection  1 mg InterCATHeter PRN  
 LORazepam (ATIVAN) tablet 0.5 mg  0.5 mg Oral Q8H PRN  
 fentaNYL (DURAGESIC) 12 mcg/hr patch 1 Patch  1 Patch TransDERmal Q72H  morphine (ROXANOL) 100 mg/5 mL (20 mg/mL) concentrated solution 10 mg  10 mg Oral Q4H  
 morphine (ROXANOL) 100 mg/5 mL (20 mg/mL) concentrated solution 10 mg  10 mg Oral Q1H PRN  
 morphine injection 4 mg  4 mg IntraVENous Q1H PRN  
 enoxaparin (LOVENOX) injection 40 mg  40 mg SubCUTAneous Q24H  
 metoclopramide HCl (REGLAN) injection 10 mg  10 mg IntraVENous TID  OLANZapine (ZyPREXA zydis) disintegrating tablet 5 mg  5 mg Oral QHS  glycopyrrolate (ROBINUL) injection 0.2 mg  0.2 mg IntraVENous Q4H PRN  
 fentaNYL (DURAGESIC) 25 mcg/hr patch 1 Patch  1 Patch TransDERmal Q72H  
 haloperidol lactate (HALDOL) injection 1 mg  1 mg IntraVENous Q4H PRN  
 aspirin-acetaminophen-caffeine (EXCEDRIN ES) per tablet 1-2 Tab   (Patient Supplied)  1-2 Tab Oral Q6H PRN  pantoprazole (PROTONIX) 40 mg in sodium chloride 0.9% 10 mL injection  40 mg IntraVENous Q24H  
 0.9% sodium chloride infusion  50 mL/hr IntraVENous CONTINUOUS  
 sodium chloride (NS) flush 5-10 mL  5-10 mL IntraVENous Q8H  
 sodium chloride (NS) flush 5-10 mL  5-10 mL IntraVENous PRN  
 
 
 
 LAB AND IMAGING FINDINGS:  
 
Lab Results Component Value Date/Time WBC 13.7 (H) 10/25/2018 04:18 AM  
 HGB 9.0 (L) 10/25/2018 04:18 AM  
 PLATELET 371 00/55/9900 04:18 AM  
 
Lab Results Component Value Date/Time Sodium 139 10/30/2018 07:06 AM  
 Potassium 4.4 10/30/2018 07:06 AM  
 Chloride 107 10/30/2018 07:06 AM  
 CO2 23 10/30/2018 07:06 AM  
 BUN 20 10/30/2018 07:06 AM  
 Creatinine 0.37 (L) 10/30/2018 07:06 AM  
 Calcium 8.1 (L) 10/30/2018 07:06 AM  
 Magnesium 1.9 10/30/2018 07:06 AM  
 Phosphorus 3.3 10/30/2018 07:06 AM  
  
Lab Results Component Value Date/Time AST (SGOT) 13 (L) 10/30/2018 07:06 AM  
 Alk. phosphatase 136 (H) 10/30/2018 07:06 AM  
 Protein, total 6.0 (L) 10/30/2018 07:06 AM  
 Albumin 2.0 (L) 10/30/2018 07:06 AM  
 Globulin 4.0 10/30/2018 07:06 AM  
 
Lab Results Component Value Date/Time INR 1.0 07/19/2018 11:54 AM  
 Prothrombin time 10.7 07/19/2018 11:54 AM  
  
No results found for: IRON, FE, TIBC, IBCT, PSAT, FERR No results found for: PH, PCO2, PO2 No components found for: [place] Lab Results Component Value Date/Time CK 72 01/09/2018 11:09 AM  
 CK - MB <1.0 01/09/2018 11:09 AM  
  
 
 
   
Coding:  F/U 2 Total time:  
Counseling / coordination time, spent as noted above:   
> 50% counseling / coordination?:   
 
Prolonged service was provided for  []30 min   []75 min in face to face time in the presence of the patient, spent as noted above. Time Start:  
Time End:  
Note: this can only be billed with 90394 (initial) or 94228 (follow up). If multiple start / stop times, list each separately.

## 2018-11-05 NOTE — PROGRESS NOTES
[address], [address] [place], [facility] [place] 
P [phone]  F [phone] Patient: Jena Enciso Admit Date: 10/16/2018 Hospital Day: 21 Admit Dx: Partial small bowel obstruction Ovarian cancer ([address] Malnutrition ([address] Dehydration SBO (small bowel obstruction) ([address] Subjective:  
Pt did well overnight. Was happily exhausted from son's wedding in [facility] yesterday. Pain is fairly well controlled, but continue working to improve. G-Tube insertion site pain improved, but \"still sore\" Continues to feel MS elixir is an improvement in pain management  remaining at bedside. Objective: No intake or output data in the 24 hours ending 11/05/18 0712 Physical Exam 
/67 (BP 1 Location: Left arm, BP Patient Position: At rest)   Pulse 90   Temp 98.2 °F (36.8 °C)   Resp 14   Wt 131 lb 4.8 oz (59.6 kg)   LMP 02/02/2015 (Approximate)   SpO2 96%   BMI 20.26 kg/m² General: A&O this morning sitting in the chair and remaining in good spirits this morning with  and friends present Cardiac:  Regular, tachy at times without M/R/G Lungs:  CTA bilat. Slight decrease to left base, but unchanged Non labored without wheezing or rales. Abdomen:  soft, remains mildly firm and and distention unchanged with continued mild tenderness to palpation. G-tube bag with clear fluid and remaing clamped most of the day. Extremity: no edema Data Review Lab Results Component Value Date/Time WBC 13.7 (H) 10/25/2018 04:18 AM  
 ABS. NEUTROPHILS 10.5 (H) 10/25/2018 04:18 AM  
 HGB 9.0 (L) 10/25/2018 04:18 AM  
 HCT 28.5 (L) 10/25/2018 04:18 AM  
 .9 (H) 10/25/2018 04:18 AM  
 MCH 32.5 10/25/2018 04:18 AM  
 PLATELET 051 42/29/7106 04:18 AM  
 
Lab Results Component Value Date/Time  Sodium 139 10/30/2018 07:06 AM  
 Potassium 4.4 10/30/2018 07:06 AM  
 Chloride 107 10/30/2018 07:06 AM  
 CO2 23 10/30/2018 07:06 AM  
 Glucose 87 10/30/2018 07:06 AM  
 BUN 20 10/30/2018 07:06 AM  
 Creatinine 0.37 (L) 10/30/2018 07:06 AM  
 Calcium 8.1 (L) 10/30/2018 07:06 AM  
 Albumin 2.0 (L) 10/30/2018 07:06 AM  
 Bilirubin, total 0.3 10/30/2018 07:06 AM  
 AST (SGOT) 13 (L) 10/30/2018 07:06 AM  
 ALT (SGPT) 44 10/30/2018 07:06 AM  
 Alk. phosphatase 136 (H) 10/30/2018 07:06 AM  
 
 
IMAGING: 
US 10/30/2018 FINDINGS: Sonography of the entire abdomen shows no ascites. 
  
IMPRESSION: No ascites KUB 10/18/2018 FINDINGS: There are multiple dilated loops of small bowel throughout the abdomen  
and pelvis is seen on prior CT dated October 15, 2018. NG tube tip and side port  
overlie the stomach. There is no free intraperitoneal gas. Filter overlies the  
expected location of the IVC at the L2 level. There is a vascular stent  
overlying SPECT dislocation of the left common iliac vein. IMPRESSION: Multiple, persistent dilated loops of small bowel throughout the  
abdomen and pelvis. No evidence of perforation. NG tube in appropriate position CT Results (most recent): 
Results from Hospital Encounter encounter on 10/16/18 CT INTRO GASTROSTOMY TUBE PERC Narrative PROCEDURE: Gastrostomy tube placement INDICATION: Decompression, bowel obstruction OPERATING PHYSICIAN: Ivana Covarrubias M.D. 
 
ESTIMATED BLOOD LOSS: None SPECIMENS REMOVED: None FLOURO TIME: None COMPLICATIONS: None immediate. Procedure and findings:  
 
CT dose reduction was achieved through use of a standardized protocol tailored 
for this examination and automatic exposure control for dose modulation. After obtaining informed consent, patient was brought the angiographic suite and 
placed supine angiographic table. An 5 Bangladeshi catheter was positioned in the 
stomach under fluoroscopy. Subsequently, the patient was moved to the CT table. Sedation was obtained with intravenous Versed and fentanyl.  The upper abdomen 
was prepped and draped in maximal sterile barrier technique. Prophylactic 
antibiotic of Ancef 2 g was administered prior to the intervention and 
discontinued prior to the completion of the procedure. Approximately 600 cc of air was used to insufflate the stomach. The puncture 
site was identified and local anesthesia was achieved with 1% lidocaine 
injection. Antegrade puncture into the stomach was performed. 2 T-tacks were 
delivered and traction was applied to appose the anterior stomach wall against 
the anterior abdomen. A needle was then advanced into the stomach lumen and 
contrast injection was performed to confirm intraluminal location. An Amplatz 
wire was then advanced through the needle and the tract was serially dilated up 
to 18 [place]. A 18 Japanese gastrostomy tube was advanced over the wire into the 
stomach. The balloon was inflated with 8 cc of dilute contrast. Final 
intraluminal position of the gastrostomy tube was confirmed with gentle contrast 
injection under CT. The patient tolerated the procedure well and there were no 
immediate complications. A dry sterile dressing was applied. Impression IMPRESSION:  
 
Successful placement of 25 Japanese gastrostomy tube under CT guidance. Assessment/Plan:  
Admitted 10/16/2018 for Partial small bowel obstruction. G-tube place 10/24/18. Active Hospital Problems Diagnosis Date Noted  Malignant cachexia ([address]  End of life care 10/29/2018  Drowsiness  Goals of care, counseling/discussion  Dehydration 10/16/2018  Malnutrition ([address] 10/16/2018  
 SBO (small bowel obstruction) ([address] 10/16/2018  Severe protein-calorie malnutrition ([address] 10/11/2018  Ovarian cancer ([address] 10/10/2018  Nausea 10/10/2018  Epigastric pain  Pain of metastatic malignancy 08/09/2018  Anemia due to chemotherapy 07/26/2018  Peritoneal carcinomatosis ([address] 06/01/2018 Esvin Miller is a 48 y.o. female with ovarian cancer admitted with carcinomatosis, SBO, protein/calorie malnutrition d/t chronic disease and obstruction with N/V and cancer related pain. Onc: Had been receiving Doxil/Avastin s/p 3 cycles last 10/4/18. This will now be stopped due to progression of disease, continued SBO and need for G-Tube with increasing debility Partial SBO with flatus. BM 10/30. Palliative G-tube placed 10/24 and relieving nausea as needed. Unlamping for 1 hour Q8h or as needed for nausea. Heme/CV: Hemodynamically stable by VS. No further lab draws at this time FEN/GI: Partial SBO with delayed gastric emptying (by endoscopy 10/12/18) with malnutrition. Stopped TPN and Lipids and pt taking small, frequent eating periods to evaluate how she tolerates. Continue Reglan, PPI/H2. Ascites:Improved after drainage 10/18. No new ascites on US 10/30. Neuro: Lethargy improved. Pain/antidpression regimen per palliative care. Palliative Medicine monitoring this closely. PPX: lovenox. Holding home xarelto. Disposition: Stable partial obstructive at this point without meaningful resolution, difficult course with her disease these last few months. G-tube for palliation in place on 10/24. Long family meeting (10/30) with goals of care, plan of care going forward reviewed and rational for not being able to proceed with chemotherapy. Continued need for pain management improvement and abdominal discomfort/nausea. Pt remains DNR. Due to pt's increased weakness, debility, pt desires to continue with PT for strength/stamina suggestions. Will continue regular, soft diet with her able to have greater selection of food choices. Pt feels a dulcolax supp would help her with some abd fullness like it did last time. Will order one today and monitor.  
 
 
Percy Rinne, NP

## 2018-11-05 NOTE — PROGRESS NOTES
[facility] Liaison note: 
 
Patient is not inpatient appropriate for hospice care. Family has not decided upon hospice at this time as they were interviewing multiple agencies. We will continue to follow for disposition. Thank you, 
 
Yahaira Draper RN [facility]

## 2018-11-05 NOTE — PROGRESS NOTES
Patient wanted to take bedtime meds, and sleep without being disturbed.  stated \"VS have not been a priority, and she is wiped out from son's wedding ceremony today\".   He will call if she is awake and we can come in to do VS

## 2018-11-05 NOTE — PROGRESS NOTES
Bedside shift change report given to Valeri Parson RN and Jai Eller RN (oncoming nurse) by Sadie López RN and HERSON Smith (offgoing nurse). Report included the following information SBAR.

## 2018-11-06 NOTE — PROGRESS NOTES
Bedside shift change report given to Baljinder Martínez RN and Alyssa Reynoso Rn (oncoming nurse) by Clenton Saint, RN (offgoing nurse). Report included the following information SBAR, Kardex, Intake/Output and MAR.

## 2018-11-06 NOTE — PROGRESS NOTES
Problem: Falls - Risk of 
Goal: *Absence of Falls Document [place] Fall Risk and appropriate interventions in the flowsheet. Outcome: Progressing Towards Goal 
Fall Risk Interventions: 
Mobility Interventions: Patient to call before getting OOB Medication Interventions: Patient to call before getting OOB, Teach patient to arise slowly Elimination Interventions: Call light in reach, Patient to call for help with toileting needs

## 2018-11-06 NOTE — PROGRESS NOTES
Bedside and Verbal shift change report given to [address] (oncoming nurse) by Winston Marquez RN (offgoing nurse). Report included the following information SBAR, Kardex, Intake/Output, MAR and Recent Results. 0830 - pt. On phone, wishes to hold off on assessment at this time. 9:30 - SBAR report given to [place] 
 
11:15 am [facility] [place] [facility]. At this time.

## 2018-11-06 NOTE — PROGRESS NOTES
Physical Therapy 11/6/2018 Pt in meeting with hospice during attempts to see her today. Looks like plan to discharge home with hospice early next week is the current plan according to notes. Will check in tomorrow to determine pt's wishes to continue therapy services. Pt has been mobilizing with family during the day since admission.   
 
Thank Danielle Demarco, PT, DPT

## 2018-11-06 NOTE — PROGRESS NOTES
Palliative Medicine Consult Ruperto: [phone] Patient Name: Bhanu Aguilar YOB: 1965 Date of Initial Consult: 10/10/18 Reason for Consult: Cancer related symptom management Requesting Provider: Dr. Malu Hdz Primary Care Physician: Aries Leon MD 
 
 SUMMARY:  
Bhanu Aguilar is a 48 y.o. with recurrent ovarian malignancy on Doxil/Avastin per Dr. Malu Hdz, known to outpatient palliative medicine. She was admitted 10/10/2018 from home due to worsening nausea and vomiting followed by acute worsening of epigastric abdominal pain. Last chemotherapy date was 10/4. Current medical issues leading to Palliative Medicine involvement include: cancer related symptoms including pain, nausea, vomiting. S/p venting peg placement on 10/24 by IR. PALLIATIVE DIAGNOSES:  
1. Epigastric abdominal pain 2. Bowel obstruction 3. Chronic neoplasm related lower abdominal pain 4. Nausea with emesis 5. Delayed gastric motility - by EGD PLAN:  
1. Abdominal pain-peritoneal carcinomatosis related. - Generalized pain is well controlled with fentanyl 50 mcg patch and scheduled morphine 10 mg every 4 hours. 
-Complains of excessive pain prior to and immediately after bowel movement. This is likely from the severe cramping that she experiences. She has not used as needed morphine for this but I have encouraged her to use morphine. 
-In anticipation of severe uncontrolled pain at home in relation to abdominal cramping or obstruction I would like for her to have immediate release fentanyl lozenges. I have provided a prescription for Actiq 200 mcg to Mr. florez. He will take it to the pharmacy and we will work on getting it ready for her to use in the future and not now. 2.  Constipation -we have not worked out a regimen that works for her at this time.   She is on senna and has required Dulcolax suppositories every 2-3 days to help with a bowel movement. She had success with lactulose many weeks ago and she agreed to try one dose today to see how she feels with it. We will discuss with Dr. Chacha Garcia team about a regular and a backup plan. 3. Nausea- improved 4. Neuropathy- chemo induced neuropathy much improved-no longer needs Lyrica or Elavil. 5.  Disposition- Mr. Verena Luna is leaving for their son's wedding to [place] this Thursday. Our tentative goal at this time is to discharge home with [facility] on Monday, November 10. I have reached out to [facility] and they will reach out to Mr. Lashanda Mancini about meeting with him in getting a plan in place for delivering the DME prior to her tentative discharge date. 
-Discharge is subject to approval from Dr. Chacha Garcia team. 
 
 
Afua Kaufman of care with: Palliative IDT, Bella Dominguez NP 
 
 
 GOALS OF CARE / TREATMENT PREFERENCES:  
 
GOALS OF CARE: DNR Patient/Health Care Proxy Stated Goals: Comfort TREATMENT PREFERENCES:  
Code Status: DNR Advance Care Planning: 
Advance Care Planning 10/17/2018 Patient's Healthcare Decision Maker is: Legal Next of Kin Primary Decision Maker Name -  
Primary Decision Maker Phone Number -  
Primary Decision Maker Relationship to Patient -  
Confirm Advance Directive None Medical Interventions: Comfort measures Artificially Administered Nutrition: No feeding tube(Venting PEG in place - not for enteral feeding) Other: As far as possible, the palliative care team has discussed with patient / health care proxy about goals of care / treatment preferences for patient. HISTORY:  
 
History obtained from:  Pt, , EMR 
 
CHIEF COMPLAINT: upper abdominal pain HPI/SUBJECTIVE: The patient is:  
[x] Verbal and participatory [] Non-participatory due to:  
 
Feels okay overall.   She is relatedly tells me about her son's wedding over the weekend and shows me pictures and videos. She and family feel they are at peace and ready to face what ever comes. She is still a bit anxious about going home next week but feels like she is much more ready now than she has ever been. Continues to have severe cramping pain before and after bowel movement. Clinical Pain Assessment (nonverbal scale for severity on nonverbal patients):  
Clinical Pain Assessment Severity: 2 Location: abd bl upper quads, LLQ Character: crampy Duration: weeks Effect: functional 
Factors: none in particular Frequency: chronic Duration: for how long has pt been experiencing pain (e.g., 2 days, 1 month, years) Frequency: how often pain is an issue (e.g., several times per day, once every few days, constant) FUNCTIONAL ASSESSMENT:  
 
Palliative Performance Scale (PPS): PPS: 50 PSYCHOSOCIAL/SPIRITUAL SCREENING:  
 
Palliative IDT has assessed this patient for cultural preferences / practices and a referral made as appropriate to needs (Cultural Services, Patient Advocacy, Ethics, etc.) Advance Care Planning: 
Advance Care Planning 10/17/2018 Patient's Healthcare Decision Maker is: Legal Next of Kin Primary Decision Maker Name -  
Primary Decision Maker Phone Number -  
Primary Decision Maker Relationship to Patient -  
Confirm Advance Directive None Any spiritual / Anabaptist concerns: 
[] Yes /  [x] No 
 
Caregiver Burnout: 
[] Yes /  [x] No /  [] No Caregiver Present Anticipatory grief assessment:  
[] Normal  / [] Maladaptive   Did not assess ESAS Anxiety: Anxiety: 3 ESAS Depression: Depression: 3 REVIEW OF SYSTEMS:  
 
Positive and pertinent negative findings in ROS are noted above in HPI. The following systems were [x] reviewed / [] unable to be reviewed as noted in HPI Other findings are noted below.  
Systems: constitutional, ears/nose/mouth/throat, respiratory, gastrointestinal, genitourinary, musculoskeletal, integumentary, neurologic, psychiatric, endocrine. Positive findings noted below. Modified ESAS Completed by: provider Fatigue: 9 Drowsiness: 3 Depression: 3 Pain: 2 Anxiety: 3 Nausea: 0 Anorexia: 9 Dyspnea: 0 Constipation: Yes Stool Occurrence(s): 1 PHYSICAL EXAM:  
 
From RN flowsheet: 
Wt Readings from Last 3 Encounters:  
10/16/18 127 lb 12.8 oz (58 kg) 10/04/18 128 lb 6.4 oz (58.2 kg) 09/27/18 127 lb 1.6 oz (57.7 kg) Blood pressure 128/81, pulse 95, temperature 97.7 °F (36.5 °C), resp. rate 15, weight 131 lb 4.8 oz (59.6 kg), last menstrual period 02/02/2015, SpO2 98 %, unknown if currently breastfeeding. Pain Scale 1: Numeric (0 - 10) Pain Intensity 1: 0 Pain Onset 1: chronic Pain Location 1: Abdomen Pain Orientation 1: Anterior Pain Description 1: Sore Pain Intervention(s) 1: Medication (see MAR) Last bowel movement, if known:  
 
Constitutional: chronically ill appearing but generally comfortable Eyes: pupils equal, anicteric ENMT: no nasal discharge, moist mucous membranes, no lesions or thrush in mouth Cardiovascular: regular rhythm, distal pulses intact, no edema Respiratory: breathing not labored, symmetric Gastrointestinal: abdomen is flat, bs active, mild TTP in epigastrium w/o rebound or guarding Musculoskeletal: no deformity, no tenderness to palpation Skin: warm, dry Neurologic: following commands, moving all extremities Psychiatric: full affect, no hallucinations HISTORY:  
 
Active Problems: 
  Peritoneal carcinomatosis ([address] (6/1/2018) Anemia due to chemotherapy (7/26/2018) Pain of metastatic malignancy (8/9/2018) Ovarian cancer ([address] (10/10/2018) Nausea (10/10/2018) Epigastric pain () Severe protein-calorie malnutrition ([address] (10/11/2018) Dehydration (10/16/2018) Malnutrition ([address] (10/16/2018) SBO (small bowel obstruction) ([address] (10/16/2018) End of life care (10/29/2018) Drowsiness () Goals of care, counseling/discussion () Malignant cachexia (HCC) () Past Medical History:  
Diagnosis Date  Anxiety  BRCA1 positive  Calculus of kidney 1/13  
 right  Hernia, inguinal, left 2012  MS (multiple sclerosis) ([facility] 1996  Nausea & vomiting  Ovarian cancer ([facility] 3/2015  
 high grade, stage 3C papillary serous  Thromboembolus ([facility] 8/2007  
 lower abdomen post fall Past Surgical History:  
Procedure Laterality Date  HX GYN  2009  
 endometrial ablation with punctured uterus  HX OTHER SURGICAL  8/2007  
 green filter  HX MARIEL AND BSO  3/2014  
 ovarian cancer Family History Problem Relation Age of Onset  Cancer Paternal Grandmother   
     breast  
 Breast Cancer Paternal Grandmother  Coronary Artery Disease Mother 48 CABG and PCI  Heart Surgery Father   
     valve replacement  No Known Problems Brother  No Known Problems Brother  No Known Problems Brother  No Known Problems Daughter  No Known Problems Son  No Known Problems Son  No Known Problems Son History reviewed, no pertinent family history. Social History Tobacco Use  Smoking status: Never Smoker  Smokeless tobacco: Never Used Substance Use Topics  Alcohol use: No  
 
Allergies Allergen Reactions  Pcn [Penicillins] Rash Current Facility-Administered Medications Medication Dose Route Frequency  metoclopramide HCl (REGLAN) tablet 5 mg  5 mg Oral AC&HS  
 OLANZapine (ZyPREXA zydis) disintegrating tablet 5 mg  5 mg Oral QHS  sennosides (SENOKOT) 8.8 mg/5 mL syrup 8.8 mg  5 mL Oral BID  fentaNYL (DURAGESIC) 50 mcg/hr patch 1 Patch  1 Patch TransDERmal Q72H  LORazepam (ATIVAN) tablet 0.5 mg  0.5 mg Oral QHS  polyethylene glycol (MIRALAX) packet 17 g  17 g Oral DAILY PRN  
 sodium chloride (NS) flush 20 mL  20 mL InterCATHeter PRN  
  sodium chloride (NS) flush 10 mL  10 mL InterCATHeter PRN  
 sodium chloride (NS) flush 10 mL  10 mL InterCATHeter Q8H  
 alteplase (CATHFLO) 1 mg in sterile water (preservative free) 1 mL injection  1 mg InterCATHeter PRN  
 LORazepam (ATIVAN) tablet 0.5 mg  0.5 mg Oral Q8H PRN  
 morphine (ROXANOL) 100 mg/5 mL (20 mg/mL) concentrated solution 10 mg  10 mg Oral Q4H  
 morphine (ROXANOL) 100 mg/5 mL (20 mg/mL) concentrated solution 10 mg  10 mg Oral Q1H PRN  
 morphine injection 4 mg  4 mg IntraVENous Q1H PRN  
 enoxaparin (LOVENOX) injection 40 mg  40 mg SubCUTAneous Q24H  
 glycopyrrolate (ROBINUL) injection 0.2 mg  0.2 mg IntraVENous Q4H PRN  
 haloperidol lactate (HALDOL) injection 1 mg  1 mg IntraVENous Q4H PRN  
 aspirin-acetaminophen-caffeine (EXCEDRIN ES) per tablet 1-2 Tab   (Patient Supplied)  1-2 Tab Oral Q6H PRN  pantoprazole (PROTONIX) 40 mg in sodium chloride 0.9% 10 mL injection  40 mg IntraVENous Q24H  
 0.9% sodium chloride infusion  50 mL/hr IntraVENous CONTINUOUS  
 sodium chloride (NS) flush 5-10 mL  5-10 mL IntraVENous PRN  
 
 
 
 LAB AND IMAGING FINDINGS:  
 
Lab Results Component Value Date/Time WBC 13.7 (H) 10/25/2018 04:18 AM  
 HGB 9.0 (L) 10/25/2018 04:18 AM  
 PLATELET 511 58/44/7206 04:18 AM  
 
Lab Results Component Value Date/Time Sodium 139 10/30/2018 07:06 AM  
 Potassium 4.4 10/30/2018 07:06 AM  
 Chloride 107 10/30/2018 07:06 AM  
 CO2 23 10/30/2018 07:06 AM  
 BUN 20 10/30/2018 07:06 AM  
 Creatinine 0.37 (L) 10/30/2018 07:06 AM  
 Calcium 8.1 (L) 10/30/2018 07:06 AM  
 Magnesium 1.9 10/30/2018 07:06 AM  
 Phosphorus 3.3 10/30/2018 07:06 AM  
  
Lab Results Component Value Date/Time AST (SGOT) 13 (L) 10/30/2018 07:06 AM  
 Alk. phosphatase 136 (H) 10/30/2018 07:06 AM  
 Protein, total 6.0 (L) 10/30/2018 07:06 AM  
 Albumin 2.0 (L) 10/30/2018 07:06 AM  
 Globulin 4.0 10/30/2018 07:06 AM  
 
Lab Results Component Value Date/Time INR 1.0 07/19/2018 11:54 AM  
 Prothrombin time 10.7 07/19/2018 11:54 AM  
  
No results found for: IRON, FE, TIBC, IBCT, PSAT, FERR No results found for: PH, PCO2, PO2 No components found for: [place] Lab Results Component Value Date/Time CK 72 01/09/2018 11:09 AM  
 CK - MB <1.0 01/09/2018 11:09 AM  
  
 
 
   
 
Total time:  60m Counseling / coordination time, spent as noted above:  60m 
> 50% counseling / coordination?:  Yes Prolonged service was provided for  []30 min   []75 min in face to face time in the presence of the patient, spent as noted above. Time Start:  
Time End:  
Note: this can only be billed with 55384 (initial) or 15508 (follow up). If multiple start / stop times, list each separately.

## 2018-11-06 NOTE — PROGRESS NOTES
[address], [address] [place], [address] [place] 
P [phone]  F [phone] Patient: Asia Torres Admit Date: 10/16/2018 Hospital Day: 22 Admit Dx: Partial small bowel obstruction Ovarian cancer ([address] Malnutrition ([address] Dehydration SBO (small bowel obstruction) ([address] Subjective: After dulcolax supp yesterday, she had two BM and although caused her some increase in her discomfort, she says now, she is feeling better. Says similar to her last suppository. Pain medication adjustments Says nausea is better too. She continued to open her G-tube as needed. Total output in last 24 hours was 450cc's. More bilie in color now. She remains so happy about her son's wedding this past weekend Says Reglan elixir causes her too much cramping and she would like to try a pill Continues to feel MS elixir is an improvement in pain management  remaining at bedside. Objective: Intake/Output Summary (Last 24 hours) at 11/6/2018 1422 Last data filed at 11/6/2018 9624 Gross per 24 hour Intake 1194.16 ml Output 150 ml Net 1044.16 ml Physical Exam 
/75 (BP 1 Location: Left arm, BP Patient Position: Sitting)   Pulse 87   Temp 98 °F (36.7 °C)   Resp 14   Wt 131 lb 4.8 oz (59.6 kg)   LMP 02/02/2015 (Approximate)   SpO2 96%   BMI 20.26 kg/m² General: A&O this morning sitting in the chair spirits smo  Cardiac:  Regular, tachy at times without M/R/G Lungs:  CTA bilat. Improved aeration to left base. Remians non labored without wheezing or rales. Abdomen:  soft, remains mildly firm and and distention unchanged with slight increase in tenderness to palpation. G-tube bag with bile colored fluid clamped on and off during the day as needed. Extremity: no edema Data Review Lab Results Component Value Date/Time  WBC 13.7 (H) 10/25/2018 04:18 AM  
 ABS. NEUTROPHILS 10.5 (H) 10/25/2018 04:18 AM  
 HGB 9.0 (L) 10/25/2018 04:18 AM  
 HCT 28.5 (L) 10/25/2018 04:18 AM  
 .9 (H) 10/25/2018 04:18 AM  
 MCH 32.5 10/25/2018 04:18 AM  
 PLATELET 743 33/52/1115 04:18 AM  
 
Lab Results Component Value Date/Time Sodium 139 10/30/2018 07:06 AM  
 Potassium 4.4 10/30/2018 07:06 AM  
 Chloride 107 10/30/2018 07:06 AM  
 CO2 23 10/30/2018 07:06 AM  
 Glucose 87 10/30/2018 07:06 AM  
 BUN 20 10/30/2018 07:06 AM  
 Creatinine 0.37 (L) 10/30/2018 07:06 AM  
 Calcium 8.1 (L) 10/30/2018 07:06 AM  
 Albumin 2.0 (L) 10/30/2018 07:06 AM  
 Bilirubin, total 0.3 10/30/2018 07:06 AM  
 AST (SGOT) 13 (L) 10/30/2018 07:06 AM  
 ALT (SGPT) 44 10/30/2018 07:06 AM  
 Alk. phosphatase 136 (H) 10/30/2018 07:06 AM  
 
 
IMAGING: 
US 10/30/2018 FINDINGS: Sonography of the entire abdomen shows no ascites. 
  
IMPRESSION: No ascites KUB 10/18/2018 FINDINGS: There are multiple dilated loops of small bowel throughout the abdomen  
and pelvis is seen on prior CT dated October 15, 2018. NG tube tip and side port  
overlie the stomach. There is no free intraperitoneal gas. Filter overlies the  
expected location of the IVC at the L2 level. There is a vascular stent  
overlying SPECT dislocation of the left common iliac vein. IMPRESSION: Multiple, persistent dilated loops of small bowel throughout the  
abdomen and pelvis. No evidence of perforation. NG tube in appropriate position CT Results (most recent): 
Results from Hospital Encounter encounter on 10/16/18 CT INTRO GASTROSTOMY TUBE PERC Narrative PROCEDURE: Gastrostomy tube placement INDICATION: Decompression, bowel obstruction OPERATING PHYSICIAN: Corinda Hodgkins, M.D. 
 
ESTIMATED BLOOD LOSS: None SPECIMENS REMOVED: None FLOURO TIME: None COMPLICATIONS: None immediate. Procedure and findings:  
 
CT dose reduction was achieved through use of a standardized protocol tailored for this examination and automatic exposure control for dose modulation. After obtaining informed consent, patient was brought the angiographic suite and 
placed supine angiographic table. An 5 Mont[place]erratian catheter was positioned in the 
stomach under fluoroscopy. Subsequently, the patient was moved to the CT table. Sedation was obtained with intravenous Versed and fentanyl. The upper abdomen 
was prepped and draped in maximal sterile barrier technique. Prophylactic 
antibiotic of Ancef 2 g was administered prior to the intervention and 
discontinued prior to the completion of the procedure. Approximately 600 cc of air was used to insufflate the stomach. The puncture 
site was identified and local anesthesia was achieved with 1% lidocaine 
injection. Antegrade puncture into the stomach was performed. 2 T-tacks were 
delivered and traction was applied to appose the anterior stomach wall against 
the anterior abdomen. A needle was then advanced into the stomach lumen and 
contrast injection was performed to confirm intraluminal location. An Amplatz 
wire was then advanced through the needle and the tract was serially dilated up 
to 18 [place]. A 18 Montserratian gastrostomy tube was advanced over the wire into the 
stomach. The balloon was inflated with 8 cc of dilute contrast. Final 
intraluminal position of the gastrostomy tube was confirmed with gentle contrast 
injection under CT. The patient tolerated the procedure well and there were no 
immediate complications. A dry sterile dressing was applied. Impression IMPRESSION:  
 
Successful placement of 25 Montserratian gastrostomy tube under CT guidance. Assessment/Plan:  
Admitted 10/16/2018 for Partial small bowel obstruction. G-tube place 10/24/18. Active Hospital Problems Diagnosis Date Noted  Malignant cachexia ([facility]  End of life care 10/29/2018  Drowsiness  Goals of care, counseling/discussion  Dehydration 10/16/2018  Malnutrition ([facility] 10/16/2018  
 SBO (small bowel obstruction) ([facility] 10/16/2018  Severe protein-calorie malnutrition ([address] 10/11/2018  Ovarian cancer ([facility] 10/10/2018  Nausea 10/10/2018  Epigastric pain  Pain of metastatic malignancy 08/09/2018  Anemia due to chemotherapy 07/26/2018  Peritoneal carcinomatosis ([facility] 06/01/2018 Elizabeth Alexander is a 48 y.o. female with ovarian cancer admitted with carcinomatosis, SBO, protein/calorie malnutrition d/t chronic disease and obstruction with N/V and cancer related pain. Onc: Had been receiving Doxil/Avastin s/p 3 cycles last 10/4/18. This will now be stopped due to progression of disease, continued SBO and need for G-Tube with increasing debility Partial SBO with flatus. BM 10/30. Palliative G-tube placed 10/24 and relieving nausea as needed. Unlamping for 1 hour Q8h or as needed for nausea. Heme/CV: Hemodynamically stable by VS. No further lab draws at this time FEN/GI: Partial SBO with delayed gastric emptying (by endoscopy 10/12/18) with malnutrition. Stopped TPN and Lipids and pt taking small, frequent eating periods to evaluate how she tolerates. Continue Reglan, but will change to pill and place in apple sauce. Continue PPI/H2. Ascites:Improved after drainage 10/18. No new ascites on US 10/30. Neuro: More alert. Pain/antidpression regimen per palliative care. Palliative Medicine monitoring this closely. PPX: lovenox. Holding home xarelto. Disposition: Stable partial obstructive at this point without meaningful resolution, difficult course with her disease these last few months. G-tube for palliation in place on 10/24. Long family meeting (10/30) with goals of care, plan of care going forward reviewed and rational for not being able to proceed with chemotherapy. Continued need for pain management improvement and abdominal discomfort/nausea. Pt remains DNR. Due to pt's increased weakness, debility, pt desires to continue with PT for strength/stamina suggestions. Will continue regular, soft diet with her able to have greater selection of food choices.   
 
 
Bravo Mcdowell, NP

## 2018-11-07 PROBLEM — D63.8 ANEMIA OF CHRONIC DISEASE: Status: ACTIVE | Noted: 2018-01-01

## 2018-11-07 NOTE — PROGRESS NOTES
Bedside and Verbal shift change report given to Jerod Merrill RN (oncoming nurse) by Sera Tanner RN (offgoing nurse). Report included the following information SBAR, Kardex, Procedure Summary, Intake/Output, MAR, Accordion and Recent Results.

## 2018-11-07 NOTE — PROGRESS NOTES
Physical Therapy Chart reviewed for updates and attempted to see patient for PT treatment/weekly reassessment. Patient reports continuing to ambulate with family 2 times/day and no concerns with mobility returning home. She has plans to discharge home with home hospice. Does not feel she needs continued acute therapy. Reviewed with patient the ability to have PT re-consulted if mobility concerns arise while admitted but expect she will not need any. Patient in agreement for PT to sign off. Nurse present and aware.   
Gilford Salts, PT, DPT

## 2018-11-07 NOTE — PROGRESS NOTES
NUTRITION Chart reviewed and consult received to discuss  options from cafe. Reviewed alternate options from menu and cafe to enhance variety and satisfy cravings regardless of amount consumed. Recommended to avoid fibrous, tough and spicy foods. Will provide a list of foods to try. Mrs. Leni Smart would like to try a grilled cheese and tomato sandwich if able.    
Continue to follow and assist.  
 
 
 
Staci Thomas, RD

## 2018-11-07 NOTE — PROGRESS NOTES
Problem: Falls - Risk of 
Goal: *Absence of Falls Document [place] Fall Risk and appropriate interventions in the flowsheet. Outcome: Progressing Towards Goal 
Fall Risk Interventions: 
Mobility Interventions: Patient to call before getting OOB Medication Interventions: Teach patient to arise slowly, Patient to call before getting OOB Elimination Interventions: Call light in reach Problem: Pressure Injury - Risk of 
Goal: *Prevention of pressure injury Document Joni Scale and appropriate interventions in the flowsheet. Outcome: Progressing Towards Goal 
Pressure Injury Interventions: 
  
 
Moisture Interventions: Maintain skin hydration (lotion/cream) Activity Interventions: Increase time out of bed Mobility Interventions: Pressure redistribution bed/mattress (bed type) Nutrition Interventions: Document food/fluid/supplement intake

## 2018-11-07 NOTE — HOSPICE
Spoke with spouse Isadora Mann and coordinated equipment delivery for Sunday 3:30-6:30p. Equipment needed Hospital bed, BSC, OBT, and FILIBERTO Mattress. Went to visit patient and she was napping and had a note on the door. Brother and her dad sitting outside the door while she napped spoke with them on the equipment delivery for Sunday and Isadora Mann was aware. They will relay the message to her when she wakes up. Patient is to be discharged on Monday for a  admission with hospice care. If there are any delays on discharge please contact [address] [place] [address] to make them aware. [phone] and ask to speak to polly Liz Equipment : Hospital bed, 1/2 rails, FILIBERTO mattress, BSC, OBT delivery between 3p-6p on Sunday 11/11  Ref Number:  [id number] Spoke with Kamryn Wellington RN

## 2018-11-07 NOTE — PROGRESS NOTES
Pt unclamped PEG tube just before 2000 meds with output of 400ccs (green). She is noting that every time she puts something in her stomach, she is uncomfortable and hurts. Unsure if it's the apple juice or another reason, will switch to water and note any difference.

## 2018-11-07 NOTE — PROGRESS NOTES
[address] [place] [address], [address] [place], [facility] Martha Mallory 
P [phone]  F [phone] Patient: Edwardo Rao Admit Date: 10/16/2018 Hospital Day: 21 Admit Dx: Partial small bowel obstruction Ovarian cancer ([address] Malnutrition ([address] Dehydration SBO (small bowel obstruction) ([address] Subjective:  
Pt slept well last night, but did have to open G-Tube due to pain/fullness feeling and some nausea After unclamping and heating pad applied,and scheduled MS given, she did obtain \"failry good relief' Palliative began lactulose on top of senna and pt was able to have small BM last evening. G-tube draining dark olive colored bile Says she feels she is able to take in less and less without pain now and this concerns her.  remaining at bedside. Objective: Intake/Output Summary (Last 24 hours) at 11/7/2018 1447 Last data filed at 11/7/2018 4285 Gross per 24 hour Intake 705.84 ml Output 725 ml Net -19.16 ml Physical Exam 
/73 (BP 1 Location: Left arm, BP Patient Position: At rest;Sitting)   Pulse 91   Temp 97.7 °F (36.5 °C)   Resp 14   Wt 131 lb 4.8 oz (59.6 kg)   LMP 02/02/2015 (Approximate)   SpO2 98%   BMI 20.26 kg/m² General: A&O this morning sitting up in bed. Remains is good spirits despite prognosis. Cardiac:  Regular, without M/R/G Lungs:  CTA bilat. Improved aeration to left base. Remians non labored without wheezing or rales. Abdomen:  soft, remains mildly firm and and distention unchanged with persistent tenderness to palpation. G-tube bag with olive colored/bile fluid clamped on and off during the day as needed. Extremity: no edema Data Review Lab Results Component Value Date/Time WBC 13.7 (H) 10/25/2018 04:18 AM  
 ABS.  NEUTROPHILS 10.5 (H) 10/25/2018 04:18 AM  
 HGB 9.0 (L) 10/25/2018 04:18 AM  
 HCT 28.5 (L) 10/25/2018 04:18 AM  
 .9 (H) 10/25/2018 04:18 AM  
 MCH 32.5 10/25/2018 04:18 AM  
 PLATELET 281 89/55/4758 04:18 AM  
 
Lab Results Component Value Date/Time Sodium 139 10/30/2018 07:06 AM  
 Potassium 4.4 10/30/2018 07:06 AM  
 Chloride 107 10/30/2018 07:06 AM  
 CO2 23 10/30/2018 07:06 AM  
 Glucose 87 10/30/2018 07:06 AM  
 BUN 20 10/30/2018 07:06 AM  
 Creatinine 0.37 (L) 10/30/2018 07:06 AM  
 Calcium 8.1 (L) 10/30/2018 07:06 AM  
 Albumin 2.0 (L) 10/30/2018 07:06 AM  
 Bilirubin, total 0.3 10/30/2018 07:06 AM  
 AST (SGOT) 13 (L) 10/30/2018 07:06 AM  
 ALT (SGPT) 44 10/30/2018 07:06 AM  
 Alk. phosphatase 136 (H) 10/30/2018 07:06 AM  
 
 
IMAGING: 
US 10/30/2018 FINDINGS: Sonography of the entire abdomen shows no ascites. 
  
IMPRESSION: No ascites KUB 10/18/2018 FINDINGS: There are multiple dilated loops of small bowel throughout the abdomen  
and pelvis is seen on prior CT dated October 15, 2018. NG tube tip and side port  
overlie the stomach. There is no free intraperitoneal gas. Filter overlies the  
expected location of the IVC at the L2 level. There is a vascular stent  
overlying SPECT dislocation of the left common iliac vein. IMPRESSION: Multiple, persistent dilated loops of small bowel throughout the  
abdomen and pelvis. No evidence of perforation. NG tube in appropriate position CT Results (most recent): 
Results from Hospital Encounter encounter on 10/16/18 CT INTRO GASTROSTOMY TUBE PERC Narrative PROCEDURE: Gastrostomy tube placement INDICATION: Decompression, bowel obstruction OPERATING PHYSICIAN: Dilma Jade M.D. 
 
ESTIMATED BLOOD LOSS: None SPECIMENS REMOVED: None FLOURO TIME: None COMPLICATIONS: None immediate. Procedure and findings:  
 
CT dose reduction was achieved through use of a standardized protocol tailored 
for this examination and automatic exposure control for dose modulation.   
 
After obtaining informed consent, patient was brought the angiographic suite and 
 placed supine angiographic table. An 5 [place]e catheter was positioned in the 
stomach under fluoroscopy. Subsequently, the patient was moved to the CT table. Sedation was obtained with intravenous Versed and fentanyl. The upper abdomen 
was prepped and draped in maximal sterile barrier technique. Prophylactic 
antibiotic of Ancef 2 g was administered prior to the intervention and 
discontinued prior to the completion of the procedure. Approximately 600 cc of air was used to insufflate the stomach. The puncture 
site was identified and local anesthesia was achieved with 1% lidocaine 
injection. Antegrade puncture into the stomach was performed. 2 T-tacks were 
delivered and traction was applied to appose the anterior stomach wall against 
the anterior abdomen. A needle was then advanced into the stomach lumen and 
contrast injection was performed to confirm intraluminal location. An Amplatz 
wire was then advanced through the needle and the tract was serially dilated up 
to 18 [place]. A 18 [place]e gastrostomy tube was advanced over the wire into the 
stomach. The balloon was inflated with 8 cc of dilute contrast. Final 
intraluminal position of the gastrostomy tube was confirmed with gentle contrast 
injection under CT. The patient tolerated the procedure well and there were no 
immediate complications. A dry sterile dressing was applied. Impression IMPRESSION:  
 
Successful placement of 25 [place] gastrostomy tube under CT guidance. Assessment/Plan:  
Admitted 10/16/2018 for Partial small bowel obstruction. G-tube place 10/24/18. Active Hospital Problems Diagnosis Date Noted  Anemia of chronic disease 11/07/2018  Malignant cachexia ([address]  End of life care 10/29/2018  Drowsiness  Goals of care, counseling/discussion  Dehydration 10/16/2018  Malnutrition ([address] 10/16/2018  
 SBO (small bowel obstruction) ([address] 10/16/2018  Severe protein-calorie malnutrition ([facility] 10/11/2018  Ovarian cancer ([facility] 10/10/2018  Nausea 10/10/2018  Epigastric pain  Pain of metastatic malignancy 08/09/2018  Anemia due to chemotherapy 07/26/2018  Peritoneal carcinomatosis ([facility] 06/01/2018 Speedy Solis is a 48 y.o. female with ovarian cancer admitted with carcinomatosis, SBO, protein/calorie malnutrition d/t chronic disease and obstruction with N/V and cancer related pain. Onc: Had been receiving Doxil/Avastin s/p 3 cycles last 10/4/18. This will now be stopped due to progression of disease, continued SBO and need for G-Tube with increasing debility Partial SBO with flatus. BM 10/30. Palliative G-tube placed 10/24 and relieving nausea as needed. Unlamping for 1 hour Q8h or as needed for nausea. Heme/CV: Hemodynamically stable by VS. No further lab draws at this time FEN/GI: Partial SBO with delayed gastric emptying (by endoscopy 10/12/18) with malnutrition. Stopped TPN and Lipids and pt taking small, frequent eating periods to evaluate how she tolerates. Continue Reglan, but will change to pill and place in apple sauce. Continue PPI/H2. Ascites:Improved after drainage 10/18. No new ascites on US 10/30. Neuro: Remaining more alert. Pain/antidpression regimen per palliative care. Palliative Medicine monitoring this closely. PPX: lovenox. Holding home xarelto. Disposition: Stable partial obstructive at this point without meaningful resolution, difficult course with her disease these last few months. G-tube for palliation in place on 10/24 and currently effective when needed Long family meeting (10/30) with goals of care, plan of care going forward reviewed and rational for not being able to proceed with chemotherapy. Continued need for pain management improvement and abdominal discomfort/nausea. Pt remains DNR.  
Per pt's request, will have Nutrition consult see pt for suggestions of eating options with increasing abd fullness. Will continue regular, soft diet with her able to have greater selection of food choices. Smoothies as tolerated. Will have massage therapist visit pt as well today. Will continue regular, soft diet with her able to have greater selection of food choices.   
 
 
Richa Walls, NP

## 2018-11-07 NOTE — PROGRESS NOTES
0 Pt is awoken by pain across the top of her stomach. It feels dull, constant and crampy. Scheduled Morphine given with heating pad. 
 
0724 Scheduled Morphine given again. Pt is finding relief with unclamped PEG tube and applying heat.

## 2018-11-07 NOTE — PROGRESS NOTES
Bedside shift change report given to Marisa Perez, RN and Nikos Guaman RN (oncoming nurse) by Indu Fabian RN (offgoing nurse). Report included the following information SBAR.

## 2018-11-08 NOTE — PROGRESS NOTES
Bedside shift change report given to Marv Medina RN and Naun Cortes RN (oncoming nurse) by Elliot Luna RN (offgoing nurse). Report included the following information SBAR.

## 2018-11-08 NOTE — PROGRESS NOTES
Bedside and Verbal shift change report given to Santos Lopez RN (oncoming nurse) by Zuri Jones RN (offgoing nurse). Report included the following information SBAR, Kardex, Procedure Summary, Intake/Output, MAR, Accordion and Recent Results.

## 2018-11-08 NOTE — PROGRESS NOTES
Palliative Medicine Consult Ruperto: [phone] ([phone]) Patient Name: Speedy Solis YOB: 1965 Date of Initial Consult: 10/10/18 Reason for Consult: Cancer related symptom management Requesting Provider: Dr. Lauri Law Primary Care Physician: Jessica Morse MD 
 
 SUMMARY:  
Speedy Solis is a 48 y.o. with recurrent ovarian malignancy on Doxil/Avastin per Dr. Lauri Law, known to outpatient palliative medicine. She was admitted 10/10/2018 from home due to worsening nausea and vomiting followed by acute worsening of epigastric abdominal pain. Last chemotherapy date was 10/4. Current medical issues leading to Palliative Medicine involvement include: cancer related symptoms including pain, nausea, vomiting. S/p venting peg placement on 10/24 by IR. PALLIATIVE DIAGNOSES:  
1. Epigastric abdominal pain 2. Bowel obstruction 3. Chronic neoplasm related lower abdominal pain 4. Nausea with emesis 5. Delayed gastric motility - by EGD PLAN:  
1. Abdominal pain-peritoneal carcinomatosis related. - Generalized pain is well controlled with fentanyl 50 mcg patch and scheduled morphine 10 mg every 4 hours. - No use of prn PO or IV morphine last 48 hrs. - spoke with nursing and pt; no excessive drowsiness noted - In anticipation of severe uncontrolled pain at home in relation to abdominal cramping or obstruction Dr. Balbir Salazar provided a prescription for Actiq 200 mcg to Mr. Olivia Baeza earlier in week to fill. 2.  Constipation - small BM yesterday. No gas or stool today. Taking senna 5ml BID and restarted lactulose this week, 15 ml. Aware of need to keep volume of meds as low as possible. Lactulose isn't easy to take for her but will keep on board throughout weekend to see if ultimately good pattern of BMs can emerge. Dulcolax supp prn. Consider increasing standing Reglan from 5--10 to inc motility. 3. Nausea- improved. Continue Reglan. 4. Neuropathy- chemo induced neuropathy much improved-no longer needs Lyrica or Elavil. 5.  Disposition- d/c to home planned for Monday with  [address] [facility].   
-Discharge is subject to approval from Dr. Becky Ovalles team. 
 
Fatimah Villalta of care with: Palliative IDT 
 
 
 GOALS OF CARE / TREATMENT PREFERENCES:  
 
GOALS OF CARE: DNR Patient/Health Care Proxy Stated Goals: Comfort TREATMENT PREFERENCES:  
Code Status: DNR Advance Care Planning: 
Advance Care Planning 10/17/2018 Patient's Healthcare Decision Maker is: Legal Next of Kin Primary Decision Maker Name -  
Primary Decision Maker Phone Number -  
Primary Decision Maker Relationship to Patient -  
Confirm Advance Directive None Medical Interventions: Comfort measures Artificially Administered Nutrition: No feeding tube(Venting PEG in place - not for enteral feeding) Other: As far as possible, the palliative care team has discussed with patient / health care proxy about goals of care / treatment preferences for patient. HISTORY:  
 
History obtained from:  Pt, , EMR 
 
CHIEF COMPLAINT: upper abdominal pain HPI/SUBJECTIVE: The patient is:  
[x] Verbal and participatory [] Non-participatory due to:  
 
Doing well today. Had a busy morning - IT working on setting up large screen for her to view son's wedding on Sat. Pain is well controlled on current regimen. Taking morphine q4h is not burdensome. Level of fatigue unchanged and overall improved post scheduled naps. No AE of inc of Fentanyl earlier in week. She denies nausea at present. Enjoying new pureed foods. Clinical Pain Assessment (nonverbal scale for severity on nonverbal patients):  
Clinical Pain Assessment Severity: 2 Location: abd bl upper quads, LLQ Character: crampy Duration: weeks Effect: functional 
Factors: none in particular Frequency: chronic Duration: for how long has pt been experiencing pain (e.g., 2 days, 1 month, years) Frequency: how often pain is an issue (e.g., several times per day, once every few days, constant) FUNCTIONAL ASSESSMENT:  
 
Palliative Performance Scale (PPS): PPS: 50 PSYCHOSOCIAL/SPIRITUAL SCREENING:  
 
Palliative IDT has assessed this patient for cultural preferences / practices and a referral made as appropriate to needs (Cultural Services, Patient Advocacy, Ethics, etc.) Advance Care Planning: 
Advance Care Planning 10/17/2018 Patient's Healthcare Decision Maker is: Legal Next of Kin Primary Decision Maker Name -  
Primary Decision Maker Phone Number -  
Primary Decision Maker Relationship to Patient -  
Confirm Advance Directive None Any spiritual / Hinduism concerns: 
[] Yes /  [x] No 
 
Caregiver Burnout: 
[] Yes /  [x] No /  [] No Caregiver Present Anticipatory grief assessment:  
[] Normal  / [] Maladaptive   Did not assess ESAS Anxiety: Anxiety: 3 ESAS Depression: Depression: 3 REVIEW OF SYSTEMS:  
 
Positive and pertinent negative findings in ROS are noted above in HPI. The following systems were [x] reviewed / [] unable to be reviewed as noted in HPI Other findings are noted below. Systems: constitutional, ears/nose/mouth/throat, respiratory, gastrointestinal, genitourinary, musculoskeletal, integumentary, neurologic, psychiatric, endocrine. Positive findings noted below. Modified ESAS Completed by: provider Fatigue: 9 Drowsiness: 3 Depression: 3 Pain: 2 Anxiety: 3 Nausea: 0 Anorexia: 9 Dyspnea: 0 Constipation: Yes Stool Occurrence(s): 1 PHYSICAL EXAM:  
 
From RN flowsheet: 
Wt Readings from Last 3 Encounters:  
10/16/18 58 kg (127 lb 12.8 oz) 10/04/18 58.2 kg (128 lb 6.4 oz) 09/27/18 57.7 kg (127 lb 1.6 oz) Blood pressure 102/68, pulse 98, temperature 98.1 °F (36.7 °C), resp.  rate 14, weight 59.6 kg (131 lb 4.8 oz), last menstrual period 02/02/2015, SpO2 98 %, unknown if currently breastfeeding. Pain Scale 1: Numeric (0 - 10) Pain Intensity 1: 1 Pain Onset 1: chronic Pain Location 1: Abdomen Pain Orientation 1: Anterior Pain Description 1: Aching Pain Intervention(s) 1: Medication (see MAR) Last bowel movement, if known:  
 
Constitutional: chronically ill appearing but generally comfortable Chest:  resp unlabored Neurologic: following commands, moving all extremities Psychiatric: full affect, no hallucinations HISTORY:  
 
Active Problems: 
  Peritoneal carcinomatosis ([address] (6/1/2018) Anemia due to chemotherapy (7/26/2018) Pain of metastatic malignancy (8/9/2018) Ovarian cancer ([address] (10/10/2018) Nausea (10/10/2018) Epigastric pain () Severe protein-calorie malnutrition ([address] (10/11/2018) Dehydration (10/16/2018) Malnutrition ([address] (10/16/2018) SBO (small bowel obstruction) ([address] (10/16/2018) End of life care (10/29/2018) Drowsiness () Goals of care, counseling/discussion () Malignant cachexia (HCC) () Anemia of chronic disease (11/7/2018) Past Medical History:  
Diagnosis Date  Anxiety  BRCA1 positive  Calculus of kidney 1/13  
 right  Hernia, inguinal, left 2012  MS (multiple sclerosis) ([address] 1996  Nausea & vomiting  Ovarian cancer ([address] 3/2015  
 high grade, stage 3C papillary serous  Thromboembolus ([address] 8/2007  
 lower abdomen post fall Past Surgical History:  
Procedure Laterality Date  HX GYN  2009  
 endometrial ablation with punctured uterus  HX OTHER SURGICAL  8/2007  
 green filter  HX MARIEL AND BSO  3/2014  
 ovarian cancer Family History Problem Relation Age of Onset  Cancer Paternal Grandmother   
     breast  
 Breast Cancer Paternal Grandmother  Coronary Artery Disease Mother 48 CABG and PCI  Heart Surgery Father valve replacement  No Known Problems Brother  No Known Problems Brother  No Known Problems Brother  No Known Problems Daughter  No Known Problems Son  No Known Problems Son  No Known Problems Son History reviewed, no pertinent family history. Social History Tobacco Use  Smoking status: Never Smoker  Smokeless tobacco: Never Used Substance Use Topics  Alcohol use: No  
 
Allergies Allergen Reactions  Pcn [Penicillins] Rash Current Facility-Administered Medications Medication Dose Route Frequency  sennosides (SENOKOT) 8.8 mg/5 mL syrup 8.8 mg  5 mL Oral BID  lactulose (CHRONULAC) 10 gram/15 mL solution 10 g  10 g Oral DAILY  metoclopramide HCl (REGLAN) tablet 5 mg  5 mg Oral AC&HS  
 OLANZapine (ZyPREXA zydis) disintegrating tablet 5 mg  5 mg Oral QHS  fentaNYL (DURAGESIC) 50 mcg/hr patch 1 Patch  1 Patch TransDERmal Q72H  LORazepam (ATIVAN) tablet 0.5 mg  0.5 mg Oral QHS  polyethylene glycol (MIRALAX) packet 17 g  17 g Oral DAILY PRN  
 sodium chloride (NS) flush 20 mL  20 mL InterCATHeter PRN  
 sodium chloride (NS) flush 10 mL  10 mL InterCATHeter PRN  
 sodium chloride (NS) flush 10 mL  10 mL InterCATHeter Q8H  
 alteplase (CATHFLO) 1 mg in sterile water (preservative free) 1 mL injection  1 mg InterCATHeter PRN  
 LORazepam (ATIVAN) tablet 0.5 mg  0.5 mg Oral Q8H PRN  
 morphine (ROXANOL) 100 mg/5 mL (20 mg/mL) concentrated solution 10 mg  10 mg Oral Q4H  
 morphine (ROXANOL) 100 mg/5 mL (20 mg/mL) concentrated solution 10 mg  10 mg Oral Q1H PRN  
 morphine injection 4 mg  4 mg IntraVENous Q1H PRN  
 enoxaparin (LOVENOX) injection 40 mg  40 mg SubCUTAneous Q24H  
 glycopyrrolate (ROBINUL) injection 0.2 mg  0.2 mg IntraVENous Q4H PRN  
 haloperidol lactate (HALDOL) injection 1 mg  1 mg IntraVENous Q4H PRN  
 aspirin-acetaminophen-caffeine (EXCEDRIN ES) per tablet 1-2 Tab   (Patient Supplied)  1-2 Tab Oral Q6H PRN  
  pantoprazole (PROTONIX) 40 mg in sodium chloride 0.9% 10 mL injection  40 mg IntraVENous Q24H  
 0.9% sodium chloride infusion  50 mL/hr IntraVENous CONTINUOUS  
 sodium chloride (NS) flush 5-10 mL  5-10 mL IntraVENous PRN  
 
 
 
 LAB AND IMAGING FINDINGS:  
 
Lab Results Component Value Date/Time WBC 13.7 (H) 10/25/2018 04:18 AM  
 HGB 9.0 (L) 10/25/2018 04:18 AM  
 PLATELET 102 02/07/9305 04:18 AM  
 
Lab Results Component Value Date/Time Sodium 139 10/30/2018 07:06 AM  
 Potassium 4.4 10/30/2018 07:06 AM  
 Chloride 107 10/30/2018 07:06 AM  
 CO2 23 10/30/2018 07:06 AM  
 BUN 20 10/30/2018 07:06 AM  
 Creatinine 0.37 (L) 10/30/2018 07:06 AM  
 Calcium 8.1 (L) 10/30/2018 07:06 AM  
 Magnesium 1.9 10/30/2018 07:06 AM  
 Phosphorus 3.3 10/30/2018 07:06 AM  
  
Lab Results Component Value Date/Time AST (SGOT) 13 (L) 10/30/2018 07:06 AM  
 Alk. phosphatase 136 (H) 10/30/2018 07:06 AM  
 Protein, total 6.0 (L) 10/30/2018 07:06 AM  
 Albumin 2.0 (L) 10/30/2018 07:06 AM  
 Globulin 4.0 10/30/2018 07:06 AM  
 
Lab Results Component Value Date/Time INR 1.0 07/19/2018 11:54 AM  
 Prothrombin time 10.7 07/19/2018 11:54 AM  
  
No results found for: IRON, FE, TIBC, IBCT, PSAT, FERR No results found for: PH, PCO2, PO2 No components found for: [place] Lab Results Component Value Date/Time CK 72 01/09/2018 11:09 AM  
 CK - MB <1.0 01/09/2018 11:09 AM  
  
 
 
   
 
Total time:   
Counseling / coordination time, spent as noted above:  
> 50% counseling / coordination?:   
 
Prolonged service was provided for  []30 min   []75 min in face to face time in the presence of the patient, spent as noted above. Time Start:  
Time End:  
Note: this can only be billed with 46257 (initial) or 65973 (follow up). If multiple start / stop times, list each separately.

## 2018-11-08 NOTE — PROGRESS NOTES
[address], [address] [facility], [address] [place] 
P [phone]  F [phone] Patient: Ozzy Abel Admit Date: 10/16/2018 Hospital Day: 25 Admit Dx: Partial small bowel obstruction Ovarian cancer ([address] Malnutrition ([address] Dehydration SBO (small bowel obstruction) ([address] Subjective:  
Pt slept well again last night, but did have to open G-Tube again due to pain/fullness feeling and some nausea After unclamping, she had relief G-tube continues draining dark olive colored bile She appreciated nutrition input and was able to take several small drinks of a smoothly last evening.  remaining at bedside. Father there as well. Objective: Intake/Output Summary (Last 24 hours) at 11/8/2018 4046 Last data filed at 11/8/2018 0710 Gross per 24 hour Intake 985.84 ml Output 1125 ml Net -139.16 ml Physical Exam 
/68 (BP 1 Location: Left arm, BP Patient Position: Sitting)   Pulse 98   Temp 98.1 °F (36.7 °C)   Resp 14   Wt 131 lb 4.8 oz (59.6 kg)   LMP 02/02/2015 (Approximate)   SpO2 98%   BMI 20.26 kg/m² General: A&O this morning and again sitting up in bed. Remains is good spirits despite prognosis. Cardiac:  Regular, without M/R/G Lungs:  CTA bilat. . Remians non labored without wheezing or rales. Abdomen:  soft, remains mildly firm and and distention unchanged with persistent tenderness to palpation. G-tube bag with olive colored/bile fluid clamped on and off during the day as needed. Extremity: no edema Data Review Lab Results Component Value Date/Time WBC 13.7 (H) 10/25/2018 04:18 AM  
 ABS. NEUTROPHILS 10.5 (H) 10/25/2018 04:18 AM  
 HGB 9.0 (L) 10/25/2018 04:18 AM  
 HCT 28.5 (L) 10/25/2018 04:18 AM  
 .9 (H) 10/25/2018 04:18 AM  
 MCH 32.5 10/25/2018 04:18 AM  
 PLATELET 697 03/98/0105 04:18 AM  
 
Lab Results Component Value Date/Time Sodium 139 10/30/2018 07:06 AM  
 Potassium 4.4 10/30/2018 07:06 AM  
 Chloride 107 10/30/2018 07:06 AM  
 CO2 23 10/30/2018 07:06 AM  
 Glucose 87 10/30/2018 07:06 AM  
 BUN 20 10/30/2018 07:06 AM  
 Creatinine 0.37 (L) 10/30/2018 07:06 AM  
 Calcium 8.1 (L) 10/30/2018 07:06 AM  
 Albumin 2.0 (L) 10/30/2018 07:06 AM  
 Bilirubin, total 0.3 10/30/2018 07:06 AM  
 AST (SGOT) 13 (L) 10/30/2018 07:06 AM  
 ALT (SGPT) 44 10/30/2018 07:06 AM  
 Alk. phosphatase 136 (H) 10/30/2018 07:06 AM  
 
 
IMAGING: 
US 10/30/2018 FINDINGS: Sonography of the entire abdomen shows no ascites. 
  
IMPRESSION: No ascites KUB 10/18/2018 FINDINGS: There are multiple dilated loops of small bowel throughout the abdomen  
and pelvis is seen on prior CT dated October 15, 2018. NG tube tip and side port  
overlie the stomach. There is no free intraperitoneal gas. Filter overlies the  
expected location of the IVC at the L2 level. There is a vascular stent  
overlying SPECT dislocation of the left common iliac vein. IMPRESSION: Multiple, persistent dilated loops of small bowel throughout the  
abdomen and pelvis. No evidence of perforation. NG tube in appropriate position CT Results (most recent): 
Results from Hospital Encounter encounter on 10/16/18 CT INTRO GASTROSTOMY TUBE PERC Narrative PROCEDURE: Gastrostomy tube placement INDICATION: Decompression, bowel obstruction OPERATING PHYSICIAN: Sofi Vega M.D. 
 
ESTIMATED BLOOD LOSS: None SPECIMENS REMOVED: None FLOURO TIME: None COMPLICATIONS: None immediate. Procedure and findings:  
 
CT dose reduction was achieved through use of a standardized protocol tailored 
for this examination and automatic exposure control for dose modulation. After obtaining informed consent, patient was brought the angiographic suite and 
placed supine angiographic table.  An 5 [place] catheter was positioned in the 
 stomach under fluoroscopy. Subsequently, the patient was moved to the CT table. Sedation was obtained with intravenous Versed and fentanyl. The upper abdomen 
was prepped and draped in maximal sterile barrier technique. Prophylactic 
antibiotic of Ancef 2 g was administered prior to the intervention and 
discontinued prior to the completion of the procedure. Approximately 600 cc of air was used to insufflate the stomach. The puncture 
site was identified and local anesthesia was achieved with 1% lidocaine 
injection. Antegrade puncture into the stomach was performed. 2 T-tacks were 
delivered and traction was applied to appose the anterior stomach wall against 
the anterior abdomen. A needle was then advanced into the stomach lumen and 
contrast injection was performed to confirm intraluminal location. An Amplatz 
wire was then advanced through the needle and the tract was serially dilated up 
to 18 [place]. A 18 Citizen of the [place] gastrostomy tube was advanced over the wire into the 
stomach. The balloon was inflated with 8 cc of dilute contrast. Final 
intraluminal position of the gastrostomy tube was confirmed with gentle contrast 
injection under CT. The patient tolerated the procedure well and there were no 
immediate complications. A dry sterile dressing was applied. Impression IMPRESSION:  
 
Successful placement of 25 Citizen of the [place] gastrostomy tube under CT guidance. Assessment/Plan:  
Admitted 10/16/2018 for Partial small bowel obstruction. G-tube place 10/24/18. Active Hospital Problems Diagnosis Date Noted  Anemia of chronic disease 11/07/2018  Malignant cachexia ([address]  End of life care 10/29/2018  Drowsiness  Goals of care, counseling/discussion  Dehydration 10/16/2018  Malnutrition ([address] 10/16/2018  
 SBO (small bowel obstruction) ([address] 10/16/2018  Severe protein-calorie malnutrition ([address] 10/11/2018  Ovarian cancer ([address] 10/10/2018  Nausea 10/10/2018  Epigastric pain  Pain of metastatic malignancy 08/09/2018  Anemia due to chemotherapy 07/26/2018  Peritoneal carcinomatosis ([facility] 06/01/2018 Felecia Lord is a 48 y.o. female with ovarian cancer admitted with carcinomatosis, SBO, protein/calorie malnutrition d/t chronic disease and obstruction with N/V and cancer related pain. Onc: Had been receiving Doxil/Avastin s/p 3 cycles last 10/4/18. This will now be stopped due to progression of disease, continued SBO and need for G-Tube with increasing debility Partial SBO with flatus. BM 10/30. Palliative G-tube placed 10/24 and relieving nausea as needed. Unlamping for 1 hour Q8h or as needed for nausea. Heme/CV: Hemodynamically stable by VS. No further lab draws at this time FEN/GI: Partial SBO with delayed gastric emptying (by endoscopy 10/12/18) with malnutrition. Stopped TPN and Lipids and pt taking small, frequent eating periods to evaluate how she tolerates. Continue Reglan, but will change to pill and place in apple sauce. Continue PPI/H2. Ascites:Improved after drainage 10/18. No new ascites on US 10/30. Neuro: Remaining more alert. Pain/antidpression regimen per palliative care. Palliative Medicine monitoring this closely. PPX: lovenox. Holding home xarelto. Disposition: Stable partial obstructive at this point without meaningful resolution, difficult course with her disease these last few months. G-tube for palliation in place on 10/24 and currently effective when needed Long family meeting (10/30) with goals of care, plan of care going forward reviewed and rational for not being able to proceed with chemotherapy. Continued need for pain management improvement and abdominal discomfort/nausea. Pt remains DNR. Will continue regular, soft diet with her able to have greater selection of food choices. Smoothies as tolerated. Will have massage therapist visit pt as well today (she was unable to see pt yesterday). Will continue regular, soft diet with her able to have greater selection of food choices. Mobilize as tolerated. Pain/symptom management per Palliative.   
 
Ham Ho NP

## 2018-11-08 NOTE — WOUND CARE
WOCN Note Second attempt to see for follow up. Family requested I return later because she is sleeping. Ordered P500 on [employer] frame from [employer]. Ref# [id number]. Parvin ELDER RN [facility] Wound Care Office [phone] Pager [phone]

## 2018-11-09 NOTE — PROGRESS NOTES
[address], [address] [facility], [address] 
P [phone]  F [phone] Patient: Felecia Lord Admit Date: 10/16/2018 Hospital Day: 25 Admit Dx: Partial small bowel obstruction Ovarian cancer ([address] Malnutrition ([address] Dehydration SBO (small bowel obstruction) ([address] Subjective:  
Pt says she did not sleep well last night. She has been getting pictures form people arriving for the wedding. She says she is happy to get them, but she tears up when looking at them. She says she misses Carlos He () Mother and Father are staying with pt. Currently denies nausea. G-tube is clamped at present. Objective: Intake/Output Summary (Last 24 hours) at 11/9/2018 8640 Last data filed at 11/9/2018 3289 Gross per 24 hour Intake 1426.67 ml Output 160 ml Net 1266.67 ml Physical Exam 
/68 (BP 1 Location: Left arm, BP Patient Position: Sitting)   Pulse 98   Temp 98.1 °F (36.7 °C)   Resp 14   Wt 131 lb 4.8 oz (59.6 kg)   LMP 02/02/2015 (Approximate)   SpO2 98%   BMI 20.26 kg/m² General: A&O this morning and again sitting up in bed. Appears sad/de[ressed. Cardiac:  Regular, without M/R/G Lungs:  CTA bilat. . Remians non labored without wheezing or rales. Abdomen:  soft, remains mildly firm and and distention unchanged with persistent tenderness to palpation. G-tube bag with olive colored/bile fluid clamped on and off during the day as needed. Extremity: no edema Data Review Lab Results Component Value Date/Time WBC 13.7 (H) 10/25/2018 04:18 AM  
 ABS. NEUTROPHILS 10.5 (H) 10/25/2018 04:18 AM  
 HGB 9.0 (L) 10/25/2018 04:18 AM  
 HCT 28.5 (L) 10/25/2018 04:18 AM  
 .9 (H) 10/25/2018 04:18 AM  
 MCH 32.5 10/25/2018 04:18 AM  
 PLATELET 658 67/85/0682 04:18 AM  
 
Lab Results Component Value Date/Time  Sodium 139 10/30/2018 07:06 AM  
 Potassium 4.4 10/30/2018 07:06 AM  
 Chloride 107 10/30/2018 07:06 AM  
 CO2 23 10/30/2018 07:06 AM  
 Glucose 87 10/30/2018 07:06 AM  
 BUN 20 10/30/2018 07:06 AM  
 Creatinine 0.37 (L) 10/30/2018 07:06 AM  
 Calcium 8.1 (L) 10/30/2018 07:06 AM  
 Albumin 2.0 (L) 10/30/2018 07:06 AM  
 Bilirubin, total 0.3 10/30/2018 07:06 AM  
 AST (SGOT) 13 (L) 10/30/2018 07:06 AM  
 ALT (SGPT) 44 10/30/2018 07:06 AM  
 Alk. phosphatase 136 (H) 10/30/2018 07:06 AM  
 
 
IMAGING: 
US 10/30/2018 FINDINGS: Sonography of the entire abdomen shows no ascites. 
  
IMPRESSION: No ascites KUB 10/18/2018 FINDINGS: There are multiple dilated loops of small bowel throughout the abdomen  
and pelvis is seen on prior CT dated October 15, 2018. NG tube tip and side port  
overlie the stomach. There is no free intraperitoneal gas. Filter overlies the  
expected location of the IVC at the L2 level. There is a vascular stent  
overlying SPECT dislocation of the left common iliac vein. IMPRESSION: Multiple, persistent dilated loops of small bowel throughout the  
abdomen and pelvis. No evidence of perforation. NG tube in appropriate position CT Results (most recent): 
Results from Hospital Encounter encounter on 10/16/18 CT INTRO GASTROSTOMY TUBE PERC Narrative PROCEDURE: Gastrostomy tube placement INDICATION: Decompression, bowel obstruction OPERATING PHYSICIAN: Soren Michelle M.D. 
 
ESTIMATED BLOOD LOSS: None SPECIMENS REMOVED: None FLOURO TIME: None COMPLICATIONS: None immediate. Procedure and findings:  
 
CT dose reduction was achieved through use of a standardized protocol tailored 
for this examination and automatic exposure control for dose modulation. After obtaining informed consent, patient was brought the angiographic suite and 
placed supine angiographic table. An 5 [place] catheter was positioned in the 
stomach under fluoroscopy. Subsequently, the patient was moved to the CT table. Sedation was obtained with intravenous Versed and fentanyl. The upper abdomen 
was prepped and draped in maximal sterile barrier technique. Prophylactic 
antibiotic of Ancef 2 g was administered prior to the intervention and 
discontinued prior to the completion of the procedure. Approximately 600 cc of air was used to insufflate the stomach. The puncture 
site was identified and local anesthesia was achieved with 1% lidocaine 
injection. Antegrade puncture into the stomach was performed. 2 T-tacks were 
delivered and traction was applied to appose the anterior stomach wall against 
the anterior abdomen. A needle was then advanced into the stomach lumen and 
contrast injection was performed to confirm intraluminal location. An Amplatz 
wire was then advanced through the needle and the tract was serially dilated up 
to 18 [place]. A 18 [place] gastrostomy tube was advanced over the wire into the 
stomach. The balloon was inflated with 8 cc of dilute contrast. Final 
intraluminal position of the gastrostomy tube was confirmed with gentle contrast 
injection under CT. The patient tolerated the procedure well and there were no 
immediate complications. A dry sterile dressing was applied. Impression IMPRESSION:  
 
Successful placement of 25 [place] gastrostomy tube under CT guidance. Assessment/Plan:  
Admitted 10/16/2018 for Partial small bowel obstruction. G-tube place 10/24/18. Active Hospital Problems Diagnosis Date Noted  Anemia of chronic disease 11/07/2018  Malignant cachexia ([address]  End of life care 10/29/2018  Drowsiness  Goals of care, counseling/discussion  Dehydration 10/16/2018  Malnutrition ([address] 10/16/2018  
 SBO (small bowel obstruction) ([address] 10/16/2018  Severe protein-calorie malnutrition ([address] 10/11/2018  Ovarian cancer ([address] 10/10/2018  Nausea 10/10/2018  Epigastric pain  Pain of metastatic malignancy 08/09/2018  Anemia due to chemotherapy 07/26/2018  Peritoneal carcinomatosis ([facility] 06/01/2018 Michael Delgado is a 48 y.o. female with ovarian cancer admitted with carcinomatosis, SBO, protein/calorie malnutrition d/t chronic disease and obstruction with N/V and cancer related pain. Onc: Had been receiving Doxil/Avastin s/p 3 cycles last 10/4/18. This will now be stopped due to progression of disease, continued SBO and need for G-Tube with increasing debility Partial SBO with flatus. BM 10/30. Palliative G-tube placed 10/24 and relieving nausea as needed. Unlamping for 1 hour Q8h or as needed for nausea. Heme/CV: Hemodynamically stable by VS. No further lab draws at this time FEN/GI: Partial SBO with delayed gastric emptying (by endoscopy 10/12/18) with malnutrition. Stopped TPN and Lipids and pt taking small, frequent eating periods to evaluate how she tolerates. Continue Reglan, but will change to pill and place in apple sauce. Continue PPI/H2. Ascites:Improved after drainage 10/18. No new ascites on US 10/30. Neuro: Remaining more alert. Pain/antidpression regimen per palliative care. Palliative Medicine monitoring this closely. PPX: lovenox. Holding home xarelto. Disposition: Stable partial obstructive at this point without meaningful resolution, difficult course with her disease these last few months. G-tube for palliation in place on 10/24 and currently effective when needed Long family meeting (10/30) with goals of care, plan of care going forward reviewed and rational for not being able to proceed with chemotherapy. Continued need for pain management improvement and abdominal discomfort/nausea. Pt remains DNR. Will continue regular, soft diet with her able to have greater selection of food choices. Smoothies as tolerated. Will have music therapist visit pt as well today. Will continue regular, soft diet with her able to have greater selection of food choices. Mobilize as tolerated. Pain/symptom management per Palliative.   
 
Kisha Ochoa, NP

## 2018-11-09 NOTE — WOUND CARE
WOCN Note:  
  
Follow up sacral assessment. 
  
Chart reviewed. Admitted DX:  Partial small bowel obstruction Ovarian cancer ([facility] Malnutrition ([address] Dehydration SBO (small bowel obstruction) ([facility]) 
  
Assessment:  
Patient is A&O x 3, communicative, continent and mobile Bed: p500 air mattress on [employer] Patient reports no pain. 
  
1. Sacrum, partial thickness wound from MASD = 0.5 x 0.5 x 0.1 cm  100% blanching pink. no exudate, odor. Cleansed and applied [place]. Recommendations:   
Sacrum:  Every 3 days cleanse and apply [place]. Cover with mepilex border. 
  
Skin Care & Pressure Prevention: 
Minimize layers of linen/pads under patient to optimize support surface. Turn/reposition approximately every 2 hours and offload heels. 
  
Discussed above plan with patient and RN. 
  
Transition of Care: Plan to follow weekly and as needed while admitted to hospital. 
  
JERROD Arzate RN [facility], [facility]. Wound Care Office [phone] Pager [phone]

## 2018-11-09 NOTE — PROGRESS NOTES
Music Therapy Assessment Felecia Lord [id number]  [id number]   
1965  48 y.o.  female Patient Telephone Number: [phone] (home) Christian Affiliation: Jew Language: [place] Extended Emergency Contact Information Primary Emergency Contact: Jose Payan Address: [address] Home Phone: [phone] Mobile Phone: [phone] Relation: Spouse Patient Active Problem List  
 Diagnosis Date Noted  Anemia of chronic disease 11/07/2018  Malignant cachexia ([address]  End of life care 10/29/2018  Drowsiness  Goals of care, counseling/discussion  Dehydration 10/16/2018  Malnutrition ([address] 10/16/2018  
 SBO (small bowel obstruction) ([address] 10/16/2018  Gastroparesis  Severe protein-calorie malnutrition ([address] 10/11/2018  Ovarian cancer ([address] 10/10/2018  Abdominal pain 10/10/2018  Nausea 10/10/2018  Epigastric pain  Xerostomia  Pain of metastatic malignancy 08/09/2018  Other proteinuria 07/26/2018  Anemia due to chemotherapy 07/26/2018  On anticoagulant therapy 07/19/2018  Peritoneal carcinomatosis ([address] 06/01/2018  Malignant ascites 06/01/2018  Thrombocytopenia ([address] 03/17/2017  
 CINV (chemotherapy-induced nausea and vomiting) 03/17/2017  Malignant neoplasm of both ovaries ([address] 04/09/2015 Date: 11/9/2018 Mental Status:   [x] Alert [  ] Percell Bullion [  ]  Confused  [  ] Minimally responsive Communication Status: [  ] Impaired Speech [  ] Nonverbal -N/A Physical Status:   [  ] Oxygen in use  [  ] Hard of Hearing [  ] Vision Impaired [  ] Ambulatory  [  ] Ambulatory with assistance [  ] Non-ambulatory -N/A Music Preferences, Background: Traditional Hymns, Contemporary Kay Caballero, songs from [facility]. Clinical Problem to be addressed: Improve mood, support self-expression Goal(s) met in session: N/A: Please see Session Observations below. Physical/Pain management (Scale of 1-10): Pre-session rating ___________    Post-session rating __________  
[  ] Increased relaxation   [  ] Regulated breathing patterns [  ] Decreased muscle tension   [  ] Minimized physical distress Emotional/Psychological: 
[  ] Increased self-expression   [  ] Decreased aggressive behavior [  ] Decreased sadness   [  ] Discussed healthy coping skills [  ] Improved mood    [  ] Decreased withdrawn behavior Social: 
[  ] Decreased feelings of isolation/loneliness [  ] Positive social interaction [  ] Provided support and/or comfort for family/friends Spiritual: 
[  ] Spiritual support    [  ] Expressed peace [  ] Expressed pavel    [  ] Discussed beliefs Techniques Utilized (Check all that apply): N/A: Please see Session Observations below. [  ] Procedural support MT [  ] Music for relaxation [  ] Patient preferred music 
[  ] Jocelyn analysis  [  ] Song choice  [  ] Music for validation [  ] Entrainment  [  ] Progressive muscle relax. [  ] Guided visualization [  ] Micaela Braden  [  ] Patient instrument playing [  ] [email or web address][employer][email or web address] Senzari writing [  ] Juan J Lopes along   [  ] Jimena   [  ] Sensory stimulation [  ] Active Listening  [  ] Music for spiritual support [  ] Making of CDs as gifts Session Observations:  F/u visit and referral from Chase Larson NP. Patient (pt) was alert with sad affect sitting in a chair with a friend sitting next to her. This music therapist (MT) asked pt how she was feeling. Pt said she hadn't slept well last night and declined music therapy saying she wanted quiet today. MT expressed understanding. Pt denied any needs at this time. Will follow as able. Ninfa Lundborg, MT-BC (Music Therapist-Board Certified) Spiritual Care Department Referral-based service

## 2018-11-09 NOTE — PROGRESS NOTES
Problem: Falls - Risk of 
Goal: *Absence of Falls Document Aviva Lin Fall Risk and appropriate interventions in the flowsheet. Outcome: Progressing Towards Goal 
Fall Risk Interventions: 
Mobility Interventions: Patient to call before getting OOB Medication Interventions: Patient to call before getting OOB, Teach patient to arise slowly Elimination Interventions: Patient to call for help with toileting needs Problem: Pressure Injury - Risk of 
Goal: *Prevention of pressure injury Document Joni Scale and appropriate interventions in the flowsheet. Outcome: Progressing Towards Goal 
Pressure Injury Interventions: 
  
 
Moisture Interventions: Maintain skin hydration (lotion/cream) Activity Interventions: Increase time out of bed, PT/OT evaluation Mobility Interventions: Pressure redistribution bed/mattress (bed type), PT/OT evaluation Nutrition Interventions: Offer support with meals,snacks and hydration

## 2018-11-09 NOTE — PROGRESS NOTES
Bedside and Verbal shift change report given to HERSON Smith (oncoming nurse) by Malka Aguirre RN (offgoing nurse). Report included the following information SBAR, Kardex, ED Summary, Intake/Output, MAR, Accordion and Recent Results.

## 2018-11-10 NOTE — PROGRESS NOTES
Bedside and Verbal shift change report given to Tasneem Roberts (oncoming nurse) by Eladia Jaffe RN (offgoing nurse). Report included the following information SBAR, Kardex, Intake/Output, MAR and Recent Results.

## 2018-11-10 NOTE — PROGRESS NOTES
[address], [address] [place], [address] 
P [phone]  F [phone] Patient: Marlena Parents Admit Date: 10/16/2018 Hospital Day: 25 Admit Dx: Partial small bowel obstruction Ovarian cancer ([address] Malnutrition ([address] Dehydration SBO (small bowel obstruction) ([address] Subjective:  
Pt reports that she slept much better last night. Her biggest concern this morning is trying to find a better way to clamp her G-tube. She is excited to be able to skype into the wedding today; although yesterday it was hard for her being away from her family. Her mother and father are staying with her. Currently denies nausea. G-tube is unclamped currently because she says the clamp is uncomfortable. Objective: Intake/Output Summary (Last 24 hours) at 11/10/2018 1070 Last data filed at 11/10/2018 0050 Gross per 24 hour Intake 1235.83 ml Output  Net 1235.83 ml Physical Exam 
/76 (BP 1 Location: Right arm, BP Patient Position: At rest;Sitting)   Pulse (!) 109   Temp 98.1 °F (36.7 °C)   Resp 16   Wt 131 lb 4.8 oz (59.6 kg)   LMP 02/02/2015 (Approximate)   SpO2 96%   BMI 20.26 kg/m² General: A&O this morning and again sitting up in bed. Appears sad/de[ressed. Cardiac:  Regular, without M/R/G Lungs:  CTA bilat. . Remians non labored without wheezing or rales. Abdomen:  soft, remains mildly firm and and distention unchanged with persistent tenderness to palpation. G-tube bag with olive colored/bile fluid clamped on and off during the day as needed. Extremity: no edema Data Review Lab Results Component Value Date/Time WBC 13.7 (H) 10/25/2018 04:18 AM  
 ABS. NEUTROPHILS 10.5 (H) 10/25/2018 04:18 AM  
 HGB 9.0 (L) 10/25/2018 04:18 AM  
 HCT 28.5 (L) 10/25/2018 04:18 AM  
 .9 (H) 10/25/2018 04:18 AM  
 MCH 32.5 10/25/2018 04:18 AM  
 PLATELET 394 02/76/2655 04:18 AM  
 
Lab Results Component Value Date/Time Sodium 139 10/30/2018 07:06 AM  
 Potassium 4.4 10/30/2018 07:06 AM  
 Chloride 107 10/30/2018 07:06 AM  
 CO2 23 10/30/2018 07:06 AM  
 Glucose 87 10/30/2018 07:06 AM  
 BUN 20 10/30/2018 07:06 AM  
 Creatinine 0.37 (L) 10/30/2018 07:06 AM  
 Calcium 8.1 (L) 10/30/2018 07:06 AM  
 Albumin 2.0 (L) 10/30/2018 07:06 AM  
 Bilirubin, total 0.3 10/30/2018 07:06 AM  
 AST (SGOT) 13 (L) 10/30/2018 07:06 AM  
 ALT (SGPT) 44 10/30/2018 07:06 AM  
 Alk. phosphatase 136 (H) 10/30/2018 07:06 AM  
 
 
IMAGING: 
US 10/30/2018 FINDINGS: Sonography of the entire abdomen shows no ascites. 
  
IMPRESSION: No ascites KUB 10/18/2018 FINDINGS: There are multiple dilated loops of small bowel throughout the abdomen  
and pelvis is seen on prior CT dated October 15, 2018. NG tube tip and side port  
overlie the stomach. There is no free intraperitoneal gas. Filter overlies the  
expected location of the IVC at the L2 level. There is a vascular stent  
overlying SPECT dislocation of the left common iliac vein. IMPRESSION: Multiple, persistent dilated loops of small bowel throughout the  
abdomen and pelvis. No evidence of perforation. NG tube in appropriate position CT Results (most recent): 
Results from Hospital Encounter encounter on 10/16/18 CT INTRO GASTROSTOMY TUBE PERC Narrative PROCEDURE: Gastrostomy tube placement INDICATION: Decompression, bowel obstruction OPERATING PHYSICIAN: Yordy Leon M.D. 
 
ESTIMATED BLOOD LOSS: None SPECIMENS REMOVED: None FLOURO TIME: None COMPLICATIONS: None immediate. Procedure and findings:  
 
CT dose reduction was achieved through use of a standardized protocol tailored 
for this examination and automatic exposure control for dose modulation. After obtaining informed consent, patient was brought the angiographic suite and 
placed supine angiographic table.  An 5 Spanish catheter was positioned in the 
 stomach under fluoroscopy. Subsequently, the patient was moved to the CT table. Sedation was obtained with intravenous Versed and fentanyl. The upper abdomen 
was prepped and draped in maximal sterile barrier technique. Prophylactic 
antibiotic of Ancef 2 g was administered prior to the intervention and 
discontinued prior to the completion of the procedure. Approximately 600 cc of air was used to insufflate the stomach. The puncture 
site was identified and local anesthesia was achieved with 1% lidocaine 
injection. Antegrade puncture into the stomach was performed. 2 T-tacks were 
delivered and traction was applied to appose the anterior stomach wall against 
the anterior abdomen. A needle was then advanced into the stomach lumen and 
contrast injection was performed to confirm intraluminal location. An Amplatz 
wire was then advanced through the needle and the tract was serially dilated up 
to 18 [place]. A 18 Filipino gastrostomy tube was advanced over the wire into the 
stomach. The balloon was inflated with 8 cc of dilute contrast. Final 
intraluminal position of the gastrostomy tube was confirmed with gentle contrast 
injection under CT. The patient tolerated the procedure well and there were no 
immediate complications. A dry sterile dressing was applied. Impression IMPRESSION:  
 
Successful placement of 25 Filipino gastrostomy tube under CT guidance. Assessment/Plan:  
Admitted 10/16/2018 for Partial small bowel obstruction. G-tube place 10/24/18. Active Hospital Problems Diagnosis Date Noted  Anemia of chronic disease 11/07/2018  Malignant cachexia ([address]  End of life care 10/29/2018  Drowsiness  Goals of care, counseling/discussion  Dehydration 10/16/2018  Malnutrition ([address] 10/16/2018  
 SBO (small bowel obstruction) ([address] 10/16/2018  Severe protein-calorie malnutrition ([address] 10/11/2018  Ovarian cancer ([address] 10/10/2018  Nausea 10/10/2018  Epigastric pain  Pain of metastatic malignancy 08/09/2018  Anemia due to chemotherapy 07/26/2018  Peritoneal carcinomatosis ([facility] 06/01/2018 Celso Mac is a 48 y.o. female with ovarian cancer admitted with carcinomatosis, SBO, protein/calorie malnutrition d/t chronic disease and obstruction with N/V and cancer related pain. Onc: Had been receiving Doxil/Avastin s/p 3 cycles last 10/4/18. This will now be stopped due to progression of disease, continued SBO and need for G-Tube with increasing debility Partial SBO with flatus. BM 10/30. Palliative G-tube placed 10/24 and relieving nausea as needed. Unlamping for 1 hour Q8h or as needed for nausea. Heme/CV: Hemodynamically stable by VS. No further lab draws at this time FEN/GI: Partial SBO with delayed gastric emptying (by endoscopy 10/12/18) with malnutrition. Stopped TPN and Lipids and pt taking small, frequent eating periods to evaluate how she tolerates. Continue Reglan, but will change to pill and place in apple sauce. Continue PPI/H2. Ascites:Improved after drainage 10/18. No new ascites on US 10/30. Neuro: Remaining more alert. Pain/antidpression regimen per palliative care. Palliative Medicine monitoring this closely. PPX: lovenox. Holding home xarelto. Disposition: Stable partial obstructive at this point without meaningful resolution, difficult course with her disease these last few months. G-tube for palliation in place on 10/24 and currently effective when needed Long family meeting (10/30) with goals of care, plan of care going forward reviewed and rational for not being able to proceed with chemotherapy. Continued need for pain management improvement and abdominal discomfort/nausea. Pt remains DNR. Will continue regular, soft diet with her able to have greater selection of food choices. Smoothies as tolerated. Will have music therapist visit pt as well today. Will continue regular, soft diet with her able to have greater selection of food choices. Mobilize as tolerated. Pain/symptom management per Palliative.   
 
Viveca Boast, MD

## 2018-11-10 NOTE — PROGRESS NOTES
Bedside and Verbal shift change report given to Larry Marie (oncoming nurse) by Neil Cabezas (offgoing nurse). Report included the following information SBAR, Kardex, Intake/Output, MAR, Accordion and Recent Results.

## 2018-11-11 NOTE — PROGRESS NOTES
[facility] 
[phone] Inpatient Nursing PRE Admission Patient Name: Jena Enciso YOB: 1965 Age: 48 y.o. Date of PLANNED Hospice Admission: 18 Hospice Attending:Dr. Viridiana Pham Primary Care Physician: Aida Finch MD 
  
Home Hospice Zip Code: [address] Expected  (if patient transferred to [facility]): TBD ADVANCE CARE PLANNING Code Status: DNR Durable DNR: []  Yes  [x]  No 
Advance Care Planning 10/17/2018 Patient's Healthcare Decision Maker is: Legal Next of Kin Primary Decision Maker Name -  
Primary Decision Maker Phone Number -  
Primary Decision Maker Relationship to Patient -  
Confirm Advance Directive None Archana Sepulveda, spouse, [phone] Mandaen: Ingrid Ho  Home: TBD HOSPICE SUMMARY  
ER Visits/ Hospitalizations in past year: 2 including this admission Hospice Diagnosis:Ovarian cancer Onset Date of Hospice Diagnosis:  Summary of Disease Progression Leading to Hospice Diagnosis:  
 
Jena Enciso is a 48 y.o.  postmenopausal female with stage IIIC ovarian cancer. She underwent X-lap, hysterectomy, and tumor debulking in 2015. She was recommended adjuvant chemotherapy with 6 cycles of Taxol and Carboplatin. Her post-treatment PET/CT was negative for disease. Her [facility] results also found her to have a deleterious BRCA 1 mutation. As for the BRCA 1 mutation, we had her see one of our breast surgeons, Dr. Silverio Chen, to talk about options, specifically screening and prophylactic surgery. She is going to hold off on surgery for now. She has decided not to have her daughter tested at that time. 
  
In early  she was found to have recurrence.   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 She also wanted to keep her hair. We also discussed options for down the line, specifically a PARP inhibitor, since she is BRCA positive. She completed 6 cycles of Gemzar/Carboplatin and her CA-125 returned to baseline. Her CT at that time was clear. We discussed maintenance therapy with a PARP inhibitor. She was started on Zejula in late July 2017 at 300 mg daily. Her dose was lowered to 200 mg daily due to gastrointestinal complaints. In August 2017 it was held due to thrombocytopenia. She elected not to restart the Susan Brake, even at a lower dose. She presented in November 2017 to discuss other PARP alternatives. She was started on Lynparza at a one-step dose reduction of 250 mg bid. Due to side effects she was reduced to 200 mg bid on 1/2/18. On 1/15/18 she stopped therapy due to abdominal pain and nausea, and most of all, fatigue.   
  
She presented not too long ago for follow up and reported lower abdominal pain. Her CA-125 was rising, up to 41, when it was previously <4. She had some complaints of lower abdominal pain, in addition to her rising CA-125. Her CT demonstrated recurrent disease. Dr. Lorna Rao discussed with her and her  multiple treatment options. She went to [facility] for a second opinion. They essentially offered her similar treatment options. She was not a candidate for a clinical trial due to her diagnosis of MS. She presented again discuss treatment options.  
  
We ultimately decided on Rubraca. She was only on therapy for about 2 1/2 months. She had been tolerating it very well. Her CA-125 did respond. [facility] when she developed significant ascites, requiring drainage at [address] [place] [address] in [place].   Her CT scan upon return unfortunately demonstrated progression.  
  
She was then recommended a combination of Doxil/Avastin as we felt the Avastin would help with the ascites. She has completed 3 cycles so far. Her CA-125 has been responding. She had a paracentesis on 7/20/18, which drained 1800 cc. She has not required one since, but still has some ascites on imaging. She was hospitalized last week with abdominal pain and some nausea/vomiting. She was seen by GI and had an EGD. There appeared to be some gastric emptying issues. Plain films at that time showed no evidence of obstruction, and she was still having BMs. Her newest CT from yesterday demonstrates proximal small bowel distention with decompressed distal loops, consistent with partial obstruction. There was more free fluid than on the previous scan, but her peritoneal implants appeared to be improved. She was admitted 10/10/2018 from home due to worsening nausea and vomiting followed by acute worsening of epigastric abdominal pain. Last chemotherapy date was 10/4. She has been in the hospital with small bowel obstruction, now has venting G tube and has decided on comfort measures.  
  
Co-Morbidities:  
Patient Active Problem List  
Diagnosis Code  Malignant neoplasm of both ovaries (HCC) C56.1, C56.2  Thrombocytopenia ([address] D69.6  CINV (chemotherapy-induced nausea and vomiting) R11.2, T45.1X5A  Peritoneal carcinomatosis (HCC) C78.6, C80.1  Malignant ascites R18.0  
 On anticoagulant therapy Z79.01  
 Other proteinuria R80.8  Anemia due to chemotherapy D64.81, T45.1X5A  
 Pain of metastatic malignancy G89.3  Ovarian cancer ([address] C56.9  Abdominal pain R10.9  Nausea R11.0  Epigastric pain R10.13  Xerostomia R68.2  Severe protein-calorie malnutrition ([address] E43  Gastroparesis K31.84  
 Dehydration E86.0  Malnutrition ([address] E46  
 SBO (small bowel obstruction) ([address] Q07.562  End of life care Z51.5  Drowsiness R40.0  Goals of care, counseling/discussion Z71.89  
 Malignant cachexia ([address] R64  
  Anemia of chronic disease D63.8 Diagnoses RELATED to the terminal prognosis: peritoneal carcinomatosis, malignant ascites, anemia, abdominal pain, nausea Other Diagnoses: dehydration, malnutrition, gastroparesis, SBO, nausea Rationale for a prognosis of life expectancy of 6 months or less if the disease follows its normal course (Disease Specific History): Stanford Flynn is a 48 y.o. who was [facility] to [facility]. The patient's principle diagnosis of ovarian cancer has resulted in generalized weakness, lack of appetite, increased nausea and vomiting, increased abdominal and back pain, chest pain, failure to thrive. Functionally, the patient's Palliative Performance Scale has declined over a period of three months and is estimated at 30. Objective information that support this patients limited prognosis includes: CT of abdomen and pelvis: 
 
FINDINGS:  
LUNG BASES: There are trace bilateral pleural effusions with underlying 
atelectasis INCIDENTALLY IMAGED HEART AND MEDIASTINUM: Unremarkable. LIVER: No mass or biliary dilatation. GALLBLADDER: Unremarkable. SPLEEN: No mass. PANCREAS: No mass or ductal dilatation. ADRENALS: Unremarkable. KIDNEYS: No mass, calculus, or hydronephrosis. STOMACH: Unremarkable. SMALL BOWEL: Proximal small bowel distention is noted with decompressed loops 
distally. COLON: No dilatation or wall thickening. APPENDIX: Not visualized. PERITONEUM: Moderate free fluid is noted, increased compared to the prior exam. 
Gastrohepatic soft tissue abnormality appears to have improved compared to the 
prior exam. Previously described lesion measuring 3.0 cm now measures 1.2 cm. Soft tissue nodule in the left upper quadrant now measures 16 mm, previously 
measuring 3.3 cm. Soft tissue density in the mesenteric root has improved. RETROPERITONEUM: Left common iliac vein stent is noted. IVC filter projects 
within the infrarenal vena cava. REPRODUCTIVE ORGANS: The uterus and ovaries are surgically absent. URINARY BLADDER: No mass or calculus. BONES: No destructive bone lesion. ADDITIONAL COMMENTS: N/A 
  
IMPRESSION IMPRESSION: 
Proximal small bowel distention with decompressed loops distally is compatible 
with obstruction and likely related to peritoneal disease in the mid abdomen. Overall, peritoneal implants have improved in size. Free fluid has increased in 
the interval. Trace bilateral effusions with underlying atelectasis. Stable partial obstructive at this point without meaningful resolution, difficult course with her disease these last few months. G-tube for palliation in place on 10/24 and currently effective when needed Long family meeting (10/30) with goals of care, plan of care going forward reviewed and rational for not being able to proceed with chemotherapy. Continued need for pain management improvement and abdominal discomfort/nausea. Pt remains DNR. Will continue regular, soft diet with her able to have greater selection of food choices. Smoothies as tolerated. Assessment:  
Patient is A&O x 3, communicative, continent and mobile Bed: total care Patient reports no pain. 
  
1. Sacrum, partial thickness wound = 0.5 x 0.5 x 0.1 cm  100% blanching pink. no exudate, odor. Cleansed and applied Newton. Recommendations:   
Sacrum:  Every 3 days cleanse and apply Newton. 
  
Skin Care & Pressure Prevention: 
Minimize layers of linen/pads under patient to optimize support surface. Turn/reposition approximately every 2 hours and offload heels. The patient/family chose comfort measures with the support of Hospice. Patient meets for Routine LOC as evidenced by patient still alert, oriented, mobile, able to get from chair to bed, still eating some small bites and sips, pain controlled with medication.   
 
Verbal certification of terminal diagnosis with life expectancy of 6 months or less received from: Dr. Sanjeev Ponce Prognosis estimated based on 11/11/18 clinical assessment is:  
[] Few to Many Hours [] Hours to Days  
[] Few to Many Days [x] Days to Weeks  
[] Few to Many Weeks  
[] Weeks to Months  
[] Few to Many Months CLINICAL INFORMATION Allergies: Allergies Allergen Reactions  Pcn [Penicillins] Rash Wt Readings from Last 3 Encounters:  
10/16/18 58 kg (127 lb 12.8 oz) 10/04/18 58.2 kg (128 lb 6.4 oz) 09/27/18 57.7 kg (127 lb 1.6 oz) Ht Readings from Last 3 Encounters:  
10/16/18 5' 7.5\" (1.715 m) 10/04/18 5' 7.52\" (1.715 m)  
09/27/18 5' 7\" (1.702 m) Body mass index is 20.26 kg/m². Visit Vitals /73 (BP 1 Location: Left arm, BP Patient Position: Sitting) Pulse 98 Temp 98.1 °F (36.7 °C) Resp 16 Wt 59.6 kg (131 lb 4.8 oz) SpO2 98% BMI 20.26 kg/m² LAB VALUES No results found for this visit on 10/16/18 (from the past 12 hour(s)). No results found for this visit on 10/16/18 (from the past 6 hour(s)). Lab Results Component Value Date/Time Protein, total 6.0 (L) 10/30/2018 07:06 AM  
 Albumin 2.0 (L) 10/30/2018 07:06 AM  
 
 
Currently this patient has: 
[] Supplemental O2 [] Peripheral IV  [] PICC    [x] PORT  
[] Lai Catheter [] NG Tube   [x] PEG Tube [] Ostomy   
[] AICD: Has ICD been deactivated? [] Yes [] No:______ Progression to DEPENDENCE WITH ADLs (include time frame): today x- Dependent for bathing: personal hygiene and grooming x- Dependent for dressing: dressing and undressing - Dependent for transferring: movement and mobility - Dependent for toileting: continence-related tasks including control and hygiene - Dependent for eating: preparing food and feeding ASSESSMENT & PLAN 1. Symptom Issues Identified: pain in back, abdomen, chest, weak, malnourished, nausea at times, has venting G tube for help with nausea. 2. Spiritual Issues Identified: none at this time 3.  Psych/ Social/ Emotional Issues Identified: none at this time, patient's son was  here at [facility] in the [address] [place] [address] so she could attend but could not go out of town for the destination wedding site. Some depression noted over this but great supportive family, lots of friends continue to visit at hospital.  
 
4.  Care Coordination:  
Transfer to: Home Report given to: RN via this report Transportation by: family Scheduled for: admission for hospice between  Medications Needs:  
 
Current Facility-Administered Medications Medication Dose Route Frequency  sennosides (SENOKOT) 8.8 mg/5 mL syrup 8.8 mg  5 mL Oral BID  lactulose (CHRONULAC) 10 gram/15 mL solution 10 g  10 g Oral DAILY  metoclopramide HCl (REGLAN) tablet 5 mg  5 mg Oral AC&HS  
 OLANZapine (ZyPREXA zydis) disintegrating tablet 5 mg  5 mg Oral QHS  fentaNYL (DURAGESIC) 50 mcg/hr patch 1 Patch  1 Patch TransDERmal Q72H  LORazepam (ATIVAN) tablet 0.5 mg  0.5 mg Oral QHS  polyethylene glycol (MIRALAX) packet 17 g  17 g Oral DAILY PRN  
 sodium chloride (NS) flush 20 mL  20 mL InterCATHeter PRN  
 sodium chloride (NS) flush 10 mL  10 mL InterCATHeter PRN  
 sodium chloride (NS) flush 10 mL  10 mL InterCATHeter Q8H  
 alteplase (CATHFLO) 1 mg in sterile water (preservative free) 1 mL injection  1 mg InterCATHeter PRN  
 LORazepam (ATIVAN) tablet 0.5 mg  0.5 mg Oral Q8H PRN  
 morphine (ROXANOL) 100 mg/5 mL (20 mg/mL) concentrated solution 10 mg  10 mg Oral Q4H  
 morphine (ROXANOL) 100 mg/5 mL (20 mg/mL) concentrated solution 10 mg  10 mg Oral Q1H PRN  
 morphine injection 4 mg  4 mg IntraVENous Q1H PRN  
 enoxaparin (LOVENOX) injection 40 mg  40 mg SubCUTAneous Q24H  
 glycopyrrolate (ROBINUL) injection 0.2 mg  0.2 mg IntraVENous Q4H PRN  
 haloperidol lactate (HALDOL) injection 1 mg  1 mg IntraVENous Q4H PRN  
 aspirin-acetaminophen-caffeine (EXCEDRIN ES) per tablet 1-2 Tab (Patient Supplied)  1-2 Tab Oral Q6H PRN  pantoprazole (PROTONIX) 40 mg in sodium chloride 0.9% 10 mL injection  40 mg IntraVENous Q24H  
 0.9% sodium chloride infusion  50 mL/hr IntraVENous CONTINUOUS  
 sodium chloride (NS) flush 5-10 mL  5-10 mL IntraVENous PRN  
  
DME: Delivery on Sunday by [employer] for Equipment needed Hospital bed, BSC, OBT, and FILIBERTO Mattress. Confirmation number is [id number] Supplies: chucks, gloves, swabs, briefs

## 2018-11-11 NOTE — PROGRESS NOTES
[address] [place] [address], [address] [place], [address] 
P [phone]  F [phone] Patient: Bertha Shah Admit Date: 10/16/2018 Hospital Day: 25 Admit Dx: Partial small bowel obstruction Ovarian cancer ([address] Malnutrition ([address] Dehydration SBO (small bowel obstruction) ([address] Subjective:  
Pt reports that her sleep was a little uneven last night, which was most likely secondary to some pain around her sacral wound. The clamp solution for her G-tube worked overnight. She was bummed that the skype for the wedding didn't work, but she was able to facetime. Her  should be back in town today. Currently denies nausea. G-tube is unclamped overnight Objective: Intake/Output Summary (Last 24 hours) at 11/11/2018 0741 Last data filed at 11/11/2018 0462 Gross per 24 hour Intake 1192.5 ml Output 575 ml Net 617.5 ml Physical Exam 
/70 (BP 1 Location: Right arm, BP Patient Position: At rest)   Pulse 93   Temp 98.4 °F (36.9 °C)   Resp 16   Wt 131 lb 4.8 oz (59.6 kg)   LMP 02/02/2015 (Approximate)   SpO2 94%   BMI 20.26 kg/m² General: A&O this morning and again sitting up in bed. Appears sad/de[ressed. Cardiac:  Regular, without M/R/G Lungs:  CTA bilat. . Remians non labored without wheezing or rales. Abdomen:  soft, remains mildly firm and and distention unchanged with persistent tenderness to palpation. G-tube bag with olive colored/bile fluid clamped on and off during the day as needed. Stable distension and tympany Extremity: no edema Data Review Lab Results Component Value Date/Time WBC 13.7 (H) 10/25/2018 04:18 AM  
 ABS. NEUTROPHILS 10.5 (H) 10/25/2018 04:18 AM  
 HGB 9.0 (L) 10/25/2018 04:18 AM  
 HCT 28.5 (L) 10/25/2018 04:18 AM  
 .9 (H) 10/25/2018 04:18 AM  
 MCH 32.5 10/25/2018 04:18 AM  
 PLATELET 721 62/19/6657 04:18 AM  
 
Lab Results Component Value Date/Time Sodium 139 10/30/2018 07:06 AM  
 Potassium 4.4 10/30/2018 07:06 AM  
 Chloride 107 10/30/2018 07:06 AM  
 CO2 23 10/30/2018 07:06 AM  
 Glucose 87 10/30/2018 07:06 AM  
 BUN 20 10/30/2018 07:06 AM  
 Creatinine 0.37 (L) 10/30/2018 07:06 AM  
 Calcium 8.1 (L) 10/30/2018 07:06 AM  
 Albumin 2.0 (L) 10/30/2018 07:06 AM  
 Bilirubin, total 0.3 10/30/2018 07:06 AM  
 AST (SGOT) 13 (L) 10/30/2018 07:06 AM  
 ALT (SGPT) 44 10/30/2018 07:06 AM  
 Alk. phosphatase 136 (H) 10/30/2018 07:06 AM  
 
 
IMAGING: 
US 10/30/2018 FINDINGS: Sonography of the entire abdomen shows no ascites. 
  
IMPRESSION: No ascites KUB 10/18/2018 FINDINGS: There are multiple dilated loops of small bowel throughout the abdomen  
and pelvis is seen on prior CT dated October 15, 2018. NG tube tip and side port  
overlie the stomach. There is no free intraperitoneal gas. Filter overlies the  
expected location of the IVC at the L2 level. There is a vascular stent  
overlying SPECT dislocation of the left common iliac vein. IMPRESSION: Multiple, persistent dilated loops of small bowel throughout the  
abdomen and pelvis. No evidence of perforation. NG tube in appropriate position CT Results (most recent): 
Results from Hospital Encounter encounter on 10/16/18 CT INTRO GASTROSTOMY TUBE PERC Narrative PROCEDURE: Gastrostomy tube placement INDICATION: Decompression, bowel obstruction OPERATING PHYSICIAN: Carmela Nieves M.D. 
 
ESTIMATED BLOOD LOSS: None SPECIMENS REMOVED: None FLOURO TIME: None COMPLICATIONS: None immediate. Procedure and findings:  
 
CT dose reduction was achieved through use of a standardized protocol tailored 
for this examination and automatic exposure control for dose modulation. After obtaining informed consent, patient was brought the angiographic suite and 
placed supine angiographic table.  An 5 Maori catheter was positioned in the 
 stomach under fluoroscopy. Subsequently, the patient was moved to the CT table. Sedation was obtained with intravenous Versed and fentanyl. The upper abdomen 
was prepped and draped in maximal sterile barrier technique. Prophylactic 
antibiotic of Ancef 2 g was administered prior to the intervention and 
discontinued prior to the completion of the procedure. Approximately 600 cc of air was used to insufflate the stomach. The puncture 
site was identified and local anesthesia was achieved with 1% lidocaine 
injection. Antegrade puncture into the stomach was performed. 2 T-tacks were 
delivered and traction was applied to appose the anterior stomach wall against 
the anterior abdomen. A needle was then advanced into the stomach lumen and 
contrast injection was performed to confirm intraluminal location. An Amplatz 
wire was then advanced through the needle and the tract was serially dilated up 
to 18 [place]. A 18 And[place]ran gastrostomy tube was advanced over the wire into the 
stomach. The balloon was inflated with 8 cc of dilute contrast. Final 
intraluminal position of the gastrostomy tube was confirmed with gentle contrast 
injection under CT. The patient tolerated the procedure well and there were no 
immediate complications. A dry sterile dressing was applied. Impression IMPRESSION:  
 
Successful placement of 25 And[place]ran gastrostomy tube under CT guidance. Assessment/Plan:  
Admitted 10/16/2018 for Partial small bowel obstruction. G-tube place 10/24/18. Active Hospital Problems Diagnosis Date Noted  Anemia of chronic disease 11/07/2018  Malignant cachexia ([address]  End of life care 10/29/2018  Drowsiness  Goals of care, counseling/discussion  Dehydration 10/16/2018  Malnutrition ([address] 10/16/2018  
 SBO (small bowel obstruction) ([address] 10/16/2018  Severe protein-calorie malnutrition ([address] 10/11/2018  Ovarian cancer ([address] 10/10/2018  Nausea 10/10/2018  Epigastric pain  Pain of metastatic malignancy 08/09/2018  Anemia due to chemotherapy 07/26/2018  Peritoneal carcinomatosis ([facility] 06/01/2018 Tiara Saravia is a 48 y.o. female with ovarian cancer admitted with carcinomatosis, SBO, protein/calorie malnutrition d/t chronic disease and obstruction with N/V and cancer related pain. Onc: Had been receiving Doxil/Avastin s/p 3 cycles last 10/4/18. This will now be stopped due to progression of disease, continued SBO and need for G-Tube with increasing debility Partial SBO with flatus. BM 10/30. Palliative G-tube placed 10/24 and relieving nausea as needed. Unlamping for 1 hour Q8h or as needed for nausea. Heme/CV: Hemodynamically stable by VS. No further lab draws at this time FEN/GI: Partial SBO with delayed gastric emptying (by endoscopy 10/12/18) with malnutrition. Stopped TPN and Lipids and pt taking small, frequent eating periods to evaluate how she tolerates. Continue Reglan, but will change to pill and place in apple sauce. Continue PPI/H2. Ascites:Improved after drainage 10/18. No new ascites on US 10/30. Neuro: Remaining more alert. Pain/antidpression regimen per palliative care. Palliative Medicine monitoring this closely. PPX: lovenox. Holding home xarelto. Disposition: Stable partial obstructive at this point without meaningful resolution, difficult course with her disease these last few months. G-tube for palliation in place on 10/24 and currently effective when needed Long family meeting (10/30) with goals of care, plan of care going forward reviewed and rational for not being able to proceed with chemotherapy. Continued need for pain management improvement and abdominal discomfort/nausea. Pt remains DNR. Will continue regular, soft diet with her able to have greater selection of food choices. Smoothies as tolerated. Will have wound care see her tomorrow for wound care dressing change.  Will try to better pad that area. Mobilize as tolerated. Pain/symptom management per Palliative Care. Plan to have supplies delivered to her home today. Will touch base with Hospice to get a better time of when to expect delivery of home supplies. Would anticipate discharge home with hospice tomorrow if all things are set up appropriately.   
 
Lion Roman MD

## 2018-11-11 NOTE — PROGRESS NOTES
Bedside and Verbal shift change report given to Larry Marie (oncoming nurse) by Delmi Klein (offgoing nurse). Report included the following information SBAR, Kardex, Intake/Output, MAR, Accordion and Recent Results.

## 2018-11-11 NOTE — PROGRESS NOTES
Bedside and Verbal shift change report given to [address] (oncoming nurse) by César Lawler (offgoing nurse). Report included the following information SBAR, Kardex, Intake/Output, MAR and Recent Results.

## 2018-11-12 NOTE — PROGRESS NOTES
[address], [address] [facility], [address] [place] 
P [phone]  F [phone] Patient: Felicia Martinez Admit Date: 10/16/2018 Hospital Day: 25 Admit Dx: Partial small bowel obstruction Ovarian cancer ([address] Malnutrition ([address] Dehydration SBO (small bowel obstruction) ([address] Subjective:  
Had a good night last night and feels good of whre she is as far a symptom management. She feels she is \"finaly got her pain, nausea and distention under control\" She continues with some pain around her sacral wound. The clamp solution for her G-tube worked well again overnight. Her  is at her side and pt is very happy he is back Currently denies nausea. Objective: Intake/Output Summary (Last 24 hours) at 11/12/2018 9036 Last data filed at 11/11/2018 2010 Gross per 24 hour Intake  Output 925 ml Net -925 ml Physical Exam 
/74 (BP 1 Location: Right arm, BP Patient Position: Supine)   Pulse 76   Temp 98 °F (36.7 °C)   Resp 16   Wt 131 lb 4.8 oz (59.6 kg)   LMP 02/02/2015 (Approximate)   SpO2 98%   BMI 20.26 kg/m² General: A&O this resting in bed in good spirits Cardiac:  Regular, without M/R/G Lungs:  CTA bilat. . Remians non labored without wheezing or rales. Abdomen:  Remains mildly firm and and with some distention. Basically,                       unchanged with persistent tenderness to palpation. G-tube                       clamped Extremity: no edema Data Review Lab Results Component Value Date/Time WBC 13.7 (H) 10/25/2018 04:18 AM  
 ABS. NEUTROPHILS 10.5 (H) 10/25/2018 04:18 AM  
 HGB 9.0 (L) 10/25/2018 04:18 AM  
 HCT 28.5 (L) 10/25/2018 04:18 AM  
 .9 (H) 10/25/2018 04:18 AM  
 MCH 32.5 10/25/2018 04:18 AM  
 PLATELET 595 47/46/1113 04:18 AM  
 
Lab Results Component Value Date/Time  Sodium 139 10/30/2018 07:06 AM  
 Potassium 4.4 10/30/2018 07:06 AM  
 Chloride 107 10/30/2018 07:06 AM  
 CO2 23 10/30/2018 07:06 AM  
 Glucose 87 10/30/2018 07:06 AM  
 BUN 20 10/30/2018 07:06 AM  
 Creatinine 0.37 (L) 10/30/2018 07:06 AM  
 Calcium 8.1 (L) 10/30/2018 07:06 AM  
 Albumin 2.0 (L) 10/30/2018 07:06 AM  
 Bilirubin, total 0.3 10/30/2018 07:06 AM  
 AST (SGOT) 13 (L) 10/30/2018 07:06 AM  
 ALT (SGPT) 44 10/30/2018 07:06 AM  
 Alk. phosphatase 136 (H) 10/30/2018 07:06 AM  
 
 
IMAGING: 
US 10/30/2018 FINDINGS: Sonography of the entire abdomen shows no ascites. 
  
IMPRESSION: No ascites KUB 10/18/2018 FINDINGS: There are multiple dilated loops of small bowel throughout the abdomen  
and pelvis is seen on prior CT dated October 15, 2018. NG tube tip and side port  
overlie the stomach. There is no free intraperitoneal gas. Filter overlies the  
expected location of the IVC at the L2 level. There is a vascular stent  
overlying SPECT dislocation of the left common iliac vein. IMPRESSION: Multiple, persistent dilated loops of small bowel throughout the  
abdomen and pelvis. No evidence of perforation. NG tube in appropriate position CT Results (most recent): 
Results from Hospital Encounter encounter on 10/16/18 CT INTRO GASTROSTOMY TUBE PERC Narrative PROCEDURE: Gastrostomy tube placement INDICATION: Decompression, bowel obstruction OPERATING PHYSICIAN: Sofi Vega M.D. 
 
ESTIMATED BLOOD LOSS: None SPECIMENS REMOVED: None FLOURO TIME: None COMPLICATIONS: None immediate. Procedure and findings:  
 
CT dose reduction was achieved through use of a standardized protocol tailored 
for this examination and automatic exposure control for dose modulation. After obtaining informed consent, patient was brought the angiographic suite and 
placed supine angiographic table. An 5 Tu[place] catheter was positioned in the 
stomach under fluoroscopy. Subsequently, the patient was moved to the CT table. Sedation was obtained with intravenous Versed and fentanyl. The upper abdomen 
was prepped and draped in maximal sterile barrier technique. Prophylactic 
antibiotic of Ancef 2 g was administered prior to the intervention and 
discontinued prior to the completion of the procedure. Approximately 600 cc of air was used to insufflate the stomach. The puncture 
site was identified and local anesthesia was achieved with 1% lidocaine 
injection. Antegrade puncture into the stomach was performed. 2 T-tacks were 
delivered and traction was applied to appose the anterior stomach wall against 
the anterior abdomen. A needle was then advanced into the stomach lumen and 
contrast injection was performed to confirm intraluminal location. An Amplatz 
wire was then advanced through the needle and the tract was serially dilated up 
to 18 [place]. A 18 Mongolian gastrostomy tube was advanced over the wire into the 
stomach. The balloon was inflated with 8 cc of dilute contrast. Final 
intraluminal position of the gastrostomy tube was confirmed with gentle contrast 
injection under CT. The patient tolerated the procedure well and there were no 
immediate complications. A dry sterile dressing was applied. Impression IMPRESSION:  
 
Successful placement of 25 Mongolian gastrostomy tube under CT guidance. Assessment/Plan:  
Admitted 10/16/2018 for Partial small bowel obstruction. G-tube place 10/24/18. Active Hospital Problems Diagnosis Date Noted  Anemia of chronic disease 11/07/2018  Malignant cachexia ([address]  End of life care 10/29/2018  Drowsiness  Goals of care, counseling/discussion  Dehydration 10/16/2018  Malnutrition ([address] 10/16/2018  
 SBO (small bowel obstruction) ([address] 10/16/2018  Severe protein-calorie malnutrition ([address] 10/11/2018  Ovarian cancer ([address] 10/10/2018  Nausea 10/10/2018  Epigastric pain  Pain of metastatic malignancy 08/09/2018  Anemia due to chemotherapy 07/26/2018  Peritoneal carcinomatosis ([facility] 06/01/2018 Tiara Saravia is a 48 y.o. female with ovarian cancer admitted with carcinomatosis, SBO, protein/calorie malnutrition d/t chronic disease and obstruction with N/V and cancer related pain. Onc: Had been receiving Doxil/Avastin s/p 3 cycles last 10/4/18. This will now be stopped due to progression of disease, continued SBO and need for G-Tube with increasing debility Partial SBO with flatus. BM 10/30. Palliative G-tube placed 10/24 and relieving nausea as needed. Unlamping for 1 hour Q8h or as needed for nausea. Heme/CV: Hemodynamically stable by VS. No further lab draws at this time FEN/GI: Partial SBO with delayed gastric emptying (by endoscopy 10/12/18) with malnutrition. Stopped TPN and Lipids and pt taking small, frequent eating periods to evaluate how she tolerates. Continue Reglan, but will change to pill and place in apple sauce. Continue PPI/H2. Ascites:Improved after drainage 10/18. No new ascites on US 10/30. Neuro: Remaining more alert. Pain/antidpression regimen per palliative care. Palliative Medicine monitoring this closely. PPX: Lovenox will stop today. Holding home xarelto. Disposition: Stable partial obstructive at this point without meaningful resolution, difficult course with her disease these last few months. G-tube for palliation in place on 10/24 and currently effective when needed Long family meeting (10/30) with goals of care, plan of care going forward reviewed and rational for not being able to proceed with chemotherapy. Continued need for pain management improvement and abdominal discomfort/nausea. Pt remains DNR. Will continue regular, soft diet with her able to have greater selection of food choices. Smoothies as tolerated. Have had wound care see her. Mobilize as tolerated. Pain/symptom management per Palliative Care. DME was delivered successfully yesterday. Pt feels she is now all set for discharge later today. Will touch base with Hospice to get a better idea of  time of meeting today at home. Discharge home with hospice today.  
 
Jayden Serna NP

## 2018-11-12 NOTE — PROGRESS NOTES
Palliative Medicine Consult Ruperto: [phone]) Patient Name: Michael Delgado YOB: 1965 Date of Initial Consult: 10/10/18 Reason for Consult: Cancer related symptom management Requesting Provider: Dr. Vira uRtledge Primary Care Physician: Ramos Dominguez MD 
 
 SUMMARY:  
Michael Delgado is a 48 y.o. with recurrent ovarian malignancy on Doxil/Avastin per Dr. Vira Rutledge, known to outpatient palliative medicine. She was admitted 10/10/2018 from home due to worsening nausea and vomiting followed by acute worsening of epigastric abdominal pain. Last chemotherapy date was 10/4. Current medical issues leading to Palliative Medicine involvement include: cancer related symptoms including pain, nausea, vomiting. S/p venting peg placement on 10/24 by IR. PALLIATIVE DIAGNOSES:  
1. Epigastric abdominal pain 2. Small bowel obstruction 3. Chronic neoplasm related lower abdominal pain 4. Nonintractable nausea with emesis 5. Delayed gastric motility - by EGD PLAN:  
1. Abdominal pain-peritoneal carcinomatosis related. - Generalized pain is well controlled with fentanyl 50 mcg patch and scheduled morphine 10 mg every 4 hours. - Can consider increase of fentanyl Td when settled at home to reflect standing prn dose of morphine.   
- No use of prn PO or IV morphine last 72 hrs. - spoke with nursing and pt; no excessive drowsiness noted - In anticipation of severe uncontrolled pain at home in relation to abdominal cramping or obstruction Dr. Bartolo Laws provided a prescription for Actiq 200 mcg. Mr. Bryn Wayne to  from pharmacy today prior to Hospice enrollment. 2.  Partial SBO / Constipation - Soft BM 11/10. Taking senna 5ml BID and restarted lactulose last week, 15 ml. Aware of need to keep volume of meds as low as possible. Lactulose isn't easy to take for her but most effective regimen to take. Dulcolax supp prn.       
 
3. Nausea- improved. Continue Reglan. 4. Neuropathy- chemo induced neuropathy much improved-no longer needs Lyrica or Elavil. 5.  Disposition- d/c to home today with [employer] support. DDNR completed today. Communicated plan of care with: Palliative IDT 
 
 
 GOALS OF CARE / TREATMENT PREFERENCES:  
 
GOALS OF CARE: DNR Patient/Health Care Proxy Stated Goals: Comfort TREATMENT PREFERENCES:  
Code Status: DNR Advance Care Planning: 
Advance Care Planning 10/17/2018 Patient's Healthcare Decision Maker is: Legal Next of Kin Primary Decision Maker Name -  
Primary Decision Maker Phone Number -  
Primary Decision Maker Relationship to Patient -  
Confirm Advance Directive None Medical Interventions: Comfort measures Artificially Administered Nutrition: No feeding tube(Venting PEG in place - not for enteral feeding) Other: As far as possible, the palliative care team has discussed with patient / health care proxy about goals of care / treatment preferences for patient. HISTORY:  
 
History obtained from:  Pt, , EMR 
 
CHIEF COMPLAINT: upper abdominal pain HPI/SUBJECTIVE: The patient is:  
[x] Verbal and participatory [] Non-participatory due to:  
 
Had a good weekend and ready for discharge today w/o concerns. Looking forward to being at home. Pain control remains optimal on current regimen. She is happy with control of nausea, bloating and abdominal distension. Clinical Pain Assessment (nonverbal scale for severity on nonverbal patients):  
Clinical Pain Assessment Severity: 2 Location: abd bl upper quads, LLQ Character: crampy Duration: weeks Effect: functional 
Factors: none in particular Frequency: chronic Duration: for how long has pt been experiencing pain (e.g., 2 days, 1 month, years) Frequency: how often pain is an issue (e.g., several times per day, once every few days, constant)  FUNCTIONAL ASSESSMENT:  
 
Palliative Performance Scale (PPS): 
 PPS: 50 PSYCHOSOCIAL/SPIRITUAL SCREENING:  
 
Palliative IDT has assessed this patient for cultural preferences / practices and a referral made as appropriate to needs (Cultural Services, Patient Advocacy, Ethics, etc.) Advance Care Planning: 
Advance Care Planning 10/17/2018 Patient's Healthcare Decision Maker is: Legal Next of Kin Primary Decision Maker Name -  
Primary Decision Maker Phone Number -  
Primary Decision Maker Relationship to Patient -  
Confirm Advance Directive None Any spiritual / Confucianism concerns: 
[] Yes /  [x] No 
 
Caregiver Burnout: 
[] Yes /  [x] No /  [] No Caregiver Present Anticipatory grief assessment:  
[] Normal  / [] Maladaptive   Did not assess ESAS Anxiety: Anxiety: 3 ESAS Depression: Depression: 3 REVIEW OF SYSTEMS:  
 
Positive and pertinent negative findings in ROS are noted above in HPI. The following systems were [x] reviewed / [] unable to be reviewed as noted in HPI Other findings are noted below. Systems: constitutional, ears/nose/mouth/throat, respiratory, gastrointestinal, genitourinary, musculoskeletal, integumentary, neurologic, psychiatric, endocrine. Positive findings noted below. Modified ESAS Completed by: provider Fatigue: 9 Drowsiness: 3 Depression: 3 Pain: 2 Anxiety: 3 Nausea: 0 Anorexia: 9 Dyspnea: 0 Constipation: Yes Stool Occurrence(s): 1 PHYSICAL EXAM:  
 
From RN flowsheet: 
Wt Readings from Last 3 Encounters:  
10/16/18 58 kg (127 lb 12.8 oz) 10/04/18 58.2 kg (128 lb 6.4 oz) 09/27/18 57.7 kg (127 lb 1.6 oz) Blood pressure 124/74, pulse 76, temperature 98 °F (36.7 °C), resp. rate 16, weight 59.6 kg (131 lb 4.8 oz), last menstrual period 02/02/2015, SpO2 98 %, unknown if currently breastfeeding. Pain Scale 1: Numeric (0 - 10) Pain Intensity 1: 2 Pain Onset 1: chronic Pain Location 1: Abdomen Pain Orientation 1: Anterior Pain Description 1: Aching Pain Intervention(s) 1: Medication (see MAR) Last bowel movement, if known:  
 
Constitutional: cachectic chronically ill appearing  female sitting up in chair Chest:  resp unlabored, Neurologic: A&Ox3, cognition intact, following commands, moving all extremities Psychiatric: full affect, no agitation or hallucinations HISTORY:  
 
Active Problems: 
  Peritoneal carcinomatosis ([address] (6/1/2018) Anemia due to chemotherapy (7/26/2018) Pain of metastatic malignancy (8/9/2018) Ovarian cancer ([address] (10/10/2018) Nausea (10/10/2018) Epigastric pain () Severe protein-calorie malnutrition ([address] (10/11/2018) Dehydration (10/16/2018) Malnutrition ([address] (10/16/2018) SBO (small bowel obstruction) ([address] (10/16/2018) End of life care (10/29/2018) Drowsiness () Goals of care, counseling/discussion () Malignant cachexia (HCC) () Anemia of chronic disease (11/7/2018) Past Medical History:  
Diagnosis Date  Anxiety  BRCA1 positive  Calculus of kidney 1/13  
 right  Hernia, inguinal, left 2012  MS (multiple sclerosis) ([address] 1996  Nausea & vomiting  Ovarian cancer ([address] 3/2015  
 high grade, stage 3C papillary serous  Thromboembolus ([address] 8/2007  
 lower abdomen post fall Past Surgical History:  
Procedure Laterality Date  HX GYN  2009  
 endometrial ablation with punctured uterus  HX OTHER SURGICAL  8/2007  
 green filter  HX MARIEL AND BSO  3/2014  
 ovarian cancer Family History Problem Relation Age of Onset  Cancer Paternal Grandmother   
     breast  
 Breast Cancer Paternal Grandmother  Coronary Artery Disease Mother 48 CABG and PCI  Heart Surgery Father   
     valve replacement  No Known Problems Brother  No Known Problems Brother  No Known Problems Brother  No Known Problems Daughter  No Known Problems Son  No Known Problems Son   
  No Known Problems Son History reviewed, no pertinent family history. Social History Tobacco Use  Smoking status: Never Smoker  Smokeless tobacco: Never Used Substance Use Topics  Alcohol use: No  
 
Allergies Allergen Reactions  Pcn [Penicillins] Rash Current Facility-Administered Medications Medication Dose Route Frequency  heparin (porcine) pf 300 Units  300 Units InterCATHeter PRN  
 sennosides (SENOKOT) 8.8 mg/5 mL syrup 8.8 mg  5 mL Oral BID  lactulose (CHRONULAC) 10 gram/15 mL solution 10 g  10 g Oral DAILY  metoclopramide HCl (REGLAN) tablet 5 mg  5 mg Oral AC&HS  
 OLANZapine (ZyPREXA zydis) disintegrating tablet 5 mg  5 mg Oral QHS  fentaNYL (DURAGESIC) 50 mcg/hr patch 1 Patch  1 Patch TransDERmal Q72H  LORazepam (ATIVAN) tablet 0.5 mg  0.5 mg Oral QHS  polyethylene glycol (MIRALAX) packet 17 g  17 g Oral DAILY PRN  
 sodium chloride (NS) flush 20 mL  20 mL InterCATHeter PRN  
 sodium chloride (NS) flush 10 mL  10 mL InterCATHeter PRN  
 sodium chloride (NS) flush 10 mL  10 mL InterCATHeter Q8H  
 alteplase (CATHFLO) 1 mg in sterile water (preservative free) 1 mL injection  1 mg InterCATHeter PRN  
 LORazepam (ATIVAN) tablet 0.5 mg  0.5 mg Oral Q8H PRN  
 morphine (ROXANOL) 100 mg/5 mL (20 mg/mL) concentrated solution 10 mg  10 mg Oral Q4H  
 morphine (ROXANOL) 100 mg/5 mL (20 mg/mL) concentrated solution 10 mg  10 mg Oral Q1H PRN  
 morphine injection 4 mg  4 mg IntraVENous Q1H PRN  
 enoxaparin (LOVENOX) injection 40 mg  40 mg SubCUTAneous Q24H  
 glycopyrrolate (ROBINUL) injection 0.2 mg  0.2 mg IntraVENous Q4H PRN  
 haloperidol lactate (HALDOL) injection 1 mg  1 mg IntraVENous Q4H PRN  
 aspirin-acetaminophen-caffeine (EXCEDRIN ES) per tablet 1-2 Tab   (Patient Supplied)  1-2 Tab Oral Q6H PRN  pantoprazole (PROTONIX) 40 mg in sodium chloride 0.9% 10 mL injection  40 mg IntraVENous Q24H  0.9% sodium chloride infusion  50 mL/hr IntraVENous CONTINUOUS  
 sodium chloride (NS) flush 5-10 mL  5-10 mL IntraVENous PRN  
 
 
 
 LAB AND IMAGING FINDINGS:  
 
Lab Results Component Value Date/Time WBC 13.7 (H) 10/25/2018 04:18 AM  
 HGB 9.0 (L) 10/25/2018 04:18 AM  
 PLATELET 086 29/75/4284 04:18 AM  
 
Lab Results Component Value Date/Time Sodium 139 10/30/2018 07:06 AM  
 Potassium 4.4 10/30/2018 07:06 AM  
 Chloride 107 10/30/2018 07:06 AM  
 CO2 23 10/30/2018 07:06 AM  
 BUN 20 10/30/2018 07:06 AM  
 Creatinine 0.37 (L) 10/30/2018 07:06 AM  
 Calcium 8.1 (L) 10/30/2018 07:06 AM  
 Magnesium 1.9 10/30/2018 07:06 AM  
 Phosphorus 3.3 10/30/2018 07:06 AM  
  
Lab Results Component Value Date/Time AST (SGOT) 13 (L) 10/30/2018 07:06 AM  
 Alk. phosphatase 136 (H) 10/30/2018 07:06 AM  
 Protein, total 6.0 (L) 10/30/2018 07:06 AM  
 Albumin 2.0 (L) 10/30/2018 07:06 AM  
 Globulin 4.0 10/30/2018 07:06 AM  
 
Lab Results Component Value Date/Time INR 1.0 07/19/2018 11:54 AM  
 Prothrombin time 10.7 07/19/2018 11:54 AM  
  
No results found for: IRON, FE, TIBC, IBCT, PSAT, FERR No results found for: PH, PCO2, PO2 No components found for: [place] Lab Results Component Value Date/Time CK 72 01/09/2018 11:09 AM  
 CK - MB <1.0 01/09/2018 11:09 AM  
  
 
 
   
 
Total time:   
Counseling / coordination time, spent as noted above:  
> 50% counseling / coordination?:   
 
Prolonged service was provided for  []30 min   []75 min in face to face time in the presence of the patient, spent as noted above. Time Start:  
Time End:  
Note: this can only be billed with 60405 (initial) or 85789 (follow up). If multiple start / stop times, list each separately.

## 2018-11-12 NOTE — PROGRESS NOTES
Bedside shift change report given to Rema Phillip RN and [facility], RN (oncoming nurse) by Anjana Valles RN (offgoing nurse). Report included the following information SBAR.

## 2018-11-12 NOTE — PROGRESS NOTES
Bedside and Verbal shift change report given to [address] [place] [address] 107 (oncoming nurse) by Chen Schuster (offgoing nurse). Report included the following information SBAR, Kardex, Procedure Summary, MAR, Accordion, Recent Results and Med Rec Status.

## 2018-11-12 NOTE — PROGRESS NOTES
Follow up visit with Aunesha Patel and family with Palliative Dr. Charlotte Silva. Pt was preparing for discharge home with hospice. Pt shared of events of the weekend. She was able to watch her son's wedding through facetime and reflected on that experience. Offered emotional support and assurance of prayers. Paola Bruce, Palliative

## 2018-11-12 NOTE — DISCHARGE INSTRUCTIONS
[address] [place] [address] Mathias Moritz [facility], [address]  P [phone]  F [phone]       Patient Discharge Instructions          Asia Torres : 1965    Admit Date: 10/16/2018 Discharge Date: 2018     Your doctor:Dr. Yolanda Salmon Jr/Dr. Pratibha Kang   Diagnosis: Partial small bowel obstruction  Ovarian cancer ([facility]  Malnutrition ([facility]  Dehydration  SBO (small bowel obstruction) ([facility]  Procedure: Gastrostomy tube placement 10/24/2018  Date of Discharge:2018      Take Home Medications     · All of your prescriptions will be reviewed and the Hospice care team will assist you in managing pain and palliative medicines along with updates and consents from your doctor. Diet    · There are no dietary restrictions if you are not nauseated. If you have nausea, reduce your diet to liquids or simple fluids. Activity    · If possible, have someone with you at all times. If you are able to be out of bed, keep safety in mind and have someone to assist you. Causes For Concern    If any of the following occur, please speak with your hospice team's on call providers who will help you with your problem. If you are unable to get a response, you may call our office. Increasing discomfort despite medications  Persisting nausea or vomiting  Uncontrolled anxiety  Constipation/Diarrhea  Respiratory difficulty or secretions  Wound or skin issues      Going Forward. .. The Hospice Care team will provide answers to many of your questions, but we remain very much concerned for and involved in your care. We offer our sincerest hopes that this journey will provide you with comfort and peace. Please call our office for any concerns you may have going forward during your hospice transition and for any updates in your care. Information obtained by :  I understand that if any problems occur once I am at home I am to contact my physician.     I understand and acknowledge receipt of the instructions indicated above.                                                                                                                                            Physician's or R.N.'s Signature                                                                  Date/Time                                                                                                                                              Patient or Representative Signature                                                          Date/Time

## 2018-11-12 NOTE — HOSPICE
[facility] Follow up meeting with pt,  Carlos Babcock, father and brother. Plan is for discharge to home with admission to hospice between 10-10:30. Hospice admission RN bringing symptom medications to home to assure comfort through transition to home. Pt requesting PRN dose of morphine prior to transportation via personal vehicle. Discussed pt with Dr Markus Cramer, NP Lisa Mccain and palliative team.   
 
Thank you for the opportunity to care for this pt and family. Please contact hospice at [phone] with any questions or concerns.

## 2018-11-12 NOTE — DISCHARGE SUMMARY
[address] [facility], [address] [facility], [facility] [facility] 
P [phone]  F [phone] Discharge Summary Patient ID: 
Page Mosley [id number] 
64 y.o. 
1965 Admit date: 10/16/2018 PCP: Salvador Hernandez MD 
 
Admit MD: Michelle Cloud MD 
 
Discharge MD: Michelle Cloud MD 
 
Discharge date and time: November 12, 2018 Admission Diagnoses:  
 
Partial small bowel obstruction Ovarian cancer ([address] Malnutrition ([address] Dehydration SBO (small bowel obstruction) ([address] Patient Active Problem List  
Diagnosis Code  Malignant neoplasm of both ovaries (HCC) C56.1, C56.2  Thrombocytopenia ([address] D69.6  CINV (chemotherapy-induced nausea and vomiting) R11.2, T45.1X5A  Peritoneal carcinomatosis (HCC) C78.6, C80.1  Malignant ascites R18.0  
 On anticoagulant therapy Z79.01  
 Other proteinuria R80.8  Anemia due to chemotherapy D64.81, T45.1X5A  
 Pain of metastatic malignancy G89.3  Ovarian cancer ([address] C56.9  Abdominal pain R10.9  Nausea R11.0  Epigastric pain R10.13  Xerostomia R68.2  Severe protein-calorie malnutrition ([address] E43  Gastroparesis K31.84  
 Dehydration E86.0  Malnutrition ([address] E46  
 SBO (small bowel obstruction) ([address] V75.118  End of life care Z51.5  Drowsiness R40.0  Goals of care, counseling/discussion Z71.89  
 Malignant cachexia (HCC) R64  
 Anemia of chronic disease D63.8 Discharge Diagnoses:   
 
Patient Active Problem List  
Diagnosis Code  Malignant neoplasm of both ovaries (HCC) C56.1, C56.2  Thrombocytopenia ([address] D69.6  CINV (chemotherapy-induced nausea and vomiting) R11.2, T45.1X5A  Peritoneal carcinomatosis (HCC) C78.6, C80.1  Malignant ascites R18.0  
 On anticoagulant therapy Z79.01  
 Other proteinuria R80.8  Anemia due to chemotherapy D64.81, T45.1X5A  
 Pain of metastatic malignancy G89.3  Ovarian cancer ([facility] C56.9  Abdominal pain R10.9  Nausea R11.0  Epigastric pain R10.13  Xerostomia R68.2  Severe protein-calorie malnutrition ([address] E43  Gastroparesis K31.84  
 Dehydration E86.0  Malnutrition ([address] E46  
 SBO (small bowel obstruction) ([address] T68.810  End of life care Z51.5  Drowsiness R40.0  Goals of care, counseling/discussion Z71.89  
 Malignant cachexia (HCC) R64  
 Anemia of chronic disease D63.8 Procedure: Placement of G-Tube 10/24/2018 Hospital Course:  
Nathaniel Marroquin is a 48 y.o. with stage IIIC ovarian cancer, BRCA1+. She underwent X-lap, hysterectomy, and tumor debulking in March 2015. She was recommended adjuvant chemotherapy with 6 cycles of Taxol and Carboplatin. Her post-treatment PET/CT was negative for disease. S/p recurrent disease early 2017 treated with carbo/Gemzar (3/2017 - 6/2017). Post treatment imaging was w/o evidence of disease and she received maintenance PARPi (zejula/lynparza). Stopped d/t toxicities Recurrent disease on imagining, initiated rubraca, subsequent disease progression following 2.5 cycles transitioned to Doxil/Avastin(7/2018) having completed 3 cycles, last 10/4/18 and there was a plan for disease status scans at end of Oct. 
She had been referred to Palliative medicine for control of abd pain and constipation on regimen as reviewed in notes. S/p paracentesis x 3 most recent 7/20/18 prior to admission, then she did have another on 10/18/18 with 1 liter pulled off. On admission, she was found to be significantly mal nourished and started on TPN and hydration. Over the next several days, she continued with abd pain with eating and abd distention even with small amounts of food. After discussion with pt, it was decided to proceed with placement of G-Tube for decompression. After several days, she began to notice improvement in nausea and pain. Palliative continued to work with her on pain management and control of nausea/vomiting. We continued with attempts to increase pt's nutrition and stamina with PT and nutrition consults As it appeared her disease was not improving and symptoms continue to progress, a long discussion was had with Dr. Lanis Koyanagi and Dr. Helen Shah and pt and her  (with some of their children physically present and some on speaker phone). This discussion was about the progression of her disease despite our best efforts. At this point, they all agreed on DNR status and that once her symptoms were felt controlled, to be discharge home with Hospice. Hospice consult was then placed and TPN was stopped. Over the rest of her hsopitalization it was a daily adjustment of pain medications and nausea management. She also aquired a sacral sore that was addressed with wound care and improved by discharge. On November 12th, pain & nausea appeared controled. Both she and her  were managing her G-tube drainage well and felt comfortable with when to decompress. Both our service and Palliative will continue to monitor pt as outpt with Hospice and the pt and family were encouraged to call for any questions or concerns Pathology:  
 
Consults:Palliative Medicine, Wound Care, Hospice Significant Diagnostic Studies: 
Lab Results Component Value Date/Time WBC 13.7 (H) 10/25/2018 04:18 AM  
 ABS. NEUTROPHILS 10.5 (H) 10/25/2018 04:18 AM  
 HGB 9.0 (L) 10/25/2018 04:18 AM  
 HCT 28.5 (L) 10/25/2018 04:18 AM  
 .9 (H) 10/25/2018 04:18 AM  
 MCH 32.5 10/25/2018 04:18 AM  
 PLATELET 033 43/64/4790 04:18 AM  
 
Lab Results Component Value Date/Time  Sodium 139 10/30/2018 07:06 AM  
 Potassium 4.4 10/30/2018 07:06 AM  
 Chloride 107 10/30/2018 07:06 AM  
 CO2 23 10/30/2018 07:06 AM  
 Glucose 87 10/30/2018 07:06 AM  
 BUN 20 10/30/2018 07:06 AM  
 Creatinine 0.37 (L) 10/30/2018 07:06 AM  
 Calcium 8.1 (L) 10/30/2018 07:06 AM  
 Albumin 2.0 (L) 10/30/2018 07:06 AM  
 Bilirubin, total 0.3 10/30/2018 07:06 AM  
 AST (SGOT) 13 (L) 10/30/2018 07:06 AM  
 ALT (SGPT) 44 10/30/2018 07:06 AM  
 Alk. phosphatase 136 (H) 10/30/2018 07:06 AM  
 
 
Condition on Discharge: improved from admission. Home with Hospice Disposition: home with Hospice Patient Instructions:  
Current Discharge Medication List  
  
 
Walk as tolerated Showers okay. Avoid tub baths due to G-tube Stool softner/Lactulose daily. Hold for loose or frequent stools Diet: Regular purred diet and Encourage fluids Wound Care: Sacral wound care per Hospice Will follow as out pt via phone and possibly make home visit.   
 
Signed: 
Paul Duffy NP 
11/12/2018/7:51 AM

## 2018-11-14 NOTE — TELEPHONE ENCOUNTER
Rep from [address] [place] [address] requesting Patient agreement form please fax to number on form (form online at [email or web address])

## 2018-11-27 ENCOUNTER — HOME CARE VISIT (OUTPATIENT)
Dept: HOSPICE | Facility: HOSPICE | Age: 53
End: 2018-11-27
Payer: COMMERCIAL

## 2018-11-27 ENCOUNTER — HOME CARE VISIT (OUTPATIENT)
Dept: SCHEDULING | Facility: HOME HEALTH | Age: 53
End: 2018-11-27
Payer: COMMERCIAL

## 2018-11-29 ENCOUNTER — APPOINTMENT (OUTPATIENT)
Dept: INFUSION THERAPY | Age: 53
End: 2018-11-29

## 2018-12-06 ENCOUNTER — HOME CARE VISIT (OUTPATIENT)
Dept: HOSPICE | Facility: HOSPICE | Age: 53
End: 2018-12-06
Payer: COMMERCIAL

## 2018-12-13 ENCOUNTER — APPOINTMENT (OUTPATIENT)
Dept: INFUSION THERAPY | Age: 53
End: 2018-12-13

## 2018-12-27 ENCOUNTER — APPOINTMENT (OUTPATIENT)
Dept: INFUSION THERAPY | Age: 53
End: 2018-12-27

## 2019-02-05 ENCOUNTER — HOME CARE VISIT (OUTPATIENT)
Dept: HOSPICE | Facility: HOSPICE | Age: 54
End: 2019-02-05
Payer: COMMERCIAL

## 2019-03-01 NOTE — TELEPHONE ENCOUNTER
Nurse returned pt's call.   See NN note
Patient  Calling to give update on medication changes.
Tim Ngo(Resident)

## 2022-06-28 NOTE — CONSULTS
Last appt with pcp was over 1 year ago 3/24/21    Next appt 7/1.     Please review and advise if you would like to fill before Friday.   Palliative Medicine Consult Ru[place]to: [phone] ([phone]) Patient Name: Debora Hoffman YOB: 1965 Date of Initial Consult: 10/16/18 Reason for Consult: Cancer related symptom management Requesting Provider: Dr. Rick Sierra Primary Care Physician: Patria Soares MD 
 
 SUMMARY:  
Debora Hoffman is a 48 y.o. with recurrent ovarian malignancy on Doxil/Avastin per Dr. Rick Sierra, known to outpatient palliative medicine as she follows closely with Palliative medicine outpatient clinic for pain management. She was admitted 10/10/2018 from home due to worsening nausea and vomiting followed by acute worsening of epigastric abdominal pain, EGD showed gastritis. Follow up CT shows proximal SBO leading to current admission for medical management and TPN for nutrition. Last chemotherapy date was 10/4. Current medical issues leading to Palliative Medicine involvement include: cancer related symptoms including pain, nausea, vomiting. PALLIATIVE DIAGNOSES:  
1. Epigastric abdominal pain 2. Chronic neoplasm related lower abdominal pain 3. Nausea with emesis 4. Xerostomia PLAN:  
1. SBO- on scopolamine patch, NGT to intermittent suction. 2. Start IV Dex 2 mg TID. 3. Abd usg 10/10 with small volume ascites. 4. Abdominal pain- well controlled  
- cont off po opioids while on NG suction.  
 - Continue morphine PCA w/o changes. 1 mg basal rate + 1 mg bolus q8min prn.   
 - Total use 20 mg IV / 12 hrs. (8 boluses / 12 hrs) 
 - mild side effects, but need to watch closely for side effects. Family present, we discussed what to look for. 5. Cont lactulose for bowel regimen. 6. IV Reglan on hold per primary team due to concern for complete obstruction 7. Needs to continue Amitriptyline for neuropathy which has helped her very much. Will need to stop suctioning for about an 1 hour after getting Amitriptyline. 8. Continue Carafate and IV protonix. 9. Communicated plan of care with: Palliative IDT, patient and , bedside RN and Dolores Sharpe NP 
 
 
 GOALS OF CARE / TREATMENT PREFERENCES:  
 
GOALS OF CARE:   Full care escalation Patient/Health Care Proxy Stated Goals: Prolong life TREATMENT PREFERENCES:  
Code Status: Full Code Advance Care Planning: 
Advance Care Planning 10/10/2018 Patient's Healthcare Decision Maker is: Legal Next of Kin Primary Decision Maker Name Yaw Shahid Primary Decision Maker Phone Number [phone] Primary Decision Maker Relationship to Patient Spouse Confirm Advance Directive None Medical Interventions: Full interventions Other: As far as possible, the palliative care team has discussed with patient / health care proxy about goals of care / treatment preferences for patient. HISTORY:  
 
History obtained from:  Pt, , EMR 
 
CHIEF COMPLAINT: upper abdominal pain HPI/SUBJECTIVE: The patient is:  
[x] Verbal and participatory [] Non-participatory due to: No overnight issues or events. Last BM/flatulence was Monday evening (2 days ago, PTA). Reports improvement in abd pain with morphine pca - current rating 1/10.  notes mild slowness of cognition. Johanny endorses mild - mod drowsiness and balance difficulties. Still able to get up and walk halls x 2 this morning with supervision. Spirits are decent this am.  She is happy to be back in the hospital for good symptom control. Clinical Pain Assessment (nonverbal scale for severity on nonverbal patients):  
Clinical Pain Assessment Severity: 1 Location: abd bl upper quads, LLQ Character: crampy Duration: weeks Effect: functional 
Factors: none in particular Frequency: chronic Duration: for how long has pt been experiencing pain (e.g., 2 days, 1 month, years) Frequency: how often pain is an issue (e.g., several times per day, once every few days, constant)  FUNCTIONAL ASSESSMENT:  
 Palliative Performance Scale (PPS): PPS: 70 PSYCHOSOCIAL/SPIRITUAL SCREENING:  
 
Palliative IDT has assessed this patient for cultural preferences / practices and a referral made as appropriate to needs (Cultural Services, Patient Advocacy, Ethics, etc.) Advance Care Planning: 
Advance Care Planning 10/10/2018 Patient's Healthcare Decision Maker is: Legal Next of Kin Primary Decision Maker Name Katlyn Wills Primary Decision Maker Phone Number [phone] Primary Decision Maker Relationship to Patient Spouse Confirm Advance Directive None Any spiritual / Rastafarian concerns: 
[] Yes /  [x] No 
 
Caregiver Burnout: 
[] Yes /  [x] No /  [] No Caregiver Present Anticipatory grief assessment:  
[] Normal  / [] Maladaptive ESAS Anxiety: Anxiety: 3 ESAS Depression: Depression: 3 REVIEW OF SYSTEMS:  
 
Positive and pertinent negative findings in ROS are noted above in HPI. The following systems were [x] reviewed / [] unable to be reviewed as noted in HPI Other findings are noted below. Systems: constitutional, ears/nose/mouth/throat, respiratory, gastrointestinal, genitourinary, musculoskeletal, integumentary, neurologic, psychiatric, endocrine. Positive findings noted below. Modified ESAS Completed by: provider Fatigue: 5 Drowsiness: 3 Depression: 3 Pain: 1 Anxiety: 3 Nausea: 3 Anorexia: 7 Dyspnea: 0 Constipation: No  
     
 
 
 PHYSICAL EXAM:  
 
From RN flowsheet: 
Wt Readings from Last 3 Encounters:  
10/16/18 58 kg (127 lb 13.9 oz) 10/16/18 58 kg (127 lb 12.8 oz) 10/04/18 58.2 kg (128 lb 6.4 oz) Blood pressure 111/70, pulse 100, temperature 97.4 °F (36.3 °C), resp. rate 14, weight 58 kg (127 lb 13.9 oz), last menstrual period 02/02/2015, SpO2 95 %, unknown if currently breastfeeding. Pain Scale 1: Numeric (0 - 10) Pain Intensity 1: 2 Pain Onset 1: poa Pain Location 1: Abdomen Pain Orientation 1: Anterior, Medial, Upper Pain Description 1: Constant, Sore Pain Intervention(s) 1: Medication (see MAR) Last bowel movement, if known:  
 
Constitutional: cachectic, chronically ill-appearing, no distress Eyes: pupils equal, anicteric ENMT: NGT to suction Cardiovascular: regular rhythm, distal pulses intact, no edema Respiratory: breathing not labored, symmetric Gastrointestinal: abdomen is flat, bssoft, mild TTP in epigastrium w/o rebound or guarding Musculoskeletal: no deformity, no tenderness to palpation Skin: warm, dry Neurologic: following commands, moving all extremities. Drowsy during interview. Psychiatric: full affect, no hallucinations or agitation. HISTORY:  
 
Active Problems: 
  Peritoneal carcinomatosis ([address] (6/1/2018) Anemia due to chemotherapy (7/26/2018) Pain of metastatic malignancy (8/9/2018) Ovarian cancer ([address] (10/10/2018) Epigastric pain () Severe protein-calorie malnutrition ([address] (10/11/2018) Dehydration (10/16/2018) Malnutrition ([address] (10/16/2018) SBO (small bowel obstruction) ([address] (10/16/2018) Past Medical History:  
Diagnosis Date  Anxiety  BRCA1 positive  Calculus of kidney 1/13  
 right  Hernia, inguinal, left 2012  MS (multiple sclerosis) ([address] 1996  Nausea & vomiting  Ovarian cancer ([address] 3/2015  
 high grade, stage 3C papillary serous  Thromboembolus ([address] 8/2007  
 lower abdomen post fall Past Surgical History:  
Procedure Laterality Date  HX GYN  2009  
 endometrial ablation with punctured uterus  HX OTHER SURGICAL  8/2007  
 green filter  HX MARIEL AND BSO  3/2014  
 ovarian cancer Family History Problem Relation Age of Onset  Cancer Paternal Grandmother   
     breast  
 Breast Cancer Paternal Grandmother  Coronary Artery Disease Mother 48 CABG and PCI  Heart Surgery Father   
     valve replacement  No Known Problems Brother  No Known Problems Brother  No Known Problems Brother  No Known Problems Daughter  No Known Problems Son  No Known Problems Son  No Known Problems Son History reviewed, no pertinent family history. Social History Tobacco Use  Smoking status: Never Smoker  Smokeless tobacco: Never Used Substance Use Topics  Alcohol use: No  
 
Allergies Allergen Reactions  Pcn [Penicillins] Rash Current Facility-Administered Medications Medication Dose Route Frequency  TPN ADULT - CENTRAL   IntraVENous CONTINUOUS  
 potassium phosphate 15 mmol in 0.9% sodium chloride 250 mL infusion   IntraVENous ONCE  
 0.9% sodium chloride infusion  40 mL/hr IntraVENous CONTINUOUS  
 amitriptyline (ELAVIL) tablet 10 mg  10 mg Oral QHS  scopolamine (TRANSDERM-SCOP) 1 mg over 3 days 1 Patch  1 Patch TransDERmal Q72H  sucralfate (CARAFATE) 100 mg/mL oral suspension 1 g  1 g Per NG tube QID  enoxaparin (LOVENOX) injection 40 mg  40 mg SubCUTAneous Q24H  
 LORazepam (ATIVAN) injection 1 mg  1 mg IntraVENous Q4H PRN  prochlorperazine (COMPAZINE) with saline injection 5 mg  5 mg IntraVENous Q4H PRN  
 sodium chloride (NS) flush 5-10 mL  5-10 mL IntraVENous Q8H  
 sodium chloride (NS) flush 5-10 mL  5-10 mL IntraVENous PRN  
 diphenhydrAMINE (BENADRYL) injection 12.5 mg  12.5 mg IntraVENous Q4H PRN  
 insulin lispro (HUMALOG) injection   SubCUTAneous Q6H  
 glucose chewable tablet 16 g  4 Tab Oral PRN  
 dextrose (D50W) injection syrg 12.5-25 g  12.5-25 g IntraVENous PRN  
 glucagon (GLUCAGEN) injection 1 mg  1 mg IntraMUSCular PRN  
 fat emulsion 20% (LIPOSYN, INTRAlipid) infusion 500 mL  500 mL IntraVENous Q TUE, THU & SAT  lactulose (CHRONULAC) solution 20 g  20 g Per NG tube BID  morphine  mg / 30 mL   IntraVENous CONTINUOUS  
 pantoprazole (PROTONIX) 40 mg in sodium chloride 0.9% 10 mL injection  40 mg IntraVENous DAILY  TPN ADULT - CENTRAL AA 5% D15% W/ ELECTROLYTES AND CA   IntraVENous CONTINUOUS  
 
 
 
 LAB AND IMAGING FINDINGS:  
 
Lab Results Component Value Date/Time WBC 4.7 10/16/2018 11:18 AM  
 HGB 9.2 (L) 10/16/2018 11:18 AM  
 PLATELET 653 01/67/3883 11:18 AM  
 
Lab Results Component Value Date/Time Sodium 138 10/17/2018 05:20 AM  
 Potassium 3.4 (L) 10/17/2018 05:20 AM  
 Chloride 104 10/17/2018 05:20 AM  
 CO2 23 10/17/2018 05:20 AM  
 BUN 8 10/17/2018 05:20 AM  
 Creatinine 0.59 10/17/2018 05:20 AM  
 Calcium 8.6 10/17/2018 05:20 AM  
 Magnesium 1.6 10/17/2018 05:20 AM  
 Phosphorus 2.1 (L) 10/17/2018 05:20 AM  
  
Lab Results Component Value Date/Time AST (SGOT) 18 10/17/2018 05:20 AM  
 Alk. phosphatase 71 10/17/2018 05:20 AM  
 Protein, total 6.4 10/17/2018 05:20 AM  
 Albumin 2.4 (L) 10/17/2018 05:20 AM  
 Globulin 4.0 10/17/2018 05:20 AM  
 
Lab Results Component Value Date/Time INR 1.0 07/19/2018 11:54 AM  
 Prothrombin time 10.7 07/19/2018 11:54 AM  
  
No results found for: IRON, FE, TIBC, IBCT, PSAT, FERR No results found for: PH, PCO2, PO2 No components found for: [place] Lab Results Component Value Date/Time CK 72 01/09/2018 11:09 AM  
 CK - MB <1.0 01/09/2018 11:09 AM  
  
 
 
   
 
 
 
Prolonged service was provided for  []30 min   []75 min in face to face time in the presence of the patient, spent as noted above. Time Start:  
Time End:  
Note: this can only be billed with 08295 (initial) or 80094 (follow up). If multiple start / stop times, list each separately.

## 2023-10-17 NOTE — DIABETES MGMT
DTC Progress Note Recommendations/ Comments: Pt's chart's reviewed due to hypoglycemia. This morning. Pt with no history of diabetes. Pt is on cyclic TPN with 20 units of Regular Insulin per liter for a total of 34 units delivered. If appropriate, please consider: 1. Decreasing insulin in TPN 
2. Change correction scale to high sensitivity. Current hospital DM medication: correction scale Humalog, normal sensitivity and Regular insulin in TPN 20 units per liter for a total of 34 units delivered (cyclic TPN) Chart reviewed on [id number] [facility]. Patient is a 48 y.o. female with no history of diabetes. A1c:  
No results found for: HBA1C, HGBE8, TCB8UGWE Recent Glucose Results:  
Lab Results Component Value Date/Time GLU 59 (L) 10/26/2018 06:45 AM  
 GLUCPOC 81 10/26/2018 07:19 AM  
 GLUCPOC 62 (L) 10/26/2018 06:56 AM  
  
 
Lab Results Component Value Date/Time Creatinine 0.53 (L) 10/26/2018 06:45 AM  
 
Estimated Creatinine Clearance: 108.3 mL/min (A) (based on SCr of 0.53 mg/dL (L)). Active Orders Diet DIET CLEAR LIQUID  
  
 
PO intake:  
Patient Vitals for the past 72 hrs: 
 % Diet Eaten 10/26/18 0954 30 % 10/24/18 0741 50 % 10/23/18 1932 50 % Will continue to follow as needed. Thank you Sabrina Reddy RD, CDE 
 

PERRL/EOMI

## 2025-04-30 NOTE — TELEPHONE ENCOUNTER
Pt calling to state she had Echo today, and would like to schedule chemotherapy.
Question about echo test.
no

## (undated) DEVICE — FORCEPS BX L240CM JAW DIA2.8MM L CAP W/ NDL MIC MESH TOOTH

## (undated) DEVICE — ENDO CARRY-ON PROCEDURE KIT INCLUDES ENZYMATIC SPONGE, GAUZE, BIOHAZARD LABEL, TRAY, LUBRICANT, DIRTY SCOPE LABEL, WATER LABEL, TRAY, DRAWSTRING PAD, AND DEFENDO 4-PIECE KIT.: Brand: ENDO CARRY-ON PROCEDURE KIT

## (undated) DEVICE — BITE BLK ENDOSCP AD 54FR GRN POLYETH ENDOSCP W STRP SLD

## (undated) DEVICE — NEEDLE HYPO 18GA L1.5IN PNK S STL HUB POLYPR SHLD REG BVL

## (undated) DEVICE — BW-412T DISP COMBO CLEANING BRUSH: Brand: SINGLE USE COMBINATION CLEANING BRUSH

## (undated) DEVICE — SYRINGE MED 20ML STD CLR PLAS LUERLOCK TIP N CTRL DISP

## (undated) DEVICE — CANN NASAL O2 CAPNOGRAPHY AD -- FILTERLINE

## (undated) DEVICE — KIT IV STRT W CHLORAPREP PD 1ML

## (undated) DEVICE — CUFF BLD PRSS CHILD SM SZ 8 FOR 12-16CM LIMB VYN SFT W/O TB

## (undated) DEVICE — SET ADMIN 16ML TBNG L100IN 2 Y INJ SITE IV PIGGY BK DISP

## (undated) DEVICE — KENDALL RADIOLUCENT FOAM MONITORING ELECTRODE -RECTANGULAR SHAPE: Brand: KENDALL

## (undated) DEVICE — 1200 GUARD II KIT W/5MM TUBE W/O VAC TUBE: Brand: GUARDIAN

## (undated) DEVICE — BAG SPEC BIOHZD LF 2MIL 6X10IN -- CONVERT TO ITEM 357326

## (undated) DEVICE — Z DISCONTINUED NO SUB IDED SET EXTN W/ 4 W STPCOCK M SPIN LOK 36IN

## (undated) DEVICE — SOLIDIFIER FLUID 3000 CC ABSORB

## (undated) DEVICE — SYR 50ML SLIP TIP NSAF LF STRL --

## (undated) DEVICE — CONTAINER SPEC 20 ML LID NEUT BUFF FORMALIN 10 % POLYPR STS